# Patient Record
Sex: FEMALE | Race: WHITE | NOT HISPANIC OR LATINO | Employment: FULL TIME | URBAN - METROPOLITAN AREA
[De-identification: names, ages, dates, MRNs, and addresses within clinical notes are randomized per-mention and may not be internally consistent; named-entity substitution may affect disease eponyms.]

---

## 2017-06-29 ENCOUNTER — APPOINTMENT (OUTPATIENT)
Dept: GENERAL RADIOLOGY | Facility: HOSPITAL | Age: 37
End: 2017-06-29
Attending: INTERNAL MEDICINE

## 2017-06-29 ENCOUNTER — HOSPITAL ENCOUNTER (INPATIENT)
Facility: HOSPITAL | Age: 37
LOS: 14 days | Discharge: NURSING, REHAB OR LONG-TERM CARE | End: 2017-07-13
Attending: INTERNAL MEDICINE | Admitting: INTERNAL MEDICINE

## 2017-06-29 DIAGNOSIS — R53.1 GENERALIZED WEAKNESS: Primary | ICD-10-CM

## 2017-06-29 LAB
ALBUMIN SERPL-MCNC: 2.9 G/DL (ref 3.5–5.2)
ALBUMIN/GLOB SERPL: 0.6 G/DL
ALP SERPL-CCNC: 77 U/L (ref 39–117)
ALT SERPL W P-5'-P-CCNC: <5 U/L (ref 1–33)
ANION GAP SERPL CALCULATED.3IONS-SCNC: 25 MMOL/L
ARTERIAL PATENCY WRIST A: POSITIVE
AST SERPL-CCNC: 7 U/L (ref 1–32)
ATMOSPHERIC PRESS: 746.8 MMHG
BACTERIA UR QL AUTO: ABNORMAL /HPF
BASE EXCESS BLDA CALC-SCNC: -25.9 MMOL/L (ref 0–2)
BDY SITE: ABNORMAL
BILIRUB SERPL-MCNC: 0.2 MG/DL (ref 0.1–1.2)
BILIRUB UR QL STRIP: NEGATIVE
BUN BLD-MCNC: 21 MG/DL (ref 6–20)
BUN/CREAT SERPL: 10.8 (ref 7–25)
CALCIUM SPEC-SCNC: 9.4 MG/DL (ref 8.6–10.5)
CHLORIDE SERPL-SCNC: 102 MMOL/L (ref 98–107)
CLARITY UR: ABNORMAL
CO2 SERPL-SCNC: 5 MMOL/L (ref 22–29)
COLOR UR: YELLOW
CREAT BLD-MCNC: 1.94 MG/DL (ref 0.57–1)
D-LACTATE SERPL-SCNC: 2.1 MMOL/L (ref 0.5–2)
DEPRECATED RDW RBC AUTO: 48 FL (ref 37–54)
ERYTHROCYTE [DISTWIDTH] IN BLOOD BY AUTOMATED COUNT: 14.6 % (ref 11.7–13)
GAS FLOW AIRWAY: 1 LPM
GFR SERPL CREATININE-BSD FRML MDRD: 29 ML/MIN/1.73
GLOBULIN UR ELPH-MCNC: 4.8 GM/DL
GLUCOSE BLD-MCNC: 300 MG/DL (ref 65–99)
GLUCOSE BLDC GLUCOMTR-MCNC: 226 MG/DL (ref 70–130)
GLUCOSE BLDC GLUCOMTR-MCNC: 243 MG/DL (ref 70–130)
GLUCOSE BLDC GLUCOMTR-MCNC: 262 MG/DL (ref 70–130)
GLUCOSE BLDC GLUCOMTR-MCNC: 330 MG/DL (ref 70–130)
GLUCOSE UR STRIP-MCNC: ABNORMAL MG/DL
GRAN CASTS URNS QL MICRO: ABNORMAL /LPF
HBA1C MFR BLD: 12.82 % (ref 4.8–5.6)
HCO3 BLDA-SCNC: 4 MMOL/L (ref 22–28)
HCT VFR BLD AUTO: 33.6 % (ref 35.6–45.5)
HGB BLD-MCNC: 12.2 G/DL (ref 11.9–15.5)
HGB UR QL STRIP.AUTO: ABNORMAL
HYALINE CASTS UR QL AUTO: ABNORMAL /LPF
KETONES UR QL STRIP: ABNORMAL
LEUKOCYTE ESTERASE UR QL STRIP.AUTO: NEGATIVE
MCH RBC QN AUTO: 32.8 PG (ref 26.9–32)
MCHC RBC AUTO-ENTMCNC: 36.3 G/DL (ref 32.4–36.3)
MCV RBC AUTO: 90.3 FL (ref 80.5–98.2)
MODALITY: ABNORMAL
NITRITE UR QL STRIP: NEGATIVE
PCO2 BLDA: 16.8 MM HG (ref 35–45)
PH BLDA: 6.99 PH UNITS (ref 7.35–7.45)
PH UR STRIP.AUTO: 6 [PH] (ref 5–8)
PLATELET # BLD AUTO: 383 10*3/MM3 (ref 140–500)
PMV BLD AUTO: 10.3 FL (ref 6–12)
PO2 BLDA: 88.8 MM HG (ref 80–100)
POTASSIUM BLD-SCNC: <1.5 MMOL/L (ref 3.5–5.2)
PROCALCITONIN SERPL-MCNC: 2.18 NG/ML (ref 0.1–0.25)
PROT SERPL-MCNC: 7.7 G/DL (ref 6–8.5)
PROT UR QL STRIP: ABNORMAL
RBC # BLD AUTO: 3.72 10*6/MM3 (ref 3.9–5.2)
RBC # UR: ABNORMAL /HPF
REF LAB TEST METHOD: ABNORMAL
SAO2 % BLDCOA: 90.5 % (ref 92–99)
SODIUM BLD-SCNC: 132 MMOL/L (ref 136–145)
SP GR UR STRIP: 1.02 (ref 1–1.03)
SQUAMOUS #/AREA URNS HPF: ABNORMAL /HPF
TOTAL RATE: 28 BREATHS/MINUTE
TSH SERPL DL<=0.05 MIU/L-ACNC: 4.91 MIU/ML (ref 0.27–4.2)
UROBILINOGEN UR QL STRIP: ABNORMAL
WBC NRBC COR # BLD: 16.5 10*3/MM3 (ref 4.5–10.7)
WBC UR QL AUTO: ABNORMAL /HPF

## 2017-06-29 PROCEDURE — 84145 PROCALCITONIN (PCT): CPT | Performed by: INTERNAL MEDICINE

## 2017-06-29 PROCEDURE — 25010000002 VANCOMYCIN 10 G RECONSTITUTED SOLUTION: Performed by: INTERNAL MEDICINE

## 2017-06-29 PROCEDURE — 36600 WITHDRAWAL OF ARTERIAL BLOOD: CPT

## 2017-06-29 PROCEDURE — 93005 ELECTROCARDIOGRAM TRACING: CPT | Performed by: INTERNAL MEDICINE

## 2017-06-29 PROCEDURE — 84100 ASSAY OF PHOSPHORUS: CPT | Performed by: INTERNAL MEDICINE

## 2017-06-29 PROCEDURE — 83735 ASSAY OF MAGNESIUM: CPT | Performed by: INTERNAL MEDICINE

## 2017-06-29 PROCEDURE — 80053 COMPREHEN METABOLIC PANEL: CPT | Performed by: INTERNAL MEDICINE

## 2017-06-29 PROCEDURE — 80048 BASIC METABOLIC PNL TOTAL CA: CPT | Performed by: INTERNAL MEDICINE

## 2017-06-29 PROCEDURE — 81001 URINALYSIS AUTO W/SCOPE: CPT | Performed by: INTERNAL MEDICINE

## 2017-06-29 PROCEDURE — 63710000001 INSULIN REGULAR HUMAN PER 5 UNITS: Performed by: INTERNAL MEDICINE

## 2017-06-29 PROCEDURE — 83605 ASSAY OF LACTIC ACID: CPT | Performed by: INTERNAL MEDICINE

## 2017-06-29 PROCEDURE — 82962 GLUCOSE BLOOD TEST: CPT

## 2017-06-29 PROCEDURE — 82803 BLOOD GASES ANY COMBINATION: CPT

## 2017-06-29 PROCEDURE — 87086 URINE CULTURE/COLONY COUNT: CPT | Performed by: INTERNAL MEDICINE

## 2017-06-29 PROCEDURE — 25010000002 PIPERACILLIN SOD-TAZOBACTAM PER 1 G: Performed by: INTERNAL MEDICINE

## 2017-06-29 PROCEDURE — 94799 UNLISTED PULMONARY SVC/PX: CPT

## 2017-06-29 PROCEDURE — 85027 COMPLETE CBC AUTOMATED: CPT | Performed by: INTERNAL MEDICINE

## 2017-06-29 PROCEDURE — 25010000002 MAGNESIUM SULFATE IN D5W 1G/100ML (PREMIX) 1-5 GM/100ML-% SOLUTION: Performed by: INTERNAL MEDICINE

## 2017-06-29 PROCEDURE — 25010000002 HEPARIN (PORCINE) PER 1000 UNITS: Performed by: INTERNAL MEDICINE

## 2017-06-29 PROCEDURE — 25810000003 POTASSIUM CHLORIDE PER 2 MEQ: Performed by: INTERNAL MEDICINE

## 2017-06-29 PROCEDURE — 25010000003 POTASSIUM CHLORIDE 10 MEQ/100ML SOLUTION: Performed by: INTERNAL MEDICINE

## 2017-06-29 PROCEDURE — 83036 HEMOGLOBIN GLYCOSYLATED A1C: CPT | Performed by: INTERNAL MEDICINE

## 2017-06-29 PROCEDURE — 82330 ASSAY OF CALCIUM: CPT | Performed by: INTERNAL MEDICINE

## 2017-06-29 PROCEDURE — 71010 HC CHEST PA OR AP: CPT

## 2017-06-29 PROCEDURE — 84443 ASSAY THYROID STIM HORMONE: CPT | Performed by: INTERNAL MEDICINE

## 2017-06-29 RX ORDER — POTASSIUM CHLORIDE 1.5 G/1.77G
40 POWDER, FOR SOLUTION ORAL AS NEEDED
Status: DISCONTINUED | OUTPATIENT
Start: 2017-06-29 | End: 2017-06-29 | Stop reason: CLARIF

## 2017-06-29 RX ORDER — POTASSIUM CHLORIDE 750 MG/1
20 CAPSULE, EXTENDED RELEASE ORAL AS NEEDED
Status: DISCONTINUED | OUTPATIENT
Start: 2017-06-29 | End: 2017-06-30 | Stop reason: ALTCHOICE

## 2017-06-29 RX ORDER — POTASSIUM CHLORIDE 7.46 G/1000ML
10 INJECTION, SOLUTION INTRAVENOUS
Status: DISCONTINUED | OUTPATIENT
Start: 2017-06-29 | End: 2017-06-30 | Stop reason: ALTCHOICE

## 2017-06-29 RX ORDER — SODIUM CHLORIDE AND POTASSIUM CHLORIDE 150; 450 MG/100ML; MG/100ML
250 INJECTION, SOLUTION INTRAVENOUS CONTINUOUS PRN
Status: DISCONTINUED | OUTPATIENT
Start: 2017-06-29 | End: 2017-07-02

## 2017-06-29 RX ORDER — POTASSIUM CHLORIDE 7.45 MG/ML
10 INJECTION INTRAVENOUS
Status: DISCONTINUED | OUTPATIENT
Start: 2017-06-29 | End: 2017-06-30 | Stop reason: ALTCHOICE

## 2017-06-29 RX ORDER — POTASSIUM CHLORIDE 1.5 G/1.77G
20 POWDER, FOR SOLUTION ORAL AS NEEDED
Status: DISCONTINUED | OUTPATIENT
Start: 2017-06-29 | End: 2017-06-30 | Stop reason: ALTCHOICE

## 2017-06-29 RX ORDER — POTASSIUM CHLORIDE 750 MG/1
40 CAPSULE, EXTENDED RELEASE ORAL AS NEEDED
Status: DISCONTINUED | OUTPATIENT
Start: 2017-06-29 | End: 2017-06-30 | Stop reason: ALTCHOICE

## 2017-06-29 RX ORDER — MAGNESIUM SULFATE HEPTAHYDRATE 40 MG/ML
2 INJECTION, SOLUTION INTRAVENOUS AS NEEDED
Status: DISCONTINUED | OUTPATIENT
Start: 2017-06-29 | End: 2017-07-06

## 2017-06-29 RX ORDER — DEXTROSE AND SODIUM CHLORIDE 5; .45 G/100ML; G/100ML
150 INJECTION, SOLUTION INTRAVENOUS CONTINUOUS PRN
Status: DISCONTINUED | OUTPATIENT
Start: 2017-06-29 | End: 2017-07-02

## 2017-06-29 RX ORDER — SODIUM CHLORIDE 450 MG/100ML
250 INJECTION, SOLUTION INTRAVENOUS CONTINUOUS
Status: DISCONTINUED | OUTPATIENT
Start: 2017-06-29 | End: 2017-07-02

## 2017-06-29 RX ORDER — MAGNESIUM SULFATE 1 G/100ML
1 INJECTION INTRAVENOUS ONCE
Status: COMPLETED | OUTPATIENT
Start: 2017-06-29 | End: 2017-06-29

## 2017-06-29 RX ORDER — POTASSIUM CHLORIDE 750 MG/1
10 CAPSULE, EXTENDED RELEASE ORAL AS NEEDED
Status: DISCONTINUED | OUTPATIENT
Start: 2017-06-29 | End: 2017-06-30 | Stop reason: ALTCHOICE

## 2017-06-29 RX ORDER — MAGNESIUM SULFATE HEPTAHYDRATE 40 MG/ML
4 INJECTION, SOLUTION INTRAVENOUS AS NEEDED
Status: DISCONTINUED | OUTPATIENT
Start: 2017-06-29 | End: 2017-07-06

## 2017-06-29 RX ORDER — DEXTROSE MONOHYDRATE 25 G/50ML
12.5 INJECTION, SOLUTION INTRAVENOUS
Status: DISCONTINUED | OUTPATIENT
Start: 2017-06-29 | End: 2017-07-13 | Stop reason: HOSPADM

## 2017-06-29 RX ORDER — POTASSIUM CHLORIDE 1.5 G/1.77G
10 POWDER, FOR SOLUTION ORAL AS NEEDED
Status: DISCONTINUED | OUTPATIENT
Start: 2017-06-29 | End: 2017-06-30 | Stop reason: ALTCHOICE

## 2017-06-29 RX ORDER — HEPARIN SODIUM 5000 [USP'U]/ML
5000 INJECTION, SOLUTION INTRAVENOUS; SUBCUTANEOUS EVERY 8 HOURS SCHEDULED
Status: DISCONTINUED | OUTPATIENT
Start: 2017-06-29 | End: 2017-07-01

## 2017-06-29 RX ORDER — DEXTROSE, SODIUM CHLORIDE, AND POTASSIUM CHLORIDE 5; .45; .15 G/100ML; G/100ML; G/100ML
150 INJECTION INTRAVENOUS CONTINUOUS PRN
Status: DISCONTINUED | OUTPATIENT
Start: 2017-06-29 | End: 2017-07-02

## 2017-06-29 RX ADMIN — POTASSIUM CHLORIDE 10 MEQ: 7.46 INJECTION, SOLUTION INTRAVENOUS at 20:43

## 2017-06-29 RX ADMIN — MAGNESIUM SULFATE HEPTAHYDRATE 1 G: 1 INJECTION, SOLUTION INTRAVENOUS at 20:43

## 2017-06-29 RX ADMIN — VANCOMYCIN HYDROCHLORIDE 1500 MG: 10 INJECTION, POWDER, LYOPHILIZED, FOR SOLUTION INTRAVENOUS at 21:54

## 2017-06-29 RX ADMIN — TAZOBACTAM SODIUM AND PIPERACILLIN SODIUM 3.38 G: 375; 3 INJECTION, SOLUTION INTRAVENOUS at 21:08

## 2017-06-29 RX ADMIN — HEPARIN SODIUM 5000 UNITS: 5000 INJECTION, SOLUTION INTRAVENOUS; SUBCUTANEOUS at 22:13

## 2017-06-29 RX ADMIN — HUMAN INSULIN 7.1 UNITS: 100 INJECTION, SOLUTION SUBCUTANEOUS at 22:14

## 2017-06-29 RX ADMIN — POTASSIUM CHLORIDE 10 MEQ: 7.46 INJECTION, SOLUTION INTRAVENOUS at 22:08

## 2017-06-29 RX ADMIN — SODIUM CHLORIDE 0.13 UNITS/KG/HR: 9 INJECTION, SOLUTION INTRAVENOUS at 22:17

## 2017-06-29 RX ADMIN — POTASSIUM CHLORIDE AND SODIUM CHLORIDE 250 ML/HR: 450; 150 INJECTION, SOLUTION INTRAVENOUS at 22:13

## 2017-06-29 RX ADMIN — POTASSIUM CHLORIDE 10 MEQ: 7.46 INJECTION, SOLUTION INTRAVENOUS at 22:07

## 2017-06-29 RX ADMIN — HUMAN INSULIN 7.1 UNITS: 100 INJECTION, SOLUTION SUBCUTANEOUS at 21:08

## 2017-06-30 ENCOUNTER — APPOINTMENT (OUTPATIENT)
Dept: GENERAL RADIOLOGY | Facility: HOSPITAL | Age: 37
End: 2017-06-30
Attending: INTERNAL MEDICINE

## 2017-06-30 PROBLEM — E11.10 DKA (DIABETIC KETOACIDOSES): Status: ACTIVE | Noted: 2017-06-30

## 2017-06-30 LAB
ALBUMIN SERPL-MCNC: 2.6 G/DL (ref 3.5–5.2)
ANION GAP SERPL CALCULATED.3IONS-SCNC: 13.9 MMOL/L
ANION GAP SERPL CALCULATED.3IONS-SCNC: 15.9 MMOL/L
ANION GAP SERPL CALCULATED.3IONS-SCNC: 16.1 MMOL/L
ANION GAP SERPL CALCULATED.3IONS-SCNC: 17 MMOL/L
ANION GAP SERPL CALCULATED.3IONS-SCNC: 18.8 MMOL/L
ANION GAP SERPL CALCULATED.3IONS-SCNC: 21.2 MMOL/L
ARTERIAL PATENCY WRIST A: ABNORMAL
ATMOSPHERIC PRESS: 744.2 MMHG
ATMOSPHERIC PRESS: 747.9 MMHG
B PERT DNA SPEC QL NAA+PROBE: NOT DETECTED
BASE EXCESS BLDA CALC-SCNC: -9.4 MMOL/L (ref 0–2)
BASE EXCESS BLDV CALC-SCNC: -22.5 MMOL/L
BDY SITE: ABNORMAL
BDY SITE: ABNORMAL
BUN BLD-MCNC: 13 MG/DL (ref 6–20)
BUN BLD-MCNC: 21 MG/DL (ref 6–20)
BUN BLD-MCNC: 24 MG/DL (ref 6–20)
BUN BLD-MCNC: 26 MG/DL (ref 6–20)
BUN BLD-MCNC: 27 MG/DL (ref 6–20)
BUN BLD-MCNC: 28 MG/DL (ref 6–20)
BUN/CREAT SERPL: 10.3 (ref 7–25)
BUN/CREAT SERPL: 10.5 (ref 7–25)
BUN/CREAT SERPL: 11.1 (ref 7–25)
BUN/CREAT SERPL: 11.6 (ref 7–25)
BUN/CREAT SERPL: 12.1 (ref 7–25)
BUN/CREAT SERPL: 9.2 (ref 7–25)
C PNEUM DNA NPH QL NAA+NON-PROBE: NOT DETECTED
CA-I BLD-MCNC: 4.5 MG/DL (ref 4.6–5.4)
CA-I BLD-MCNC: 5.9 MG/DL (ref 4.6–5.4)
CA-I BLD-MCNC: 6 MG/DL (ref 4.6–5.4)
CA-I BLD-MCNC: 6.2 MG/DL (ref 4.6–5.4)
CA-I BLD-MCNC: 6.4 MG/DL (ref 4.6–5.4)
CA-I BLD-MCNC: 6.5 MG/DL (ref 4.6–5.4)
CA-I SERPL ISE-MCNC: 1.13 MMOL/L (ref 1.1–1.35)
CA-I SERPL ISE-MCNC: 1.48 MMOL/L (ref 1.1–1.35)
CA-I SERPL ISE-MCNC: 1.5 MMOL/L (ref 1.1–1.35)
CA-I SERPL ISE-MCNC: 1.55 MMOL/L (ref 1.1–1.35)
CA-I SERPL ISE-MCNC: 1.6 MMOL/L (ref 1.1–1.35)
CA-I SERPL ISE-MCNC: 1.62 MMOL/L (ref 1.1–1.35)
CALCIUM SPEC-SCNC: 7.1 MG/DL (ref 8.6–10.5)
CALCIUM SPEC-SCNC: 7.8 MG/DL (ref 8.6–10.5)
CALCIUM SPEC-SCNC: 8.4 MG/DL (ref 8.6–10.5)
CALCIUM SPEC-SCNC: 9 MG/DL (ref 8.6–10.5)
CALCIUM SPEC-SCNC: 9.2 MG/DL (ref 8.6–10.5)
CALCIUM SPEC-SCNC: 9.3 MG/DL (ref 8.6–10.5)
CHLORIDE SERPL-SCNC: 107 MMOL/L (ref 98–107)
CHLORIDE SERPL-SCNC: 109 MMOL/L (ref 98–107)
CHLORIDE SERPL-SCNC: 111 MMOL/L (ref 98–107)
CHLORIDE SERPL-SCNC: 112 MMOL/L (ref 98–107)
CHLORIDE SERPL-SCNC: 113 MMOL/L (ref 98–107)
CHLORIDE SERPL-SCNC: 97 MMOL/L (ref 98–107)
CO2 SERPL-SCNC: 15.2 MMOL/L (ref 22–29)
CO2 SERPL-SCNC: 4 MMOL/L (ref 22–29)
CO2 SERPL-SCNC: 4.8 MMOL/L (ref 22–29)
CO2 SERPL-SCNC: 5.9 MMOL/L (ref 22–29)
CO2 SERPL-SCNC: 6.1 MMOL/L (ref 22–29)
CO2 SERPL-SCNC: 6.1 MMOL/L (ref 22–29)
CORTIS SERPL-MCNC: 72 MCG/DL
CREAT BLD-MCNC: 1.42 MG/DL (ref 0.57–1)
CREAT BLD-MCNC: 1.81 MG/DL (ref 0.57–1)
CREAT BLD-MCNC: 1.98 MG/DL (ref 0.57–1)
CREAT BLD-MCNC: 2.34 MG/DL (ref 0.57–1)
CREAT BLD-MCNC: 2.56 MG/DL (ref 0.57–1)
CREAT BLD-MCNC: 2.73 MG/DL (ref 0.57–1)
D-LACTATE SERPL-SCNC: 1.7 MMOL/L (ref 0.5–2)
D-LACTATE SERPL-SCNC: 2.9 MMOL/L (ref 0.5–2)
D-LACTATE SERPL-SCNC: 3 MMOL/L (ref 0.5–2)
FLUAV H1 2009 PAND RNA NPH QL NAA+PROBE: NOT DETECTED
FLUAV H1 HA GENE NPH QL NAA+PROBE: NOT DETECTED
FLUAV H3 RNA NPH QL NAA+PROBE: NOT DETECTED
FLUAV SUBTYP SPEC NAA+PROBE: NOT DETECTED
FLUBV RNA ISLT QL NAA+PROBE: NOT DETECTED
GAS FLOW AIRWAY: 1 LPM
GFR SERPL CREATININE-BSD FRML MDRD: 19 ML/MIN/1.73
GFR SERPL CREATININE-BSD FRML MDRD: 21 ML/MIN/1.73
GFR SERPL CREATININE-BSD FRML MDRD: 23 ML/MIN/1.73
GFR SERPL CREATININE-BSD FRML MDRD: 28 ML/MIN/1.73
GFR SERPL CREATININE-BSD FRML MDRD: 31 ML/MIN/1.73
GFR SERPL CREATININE-BSD FRML MDRD: 41 ML/MIN/1.73
GLUCOSE BLD-MCNC: 146 MG/DL (ref 65–99)
GLUCOSE BLD-MCNC: 147 MG/DL (ref 65–99)
GLUCOSE BLD-MCNC: 181 MG/DL (ref 65–99)
GLUCOSE BLD-MCNC: 185 MG/DL (ref 65–99)
GLUCOSE BLD-MCNC: 221 MG/DL (ref 65–99)
GLUCOSE BLD-MCNC: 254 MG/DL (ref 65–99)
GLUCOSE BLDC GLUCOMTR-MCNC: 115 MG/DL (ref 70–130)
GLUCOSE BLDC GLUCOMTR-MCNC: 119 MG/DL (ref 70–130)
GLUCOSE BLDC GLUCOMTR-MCNC: 124 MG/DL (ref 70–130)
GLUCOSE BLDC GLUCOMTR-MCNC: 132 MG/DL (ref 70–130)
GLUCOSE BLDC GLUCOMTR-MCNC: 138 MG/DL (ref 70–130)
GLUCOSE BLDC GLUCOMTR-MCNC: 138 MG/DL (ref 70–130)
GLUCOSE BLDC GLUCOMTR-MCNC: 144 MG/DL (ref 70–130)
GLUCOSE BLDC GLUCOMTR-MCNC: 148 MG/DL (ref 70–130)
GLUCOSE BLDC GLUCOMTR-MCNC: 154 MG/DL (ref 70–130)
GLUCOSE BLDC GLUCOMTR-MCNC: 155 MG/DL (ref 70–130)
GLUCOSE BLDC GLUCOMTR-MCNC: 156 MG/DL (ref 70–130)
GLUCOSE BLDC GLUCOMTR-MCNC: 158 MG/DL (ref 70–130)
GLUCOSE BLDC GLUCOMTR-MCNC: 159 MG/DL (ref 70–130)
GLUCOSE BLDC GLUCOMTR-MCNC: 165 MG/DL (ref 70–130)
GLUCOSE BLDC GLUCOMTR-MCNC: 167 MG/DL (ref 70–130)
GLUCOSE BLDC GLUCOMTR-MCNC: 173 MG/DL (ref 70–130)
GLUCOSE BLDC GLUCOMTR-MCNC: 178 MG/DL (ref 70–130)
GLUCOSE BLDC GLUCOMTR-MCNC: 195 MG/DL (ref 70–130)
GLUCOSE BLDC GLUCOMTR-MCNC: 197 MG/DL (ref 70–130)
GLUCOSE BLDC GLUCOMTR-MCNC: 199 MG/DL (ref 70–130)
GLUCOSE BLDC GLUCOMTR-MCNC: 209 MG/DL (ref 70–130)
GLUCOSE BLDC GLUCOMTR-MCNC: 255 MG/DL (ref 70–130)
GLUCOSE BLDC GLUCOMTR-MCNC: 280 MG/DL (ref 70–130)
GLUCOSE BLDC GLUCOMTR-MCNC: 71 MG/DL (ref 70–130)
GLUCOSE BLDC GLUCOMTR-MCNC: 75 MG/DL (ref 70–130)
GLUCOSE BLDC GLUCOMTR-MCNC: 81 MG/DL (ref 70–130)
GLUCOSE BLDC GLUCOMTR-MCNC: 85 MG/DL (ref 70–130)
GLUCOSE BLDC GLUCOMTR-MCNC: 96 MG/DL (ref 70–130)
GLUCOSE BLDC GLUCOMTR-MCNC: 97 MG/DL (ref 70–130)
HADV DNA SPEC NAA+PROBE: NOT DETECTED
HBV SURFACE AB SER RIA-ACNC: NORMAL
HBV SURFACE AG SERPL QL IA: NORMAL
HCO3 BLDA-SCNC: 15 MMOL/L (ref 22–28)
HCO3 BLDV-SCNC: 6.7 MMOL/L (ref 22–28)
HCOV 229E RNA SPEC QL NAA+PROBE: NOT DETECTED
HCOV HKU1 RNA SPEC QL NAA+PROBE: NOT DETECTED
HCOV NL63 RNA SPEC QL NAA+PROBE: NOT DETECTED
HCOV OC43 RNA SPEC QL NAA+PROBE: NOT DETECTED
HMPV RNA NPH QL NAA+NON-PROBE: NOT DETECTED
HOROWITZ INDEX BLD+IHG-RTO: 100 %
HPIV1 RNA SPEC QL NAA+PROBE: NOT DETECTED
HPIV2 RNA SPEC QL NAA+PROBE: NOT DETECTED
HPIV3 RNA NPH QL NAA+PROBE: NOT DETECTED
HPIV4 P GENE NPH QL NAA+PROBE: NOT DETECTED
M PNEUMO IGG SER IA-ACNC: NOT DETECTED
MAGNESIUM SERPL-MCNC: 1.8 MG/DL (ref 1.6–2.6)
MAGNESIUM SERPL-MCNC: 2 MG/DL (ref 1.6–2.6)
MAGNESIUM SERPL-MCNC: 2.3 MG/DL (ref 1.6–2.6)
MAGNESIUM SERPL-MCNC: 2.4 MG/DL (ref 1.6–2.6)
MAGNESIUM SERPL-MCNC: 2.4 MG/DL (ref 1.6–2.6)
MODALITY: ABNORMAL
MODALITY: ABNORMAL
O2 A-A PPRESDIFF RESPIRATORY: 0.3 MMHG
OSMOLALITY SERPL: 292 MOSM/KG (ref 275–300)
PCO2 BLDA: 27.3 MM HG (ref 35–45)
PCO2 BLDV: 24.7 MM HG (ref 41–51)
PEEP RESPIRATORY: 10 CM[H2O]
PH BLDA: 7.35 PH UNITS (ref 7.35–7.45)
PH BLDV: 7.04 [PH] (ref 7.31–7.41)
PHOSPHATE SERPL-MCNC: 0.4 MG/DL (ref 2.5–4.5)
PHOSPHATE SERPL-MCNC: 0.6 MG/DL (ref 2.5–4.5)
PHOSPHATE SERPL-MCNC: 1.4 MG/DL (ref 2.5–4.5)
PHOSPHATE SERPL-MCNC: 1.8 MG/DL (ref 2.5–4.5)
PO2 BLDA: 196.6 MM HG (ref 80–100)
PO2 BLDV: 48.8 MM HG (ref 35–45)
POTASSIUM BLD-SCNC: 2 MMOL/L (ref 3.5–5.2)
POTASSIUM BLD-SCNC: 3.3 MMOL/L (ref 3.5–5.2)
POTASSIUM BLD-SCNC: 3.4 MMOL/L (ref 3.5–5.2)
POTASSIUM BLD-SCNC: 3.7 MMOL/L (ref 3.5–5.2)
POTASSIUM BLD-SCNC: 3.9 MMOL/L (ref 3.5–5.2)
POTASSIUM BLD-SCNC: 4.2 MMOL/L (ref 3.5–5.2)
RHINOVIRUS RNA SPEC NAA+PROBE: NOT DETECTED
RSV RNA NPH QL NAA+NON-PROBE: NOT DETECTED
SAO2 % BLDCOA: 66.5 % (ref 92–99)
SAO2 % BLDCOA: 99.7 % (ref 92–99)
SET MECH RESP RATE: 24
SODIUM BLD-SCNC: 129 MMOL/L (ref 136–145)
SODIUM BLD-SCNC: 131 MMOL/L (ref 136–145)
SODIUM BLD-SCNC: 132 MMOL/L (ref 136–145)
SODIUM BLD-SCNC: 132 MMOL/L (ref 136–145)
SODIUM BLD-SCNC: 135 MMOL/L (ref 136–145)
SODIUM BLD-SCNC: 135 MMOL/L (ref 136–145)
TOTAL RATE: 24 BREATHS/MINUTE
TOTAL RATE: 44 BREATHS/MINUTE
VANCOMYCIN SERPL-MCNC: 9.5 MCG/ML (ref 5–40)
VENTILATOR MODE: ABNORMAL
VT ON VENT VENT: 476 ML

## 2017-06-30 PROCEDURE — 25010000002 FENTANYL CITRATE (PF) 100 MCG/2ML SOLUTION: Performed by: INTERNAL MEDICINE

## 2017-06-30 PROCEDURE — G0480 DRUG TEST DEF 1-7 CLASSES: HCPCS | Performed by: INTERNAL MEDICINE

## 2017-06-30 PROCEDURE — 80299 QUANTITATIVE ASSAY DRUG: CPT | Performed by: INTERNAL MEDICINE

## 2017-06-30 PROCEDURE — 80184 ASSAY OF PHENOBARBITAL: CPT | Performed by: INTERNAL MEDICINE

## 2017-06-30 PROCEDURE — 84100 ASSAY OF PHOSPHORUS: CPT | Performed by: INTERNAL MEDICINE

## 2017-06-30 PROCEDURE — 94799 UNLISTED PULMONARY SVC/PX: CPT

## 2017-06-30 PROCEDURE — 82330 ASSAY OF CALCIUM: CPT | Performed by: INTERNAL MEDICINE

## 2017-06-30 PROCEDURE — 02HV33Z INSERTION OF INFUSION DEVICE INTO SUPERIOR VENA CAVA, PERCUTANEOUS APPROACH: ICD-10-PCS | Performed by: INTERNAL MEDICINE

## 2017-06-30 PROCEDURE — 83605 ASSAY OF LACTIC ACID: CPT | Performed by: INTERNAL MEDICINE

## 2017-06-30 PROCEDURE — 87340 HEPATITIS B SURFACE AG IA: CPT | Performed by: INTERNAL MEDICINE

## 2017-06-30 PROCEDURE — 0BH17EZ INSERTION OF ENDOTRACHEAL AIRWAY INTO TRACHEA, VIA NATURAL OR ARTIFICIAL OPENING: ICD-10-PCS | Performed by: INTERNAL MEDICINE

## 2017-06-30 PROCEDURE — 87040 BLOOD CULTURE FOR BACTERIA: CPT | Performed by: INTERNAL MEDICINE

## 2017-06-30 PROCEDURE — 25010000002 PROPOFOL 10 MG/ML EMULSION

## 2017-06-30 PROCEDURE — 25010000002 HEPARIN (PORCINE) PER 1000 UNITS: Performed by: INTERNAL MEDICINE

## 2017-06-30 PROCEDURE — 25010000002 CALCIUM GLUCONATE PER 10 ML: Performed by: INTERNAL MEDICINE

## 2017-06-30 PROCEDURE — 87486 CHLMYD PNEUM DNA AMP PROBE: CPT | Performed by: INTERNAL MEDICINE

## 2017-06-30 PROCEDURE — 99255 IP/OBS CONSLTJ NEW/EST HI 80: CPT | Performed by: INTERNAL MEDICINE

## 2017-06-30 PROCEDURE — 71010 HC CHEST PA OR AP: CPT

## 2017-06-30 PROCEDURE — 80048 BASIC METABOLIC PNL TOTAL CA: CPT | Performed by: INTERNAL MEDICINE

## 2017-06-30 PROCEDURE — 86704 HEP B CORE ANTIBODY TOTAL: CPT | Performed by: INTERNAL MEDICINE

## 2017-06-30 PROCEDURE — 25010000002 PIPERACILLIN SOD-TAZOBACTAM PER 1 G: Performed by: INTERNAL MEDICINE

## 2017-06-30 PROCEDURE — 25010000002 HYDROCORTISONE SODIUM SUCCINATE 100 MG RECONSTITUTED SOLUTION: Performed by: INTERNAL MEDICINE

## 2017-06-30 PROCEDURE — 25010000002 PROPOFOL 1000 MG/ML EMULSION: Performed by: INTERNAL MEDICINE

## 2017-06-30 PROCEDURE — 25010000002 THIAMINE PER 100 MG: Performed by: INTERNAL MEDICINE

## 2017-06-30 PROCEDURE — 82533 TOTAL CORTISOL: CPT | Performed by: INTERNAL MEDICINE

## 2017-06-30 PROCEDURE — 25010000002 LORAZEPAM PER 2 MG

## 2017-06-30 PROCEDURE — 87798 DETECT AGENT NOS DNA AMP: CPT | Performed by: INTERNAL MEDICINE

## 2017-06-30 PROCEDURE — 83735 ASSAY OF MAGNESIUM: CPT | Performed by: INTERNAL MEDICINE

## 2017-06-30 PROCEDURE — 25010000003 POTASSIUM CHLORIDE 10 MEQ/100ML SOLUTION: Performed by: INTERNAL MEDICINE

## 2017-06-30 PROCEDURE — 87581 M.PNEUMON DNA AMP PROBE: CPT | Performed by: INTERNAL MEDICINE

## 2017-06-30 PROCEDURE — 93010 ELECTROCARDIOGRAM REPORT: CPT | Performed by: INTERNAL MEDICINE

## 2017-06-30 PROCEDURE — 93005 ELECTROCARDIOGRAM TRACING: CPT | Performed by: INTERNAL MEDICINE

## 2017-06-30 PROCEDURE — 82010 KETONE BODYS QUAN: CPT | Performed by: INTERNAL MEDICINE

## 2017-06-30 PROCEDURE — 83930 ASSAY OF BLOOD OSMOLALITY: CPT | Performed by: INTERNAL MEDICINE

## 2017-06-30 PROCEDURE — 25010000002 PROPOFOL 10 MG/ML EMULSION: Performed by: INTERNAL MEDICINE

## 2017-06-30 PROCEDURE — 5A1955Z RESPIRATORY VENTILATION, GREATER THAN 96 CONSECUTIVE HOURS: ICD-10-PCS | Performed by: INTERNAL MEDICINE

## 2017-06-30 PROCEDURE — 80202 ASSAY OF VANCOMYCIN: CPT | Performed by: INTERNAL MEDICINE

## 2017-06-30 PROCEDURE — 82962 GLUCOSE BLOOD TEST: CPT

## 2017-06-30 PROCEDURE — 87070 CULTURE OTHR SPECIMN AEROBIC: CPT | Performed by: INTERNAL MEDICINE

## 2017-06-30 PROCEDURE — 03HC33Z INSERTION OF INFUSION DEVICE INTO LEFT RADIAL ARTERY, PERCUTANEOUS APPROACH: ICD-10-PCS | Performed by: INTERNAL MEDICINE

## 2017-06-30 PROCEDURE — 84600 ASSAY OF VOLATILES: CPT | Performed by: INTERNAL MEDICINE

## 2017-06-30 PROCEDURE — 87633 RESP VIRUS 12-25 TARGETS: CPT | Performed by: INTERNAL MEDICINE

## 2017-06-30 PROCEDURE — 25010000002 VANCOMYCIN: Performed by: INTERNAL MEDICINE

## 2017-06-30 PROCEDURE — 25810000003 POTASSIUM CHLORIDE PER 2 MEQ: Performed by: INTERNAL MEDICINE

## 2017-06-30 PROCEDURE — 80069 RENAL FUNCTION PANEL: CPT | Performed by: INTERNAL MEDICINE

## 2017-06-30 PROCEDURE — 86706 HEP B SURFACE ANTIBODY: CPT | Performed by: INTERNAL MEDICINE

## 2017-06-30 PROCEDURE — 87205 SMEAR GRAM STAIN: CPT | Performed by: INTERNAL MEDICINE

## 2017-06-30 PROCEDURE — 82803 BLOOD GASES ANY COMBINATION: CPT

## 2017-06-30 PROCEDURE — 5A1D60Z PERFORMANCE OF URINARY FILTRATION, MULTIPLE: ICD-10-PCS | Performed by: INTERNAL MEDICINE

## 2017-06-30 PROCEDURE — 36600 WITHDRAWAL OF ARTERIAL BLOOD: CPT

## 2017-06-30 RX ORDER — FAMOTIDINE 10 MG/ML
20 INJECTION, SOLUTION INTRAVENOUS DAILY
Status: DISCONTINUED | OUTPATIENT
Start: 2017-06-30 | End: 2017-07-03 | Stop reason: CLARIF

## 2017-06-30 RX ORDER — HYDROCODONE BITARTRATE AND ACETAMINOPHEN 5; 325 MG/1; MG/1
1 TABLET ORAL EVERY 4 HOURS PRN
Status: DISPENSED | OUTPATIENT
Start: 2017-06-30 | End: 2017-07-10

## 2017-06-30 RX ORDER — ASCORBIC ACID 500 MG/ML
1500 INJECTION, SOLUTION INTRAMUSCULAR; INTRAVENOUS; SUBCUTANEOUS EVERY 6 HOURS
Status: DISCONTINUED | OUTPATIENT
Start: 2017-06-30 | End: 2017-06-30 | Stop reason: SDUPTHER

## 2017-06-30 RX ORDER — FENTANYL CITRATE 50 UG/ML
50 INJECTION, SOLUTION INTRAMUSCULAR; INTRAVENOUS
Status: DISCONTINUED | OUTPATIENT
Start: 2017-06-30 | End: 2017-07-04

## 2017-06-30 RX ORDER — PROPOFOL 10 MG/ML
20 VIAL (ML) INTRAVENOUS ONCE
Status: DISCONTINUED | OUTPATIENT
Start: 2017-06-30 | End: 2017-06-30

## 2017-06-30 RX ORDER — POTASSIUM CHLORIDE 7.45 MG/ML
10 INJECTION INTRAVENOUS
Status: COMPLETED | OUTPATIENT
Start: 2017-06-30 | End: 2017-07-01

## 2017-06-30 RX ORDER — DEXTROSE MONOHYDRATE 25 G/50ML
INJECTION, SOLUTION INTRAVENOUS
Status: COMPLETED
Start: 2017-06-30 | End: 2017-06-30

## 2017-06-30 RX ORDER — ALBUMIN (HUMAN) 12.5 G/50ML
12.5 SOLUTION INTRAVENOUS AS NEEDED
Status: ACTIVE | OUTPATIENT
Start: 2017-06-30 | End: 2017-07-01

## 2017-06-30 RX ORDER — PROPOFOL 10 MG/ML
VIAL (ML) INTRAVENOUS
Status: COMPLETED
Start: 2017-06-30 | End: 2017-06-30

## 2017-06-30 RX ORDER — FENTANYL CITRATE 50 UG/ML
100 INJECTION, SOLUTION INTRAMUSCULAR; INTRAVENOUS ONCE
Status: COMPLETED | OUTPATIENT
Start: 2017-07-01 | End: 2017-06-30

## 2017-06-30 RX ORDER — LORAZEPAM 2 MG/ML
INJECTION INTRAMUSCULAR
Status: COMPLETED
Start: 2017-06-30 | End: 2017-06-30

## 2017-06-30 RX ORDER — CALCIUM GLUCONATE 94 MG/ML
1 INJECTION, SOLUTION INTRAVENOUS ONCE
Status: DISCONTINUED | OUTPATIENT
Start: 2017-06-30 | End: 2017-06-30 | Stop reason: SDUPTHER

## 2017-06-30 RX ORDER — DEXTROSE MONOHYDRATE 25 G/50ML
INJECTION, SOLUTION INTRAVENOUS
Status: DISPENSED
Start: 2017-06-30 | End: 2017-07-01

## 2017-06-30 RX ORDER — HEPARIN SODIUM 1000 [USP'U]/ML
3400 INJECTION, SOLUTION INTRAVENOUS; SUBCUTANEOUS ONCE
Status: COMPLETED | OUTPATIENT
Start: 2017-06-30 | End: 2017-06-30

## 2017-06-30 RX ORDER — LORAZEPAM 2 MG/ML
1 INJECTION INTRAMUSCULAR
Status: DISCONTINUED | OUTPATIENT
Start: 2017-06-30 | End: 2017-06-30

## 2017-06-30 RX ADMIN — HYDROCORTISONE SODIUM SUCCINATE 50 MG: 100 INJECTION, POWDER, FOR SOLUTION INTRAMUSCULAR; INTRAVENOUS at 20:45

## 2017-06-30 RX ADMIN — PROPOFOL 25 MCG/KG/MIN: 10 INJECTION, EMULSION INTRAVENOUS at 21:31

## 2017-06-30 RX ADMIN — HEPARIN SODIUM 5000 UNITS: 5000 INJECTION, SOLUTION INTRAVENOUS; SUBCUTANEOUS at 22:34

## 2017-06-30 RX ADMIN — HEPARIN SODIUM 3400 UNITS: 1000 INJECTION, SOLUTION INTRAVENOUS; SUBCUTANEOUS at 20:27

## 2017-06-30 RX ADMIN — ASCORBIC ACID: 500 INJECTION, SOLUTION INTRAMUSCULAR; INTRAVENOUS; SUBCUTANEOUS at 20:20

## 2017-06-30 RX ADMIN — POTASSIUM CHLORIDE 10 MEQ: 7.46 INJECTION, SOLUTION INTRAVENOUS at 02:50

## 2017-06-30 RX ADMIN — POTASSIUM PHOSPHATE, MONOBASIC AND POTASSIUM PHOSPHATE, DIBASIC 10 MMOL: 224; 236 INJECTION, SOLUTION INTRAVENOUS at 14:23

## 2017-06-30 RX ADMIN — FENTANYL CITRATE 100 MCG: 50 INJECTION, SOLUTION INTRAMUSCULAR; INTRAVENOUS at 23:33

## 2017-06-30 RX ADMIN — HEPARIN SODIUM 5000 UNITS: 5000 INJECTION, SOLUTION INTRAVENOUS; SUBCUTANEOUS at 05:44

## 2017-06-30 RX ADMIN — POTASSIUM CHLORIDE 10 MEQ: 7.46 INJECTION, SOLUTION INTRAVENOUS at 01:43

## 2017-06-30 RX ADMIN — FENTANYL CITRATE 50 MCG: 50 INJECTION INTRAMUSCULAR; INTRAVENOUS at 21:30

## 2017-06-30 RX ADMIN — PROPOFOL 5 MG: 10 INJECTION, EMULSION INTRAVENOUS at 16:30

## 2017-06-30 RX ADMIN — POTASSIUM CHLORIDE 10 MEQ: 7.46 INJECTION, SOLUTION INTRAVENOUS at 01:44

## 2017-06-30 RX ADMIN — POTASSIUM CHLORIDE 10 MEQ: 7.46 INJECTION, SOLUTION INTRAVENOUS at 22:34

## 2017-06-30 RX ADMIN — POTASSIUM CHLORIDE, DEXTROSE MONOHYDRATE AND SODIUM CHLORIDE 150 ML/HR: 150; 5; 450 INJECTION, SOLUTION INTRAVENOUS at 20:21

## 2017-06-30 RX ADMIN — POTASSIUM CHLORIDE 10 MEQ: 7.46 INJECTION, SOLUTION INTRAVENOUS at 00:39

## 2017-06-30 RX ADMIN — POTASSIUM CHLORIDE 10 MEQ: 7.46 INJECTION, SOLUTION INTRAVENOUS at 00:38

## 2017-06-30 RX ADMIN — POTASSIUM CHLORIDE 10 MEQ: 7.46 INJECTION, SOLUTION INTRAVENOUS at 23:55

## 2017-06-30 RX ADMIN — PROPOFOL 35 MCG/KG/MIN: 10 INJECTION, EMULSION INTRAVENOUS at 22:43

## 2017-06-30 RX ADMIN — TAZOBACTAM SODIUM AND PIPERACILLIN SODIUM 3.38 G: 375; 3 INJECTION, SOLUTION INTRAVENOUS at 13:10

## 2017-06-30 RX ADMIN — TAZOBACTAM SODIUM AND PIPERACILLIN SODIUM 3.38 G: 375; 3 INJECTION, SOLUTION INTRAVENOUS at 21:52

## 2017-06-30 RX ADMIN — THIAMINE HYDROCHLORIDE 200 MG: 100 INJECTION, SOLUTION INTRAMUSCULAR; INTRAVENOUS at 19:00

## 2017-06-30 RX ADMIN — HEPARIN SODIUM 5000 UNITS: 5000 INJECTION, SOLUTION INTRAVENOUS; SUBCUTANEOUS at 16:19

## 2017-06-30 RX ADMIN — POTASSIUM CHLORIDE 10 MEQ: 7.46 INJECTION, SOLUTION INTRAVENOUS at 02:49

## 2017-06-30 RX ADMIN — LORAZEPAM 1 MG: 2 INJECTION INTRAMUSCULAR at 03:04

## 2017-06-30 RX ADMIN — LORAZEPAM 1 MG: 2 INJECTION INTRAMUSCULAR; INTRAVENOUS at 03:04

## 2017-06-30 RX ADMIN — CALCIUM GLUCONATE 1 G: 94 INJECTION, SOLUTION INTRAVENOUS at 23:47

## 2017-06-30 RX ADMIN — POTASSIUM CHLORIDE AND SODIUM CHLORIDE 250 ML/HR: 450; 150 INJECTION, SOLUTION INTRAVENOUS at 01:59

## 2017-06-30 RX ADMIN — DEXTROSE MONOHYDRATE 12.5 G: 25 INJECTION, SOLUTION INTRAVENOUS at 15:45

## 2017-06-30 RX ADMIN — DEXTROSE MONOHYDRATE 12.5 G: 25 INJECTION, SOLUTION INTRAVENOUS at 18:39

## 2017-06-30 RX ADMIN — TAZOBACTAM SODIUM AND PIPERACILLIN SODIUM 3.38 G: 375; 3 INJECTION, SOLUTION INTRAVENOUS at 05:01

## 2017-06-30 RX ADMIN — DEXTROSE MONOHYDRATE 12.5 G: 25 INJECTION, SOLUTION INTRAVENOUS at 19:17

## 2017-06-30 RX ADMIN — VANCOMYCIN HYDROCHLORIDE 1500 MG: 1 INJECTION, POWDER, LYOPHILIZED, FOR SOLUTION INTRAVENOUS at 21:13

## 2017-06-30 RX ADMIN — POTASSIUM CHLORIDE AND SODIUM CHLORIDE 250 ML/HR: 450; 150 INJECTION, SOLUTION INTRAVENOUS at 18:34

## 2017-06-30 RX ADMIN — SODIUM PHOSPHATE, MONOBASIC, MONOHYDRATE AND SODIUM PHOSPHATE, DIBASIC, ANHYDROUS 20 MMOL: 276; 142 INJECTION, SOLUTION INTRAVENOUS at 23:48

## 2017-06-30 RX ADMIN — AZITHROMYCIN DIHYDRATE 500 MG: 500 INJECTION, POWDER, LYOPHILIZED, FOR SOLUTION INTRAVENOUS at 10:06

## 2017-06-30 RX ADMIN — POTASSIUM CHLORIDE, DEXTROSE MONOHYDRATE AND SODIUM CHLORIDE 150 ML/HR: 150; 5; 450 INJECTION, SOLUTION INTRAVENOUS at 05:01

## 2017-07-01 ENCOUNTER — APPOINTMENT (OUTPATIENT)
Dept: CARDIOLOGY | Facility: HOSPITAL | Age: 37
End: 2017-07-01
Attending: INTERNAL MEDICINE

## 2017-07-01 ENCOUNTER — APPOINTMENT (OUTPATIENT)
Dept: GENERAL RADIOLOGY | Facility: HOSPITAL | Age: 37
End: 2017-07-01

## 2017-07-01 PROBLEM — A41.9 SEPSIS: Status: ACTIVE | Noted: 2017-07-01

## 2017-07-01 PROBLEM — J18.9 PNEUMONIA: Status: ACTIVE | Noted: 2017-07-01

## 2017-07-01 PROBLEM — Z86.2 HISTORY OF ITP: Status: ACTIVE | Noted: 2017-07-01

## 2017-07-01 PROBLEM — J96.00 ACUTE RESPIRATORY FAILURE: Status: ACTIVE | Noted: 2017-07-01

## 2017-07-01 PROBLEM — M32.9 HISTORY OF SYSTEMIC LUPUS ERYTHEMATOSUS (SLE) (HCC): Status: ACTIVE | Noted: 2017-07-01

## 2017-07-01 PROBLEM — Z90.81 HISTORY OF SPLENECTOMY: Status: ACTIVE | Noted: 2017-07-01

## 2017-07-01 PROBLEM — E03.9 PRIMARY HYPOTHYROIDISM: Status: ACTIVE | Noted: 2017-07-01

## 2017-07-01 LAB
ALBUMIN SERPL-MCNC: 1.6 G/DL (ref 3.5–5.2)
ALBUMIN SERPL-MCNC: 1.7 G/DL (ref 3.5–5.2)
ALBUMIN SERPL-MCNC: 1.9 G/DL (ref 3.5–5.2)
ANION GAP SERPL CALCULATED.3IONS-SCNC: 11.5 MMOL/L
ANION GAP SERPL CALCULATED.3IONS-SCNC: 11.9 MMOL/L
ANION GAP SERPL CALCULATED.3IONS-SCNC: 17.3 MMOL/L
ANION GAP SERPL CALCULATED.3IONS-SCNC: 18 MMOL/L
APTT PPP: 107.8 SECONDS (ref 22.7–35.4)
APTT PPP: 37.4 SECONDS (ref 22.7–35.4)
APTT PPP: 37.6 SECONDS (ref 22.7–35.4)
ARTERIAL PATENCY WRIST A: ABNORMAL
ATMOSPHERIC PRESS: 744.5 MMHG
ATMOSPHERIC PRESS: 745.9 MMHG
ATMOSPHERIC PRESS: 746.5 MMHG
ATMOSPHERIC PRESS: 747.5 MMHG
BACTERIA SPEC AEROBE CULT: NO GROWTH
BASE EXCESS BLDA CALC-SCNC: -0.1 MMOL/L (ref 0–2)
BASE EXCESS BLDA CALC-SCNC: -10.3 MMOL/L (ref 0–2)
BASE EXCESS BLDA CALC-SCNC: -13.4 MMOL/L (ref 0–2)
BASE EXCESS BLDA CALC-SCNC: -5.2 MMOL/L (ref 0–2)
BASOPHILS # BLD AUTO: 0.01 10*3/MM3 (ref 0–0.2)
BASOPHILS NFR BLD AUTO: 0.1 % (ref 0–1.5)
BDY SITE: ABNORMAL
BUN BLD-MCNC: 12 MG/DL (ref 6–20)
BUN BLD-MCNC: 16 MG/DL (ref 6–20)
BUN BLD-MCNC: 17 MG/DL (ref 6–20)
BUN BLD-MCNC: 6 MG/DL (ref 6–20)
BUN/CREAT SERPL: 6.7 (ref 7–25)
BUN/CREAT SERPL: 7.1 (ref 7–25)
BUN/CREAT SERPL: 7.9 (ref 7–25)
BUN/CREAT SERPL: 8.4 (ref 7–25)
BURR CELLS BLD QL SMEAR: NORMAL
C3 FRG RBC-MCNC: NORMAL
CA-I BLD-MCNC: 4 MG/DL (ref 4.6–5.4)
CA-I BLD-MCNC: 4.1 MG/DL (ref 4.6–5.4)
CA-I BLD-MCNC: 4.4 MG/DL (ref 4.6–5.4)
CA-I SERPL ISE-MCNC: 1 MMOL/L (ref 1.1–1.35)
CA-I SERPL ISE-MCNC: 1.02 MMOL/L (ref 1.1–1.35)
CA-I SERPL ISE-MCNC: 1.1 MMOL/L (ref 1.1–1.35)
CALCIUM SPEC-SCNC: 6.6 MG/DL (ref 8.6–10.5)
CALCIUM SPEC-SCNC: 6.8 MG/DL (ref 8.6–10.5)
CALCIUM SPEC-SCNC: 7 MG/DL (ref 8.6–10.5)
CALCIUM SPEC-SCNC: 7.4 MG/DL (ref 8.6–10.5)
CHLORIDE SERPL-SCNC: 101 MMOL/L (ref 98–107)
CHLORIDE SERPL-SCNC: 96 MMOL/L (ref 98–107)
CO2 SERPL-SCNC: 14 MMOL/L (ref 22–29)
CO2 SERPL-SCNC: 16.7 MMOL/L (ref 22–29)
CO2 SERPL-SCNC: 22.1 MMOL/L (ref 22–29)
CO2 SERPL-SCNC: 26.5 MMOL/L (ref 22–29)
CREAT BLD-MCNC: 0.85 MG/DL (ref 0.57–1)
CREAT BLD-MCNC: 1.79 MG/DL (ref 0.57–1)
CREAT BLD-MCNC: 1.9 MG/DL (ref 0.57–1)
CREAT BLD-MCNC: 2.15 MG/DL (ref 0.57–1)
D-LACTATE SERPL-SCNC: 0.2 MMOL/L (ref 0.5–2)
D-LACTATE SERPL-SCNC: 0.3 MMOL/L (ref 0.5–2)
D-LACTATE SERPL-SCNC: 1.6 MMOL/L (ref 0.5–2)
D-LACTATE SERPL-SCNC: 2.4 MMOL/L (ref 0.5–2)
D-LACTATE SERPL-SCNC: 2.9 MMOL/L (ref 0.5–2)
D-LACTATE SERPL-SCNC: 4.8 MMOL/L (ref 0.5–2)
DEPRECATED RDW RBC AUTO: 45.2 FL (ref 37–54)
EOSINOPHIL # BLD AUTO: 0 10*3/MM3 (ref 0–0.7)
EOSINOPHIL NFR BLD AUTO: 0 % (ref 0.3–6.2)
ERYTHROCYTE [DISTWIDTH] IN BLOOD BY AUTOMATED COUNT: 14.6 % (ref 11.7–13)
GFR SERPL CREATININE-BSD FRML MDRD: 26 ML/MIN/1.73
GFR SERPL CREATININE-BSD FRML MDRD: 29 ML/MIN/1.73
GFR SERPL CREATININE-BSD FRML MDRD: 32 ML/MIN/1.73
GFR SERPL CREATININE-BSD FRML MDRD: 74 ML/MIN/1.73
GLUCOSE BLD-MCNC: 144 MG/DL (ref 65–99)
GLUCOSE BLD-MCNC: 150 MG/DL (ref 65–99)
GLUCOSE BLD-MCNC: 198 MG/DL (ref 65–99)
GLUCOSE BLD-MCNC: 290 MG/DL (ref 65–99)
GLUCOSE BLDC GLUCOMTR-MCNC: 118 MG/DL (ref 70–130)
GLUCOSE BLDC GLUCOMTR-MCNC: 129 MG/DL (ref 70–130)
GLUCOSE BLDC GLUCOMTR-MCNC: 131 MG/DL (ref 70–130)
GLUCOSE BLDC GLUCOMTR-MCNC: 147 MG/DL (ref 70–130)
GLUCOSE BLDC GLUCOMTR-MCNC: 157 MG/DL (ref 70–130)
GLUCOSE BLDC GLUCOMTR-MCNC: 162 MG/DL (ref 70–130)
GLUCOSE BLDC GLUCOMTR-MCNC: 166 MG/DL (ref 70–130)
GLUCOSE BLDC GLUCOMTR-MCNC: 166 MG/DL (ref 70–130)
GLUCOSE BLDC GLUCOMTR-MCNC: 179 MG/DL (ref 70–130)
GLUCOSE BLDC GLUCOMTR-MCNC: 202 MG/DL (ref 70–130)
GLUCOSE BLDC GLUCOMTR-MCNC: 211 MG/DL (ref 70–130)
GLUCOSE BLDC GLUCOMTR-MCNC: 246 MG/DL (ref 70–130)
GLUCOSE BLDC GLUCOMTR-MCNC: 282 MG/DL (ref 70–130)
GLUCOSE BLDC GLUCOMTR-MCNC: 293 MG/DL (ref 70–130)
GLUCOSE BLDC GLUCOMTR-MCNC: 304 MG/DL (ref 70–130)
GLUCOSE BLDC GLUCOMTR-MCNC: 328 MG/DL (ref 70–130)
GLUCOSE BLDC GLUCOMTR-MCNC: 62 MG/DL (ref 70–130)
GLUCOSE BLDC GLUCOMTR-MCNC: 87 MG/DL (ref 70–130)
GLUCOSE BLDC GLUCOMTR-MCNC: 91 MG/DL (ref 70–130)
GLUCOSE BLDC GLUCOMTR-MCNC: 93 MG/DL (ref 70–130)
HCG INTACT+B SERPL-ACNC: <0.5 MIU/ML
HCO3 BLDA-SCNC: 13.1 MMOL/L (ref 22–28)
HCO3 BLDA-SCNC: 14.2 MMOL/L (ref 22–28)
HCO3 BLDA-SCNC: 22.9 MMOL/L (ref 22–28)
HCO3 BLDA-SCNC: 26.1 MMOL/L (ref 22–28)
HCT VFR BLD AUTO: 29 % (ref 35.6–45.5)
HCT VFR BLD AUTO: 29.2 % (ref 35.6–45.5)
HGB BLD-MCNC: 11.1 G/DL (ref 11.9–15.5)
HGB BLD-MCNC: 9.7 G/DL (ref 11.9–15.5)
HOROWITZ INDEX BLD+IHG-RTO: 100 %
HYPOCHROMIA BLD QL: NORMAL
IMM GRANULOCYTES # BLD: 0.09 10*3/MM3 (ref 0–0.03)
IMM GRANULOCYTES NFR BLD: 0.8 % (ref 0–0.5)
INR PPP: 1.63 (ref 0.9–1.1)
LYMPHOCYTES # BLD AUTO: 0.83 10*3/MM3 (ref 0.9–4.8)
LYMPHOCYTES NFR BLD AUTO: 7.6 % (ref 19.6–45.3)
MAGNESIUM SERPL-MCNC: 1.8 MG/DL (ref 1.6–2.6)
MAGNESIUM SERPL-MCNC: 1.8 MG/DL (ref 1.6–2.6)
MAGNESIUM SERPL-MCNC: 1.9 MG/DL (ref 1.6–2.6)
MODALITY: ABNORMAL
MONOCYTES # BLD AUTO: 0.53 10*3/MM3 (ref 0.2–1.2)
MONOCYTES NFR BLD AUTO: 4.9 % (ref 5–12)
NEUTROPHILS # BLD AUTO: 9.44 10*3/MM3 (ref 1.9–8.1)
NEUTROPHILS NFR BLD AUTO: 86.6 % (ref 42.7–76)
O2 A-A PPRESDIFF RESPIRATORY: 0.1 MMHG
O2 A-A PPRESDIFF RESPIRATORY: 0.1 MMHG
O2 A-A PPRESDIFF RESPIRATORY: 0.3 MMHG
O2 A-A PPRESDIFF RESPIRATORY: 0.3 MMHG
OVALOCYTES BLD QL SMEAR: NORMAL
PCO2 BLDA: 26.4 MM HG (ref 35–45)
PCO2 BLDA: 31.5 MM HG (ref 35–45)
PCO2 BLDA: 48.9 MM HG (ref 35–45)
PCO2 BLDA: 57.7 MM HG (ref 35–45)
PEEP RESPIRATORY: 10 CM[H2O]
PEEP RESPIRATORY: 12 CM[H2O]
PEEP RESPIRATORY: 14 CM[H2O]
PEEP RESPIRATORY: 18 CM[H2O]
PH BLDA: 7.21 PH UNITS (ref 7.35–7.45)
PH BLDA: 7.22 PH UNITS (ref 7.35–7.45)
PH BLDA: 7.33 PH UNITS (ref 7.35–7.45)
PH BLDA: 7.34 PH UNITS (ref 7.35–7.45)
PHOSPHATE SERPL-MCNC: 0.8 MG/DL (ref 2.5–4.5)
PHOSPHATE SERPL-MCNC: 1.1 MG/DL (ref 2.5–4.5)
PHOSPHATE SERPL-MCNC: 2.5 MG/DL (ref 2.5–4.5)
PLAT MORPH BLD: NORMAL
PLATELET # BLD AUTO: 210 10*3/MM3 (ref 140–500)
PMV BLD AUTO: 10.1 FL (ref 6–12)
PO2 BLDA: 191.5 MM HG (ref 80–100)
PO2 BLDA: 229.2 MM HG (ref 80–100)
PO2 BLDA: 58.7 MM HG (ref 80–100)
PO2 BLDA: 62.6 MM HG (ref 80–100)
POTASSIUM BLD-SCNC: 3.3 MMOL/L (ref 3.5–5.2)
POTASSIUM BLD-SCNC: 3.4 MMOL/L (ref 3.5–5.2)
POTASSIUM BLD-SCNC: 3.8 MMOL/L (ref 3.5–5.2)
POTASSIUM BLD-SCNC: 4 MMOL/L (ref 3.5–5.2)
PROCALCITONIN SERPL-MCNC: 4.89 NG/ML (ref 0.1–0.25)
PROTHROMBIN TIME: 18.8 SECONDS (ref 11.7–14.2)
RBC # BLD AUTO: 3.23 10*6/MM3 (ref 3.9–5.2)
SAO2 % BLDCOA: 83.2 % (ref 92–99)
SAO2 % BLDCOA: 87.3 % (ref 92–99)
SAO2 % BLDCOA: 99.6 % (ref 92–99)
SAO2 % BLDCOA: 99.8 % (ref 92–99)
SET MECH RESP RATE: 24
SODIUM BLD-SCNC: 133 MMOL/L (ref 136–145)
SODIUM BLD-SCNC: 134 MMOL/L (ref 136–145)
SODIUM BLD-SCNC: 135 MMOL/L (ref 136–145)
SODIUM BLD-SCNC: 135 MMOL/L (ref 136–145)
T4 FREE SERPL-MCNC: 0.84 NG/DL (ref 0.93–1.7)
TOTAL RATE: 24 BREATHS/MINUTE
TOTAL RATE: 24 BREATHS/MINUTE
TOTAL RATE: 33 BREATHS/MINUTE
TOTAL RATE: 36 BREATHS/MINUTE
VANCOMYCIN SERPL-MCNC: 34.5 MCG/ML (ref 5–40)
VENTILATOR MODE: ABNORMAL
VT ON VENT VENT: 375 ML
VT ON VENT VENT: 397 ML
VT ON VENT VENT: 462 ML
VT ON VENT VENT: 560 ML
WBC MORPH BLD: NORMAL
WBC NRBC COR # BLD: 10.9 10*3/MM3 (ref 4.5–10.7)

## 2017-07-01 PROCEDURE — 82330 ASSAY OF CALCIUM: CPT | Performed by: INTERNAL MEDICINE

## 2017-07-01 PROCEDURE — 94003 VENT MGMT INPAT SUBQ DAY: CPT

## 2017-07-01 PROCEDURE — 36600 WITHDRAWAL OF ARTERIAL BLOOD: CPT

## 2017-07-01 PROCEDURE — 84702 CHORIONIC GONADOTROPIN TEST: CPT | Performed by: INTERNAL MEDICINE

## 2017-07-01 PROCEDURE — 83605 ASSAY OF LACTIC ACID: CPT | Performed by: INTERNAL MEDICINE

## 2017-07-01 PROCEDURE — 83735 ASSAY OF MAGNESIUM: CPT | Performed by: INTERNAL MEDICINE

## 2017-07-01 PROCEDURE — 84439 ASSAY OF FREE THYROXINE: CPT | Performed by: INTERNAL MEDICINE

## 2017-07-01 PROCEDURE — 25010000002 FENTANYL CITRATE (PF) 100 MCG/2ML SOLUTION: Performed by: INTERNAL MEDICINE

## 2017-07-01 PROCEDURE — 94799 UNLISTED PULMONARY SVC/PX: CPT

## 2017-07-01 PROCEDURE — 80048 BASIC METABOLIC PNL TOTAL CA: CPT | Performed by: INTERNAL MEDICINE

## 2017-07-01 PROCEDURE — 85730 THROMBOPLASTIN TIME PARTIAL: CPT | Performed by: INTERNAL MEDICINE

## 2017-07-01 PROCEDURE — 25010000002 PIPERACILLIN SOD-TAZOBACTAM PER 1 G: Performed by: INTERNAL MEDICINE

## 2017-07-01 PROCEDURE — 85007 BL SMEAR W/DIFF WBC COUNT: CPT | Performed by: INTERNAL MEDICINE

## 2017-07-01 PROCEDURE — 93010 ELECTROCARDIOGRAM REPORT: CPT | Performed by: INTERNAL MEDICINE

## 2017-07-01 PROCEDURE — 99255 IP/OBS CONSLTJ NEW/EST HI 80: CPT | Performed by: INTERNAL MEDICINE

## 2017-07-01 PROCEDURE — 25010000002 HYDROCORTISONE SODIUM SUCCINATE 100 MG RECONSTITUTED SOLUTION: Performed by: INTERNAL MEDICINE

## 2017-07-01 PROCEDURE — 25010000002 HEPARIN (PORCINE) PER 1000 UNITS: Performed by: INTERNAL MEDICINE

## 2017-07-01 PROCEDURE — 25010000002 PROPOFOL 1000 MG/ML EMULSION: Performed by: INTERNAL MEDICINE

## 2017-07-01 PROCEDURE — 63710000001 INSULIN REGULAR HUMAN PER 5 UNITS: Performed by: INTERNAL MEDICINE

## 2017-07-01 PROCEDURE — 99233 SBSQ HOSP IP/OBS HIGH 50: CPT | Performed by: INTERNAL MEDICINE

## 2017-07-01 PROCEDURE — 25010000002 THIAMINE PER 100 MG: Performed by: INTERNAL MEDICINE

## 2017-07-01 PROCEDURE — 82962 GLUCOSE BLOOD TEST: CPT

## 2017-07-01 PROCEDURE — 25010000002 PHENYLEPHRINE PER 1 ML

## 2017-07-01 PROCEDURE — 82803 BLOOD GASES ANY COMBINATION: CPT

## 2017-07-01 PROCEDURE — 25010000002 FUROSEMIDE PER 20 MG: Performed by: INTERNAL MEDICINE

## 2017-07-01 PROCEDURE — 85610 PROTHROMBIN TIME: CPT | Performed by: INTERNAL MEDICINE

## 2017-07-01 PROCEDURE — 80069 RENAL FUNCTION PANEL: CPT | Performed by: INTERNAL MEDICINE

## 2017-07-01 PROCEDURE — 80202 ASSAY OF VANCOMYCIN: CPT | Performed by: INTERNAL MEDICINE

## 2017-07-01 PROCEDURE — 84145 PROCALCITONIN (PCT): CPT | Performed by: INTERNAL MEDICINE

## 2017-07-01 PROCEDURE — 93005 ELECTROCARDIOGRAM TRACING: CPT | Performed by: INTERNAL MEDICINE

## 2017-07-01 PROCEDURE — 85018 HEMOGLOBIN: CPT | Performed by: INTERNAL MEDICINE

## 2017-07-01 PROCEDURE — 85014 HEMATOCRIT: CPT | Performed by: INTERNAL MEDICINE

## 2017-07-01 PROCEDURE — 25010000002 DOPAMINE PER 40 MG

## 2017-07-01 PROCEDURE — 85025 COMPLETE CBC W/AUTO DIFF WBC: CPT | Performed by: INTERNAL MEDICINE

## 2017-07-01 RX ORDER — DEXTROSE MONOHYDRATE 100 MG/ML
20 INJECTION, SOLUTION INTRAVENOUS CONTINUOUS
Status: DISCONTINUED | OUTPATIENT
Start: 2017-07-01 | End: 2017-07-03

## 2017-07-01 RX ORDER — DOPAMINE HYDROCHLORIDE 160 MG/100ML
2-20 INJECTION, SOLUTION INTRAVENOUS
Status: DISCONTINUED | OUTPATIENT
Start: 2017-07-01 | End: 2017-07-01

## 2017-07-01 RX ORDER — HEPARIN SODIUM 5000 [USP'U]/ML
80 INJECTION, SOLUTION INTRAVENOUS; SUBCUTANEOUS ONCE
Status: DISCONTINUED | OUTPATIENT
Start: 2017-07-01 | End: 2017-07-01

## 2017-07-01 RX ORDER — DOPAMINE HYDROCHLORIDE 160 MG/100ML
INJECTION, SOLUTION INTRAVENOUS
Status: COMPLETED
Start: 2017-07-01 | End: 2017-07-01

## 2017-07-01 RX ORDER — HEPARIN SODIUM 5000 [USP'U]/ML
40-80 INJECTION, SOLUTION INTRAVENOUS; SUBCUTANEOUS EVERY 6 HOURS PRN
Status: DISCONTINUED | OUTPATIENT
Start: 2017-07-01 | End: 2017-07-01

## 2017-07-01 RX ORDER — MAGNESIUM SULFATE 1 G/100ML
2 INJECTION INTRAVENOUS AS NEEDED
Status: ACTIVE | OUTPATIENT
Start: 2017-07-01 | End: 2017-07-03

## 2017-07-01 RX ORDER — MINERAL OIL AND WHITE PETROLATUM 150; 830 MG/G; MG/G
OINTMENT OPHTHALMIC AS NEEDED
Status: DISCONTINUED | OUTPATIENT
Start: 2017-07-01 | End: 2017-07-13 | Stop reason: HOSPADM

## 2017-07-01 RX ORDER — BUMETANIDE 0.25 MG/ML
2 INJECTION INTRAMUSCULAR; INTRAVENOUS ONCE
Status: COMPLETED | OUTPATIENT
Start: 2017-07-01 | End: 2017-07-01

## 2017-07-01 RX ORDER — BUMETANIDE 0.25 MG/ML
3 INJECTION INTRAMUSCULAR; INTRAVENOUS ONCE
Status: COMPLETED | OUTPATIENT
Start: 2017-07-01 | End: 2017-07-01

## 2017-07-01 RX ORDER — HEPARIN SODIUM 5000 [USP'U]/ML
5000 INJECTION, SOLUTION INTRAVENOUS; SUBCUTANEOUS EVERY 8 HOURS SCHEDULED
Status: DISCONTINUED | OUTPATIENT
Start: 2017-07-01 | End: 2017-07-05

## 2017-07-01 RX ORDER — POTASSIUM CHLORIDE 7.45 MG/ML
10 INJECTION INTRAVENOUS
Status: DISPENSED | OUTPATIENT
Start: 2017-07-01 | End: 2017-07-01

## 2017-07-01 RX ORDER — CISATRACURIUM BESYLATE 2 MG/ML
100 INJECTION, SOLUTION INTRAVENOUS ONCE
Status: COMPLETED | OUTPATIENT
Start: 2017-07-01 | End: 2017-07-01

## 2017-07-01 RX ORDER — MINERAL OIL AND WHITE PETROLATUM 150; 830 MG/G; MG/G
OINTMENT OPHTHALMIC EVERY 4 HOURS
Status: DISCONTINUED | OUTPATIENT
Start: 2017-07-01 | End: 2017-07-04

## 2017-07-01 RX ORDER — LEVOTHYROXINE SODIUM ANHYDROUS 100 UG/5ML
25 INJECTION, POWDER, LYOPHILIZED, FOR SOLUTION INTRAVENOUS
Status: DISCONTINUED | OUTPATIENT
Start: 2017-07-01 | End: 2017-07-04 | Stop reason: ALTCHOICE

## 2017-07-01 RX ORDER — DEXTROSE MONOHYDRATE 25 G/50ML
INJECTION, SOLUTION INTRAVENOUS
Status: DISPENSED
Start: 2017-07-01 | End: 2017-07-01

## 2017-07-01 RX ORDER — HEPARIN SODIUM 1000 [USP'U]/ML
2000 INJECTION, SOLUTION INTRAVENOUS; SUBCUTANEOUS ONCE
Status: COMPLETED | OUTPATIENT
Start: 2017-07-01 | End: 2017-07-01

## 2017-07-01 RX ORDER — HEPARIN SODIUM 1000 [USP'U]/ML
INJECTION, SOLUTION INTRAVENOUS; SUBCUTANEOUS AS NEEDED
Status: DISCONTINUED | OUTPATIENT
Start: 2017-07-01 | End: 2017-07-06

## 2017-07-01 RX ORDER — FUROSEMIDE 10 MG/ML
40 INJECTION INTRAMUSCULAR; INTRAVENOUS ONCE
Status: COMPLETED | OUTPATIENT
Start: 2017-07-01 | End: 2017-07-01

## 2017-07-01 RX ORDER — POTASSIUM CHLORIDE 29.8 MG/ML
20 INJECTION INTRAVENOUS AS NEEDED
Status: DISCONTINUED | OUTPATIENT
Start: 2017-07-01 | End: 2017-07-02

## 2017-07-01 RX ADMIN — LEVOTHYROXINE SODIUM ANHYDROUS 25 MCG: 100 INJECTION, POWDER, LYOPHILIZED, FOR SOLUTION INTRAVENOUS at 21:26

## 2017-07-01 RX ADMIN — SODIUM CHLORIDE 3 MCG/KG/MIN: 9 INJECTION, SOLUTION INTRAVENOUS at 06:33

## 2017-07-01 RX ADMIN — DOPAMINE HYDROCHLORIDE 2 MCG/KG/MIN: 160 INJECTION, SOLUTION INTRAVENOUS at 05:51

## 2017-07-01 RX ADMIN — DEXTROSE MONOHYDRATE 12.5 G: 25 INJECTION, SOLUTION INTRAVENOUS at 15:26

## 2017-07-01 RX ADMIN — BUMETANIDE 1 MG/HR: 0.25 INJECTION, SOLUTION INTRAMUSCULAR; INTRAVENOUS at 11:02

## 2017-07-01 RX ADMIN — FAMOTIDINE 20 MG: 10 INJECTION, SOLUTION INTRAVENOUS at 15:34

## 2017-07-01 RX ADMIN — HYDROCORTISONE SODIUM SUCCINATE 50 MG: 100 INJECTION, POWDER, FOR SOLUTION INTRAMUSCULAR; INTRAVENOUS at 15:28

## 2017-07-01 RX ADMIN — THIAMINE HYDROCHLORIDE 200 MG: 100 INJECTION, SOLUTION INTRAMUSCULAR; INTRAVENOUS at 18:13

## 2017-07-01 RX ADMIN — BUMETANIDE 2 MG: 0.25 INJECTION, SOLUTION INTRAMUSCULAR; INTRAVENOUS at 09:30

## 2017-07-01 RX ADMIN — ASCORBIC ACID: 500 INJECTION, SOLUTION INTRAMUSCULAR; INTRAVENOUS; SUBCUTANEOUS at 13:51

## 2017-07-01 RX ADMIN — Medication 0.04 MCG/KG/MIN: at 10:15

## 2017-07-01 RX ADMIN — CISATRACURIUM BESYLATE 7040 MCG: 2 INJECTION INTRAVENOUS at 06:35

## 2017-07-01 RX ADMIN — MINERAL OIL AND WHITE PETROLATUM: 150; 830 OINTMENT OPHTHALMIC at 20:09

## 2017-07-01 RX ADMIN — THIAMINE HYDROCHLORIDE 200 MG: 100 INJECTION, SOLUTION INTRAMUSCULAR; INTRAVENOUS at 07:08

## 2017-07-01 RX ADMIN — HEPARIN SODIUM 5000 UNITS: 5000 INJECTION, SOLUTION INTRAVENOUS; SUBCUTANEOUS at 21:27

## 2017-07-01 RX ADMIN — HEPARIN SODIUM 500 UNITS/HR: 10000 INJECTION, SOLUTION INTRAVENOUS at 07:14

## 2017-07-01 RX ADMIN — MINERAL OIL AND WHITE PETROLATUM: 150; 830 OINTMENT OPHTHALMIC at 15:34

## 2017-07-01 RX ADMIN — MINERAL OIL AND WHITE PETROLATUM: 150; 830 OINTMENT OPHTHALMIC at 15:37

## 2017-07-01 RX ADMIN — DEXTROSE MONOHYDRATE 12.5 G: 25 INJECTION, SOLUTION INTRAVENOUS at 10:44

## 2017-07-01 RX ADMIN — REMIFENTANIL HYDROCHLORIDE 0.5 MCG/KG/HR: 1 INJECTION, POWDER, LYOPHILIZED, FOR SOLUTION INTRAVENOUS at 00:16

## 2017-07-01 RX ADMIN — AZITHROMYCIN DIHYDRATE 500 MG: 500 INJECTION, POWDER, LYOPHILIZED, FOR SOLUTION INTRAVENOUS at 11:25

## 2017-07-01 RX ADMIN — TAZOBACTAM SODIUM AND PIPERACILLIN SODIUM 3.38 G: 375; 3 INJECTION, SOLUTION INTRAVENOUS at 21:27

## 2017-07-01 RX ADMIN — TAZOBACTAM SODIUM AND PIPERACILLIN SODIUM 3.38 G: 375; 3 INJECTION, SOLUTION INTRAVENOUS at 05:57

## 2017-07-01 RX ADMIN — HYDROCORTISONE SODIUM SUCCINATE 50 MG: 100 INJECTION, POWDER, FOR SOLUTION INTRAMUSCULAR; INTRAVENOUS at 20:17

## 2017-07-01 RX ADMIN — SODIUM CHLORIDE 2.5 MCG/KG/MIN: 9 INJECTION, SOLUTION INTRAVENOUS at 20:28

## 2017-07-01 RX ADMIN — HYDROCORTISONE SODIUM SUCCINATE 50 MG: 100 INJECTION, POWDER, FOR SOLUTION INTRAMUSCULAR; INTRAVENOUS at 05:57

## 2017-07-01 RX ADMIN — BUMETANIDE 3 MG: 0.25 INJECTION, SOLUTION INTRAMUSCULAR; INTRAVENOUS at 07:35

## 2017-07-01 RX ADMIN — PROPOFOL 20 MCG/KG/MIN: 10 INJECTION, EMULSION INTRAVENOUS at 06:09

## 2017-07-01 RX ADMIN — Medication 50 MEQ: at 04:01

## 2017-07-01 RX ADMIN — PHENYLEPHRINE HYDROCHLORIDE: 10 INJECTION INTRAVENOUS at 02:51

## 2017-07-01 RX ADMIN — SODIUM CHLORIDE 9.5 UNITS/HR: 9 INJECTION, SOLUTION INTRAVENOUS at 06:32

## 2017-07-01 RX ADMIN — TAZOBACTAM SODIUM AND PIPERACILLIN SODIUM 3.38 G: 375; 3 INJECTION, SOLUTION INTRAVENOUS at 13:42

## 2017-07-01 RX ADMIN — PHENYLEPHRINE HYDROCHLORIDE 2.5 MCG/KG/MIN: 10 INJECTION INTRAVENOUS at 02:50

## 2017-07-01 RX ADMIN — PHENYLEPHRINE HYDROCHLORIDE 2 MCG/KG/MIN: 10 INJECTION INTRAVENOUS at 00:34

## 2017-07-01 RX ADMIN — PROPOFOL 20 MCG/KG/MIN: 10 INJECTION, EMULSION INTRAVENOUS at 14:43

## 2017-07-01 RX ADMIN — ASCORBIC ACID: 500 INJECTION, SOLUTION INTRAMUSCULAR; INTRAVENOUS; SUBCUTANEOUS at 18:13

## 2017-07-01 RX ADMIN — HEPARIN SODIUM 5000 UNITS: 5000 INJECTION, SOLUTION INTRAVENOUS; SUBCUTANEOUS at 06:09

## 2017-07-01 RX ADMIN — FENTANYL CITRATE 50 MCG: 50 INJECTION INTRAMUSCULAR; INTRAVENOUS at 00:09

## 2017-07-01 RX ADMIN — HEPARIN SODIUM 2000 UNITS: 1000 INJECTION, SOLUTION INTRAVENOUS; SUBCUTANEOUS at 06:10

## 2017-07-01 RX ADMIN — DEXTROSE MONOHYDRATE 20 ML/HR: 100 INJECTION, SOLUTION INTRAVENOUS at 10:41

## 2017-07-01 RX ADMIN — FUROSEMIDE 40 MG: 10 INJECTION, SOLUTION INTRAMUSCULAR; INTRAVENOUS at 06:15

## 2017-07-01 RX ADMIN — ASCORBIC ACID: 500 INJECTION, SOLUTION INTRAMUSCULAR; INTRAVENOUS; SUBCUTANEOUS at 07:46

## 2017-07-01 RX ADMIN — ASCORBIC ACID: 500 INJECTION, SOLUTION INTRAMUSCULAR; INTRAVENOUS; SUBCUTANEOUS at 00:49

## 2017-07-01 RX ADMIN — POTASSIUM CHLORIDE, DEXTROSE MONOHYDRATE AND SODIUM CHLORIDE 150 ML/HR: 150; 5; 450 INJECTION, SOLUTION INTRAVENOUS at 04:02

## 2017-07-01 NOTE — PROGRESS NOTES
PROGRESS NOTE   LOS: 2 days   Patient Care Team:  Gregorio Bai MD as PCP - General (Family Medicine)    Chief Complaint: Worsening respiratory failure and hypoxemia    Interval History: Patient has been having challenging night with increased oxygen requirement that ended up with being intubated on the ventilator with evidence of significant progression of the pulmonary infiltrate despite initial antibiotic administration.  Her initial ABG was showing adequate ventilation and oxygenation in her acidosis did improve with the initial hemodialysis session however she had worsening respiratory distress and despite having adequate ventilation and she was borderline in her oxygen and her acidosis has recurred so CRRT was ordered and patient clotted the filter and lost significant amount of blood with that and dropped her blood pressure.  She was having increasing her PEEP requirement and she is on 100% FiO2 and she still having the frothy secretion and copies seal secretion.  Patient was started on propofol and later on on remifentanil in order to help control her severe tachypnea  Different ventilator setting has been tried and currently she is on a pressure control with a PEEP of 12    REVIEW OF SYSTEMS:   Unable to obtain    Ventilator/Non-Invasive Ventilation Settings     Start     Ordered    07/01/17 0107  Ventilator - AC/PC; (24); 100; 12; 24  Continuous     Question Answer Comment   Vent Mode AC/PC    Breath rate  24   FiO2 100    PEEP 12    Inspiratory Pressure 24        07/01/17 0107    06/30/17 2121  Ventilator - AC/VC; 8  Continuous,   Status:  Canceled     Question Answer Comment   Vent Mode AC/VC    PEEP 8        06/30/17 2120            Vital Signs  Temp:  [97.3 °F (36.3 °C)-99.1 °F (37.3 °C)] 99.1 °F (37.3 °C)  Heart Rate:  [] 87  Resp:  [20-47] 37  BP: ()/() 90/58  FiO2 (%):  [80 %-100 %] 100 %  SpO2:  [78 %-100 %] 85 %  on    O2 Device: mechanical ventilator    Intake/Output Summary  "(Last 24 hours) at 07/01/17 0556  Last data filed at 06/30/17 2152   Gross per 24 hour   Intake              150 ml   Output              125 ml   Net               25 ml     Flowsheet Rows         First Filed Value    Admission Height  67\" (170.2 cm) Documented at 06/29/2017 2028    Admission Weight  155 lb 10.3 oz (70.6 kg) Documented at 06/29/2017 2028        Last 3 weights    06/29/17 2028 06/29/17 2103 06/30/17  0850   Weight: 155 lb 10.3 oz (70.6 kg) 155 lb (70.3 kg) 155 lb (70.3 kg)       Physical Exam:  GEN:  Sedated, orally intubated, on the ventilator, in obvious distress   EYES:   Sclera clear. No icterus.   ENT:   External ears/nose normal, no oral lesions, no thrush, mucous membranes moist  NECK:  Supple, midline trachea, no JVD  LUNGS: Coarse crackles and rhonchi bilaterally rapid respiratory rate on the ventilator, wheezes.   CV:  Regular rhythm and rate. Normal S1/S2. No murmurs, gallops, or rubs noted.  ABD:  Soft, non-tender and non-distended. Normal bowel sounds. No guarding  EXT:  Cyanotic areas are from her Raynaud's disease. No redness. No edema.   Skin: dry, intact, no bleeding    Results Review:        Results from last 7 days  Lab Units 07/01/17  0236 06/30/17  2049 06/30/17  1605 06/30/17  1258 06/30/17  0853 06/30/17  0401 06/29/17  2320 06/29/17 2018   SODIUM mmol/L 133* 131* 132* 135* 129* 132* 135* 132*   POTASSIUM mmol/L 3.4* 3.4* 3.7 3.9 3.3* 4.2 2.0* <1.5*   CHLORIDE mmol/L 101 97* 112* 113* 107 111* 109* 102   CO2 mmol/L 14.0* 15.2* 6.1* 5.9* 6.1* 4.0* 4.8* 5.0*   BUN mg/dL 16 13 27* 28* 26* 24* 21* 21*   CREATININE mg/dL 1.90* 1.42* 2.56* 2.73* 2.34* 1.98* 1.81* 1.94*   CALCIUM mg/dL 7.4* 7.1* 7.8* 9.3 9.2 9.0 8.4* 9.4   AST (SGOT) U/L  --   --   --   --   --   --   --  7   ALT (SGPT) U/L  --   --   --   --   --   --   --  <5   ANION GAP mmol/L 18.0 18.8 13.9 16.1 15.9 17.0 21.2 25.0   ALBUMIN g/dL  --   --   --  2.60*  --   --   --  2.90*           Results from last 7 days  Lab " Units 06/29/17  2018   WBC 10*3/mm3 16.50*   HEMOGLOBIN g/dL 12.2   HEMATOCRIT % 33.6*   PLATELETS 10*3/mm3 383           Results from last 7 days  Lab Units 06/30/17  2049 06/30/17  1605 06/30/17  0853 06/30/17  0401 06/29/17  2320   MAGNESIUM mg/dL 1.8 2.0 2.3 2.4 2.4           Results from last 7 days  Lab Units 07/01/17  0203 06/30/17  2107 06/30/17  1741 06/29/17 2020   PH, ARTERIAL pH units 7.225* 7.348* 7.339* 6.987*   PO2 ART mm Hg 62.6* 196.6* 191.5* 88.8   PCO2, ARTERIAL mm Hg 31.5* 27.3* 26.4* 16.8*   HCO3 ART mmol/L 13.1* 15.0* 14.2* 4.0*       Results from last 7 days  Lab Units 06/29/17  2018   HEMOGLOBIN A1C % 12.82*     Glucose   Date/Time Value Ref Range Status   07/01/2017 0442 246 (H) 70 - 130 mg/dL Final   07/01/2017 0341 293 (H) 70 - 130 mg/dL Final   07/01/2017 0234 282 (H) 70 - 130 mg/dL Final   07/01/2017 0131 304 (H) 70 - 130 mg/dL Final   07/01/2017 0027 328 (H) 70 - 130 mg/dL Final   06/30/2017 2335 280 (H) 70 - 130 mg/dL Final   06/30/2017 2249 255 (H) 70 - 130 mg/dL Final   06/30/2017 2126 155 (H) 70 - 130 mg/dL Final       Results from last 7 days  Lab Units 07/01/17  0406 06/30/17  2355 06/30/17  2049 06/30/17  1815 06/30/17  0200 06/29/17  2200 06/29/17 2018   PROCALCITONIN ng/mL  --   --   --   --   --   --  2.18*   LACTATE mmol/L 4.8* 2.9* 3.0* 1.7 2.9* 2.1*  --            Results from last 7 days  Lab Units 06/30/17  1050 06/30/17  0925   BLOODCX  No growth at less than 24 hours No growth at less than 24 hours       Results from last 7 days  Lab Units 06/29/17  2159   NITRITE UA  Negative   WBC UA /HPF 13-20*   BACTERIA UA /HPF Trace*   SQUAM EPITHEL UA /HPF None Seen       Results from last 7 days  Lab Units 06/30/17  1706   ADENOVIRUS DETECTION BY PCR  Not Detected   CORONAVIRUS 229E  Not Detected   CORONAVIRUS HKU1  Not Detected   CORONAVIRUS NL63  Not Detected   CORONAVIRUS OC43  Not Detected   HUMAN METAPNEUMOVIRUS  Not Detected   HUMAN RHINOVIRUS/ENTEROVIRUS  Not Detected    INFLUENZA B PCR  Not Detected   PARAINFLUENZA 1  Not Detected   PARAINFLUENZA VIRUS 2  Not Detected   PARAINFLUENZA VIRUS 3  Not Detected   PARAINFLUENZA VIRUS 4  Not Detected   BORDETELLA PERTUSSIS PCR  Not Detected   INFLUENZA 2009 H1N1 BY PCR  Not Detected   CHLAMYDOPHILA PNEUMONIAE PCR  Not Detected   MYCOPLAMA PNEUMO PCR  Not Detected   INFLUENZA A PCR  Not Detected   INFLUENZA A H3  Not Detected   INFLUENZA A H1  Not Detected   RSV, PCR  Not Detected           Imaging Results (all)     Procedure Component Value Units Date/Time    XR Chest 1 View [[id number]] Collected:  06/30/17 1723     Updated:  06/30/17 1728    Narrative:       ONE VIEW PORTABLE CHEST AT 5:07 PM     HISTORY: Shortness of breath. ET tube placement     There has been recent insertion of an ET tube which ends in good  position 3.4 cm above the sai. There is extensive bilateral  pneumonia, right larger than left and the left-sided pneumonia shows  further worsening since yesterday's exam.     This report was finalized on 6/30/2017 5:25 PM by Dr. Ba Marley MD.       XR Chest 1 View [[id number]] Collected:  06/29/17 2108     Updated:  06/30/17 2109    Narrative:       X-RAY CHEST 1 VIEW.     HISTORY: Dyspnea cardiac origin.     COMPARISON: No prior studies for comparison.     FINDINGS:  Cardiomediastinal silhouette is within normal limits.         Large opacity in the right lung concerning for infiltrate, there is a  small right effusion.     Mild opacity in the left lung base, infiltrate is suspected.              Impression:       Large right, small left infiltrate. Suspect small right effusion.  Follow-up to resolution is recommended.         This report was finalized on 6/30/2017 9:06 PM by Dr. Fabi Collado MD.             I reviewed the patient's new clinical results.  I personally viewed and interpreted the patient's imaging results:Rapid progression of the bilateral pulmonary infiltrate with severe involvement of the entire right  lung with opacification and evidence of right-sided pleural effusion        Medication Review:     artificial tears  Both Eyes Q4H   artificial tears  Both Eyes Q4H   IVPB builder  Intravenous Q6H   azithromycin 500 mg Intravenous Q24H   cisatracurium 100 mcg/kg Intravenous Once   dextrose      famotidine 20 mg Intravenous Daily   furosemide 40 mg Intravenous Once   heparin (porcine) 5,000 Units Subcutaneous Q8H   hydrocortisone sodium succinate 50 mg Intravenous Q8H   piperacillin-tazobactam 3.375 g Intravenous Q8H   thiamine (VITAMIN B1) IVPB 200 mg Intravenous Q12H   Vancomycin Pharmacy Intermittent Dosing  Does not apply Daily         cisatracurium (NIMBEX) infusion 3 mcg/kg/min    dextrose 5 % and sodium chloride 0.45 % 150 mL/hr Last Rate: 150 mL/hr (06/30/17 1815)   dextrose 5 % and sodium chloride 0.45 % with KCl 20 mEq/L 150 mL/hr Last Rate: 150 mL/hr (07/01/17 0402)   DOPamine 2-20 mcg/kg/min Last Rate: 2 mcg/kg/min (07/01/17 0551)   insulin regular infusion 1 unit/mL 0.1 Units/kg/hr Last Rate: 8.5 Units/hr (07/01/17 0343)   Pharmacy to dose vancomycin     phenylephrine (DREA-SYNEPHRINE) 50 mg/250 (0.2 mg/mL) infusion 0.5-3 mcg/kg/min Last Rate: 2.5 mcg/kg/min (07/01/17 0250)   propofol 5-50 mcg/kg/min Last Rate: 25 mcg/kg/min (07/01/17 0355)   remifentanil (ULTIVA) infusion 0.5-15 mcg/kg/hr (Ideal) Last Rate: 1 mcg/kg/hr (07/01/17 0315)   sodium bicarbonate drip less than 75 mEq/bag 100 mL/hr Last Rate: 100 mL/hr (07/01/17 0348)   sodium chloride 250 mL/hr Last Rate: Stopped (06/29/17 2208)   sodium chloride 0.45 % with KCl 20 mEq 250 mL/hr Last Rate: 250 mL/hr (06/30/17 1834)       ASSESSMENT:   ARDS  Immunocompromised host with splenectomy  DKA  Sepsis with septic shock  Acute kidney injury secondary to above  History of ITP status post splenectomy  Toxic metabolic encephalopathy  History of lupus in remission  Lactic acidosis with gradual increase in the lactic acid level despite the maximum supportive  measures  Severe hypokalemia on presentation corrected    PLAN:  Antibiotic with Zosyn and vancomycin and Zithromax per infectious disease recommendation while waiting on the culture results  Given the rapid progression in the immunocompromise situation patient was started on the metabolic resuscitation of sepsis with vitamin C, thiamine, and IV steroids  Patient had significant increase in her oxygen requirement in such a short time and given the progression despite the initial supportive measures we will go ahead and proceed with the roto-prone bed to allow adequate oxygenation  Add dopamine given the fact that she has hypotension heart rate in the 80s and she get significant amount of fluid with possible cardiomyopathy from sepsis and we will check an echocardiogram to better assess  Ventilator setting adjusted and would require further adjustment once she get the roto-program which is estimated to be set up by 9 AM today  Discussed with the staff and with the family patient will be further evaluated and followed by the daytime shift physician  Continue with the D5/half-normal saline and with the insulin drip  Patient would require heparin drip while on CRRT to avoid any further clotting in blood loss as a result of that      Disposition: Depending on hospital course    Madison Harris MD  07/01/17  5:56 AM      Time: Critical care 52 min    EMR Dragon/Transcription disclaimer:   Much of this encounter note is an electronic transcription/translation of spoken language to printed text. The electronic translation of spoken language may permit erroneous, or at times, nonsensical words or phrases to be inadvertently transcribed; Although I have reviewed the note for such errors, some may still exist.

## 2017-07-01 NOTE — NURSING NOTE
1345 crrt started after 1.5hr delay. Changed to rotating bed, and had issues trying to get lines to position we could run.

## 2017-07-01 NOTE — PROGRESS NOTES
LOS: 2 days     Chief Complaint:  Follow-up sepsis    Interval History:  Intubated yesterday. Very sick. 100% FiO2. Plans for prone positioning. Now on CRRT. TOlerating antibiotics w/o rash or diarrhea.    ROS: can't be obtained due to intubated status    Vital Signs  Temp:  [97.3 °F (36.3 °C)-99.1 °F (37.3 °C)] 98.8 °F (37.1 °C)  Heart Rate:  [] 105  Resp:  [24-47] 24  BP: ()/() 110/61  FiO2 (%):  [80 %-100 %] 100 %    Physical Exam:  General: intubated, sedated  Head: Normocephalic, atraumatic  Eyes: no scleral icterus,  ENT: ETT in place, MM dry, OP clear, no thrush. Good dentition.   Cardiovascular: tachycardic, RR, no murmurs, rubs, or gallops; no LE edema  Respiratory: Lungs with B rales; on 100% FiO2 on vent  GI: Abdomen is soft, non-tender, non-distended  :  Lockwood catheter present  Musculoskeletal: no joint abnormalities, normal musculature  Skin: No rashes, lesions, or embolic phenomenon  Psychiatric: sedated  Vasc: +cyanosis; RIJ TLC w/o erythema    Antibiotics:  •  azithromycin (ZITHROMAX) 500 mg 0.9% NaCl (Add-vantage) 250 mL, 500 mg, Intravenous, Q24H, Steve Emery MD, 500 mg at 06/30/17 1006  •  Pharmacy to dose vancomycin, , Does not apply, Continuous PRN, Alexis Poe MD  •  piperacillin-tazobactam (ZOSYN) 3.375 g in iso-osmotic dextrose 50 ml (premix), 3.375 g, Intravenous, Q8H, Alexis Poe MD, 3.375 g at 07/01/17 0557  •  Vancomycin Pharmacy Intermittent Dosing, , Does not apply, Daily, Alexis Poe MD, Stopped at 06/30/17 1330    LABS:  CBC, CMP, VTr, Procal, Micro reviewed today  Lab Results   Component Value Date    WBC 10.90 (H) 07/01/2017    HGB 9.7 (L) 07/01/2017    HCT 29.0 (L) 07/01/2017    MCV 90.3 06/29/2017     07/01/2017     Lab Results   Component Value Date    GLUCOSE 198 (H) 07/01/2017    BUN 17 07/01/2017    CREATININE 2.15 (H) 07/01/2017    EGFRIFNONA 26 (L) 07/01/2017    BCR 7.9 07/01/2017    CO2 16.7 (L) 07/01/2017    CALCIUM 7.0 (L)  07/01/2017    ALBUMIN 1.70 (L) 07/01/2017    LABIL2 0.6 06/29/2017    AST 7 06/29/2017    ALT <5 06/29/2017     Lab Results   Component Value Date    STEFFANIE 34.50 07/01/2017       Procal 2--->4.9    Microbiology:  6/30 RVP negative  6/30 BCx: NGTD  6/30 SpCx: pending    Radiology (personally reviewed):   CXR w/ worse B infiltrates    Assessment/Plan   1. ARDS and Sepsis secondary to Right lower lobe pneumonia  -much worse today; plans for prone positioning; CXR worse  -continue vancomycin with goal trough 15-20; dosing for CRRT  -continue Zosyn 3.375 g q8h  -continue azithromycin 500 mg IV q24h  -f/u sputum culture  -f/u blood cultures and call [facility] for BCx there  -prone positioning today     2. Diabetic ketoacidosis  -per ICU team  -trying to correct acidosis w/ CRRT     3. History of splenectomy  -will address vaccine status after critical stage     4. Systemic lupus erythematosus  -appears quiescent based on history     5. Acute kidney injury now on CRRT  -Crt elevated; will trend     Thank you for this consult. ID will follow. I discussed the patients findings and my recommendations with Dr Remy

## 2017-07-01 NOTE — PROGRESS NOTES
"Pharmacy to Dose Vancomycin IV    Day 3  Consult for Dr. Poe  (Dr Emery following for sepsis)  Treating: PNA  Goal trough: 15-20 mcg/mL    Current Vancomycin dose: Intermittent    IV Anti-Infectives     Ordered     Dose/Rate Route Frequency Start Stop    06/30/17 1410  vancomycin 1500 mg/500 mL 0.9% NS IVPB ([facility])     Ordering Provider:  Madison Harris MD    20 mg/kg × 70.6 kg  over 150 Minutes Intravenous Once 06/30/17 1445 06/30/17 2343    06/30/17 0828  azithromycin (ZITHROMAX) 500 mg 0.9% NaCl (Add-vantage) 250 mL     Ordering Provider:  Steve Emery MD    500 mg  over 60 Minutes Intravenous Every 24 Hours 06/30/17 0900 07/05/17 0859    06/29/17 2101  piperacillin-tazobactam (ZOSYN) 3.375 g in iso-osmotic dextrose 50 ml (premix)     Ordering Provider:  Alexis Poe MD    3.375 g  over 4 Hours Intravenous Every 8 Hours 06/29/17 2145      06/29/17 2103  vancomycin 1500 mg/500 mL 0.9% NS IVPB ([facility])     Ordering Provider:  Alexis Poe MD    20 mg/kg × 70.6 kg Intravenous Once 06/29/17 2145 06/29/17 2154      Relevant clinical data and objective history reviewed:  37 y.o. female 67.01\" (170.2 cm) 155 lb (70.3 kg)  Body mass index is 24.27 kg/(m^2).    Results from last 7 days  Lab Units 07/01/17  0608 07/01/17  0236 06/30/17  2049   CREATININE mg/dL 2.15* 1.90* 1.42*     Estimated Creatinine Clearance: 34.2 mL/min (by C-G formula based on Cr of 2.15).    Lab Results   Component Value Date    WBC 10.90 (H) 07/01/2017    WBC 16.50 (H) 06/29/2017     Temp:  [97.3 °F (36.3 °C)-99.1 °F (37.3 °C)] 98.8 °F (37.1 °C)  Heart Rate:  [] 99  Resp:  [24-47] 24  BP: ()/() 110/61  FiO2 (%):  [80 %-100 %] 100 %    Intake/Output Summary (Last 24 hours) at 07/01/17 1159  Last data filed at 06/30/17 1699   Gross per 24 hour   Intake              150 ml   Output                0 ml   Net              150 ml     IV Meds:  bumetanide (BUMEX) infusion 1 mg/hr Last Rate: 1 mg/hr (07/01/17 1102)   dextrose " 20 mL/hr Last Rate: 20 mL/hr (07/01/17 1041)   dextrose 5 % and sodium chloride 0.45 % 150 mL/hr Last Rate: 150 mL/hr (06/30/17 1815)   dextrose 5 % and sodium chloride 0.45 % with KCl 20 mEq/L 150 mL/hr Last Rate: 150 mL/hr (07/01/17 0402)   norepinephrine 0.02-0.3 mcg/kg/min Last Rate: 0.06 mcg/kg/min (07/01/17 1120)   propofol 5-50 mcg/kg/min Last Rate: 20 mcg/kg/min (07/01/17 0609)   remifentanil (ULTIVA) infusion 0.5-15 mcg/kg/hr (Ideal) Last Rate: 1 mcg/kg/hr (07/01/17 0315)     Ventilator/Non-Invasive Ventilation Settings     Start     Ordered    07/01/17 0804  Ventilator - AC/PC; (24); 100; 88%; Other (see comments); 18; 22; (I time 1 sec)  Continuous     Question Answer Comment   Vent Mode AC/PC    Breath rate  24   FiO2 100    FiO2 titrate for Sp02% =/> 88%    PEEP Other (see comments)    PEEP: 18    Inspiratory Pressure 22    I:E Ratio  I time 1 sec       07/01/17 0805      Baseline cultures/labs/radiology:  6/29 Urine cx: NGF  6/29 HgA1C: 12.82  6/29 TSH: 4.910 (H)  6/29 PCT: 2.18  6/29 Lactic acid: 2.1  6/29 UA: trace bacteria/13-20 WBC  6/29 CXR: large right, small left infiltrate  6/30 Lactic acid: 2.9-->1.7-->3.0-->2.9  6/30 CXR: extensive bilateral pneumonia, right larger than left and the left-sided pneumonia shows further worsening since yesterday's exam.  6/30 Blood cx 2/2: NG x 24 hrs  6/30 RVP: None detected  6/30 Sputum cx (cough): NGTD  7/01 Lactic acid: 4.8 --> 2.4  7/01 CXR: Again noted is extensive consolidation throughout both lungs and moderately large right pleural  effusion showing no change from yesterday  7/01 PCT: 4.89    Assessment/Plan:   PMH: ITP w previous splenectomy, Lupus and Hypothyroidism.    Pt transferred from [facility] ED. [place] airport confused & wondering around. IO=228, pH=6.89 at . No previous hx DM. Admitted d/t DKA & PNA    Vancomycin random level above desired range. Will continue to hold & recheck level in AM for further dosing.    Lab Results    Component Value Date    VANCO RANDOM 34.50 mcg/mL 07/01/2017@06:08 am     Vancomycin Dosing History:  6/30 11:59 Vanc random: 9.50 mcg/mL  6/30 21:13 Vancomycin 1500 mg IV x1  7/01 06:08 Vanc random: 34.50 mcg/mL    Thank you for your consult,    Maribel Cardona [facility]  07/01/17 11:59 AM

## 2017-07-01 NOTE — NURSING NOTE
[facility]AL[facility] called and Rotoprone bed ordered per Dr Harris orders. Order confirmation number [id number]. Awaiting call from rep with ETA from company.

## 2017-07-01 NOTE — PROGRESS NOTES
LOS: 2 days   Patient Care Team:  Gregorio Bai MD as PCP - General (Family Medicine)    Chief Complaint:  Pneumonia and respiratory failure    Subjective     Interval History:     37-year-old white female intubated paralyzed and sedated on the ventilator she is on a Nimbex drip with remifentanil and propofol she is also on dopamine at 2 mics per kilogram per minute apparently because she developed some bradycardia overnight.  She is currently receiving CRRT and she is on a ventilator with pressure control of 24 and I time of 0.75 seconds an inspiratory pressure of 24 PEEP of 14 cm and SPO2 on 100% FiO2 of 83% and tidal volumes are running about 325 cc.  Her mother reports her height of about 5 feet 6 inches and no known allergies.  She has a history of lupus and she's had a prior splenectomy she also has Raynaud phenomenon she has no history of diabetes she does not use drugs or tobacco she has occasional social alcohol but is not a regular consumer.  Patient has a right femoral dialysis catheter in place.  A rather prolonged bed is supposed to be delivered shortly to allow programming of the patient.    Review of Systems:   Unable to obtain       Objective     Vital Signs  Temp:  [97.3 °F (36.3 °C)-99.1 °F (37.3 °C)] 99.1 °F (37.3 °C)  Heart Rate:  [] 105  Resp:  [24-47] 24  BP: ()/() 110/61  FiO2 (%):  [80 %-100 %] 100 %  Vent Mode: PC/AC  Ventilator/Non-Invasive Ventilation Settings     Start     Ordered    07/01/17 0804  Ventilator - AC/PC; (24); 100; 88%; Other (see comments); 18; 22; (I time 1 sec)  Continuous     Question Answer Comment   Vent Mode AC/PC    Breath rate  24   FiO2 100    FiO2 titrate for Sp02% =/> 88%    PEEP Other (see comments)    PEEP: 18    Inspiratory Pressure 22    I:E Ratio  I time 1 sec       07/01/17 0805    07/01/17 0107  Ventilator - AC/PC; (24); 100; 12; 24  Continuous,   Status:  Canceled     Question Answer Comment   Vent Mode AC/PC    Breath rate  24    FiO2 100    PEEP 12    Inspiratory Pressure 24        07/01/17 0107    06/30/17 2121  Ventilator - AC/VC; 8  Continuous,   Status:  Canceled     Question Answer Comment   Vent Mode AC/VC    PEEP 8        06/30/17 2120                       Body mass index is 24.27 kg/(m^2).  I/O last 3 completed shifts:  In: 4438 [I.V.:4288; IV Piggyback:150]  Out: 800 [Urine:800]           Physical Exam:  General Appearance: Well-developed white female she isn't fed orally intubated and a tracheal tube looks like it's about 23 cm at the lips.  When she is sedated with propofol and remifentanil she has 0 which is on a train of 4 on Nimbex drip.  Eyes: Conjunctiva are clear and anicteric pupils are about 2-2-1/2 mm they are sluggishly reactive to light.  ENT: Oral mucous membranes are dry  Neck: Trachea midline I don't appreciate jugular venous distention  Lungs: Coarse breath sounds bilaterally they are symmetric and chest expansion appears symmetric  Cardiac: Regular rate and rhythm no murmur  Abdomen: Soft nontender I do not palpate organomegaly or masses I don't hear much in the way of bowel sounds either  : Lockwood catheter dependent drainage and a small amount of yellow urine in the bag  Musc/Skel: She is paralyzed chemically she does not have any twitches on a train of 4 and with the amperage turned up to the maximum on the nerve stimulator.  Skin: She does have some mottling of distal fingers and feet bilaterally I don't appreciate any rashes no jaundice no petechiae  Neuro: Paralyzed and sedated and unresponsive  Extremities/P Vascular: She does have palpable strong dorsalis pedis and radial pulses bilaterally and she really doesn't have significant edema  MSE: Unable to assess       Labs:  WBC No results found for: WBCS   HGB Hemoglobin   Date Value Ref Range Status   07/01/2017 9.7 (L) 11.9 - 15.5 g/dL Final   06/29/2017 12.2 11.9 - 15.5 g/dL Final      HCT Hematocrit   Date Value Ref Range Status   07/01/2017 29.0 (L)  35.6 - 45.5 % Final   06/29/2017 33.6 (L) 35.6 - 45.5 % Final      Platlets No results found for: LABPLAT     PT/INR:    Protime   Date Value Ref Range Status   07/01/2017 18.8 (H) 11.7 - 14.2 Seconds Final   /  INR   Date Value Ref Range Status   07/01/2017 1.63 (H) 0.90 - 1.10 Final       Sodium Sodium   Date Value Ref Range Status   07/01/2017 135 (L) 136 - 145 mmol/L Final   07/01/2017 133 (L) 136 - 145 mmol/L Final   06/30/2017 131 (L) 136 - 145 mmol/L Final   06/30/2017 132 (L) 136 - 145 mmol/L Final   06/30/2017 135 (L) 136 - 145 mmol/L Final   06/30/2017 129 (L) 136 - 145 mmol/L Final   06/30/2017 132 (L) 136 - 145 mmol/L Final   06/29/2017 135 (L) 136 - 145 mmol/L Final   06/29/2017 132 (L) 136 - 145 mmol/L Final      Potassium Potassium   Date Value Ref Range Status   07/01/2017 3.3 (L) 3.5 - 5.2 mmol/L Final   07/01/2017 3.4 (L) 3.5 - 5.2 mmol/L Final   06/30/2017 3.4 (L) 3.5 - 5.2 mmol/L Final   06/30/2017 3.7 3.5 - 5.2 mmol/L Final   06/30/2017 3.9 3.5 - 5.2 mmol/L Final   06/30/2017 3.3 (L) 3.5 - 5.2 mmol/L Final   06/30/2017 4.2 3.5 - 5.2 mmol/L Final   06/29/2017 2.0 (C) 3.5 - 5.2 mmol/L Final   06/29/2017 <1.5 (C) 3.5 - 5.2 mmol/L Final      Chloride Chloride   Date Value Ref Range Status   07/01/2017 101 98 - 107 mmol/L Final   07/01/2017 101 98 - 107 mmol/L Final   06/30/2017 97 (L) 98 - 107 mmol/L Final   06/30/2017 112 (H) 98 - 107 mmol/L Final   06/30/2017 113 (H) 98 - 107 mmol/L Final   06/30/2017 107 98 - 107 mmol/L Final   06/30/2017 111 (H) 98 - 107 mmol/L Final   06/29/2017 109 (H) 98 - 107 mmol/L Final   06/29/2017 102 98 - 107 mmol/L Final      Bicarbonate No results found for: PLASMABICARB   BUN BUN   Date Value Ref Range Status   07/01/2017 17 6 - 20 mg/dL Final   07/01/2017 16 6 - 20 mg/dL Final   06/30/2017 13 6 - 20 mg/dL Final   06/30/2017 27 (H) 6 - 20 mg/dL Final   06/30/2017 28 (H) 6 - 20 mg/dL Final   06/30/2017 26 (H) 6 - 20 mg/dL Final   06/30/2017 24 (H) 6 - 20 mg/dL Final    06/29/2017 21 (H) 6 - 20 mg/dL Final   06/29/2017 21 (H) 6 - 20 mg/dL Final      Creatinine Creatinine   Date Value Ref Range Status   07/01/2017 2.15 (H) 0.57 - 1.00 mg/dL Final   07/01/2017 1.90 (H) 0.57 - 1.00 mg/dL Final   06/30/2017 1.42 (H) 0.57 - 1.00 mg/dL Final   06/30/2017 2.56 (H) 0.57 - 1.00 mg/dL Final   06/30/2017 2.73 (H) 0.57 - 1.00 mg/dL Final   06/30/2017 2.34 (H) 0.57 - 1.00 mg/dL Final   06/30/2017 1.98 (H) 0.57 - 1.00 mg/dL Final   06/29/2017 1.81 (H) 0.57 - 1.00 mg/dL Final   06/29/2017 1.94 (H) 0.57 - 1.00 mg/dL Final      Calcium Calcium   Date Value Ref Range Status   07/01/2017 7.0 (L) 8.6 - 10.5 mg/dL Final   07/01/2017 7.4 (L) 8.6 - 10.5 mg/dL Final   06/30/2017 7.1 (L) 8.6 - 10.5 mg/dL Final   06/30/2017 7.8 (L) 8.6 - 10.5 mg/dL Final   06/30/2017 9.3 8.6 - 10.5 mg/dL Final   06/30/2017 9.2 8.6 - 10.5 mg/dL Final   06/30/2017 9.0 8.6 - 10.5 mg/dL Final   06/29/2017 8.4 (L) 8.6 - 10.5 mg/dL Final   06/29/2017 9.4 8.6 - 10.5 mg/dL Final      Magnesium Magnesium   Date Value Ref Range Status   07/01/2017 1.9 1.6 - 2.6 mg/dL Final   06/30/2017 1.8 1.6 - 2.6 mg/dL Final   06/30/2017 2.0 1.6 - 2.6 mg/dL Final   06/30/2017 2.3 1.6 - 2.6 mg/dL Final   06/30/2017 2.4 1.6 - 2.6 mg/dL Final   06/29/2017 2.4 1.6 - 2.6 mg/dL Final            pH pH, Arterial   Date Value Ref Range Status   07/01/2017 7.225 (C) 7.350 - 7.450 pH units Final     Comment:     Critical:Notify THIERNO PARK RN (01-Jul-17 02:05:47)Read back ok   06/30/2017 7.348 (L) 7.350 - 7.450 pH units Final   06/30/2017 7.339 (L) 7.350 - 7.450 pH units Final   06/29/2017 6.987 (C) 7.350 - 7.450 pH units Final     Comment:     Critical:Notify THIERNO REYES RN (29-Jun-17 20:23:53)Read back ok      pO2 pO2, Arterial   Date Value Ref Range Status   07/01/2017 62.6 (L) 80.0 - 100.0 mm Hg Final   06/30/2017 196.6 (H) 80.0 - 100.0 mm Hg Final   06/30/2017 191.5 (H) 80.0 - 100.0 mm Hg Final   06/29/2017 88.8 80.0 - 100.0 mm Hg Final       pCO2 pCO2, Arterial   Date Value Ref Range Status   07/01/2017 31.5 (L) 35.0 - 45.0 mm Hg Final   06/30/2017 27.3 (L) 35.0 - 45.0 mm Hg Final   06/30/2017 26.4 (L) 35.0 - 45.0 mm Hg Final   06/29/2017 16.8 (C) 35.0 - 45.0 mm Hg Final     Comment:     Critical:Notify RN JUDI REYES RN (29-Jun-17 20:23:53)Read back ok      HCO3 HCO3, Arterial   Date Value Ref Range Status   07/01/2017 13.1 (L) 22.0 - 28.0 mmol/L Final   06/30/2017 15.0 (L) 22.0 - 28.0 mmol/L Final   06/30/2017 14.2 (L) 22.0 - 28.0 mmol/L Final   06/29/2017 4.0 (L) 22.0 - 28.0 mmol/L Final          artificial tears  Both Eyes Q4H   artificial tears  Both Eyes Q4H   IVPB builder  Intravenous Q6H   azithromycin 500 mg Intravenous Q24H   famotidine 20 mg Intravenous Daily   heparin (porcine) 5,000 Units Subcutaneous Q8H   hydrocortisone sodium succinate 50 mg Intravenous Q8H   piperacillin-tazobactam 3.375 g Intravenous Q8H   potassium chloride 10 mEq Intravenous Q1H   thiamine (VITAMIN B1) IVPB 200 mg Intravenous Q12H   Vancomycin Pharmacy Intermittent Dosing  Does not apply Daily       cisatracurium (NIMBEX) infusion 3 mcg/kg/min Last Rate: 3 mcg/kg/min (07/01/17 0633)   dextrose 5 % and sodium chloride 0.45 % 150 mL/hr Last Rate: 150 mL/hr (06/30/17 1815)   dextrose 5 % and sodium chloride 0.45 % with KCl 20 mEq/L 150 mL/hr Last Rate: 150 mL/hr (07/01/17 0402)   heparin (porcine) 500 Units/hr Last Rate: 500 Units/hr (07/01/17 0714)   insulin regular infusion 1 unit/mL 0.1 Units/kg/hr Last Rate: 9.5 Units/hr (07/01/17 0632)   Pharmacy to dose vancomycin     propofol 5-50 mcg/kg/min Last Rate: 20 mcg/kg/min (07/01/17 0609)   remifentanil (ULTIVA) infusion 0.5-15 mcg/kg/hr (Ideal) Last Rate: 1 mcg/kg/hr (07/01/17 0315)   sodium bicarbonate drip less than 75 mEq/bag 100 mL/hr Last Rate: Stopped (07/01/17 0630)   sodium chloride 250 mL/hr Last Rate: Stopped (06/29/17 2208)   sodium chloride 0.45 % with KCl 20 mEq 250 mL/hr Last Rate: 250 mL/hr (06/30/17  1834)       Diagnostics:  Imaging Results (last 24 hours)     Procedure Component Value Units Date/Time    XR Chest 1 View [[id number]] Collected:  06/30/17 1723     Updated:  06/30/17 1728    Narrative:       ONE VIEW PORTABLE CHEST AT 5:07 PM     HISTORY: Shortness of breath. ET tube placement     There has been recent insertion of an ET tube which ends in good  position 3.4 cm above the sai. There is extensive bilateral  pneumonia, right larger than left and the left-sided pneumonia shows  further worsening since yesterday's exam.     This report was finalized on 6/30/2017 5:25 PM by Dr. Ba Marley MD.       XR Chest 1 View [[id number]] Collected:  06/29/17 2108     Updated:  06/30/17 2109    Narrative:       X-RAY CHEST 1 VIEW.     HISTORY: Dyspnea cardiac origin.     COMPARISON: No prior studies for comparison.     FINDINGS:  Cardiomediastinal silhouette is within normal limits.         Large opacity in the right lung concerning for infiltrate, there is a  small right effusion.     Mild opacity in the left lung base, infiltrate is suspected.              Impression:       Large right, small left infiltrate. Suspect small right effusion.  Follow-up to resolution is recommended.         This report was finalized on 6/30/2017 9:06 PM by Dr. Fabi Collado MD.             I did review her chest x-rays from yesterday bilateral progressive pulmonary infiltrates right greater than left she now has all lobe involvement.  Endotracheal tube in good position      Assessment/Plan     Patient Active Problem List   Diagnosis Code   • DKA (diabetic ketoacidoses) E13.10       Impression #1 pneumonia community-acquired severe she is on broad-spectrum antibiotics including azithromycin and vancomycin and Zosyn.  Cultures thus far negative and infectious disease is following  #2 acute hypoxic respiratory failure this meets the definition for ARDS.  I have increased her peak and expiratory pressure to 18 I have her saturation  artery up to 88% which is my goal.  A plan is to prone the patient shortly.  If we need to we could potentially increase her inspiratory pressure a little further I believe  #3 severe sepsis with septic shockher blood pressure has improved.  She is off Ilu-Synephrine drip and we just turned off the dopamine which was on more for some bradycardia she is now tachycardic  #4 new onset diabetes with diabetic ketoacidosis and dialysis is helping clear the excess ketones her acidosis has improved she is on an insulin drip she is obviously providing she is survived skin in need endocrinology help I'm going ahead and get them consulted  #5 acute renal failure probably ATN she is on SLED per nephrology  #6 SLE apparently this is been fairly inactive recently she is getting some steroids stress dose that should help should she have some active disease.  #7 renal syndrome will have to watch I have discussed with the family regarding location of arterial line.  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    Plan for disposition:    Van Remy MD  07/01/17  8:10 AM    Time: I have spent 43 minutes thus far today in care of the patient excluding any planned procedures

## 2017-07-01 NOTE — POST-PROCEDURE NOTE
Left radial arterial line  Indication: Patient in ARDS going to be prone in need of access for both a blood pressure evaluation and management as she is also in shock and for access for monitoring arterial blood gases.  Consent: Discussed with the patient's parents informed consent obtained.  We did discuss the risk that she does have Raynaud onset and options  Procedure patient left radial artery identified with ultrasound ChloraPrep times to maximal barrier precautions with ultrasound guidance the left radial was cannulated on the first attempt and using modified Seldinger technique a narrow radial artery catheter was placed 20-gauge and secured with 2 2-0 silk sutures Biopatch dressing applied there is excellent blood flow and good waveform no complications

## 2017-07-01 NOTE — CONSULTS
"  ENDOCRINE    Subjective   Sarita Bai is a 37 y.o. female.     Date of consultation:  July 1, 2017    Consultation from: Dr. Remy    Reason for consultation: Endocrine evaluation    Present illness:  There is no history available from the patient.  Her chart was reviewed    Patient is a 37-year-old female who was admitted to the hospital on June 29, 2017 because of confusion she was found to be markedly hyperglycemic and was noted to have pneumonia.  She was started on IV fluids and insulin drip and empiric antibiotics.  Her respiratory status deteriorated and she required mechanical ventilation.  She is on Nimbex, Bumex, heparin, Levothroid, propofol, Ultiva and insulin drips.  She is off the dopamine drip.  She is undergoing SLED    Patient has no previous history of diabetes mellitus.  She has a history of hypothyroidism but has not been taking any medication.  TSH on admission was elevated at 4.91.    Past medical history:  Previous splenectomy for ITP.    History of systemic lupus erythematosus-not on medication    Allergies: No known drug allergies    Family history: Unobtainable    Review of systems: Unobtainable due to patient's sedation    Objective   /61  Pulse 97  Temp 98.6 °F (37 °C) (Oral)   Resp 24  Ht 67.01\" (170.2 cm)  Wt 155 lb (70.3 kg)  SpO2 100%  BMI 24.27 kg/m2  Physical Exam    Sedated on mechanical ventilation.  Cornea clear.  Pupils equal and reactive to light    pink conjunctiva.  Sclerae is nonicteric  No xanthelasma  No facial asymmetry  Orotracheal tube in place  Neck veins are not engorged  Thyroid is not enlarged.  No cervical lymphadenopathy.  Equal chest expansion.  Few rhonchi.  No wheezes  Regular heart rate and rhythm.  No murmurs.  No rubs  Abdomen soft, hypoactive bowel sounds  Extremities warm.  No cyanosis.  No pedal edema.        Lab Results (last 24 hours)     Procedure Component Value Units Date/Time    POC Glucose Fingerstick [[id number]]  (Normal) " Collected:  06/30/17 2046    Specimen:  Blood Updated:  06/30/17 2106     Glucose 115 mg/dL     Narrative:       Meter: [id number] : [id number] Edgar Leonard    Blood Gas, Arterial [[id number]]  (Abnormal) Collected:  06/30/17 2107    Specimen:  Arterial Blood Updated:  06/30/17 2112     Site Arterial: right brachial     Cristian's Test N/A     pH, Arterial 7.348 (L) pH units      pCO2, Arterial 27.3 (L) mm Hg      pO2, Arterial 196.6 (H) mm Hg      HCO3, Arterial 15.0 (L) mmol/L      Base Excess, Arterial -9.4 (L) mmol/L      O2 Saturation Calculated 99.7 (H) %      A-a Gradiant 0.3 mmHg      Barometric Pressure for Blood Gas 744.2 mmHg      Modality Adult Vent     FIO2 100 %      Ventilator Mode PC     Set Tidal Volume 476     Set Mech Resp Rate 24     Rate 44 Breaths/minute      PEEP 10    Narrative:       SPO2 98% IP 28 Meter: [id number] : [id number] Bertram Dickey    Lactic Acid, Plasma [[id number]]  (Abnormal) Collected:  06/30/17 2049    Specimen:  Blood Updated:  06/30/17 2119     Lactate 3.0 (C) mmol/L     POC Glucose Fingerstick [[id number]]  (Abnormal) Collected:  06/30/17 2111    Specimen:  Blood Updated:  06/30/17 2125     Glucose 148 (H) mg/dL     Narrative:       Meter: [id number] : [id number] Lovely Castillo    Magnesium [[id number]]  (Normal) Collected:  06/30/17 2049    Specimen:  Blood Updated:  06/30/17 2125     Magnesium 1.8 mg/dL     Cortisol [[id number]]  (Normal) Collected:  06/30/17 1607    Specimen:  Blood Updated:  06/30/17 2126     Cortisol 72.00 mcg/dL     Narrative:       Cortisol Reference Ranges:    Cortisol 6AM - 10AM Range: 6.02-18.40 mcg/dl  Cortisol 4PM - 8PM Range: 2.68-10.50 mcg/dl  Critical AM/PM:    Less than 2 mcg/dl    Phosphorus [[id number]]  (Abnormal) Collected:  06/30/17 2049    Specimen:  Blood Updated:  06/30/17 2127     Phosphorus 0.6 (L) mg/dL     Basic Metabolic Panel [[id number]]  (Abnormal) Collected:  06/30/17 2049    Specimen:  Blood Updated:  06/30/17  2127     Glucose 254 (H) mg/dL      BUN 13 mg/dL      Creatinine 1.42 (H) mg/dL      Sodium 131 (L) mmol/L      Potassium 3.4 (L) mmol/L      Chloride 97 (L) mmol/L      CO2 15.2 (L) mmol/L      Calcium 7.1 (L) mg/dL      eGFR Non African Amer 41 (L) mL/min/1.73      BUN/Creatinine Ratio 9.2     Anion Gap 18.8 mmol/L     Narrative:       GFR Normal >60  Chronic Kidney Disease <60  Kidney Failure <15    POC Glucose Fingerstick [[id number]]  (Abnormal) Collected:  06/30/17 2126    Specimen:  Blood Updated:  06/30/17 2134     Glucose 155 (H) mg/dL     Narrative:       Meter: [id number] : [id number] Lovely Castillo    Calcium, Ionized [[id number]]  (Abnormal) Collected:  06/30/17 2049    Specimen:  Blood Updated:  06/30/17 2138     Ionized Calcium 1.13 mmol/L      Ionized Calcium 4.5 (L) mg/dL     POC Glucose Fingerstick [[id number]]  (Abnormal) Collected:  06/30/17 2249    Specimen:  Blood Updated:  06/30/17 2302     Glucose 255 (H) mg/dL     Narrative:       Meter: [id number] : [id number] Lovely Castillo    POC Glucose Fingerstick [[id number]]  (Abnormal) Collected:  06/30/17 2335    Specimen:  Blood Updated:  06/30/17 2345     Glucose 280 (H) mg/dL     Narrative:       Meter: [id number] : [id number] Lovely Castillo    POC Glucose Fingerstick [[id number]]  (Abnormal) Collected:  07/01/17 0027    Specimen:  Blood Updated:  07/01/17 0028     Glucose 328 (H) mg/dL     Narrative:       Meter: [id number] : [id number] Lovely Castillo    Lactic Acid, Plasma [[id number]]  (Abnormal) Collected:  06/30/17 2355    Specimen:  Blood Updated:  07/01/17 0100     Lactate 2.9 (C) mmol/L     POC Glucose Fingerstick [[id number]]  (Abnormal) Collected:  07/01/17 0131    Specimen:  Blood Updated:  07/01/17 0141     Glucose 304 (H) mg/dL     Narrative:       Meter: [id number] : [id number] Lovely Castillo    Blood Gas, Arterial [[id number]]  (Abnormal) Collected:  07/01/17 0203    Specimen:  Arterial Blood Updated:  07/01/17 0206     Site  Arterial: right brachial     Cristian's Test N/A     pH, Arterial 7.225 (C) pH units       Critical:Notify RN GISELLE PARK RN (01-Jul-17 02:05:47)Read back ok        pCO2, Arterial 31.5 (L) mm Hg      pO2, Arterial 62.6 (L) mm Hg      HCO3, Arterial 13.1 (L) mmol/L      Base Excess, Arterial -13.4 (L) mmol/L      O2 Saturation Calculated 87.3 (L) %      A-a Gradiant 0.1 mmHg      Barometric Pressure for Blood Gas 745.9 mmHg      Modality Adult Vent     FIO2 100 %      Ventilator Mode PC     Set Tidal Volume 462     Set Mech Resp Rate 24     Rate 33 Breaths/minute      PEEP 12    Narrative:       SPO2 94% IP 24 Meter: [id number] : [id number] Bertram Dickey    POC Glucose Fingerstick [[id number]]  (Abnormal) Collected:  07/01/17 0234    Specimen:  Blood Updated:  07/01/17 0245     Glucose 282 (H) mg/dL     Narrative:       Meter: [id number] : [id number] Lovely Castillo    Basic Metabolic Panel [[id number]]  (Abnormal) Collected:  07/01/17 0236    Specimen:  Blood Updated:  07/01/17 0314     Glucose 290 (H) mg/dL      BUN 16 mg/dL      Creatinine 1.90 (H) mg/dL      Sodium 133 (L) mmol/L      Potassium 3.4 (L) mmol/L      Chloride 101 mmol/L      CO2 14.0 (L) mmol/L      Calcium 7.4 (L) mg/dL      eGFR Non African Amer 29 (L) mL/min/1.73      BUN/Creatinine Ratio 8.4     Anion Gap 18.0 mmol/L     Narrative:       GFR Normal >60  Chronic Kidney Disease <60  Kidney Failure <15    POC Glucose Fingerstick [[id number]]  (Abnormal) Collected:  07/01/17 0341    Specimen:  Blood Updated:  07/01/17 0352     Glucose 293 (H) mg/dL     Narrative:       Meter: [id number] : [id number] Lovely Castillo    Lactic Acid, Plasma [[id number]]  (Abnormal) Collected:  07/01/17 0406    Specimen:  Blood Updated:  07/01/17 0452     Lactate 4.8 (C) mmol/L     POC Glucose Fingerstick [[id number]]  (Abnormal) Collected:  07/01/17 0442    Specimen:  Blood Updated:  07/01/17 0455     Glucose 246 (H) mg/dL     Narrative:       Meter: [id number]  : [id number] Lovely Anna    Urine Culture [[id number]]  (Normal) Collected:  06/29/17 2159    Specimen:  Urine from Urine, Clean Catch Updated:  07/01/17 0609     Urine Culture No growth    POC Glucose Fingerstick [[id number]]  (Abnormal) Collected:  07/01/17 0604    Specimen:  Blood Updated:  07/01/17 0616     Glucose 202 (H) mg/dL     Narrative:       Meter: [id number] : [id number] WASHINGTON DIA    POC Glucose Fingerstick [[id number]]  (Abnormal) Collected:  07/01/17 0645    Specimen:  Blood Updated:  07/01/17 0646     Glucose 179 (H) mg/dL     Narrative:       Meter: [id number] : [id number] Lovely Castillo    Protime-INR [[id number]]  (Abnormal) Collected:  07/01/17 0607    Specimen:  Blood Updated:  07/01/17 0649     Protime 18.8 (H) Seconds      INR 1.63 (H)    aPTT [[id number]]  (Abnormal) Collected:  07/01/17 0607    Specimen:  Blood Updated:  07/01/17 0649     PTT 37.6 (H) seconds     Magnesium [[id number]]  (Normal) Collected:  07/01/17 0608    Specimen:  Blood Updated:  07/01/17 0659     Magnesium 1.9 mg/dL     Vancomycin, Random [[id number]]  (Normal) Collected:  07/01/17 0608    Specimen:  Blood Updated:  07/01/17 0700     Vancomycin Random 34.50 mcg/mL     Renal Function Panel [[id number]]  (Abnormal) Collected:  07/01/17 0608    Specimen:  Blood Updated:  07/01/17 0703     Glucose 198 (H) mg/dL      BUN 17 mg/dL      Creatinine 2.15 (H) mg/dL      Sodium 135 (L) mmol/L      Potassium 3.3 (L) mmol/L      Chloride 101 mmol/L      CO2 16.7 (L) mmol/L      Calcium 7.0 (L) mg/dL      Albumin 1.70 (L) g/dL      Phosphorus 1.1 (L) mg/dL      Anion Gap 17.3 mmol/L      BUN/Creatinine Ratio 7.9     eGFR Non African Amer 26 (L) mL/min/1.73     Procalcitonin [[id number]]  (Abnormal) Collected:  07/01/17 0608    Specimen:  Blood Updated:  07/01/17 0708     Procalcitonin 4.89 (C) ng/mL     Narrative:       As a Marker for Sepsis (Non-Neonates):   1. <0.5 ng/mL represents a low risk of severe sepsis and/or  "septic shock.  1. >2 ng/mL represents a high risk of severe sepsis and/or septic shock.    As a Marker for Lower Respiratory Tract Infections that require antibiotic therapy:  PCT on Admission     Antibiotic Therapy             6-12 Hrs later  > 0.5                Strongly Recommended            >0.25 - <0.5         Recommended  0.1 - 0.25           Discouraged                   Remeasure/reassess PCT  <0.1                 Strongly Discouraged          Remeasure/reassess PCT      As 28 day mortality risk marker: \"Change in Procalcitonin Result\" (> 80 % or <=80 %) if Day 0 (or Day 1) and Day 4 values are available. Refer to http://www.[employer]-calculator.com/   Change in PCT <=80 %   A decrease of PCT levels below or equal to 80 % defines a positive change in PCT test result representing a higher risk for 28-day all-cause mortality of patients diagnosed with severe sepsis or septic shock.  Change in PCT > 80 %   A decrease of PCT levels of more than 80 % defines a negative change in PCT result representing a lower risk for 28-day all-cause mortality of patients diagnosed with severe sepsis or septic shock.                Calcium, Ionized [[id number]]  (Abnormal) Collected:  07/01/17 0607    Specimen:  Blood Updated:  07/01/17 0736     Ionized Calcium 1.10 mmol/L      Ionized Calcium 4.4 (L) mg/dL     POC Glucose Fingerstick [[id number]]  (Abnormal) Collected:  07/01/17 0726    Specimen:  Blood Updated:  07/01/17 0737     Glucose 166 (H) mg/dL     Narrative:       Meter: [id number] : [id number] Lovely Castillo    CBC Auto Differential [[id number]]  (Abnormal) Collected:  07/01/17 0615    Specimen:  Blood Updated:  07/01/17 0746     WBC 10.90 (H) 10*3/mm3      RBC 3.23 (L) 10*6/mm3      Hemoglobin 9.7 (L) g/dL      Hematocrit 29.0 (L) %      RDW 14.6 (H) %      RDW-SD 45.2 fl      MPV 10.1 fL      Platelets 210 10*3/mm3      Neutrophil % 86.6 (H) %      Lymphocyte % 7.6 (L) %      Monocyte % 4.9 (L) %      Eosinophil % " 0.0 (L) %      Basophil % 0.1 %      Immature Grans % 0.8 (H) %      Neutrophils, Absolute 9.44 (H) 10*3/mm3      Lymphocytes, Absolute 0.83 (L) 10*3/mm3      Monocytes, Absolute 0.53 10*3/mm3      Eosinophils, Absolute 0.00 10*3/mm3      Basophils, Absolute 0.01 10*3/mm3      Immature Grans, Absolute 0.09 (H) 10*3/mm3     CBC & Differential [[id number]] Collected:  07/01/17 0615    Specimen:  Blood Updated:  07/01/17 0749    Narrative:       The following orders were created for panel order CBC & Differential.  Procedure                               Abnormality         Status                     ---------                               -----------         ------                     Scan Slide[[id number]]                                       Final result               CBC Auto Differential[[id number]]        Abnormal            Final result                 Please view results for these tests on the individual orders.    Scan Slide [[id number]] Collected:  07/01/17 0615    Specimen:  Blood Updated:  07/01/17 0749     Crenated RBC's Slight/1+     Hypochromia Mod/2+     Ovalocytes Mod/2+     RBC Fragments Slight/1+     WBC Morphology Normal     Platelet Morphology Normal    Lactic Acid, Plasma [[id number]]  (Abnormal) Collected:  07/01/17 0816    Specimen:  Blood Updated:  07/01/17 0851     Lactate 2.4 (C) mmol/L     Blood Culture [[id number]]  (Normal) Collected:  06/30/17 0925    Specimen:  Blood from Hand, Left Updated:  07/01/17 1001     Blood Culture No growth at 24 hours    Blood Gas, Arterial [[id number]]  (Abnormal) Collected:  06/30/17 1741    Specimen:  Arterial Blood Updated:  07/01/17 1003     Site Arterial: right brachial     Cristian's Test N/A     pH, Arterial 7.339 (L) pH units      pCO2, Arterial 26.4 (L) mm Hg      pO2, Arterial 191.5 (H) mm Hg      HCO3, Arterial 14.2 (L) mmol/L      Base Excess, Arterial -10.3 (L) mmol/L      O2 Saturation Calculated 99.6 (H) %      A-a Gradiant 0.3 mmHg      Barometric  Pressure for Blood Gas 744.5 mmHg      Modality Adult Vent      Corrected result. Previous result was aerosol mask on 6/30/2017 at 1743 EDT        FIO2 100 %      Ventilator Mode PC     Set Tidal Volume 560     [facility] 24     Rate 36 Breaths/minute      PEEP 10    Narrative:       sat 98 Meter: [id number] : [id number] De La Cruz Shannan    POC Glucose Fingerstick [[id number]]  (Normal) Collected:  07/01/17 0937    Specimen:  Blood Updated:  07/01/17 1012     Glucose 87 mg/dL     Narrative:       Meter: [id number] : [id number] Edgar Leonard    hCG, Quantitative, Pregnancy [[id number]] Collected:  07/01/17 0608    Specimen:  Blood Updated:  07/01/17 1023     HCG Quantitative <0.50 mIU/mL     Narrative:       HCG Ranges by Gestational Age    3 Weeks         5.4 -      72 mIU/mL  4 Weeks        10.2 -     708 mIU/mL  5 Weeks       217   -   8,245 mIU/mL  6 Weeks       152   -  32,177 mIU/mL  7 Weeks     4,059   - 153,767 mIU/mL  8 Weeks    31,366   - 149,094 mIU/mL  9 Weeks    59,109   - 135,901 mIU/mL  10 Weeks   44,186   - 170,409 mIU/mL  12 Weeks   27,107   - 201,615 mIU/mL  14 Weeks   24,302   -  93,646 mIU/mL  15 Weeks   12,540   -  69,747 mIU/mL  16 Weeks    8,904   -  55,332 mIU/mL  17 Weeks    8,240   -  51,793 mIU/mL  18 Weeks    9,649   -  55,271 mIU/mL    Respiratory Culture [[id number]] Collected:  06/30/17 1706    Specimen:  Sputum from Cough Updated:  07/01/17 1024     Respiratory Culture No growth at less than 24 hours     Gram Stain Result Rare (1+) WBCs seen      Rare (1+) Epithelial cells per low power field      No organisms seen    POC Glucose Fingerstick [[id number]]  (Abnormal) Collected:  07/01/17 1036    Specimen:  Blood Updated:  07/01/17 1046     Glucose 62 (L) mg/dL     Narrative:       Meter: [id number] : [id number] Edgar Leonard    Blood Culture [[id number]]  (Normal) Collected:  06/30/17 1050    Specimen:  Blood from Arm, Left Updated:  07/01/17 1101     Blood Culture No  growth at 24 hours    POC Glucose Fingerstick [[id number]]  (Normal) Collected:  07/01/17 1130    Specimen:  Blood Updated:  07/01/17 1150     Glucose 118 mg/dL     Narrative:       Meter: [id number] : [id number] Hernández Aisha    Blood Gas, Arterial [[id number]]  (Abnormal) Collected:  07/01/17 1148    Specimen:  Arterial Blood Updated:  07/01/17 1151     Site Arterial Line     Cristian's Test N/A     pH, Arterial 7.207 (C) pH units       Critical:Notify Dr CARVER (01-Jul-17 11:50:51)Read back ok        pCO2, Arterial 57.7 (C) mm Hg       Critical:Notify Dr CARVER (01-Jul-17 11:50:51)Read back ok        pO2, Arterial 58.7 (L) mm Hg      HCO3, Arterial 22.9 mmol/L      Base Excess, Arterial -5.2 (L) mmol/L      O2 Saturation Calculated 83.2 (L) %      A-a Gradiant 0.1 mmHg      Barometric Pressure for Blood Gas 747.5 mmHg      Modality Adult Vent     FIO2 100 %      Ventilator Mode PC     Set Tidal Volume 375     Set Mech Resp Rate 24     Rate 24 Breaths/minute      PEEP 14    Narrative:       IP 22, sat 94 Meter: [id number] : [id number] Johnny Esparza    POC Glucose Fingerstick [[id number]]  (Abnormal) Collected:  07/01/17 1217    Specimen:  Blood Updated:  07/01/17 1229     Glucose 131 (H) mg/dL     Narrative:       Meter: [id number] : [id number] Edgar Leonard    Lactic Acid, Plasma [[id number]]  (Normal) Collected:  07/01/17 1202    Specimen:  Blood Updated:  07/01/17 1237     Lactate 1.6 mmol/L     aPTT [[id number]]  (Abnormal) Collected:  07/01/17 1202    Specimen:  Blood Updated:  07/01/17 1238     PTT 37.4 (H) seconds     Magnesium [[id number]]  (Normal) Collected:  07/01/17 1202    Specimen:  Blood Updated:  07/01/17 1243     Magnesium 1.8 mg/dL     Renal Function Panel [[id number]]  (Abnormal) Collected:  07/01/17 1202    Specimen:  Blood Updated:  07/01/17 1245     Glucose 144 (H) mg/dL      BUN 12 mg/dL      Creatinine 1.79 (H) mg/dL      Sodium 135 (L) mmol/L      Potassium 4.0 mmol/L      Chloride  101 mmol/L      CO2 22.1 mmol/L      Calcium 6.6 (L) mg/dL      Albumin 1.60 (L) g/dL      Phosphorus 2.5 mg/dL      Anion Gap 11.9 mmol/L      BUN/Creatinine Ratio 6.7 (L)     eGFR Non  Amer 32 (L) mL/min/1.73     Calcium, Ionized [[id number]]  (Abnormal) Collected:  07/01/17 1202    Specimen:  Blood Updated:  07/01/17 1255     Ionized Calcium 1.00 (L) mmol/L      Ionized Calcium 4.0 (L) mg/dL     POC Glucose Fingerstick [[id number]]  (Abnormal) Collected:  07/01/17 1404    Specimen:  Blood Updated:  07/01/17 1414     Glucose 211 (H) mg/dL     Narrative:       Meter: [id number] : [id number] Hernández Aisha    POC Glucose Fingerstick [[id number]]  (Normal) Collected:  07/01/17 1523    Specimen:  Blood Updated:  07/01/17 1542     Glucose 91 mg/dL     Narrative:       Meter: [id number] : [id number] [employer] Aisha    Hemoglobin & Hematocrit, Blood [[id number]]  (Abnormal) Collected:  07/01/17 1540    Specimen:  Blood Updated:  07/01/17 1612     Hemoglobin 11.1 (L) g/dL      Hematocrit 29.2 (L) %     Lactic Acid, Plasma [[id number]]  (Abnormal) Collected:  07/01/17 1540    Specimen:  Blood Updated:  07/01/17 1613     Lactate 0.2 (L) mmol/L     POC Glucose Fingerstick [[id number]]  (Normal) Collected:  07/01/17 1623    Specimen:  Blood Updated:  07/01/17 1642     Glucose 93 mg/dL     Narrative:       Meter: [id number] : [id number] [employer] Aisha    POC Glucose Fingerstick [[id number]]  (Abnormal) Collected:  07/01/17 1744    Specimen:  Blood Updated:  07/01/17 1746     Glucose 147 (H) mg/dL     Narrative:       Meter: [id number] : [id number] Hernández Aisha    Blood Gas, Arterial [[id number]]  (Abnormal) Collected:  07/01/17 1804    Specimen:  Arterial Blood Updated:  07/01/17 1807     Site Arterial Line     Cristian's Test N/A     pH, Arterial 7.335 (L) pH units      pCO2, Arterial 48.9 (H) mm Hg      pO2, Arterial 229.2 (H) mm Hg      HCO3, Arterial 26.1 mmol/L      Base Excess, Arterial -0.1 (L) mmol/L       O2 Saturation Calculated 99.8 (H) %      A-a Gradiant 0.3 mmHg      Barometric Pressure for Blood Gas 746.5 mmHg      Modality Adult Vent     FIO2 100 %      Ventilator Mode PC     Set Tidal Volume 397     Set Mech Resp Rate 24     Rate 24 Breaths/minute      PEEP 18    Narrative:       99% SAT, IP 22, prone Meter: [id number] : [id number] Johnny Esparza    Lactic Acid, Plasma [[id number]]  (Abnormal) Collected:  07/01/17 1740    Specimen:  Blood Updated:  07/01/17 1819     Lactate 0.3 (L) mmol/L     Magnesium [[id number]]  (Normal) Collected:  07/01/17 1740    Specimen:  Blood Updated:  07/01/17 1836     Magnesium 1.8 mg/dL     Renal Function Panel [[id number]]  (Abnormal) Collected:  07/01/17 1740    Specimen:  Blood Updated:  07/01/17 1836     Glucose 150 (H) mg/dL      BUN 6 mg/dL      Creatinine 0.85 mg/dL      Sodium 134 (L) mmol/L      Potassium 3.8 mmol/L      Chloride 96 (L) mmol/L      CO2 26.5 mmol/L      Calcium 6.8 (L) mg/dL      Albumin 1.90 (L) g/dL      Phosphorus 0.8 (L) mg/dL      Anion Gap 11.5 mmol/L      BUN/Creatinine Ratio 7.1     eGFR Non African Amer 74 mL/min/1.73     Calcium, Ionized [[id number]]  (Abnormal) Collected:  07/01/17 1740    Specimen:  Blood Updated:  07/01/17 1846     Ionized Calcium 1.02 (L) mmol/L      Ionized Calcium 4.1 (L) mg/dL             Active Problems:    DKA (diabetic ketoacidoses)    Pneumonia    Sepsis    Acute respiratory failure    History of systemic lupus erythematosus (SLE)    History of splenectomy    History of ITP    Primary hypothyroidism    Continue insulin drip until more hemodynamically stable.  Check C-peptide and REGINA antibody in the morning    Patient has history of hypothyroidism.  Her TSH is mildly elevated but this may be depressed by exogenous corticosteroids and severe illness.  Check free T4.  Will start levothyroxine 25 µg IV daily.    Continue empiric antibiotics per Dr. Emery.      Thank you for the consultation

## 2017-07-01 NOTE — PROGRESS NOTES
"   LOS: 2 days    Patient Care Team:  Gregorio Bai MD as PCP - General (Family Medicine)    Chief Complaint:  No chief complaint on file.      Subjective     Interval History: worsening condition.  Pt was intubated around 4:30 pm yesterday for worsening oxygenation, underwent emergent hemodialysis for correction of acidemia.  She stabilized somewhat and then dropped her oxygen level and ph dropped to 7.2 early this am.  Initiated on CRRT this am, clotted off within 30 minutes and then has been restarted.  Going on levophed for pressor support.    Patient Complaints: unobtainable  Patient Denies:  unobtainable  History taken from: patient RN    Review of Systems:    Review of systems could not be obtained due to  patient unresponsive.    Objective     Vital Signs  Temp:  [97.3 °F (36.3 °C)-99.1 °F (37.3 °C)] 98.8 °F (37.1 °C)  Heart Rate:  [] 105  Resp:  [24-47] 24  BP: ()/() 110/61  FiO2 (%):  [80 %-100 %] 100 %    Flowsheet Rows         First Filed Value    Admission Height  67\" (170.2 cm) Documented at 06/29/2017 2028    Admission Weight  155 lb 10.3 oz (70.6 kg) Documented at 06/29/2017 2028             I/O last 3 completed shifts:  In: 4438 [I.V.:4288; IV Piggyback:150]  Out: 800 [Urine:800]    Intake/Output Summary (Last 24 hours) at 07/01/17 0846  Last data filed at 06/30/17 2152   Gross per 24 hour   Intake              150 ml   Output                0 ml   Net              150 ml       Physical Exam:  Profoundly ill-appearing; paralyzed and sedated  Dry MM; no scleral icterus  No JVD; no carotid bruits  Coarse rhonchi bilat; rapid breathing; not labored  RR, tachy, no rub  Soft, NT, ND, BS hypoactive  No edema; cool extremities; some mottling of LE's  No clubbing  Cannot assess asterixis  Moves all extremities       Results Review:      Results from last 7 days  Lab Units 07/01/17  0608 07/01/17  0236 06/30/17 2049 06/29/17 2018   SODIUM mmol/L 135* 133* 131*  < > 132*   POTASSIUM " mmol/L 3.3* 3.4* 3.4*  < > <1.5*   CHLORIDE mmol/L 101 101 97*  < > 102   CO2 mmol/L 16.7* 14.0* 15.2*  < > 5.0*   BUN mg/dL 17 16 13  < > 21*   CREATININE mg/dL 2.15* 1.90* 1.42*  < > 1.94*   CALCIUM mg/dL 7.0* 7.4* 7.1*  < > 9.4   BILIRUBIN mg/dL  --   --   --   --  0.2   ALK PHOS U/L  --   --   --   --  77   ALT (SGPT) U/L  --   --   --   --  <5   AST (SGOT) U/L  --   --   --   --  7   GLUCOSE mg/dL 198* 290* 254*  < > 300*   < > = values in this interval not displayed.    Estimated Creatinine Clearance: 34.2 mL/min (by C-G formula based on Cr of 2.15).      Results from last 7 days  Lab Units 07/01/17  0608 06/30/17  2049 06/30/17  1605   MAGNESIUM mg/dL 1.9 1.8 2.0   PHOSPHORUS mg/dL 1.1* 0.6* 1.8*               Results from last 7 days  Lab Units 07/01/17  0615 06/29/17 2018   WBC 10*3/mm3 10.90* 16.50*   HEMOGLOBIN g/dL 9.7* 12.2   PLATELETS 10*3/mm3 210 383         Results from last 7 days  Lab Units 07/01/17  0607   INR  1.63*         Imaging Results (last 24 hours)     Procedure Component Value Units Date/Time    XR Chest 1 View [[id number]] Collected:  06/30/17 1723     Updated:  06/30/17 1728    Narrative:       ONE VIEW PORTABLE CHEST AT 5:07 PM     HISTORY: Shortness of breath. ET tube placement     There has been recent insertion of an ET tube which ends in good  position 3.4 cm above the sai. There is extensive bilateral  pneumonia, right larger than left and the left-sided pneumonia shows  further worsening since yesterday's exam.     This report was finalized on 6/30/2017 5:25 PM by Dr. Ba Marley MD.       XR Chest 1 View [[id number]] Collected:  06/29/17 2108     Updated:  06/30/17 2109    Narrative:       X-RAY CHEST 1 VIEW.     HISTORY: Dyspnea cardiac origin.     COMPARISON: No prior studies for comparison.     FINDINGS:  Cardiomediastinal silhouette is within normal limits.         Large opacity in the right lung concerning for infiltrate, there is a  small right effusion.     Mild  opacity in the left lung base, infiltrate is suspected.              Impression:       Large right, small left infiltrate. Suspect small right effusion.  Follow-up to resolution is recommended.         This report was finalized on 6/30/2017 9:06 PM by Dr. Fabi Collado MD.             artificial tears  Both Eyes Q4H   artificial tears  Both Eyes Q4H   IVPB builder  Intravenous Q6H   azithromycin 500 mg Intravenous Q24H   bumetanide 2 mg Intravenous Once   bumetanide 2 mg Intravenous Once   famotidine 20 mg Intravenous Daily   heparin (porcine) 5,000 Units Subcutaneous Q8H   hydrocortisone sodium succinate 50 mg Intravenous Q8H   piperacillin-tazobactam 3.375 g Intravenous Q8H   potassium chloride 10 mEq Intravenous Q1H   thiamine (VITAMIN B1) IVPB 200 mg Intravenous Q12H   Vancomycin Pharmacy Intermittent Dosing  Does not apply Daily       bumetanide (BUMEX) infusion 1 mg/hr    cisatracurium (NIMBEX) infusion 3 mcg/kg/min Last Rate: 3 mcg/kg/min (07/01/17 0633)   dextrose 5 % and sodium chloride 0.45 % 150 mL/hr Last Rate: 150 mL/hr (06/30/17 1815)   dextrose 5 % and sodium chloride 0.45 % with KCl 20 mEq/L 150 mL/hr Last Rate: 150 mL/hr (07/01/17 0402)   heparin (porcine) 500 Units/hr Last Rate: 500 Units/hr (07/01/17 0714)   insulin regular infusion 1 unit/mL 0.1 Units/kg/hr Last Rate: 9.5 Units/hr (07/01/17 0632)   Pharmacy to dose vancomycin     propofol 5-50 mcg/kg/min Last Rate: 20 mcg/kg/min (07/01/17 0609)   remifentanil (ULTIVA) infusion 0.5-15 mcg/kg/hr (Ideal) Last Rate: 1 mcg/kg/hr (07/01/17 0315)   sodium bicarbonate drip less than 75 mEq/bag 100 mL/hr Last Rate: Stopped (07/01/17 0630)   sodium chloride 250 mL/hr Last Rate: Stopped (06/29/17 2208)   sodium chloride 0.45 % with KCl 20 mEq 250 mL/hr Last Rate: 250 mL/hr (06/30/17 1834)       Medication Review:   Current Facility-Administered Medications   Medication Dose Route Frequency Provider Last Rate Last Dose   • albumin human 25 % IV SOLN 12.5 g   12.5 g Intravenous PRN Justo Gold MD       • artificial tears (LUBRIFRESH P.M.) ophthalmic ointment   Both Eyes Q4H Madison Harris MD       • artificial tears (LUBRIFRESH P.M.) ophthalmic ointment   Both Eyes PRN Madison Harris MD       • artificial tears ophthalmic ointment   Both Eyes Q4H Madison Harris MD       • ascorbic acid 1,500 mg in dextrose (D5W) 5 % 100 mL IVPB   Intravenous Q6H Madison Harris  mL/hr at 07/01/17 0746     • azithromycin (ZITHROMAX) 500 mg 0.9% NaCl (Add-vantage) 250 mL  500 mg Intravenous Q24H Steve Emery MD   500 mg at 06/30/17 1006   • bumetanide (BUMEX) 10 mg in sodium chloride 0.9 % 100 mL (0.1 mg/mL) infusion  1 mg/hr Intravenous Continuous Susan Urena MD       • bumetanide (BUMEX) injection 2 mg  2 mg Intravenous Once Susan Urena MD       • bumetanide (BUMEX) injection 2 mg  2 mg Intravenous Once Susan Urena MD       • calcium gluconate 1 g in sodium chloride 0.9 % 50 mL IVPB  1 g Intravenous PRN Susan Urena MD        Or   • calcium gluconate 2 g in sodium chloride 0.9 % 50 mL IVPB  2 g Intravenous PRN Susan Urena MD       • cisatracurium besylate (NIMBEX) 200 mg in sodium chloride 0.9 % 100 mL (2 mg/mL) infusion  3 mcg/kg/min Intravenous Titrated Madison Harris MD 6.33 mL/hr at 07/01/17 0633 3 mcg/kg/min at 07/01/17 0633   • dextrose (D50W) solution 12.5 g  12.5 g Intravenous Q15 Min PRN Alexis Poe MD   12.5 g at 06/30/17 1917   • dextrose 5 % and sodium chloride 0.45 % infusion  150 mL/hr Intravenous Continuous PRN Alexis Poe  mL/hr at 06/30/17 1815 150 mL/hr at 06/30/17 1815   • dextrose 5 % and sodium chloride 0.45 % with KCl 20 mEq/L infusion  150 mL/hr Intravenous Continuous PRN Alexis Poe  mL/hr at 07/01/17 0402 150 mL/hr at 07/01/17 0402   • famotidine (PEPCID) injection 20 mg  20 mg Intravenous Daily Madison Harris MD       • fentaNYL citrate (PF) (SUBLIMAZE) injection  50 mcg  50 mcg Intravenous Q1H PRN Madison Harris MD   50 mcg at 07/01/17 0009   • heparin (porcine) 5000 UNIT/ML injection 5,000 Units  5,000 Units Subcutaneous Q8H Alexis Poe MD   5,000 Units at 07/01/17 0609   • heparin (porcine) injection 1,000-2,000 Units  1,000-2,000 Units Intravenous PRN Susan Urena MD       • heparin 83343 units/250 mL (100 units/mL) in 0.45 % NaCl infusion  500 Units/hr Intravenous Continuous Susan Urena MD 5 mL/hr at 07/01/17 0714 500 Units/hr at 07/01/17 0714   • HYDROcodone-acetaminophen (NORCO) 5-325 MG per tablet 1 tablet  1 tablet Oral Q4H PRN Alexis Poe MD       • hydrocortisone sodium succinate (Solu-CORTEF) injection 50 mg  50 mg Intravenous Q8H Madison Harris MD   50 mg at 07/01/17 0557   • insulin regular (humuLIN R,novoLIN R) 100 Units in sodium chloride 0.9 % 100 mL (1 Units/mL) infusion  0.1 Units/kg/hr Intravenous Titrated Alexis Poe MD 9.5 mL/hr at 07/01/17 0632 9.5 Units/hr at 07/01/17 0632   • Magnesium Sulfate 2 gram Bolus, followed by 8 gram infusion (total Mg dose 10 grams)- Mg less than or equal to 1mg/dL  2 g Intravenous PRN Alexis Poe MD        Or   • Magnesium Sulfate 6 gram Infusion (2 gm x 3) -Mg 1.1 -1.5 mg/dL  2 g Intravenous PRN Alexis Poe MD        Or   • magnesium sulfate 4 gram infusion- Mg 1.6-1.9 mg/dL  4 g Intravenous PRN Alexis Poe MD       • magnesium sulfate in D5W 1g/100mL (PREMIX)  2 g Intravenous PRN Susan Urena MD       • Pharmacy to dose vancomycin   Does not apply Continuous PRN Alexis Poe MD       • piperacillin-tazobactam (ZOSYN) 3.375 g in iso-osmotic dextrose 50 ml (premix)  3.375 g Intravenous Q8H Alexis Poe MD   3.375 g at 07/01/17 0557   • potassium chloride 10 mEq in 100 mL IVPB  10 mEq Intravenous Q1H Susan Urena MD       • potassium chloride 20 mEq in 50 mL IVPB  20 mEq Intravenous PRN Susan Urena MD       • propofol (DIPRIVAN) infusion 10 mg/mL 100 mL  5-50 mcg/kg/min  Intravenous Titrated Madison Harris MD 8.44 mL/hr at 07/01/17 0609 20 mcg/kg/min at 07/01/17 0609   • remifentanil (ULTIVA) 50 mcg/mL in sodium chloride 0.9 % 100 mL  0.5-15 mcg/kg/hr (Ideal) Intravenous Titrated Madison Harris MD 1.23 mL/hr at 07/01/17 0315 1 mcg/kg/hr at 07/01/17 0315   • sodium bicarbonate 8.4 % 75 mEq in sodium chloride 0.45 % 1,000 mL infusion (less than 75 mEq)  100 mL/hr Intravenous Continuous Susan Urena MD   Stopped at 07/01/17 0630   • sodium chloride 0.45 % infusion  250 mL/hr Intravenous Continuous Alexis Poe MD   Stopped at 06/29/17 2208   • sodium chloride 0.45 % with KCl 20 mEq/L infusion  250 mL/hr Intravenous Continuous PRN Alexis Poe  mL/hr at 06/30/17 1834 250 mL/hr at 06/30/17 1834   • sodium phosphates 10 mmol in sodium chloride 0.9 % 100 mL IVPB  10 mmol Intravenous PRN Susan Urena MD       • thiamine (B-1) 200 mg in sodium chloride 0.9 % 100 mL IVPB  200 mg Intravenous Q12H Madison Harris  mL/hr at 07/01/17 0708 200 mg at 07/01/17 0708   • Vancomycin Pharmacy Intermittent Dosing   Does not apply Daily Alexis Poe MD   Stopped at 06/30/17 1330       Assessment/Plan       Active Problems:    DKA (diabetic ketoacidoses)    1. HODAN, oliguric, prerenal +/- evolution to ATN from vol depletion/DKA and sepsis syndrome. SCr still rising despite vol resuscitation; very low phos and stabilizing serum K; severe acidemia.  Pt had hemodialysis yesterday.    Early this am she developed worsening oxygenation and acidosis and CRRT was initiated.  She clotted off within 30minutes and CRRT restarted with heparin gtt and goal of removing volume as tolerated.  Course is complicated by severe sepsis, and now hypotension requiring pressor.   2. Severe right-sided pna   3. Obtundation  4. Hypoxia  5. Newly dx'd DM1 presenting as DKA, presently on insulin drip with controlled BS6. H/o TTP with prior splenectomy: plt count fine. Vaccine status not known  6.  Hypothyroidism, untreated    Plan:    Continue CRRT with the goal of trying to remove volume excess and correct severe acidemia.  Will add bumex gtt this am to see if she will respond.  Have discontinued IVF (part of dka protocol) because of oligoanuria.  Agree with pressor support.  Check labs serially.  Discussed with RN and Dr. Remy      Discussed above with patient's family in waiting room.    Susan Urena MD  07/01/17  8:46 AM

## 2017-07-01 NOTE — CONSULTS
Adult Nutrition  Assessment/PES    Patient Name:  Sarita Bai  YOB: 1980  MRN: [id number]  Admit Date:  6/29/2017    Assessment Date:  7/1/2017  Consult for RD to manage nutrition support.  Nepro at 40 ml/hr will meet needs at this time.      Reason for Assessment       07/01/17 1023    Reason for Assessment    Reason For Assessment/Visit physician consult;TF/PN    Pulmonary/Critical Care Ventilator   proning ordered    Renal CRRT              Nutrition/Diet History       07/01/17 1024    Nutrition/Diet History    Other intubated, MD wants Dietitian to mangage nutrition support              Labs/Tests/Procedures/Meds       07/01/17 1024    Labs/Tests/Procedures/Meds    Diagnostic Test/Procedure Review reviewed    Labs/Tests Review Reviewed    Medication Review Reviewed, pertinent    Significant Vitals reviewed            Physical Findings       07/01/17 1024    Physical Appearance    Overall Physical Appearance on ventilator support    Skin --   intact              Nutrition Prescription Ordered       07/01/17 1030    Nutrition Prescription PO    Current PO Diet NPO    Propofol Considerations    Propofol (mL/hr) 8.44 mL/hr    Propofol (Kcal/day) 222 Kcal/day                Problem/Interventions:          Problem 2       07/01/17 1025    Nutrition Diagnoses Problem 2    Problem 2 Needs Alternate Route    Etiology (related to) Medical Diagnosis    Pulmonary/Critical Care Ventilator                  Intervention Goal       07/01/17 1028    Intervention Goal    General Maintain nutrition;Nutrition support treatment    TF/PN Inititiate TF/PN;Tolerate TF at goal    Weight No significant weight loss            Nutrition Intervention       07/01/17 1029    Nutrition Intervention    RD/Tech Action Follow Tx progress;Care plan reviewd;Recommend/ordered    Recommended/Ordered EN            Nutrition Prescription       07/01/17 1029    Nutrition Prescription EN    Enteral Prescription Enteral  begin/change;Enteral to supply    Product Nepro    TF Delivery Method Continuous    Continuous TF Goal Rate (mL/hr) 40 mL/hr    Water flush (mL)  30 mL    Water Flush Frequency Every 4 hours    EN to Supply    Kcal/Day 1728 Kcal/Day    Kcal/day with Propofol  1950 Kcal/day with Propofol    Protein (gm/day) 77 gm/day    Meet Estimated Kcal Need (%) 110 %    Meet Estimated Protein Need (%) 111 %    TF Free H2O (mL) 697 mL    Total Free H2O (mL/day) 877 mL/day            Education/Evaluation       07/01/17 1030    Education    Education Education not appropriate at this time    Monitor/Evaluation    Monitor Per protocol            Electronically signed by:  Alexa Stroud RD  07/01/17 10:32 AM

## 2017-07-01 NOTE — POST-PROCEDURE NOTE
Procedure right IJ central venous catheter indication patient on multiple medications in need of IV access.  Consent: Discussed with the patient's parents informed consent obtained  Procedure: The right IJ site was chosen patient severely hypoxic on high PEEP so subclavian sites not optimal.  Patient placed in Trendelenburg position ChloraPrep times to maximal per precautions the right IJ was cannulated on the first attempt without complications using modified Seldinger technique a narrow quad lumen catheter was placed to 16 cm insertion like secured with 2 2-0 silk sutures Biopatch dressing applied all ports aspirated and flushed easily chest x-rays been ordered to confirm placement

## 2017-07-02 ENCOUNTER — APPOINTMENT (OUTPATIENT)
Dept: GENERAL RADIOLOGY | Facility: HOSPITAL | Age: 37
End: 2017-07-02

## 2017-07-02 ENCOUNTER — APPOINTMENT (OUTPATIENT)
Dept: CARDIOLOGY | Facility: HOSPITAL | Age: 37
End: 2017-07-02
Attending: INTERNAL MEDICINE

## 2017-07-02 LAB
ALBUMIN SERPL-MCNC: 1.9 G/DL (ref 3.5–5.2)
ALBUMIN SERPL-MCNC: 2 G/DL (ref 3.5–5.2)
ALBUMIN SERPL-MCNC: 2.1 G/DL (ref 3.5–5.2)
ALBUMIN SERPL-MCNC: 2.1 G/DL (ref 3.5–5.2)
AMYLASE SERPL-CCNC: 483 U/L (ref 28–100)
ANION GAP SERPL CALCULATED.3IONS-SCNC: 15.7 MMOL/L
ANION GAP SERPL CALCULATED.3IONS-SCNC: 17.2 MMOL/L
ANION GAP SERPL CALCULATED.3IONS-SCNC: 18.5 MMOL/L
ANION GAP SERPL CALCULATED.3IONS-SCNC: 19.1 MMOL/L
APTT PPP: 101.1 SECONDS (ref 22.7–35.4)
ARTERIAL PATENCY WRIST A: ABNORMAL
ASCENDING AORTA: 2.8 CM
ATMOSPHERIC PRESS: 748.7 MMHG
ATMOSPHERIC PRESS: 748.8 MMHG
ATMOSPHERIC PRESS: 748.9 MMHG
ATMOSPHERIC PRESS: 750.9 MMHG
BACTERIA SPEC RESP CULT: NO GROWTH
BASE EXCESS BLDA CALC-SCNC: -1.5 MMOL/L (ref 0–2)
BASE EXCESS BLDA CALC-SCNC: 0.1 MMOL/L (ref 0–2)
BASE EXCESS BLDA CALC-SCNC: 0.5 MMOL/L (ref 0–2)
BASE EXCESS BLDA CALC-SCNC: 2.5 MMOL/L (ref 0–2)
BDY SITE: ABNORMAL
BH CV ECHO MEAS - AO MAX PG: 11 MMHG
BH CV ECHO MEAS - AO MEAN PG (FULL): 4 MMHG
BH CV ECHO MEAS - AO MEAN PG: 6 MMHG
BH CV ECHO MEAS - AO ROOT AREA (BSA CORRECTED): 1.4
BH CV ECHO MEAS - AO ROOT AREA: 5.3 CM^2
BH CV ECHO MEAS - AO ROOT DIAM: 2.6 CM
BH CV ECHO MEAS - AO V2 MAX: 164 CM/SEC
BH CV ECHO MEAS - AO V2 MEAN: 112 CM/SEC
BH CV ECHO MEAS - AO V2 VTI: 25.6 CM
BH CV ECHO MEAS - ASC AORTA: 2.8 CM
BH CV ECHO MEAS - AVA(I,A): 2.3 CM^2
BH CV ECHO MEAS - AVA(I,D): 2.3 CM^2
BH CV ECHO MEAS - BSA(HAYCOCK): 1.8 M^2
BH CV ECHO MEAS - BSA: 1.8 M^2
BH CV ECHO MEAS - BZI_BMI: 24.3 KILOGRAMS/M^2
BH CV ECHO MEAS - BZI_METRIC_HEIGHT: 170.2 CM
BH CV ECHO MEAS - BZI_METRIC_WEIGHT: 70.3 KG
BH CV ECHO MEAS - CONTRAST EF (2CH): 66.7 ML/M^2
BH CV ECHO MEAS - CONTRAST EF 4CH: 60 ML/M^2
BH CV ECHO MEAS - EDV(CUBED): 50.7 ML
BH CV ECHO MEAS - EDV(MOD-SP2): 45 ML
BH CV ECHO MEAS - EDV(MOD-SP4): 50 ML
BH CV ECHO MEAS - EDV(TEICH): 58.1 ML
BH CV ECHO MEAS - EF(CUBED): 65.3 %
BH CV ECHO MEAS - EF(MOD-SP2): 66.7 %
BH CV ECHO MEAS - EF(MOD-SP4): 60 %
BH CV ECHO MEAS - EF(TEICH): 57.7 %
BH CV ECHO MEAS - ESV(CUBED): 17.6 ML
BH CV ECHO MEAS - ESV(MOD-SP2): 15 ML
BH CV ECHO MEAS - ESV(MOD-SP4): 20 ML
BH CV ECHO MEAS - ESV(TEICH): 24.6 ML
BH CV ECHO MEAS - FS: 29.7 %
BH CV ECHO MEAS - IVS/LVPW: 1.3
BH CV ECHO MEAS - IVSD: 1 CM
BH CV ECHO MEAS - LA DIMENSION(2D): 11 CM
BH CV ECHO MEAS - LA DIMENSION: 4 CM
BH CV ECHO MEAS - LAT PEAK E' VEL: 7 CM/SEC
BH CV ECHO MEAS - LV DIASTOLIC VOL/BSA (35-75): 27.6 ML/M^2
BH CV ECHO MEAS - LV MASS(C)D: 96.9 GRAMS
BH CV ECHO MEAS - LV MASS(C)DI: 53.4 GRAMS/M^2
BH CV ECHO MEAS - LV MEAN PG: 2 MMHG
BH CV ECHO MEAS - LV SYSTOLIC VOL/BSA (12-30): 11 ML/M^2
BH CV ECHO MEAS - LV V1 MEAN: 69.8 CM/SEC
BH CV ECHO MEAS - LV V1 VTI: 18.4 CM
BH CV ECHO MEAS - LVIDD: 3.7 CM
BH CV ECHO MEAS - LVIDS: 2.6 CM
BH CV ECHO MEAS - LVLD AP2: 4.9 CM
BH CV ECHO MEAS - LVLD AP4: 5.8 CM
BH CV ECHO MEAS - LVLS AP2: 4.2 CM
BH CV ECHO MEAS - LVLS AP4: 5.1 CM
BH CV ECHO MEAS - LVOT AREA (M): 3.1 CM^2
BH CV ECHO MEAS - LVOT AREA: 3.1 CM^2
BH CV ECHO MEAS - LVOT DIAM: 2 CM
BH CV ECHO MEAS - LVPWD: 0.8 CM
BH CV ECHO MEAS - MED PEAK E' VEL: 8 CM/SEC
BH CV ECHO MEAS - MV A DUR: 0.09 SEC
BH CV ECHO MEAS - MV A MAX VEL: 75 CM/SEC
BH CV ECHO MEAS - MV DEC SLOPE: 771 CM/SEC^2
BH CV ECHO MEAS - MV DEC TIME: 0.14 SEC
BH CV ECHO MEAS - MV E MAX VEL: 92.3 CM/SEC
BH CV ECHO MEAS - MV E/A: 1.2
BH CV ECHO MEAS - MV MEAN PG: 2 MMHG
BH CV ECHO MEAS - MV P1/2T MAX VEL: 117 CM/SEC
BH CV ECHO MEAS - MV P1/2T: 44.4 MSEC
BH CV ECHO MEAS - MV V2 MEAN: 72.2 CM/SEC
BH CV ECHO MEAS - MV V2 VTI: 19.1 CM
BH CV ECHO MEAS - MVA P1/2T LCG: 1.9 CM^2
BH CV ECHO MEAS - MVA(P1/2T): 4.9 CM^2
BH CV ECHO MEAS - MVA(VTI): 3 CM^2
BH CV ECHO MEAS - PA ACC SLOPE: 3432 CM/SEC^2
BH CV ECHO MEAS - PA ACC TIME: 0.04 SEC
BH CV ECHO MEAS - PA MAX PG (FULL): 4 MMHG
BH CV ECHO MEAS - PA MAX PG: 7.2 MMHG
BH CV ECHO MEAS - PA PR(ACCEL): 61.5 MMHG
BH CV ECHO MEAS - PA V2 MAX: 134 CM/SEC
BH CV ECHO MEAS - PULM A REVS DUR: 0.09 SEC
BH CV ECHO MEAS - PULM A REVS VEL: 36.5 CM/SEC
BH CV ECHO MEAS - PULM DIAS VEL: 89.3 CM/SEC
BH CV ECHO MEAS - PULM S/D: 0.78
BH CV ECHO MEAS - PULM SYS VEL: 69.6 CM/SEC
BH CV ECHO MEAS - PVA(V,A): 2.5 CM^2
BH CV ECHO MEAS - PVA(V,D): 2.5 CM^2
BH CV ECHO MEAS - QP/QS: 0.85
BH CV ECHO MEAS - RV MAX PG: 3.1 MMHG
BH CV ECHO MEAS - RV MEAN PG: 1 MMHG
BH CV ECHO MEAS - RV V1 MAX: 88.7 CM/SEC
BH CV ECHO MEAS - RV V1 MEAN: 45.9 CM/SEC
BH CV ECHO MEAS - RV V1 VTI: 12.9 CM
BH CV ECHO MEAS - RVOT AREA: 3.8 CM^2
BH CV ECHO MEAS - RVOT DIAM: 2.2 CM
BH CV ECHO MEAS - SI(AO): 74.9 ML/M^2
BH CV ECHO MEAS - SI(CUBED): 18.2 ML/M^2
BH CV ECHO MEAS - SI(LVOT): 31.9 ML/M^2
BH CV ECHO MEAS - SI(MOD-SP2): 16.5 ML/M^2
BH CV ECHO MEAS - SI(MOD-SP4): 16.5 ML/M^2
BH CV ECHO MEAS - SI(TEICH): 18.5 ML/M^2
BH CV ECHO MEAS - SV(AO): 135.9 ML
BH CV ECHO MEAS - SV(CUBED): 33.1 ML
BH CV ECHO MEAS - SV(LVOT): 57.8 ML
BH CV ECHO MEAS - SV(MOD-SP2): 30 ML
BH CV ECHO MEAS - SV(MOD-SP4): 30 ML
BH CV ECHO MEAS - SV(RVOT): 49 ML
BH CV ECHO MEAS - SV(TEICH): 33.5 ML
BH CV ECHO MEAS - TAPSE (>1.6): 1.8 CM2
BH CV ECHO MEAS - TR MAX VEL: 337 CM/SEC
BH CV VAS BP RIGHT ARM: NORMAL MMHG
BH CV XLRA - RV BASE: 3 CM
BH CV XLRA - TDI S': 11 CM/SEC
BUN BLD-MCNC: 3 MG/DL (ref 6–20)
BUN BLD-MCNC: 5 MG/DL (ref 6–20)
BUN BLD-MCNC: 6 MG/DL (ref 6–20)
BUN BLD-MCNC: 7 MG/DL (ref 6–20)
BUN/CREAT SERPL: 5 (ref 7–25)
BUN/CREAT SERPL: 5.6 (ref 7–25)
BUN/CREAT SERPL: 6.4 (ref 7–25)
BUN/CREAT SERPL: 7.5 (ref 7–25)
BURR CELLS BLD QL SMEAR: ABNORMAL
CA-I BLD-MCNC: 3.9 MG/DL (ref 4.6–5.4)
CA-I BLD-MCNC: 4 MG/DL (ref 4.6–5.4)
CA-I SERPL ISE-MCNC: 0.98 MMOL/L (ref 1.1–1.35)
CA-I SERPL ISE-MCNC: 0.99 MMOL/L (ref 1.1–1.35)
CA-I SERPL ISE-MCNC: 1 MMOL/L (ref 1.1–1.35)
CA-I SERPL ISE-MCNC: 1 MMOL/L (ref 1.1–1.35)
CALCIUM SPEC-SCNC: 6.5 MG/DL (ref 8.6–10.5)
CALCIUM SPEC-SCNC: 6.9 MG/DL (ref 8.6–10.5)
CALCIUM SPEC-SCNC: 6.9 MG/DL (ref 8.6–10.5)
CALCIUM SPEC-SCNC: 7 MG/DL (ref 8.6–10.5)
CHLORIDE SERPL-SCNC: 92 MMOL/L (ref 98–107)
CHLORIDE SERPL-SCNC: 93 MMOL/L (ref 98–107)
CO2 SERPL-SCNC: 22.5 MMOL/L (ref 22–29)
CO2 SERPL-SCNC: 22.9 MMOL/L (ref 22–29)
CO2 SERPL-SCNC: 24.8 MMOL/L (ref 22–29)
CO2 SERPL-SCNC: 25.3 MMOL/L (ref 22–29)
CREAT BLD-MCNC: 0.47 MG/DL (ref 0.57–1)
CREAT BLD-MCNC: 0.93 MG/DL (ref 0.57–1)
CREAT BLD-MCNC: 1 MG/DL (ref 0.57–1)
CREAT BLD-MCNC: 1.08 MG/DL (ref 0.57–1)
D-LACTATE SERPL-SCNC: 1.9 MMOL/L (ref 0.5–2)
DEPRECATED RDW RBC AUTO: 47.2 FL (ref 37–54)
E/E' RATIO: 13
ERYTHROCYTE [DISTWIDTH] IN BLOOD BY AUTOMATED COUNT: 15.1 % (ref 11.7–13)
GFR SERPL CREATININE-BSD FRML MDRD: 148 ML/MIN/1.73
GFR SERPL CREATININE-BSD FRML MDRD: 56 ML/MIN/1.73
GFR SERPL CREATININE-BSD FRML MDRD: 62 ML/MIN/1.73
GFR SERPL CREATININE-BSD FRML MDRD: 67 ML/MIN/1.73
GLUCOSE BLD-MCNC: 132 MG/DL (ref 65–99)
GLUCOSE BLD-MCNC: 148 MG/DL (ref 65–99)
GLUCOSE BLD-MCNC: 155 MG/DL (ref 65–99)
GLUCOSE BLD-MCNC: 184 MG/DL (ref 65–99)
GLUCOSE BLDC GLUCOMTR-MCNC: 132 MG/DL (ref 70–130)
GLUCOSE BLDC GLUCOMTR-MCNC: 159 MG/DL (ref 70–130)
GLUCOSE BLDC GLUCOMTR-MCNC: 163 MG/DL (ref 70–130)
GLUCOSE BLDC GLUCOMTR-MCNC: 165 MG/DL (ref 70–130)
GLUCOSE BLDC GLUCOMTR-MCNC: 167 MG/DL (ref 70–130)
GLUCOSE BLDC GLUCOMTR-MCNC: 168 MG/DL (ref 70–130)
GLUCOSE BLDC GLUCOMTR-MCNC: 172 MG/DL (ref 70–130)
GLUCOSE BLDC GLUCOMTR-MCNC: 176 MG/DL (ref 70–130)
GLUCOSE BLDC GLUCOMTR-MCNC: 176 MG/DL (ref 70–130)
GLUCOSE BLDC GLUCOMTR-MCNC: 181 MG/DL (ref 70–130)
GLUCOSE BLDC GLUCOMTR-MCNC: 185 MG/DL (ref 70–130)
GLUCOSE BLDC GLUCOMTR-MCNC: 188 MG/DL (ref 70–130)
GLUCOSE BLDC GLUCOMTR-MCNC: 198 MG/DL (ref 70–130)
GLUCOSE BLDC GLUCOMTR-MCNC: 204 MG/DL (ref 70–130)
GLUCOSE BLDC GLUCOMTR-MCNC: 207 MG/DL (ref 70–130)
GLUCOSE BLDC GLUCOMTR-MCNC: 208 MG/DL (ref 70–130)
GRAM STN SPEC: NORMAL
HBV CORE AB SER DONR QL IA: NEGATIVE
HCO3 BLDA-SCNC: 24.8 MMOL/L (ref 22–28)
HCO3 BLDA-SCNC: 24.9 MMOL/L (ref 22–28)
HCO3 BLDA-SCNC: 27.1 MMOL/L (ref 22–28)
HCO3 BLDA-SCNC: 27.2 MMOL/L (ref 22–28)
HCT VFR BLD AUTO: 25 % (ref 35.6–45.5)
HGB BLD-MCNC: 8.3 G/DL (ref 11.9–15.5)
HOROWITZ INDEX BLD+IHG-RTO: 50 %
HOROWITZ INDEX BLD+IHG-RTO: 50 %
HOROWITZ INDEX BLD+IHG-RTO: 60 %
HOROWITZ INDEX BLD+IHG-RTO: 65 %
LEFT ATRIUM VOLUME INDEX: 13 ML/M2
LEFT ATRIUM VOLUME: 24 CM3
LIPASE SERPL-CCNC: 20 U/L (ref 13–60)
LYMPHOCYTES # BLD MANUAL: 0.18 10*3/MM3 (ref 0.9–4.8)
LYMPHOCYTES NFR BLD MANUAL: 1 % (ref 19.6–45.3)
LYMPHOCYTES NFR BLD MANUAL: 4 % (ref 5–12)
MAGNESIUM SERPL-MCNC: 1.8 MG/DL (ref 1.6–2.6)
MAGNESIUM SERPL-MCNC: 2 MG/DL (ref 1.6–2.6)
MAGNESIUM SERPL-MCNC: 2 MG/DL (ref 1.6–2.6)
MAGNESIUM SERPL-MCNC: 2.8 MG/DL (ref 1.6–2.6)
MODALITY: ABNORMAL
MONOCYTES # BLD AUTO: 0.71 10*3/MM3 (ref 0.2–1.2)
NEUTROPHILS # BLD AUTO: 16.79 10*3/MM3 (ref 1.9–8.1)
NEUTROPHILS NFR BLD MANUAL: 95 % (ref 42.7–76)
NRBC SPEC MANUAL: 8 /100 WBC (ref 0–0)
O2 A-A PPRESDIFF RESPIRATORY: 0.1 MMHG
O2 A-A PPRESDIFF RESPIRATORY: 0.2 MMHG
O2 A-A PPRESDIFF RESPIRATORY: 0.2 MMHG
O2 A-A PPRESDIFF RESPIRATORY: 0.3 MMHG
PCO2 BLDA: 37.6 MM HG (ref 35–45)
PCO2 BLDA: 41.4 MM HG (ref 35–45)
PCO2 BLDA: 50.9 MM HG (ref 35–45)
PCO2 BLDA: 57.2 MM HG (ref 35–45)
PEEP RESPIRATORY: 15 CM[H2O]
PEEP RESPIRATORY: 15 CM[H2O]
PEEP RESPIRATORY: 9 CM[H2O]
PEEP RESPIRATORY: 9 CM[H2O]
PH BLDA: 7.29 PH UNITS (ref 7.35–7.45)
PH BLDA: 7.3 PH UNITS (ref 7.35–7.45)
PH BLDA: 7.42 PH UNITS (ref 7.35–7.45)
PH BLDA: 7.43 PH UNITS (ref 7.35–7.45)
PHOSPHATE SERPL-MCNC: 0.9 MG/DL (ref 2.5–4.5)
PHOSPHATE SERPL-MCNC: 1.2 MG/DL (ref 2.5–4.5)
PHOSPHATE SERPL-MCNC: 1.4 MG/DL (ref 2.5–4.5)
PHOSPHATE SERPL-MCNC: 1.8 MG/DL (ref 2.5–4.5)
PLAT MORPH BLD: NORMAL
PLATELET # BLD AUTO: 197 10*3/MM3 (ref 140–500)
PMV BLD AUTO: 10.7 FL (ref 6–12)
PO2 BLDA: 112.5 MM HG (ref 80–100)
PO2 BLDA: 58.6 MM HG (ref 80–100)
PO2 BLDA: 67.7 MM HG (ref 80–100)
PO2 BLDA: 72 MM HG (ref 80–100)
POTASSIUM BLD-SCNC: 3.6 MMOL/L (ref 3.5–5.2)
POTASSIUM BLD-SCNC: 3.8 MMOL/L (ref 3.5–5.2)
POTASSIUM BLD-SCNC: 3.9 MMOL/L (ref 3.5–5.2)
POTASSIUM BLD-SCNC: 3.9 MMOL/L (ref 3.5–5.2)
PROCALCITONIN SERPL-MCNC: 4.41 NG/ML (ref 0.1–0.25)
RBC # BLD AUTO: 3.03 10*6/MM3 (ref 3.9–5.2)
SAO2 % BLDCOA: 90.5 % (ref 92–99)
SAO2 % BLDCOA: 90.8 % (ref 92–99)
SAO2 % BLDCOA: 91.7 % (ref 92–99)
SAO2 % BLDCOA: 98.5 % (ref 92–99)
SCAN SLIDE: NORMAL
SCHISTOCYTES BLD QL SMEAR: ABNORMAL
SET MECH RESP RATE: 24
SET MECH RESP RATE: 242
SODIUM BLD-SCNC: 133 MMOL/L (ref 136–145)
SODIUM BLD-SCNC: 134 MMOL/L (ref 136–145)
TOTAL RATE: 24 BREATHS/MINUTE
TOTAL RATE: 26 BREATHS/MINUTE
VANCOMYCIN SERPL-MCNC: 5.7 MCG/ML (ref 5–40)
VENTILATOR MODE: ABNORMAL
VT ON VENT VENT: 344 ML
VT ON VENT VENT: 350 ML
VT ON VENT VENT: 443 ML
VT ON VENT VENT: 491 ML
WBC MORPH BLD: NORMAL
WBC NRBC COR # BLD: 17.67 10*3/MM3 (ref 4.5–10.7)

## 2017-07-02 PROCEDURE — 94003 VENT MGMT INPAT SUBQ DAY: CPT

## 2017-07-02 PROCEDURE — 93306 TTE W/DOPPLER COMPLETE: CPT | Performed by: INTERNAL MEDICINE

## 2017-07-02 PROCEDURE — 82962 GLUCOSE BLOOD TEST: CPT

## 2017-07-02 PROCEDURE — 82330 ASSAY OF CALCIUM: CPT | Performed by: INTERNAL MEDICINE

## 2017-07-02 PROCEDURE — 25010000002 ALBUMIN HUMAN 5% PER 50 ML

## 2017-07-02 PROCEDURE — 25010000002 PROPOFOL 1000 MG/ML EMULSION: Performed by: INTERNAL MEDICINE

## 2017-07-02 PROCEDURE — 25010000002 HYDROCORTISONE SODIUM SUCCINATE 100 MG RECONSTITUTED SOLUTION: Performed by: INTERNAL MEDICINE

## 2017-07-02 PROCEDURE — 25010000002 ALBUMIN HUMAN 25% PER 50 ML: Performed by: INTERNAL MEDICINE

## 2017-07-02 PROCEDURE — 85730 THROMBOPLASTIN TIME PARTIAL: CPT | Performed by: INTERNAL MEDICINE

## 2017-07-02 PROCEDURE — 84145 PROCALCITONIN (PCT): CPT | Performed by: INTERNAL MEDICINE

## 2017-07-02 PROCEDURE — 94799 UNLISTED PULMONARY SVC/PX: CPT

## 2017-07-02 PROCEDURE — 25010000003 POTASSIUM CHLORIDE PER 2 MEQ: Performed by: INTERNAL MEDICINE

## 2017-07-02 PROCEDURE — P9046 ALBUMIN (HUMAN), 25%, 20 ML: HCPCS | Performed by: INTERNAL MEDICINE

## 2017-07-02 PROCEDURE — 25010000002 HEPARIN (PORCINE) PER 1000 UNITS: Performed by: INTERNAL MEDICINE

## 2017-07-02 PROCEDURE — 83605 ASSAY OF LACTIC ACID: CPT | Performed by: INTERNAL MEDICINE

## 2017-07-02 PROCEDURE — 25010000002 THIAMINE PER 100 MG: Performed by: INTERNAL MEDICINE

## 2017-07-02 PROCEDURE — 80069 RENAL FUNCTION PANEL: CPT | Performed by: INTERNAL MEDICINE

## 2017-07-02 PROCEDURE — 71010 HC CHEST PA OR AP: CPT

## 2017-07-02 PROCEDURE — 99233 SBSQ HOSP IP/OBS HIGH 50: CPT | Performed by: INTERNAL MEDICINE

## 2017-07-02 PROCEDURE — 25010000002 PIPERACILLIN SOD-TAZOBACTAM PER 1 G: Performed by: INTERNAL MEDICINE

## 2017-07-02 PROCEDURE — 84681 ASSAY OF C-PEPTIDE: CPT | Performed by: INTERNAL MEDICINE

## 2017-07-02 PROCEDURE — 80202 ASSAY OF VANCOMYCIN: CPT | Performed by: INTERNAL MEDICINE

## 2017-07-02 PROCEDURE — 82150 ASSAY OF AMYLASE: CPT | Performed by: INTERNAL MEDICINE

## 2017-07-02 PROCEDURE — 86341 ISLET CELL ANTIBODY: CPT | Performed by: INTERNAL MEDICINE

## 2017-07-02 PROCEDURE — 93306 TTE W/DOPPLER COMPLETE: CPT

## 2017-07-02 PROCEDURE — P9041 ALBUMIN (HUMAN),5%, 50ML: HCPCS

## 2017-07-02 PROCEDURE — 83690 ASSAY OF LIPASE: CPT | Performed by: INTERNAL MEDICINE

## 2017-07-02 PROCEDURE — 99232 SBSQ HOSP IP/OBS MODERATE 35: CPT | Performed by: INTERNAL MEDICINE

## 2017-07-02 PROCEDURE — 85025 COMPLETE CBC W/AUTO DIFF WBC: CPT | Performed by: INTERNAL MEDICINE

## 2017-07-02 PROCEDURE — 83735 ASSAY OF MAGNESIUM: CPT | Performed by: INTERNAL MEDICINE

## 2017-07-02 PROCEDURE — 85007 BL SMEAR W/DIFF WBC COUNT: CPT | Performed by: INTERNAL MEDICINE

## 2017-07-02 PROCEDURE — 82803 BLOOD GASES ANY COMBINATION: CPT

## 2017-07-02 RX ORDER — ALBUMIN, HUMAN INJ 5% 5 %
SOLUTION INTRAVENOUS
Status: COMPLETED
Start: 2017-07-02 | End: 2017-07-02

## 2017-07-02 RX ORDER — ALBUMIN (HUMAN) 12.5 G/50ML
12.5 SOLUTION INTRAVENOUS EVERY 6 HOURS
Status: DISCONTINUED | OUTPATIENT
Start: 2017-07-02 | End: 2017-07-04

## 2017-07-02 RX ADMIN — SODIUM PHOSPHATE, MONOBASIC, MONOHYDRATE AND SODIUM PHOSPHATE, DIBASIC, ANHYDROUS 10 MMOL: 276; 142 INJECTION, SOLUTION INTRAVENOUS at 09:02

## 2017-07-02 RX ADMIN — MINERAL OIL AND WHITE PETROLATUM: 150; 830 OINTMENT OPHTHALMIC at 10:16

## 2017-07-02 RX ADMIN — ASCORBIC ACID: 500 INJECTION, SOLUTION INTRAMUSCULAR; INTRAVENOUS; SUBCUTANEOUS at 18:42

## 2017-07-02 RX ADMIN — HYDROCORTISONE SODIUM SUCCINATE 50 MG: 100 INJECTION, POWDER, FOR SOLUTION INTRAMUSCULAR; INTRAVENOUS at 21:12

## 2017-07-02 RX ADMIN — HEPARIN SODIUM 5000 UNITS: 5000 INJECTION, SOLUTION INTRAVENOUS; SUBCUTANEOUS at 22:10

## 2017-07-02 RX ADMIN — MINERAL OIL AND WHITE PETROLATUM: 150; 830 OINTMENT OPHTHALMIC at 06:21

## 2017-07-02 RX ADMIN — ASCORBIC ACID: 500 INJECTION, SOLUTION INTRAMUSCULAR; INTRAVENOUS; SUBCUTANEOUS at 06:38

## 2017-07-02 RX ADMIN — HYDROCORTISONE SODIUM SUCCINATE 50 MG: 100 INJECTION, POWDER, FOR SOLUTION INTRAMUSCULAR; INTRAVENOUS at 15:24

## 2017-07-02 RX ADMIN — PROPOFOL 10 MCG/KG/MIN: 10 INJECTION, EMULSION INTRAVENOUS at 05:51

## 2017-07-02 RX ADMIN — ASCORBIC ACID: 500 INJECTION, SOLUTION INTRAMUSCULAR; INTRAVENOUS; SUBCUTANEOUS at 16:05

## 2017-07-02 RX ADMIN — THIAMINE HYDROCHLORIDE 200 MG: 100 INJECTION, SOLUTION INTRAMUSCULAR; INTRAVENOUS at 06:00

## 2017-07-02 RX ADMIN — HYDROCORTISONE SODIUM SUCCINATE 50 MG: 100 INJECTION, POWDER, FOR SOLUTION INTRAMUSCULAR; INTRAVENOUS at 04:02

## 2017-07-02 RX ADMIN — MINERAL OIL AND WHITE PETROLATUM: 150; 830 OINTMENT OPHTHALMIC at 22:10

## 2017-07-02 RX ADMIN — SODIUM PHOSPHATE, MONOBASIC, MONOHYDRATE AND SODIUM PHOSPHATE, DIBASIC, ANHYDROUS 10 MMOL: 276; 142 INJECTION, SOLUTION INTRAVENOUS at 21:09

## 2017-07-02 RX ADMIN — LEVOTHYROXINE SODIUM ANHYDROUS 25 MCG: 100 INJECTION, POWDER, LYOPHILIZED, FOR SOLUTION INTRAVENOUS at 10:58

## 2017-07-02 RX ADMIN — SODIUM PHOSPHATE, MONOBASIC, MONOHYDRATE AND SODIUM PHOSPHATE, DIBASIC, ANHYDROUS 10 MMOL: 276; 142 INJECTION, SOLUTION INTRAVENOUS at 01:51

## 2017-07-02 RX ADMIN — ALBUMIN HUMAN 12.5 G: 0.05 INJECTION, SOLUTION INTRAVENOUS at 15:39

## 2017-07-02 RX ADMIN — PROPOFOL 25 MCG/KG/MIN: 10 INJECTION, EMULSION INTRAVENOUS at 22:23

## 2017-07-02 RX ADMIN — ASCORBIC ACID: 500 INJECTION, SOLUTION INTRAMUSCULAR; INTRAVENOUS; SUBCUTANEOUS at 00:59

## 2017-07-02 RX ADMIN — TAZOBACTAM SODIUM AND PIPERACILLIN SODIUM 3.38 G: 375; 3 INJECTION, SOLUTION INTRAVENOUS at 21:00

## 2017-07-02 RX ADMIN — MINERAL OIL AND WHITE PETROLATUM: 150; 830 OINTMENT OPHTHALMIC at 18:25

## 2017-07-02 RX ADMIN — FAMOTIDINE 20 MG: 10 INJECTION, SOLUTION INTRAVENOUS at 08:19

## 2017-07-02 RX ADMIN — DEXTROSE MONOHYDRATE 10 ML/HR: 100 INJECTION, SOLUTION INTRAVENOUS at 21:33

## 2017-07-02 RX ADMIN — TAZOBACTAM SODIUM AND PIPERACILLIN SODIUM 3.38 G: 375; 3 INJECTION, SOLUTION INTRAVENOUS at 04:56

## 2017-07-02 RX ADMIN — TAZOBACTAM SODIUM AND PIPERACILLIN SODIUM 3.38 G: 375; 3 INJECTION, SOLUTION INTRAVENOUS at 15:02

## 2017-07-02 RX ADMIN — THIAMINE HYDROCHLORIDE 200 MG: 100 INJECTION, SOLUTION INTRAMUSCULAR; INTRAVENOUS at 18:42

## 2017-07-02 RX ADMIN — POTASSIUM CHLORIDE 20 MEQ: 400 INJECTION, SOLUTION INTRAVENOUS at 06:29

## 2017-07-02 RX ADMIN — HEPARIN SODIUM 5000 UNITS: 5000 INJECTION, SOLUTION INTRAVENOUS; SUBCUTANEOUS at 15:39

## 2017-07-02 RX ADMIN — AZITHROMYCIN DIHYDRATE 500 MG: 500 INJECTION, POWDER, LYOPHILIZED, FOR SOLUTION INTRAVENOUS at 08:18

## 2017-07-02 RX ADMIN — MAGNESIUM SULFATE HEPTAHYDRATE 4 G: 40 INJECTION, SOLUTION INTRAVENOUS at 01:00

## 2017-07-02 RX ADMIN — ALBUMIN HUMAN 12.5 G: 0.25 SOLUTION INTRAVENOUS at 18:42

## 2017-07-02 RX ADMIN — VANCOMYCIN HYDROCHLORIDE 750 MG: 750 INJECTION, POWDER, LYOPHILIZED, FOR SOLUTION INTRAVENOUS at 12:00

## 2017-07-02 RX ADMIN — ALBUMIN HUMAN 12.5 G: 0.25 SOLUTION INTRAVENOUS at 17:34

## 2017-07-02 RX ADMIN — HEPARIN SODIUM 5000 UNITS: 5000 INJECTION, SOLUTION INTRAVENOUS; SUBCUTANEOUS at 05:05

## 2017-07-02 RX ADMIN — PROPOFOL 20 MCG/KG/MIN: 10 INJECTION, EMULSION INTRAVENOUS at 13:06

## 2017-07-02 RX ADMIN — POTASSIUM CHLORIDE 20 MEQ: 400 INJECTION, SOLUTION INTRAVENOUS at 00:16

## 2017-07-02 RX ADMIN — HEPARIN SODIUM 1000 UNITS/HR: 10000 INJECTION, SOLUTION INTRAVENOUS at 09:16

## 2017-07-02 NOTE — PROGRESS NOTES
"   LOS: 3 days    Patient Care Team:  Gregorio Bai MD as PCP - General (Family Medicine)    Chief Complaint:  No chief complaint on file.      Subjective     Interval History: Tolerated CRRT overnight    Patient Complaints: unobtainable  Patient Denies:  unobtainable  History taken from: RN Dr. Remy    Review of Systems:    Review of systems could not be obtained due to  patient unresponsive.    Objective     Vital Signs  Temp:  [98.8 °F (37.1 °C)-99.5 °F (37.5 °C)] 99.5 °F (37.5 °C)  Heart Rate:  [] 112  Resp:  [24] 24  BP: ()/() 123/81  Arterial Line BP: ()/() 186/93  FiO2 (%):  [50 %-100 %] 50 %    Flowsheet Rows         First Filed Value    Admission Height  67\" (170.2 cm) Documented at 06/29/2017 2028    Admission Weight  155 lb 10.3 oz (70.6 kg) Documented at 06/29/2017 2028             I/O last 3 completed shifts:  In: 3113.7 [I.V.:2528; IV Piggyback:585.7]  Out: 5302 [Other:5302]    Intake/Output Summary (Last 24 hours) at 07/02/17 1208  Last data filed at 07/02/17 0700   Gross per 24 hour   Intake          2613.77 ml   Output             4650 ml   Net         -2036.23 ml       Physical Exam:on rotating bed   Paralyzed and sedated on the vent  Eyes taped shut  No jvd or lad seen  Lungs:  Auscultated posteriorly very course  Cardiac: s1s2 tachy  abd unable to be examined due to her position     Results Review:      Results from last 7 days  Lab Units 07/02/17  0513 07/01/17  2305 07/01/17  1740  06/29/17 2018   SODIUM mmol/L 134* 134* 134*  < > 132*   POTASSIUM mmol/L 3.8 3.6 3.8  < > <1.5*   CHLORIDE mmol/L 92* 93* 96*  < > 102   CO2 mmol/L 22.9 25.3 26.5  < > 5.0*   BUN mg/dL 5* 6 6  < > 21*   CREATININE mg/dL 1.00 1.08* 0.85  < > 1.94*   CALCIUM mg/dL 7.0* 6.9* 6.8*  < > 9.4   BILIRUBIN mg/dL  --   --   --   --  0.2   ALK PHOS U/L  --   --   --   --  77   ALT (SGPT) U/L  --   --   --   --  <5   AST (SGOT) U/L  --   --   --   --  7   GLUCOSE mg/dL 155* 148* 150*  < > 300* "   < > = values in this interval not displayed.    Estimated Creatinine Clearance: 73.4 mL/min (by C-G formula based on Cr of 1).      Results from last 7 days  Lab Units 07/02/17  0513 07/01/17  2305 07/01/17  1740   MAGNESIUM mg/dL 2.8* 1.8 1.8   PHOSPHORUS mg/dL 1.4* 0.9* 0.8*               Results from last 7 days  Lab Units 07/02/17  0513 07/01/17  1540 07/01/17  0615 06/29/17 2018   WBC 10*3/mm3 17.67*  --  10.90* 16.50*   HEMOGLOBIN g/dL 8.3* 11.1* 9.7* 12.2   PLATELETS 10*3/mm3 197  --  210 383         Results from last 7 days  Lab Units 07/01/17  0607   INR  1.63*         Imaging Results (last 24 hours)     Procedure Component Value Units Date/Time    XR Chest 1 View [[id number]] Collected:  07/02/17 0539     Updated:  07/02/17 0539    Narrative:       X-RAY CHEST 1 VIEW.     HISTORY: Respiratory failure.     COMPARISON: 07/01/2017.     FINDINGS:  Cardiomediastinal silhouette is within normal limits. Line and tubes are  stable.     Diffuse bilateral lung opacities and bilateral pleural effusions.              Impression:       Persistent bilateral infiltrates and effusions, follow-up to resolution  is recommended.                 artificial tears  Both Eyes Q4H   artificial tears  Both Eyes Q4H   IVPB builder  Intravenous Q6H   azithromycin 500 mg Intravenous Q24H   famotidine 20 mg Intravenous Daily   heparin (porcine) 5,000 Units Subcutaneous Q8H   hydrocortisone sodium succinate 50 mg Intravenous Q8H   levothyroxine sodium 25 mcg Intravenous Daily   piperacillin-tazobactam 3.375 g Intravenous Q8H   thiamine (VITAMIN B1) IVPB 200 mg Intravenous Q12H   vancomycin 750 mg Intravenous Q12H       bumetanide (BUMEX) infusion 1 mg/hr Last Rate: Stopped (07/01/17 1600)   cisatracurium (NIMBEX) infusion 3 mcg/kg/min Last Rate: 2 mcg/kg/min (07/02/17 0728)   dextrose 20 mL/hr Last Rate: 20 mL/hr (07/01/17 1041)   dextrose 5 % and sodium chloride 0.45 % 150 mL/hr Last Rate: 150 mL/hr (06/30/17 4026)   dextrose 5 %  and sodium chloride 0.45 % with KCl 20 mEq/L 150 mL/hr Last Rate: 150 mL/hr (07/01/17 0402)   heparin (porcine) 1,000 Units/hr Last Rate: 1,000 Units/hr (07/02/17 0916)   insulin regular infusion 1 unit/mL 0.1 Units/kg/hr Last Rate: 0.014 Units/kg/hr (07/01/17 2000)   norepinephrine 0.02-0.3 mcg/kg/min Last Rate: Stopped (07/02/17 0728)   Pharmacy to dose vancomycin     propofol 5-50 mcg/kg/min Last Rate: 20 mcg/kg/min (07/02/17 0728)   remifentanil (ULTIVA) infusion 0.5-15 mcg/kg/hr (Ideal) Last Rate: 1 mcg/kg/hr (07/01/17 0315)   sodium bicarbonate drip less than 75 mEq/bag 100 mL/hr Last Rate: Stopped (07/01/17 0630)   sodium chloride 250 mL/hr Last Rate: Stopped (06/29/17 2208)   sodium chloride 0.45 % with KCl 20 mEq 250 mL/hr Last Rate: 250 mL/hr (06/30/17 1834)       Medication Review:   Current Facility-Administered Medications   Medication Dose Route Frequency Provider Last Rate Last Dose   • artificial tears (LUBRIFRESH P.M.) ophthalmic ointment   Both Eyes Q4H Madison Harris MD       • artificial tears (LUBRIFRESH P.M.) ophthalmic ointment   Both Eyes PRN Madison Harris MD       • artificial tears ophthalmic ointment   Both Eyes Q4H Madison Harris MD       • ascorbic acid 1,500 mg in dextrose (D5W) 5 % 100 mL IVPB   Intravenous Q6H Madison Harris  mL/hr at 07/02/17 0638     • azithromycin (ZITHROMAX) 500 mg 0.9% NaCl (Add-vantage) 250 mL  500 mg Intravenous Q24H Steve Emery MD   500 mg at 07/02/17 0818   • bumetanide (BUMEX) 10 mg in sodium chloride 0.9 % 100 mL (0.1 mg/mL) infusion  1 mg/hr Intravenous Continuous Susan Urena MD   Stopped at 07/01/17 1600   • calcium gluconate 1 g in sodium chloride 0.9 % 50 mL IVPB  1 g Intravenous PRN Susan Urena MD        Or   • calcium gluconate 2 g in sodium chloride 0.9 % 50 mL IVPB  2 g Intravenous PRN Susan Urena MD       • cisatracurium besylate (NIMBEX) 200 mg in sodium chloride 0.9 % 100 mL (2 mg/mL)  infusion  3 mcg/kg/min Intravenous Titrated Madison Harris MD 4.22 mL/hr at 07/02/17 0728 2 mcg/kg/min at 07/02/17 0728   • dextrose (D50W) solution 12.5 g  12.5 g Intravenous Q15 Min PRN Alexis Poe MD   12.5 g at 07/01/17 1526   • dextrose 10 % infusion  20 mL/hr Intravenous Continuous Van Remy MD 20 mL/hr at 07/01/17 1041 20 mL/hr at 07/01/17 1041   • dextrose 5 % and sodium chloride 0.45 % infusion  150 mL/hr Intravenous Continuous PRN Alexis Poe  mL/hr at 06/30/17 1815 150 mL/hr at 06/30/17 1815   • dextrose 5 % and sodium chloride 0.45 % with KCl 20 mEq/L infusion  150 mL/hr Intravenous Continuous PRN Alexis Poe  mL/hr at 07/01/17 0402 150 mL/hr at 07/01/17 0402   • famotidine (PEPCID) injection 20 mg  20 mg Intravenous Daily Madison Harris MD   20 mg at 07/02/17 0819   • fentaNYL citrate (PF) (SUBLIMAZE) injection 50 mcg  50 mcg Intravenous Q1H PRN Madison Harris MD   50 mcg at 07/01/17 0009   • heparin (porcine) 5000 UNIT/ML injection 5,000 Units  5,000 Units Subcutaneous Q8H Van Remy MD   5,000 Units at 07/02/17 0505   • heparin (porcine) injection 1,000-2,000 Units  1,000-2,000 Units Intravenous PRN Susan Urena MD       • heparin 54933 units/250 mL (100 units/mL) in 0.45 % NaCl infusion  1,000 Units/hr Intravenous Continuous Susan Urena MD 10 mL/hr at 07/02/17 0916 1,000 Units/hr at 07/02/17 0916   • HYDROcodone-acetaminophen (NORCO) 5-325 MG per tablet 1 tablet  1 tablet Oral Q4H PRN Alexis Poe MD       • hydrocortisone sodium succinate (Solu-CORTEF) injection 50 mg  50 mg Intravenous Q8H Madison Harris MD   50 mg at 07/02/17 0402   • insulin regular (humuLIN R,novoLIN R) 100 Units in sodium chloride 0.9 % 100 mL (1 Units/mL) infusion  0.1 Units/kg/hr Intravenous Titrated Alexis Poe MD 1 mL/hr at 07/01/17 2000 0.014 Units/kg/hr at 07/01/17 2000   • levothyroxine sodium injection 25 mcg  25 mcg Intravenous Daily Thomas Campbell MD   25 mcg at  07/02/17 1058   • Magnesium Sulfate 2 gram Bolus, followed by 8 gram infusion (total Mg dose 10 grams)- Mg less than or equal to 1mg/dL  2 g Intravenous PRN Alexis Poe MD        Or   • Magnesium Sulfate 6 gram Infusion (2 gm x 3) -Mg 1.1 -1.5 mg/dL  2 g Intravenous PRN Alexis Poe MD        Or   • magnesium sulfate 4 gram infusion- Mg 1.6-1.9 mg/dL  4 g Intravenous PRN Alexis Poe MD 25 mL/hr at 07/02/17 0100 4 g at 07/02/17 0100   • magnesium sulfate in D5W 1g/100mL (PREMIX)  2 g Intravenous PRN Susan Urena MD       • norepinephrine (LEVOPHED) 8 mg/250 mL (32 mcg/mL) in sodium chloride 0.9% infusion (premix)  0.02-0.3 mcg/kg/min Intravenous Titrated Van Remy MD   Stopped at 07/02/17 0728   • Pharmacy to dose vancomycin   Does not apply Continuous PRN Alexis Poe MD       • piperacillin-tazobactam (ZOSYN) 3.375 g in iso-osmotic dextrose 50 ml (premix)  3.375 g Intravenous Q8H Alexis Poe MD   3.375 g at 07/02/17 0456   • potassium chloride 20 mEq in 50 mL IVPB  20 mEq Intravenous PRN Susan Urena MD 50 mL/hr at 07/02/17 0629 20 mEq at 07/02/17 0629   • propofol (DIPRIVAN) infusion 10 mg/mL 100 mL  5-50 mcg/kg/min Intravenous Titrated Madison Harris MD 8.44 mL/hr at 07/02/17 0728 20 mcg/kg/min at 07/02/17 0728   • remifentanil (ULTIVA) 50 mcg/mL in sodium chloride 0.9 % 100 mL  0.5-15 mcg/kg/hr (Ideal) Intravenous Titrated Madison Harris MD 1.23 mL/hr at 07/01/17 0315 1 mcg/kg/hr at 07/01/17 0315   • sodium bicarbonate 8.4 % 75 mEq in sodium chloride 0.45 % 1,000 mL infusion (less than 75 mEq)  100 mL/hr Intravenous Continuous Susan Urena MD   Stopped at 07/01/17 0630   • sodium chloride 0.45 % infusion  250 mL/hr Intravenous Continuous Alexis Poe MD   Stopped at 06/29/17 2208   • sodium chloride 0.45 % with KCl 20 mEq/L infusion  250 mL/hr Intravenous Continuous PRN Alexis Poe  mL/hr at 06/30/17 1834 250 mL/hr at 06/30/17 1834   • sodium phosphates 10 mmol in  sodium chloride 0.9 % 100 mL IVPB  10 mmol Intravenous PRN Susan Urena MD 25 mL/hr at 07/02/17 0902 10 mmol at 07/02/17 0902   • thiamine (B-1) 200 mg in sodium chloride 0.9 % 100 mL IVPB  200 mg Intravenous Q12H Madison Harris  mL/hr at 07/02/17 0600 200 mg at 07/02/17 0600   • vancomycin 750 mg/250 mL 0.9% NS add-vantage  750 mg Intravenous Q12H Alexis Poe MD           Assessment/Plan       Active Problems:    DKA (diabetic ketoacidoses)    Pneumonia    Sepsis    Acute respiratory failure    History of systemic lupus erythematosus (SLE)    History of splenectomy    History of ITP    Primary hypothyroidism    1. HODAN, oligoanuric, prerenal +/- evolution to ATN from vol depletion/DKA and sepsis syndrome.   Early this am she developed worsening oxygenation and acidosis and CRRT was initiated  She has been on CRRT overnight and roughly 2.5liters have been removed  2. Severe right-sided pna/ARDS  3. Obtundation  4. Hypoxia- oxygenation improving with fluid removal  5. Newly dx'd DM1 presenting as DKA, presently on insulin drip with controlled BS.  A1c 12.  6. H/o TTP with prior splenectomy: plt count fine. Vaccine status not known  6. Hypothyroidism, untreated  7.  Anemia:  May need prbcs     Plan:   Continue SLED with the goal of trying to remove volume excess and correct severe acidemia. Discussed with RN and Dr. Remy and updated patient's mother.  Prognosis guarded                Susan Urena MD  07/02/17  12:08 PM

## 2017-07-02 NOTE — NURSING NOTE
[facility] called to check on blood cultures drawn on admission prior to transfer to [facility], spoke with [place] in medical records, she states that there are no record of cultures collected on that admission. Dr Harris informed of this information.

## 2017-07-02 NOTE — PROGRESS NOTES
"  ENDOCRINE    Subjective   AND PLANS  Sarita Bai is a 37 y.o. female.     Remains on NPO.  Maintaining oxygenation on lower FiO2 and PEEP.  Levophed drip has been discontinued.    On Nimbex and propofor and Utiva drips.  Patient has been on NPO since June 29.  If patient cannot be taken off rotating bed, she may need to start on TPN.  Will let Dr. Remy decide.    Blood sugar 132-185.  Continue on insulin drip.    Tolerating IV levothyroxine.  Continue with same for now.    Tolerating SLED.  Had -2 L yesterday.  Dr. rUena managing patient.      Objective   /81 (BP Location: Right arm, Patient Position: Lying)  Pulse 112  Temp 99.5 °F (37.5 °C) (Oral)   Resp 24  Ht 67.01\" (170.2 cm)  Wt 155 lb (70.3 kg)  SpO2 95%  BMI 24.27 kg/m2  Physical Exam     sedated on ventilator.  Orotracheal tube in place    no rales or wheezes.  Few rhonchi.  Regular heart rate and rhythm.  Abdomen soft.  Few bowel sounds.   Extremities warm.  No cyanosis.   Lab Results (last 24 hours)     Procedure Component Value Units Date/Time    POC Glucose Fingerstick [[id number]]  (Normal) Collected:  07/01/17 1523    Specimen:  Blood Updated:  07/01/17 1542     Glucose 91 mg/dL     Narrative:       Meter: [id number] : [id number] Edgar Aisha    Hemoglobin & Hematocrit, Blood [[id number]]  (Abnormal) Collected:  07/01/17 1540    Specimen:  Blood Updated:  07/01/17 1612     Hemoglobin 11.1 (L) g/dL      Hematocrit 29.2 (L) %     Lactic Acid, Plasma [[id number]]  (Abnormal) Collected:  07/01/17 1540    Specimen:  Blood Updated:  07/01/17 1613     Lactate 0.2 (L) mmol/L     POC Glucose Fingerstick [[id number]]  (Normal) Collected:  07/01/17 1623    Specimen:  Blood Updated:  07/01/17 1642     Glucose 93 mg/dL     Narrative:       Meter: [id number] : [id number] Hernández Aisha    POC Glucose Fingerstick [[id number]]  (Abnormal) Collected:  07/01/17 1744    Specimen:  Blood Updated:  07/01/17 1746     Glucose 147 (H) mg/dL     " Narrative:       Meter: [id number] : [id number] Edgar Leonard    Blood Gas, Arterial [[id number]]  (Abnormal) Collected:  07/01/17 1804    Specimen:  Arterial Blood Updated:  07/01/17 1807     Site Arterial Line     Cristian's Test N/A     pH, Arterial 7.335 (L) pH units      pCO2, Arterial 48.9 (H) mm Hg      pO2, Arterial 229.2 (H) mm Hg      HCO3, Arterial 26.1 mmol/L      Base Excess, Arterial -0.1 (L) mmol/L      O2 Saturation Calculated 99.8 (H) %      A-a Gradiant 0.3 mmHg      Barometric Pressure for Blood Gas 746.5 mmHg      Modality Adult Vent     FIO2 100 %      Ventilator Mode PC     Set Tidal Volume 397     Set Mech Resp Rate 24     Rate 24 Breaths/minute      PEEP 18    Narrative:       99% SAT, IP 22, prone Meter: [id number] : [id number] Johnny Esparza    Lactic Acid, Plasma [[id number]]  (Abnormal) Collected:  07/01/17 1740    Specimen:  Blood Updated:  07/01/17 1819     Lactate 0.3 (L) mmol/L     Magnesium [[id number]]  (Normal) Collected:  07/01/17 1740    Specimen:  Blood Updated:  07/01/17 1836     Magnesium 1.8 mg/dL     Renal Function Panel [[id number]]  (Abnormal) Collected:  07/01/17 1740    Specimen:  Blood Updated:  07/01/17 1836     Glucose 150 (H) mg/dL      BUN 6 mg/dL      Creatinine 0.85 mg/dL      Sodium 134 (L) mmol/L      Potassium 3.8 mmol/L      Chloride 96 (L) mmol/L      CO2 26.5 mmol/L      Calcium 6.8 (L) mg/dL      Albumin 1.90 (L) g/dL      Phosphorus 0.8 (L) mg/dL      Anion Gap 11.5 mmol/L      BUN/Creatinine Ratio 7.1     eGFR Non African Amer 74 mL/min/1.73     Calcium, Ionized [[id number]]  (Abnormal) Collected:  07/01/17 1740    Specimen:  Blood Updated:  07/01/17 1846     Ionized Calcium 1.02 (L) mmol/L      Ionized Calcium 4.1 (L) mg/dL     POC Glucose Fingerstick [[id number]]  (Abnormal) Collected:  07/01/17 2001    Specimen:  Blood Updated:  07/01/17 2010     Glucose 157 (H) mg/dL     Narrative:       Meter: [id number] : [id number] Edgar Leonard    T4, Free  [[id number]]  (Abnormal) Collected:  07/01/17 1740    Specimen:  Blood Updated:  07/01/17 2039     Free T4 0.84 (L) ng/dL     POC Glucose Fingerstick [[id number]]  (Normal) Collected:  07/01/17 2102    Specimen:  Blood Updated:  07/01/17 2112     Glucose 129 mg/dL     Narrative:       Meter: [id number] : [id number] Caro Paul    aPTT [[id number]]  (Abnormal) Collected:  07/01/17 1852    Specimen:  Blood Updated:  07/01/17 2132     .8 (H) seconds     POC Glucose Fingerstick [[id number]]  (Abnormal) Collected:  07/01/17 2156    Specimen:  Blood Updated:  07/01/17 2205     Glucose 162 (H) mg/dL     Narrative:       Meter: [id number] : [id number] Caro Paul    Lactic Acid, Plasma [[id number]]  (Normal) Collected:  07/01/17 2023    Specimen:  Blood Updated:  07/01/17 2208     Lactate 1.6 mmol/L     POC Glucose Fingerstick [[id number]]  (Abnormal) Collected:  07/01/17 2255    Specimen:  Blood Updated:  07/01/17 2304     Glucose 166 (H) mg/dL     Narrative:       Meter: [id number] : [id number] Edison Paul    Lactic Acid, Plasma [[id number]]  (Normal) Collected:  07/01/17 2305    Specimen:  Blood Updated:  07/01/17 2337     Lactate 1.6 mmol/L     Magnesium [[id number]]  (Normal) Collected:  07/01/17 2305    Specimen:  Blood Updated:  07/02/17 0002     Magnesium 1.8 mg/dL     Renal Function Panel [[id number]]  (Abnormal) Collected:  07/01/17 2305    Specimen:  Blood Updated:  07/02/17 0002     Glucose 148 (H) mg/dL      BUN 6 mg/dL      Creatinine 1.08 (H) mg/dL      Sodium 134 (L) mmol/L      Potassium 3.6 mmol/L      Chloride 93 (L) mmol/L      CO2 25.3 mmol/L      Calcium 6.9 (L) mg/dL      Albumin 2.10 (L) g/dL      Phosphorus 0.9 (L) mg/dL      Anion Gap 15.7 mmol/L      BUN/Creatinine Ratio 5.6 (L)     eGFR Non  Amer 56 (L) mL/min/1.73     POC Glucose Fingerstick [[id number]]  (Abnormal) Collected:  07/01/17 2356    Specimen:  Blood Updated:  07/02/17 0005     Glucose 165 (H) mg/dL      Narrative:       Meter: [id number] : [id number] Caro Paul    Blood Gas, Arterial [[id number]]  (Abnormal) Collected:  07/02/17 0003    Specimen:  Arterial Blood Updated:  07/02/17 0006     Site Arterial Line     Cristian's Test N/A     pH, Arterial 7.425 pH units      pCO2, Arterial 41.4 mm Hg      pO2, Arterial 58.6 (L) mm Hg      HCO3, Arterial 27.1 mmol/L      Base Excess, Arterial 2.5 (H) mmol/L      O2 Saturation Calculated 90.5 (L) %      A-a Gradiant 0.1 mmHg      Barometric Pressure for Blood Gas 748.8 mmHg      Modality Adult Vent     FIO2 65 %      Ventilator Mode PC     Set Tidal Volume 491     Set Mech Resp Rate 24     Rate 24 Breaths/minute      PEEP 15    Narrative:       SPO2 97% IP 22 Meter: [id number] : [id number] Bertram Keli    Calcium, Ionized [[id number]]  (Abnormal) Collected:  07/01/17 2305    Specimen:  Blood Updated:  07/02/17 0026     Ionized Calcium 1.00 (L) mmol/L      Ionized Calcium 4.0 (L) mg/dL     POC Glucose Fingerstick [[id number]]  (Abnormal) Collected:  07/02/17 0056    Specimen:  Blood Updated:  07/02/17 0105     Glucose 188 (H) mg/dL     Narrative:       Meter: [id number] : [id number] Hamel Paul    POC Glucose Fingerstick [[id number]]  (Abnormal) Collected:  07/02/17 0157    Specimen:  Blood Updated:  07/02/17 0207     Glucose 176 (H) mg/dL     Narrative:       Meter: [id number] : [id number] Hamel Paul    POC Glucose Fingerstick [[id number]]  (Abnormal) Collected:  07/02/17 0255    Specimen:  Blood Updated:  07/02/17 0305     Glucose 172 (H) mg/dL     Narrative:       Meter: [id number] : [id number] Hamel Paul    POC Glucose Fingerstick [[id number]]  (Abnormal) Collected:  07/02/17 0350    Specimen:  Blood Updated:  07/02/17 0359     Glucose 167 (H) mg/dL     Narrative:       Meter: [id number] : [id number] Hamel Paul    POC Glucose Fingerstick [[id number]]  (Abnormal) Collected:  07/02/17 0453    Specimen:  Blood Updated:  07/02/17 0506      Glucose 159 (H) mg/dL     Narrative:       Meter: [id number] : [id number] Caro Miller    Glutamic Acid Decarboxylase [[id number]] Collected:  07/02/17 0513    Specimen:  Blood Updated:  07/02/17 0521    C-Peptide [[id number]] Collected:  07/02/17 0513    Specimen:  Blood Updated:  07/02/17 0521    aPTT [[id number]]  (Abnormal) Collected:  07/02/17 0513    Specimen:  Blood Updated:  07/02/17 0544     .1 (H) seconds     Lipase [[id number]]  (Normal) Collected:  07/02/17 0513    Specimen:  Blood Updated:  07/02/17 0551     Lipase 20 U/L     Amylase [[id number]]  (Abnormal) Collected:  07/02/17 0513    Specimen:  Blood Updated:  07/02/17 0551     Amylase 483 (H) U/L     Renal Function Panel [[id number]]  (Abnormal) Collected:  07/02/17 0513    Specimen:  Blood Updated:  07/02/17 0554     Glucose 155 (H) mg/dL      BUN 5 (L) mg/dL      Creatinine 1.00 mg/dL      Sodium 134 (L) mmol/L      Potassium 3.8 mmol/L      Chloride 92 (L) mmol/L      CO2 22.9 mmol/L      Calcium 7.0 (L) mg/dL      Albumin 2.10 (L) g/dL      Phosphorus 1.4 (L) mg/dL      Anion Gap 19.1 mmol/L      BUN/Creatinine Ratio 5.0 (L)     eGFR Non African Amer 62 mL/min/1.73     Magnesium [[id number]]  (Abnormal) Collected:  07/02/17 0513    Specimen:  Blood Updated:  07/02/17 0554     Magnesium 2.8 (H) mg/dL     Blood Gas, Arterial [[id number]]  (Abnormal) Collected:  07/02/17 0555    Specimen:  Arterial Blood Updated:  07/02/17 0558     Site Arterial Line     Cristian's Test N/A     pH, Arterial 7.427 pH units      pCO2, Arterial 37.6 mm Hg      pO2, Arterial 112.5 (H) mm Hg      HCO3, Arterial 24.8 mmol/L      Base Excess, Arterial 0.5 mmol/L      O2 Saturation Calculated 98.5 %      A-a Gradiant 0.3 mmHg      Barometric Pressure for Blood Gas 748.9 mmHg      Modality Adult Vent     FIO2 60 %      Ventilator Mode PC     Set Tidal Volume 443     Set Mech Resp Rate 24     Rate 24 Breaths/minute      PEEP 15    Narrative:       SPO2 97% IP 22 Meter:  [id number] : [id number] Bertram Dickey    POC Glucose Fingerstick [[id number]]  (Abnormal) Collected:  07/02/17 0554    Specimen:  Blood Updated:  07/02/17 0604     Glucose 185 (H) mg/dL     Narrative:       Meter: [id number] : [id number] Caro Miller    Lactic Acid, Plasma [[id number]]  (Normal) Collected:  07/02/17 0513    Specimen:  Blood Updated:  07/02/17 0606     Lactate 1.9 mmol/L     CBC & Differential [[id number]] Collected:  07/02/17 0513    Specimen:  Blood Updated:  07/02/17 0631    Narrative:       The following orders were created for panel order CBC & Differential.  Procedure                               Abnormality         Status                     ---------                               -----------         ------                     Manual Differential[[id number]]          Abnormal            Final result               Scan Slide[[id number]]                                       Final result               CBC Auto Differential[[id number]]        Abnormal            Final result                 Please view results for these tests on the individual orders.    CBC Auto Differential [[id number]]  (Abnormal) Collected:  07/02/17 0513    Specimen:  Blood Updated:  07/02/17 0631     WBC 17.67 (H) 10*3/mm3       WBC corrected for presence of NRBC's        RBC 3.03 (L) 10*6/mm3      Hemoglobin 8.3 (L) g/dL      Hematocrit 25.0 (L) %      RDW 15.1 (H) %      RDW-SD 47.2 fl      MPV 10.7 fL      Platelets 197 10*3/mm3     Scan Slide [[id number]] Collected:  07/02/17 0513    Specimen:  Blood Updated:  07/02/17 0631     Scan Slide --      See Manual Differential Results       Manual Differential [[id number]]  (Abnormal) Collected:  07/02/17 0513    Specimen:  Blood Updated:  07/02/17 0631     Neutrophil % 95.0 (H) %      Lymphocyte % 1.0 (L) %      Monocyte % 4.0 (L) %      Neutrophils Absolute 16.79 (H) 10*3/mm3      Lymphocytes Absolute 0.18 (L) 10*3/mm3      Monocytes Absolute 0.71 10*3/mm3      nRBC  "8.0 (H) /100 WBC      Yogi Cells Mod/2+     Schistocytes Slight/1+     WBC Morphology Normal     Platelet Morphology Normal    Calcium, Ionized [[id number]]  (Abnormal) Collected:  07/02/17 0513    Specimen:  Blood Updated:  07/02/17 0638     Ionized Calcium 0.98 (L) mmol/L      Ionized Calcium 3.9 (L) mg/dL     POC Glucose Fingerstick [[id number]]  (Abnormal) Collected:  07/02/17 0653    Specimen:  Blood Updated:  07/02/17 0702     Glucose 163 (H) mg/dL     Narrative:       Meter: [id number] : [id number] Caro Miller    Procalcitonin [[id number]]  (Abnormal) Collected:  07/02/17 0513    Specimen:  Blood Updated:  07/02/17 0703     Procalcitonin 4.41 (C) ng/mL     Narrative:       As a Marker for Sepsis (Non-Neonates):   1. <0.5 ng/mL represents a low risk of severe sepsis and/or septic shock.  1. >2 ng/mL represents a high risk of severe sepsis and/or septic shock.    As a Marker for Lower Respiratory Tract Infections that require antibiotic therapy:  PCT on Admission     Antibiotic Therapy             6-12 Hrs later  > 0.5                Strongly Recommended            >0.25 - <0.5         Recommended  0.1 - 0.25           Discouraged                   Remeasure/reassess PCT  <0.1                 Strongly Discouraged          Remeasure/reassess PCT      As 28 day mortality risk marker: \"Change in Procalcitonin Result\" (> 80 % or <=80 %) if Day 0 (or Day 1) and Day 4 values are available. Refer to http://www.[employer]-pct-calculator.com/   Change in PCT <=80 %   A decrease of PCT levels below or equal to 80 % defines a positive change in PCT test result representing a higher risk for 28-day all-cause mortality of patients diagnosed with severe sepsis or septic shock.  Change in PCT > 80 %   A decrease of PCT levels of more than 80 % defines a negative change in PCT result representing a lower risk for 28-day all-cause mortality of patients diagnosed with severe sepsis or septic shock.                Vancomycin, " Random [[id number]]  (Normal) Collected:  07/02/17 0513    Specimen:  Blood Updated:  07/02/17 0741     Vancomycin Random 5.70 mcg/mL     Hepatitis B Core Antibody, Total [[id number]] Collected:  06/30/17 1607    Specimen:  Blood Updated:  07/02/17 0809     Hep B Core Total Ab Negative    Narrative:       Performed at:  [address] [place] [address]  [id number]  : Vince Minaya PhD, Phone:  [phone]    POC Glucose Fingerstick [[id number]]  (Abnormal) Collected:  07/02/17 0813    Specimen:  Blood Updated:  07/02/17 0832     Glucose 181 (H) mg/dL     Narrative:       Meter: [id number] : [id number] Hernández Aisha    POC Glucose Fingerstick [[id number]]  (Abnormal) Collected:  07/02/17 0912    Specimen:  Blood Updated:  07/02/17 0932     Glucose 168 (H) mg/dL     Narrative:       Meter: [id number] : [id number] Edgar Aisha    Respiratory Culture [[id number]] Collected:  06/30/17 1706    Specimen:  Sputum from Cough Updated:  07/02/17 0933     Respiratory Culture No growth     Gram Stain Result Rare (1+) WBCs seen      Rare (1+) Epithelial cells per low power field      No organisms seen    Blood Culture [[id number]]  (Normal) Collected:  06/30/17 0925    Specimen:  Blood from Hand, Left Updated:  07/02/17 1001     Blood Culture No growth at 2 days    Blood Culture [[id number]]  (Normal) Collected:  06/30/17 1050    Specimen:  Blood from Arm, Left Updated:  07/02/17 1101     Blood Culture No growth at 2 days    Blood Gas, Arterial [[id number]]  (Abnormal) Collected:  07/02/17 1226    Specimen:  Arterial Blood Updated:  07/02/17 1233     Site Arterial Line     Cristian's Test N/A     pH, Arterial 7.286 (C) pH units       Critical:Notify Dr CARVER (02-Jul-17 12:28:31)Read back ok        pCO2, Arterial 57.2 (C) mm Hg       Critical:Notify Dr CARVER (02-Jul-17 12:28:31)Read back ok        pO2, Arterial 72.0 (L) mm Hg      HCO3, Arterial 27.2 mmol/L      Base Excess, Arterial 0.1 mmol/L       O2 Saturation Calculated 91.7 (L) %      A-a Gradiant 0.2 mmHg      Barometric Pressure for Blood Gas 750.9 mmHg      Modality Adult Vent     FIO2 50 %      Ventilator Mode PC     Set Tidal Volume 344     Set Mech Resp Rate 242     Rate 24 Breaths/minute      PEEP 9    Narrative:       94% SAT, IP 18, supine Meter: [id number] : [id number] Johnny Esparza    POC Glucose Fingerstick [[id number]]  (Abnormal) Collected:  07/02/17 1225    Specimen:  Blood Updated:  07/02/17 1242     Glucose 132 (H) mg/dL     Narrative:       Meter: [id number] : [id number] Edgar Leonard    Calcium, Ionized [[id number]]  (Abnormal) Collected:  07/02/17 1235    Specimen:  Blood Updated:  07/02/17 1253     Ionized Calcium 1.00 (L) mmol/L      Ionized Calcium 4.0 (L) mg/dL     Renal Function Panel [[id number]]  (Abnormal) Collected:  07/02/17 1234    Specimen:  Blood Updated:  07/02/17 1318     Glucose 132 (H) mg/dL      BUN 3 (L) mg/dL      Creatinine 0.47 (L) mg/dL      Sodium 134 (L) mmol/L      Potassium 3.9 mmol/L      Chloride 92 (L) mmol/L      CO2 24.8 mmol/L      Calcium 6.9 (L) mg/dL      Albumin 1.90 (L) g/dL      Phosphorus 1.2 (L) mg/dL      Anion Gap 17.2 mmol/L      BUN/Creatinine Ratio 6.4 (L)     eGFR Non African Amer 148 mL/min/1.73     Magnesium [[id number]]  (Normal) Collected:  07/02/17 1234    Specimen:  Blood Updated:  07/02/17 1318     Magnesium 2.0 mg/dL             Active Problems:    DKA (diabetic ketoacidoses)    Pneumonia    Sepsis    Acute respiratory failure    History of systemic lupus erythematosus (SLE)    History of splenectomy    History of ITP    Primary hypothyroidism     continue insulin drip.     continue IV levothyroxine.     continue empiric antibiotics.

## 2017-07-02 NOTE — PLAN OF CARE
Problem: Patient Care Overview (Adult)  Goal: Plan of Care Review  Outcome: Ongoing (interventions implemented as appropriate)    07/02/17 0414   Coping/Psychosocial Response Interventions   Plan Of Care Reviewed With patient;family;mother;father   Patient Care Overview   Progress no change   Outcome Evaluation   Outcome Summary/Follow up Plan tolerating CRRT, anuric, one bowel movement on shift, tolerating rotoprone 4 hours, supine 1 hour per Dr Harris, blood sugar stable, spoke with family about support, encouraged questions       Goal: Adult Individualization and Mutuality  Outcome: Ongoing (interventions implemented as appropriate)  Goal: Discharge Needs Assessment  Outcome: Ongoing (interventions implemented as appropriate)    07/02/17 0414   Discharge Needs Assessment   Concerns To Be Addressed denies needs/concerns at this time         Problem: Fall Risk (Adult)  Goal: Identify Related Risk Factors and Signs and Symptoms  Outcome: Ongoing (interventions implemented as appropriate)    07/02/17 0414   Fall Risk   Fall Risk: Related Risk Factors environment unfamiliar;homeostatic imbalance   Fall Risk: Signs and Symptoms presence of risk factors       Goal: Absence of Falls  Outcome: Ongoing (interventions implemented as appropriate)    07/02/17 0414   Fall Risk (Adult)   Absence of Falls making progress toward outcome         Problem: Diabetes, Type 1 (Adult)  Goal: Signs and Symptoms of Listed Potential Problems Will be Absent or Manageable (Diabetes, Type 1)  Outcome: Ongoing (interventions implemented as appropriate)    07/02/17 0414   Diabetes, Type 1   Problems Assessed (Type 1 Diabetes) all         Problem: Sepsis (Adult)  Goal: Signs and Symptoms of Listed Potential Problems Will be Absent or Manageable (Sepsis)  Outcome: Ongoing (interventions implemented as appropriate)    07/02/17 0414   Sepsis   Problems Assessed (Sepsis) all         Problem: Pneumonia (Adult)  Goal: Signs and Symptoms of Listed  Potential Problems Will be Absent or Manageable (Pneumonia)  Outcome: Ongoing (interventions implemented as appropriate)    07/02/17 0414   Pneumonia   Problems Assessed (Pneumonia) all         Problem: Hemodialysis (Adult)  Goal: Signs and Symptoms of Listed Potential Problems Will be Absent or Manageable (Hemodialysis)  Outcome: Ongoing (interventions implemented as appropriate)    07/02/17 0414   Hemodialysis   Problems Assessed (Hemodialysis) all

## 2017-07-02 NOTE — PROGRESS NOTES
LOS: 3 days   Patient Care Team:  Gregorio Bai MD as PCP - General (Family Medicine)    Chief Complaint:  Pneumonia and respiratory failure    Subjective     Interval History:     Patient remains intubated and sedated on the ventilator    Review of Systems:   Unable to obtain       Objective     Vital Signs  Temp:  [98.6 °F (37 °C)-99.5 °F (37.5 °C)] 99.5 °F (37.5 °C)  Heart Rate:  [] 109  Resp:  [24] 24  BP: ()/() 123/81  Arterial Line BP: ()/() 186/93  FiO2 (%):  [60 %-100 %] 60 %  Vent Mode: PC/AC  Ventilator/Non-Invasive Ventilation Settings     Start     Ordered    07/01/17 0804  Ventilator - AC/PC; (24); 100; 88%; Other (see comments); 18; 22; (I time 1 sec)  Continuous     Question Answer Comment   Vent Mode AC/PC    Breath rate  24   FiO2 100    FiO2 titrate for Sp02% =/> 88%    PEEP Other (see comments)    PEEP: 18    Inspiratory Pressure 22    I:E Ratio  I time 1 sec       07/01/17 0805    07/01/17 0107  Ventilator - AC/PC; (24); 100; 12; 24  Continuous,   Status:  Canceled     Question Answer Comment   Vent Mode AC/PC    Breath rate  24   FiO2 100    PEEP 12    Inspiratory Pressure 24        07/01/17 0107    06/30/17 2121  Ventilator - AC/VC; 8  Continuous,   Status:  Canceled     Question Answer Comment   Vent Mode AC/VC    PEEP 8        06/30/17 2120              Arterial Line BP: 186/93  Arterial Line MAP (mmHg): 122 mmHg     Body mass index is 24.27 kg/(m^2).  I/O last 3 completed shifts:  In: 3113.7 [I.V.:2528; IV Piggyback:585.7]  Out: 5302 [Other:5302]           Physical Exam:  General Appearance: Well-developed white female she is orally intubated and a tracheal tube looks like it's about 23 cm at the lips.  When she is sedated with propofol at 10 mics and remifentanil at 1.3 she has 3 twitches on a train of 4 on Nimbex drip.  Her BIS is 40  Eyes: She is currently on the road a prolonged her eyes are taped shut she does have a little bit of scleredema  evident.  ENT: Oral mucous membranes are dry  Neck: Trachea midline I don't appreciate jugular venous distention  Lungs: Coarse breath sounds bilaterally they are symmetric and chest expansion appears symmetric she is on a ventilator she is on a pressure control of 24 with a nighttime of 1 second her positive end expiratory pressure is down to 15 cm and her FiO2 is down to 60% with an inspiratory pressure of 22 her tidal volumes have crept up they are now 450-465 cc  Cardiac: Regular rate and rhythm no murmur  Abdomen: Soft nontender I do not palpate organomegaly or masses I don't hear much in the way of bowel sounds either  : Lockwood catheter dependent drainage per RN she is been anuric all night  Musc/Skel: She is paralyzed chemically   Skin: Actually she has less mottling of her distal extremities today than yesterday  Neuro: Paralyzed and sedated and unresponsive  Extremities/P Vascular: She is developing a little bit of edema.  I really can't get to her feet currently that checked pulses although nurses assure me she has good pulses her a line has a good waveform  MSE: Unable to assess       Labs:  WBC No results found for: WBCS   HGB Hemoglobin   Date Value Ref Range Status   07/02/2017 8.3 (L) 11.9 - 15.5 g/dL Final   07/01/2017 11.1 (L) 11.9 - 15.5 g/dL Final   07/01/2017 9.7 (L) 11.9 - 15.5 g/dL Final   06/29/2017 12.2 11.9 - 15.5 g/dL Final      HCT Hematocrit   Date Value Ref Range Status   07/02/2017 25.0 (L) 35.6 - 45.5 % Final   07/01/2017 29.2 (L) 35.6 - 45.5 % Final   07/01/2017 29.0 (L) 35.6 - 45.5 % Final   06/29/2017 33.6 (L) 35.6 - 45.5 % Final      Platlets No results found for: LABPLAT     PT/INR:    Protime   Date Value Ref Range Status   07/01/2017 18.8 (H) 11.7 - 14.2 Seconds Final   /  INR   Date Value Ref Range Status   07/01/2017 1.63 (H) 0.90 - 1.10 Final       Sodium Sodium   Date Value Ref Range Status   07/02/2017 134 (L) 136 - 145 mmol/L Final   07/01/2017 134 (L) 136 - 145 mmol/L  Final   07/01/2017 134 (L) 136 - 145 mmol/L Final   07/01/2017 135 (L) 136 - 145 mmol/L Final   07/01/2017 135 (L) 136 - 145 mmol/L Final   07/01/2017 133 (L) 136 - 145 mmol/L Final   06/30/2017 131 (L) 136 - 145 mmol/L Final   06/30/2017 132 (L) 136 - 145 mmol/L Final   06/30/2017 135 (L) 136 - 145 mmol/L Final   06/30/2017 129 (L) 136 - 145 mmol/L Final   06/30/2017 132 (L) 136 - 145 mmol/L Final   06/29/2017 135 (L) 136 - 145 mmol/L Final   06/29/2017 132 (L) 136 - 145 mmol/L Final      Potassium Potassium   Date Value Ref Range Status   07/02/2017 3.8 3.5 - 5.2 mmol/L Final   07/01/2017 3.6 3.5 - 5.2 mmol/L Final   07/01/2017 3.8 3.5 - 5.2 mmol/L Final   07/01/2017 4.0 3.5 - 5.2 mmol/L Final   07/01/2017 3.3 (L) 3.5 - 5.2 mmol/L Final   07/01/2017 3.4 (L) 3.5 - 5.2 mmol/L Final   06/30/2017 3.4 (L) 3.5 - 5.2 mmol/L Final   06/30/2017 3.7 3.5 - 5.2 mmol/L Final   06/30/2017 3.9 3.5 - 5.2 mmol/L Final   06/30/2017 3.3 (L) 3.5 - 5.2 mmol/L Final   06/30/2017 4.2 3.5 - 5.2 mmol/L Final   06/29/2017 2.0 (C) 3.5 - 5.2 mmol/L Final   06/29/2017 <1.5 (C) 3.5 - 5.2 mmol/L Final      Chloride Chloride   Date Value Ref Range Status   07/02/2017 92 (L) 98 - 107 mmol/L Final   07/01/2017 93 (L) 98 - 107 mmol/L Final   07/01/2017 96 (L) 98 - 107 mmol/L Final   07/01/2017 101 98 - 107 mmol/L Final   07/01/2017 101 98 - 107 mmol/L Final   07/01/2017 101 98 - 107 mmol/L Final   06/30/2017 97 (L) 98 - 107 mmol/L Final   06/30/2017 112 (H) 98 - 107 mmol/L Final   06/30/2017 113 (H) 98 - 107 mmol/L Final   06/30/2017 107 98 - 107 mmol/L Final   06/30/2017 111 (H) 98 - 107 mmol/L Final   06/29/2017 109 (H) 98 - 107 mmol/L Final   06/29/2017 102 98 - 107 mmol/L Final      Bicarbonate No results found for: PLASMABICARB   BUN BUN   Date Value Ref Range Status   07/02/2017 5 (L) 6 - 20 mg/dL Final   07/01/2017 6 6 - 20 mg/dL Final   07/01/2017 6 6 - 20 mg/dL Final   07/01/2017 12 6 - 20 mg/dL Final   07/01/2017 17 6 - 20 mg/dL Final    07/01/2017 16 6 - 20 mg/dL Final   06/30/2017 13 6 - 20 mg/dL Final   06/30/2017 27 (H) 6 - 20 mg/dL Final   06/30/2017 28 (H) 6 - 20 mg/dL Final   06/30/2017 26 (H) 6 - 20 mg/dL Final   06/30/2017 24 (H) 6 - 20 mg/dL Final   06/29/2017 21 (H) 6 - 20 mg/dL Final   06/29/2017 21 (H) 6 - 20 mg/dL Final      Creatinine Creatinine   Date Value Ref Range Status   07/02/2017 1.00 0.57 - 1.00 mg/dL Final   07/01/2017 1.08 (H) 0.57 - 1.00 mg/dL Final   07/01/2017 0.85 0.57 - 1.00 mg/dL Final   07/01/2017 1.79 (H) 0.57 - 1.00 mg/dL Final   07/01/2017 2.15 (H) 0.57 - 1.00 mg/dL Final   07/01/2017 1.90 (H) 0.57 - 1.00 mg/dL Final   06/30/2017 1.42 (H) 0.57 - 1.00 mg/dL Final   06/30/2017 2.56 (H) 0.57 - 1.00 mg/dL Final   06/30/2017 2.73 (H) 0.57 - 1.00 mg/dL Final   06/30/2017 2.34 (H) 0.57 - 1.00 mg/dL Final   06/30/2017 1.98 (H) 0.57 - 1.00 mg/dL Final   06/29/2017 1.81 (H) 0.57 - 1.00 mg/dL Final   06/29/2017 1.94 (H) 0.57 - 1.00 mg/dL Final      Calcium Calcium   Date Value Ref Range Status   07/02/2017 7.0 (L) 8.6 - 10.5 mg/dL Final   07/01/2017 6.9 (L) 8.6 - 10.5 mg/dL Final   07/01/2017 6.8 (L) 8.6 - 10.5 mg/dL Final   07/01/2017 6.6 (L) 8.6 - 10.5 mg/dL Final   07/01/2017 7.0 (L) 8.6 - 10.5 mg/dL Final   07/01/2017 7.4 (L) 8.6 - 10.5 mg/dL Final   06/30/2017 7.1 (L) 8.6 - 10.5 mg/dL Final   06/30/2017 7.8 (L) 8.6 - 10.5 mg/dL Final   06/30/2017 9.3 8.6 - 10.5 mg/dL Final   06/30/2017 9.2 8.6 - 10.5 mg/dL Final   06/30/2017 9.0 8.6 - 10.5 mg/dL Final   06/29/2017 8.4 (L) 8.6 - 10.5 mg/dL Final   06/29/2017 9.4 8.6 - 10.5 mg/dL Final      Magnesium Magnesium   Date Value Ref Range Status   07/02/2017 2.8 (H) 1.6 - 2.6 mg/dL Final   07/01/2017 1.8 1.6 - 2.6 mg/dL Final   07/01/2017 1.8 1.6 - 2.6 mg/dL Final   07/01/2017 1.8 1.6 - 2.6 mg/dL Final   07/01/2017 1.9 1.6 - 2.6 mg/dL Final   06/30/2017 1.8 1.6 - 2.6 mg/dL Final   06/30/2017 2.0 1.6 - 2.6 mg/dL Final   06/30/2017 2.3 1.6 - 2.6 mg/dL Final   06/30/2017 2.4 1.6  - 2.6 mg/dL Final   06/29/2017 2.4 1.6 - 2.6 mg/dL Final            pH pH, Arterial   Date Value Ref Range Status   07/02/2017 7.427 7.350 - 7.450 pH units Final   07/02/2017 7.425 7.350 - 7.450 pH units Final   07/01/2017 7.335 (L) 7.350 - 7.450 pH units Final   07/01/2017 7.207 (C) 7.350 - 7.450 pH units Final     Comment:     Critical:Notify Dr CARVER (01-Jul-17 11:50:51)Read back ok   07/01/2017 7.225 (C) 7.350 - 7.450 pH units Final     Comment:     Critical:Notify THIERNO PARK RN (01-Jul-17 02:05:47)Read back ok   06/30/2017 7.348 (L) 7.350 - 7.450 pH units Final   06/30/2017 7.339 (L) 7.350 - 7.450 pH units Final   06/29/2017 6.987 (C) 7.350 - 7.450 pH units Final     Comment:     Critical:Notify THIERNO REYES RN (29-Jun-17 20:23:53)Read back ok      pO2 pO2, Arterial   Date Value Ref Range Status   07/02/2017 112.5 (H) 80.0 - 100.0 mm Hg Final   07/02/2017 58.6 (L) 80.0 - 100.0 mm Hg Final   07/01/2017 229.2 (H) 80.0 - 100.0 mm Hg Final   07/01/2017 58.7 (L) 80.0 - 100.0 mm Hg Final   07/01/2017 62.6 (L) 80.0 - 100.0 mm Hg Final   06/30/2017 196.6 (H) 80.0 - 100.0 mm Hg Final   06/30/2017 191.5 (H) 80.0 - 100.0 mm Hg Final   06/29/2017 88.8 80.0 - 100.0 mm Hg Final      pCO2 pCO2, Arterial   Date Value Ref Range Status   07/02/2017 37.6 35.0 - 45.0 mm Hg Final   07/02/2017 41.4 35.0 - 45.0 mm Hg Final   07/01/2017 48.9 (H) 35.0 - 45.0 mm Hg Final   07/01/2017 57.7 (C) 35.0 - 45.0 mm Hg Final     Comment:     Critical:Notify Dr CARVER (01-Jul-17 11:50:51)Read back ok   07/01/2017 31.5 (L) 35.0 - 45.0 mm Hg Final   06/30/2017 27.3 (L) 35.0 - 45.0 mm Hg Final   06/30/2017 26.4 (L) 35.0 - 45.0 mm Hg Final   06/29/2017 16.8 (C) 35.0 - 45.0 mm Hg Final     Comment:     Critical:Notify THIERNO REYES RN (29-Jun-17 20:23:53)Read back ok      HCO3 HCO3, Arterial   Date Value Ref Range Status   07/02/2017 24.8 22.0 - 28.0 mmol/L Final   07/02/2017 27.1 22.0 - 28.0 mmol/L Final   07/01/2017 26.1 22.0 - 28.0  mmol/L Final   07/01/2017 22.9 22.0 - 28.0 mmol/L Final   07/01/2017 13.1 (L) 22.0 - 28.0 mmol/L Final   06/30/2017 15.0 (L) 22.0 - 28.0 mmol/L Final   06/30/2017 14.2 (L) 22.0 - 28.0 mmol/L Final   06/29/2017 4.0 (L) 22.0 - 28.0 mmol/L Final          artificial tears  Both Eyes Q4H   artificial tears  Both Eyes Q4H   IVPB builder  Intravenous Q6H   azithromycin 500 mg Intravenous Q24H   famotidine 20 mg Intravenous Daily   heparin (porcine) 5,000 Units Subcutaneous Q8H   hydrocortisone sodium succinate 50 mg Intravenous Q8H   levothyroxine sodium 25 mcg Intravenous Daily   piperacillin-tazobactam 3.375 g Intravenous Q8H   thiamine (VITAMIN B1) IVPB 200 mg Intravenous Q12H   Vancomycin Pharmacy Intermittent Dosing  Does not apply Daily       bumetanide (BUMEX) infusion 1 mg/hr Last Rate: Stopped (07/01/17 1600)   cisatracurium (NIMBEX) infusion 3 mcg/kg/min Last Rate: 2 mcg/kg/min (07/02/17 0728)   dextrose 20 mL/hr Last Rate: 20 mL/hr (07/01/17 1041)   dextrose 5 % and sodium chloride 0.45 % 150 mL/hr Last Rate: 150 mL/hr (06/30/17 1815)   dextrose 5 % and sodium chloride 0.45 % with KCl 20 mEq/L 150 mL/hr Last Rate: 150 mL/hr (07/01/17 0402)   heparin (porcine) 1,000 Units/hr Last Rate: 1,000 Units/hr (07/01/17 1342)   insulin regular infusion 1 unit/mL 0.1 Units/kg/hr Last Rate: 0.014 Units/kg/hr (07/01/17 2000)   norepinephrine 0.02-0.3 mcg/kg/min Last Rate: Stopped (07/02/17 0728)   Pharmacy to dose vancomycin     propofol 5-50 mcg/kg/min Last Rate: 20 mcg/kg/min (07/02/17 0728)   remifentanil (ULTIVA) infusion 0.5-15 mcg/kg/hr (Ideal) Last Rate: 1 mcg/kg/hr (07/01/17 0315)   sodium bicarbonate drip less than 75 mEq/bag 100 mL/hr Last Rate: Stopped (07/01/17 0630)   sodium chloride 250 mL/hr Last Rate: Stopped (06/29/17 2208)   sodium chloride 0.45 % with KCl 20 mEq 250 mL/hr Last Rate: 250 mL/hr (06/30/17 1834)       Diagnostics:  Imaging Results (last 24 hours)     Procedure Component Value Units Date/Time     XR Chest Post CVA Port [[id number]] Collected:  07/01/17 1012     Updated:  07/01/17 1016    Narrative:       ONE VIEW PORTABLE CHEST AT 10:00 AM     HISTORY: Recent central line placement     There has been recent insertion of a right jugular catheter which ends  in the SVC. There is no pneumothorax. Again noted is extensive  consolidation throughout both lungs and moderately large right pleural  effusion showing no change from yesterday. An ET tube remains in good  position 3 cm above the sai.     This report was finalized on 7/1/2017 10:13 AM by Dr. Ba Marley MD.       XR Chest 1 View [[id number]] Collected:  07/02/17 0539     Updated:  07/02/17 0539    Narrative:       X-RAY CHEST 1 VIEW.     HISTORY: Respiratory failure.     COMPARISON: 07/01/2017.     FINDINGS:  Cardiomediastinal silhouette is within normal limits. Line and tubes are  stable.     Diffuse bilateral lung opacities and bilateral pleural effusions.              Impression:       Persistent bilateral infiltrates and effusions, follow-up to resolution  is recommended.                 Endotracheal tube and central venous catheter.  To be in good position I think there has been improvement in the right lung infiltrate that looks to be a small right pleural effusion not sure I can say there is a whole lot of improvement in the left lung      Assessment/Plan     Patient Active Problem List   Diagnosis Code   • DKA (diabetic ketoacidoses) E13.10   • Pneumonia J18.9   • Sepsis A41.9   • Acute respiratory failure J96.00   • History of systemic lupus erythematosus (SLE) Z87.39   • History of splenectomy Z90.81   • History of ITP Z86.2   • Primary hypothyroidism E03.9       Impression #1 pneumonia community-acquired severe she is on broad-spectrum antibiotics including azithromycin and vancomycin and Zosyn.  Cultures thus far negative and infectious disease is following  #2 acute hypoxic respiratory failure this meets the definition for ARDS. She has  responded very well to both higher peaks and proning.  Now we are in the process of weaning support.  I would like to get her off the road a prone and off paralytics as soon as possible but she still has pretty dramatic swings in her oxygenation and ventilation and blood pressure all for the better when she is prone.  I will decreased inspiratory pressure some 1 to keep her tidal volumes in that 5-7 mL per kilo ideal body weight range  #3 severe sepsis with septic shockher blood pressure has improved.  Her mother blood pressure is very good when she is prone they have to use low-dose Levophed when she is supine  #4 new onset diabetes with diabetic ketoacidosis and dialysis is helping clear the excess ketones her acidosis has improved she is on an insulin drip and glucose drip.  Once we get some tube feeds going at suspect we can get the glucose drip off.  Endocrinology is following and helping workup the etiology their help is much appreciated  #5 acute renal failure probably ATN she is on SLED per nephrology.  She has been tolerating dialysis very well and I certainly think it is helped greatly with fluid management along with the clearing her acidosis.  Unfortunately she looks like she is anicteric hopefully she will continue to tolerate the dialysis.  I think if we can keep her fluid status under control at this point it looks like her respiratory status may be starting to turn the corner  #6 SLE apparently this is been fairly inactive recently she is getting some steroids stress dose that should help should she have some active disease.  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  #11 anemia looks like this is primarily acute blood loss along with this is probably phlebotomy and ICU type anemia we don't see any evidence of  active bleeding we will continue to follow H&H she is on gut prophylaxis  #12 hypokalemia corrected  #13 fluids/electrolytes/nutrition we will need to start some tube feeds in the near future for gut protection I think the next time she is supine with can probably try and get a feeding tube in and start feeds now initially for gut protection but also help us in managing her diabetes some    Plan for disposition:    Van Remy MD  07/02/17  7:37 AM    Time: I have spent 45 minutes thus far today in care of the patient excluding any planned procedures

## 2017-07-02 NOTE — PROGRESS NOTES
"Pharmacy to Dose Vancomycin IV    Day 4  Consult for Dr. Poe  (Dr Emery following for sepsis)  Treating: PNA  Goal trough: 15-20 mcg/mL    Current Vancomycin dose: Intermittent    IV Anti-Infectives     Ordered     Dose/Rate Route Frequency Start Stop    06/30/17 1410  vancomycin 1500 mg/500 mL 0.9% NS IVPB ([facility])     Ordering Provider:  Madison Harris MD    20 mg/kg × 70.6 kg  over 150 Minutes Intravenous Once 06/30/17 1445 06/30/17 2343    06/30/17 0828  azithromycin (ZITHROMAX) 500 mg 0.9% NaCl (Add-vantage) 250 mL     Ordering Provider:  Steve Emery MD    500 mg  over 60 Minutes Intravenous Every 24 Hours 06/30/17 0900 07/05/17 0859    06/29/17 2101  piperacillin-tazobactam (ZOSYN) 3.375 g in iso-osmotic dextrose 50 ml (premix)     Ordering Provider:  Alexis Poe MD    3.375 g  over 4 Hours Intravenous Every 8 Hours 06/29/17 2145      06/29/17 2103  vancomycin 1500 mg/500 mL 0.9% NS IVPB ([facility])     Ordering Provider:  Alexis Poe MD    20 mg/kg × 70.6 kg Intravenous Once 06/29/17 2145 06/29/17 2154      Relevant clinical data and objective history reviewed:  37 y.o. female 67.01\" (170.2 cm) 155 lb (70.3 kg)  Body mass index is 24.27 kg/(m^2).    Results from last 7 days  Lab Units 07/02/17  0513 07/01/17  2305 07/01/17  1740   CREATININE mg/dL 1.00 1.08* 0.85     Estimated Creatinine Clearance: 73.4 mL/min (by C-G formula based on Cr of 1).    Lab Results   Component Value Date    WBC 17.67 (H) 07/02/2017    WBC 10.90 (H) 07/01/2017    WBC 16.50 (H) 06/29/2017     Temp:  [98.6 °F (37 °C)-99.5 °F (37.5 °C)] 99.5 °F (37.5 °C)  Heart Rate:  [] 112  Resp:  [24] 24  BP: ()/() 123/81  Arterial Line BP: ()/() 186/93  FiO2 (%):  [60 %-100 %] 60 %    Intake/Output Summary (Last 24 hours) at 07/02/17 1101  Last data filed at 07/02/17 0700   Gross per 24 hour   Intake          2613.77 ml   Output             4650 ml   Net         -2036.23 ml     Baseline " cultures/labs/radiology:  6/29 Urine cx: NGF  6/29 HgA1C: 12.82  6/29 TSH: 4.910 (H)  6/29 PCT: 2.18  6/29 Lactic acid: 2.1  6/29 UA: trace bacteria/13-20 WBC  6/29 CXR: large right, small left infiltrate  6/30 Lactic acid: 2.9-->1.7-->3.0-->2.9  6/30 CXR: extensive bilateral pneumonia, right larger than left and the left-sided pneumonia shows further worsening since yesterday's exam.  6/30 Blood cx 2/2: NG x 2 days  6/30 RVP: None detected  6/30 Sputum cx (cough): NGF  7/01 Lactic acid: 4.8 --> 2.4  7/01 CXR: Again noted is extensive consolidation throughout both lungs and moderately large right pleural  effusion showing no change from yesterday  7/01 PCT: 4.89  7/02 CXR: Persistent bilateral infiltrates and effusions  7/02 PCT: 4.41    Assessment/Plan:   PMH: ITP w previous splenectomy, Lupus and Hypothyroidism.     Pt transferred from [facility] ED. She [place] airport confused & wondering around. MG=571, pH=6.89 at . No previous hx DM. Admitted d/t DKA & PNA    Will start scheduled Vancomycin 750 mg IV q12hrs (21 mg/Kg/day) & follow w levels    Lab Results   Component Value Date    VANCO RANDOM 5.70 mcg/mL 07/02/2017     PK parameters:  Joao: 0.071 hr-1,  T1/2: 9.8 hr, Vd: 49.2 L  (0.7 L/kg)    Vancomycin Dosing History:  6/29 21:54 Vancomycin 1500mg IV x1  6/30 11:59 Vanc random: 9.50 mcg/mL  6/30 21:13 Vancomycin 1500 mg IV x1  7/01 06:08 Vanc random: 34.50 mcg/mL  7/02 05:13 Vanc random: 5.70 mcg/mL    Thank you for your consult,    Maribel Cardona [facility]  07/02/17 11:01 AM

## 2017-07-02 NOTE — PROGRESS NOTES
LOS: 3 days     Chief Complaint:  Follow-up sepsis    Interval History:  O2 improved w/ prone positioning. No fevers. WBC elevated. Tolerating antibiotics w/o rash or diarrhea. Can't obtain full history due to intubated status    ROS: can't be obtained due to intubated status    Vital Signs  Temp:  [98.6 °F (37 °C)-99.5 °F (37.5 °C)] 99.5 °F (37.5 °C)  Heart Rate:  [] 112  Resp:  [24] 24  BP: ()/() 123/81  Arterial Line BP: ()/() 186/93  FiO2 (%):  [60 %-100 %] 60 %    Physical Exam:  Full Exam unable to be done bc in prone/rotating bed  General: intubated, sedated, in prone rotating bed  :  Lockwood catheter present  Musculoskeletal: no joint abnormalities, normal musculature  Skin: No rashes, lesions, or embolic phenomenon on visible skin  Psychiatric: sedated  Vasc:  RIJ TLC present    Antibiotics:  •  azithromycin (ZITHROMAX) 500 mg 0.9% NaCl (Add-vantage) 250 mL, 500 mg, Intravenous, Q24H, Steve Emery MD, 500 mg at 06/30/17 1006  •  Pharmacy to dose vancomycin, , Does not apply, Continuous PRN, Alexis Poe MD  •  piperacillin-tazobactam (ZOSYN) 3.375 g in iso-osmotic dextrose 50 ml (premix), 3.375 g, Intravenous, Q8H, Alexis Poe MD, 3.375 g at 07/01/17 0557  •  Vancomycin Pharmacy Intermittent Dosing, , Does not apply, Daily, Alexis Poe MD, Stopped at 06/30/17 1330    LABS:  CBC, Crt, VTr, Procal, Micro reviewed today  Lab Results   Component Value Date    WBC 17.67 (H) 07/02/2017    HGB 8.3 (L) 07/02/2017    HCT 25.0 (L) 07/02/2017    MCV 90.3 06/29/2017     07/02/2017     Lab Results   Component Value Date    GLUCOSE 155 (H) 07/02/2017    BUN 5 (L) 07/02/2017    CREATININE 1.00 07/02/2017    EGFRIFNONA 62 07/02/2017    BCR 5.0 (L) 07/02/2017    CO2 22.9 07/02/2017    CALCIUM 7.0 (L) 07/02/2017    ALBUMIN 2.10 (L) 07/02/2017    LABIL2 0.6 06/29/2017    AST 7 06/29/2017    ALT <5 06/29/2017     Lab Results   Component Value Date    [facility] 5.70  07/02/2017       Procal 2--->4.9--->4.4    Microbiology:  6/30 RVP negative  6/30 BCx: NGTD  6/30 SpCx: negative    Radiology (personally reviewed):   CXR w/ persistent bilateral infiltrates    Assessment/Plan   1. ARDS and Sepsis secondary to Right lower lobe pneumonia  -improved w/ paralysis and prone positioning  -continue vancomycin with goal trough 15-20  -continue Zosyn 3.375 g q8h  -continue azithromycin 500 mg IV q24h x 5 days  -f/u sputum culture  -f/u blood cultures and call [facility] for BCx there  -prone positioning today     2. Diabetic ketoacidosis  -per ICU team  -trying to correct acidosis w/ CRRT     3. History of splenectomy  -will address vaccine status after critical stage     4. Systemic lupus erythematosus  -appears quiescent based on history     5. Acute kidney injury requiring CRRT  -Crt stable     Thank you for this consult. ID will follow. I discussed the patients findings and my recommendations with RN.

## 2017-07-03 ENCOUNTER — APPOINTMENT (OUTPATIENT)
Dept: GENERAL RADIOLOGY | Facility: HOSPITAL | Age: 37
End: 2017-07-03

## 2017-07-03 ENCOUNTER — APPOINTMENT (OUTPATIENT)
Dept: GENERAL RADIOLOGY | Facility: HOSPITAL | Age: 37
End: 2017-07-03
Attending: INTERNAL MEDICINE

## 2017-07-03 LAB
ACETONE SERPL-MCNC: NEGATIVE % (ref 0–0.01)
ALBUMIN SERPL-MCNC: 2.5 G/DL (ref 3.5–5.2)
ALBUMIN SERPL-MCNC: 2.7 G/DL (ref 3.5–5.2)
ALBUMIN SERPL-MCNC: 2.8 G/DL (ref 3.5–5.2)
ANION GAP SERPL CALCULATED.3IONS-SCNC: 16.3 MMOL/L
ANION GAP SERPL CALCULATED.3IONS-SCNC: 17.5 MMOL/L
ANION GAP SERPL CALCULATED.3IONS-SCNC: 18.5 MMOL/L
APTT PPP: 80.2 SECONDS (ref 22.7–35.4)
ARTERIAL PATENCY WRIST A: ABNORMAL
ATMOSPHERIC PRESS: 748.1 MMHG
ATMOSPHERIC PRESS: 748.3 MMHG
ATMOSPHERIC PRESS: 750.4 MMHG
BASE EXCESS BLDA CALC-SCNC: 1.8 MMOL/L (ref 0–2)
BASE EXCESS BLDA CALC-SCNC: 2.3 MMOL/L (ref 0–2)
BASE EXCESS BLDA CALC-SCNC: 7.2 MMOL/L (ref 0–2)
BDY SITE: ABNORMAL
BUN BLD-MCNC: 6 MG/DL (ref 6–20)
BUN BLD-MCNC: 7 MG/DL (ref 6–20)
BUN BLD-MCNC: 8 MG/DL (ref 6–20)
BUN/CREAT SERPL: 7.3 (ref 7–25)
BUN/CREAT SERPL: 8.2 (ref 7–25)
BUN/CREAT SERPL: 9.7 (ref 7–25)
CA-I BLD-MCNC: 4 MG/DL (ref 4.6–5.4)
CA-I BLD-MCNC: 4.1 MG/DL (ref 4.6–5.4)
CA-I SERPL ISE-MCNC: 0.99 MMOL/L (ref 1.1–1.35)
CA-I SERPL ISE-MCNC: 0.99 MMOL/L (ref 1.1–1.35)
CA-I SERPL ISE-MCNC: 1.01 MMOL/L (ref 1.1–1.35)
CA-I SERPL ISE-MCNC: 1.02 MMOL/L (ref 1.1–1.35)
CALCIUM SPEC-SCNC: 6.9 MG/DL (ref 8.6–10.5)
CALCIUM SPEC-SCNC: 6.9 MG/DL (ref 8.6–10.5)
CALCIUM SPEC-SCNC: 7.2 MG/DL (ref 8.6–10.5)
CHLORIDE SERPL-SCNC: 91 MMOL/L (ref 98–107)
CHLORIDE SERPL-SCNC: 92 MMOL/L (ref 98–107)
CHLORIDE SERPL-SCNC: 94 MMOL/L (ref 98–107)
CO2 SERPL-SCNC: 23.5 MMOL/L (ref 22–29)
CO2 SERPL-SCNC: 24.5 MMOL/L (ref 22–29)
CO2 SERPL-SCNC: 25.7 MMOL/L (ref 22–29)
CREAT BLD-MCNC: 0.62 MG/DL (ref 0.57–1)
CREAT BLD-MCNC: 0.85 MG/DL (ref 0.57–1)
CREAT BLD-MCNC: 1.1 MG/DL (ref 0.57–1)
ETHANOL SPEC-SCNC: NEGATIVE % (ref 0–0.01)
GFR SERPL CREATININE-BSD FRML MDRD: 107 ML/MIN/1.73
GFR SERPL CREATININE-BSD FRML MDRD: 55 ML/MIN/1.73
GFR SERPL CREATININE-BSD FRML MDRD: 74 ML/MIN/1.73
GLUCOSE BLD-MCNC: 132 MG/DL (ref 65–99)
GLUCOSE BLD-MCNC: 190 MG/DL (ref 65–99)
GLUCOSE BLD-MCNC: 204 MG/DL (ref 65–99)
GLUCOSE BLDC GLUCOMTR-MCNC: 139 MG/DL (ref 70–130)
GLUCOSE BLDC GLUCOMTR-MCNC: 143 MG/DL (ref 70–130)
GLUCOSE BLDC GLUCOMTR-MCNC: 148 MG/DL (ref 70–130)
GLUCOSE BLDC GLUCOMTR-MCNC: 163 MG/DL (ref 70–130)
GLUCOSE BLDC GLUCOMTR-MCNC: 163 MG/DL (ref 70–130)
GLUCOSE BLDC GLUCOMTR-MCNC: 166 MG/DL (ref 70–130)
GLUCOSE BLDC GLUCOMTR-MCNC: 166 MG/DL (ref 70–130)
GLUCOSE BLDC GLUCOMTR-MCNC: 167 MG/DL (ref 70–130)
GLUCOSE BLDC GLUCOMTR-MCNC: 175 MG/DL (ref 70–130)
GLUCOSE BLDC GLUCOMTR-MCNC: 177 MG/DL (ref 70–130)
GLUCOSE BLDC GLUCOMTR-MCNC: 178 MG/DL (ref 70–130)
GLUCOSE BLDC GLUCOMTR-MCNC: 189 MG/DL (ref 70–130)
GLUCOSE BLDC GLUCOMTR-MCNC: 191 MG/DL (ref 70–130)
GLUCOSE BLDC GLUCOMTR-MCNC: 194 MG/DL (ref 70–130)
GLUCOSE BLDC GLUCOMTR-MCNC: 197 MG/DL (ref 70–130)
GLUCOSE BLDC GLUCOMTR-MCNC: 201 MG/DL (ref 70–130)
GLUCOSE BLDC GLUCOMTR-MCNC: 207 MG/DL (ref 70–130)
GLUCOSE BLDC GLUCOMTR-MCNC: 209 MG/DL (ref 70–130)
GLUCOSE BLDC GLUCOMTR-MCNC: 211 MG/DL (ref 70–130)
GLUCOSE BLDC GLUCOMTR-MCNC: 220 MG/DL (ref 70–130)
GLUCOSE BLDC GLUCOMTR-MCNC: 227 MG/DL (ref 70–130)
GLUCOSE BLDC GLUCOMTR-MCNC: 231 MG/DL (ref 70–130)
HCO3 BLDA-SCNC: 26.8 MMOL/L (ref 22–28)
HCO3 BLDA-SCNC: 28 MMOL/L (ref 22–28)
HCO3 BLDA-SCNC: 31.2 MMOL/L (ref 22–28)
HOROWITZ INDEX BLD+IHG-RTO: 40 %
HOROWITZ INDEX BLD+IHG-RTO: 40 %
HOROWITZ INDEX BLD+IHG-RTO: 50 %
ISOPROPANOL SERPL-MCNC: NEGATIVE % (ref 0–0.01)
MAGNESIUM SERPL-MCNC: 2 MG/DL (ref 1.6–2.6)
MAGNESIUM SERPL-MCNC: 2.1 MG/DL (ref 1.6–2.6)
MAGNESIUM SERPL-MCNC: 2.1 MG/DL (ref 1.6–2.6)
METHANOL SERPL-MCNC: NEGATIVE % (ref 0–0.01)
MODALITY: ABNORMAL
O2 A-A PPRESDIFF RESPIRATORY: 0.3 MMHG
O2 A-A PPRESDIFF RESPIRATORY: 0.4 MMHG
O2 A-A PPRESDIFF RESPIRATORY: 0.6 MMHG
PCO2 BLDA: 40.1 MM HG (ref 35–45)
PCO2 BLDA: 40.4 MM HG (ref 35–45)
PCO2 BLDA: 52.6 MM HG (ref 35–45)
PEEP RESPIRATORY: 8 CM[H2O]
PEEP RESPIRATORY: 9 CM[H2O]
PEEP RESPIRATORY: 9 CM[H2O]
PH BLDA: 7.33 PH UNITS (ref 7.35–7.45)
PH BLDA: 7.43 PH UNITS (ref 7.35–7.45)
PH BLDA: 7.5 PH UNITS (ref 7.35–7.45)
PHOSPHATE SERPL-MCNC: 0.7 MG/DL (ref 2.5–4.5)
PHOSPHATE SERPL-MCNC: 1.9 MG/DL (ref 2.5–4.5)
PHOSPHATE SERPL-MCNC: 2.1 MG/DL (ref 2.5–4.5)
PO2 BLDA: 157.7 MM HG (ref 80–100)
PO2 BLDA: 79.2 MM HG (ref 80–100)
PO2 BLDA: 98.6 MM HG (ref 80–100)
POTASSIUM BLD-SCNC: 3.6 MMOL/L (ref 3.5–5.2)
POTASSIUM BLD-SCNC: 3.7 MMOL/L (ref 3.5–5.2)
POTASSIUM BLD-SCNC: 3.9 MMOL/L (ref 3.5–5.2)
SAO2 % BLDCOA: 94.5 % (ref 92–99)
SAO2 % BLDCOA: 97.9 % (ref 92–99)
SAO2 % BLDCOA: 99.5 % (ref 92–99)
SET MECH RESP RATE: 20
SET MECH RESP RATE: 24
SET MECH RESP RATE: 24
SODIUM BLD-SCNC: 133 MMOL/L (ref 136–145)
SODIUM BLD-SCNC: 134 MMOL/L (ref 136–145)
SODIUM BLD-SCNC: 136 MMOL/L (ref 136–145)
TOTAL RATE: 20 BREATHS/MINUTE
TOTAL RATE: 25 BREATHS/MINUTE
TOTAL RATE: 30 BREATHS/MINUTE
VANCOMYCIN TROUGH SERPL-MCNC: 12 MCG/ML (ref 5–20)
VENTILATOR MODE: ABNORMAL
VT ON VENT VENT: 322 ML
VT ON VENT VENT: 393 ML
VT ON VENT VENT: 482 ML

## 2017-07-03 PROCEDURE — 94799 UNLISTED PULMONARY SVC/PX: CPT

## 2017-07-03 PROCEDURE — 82803 BLOOD GASES ANY COMBINATION: CPT

## 2017-07-03 PROCEDURE — 25010000002 ALBUMIN HUMAN 25% PER 50 ML: Performed by: INTERNAL MEDICINE

## 2017-07-03 PROCEDURE — 83735 ASSAY OF MAGNESIUM: CPT | Performed by: INTERNAL MEDICINE

## 2017-07-03 PROCEDURE — 80069 RENAL FUNCTION PANEL: CPT | Performed by: INTERNAL MEDICINE

## 2017-07-03 PROCEDURE — 25010000002 PIPERACILLIN SOD-TAZOBACTAM PER 1 G: Performed by: INTERNAL MEDICINE

## 2017-07-03 PROCEDURE — 25010000002 THIAMINE PER 100 MG: Performed by: INTERNAL MEDICINE

## 2017-07-03 PROCEDURE — 25010000002 HYDROCORTISONE SODIUM SUCCINATE 100 MG RECONSTITUTED SOLUTION: Performed by: INTERNAL MEDICINE

## 2017-07-03 PROCEDURE — 82330 ASSAY OF CALCIUM: CPT | Performed by: INTERNAL MEDICINE

## 2017-07-03 PROCEDURE — 63710000001 INSULIN REGULAR HUMAN PER 5 UNITS: Performed by: INTERNAL MEDICINE

## 2017-07-03 PROCEDURE — 25010000002 FENTANYL CITRATE (PF) 100 MCG/2ML SOLUTION: Performed by: INTERNAL MEDICINE

## 2017-07-03 PROCEDURE — 25010000002 HEPARIN (PORCINE) PER 1000 UNITS: Performed by: INTERNAL MEDICINE

## 2017-07-03 PROCEDURE — 94003 VENT MGMT INPAT SUBQ DAY: CPT

## 2017-07-03 PROCEDURE — P9046 ALBUMIN (HUMAN), 25%, 20 ML: HCPCS | Performed by: INTERNAL MEDICINE

## 2017-07-03 PROCEDURE — 85730 THROMBOPLASTIN TIME PARTIAL: CPT | Performed by: INTERNAL MEDICINE

## 2017-07-03 PROCEDURE — 25010000002 PROPOFOL 1000 MG/ML EMULSION: Performed by: INTERNAL MEDICINE

## 2017-07-03 PROCEDURE — 71010 HC CHEST PA OR AP: CPT

## 2017-07-03 PROCEDURE — 99232 SBSQ HOSP IP/OBS MODERATE 35: CPT | Performed by: INTERNAL MEDICINE

## 2017-07-03 PROCEDURE — 99233 SBSQ HOSP IP/OBS HIGH 50: CPT | Performed by: INTERNAL MEDICINE

## 2017-07-03 PROCEDURE — 82962 GLUCOSE BLOOD TEST: CPT

## 2017-07-03 PROCEDURE — 80202 ASSAY OF VANCOMYCIN: CPT | Performed by: INTERNAL MEDICINE

## 2017-07-03 RX ORDER — CASTOR OIL AND BALSAM, PERU 788; 87 MG/G; MG/G
OINTMENT TOPICAL EVERY 12 HOURS SCHEDULED
Status: DISCONTINUED | OUTPATIENT
Start: 2017-07-03 | End: 2017-07-13 | Stop reason: HOSPADM

## 2017-07-03 RX ORDER — HYDROCODONE BITARTRATE AND ACETAMINOPHEN 10; 325 MG/1; MG/1
1 TABLET ORAL EVERY 6 HOURS
Status: DISCONTINUED | OUTPATIENT
Start: 2017-07-03 | End: 2017-07-06

## 2017-07-03 RX ORDER — LANSOPRAZOLE
30 KIT EVERY MORNING
Status: DISCONTINUED | OUTPATIENT
Start: 2017-07-04 | End: 2017-07-11

## 2017-07-03 RX ADMIN — HYDROCODONE BITARTRATE AND ACETAMINOPHEN 1 TABLET: 10; 325 TABLET ORAL at 15:32

## 2017-07-03 RX ADMIN — CASTOR OIL AND BALSAM, PERU: 788; 87 OINTMENT TOPICAL at 13:14

## 2017-07-03 RX ADMIN — FENTANYL CITRATE 50 MCG: 50 INJECTION INTRAMUSCULAR; INTRAVENOUS at 10:20

## 2017-07-03 RX ADMIN — HEPARIN SODIUM 1000 UNITS/HR: 10000 INJECTION, SOLUTION INTRAVENOUS at 04:22

## 2017-07-03 RX ADMIN — MINERAL OIL AND WHITE PETROLATUM: 150; 830 OINTMENT OPHTHALMIC at 21:36

## 2017-07-03 RX ADMIN — MINERAL OIL AND WHITE PETROLATUM: 150; 830 OINTMENT OPHTHALMIC at 10:24

## 2017-07-03 RX ADMIN — SODIUM PHOSPHATE, MONOBASIC, MONOHYDRATE AND SODIUM PHOSPHATE, DIBASIC, ANHYDROUS 10 MMOL: 276; 142 INJECTION, SOLUTION INTRAVENOUS at 14:25

## 2017-07-03 RX ADMIN — HYDROCORTISONE SODIUM SUCCINATE 25 MG: 100 INJECTION, POWDER, FOR SOLUTION INTRAMUSCULAR; INTRAVENOUS at 21:50

## 2017-07-03 RX ADMIN — ASCORBIC ACID: 500 INJECTION, SOLUTION INTRAMUSCULAR; INTRAVENOUS; SUBCUTANEOUS at 13:14

## 2017-07-03 RX ADMIN — THIAMINE HYDROCHLORIDE 200 MG: 100 INJECTION, SOLUTION INTRAMUSCULAR; INTRAVENOUS at 06:02

## 2017-07-03 RX ADMIN — ASCORBIC ACID: 500 INJECTION, SOLUTION INTRAMUSCULAR; INTRAVENOUS; SUBCUTANEOUS at 00:52

## 2017-07-03 RX ADMIN — VANCOMYCIN HYDROCHLORIDE 750 MG: 750 INJECTION, POWDER, LYOPHILIZED, FOR SOLUTION INTRAVENOUS at 11:23

## 2017-07-03 RX ADMIN — FENTANYL CITRATE 50 MCG: 50 INJECTION INTRAMUSCULAR; INTRAVENOUS at 12:40

## 2017-07-03 RX ADMIN — ASCORBIC ACID: 500 INJECTION, SOLUTION INTRAMUSCULAR; INTRAVENOUS; SUBCUTANEOUS at 06:01

## 2017-07-03 RX ADMIN — MINERAL OIL AND WHITE PETROLATUM: 150; 830 OINTMENT OPHTHALMIC at 02:02

## 2017-07-03 RX ADMIN — PROPOFOL 30 MCG/KG/MIN: 10 INJECTION, EMULSION INTRAVENOUS at 05:27

## 2017-07-03 RX ADMIN — AZITHROMYCIN DIHYDRATE 500 MG: 500 INJECTION, POWDER, LYOPHILIZED, FOR SOLUTION INTRAVENOUS at 08:50

## 2017-07-03 RX ADMIN — ALBUMIN HUMAN 12.5 G: 0.25 SOLUTION INTRAVENOUS at 18:05

## 2017-07-03 RX ADMIN — THIAMINE HYDROCHLORIDE 200 MG: 100 INJECTION, SOLUTION INTRAMUSCULAR; INTRAVENOUS at 18:12

## 2017-07-03 RX ADMIN — VANCOMYCIN HYDROCHLORIDE 750 MG: 750 INJECTION, POWDER, LYOPHILIZED, FOR SOLUTION INTRAVENOUS at 00:02

## 2017-07-03 RX ADMIN — HEPARIN SODIUM 2000 UNITS: 1000 INJECTION, SOLUTION INTRAVENOUS; SUBCUTANEOUS at 13:07

## 2017-07-03 RX ADMIN — TAZOBACTAM SODIUM AND PIPERACILLIN SODIUM 3.38 G: 375; 3 INJECTION, SOLUTION INTRAVENOUS at 05:04

## 2017-07-03 RX ADMIN — ALBUMIN HUMAN 12.5 G: 0.25 SOLUTION INTRAVENOUS at 00:52

## 2017-07-03 RX ADMIN — ALBUMIN HUMAN 12.5 G: 0.25 SOLUTION INTRAVENOUS at 13:08

## 2017-07-03 RX ADMIN — HEPARIN SODIUM 5000 UNITS: 5000 INJECTION, SOLUTION INTRAVENOUS; SUBCUTANEOUS at 05:20

## 2017-07-03 RX ADMIN — HYDROCODONE BITARTRATE AND ACETAMINOPHEN 1 TABLET: 10; 325 TABLET ORAL at 09:29

## 2017-07-03 RX ADMIN — TAZOBACTAM SODIUM AND PIPERACILLIN SODIUM 3.38 G: 375; 3 INJECTION, SOLUTION INTRAVENOUS at 21:50

## 2017-07-03 RX ADMIN — PROPOFOL 25 MCG/KG/MIN: 10 INJECTION, EMULSION INTRAVENOUS at 22:42

## 2017-07-03 RX ADMIN — MINERAL OIL AND WHITE PETROLATUM: 150; 830 OINTMENT OPHTHALMIC at 05:51

## 2017-07-03 RX ADMIN — FAMOTIDINE 20 MG: 10 INJECTION, SOLUTION INTRAVENOUS at 08:50

## 2017-07-03 RX ADMIN — HEPARIN SODIUM 5000 UNITS: 5000 INJECTION, SOLUTION INTRAVENOUS; SUBCUTANEOUS at 22:30

## 2017-07-03 RX ADMIN — HEPARIN SODIUM 5000 UNITS: 5000 INJECTION, SOLUTION INTRAVENOUS; SUBCUTANEOUS at 13:16

## 2017-07-03 RX ADMIN — MINERAL OIL AND WHITE PETROLATUM: 150; 830 OINTMENT OPHTHALMIC at 14:14

## 2017-07-03 RX ADMIN — SODIUM CHLORIDE 0.01 UNITS/KG/HR: 9 INJECTION, SOLUTION INTRAVENOUS at 01:42

## 2017-07-03 RX ADMIN — PROPOFOL 25 MCG/KG/MIN: 10 INJECTION, EMULSION INTRAVENOUS at 16:27

## 2017-07-03 RX ADMIN — HYDROCORTISONE SODIUM SUCCINATE 50 MG: 100 INJECTION, POWDER, FOR SOLUTION INTRAMUSCULAR; INTRAVENOUS at 04:00

## 2017-07-03 RX ADMIN — Medication 0.06 MCG/KG/MIN: at 01:54

## 2017-07-03 RX ADMIN — LEVOTHYROXINE SODIUM ANHYDROUS 25 MCG: 100 INJECTION, POWDER, LYOPHILIZED, FOR SOLUTION INTRAVENOUS at 10:25

## 2017-07-03 RX ADMIN — REMIFENTANIL HYDROCHLORIDE 5 MCG/KG/HR: 1 INJECTION, POWDER, LYOPHILIZED, FOR SOLUTION INTRAVENOUS at 04:10

## 2017-07-03 RX ADMIN — CASTOR OIL AND BALSAM, PERU: 788; 87 OINTMENT TOPICAL at 21:37

## 2017-07-03 RX ADMIN — TAZOBACTAM SODIUM AND PIPERACILLIN SODIUM 3.38 G: 375; 3 INJECTION, SOLUTION INTRAVENOUS at 13:16

## 2017-07-03 RX ADMIN — HYDROCODONE BITARTRATE AND ACETAMINOPHEN 1 TABLET: 10; 325 TABLET ORAL at 21:50

## 2017-07-03 RX ADMIN — MINERAL OIL AND WHITE PETROLATUM: 150; 830 OINTMENT OPHTHALMIC at 17:58

## 2017-07-03 RX ADMIN — SODIUM PHOSPHATE, MONOBASIC, MONOHYDRATE AND SODIUM PHOSPHATE, DIBASIC, ANHYDROUS 10 MMOL: 276; 142 INJECTION, SOLUTION INTRAVENOUS at 02:04

## 2017-07-03 RX ADMIN — ASCORBIC ACID: 500 INJECTION, SOLUTION INTRAMUSCULAR; INTRAVENOUS; SUBCUTANEOUS at 18:12

## 2017-07-03 RX ADMIN — HYDROCORTISONE SODIUM SUCCINATE 25 MG: 100 INJECTION, POWDER, FOR SOLUTION INTRAMUSCULAR; INTRAVENOUS at 13:15

## 2017-07-03 RX ADMIN — ALBUMIN HUMAN 12.5 G: 0.25 SOLUTION INTRAVENOUS at 06:02

## 2017-07-03 RX ADMIN — HEPARIN SODIUM 1000 UNITS: 1000 INJECTION, SOLUTION INTRAVENOUS; SUBCUTANEOUS at 18:16

## 2017-07-03 NOTE — PLAN OF CARE
Problem: Sepsis (Adult)  Intervention: Provide Early Enteral Nutrition Therapy  Tolerating TF at goal. BM 7/1.

## 2017-07-03 NOTE — PLAN OF CARE
Problem: Patient Care Overview (Adult)  Goal: Plan of Care Review  Outcome: Ongoing (interventions implemented as appropriate)    07/03/17 0636   Coping/Psychosocial Response Interventions   Plan Of Care Reviewed With patient;family   Patient Care Overview   Progress improving   Outcome Evaluation   Outcome Summary/Follow up Plan blood gases improving, requiring less FIO2 and PEEP, tolerating rotoprone and supine positions, tolerating tube feeding, tolerating CRRT       Goal: Adult Individualization and Mutuality  Outcome: Ongoing (interventions implemented as appropriate)  Goal: Discharge Needs Assessment  Outcome: Ongoing (interventions implemented as appropriate)    07/03/17 0636   Discharge Needs Assessment   Concerns To Be Addressed denies needs/concerns at this time         Problem: Fall Risk (Adult)  Goal: Identify Related Risk Factors and Signs and Symptoms  Outcome: Ongoing (interventions implemented as appropriate)    07/03/17 0636   Fall Risk   Fall Risk: Signs and Symptoms presence of risk factors       Goal: Absence of Falls  Outcome: Ongoing (interventions implemented as appropriate)    07/03/17 0636   Fall Risk (Adult)   Absence of Falls making progress toward outcome         Problem: Diabetes, Type 1 (Adult)  Goal: Signs and Symptoms of Listed Potential Problems Will be Absent or Manageable (Diabetes, Type 1)  Outcome: Ongoing (interventions implemented as appropriate)    07/03/17 0636   Diabetes, Type 1   Problems Assessed (Type 1 Diabetes) all         Problem: Sepsis (Adult)  Goal: Signs and Symptoms of Listed Potential Problems Will be Absent or Manageable (Sepsis)  Outcome: Ongoing (interventions implemented as appropriate)    07/03/17 0636   Sepsis   Problems Assessed (Sepsis) all         Problem: Pneumonia (Adult)  Goal: Signs and Symptoms of Listed Potential Problems Will be Absent or Manageable (Pneumonia)  Outcome: Ongoing (interventions implemented as appropriate)    07/03/17 0636    Pneumonia   Problems Assessed (Pneumonia) all         Problem: Hemodialysis (Adult)  Goal: Signs and Symptoms of Listed Potential Problems Will be Absent or Manageable (Hemodialysis)  Outcome: Ongoing (interventions implemented as appropriate)    07/03/17 0636   Hemodialysis   Problems Assessed (Hemodialysis) all

## 2017-07-03 NOTE — PROGRESS NOTES
LOS: 4 days     Chief Complaint:  Follow-up sepsis    Interval History:  O2 improved w/ prone positioning. FiO2 down to 40%. Plans to stop prone and paralytics today. No fevers. Tolerating antibiotics w/o rash or diarrhea. Can't obtain full history due to intubated status    ROS: can't be obtained due to intubated status    Vital Signs  Temp:  [99.6 °F (37.6 °C)-99.9 °F (37.7 °C)] 99.9 °F (37.7 °C)  Heart Rate:  [] 79  Resp:  [24-29] 24  BP: ()/() 94/55  Arterial Line BP: ()/() 124/83  FiO2 (%):  [40 %-60 %] 40 %    Physical Exam:  Full Exam unable to be done bc in prone/rotating bed  General: intubated, sedated, in prone rotating bed  :  Lockwood catheter present  Musculoskeletal: no joint abnormalities, normal musculature  Skin: No rashes, lesions, or embolic phenomenon on visible skin  Psychiatric: sedated  Vasc:  RIJ TLC present    Antibiotics:  •  azithromycin (ZITHROMAX) 500 mg 0.9% NaCl (Add-vantage) 250 mL, 500 mg, Intravenous, Q24H, Steve Emery MD, 500 mg at 06/30/17 1006  •  piperacillin-tazobactam (ZOSYN) 3.375 g in iso-osmotic dextrose 50 ml (premix), 3.375 g, Intravenous, Q8H, Alexis Poe MD, 3.375 g at 07/01/17 0557  •  Vancomycin 750 mg IV q12h    LABS:  BMP, VTr, Procal, Micro reviewed today  Lab Results   Component Value Date    WBC 17.67 (H) 07/02/2017    HGB 8.3 (L) 07/02/2017    HCT 25.0 (L) 07/02/2017    MCV 90.3 06/29/2017     07/02/2017     Lab Results   Component Value Date    GLUCOSE 204 (H) 07/03/2017    BUN 8 07/03/2017    CREATININE 1.10 (H) 07/03/2017    EGFRIFNONA 55 (L) 07/03/2017    BCR 7.3 07/03/2017    CO2 23.5 07/03/2017    CALCIUM 6.9 (L) 07/03/2017    ALBUMIN 2.70 (L) 07/03/2017    LABIL2 0.6 06/29/2017    AST 7 06/29/2017    ALT <5 06/29/2017     Lab Results   Component Value Date    [facility] 5.70 07/02/2017       Procal 2--->4.9--->4.4    Microbiology:  6/30 RVP negative  6/30 BCx: NGTD  6/30 SpCx: negative    Radiology  (personally reviewed images):   CXR w/ slight improvement of aeration    Assessment/Plan   1. ARDS and Sepsis secondary to Right lower lobe pneumonia  -improved w/ paralysis and prone positioning  -continue vancomycin with goal trough 15-20  -continue Zosyn 3.375 g q8h  -continue azithromycin 500 mg IV q24h x 5 days  -all cultures negative     2. Diabetic ketoacidosis  -resolved     3. History of splenectomy  -will address vaccine status after critical stage     4. Systemic lupus erythematosus  -appears quiescent based on history     5. Acute kidney injury   -Crt increased a little today but overall much better     Thank you for this consult. ID will follow. I discussed the patients findings and my recommendations with RN and patient's uncle.

## 2017-07-03 NOTE — NURSING NOTE
07/03/17 1124   Pressure Ulcer 07/01/17 Left other (see comments) Stage II   Date first assessed: 07/01/17   Present On Admission (Pressure Ulcer): no  Side: Left  Location: (c) other (see comments)  Stage: Stage II   Dressing Appearance intact   Pressure Ulcer Appearance moist;clean;yellow   Periwound Area intact;pink;dry   Length (Pressure Ulcer) (cm) 1   Width (Pressure Ulcer) (cm) 1.5   Periwound Care topical treatment applied  (VENELEX ORDERED)   Dressing foam;non-adherent   Pressure Ulcer 07/01/17 Right other (see comments) suspected deep tissue injury   Date first assessed: 07/01/17   Side: Right  Location: (c) other (see comments)  Stage: suspected deep tissue injury   Dressing Appearance intact   Pressure Ulcer Appearance reddened;purple   Periwound Area intact;pink;dry   Length (Pressure Ulcer) (cm) 1.5   Width (Pressure Ulcer) (cm) 4.5   Periwound Care topical treatment applied;other (see comments)  (VENELEX ORDERED)   Dressing foam;non-adherent   Pt with device related pressure injury to bilateral anterior shoulders r/t roto prone frame.  Pt has now been transferred back to a regular bed.  Mepilex dressings over these areas to provide extra padding.  Pt remains critically ill on HD, intubated, pressors, and multiple infusions.  Will order Venelex to these areas.  Continue to follow.

## 2017-07-03 NOTE — PLAN OF CARE
Problem: Patient Care Overview (Adult)  Goal: Plan of Care Review  Outcome: Ongoing (interventions implemented as appropriate)    07/03/17 1720   Coping/Psychosocial Response Interventions   Plan Of Care Reviewed With father;mother;sibling   Patient Care Overview   Progress improving   Outcome Evaluation   Outcome Summary/Follow up Plan Pt removed off rotoprone at 0930, tolerated well. tube feedings were increased to goal, pt did have multiple large watery stools through shift and FMS was placed. pt tolerated CRRT for shift, Nephrology wants to stop CRRT tonight at 1830 and proceed with HD in AM, pt has had an increase in pressures and levophed being titrated at this time. pt remains anuric, remains on insulin drip. vitals are stable at this time. will continue to monitor pt.          Problem: Fall Risk (Adult)  Goal: Identify Related Risk Factors and Signs and Symptoms  Outcome: Ongoing (interventions implemented as appropriate)  Goal: Absence of Falls  Outcome: Ongoing (interventions implemented as appropriate)    Problem: Diabetes, Type 1 (Adult)  Goal: Signs and Symptoms of Listed Potential Problems Will be Absent or Manageable (Diabetes, Type 1)  Outcome: Ongoing (interventions implemented as appropriate)    Problem: Sepsis (Adult)  Goal: Signs and Symptoms of Listed Potential Problems Will be Absent or Manageable (Sepsis)  Outcome: Ongoing (interventions implemented as appropriate)    Problem: Pneumonia (Adult)  Goal: Signs and Symptoms of Listed Potential Problems Will be Absent or Manageable (Pneumonia)  Outcome: Ongoing (interventions implemented as appropriate)    Problem: Hemodialysis (Adult)  Goal: Signs and Symptoms of Listed Potential Problems Will be Absent or Manageable (Hemodialysis)  Outcome: Ongoing (interventions implemented as appropriate)

## 2017-07-03 NOTE — PROGRESS NOTES
"  ENDOCRINE    Subjective   Sarita Bai is a 37 y.o. female.     Oxygenating well with less FiO2 and PEEP.  Patient is off Ultiva drip.  Levophed and Nimbex drips being tapered off.  Had -1.5 L fluid balance yesterday.  Patient will go off CRR T and switch to intermittent hemodialysis.  Started on Nepro and is tolerating at 40 mL per hour.  Blood sugar 139-201.  Continue on insulin drip until Levophed drip is off and then switch to subcutaneous insulin.  Tolerating IV levothyroxine 25 µg per day.  Continue with same for now.    Objective   BP 92/49 (BP Location: Right arm, Patient Position: Lying)  Pulse 76  Temp 97.9 °F (36.6 °C) (Oral)   Resp 22  Ht 67.01\" (170.2 cm)  Wt 155 lb (70.3 kg)  SpO2 98%  BMI 24.27 kg/m2  Physical Exam    Sedated.  Not in distress.  No rales or wheezes.  Regular heart rate and rhythm.  Abdomen soft, nontender.  No cyanosis.  No clubbing.      Lab Results (last 24 hours)     Procedure Component Value Units Date/Time    Magnesium [[id number]]  (Normal) Collected:  07/02/17 1820    Specimen:  Blood Updated:  07/02/17 1913     Magnesium 2.0 mg/dL     Renal Function Panel [[id number]]  (Abnormal) Collected:  07/02/17 1820    Specimen:  Blood Updated:  07/02/17 1917     Glucose 184 (H) mg/dL      BUN 7 mg/dL      Creatinine 0.93 mg/dL      Sodium 133 (L) mmol/L      Potassium 3.9 mmol/L      Chloride 92 (L) mmol/L      CO2 22.5 mmol/L      Calcium 6.5 (L) mg/dL      Albumin 2.00 (L) g/dL      Phosphorus 1.8 (L) mg/dL      Anion Gap 18.5 mmol/L      BUN/Creatinine Ratio 7.5     eGFR Non African Amer 67 mL/min/1.73     Calcium, Ionized [[id number]]  (Abnormal) Collected:  07/02/17 1820    Specimen:  Blood Updated:  07/02/17 1931     Ionized Calcium 0.99 (L) mmol/L      Ionized Calcium 4.0 (L) mg/dL     POC Glucose Fingerstick [[id number]]  (Abnormal) Collected:  07/02/17 2106    Specimen:  Blood Updated:  07/02/17 2115     Glucose 198 (H) mg/dL     Narrative:       Meter: [id number] : [id number] Caro Miller    POC Glucose Fingerstick [[id number]]  (Abnormal) Collected:  07/02/17 2158    Specimen:  Blood Updated:  07/02/17 2207     Glucose 204 (H) mg/dL     Narrative:       Meter: [id number] : [id number] Caro Miller    POC Glucose Fingerstick [[id number]]  (Abnormal) Collected:  07/02/17 2250    Specimen:  Blood Updated:  07/02/17 2300     Glucose 208 (H) mg/dL     Narrative:       Meter: [id number] : [id number] Caro Miller    POC Glucose Fingerstick [[id number]]  (Abnormal) Collected:  07/02/17 2357    Specimen:  Blood Updated:  07/03/17 0008     Glucose 194 (H) mg/dL     Narrative:       Meter: [id number] : [id number] Caro Miller    Blood Gas, Arterial [[id number]]  (Abnormal) Collected:  07/03/17 0005    Specimen:  Arterial Blood Updated:  07/03/17 0008     Site Arterial Line     Cristian's Test N/A     pH, Arterial 7.334 (L) pH units      pCO2, Arterial 52.6 (H) mm Hg      pO2, Arterial 79.2 (L) mm Hg      HCO3, Arterial 28.0 mmol/L      Base Excess, Arterial 1.8 mmol/L      O2 Saturation Calculated 94.5 %      A-a Gradiant 0.3 mmHg      Barometric Pressure for Blood Gas 750.4 mmHg      Modality Adult Vent     FIO2 50 %      Ventilator Mode PC     Set Tidal Volume 322     Set Mech Resp Rate 24     Rate 30 Breaths/minute      PEEP 9    Narrative:       SPO2 96% IP 18 Meter: [id number] : [id number] Bertram Dickey    Renal Function Panel [[id number]]  (Abnormal) Collected:  07/03/17 0004    Specimen:  Blood Updated:  07/03/17 0047     Glucose 190 (H) mg/dL      BUN 7 mg/dL      Creatinine 0.85 mg/dL      Sodium 133 (L) mmol/L      Potassium 3.7 mmol/L      Chloride 91 (L) mmol/L      CO2 24.5 mmol/L      Calcium 6.9 (L) mg/dL      Albumin 2.50 (L) g/dL      Phosphorus 2.1 (L) mg/dL      Anion Gap 17.5 mmol/L      BUN/Creatinine Ratio 8.2     eGFR Non African Amer 74 mL/min/1.73     Magnesium [[id number]]  (Normal) Collected:  07/03/17 0004    Specimen:  Blood Updated:   07/03/17 0047     Magnesium 2.1 mg/dL     Calcium, Ionized [[id number]]  (Abnormal) Collected:  07/03/17 0004    Specimen:  Blood Updated:  07/03/17 0054     Ionized Calcium 0.99 (L) mmol/L      Ionized Calcium 4.0 (L) mg/dL     POC Glucose Fingerstick [[id number]]  (Abnormal) Collected:  07/03/17 0051    Specimen:  Blood Updated:  07/03/17 0101     Glucose 166 (H) mg/dL     Narrative:       Meter: [id number] : [id number] [place] Paul    POC Glucose Fingerstick [[id number]]  (Abnormal) Collected:  07/03/17 0157    Specimen:  Blood Updated:  07/03/17 0206     Glucose 166 (H) mg/dL     Narrative:       Meter: [id number] : [id number] [place] Paul    POC Glucose Fingerstick [[id number]]  (Abnormal) Collected:  07/03/17 0248    Specimen:  Blood Updated:  07/03/17 0257     Glucose 211 (H) mg/dL     Narrative:       Meter: [id number] : [id number] Caro Paul    POC Glucose Fingerstick [[id number]]  (Abnormal) Collected:  07/03/17 0355    Specimen:  Blood Updated:  07/03/17 0406     Glucose 207 (H) mg/dL     Narrative:       Meter: [id number] : [id number] Caro Paul    POC Glucose Fingerstick [[id number]]  (Abnormal) Collected:  07/03/17 0513    Specimen:  Blood Updated:  07/03/17 0522     Glucose 231 (H) mg/dL     Narrative:       Meter: [id number] : [id number] Caro Paul    Blood Gas, Arterial [[id number]]  (Abnormal) Collected:  07/03/17 0550    Specimen:  Arterial Blood Updated:  07/03/17 0552     Site Arterial Line     Cristian's Test N/A     pH, Arterial 7.433 pH units      pCO2, Arterial 40.1 mm Hg      pO2, Arterial 98.6 mm Hg      HCO3, Arterial 26.8 mmol/L      Base Excess, Arterial 2.3 (H) mmol/L      O2 Saturation Calculated 97.9 %      A-a Gradiant 0.4 mmHg      Barometric Pressure for Blood Gas 748.1 mmHg      Modality Adult Vent     FIO2 40 %      Ventilator Mode PC     Set Tidal Volume 393     Set Mech Resp Rate 24     Rate 25 Breaths/minute      PEEP 9    Narrative:       SPO2 100% IP 18  Meter: [id number] : [id number] Bertram Dickey    POC Glucose Fingerstick [[id number]]  (Abnormal) Collected:  07/03/17 0550    Specimen:  Blood Updated:  07/03/17 0600     Glucose 227 (H) mg/dL     Narrative:       Meter: [id number] : [id number] Caro Miller    aPTT [[id number]]  (Abnormal) Collected:  07/03/17 0517    Specimen:  Blood Updated:  07/03/17 0626     PTT 80.2 (H) seconds     Calcium, Ionized [[id number]]  (Abnormal) Collected:  07/03/17 0517    Specimen:  Blood Updated:  07/03/17 0630     Ionized Calcium 0.99 (L) mmol/L      Ionized Calcium 4.0 (L) mg/dL     Magnesium [[id number]]  (Normal) Collected:  07/03/17 0516    Specimen:  Blood Updated:  07/03/17 0631     Magnesium 2.1 mg/dL     Renal Function Panel [[id number]]  (Abnormal) Collected:  07/03/17 0516    Specimen:  Blood Updated:  07/03/17 0631     Glucose 204 (H) mg/dL      BUN 8 mg/dL      Creatinine 1.10 (H) mg/dL      Sodium 134 (L) mmol/L      Potassium 3.9 mmol/L      Chloride 92 (L) mmol/L      CO2 23.5 mmol/L      Calcium 6.9 (L) mg/dL      Albumin 2.70 (L) g/dL      Phosphorus 1.9 (L) mg/dL      Anion Gap 18.5 mmol/L      BUN/Creatinine Ratio 7.3     eGFR Non African Amer 55 (L) mL/min/1.73     POC Glucose Fingerstick [[id number]]  (Abnormal) Collected:  07/03/17 0648    Specimen:  Blood Updated:  07/03/17 0658     Glucose 189 (H) mg/dL     Narrative:       Meter: [id number] : [id number] [place] Paul    POC Glucose Fingerstick [[id number]]  (Abnormal) Collected:  07/03/17 0744    Specimen:  Blood Updated:  07/03/17 0746     Glucose 167 (H) mg/dL     Narrative:       Meter: [id number] : [id number] Charlie Akins    POC Glucose Fingerstick [[id number]]  (Abnormal) Collected:  07/03/17 0847    Specimen:  Blood Updated:  07/03/17 0858     Glucose 177 (H) mg/dL     Narrative:       Meter: [id number] : [id number] Charlie Akins    Blood Culture [[id number]]  (Normal) Collected:  06/30/17 0925    Specimen:  Blood from Hand,  Left Updated:  07/03/17 1001     Blood Culture No growth at 3 days    POC Glucose Fingerstick [[id number]]  (Abnormal) Collected:  07/03/17 1004    Specimen:  Blood Updated:  07/03/17 1025     Glucose 148 (H) mg/dL     Narrative:       Meter: [id number] : [id number] Storm Betancourt    Blood Culture [[id number]]  (Normal) Collected:  06/30/17 1050    Specimen:  Blood from Arm, Left Updated:  07/03/17 1101     Blood Culture No growth at 3 days    POC Glucose Fingerstick [[id number]]  (Abnormal) Collected:  07/03/17 1116    Specimen:  Blood Updated:  07/03/17 1121     Glucose 143 (H) mg/dL     Narrative:       Meter: [id number] : [id number] Storm Asia    POC Glucose Fingerstick [[id number]]  (Abnormal) Collected:  07/03/17 1213    Specimen:  Blood Updated:  07/03/17 1234     Glucose 139 (H) mg/dL     Narrative:       Meter: [id number] : [id number] Storm Willishanie    Magnesium [[id number]]  (Normal) Collected:  07/03/17 1213    Specimen:  Blood Updated:  07/03/17 1309     Magnesium 2.0 mg/dL     Renal Function Panel [[id number]]  (Abnormal) Collected:  07/03/17 1213    Specimen:  Blood Updated:  07/03/17 1320     Glucose 132 (H) mg/dL      BUN 6 mg/dL      Creatinine 0.62 mg/dL      Sodium 136 mmol/L      Potassium 3.6 mmol/L      Chloride 94 (L) mmol/L      CO2 25.7 mmol/L      Calcium 7.2 (L) mg/dL      Albumin 2.80 (L) g/dL      Phosphorus 0.7 (L) mg/dL      Anion Gap 16.3 mmol/L      BUN/Creatinine Ratio 9.7     eGFR Non African Amer 107 mL/min/1.73     Calcium, Ionized [[id number]]  (Abnormal) Collected:  07/03/17 1213    Specimen:  Blood Updated:  07/03/17 1333     Ionized Calcium 1.01 (L) mmol/L      Ionized Calcium 4.0 (L) mg/dL     POC Glucose Fingerstick [[id number]]  (Abnormal) Collected:  07/03/17 1341    Specimen:  Blood Updated:  07/03/17 1359     Glucose 197 (H) mg/dL     Narrative:       Meter: [id number] : [id number] Storm Betancourt    Volatile Compounds [[id number]] Collected:  06/30/17  1605    Specimen:  Blood Updated:  07/03/17 1510     Acetone Negative %                                       Detection Limit = 0.010        Ethanol % Negative %                                       Detection Limit = 0.010  This test was developed and its performance characteristics  determined by [facility]. It has not been cleared or approved  by the Food and Drug Administration.        Isopropanol % Negative %                                       Detection Limit = 0.010        Methanol % Negative %                                       Detection Limit = 0.010       Narrative:       Performed at:   - [address]  [id number]  : Jordy Doll MD, Phone:  [phone]    POC Glucose Fingerstick [[id number]]  (Abnormal) Collected:  07/03/17 1511    Specimen:  Blood Updated:  07/03/17 1531     Glucose 201 (H) mg/dL     Narrative:       Meter: [id number] : [id number] Storm Betancourt    POC Glucose Fingerstick [[id number]]  (Abnormal) Collected:  07/03/17 1644    Specimen:  Blood Updated:  07/03/17 1704     Glucose 178 (H) mg/dL     Narrative:       Meter: [id number] : [id number] Storm Betancourt    POC Glucose Fingerstick [[id number]]  (Abnormal) Collected:  07/03/17 1736    Specimen:  Blood Updated:  07/03/17 1757     Glucose 163 (H) mg/dL     Narrative:       Meter: [id number] : [id number] Storm Betancourt            Active Problems:    DKA (diabetic ketoacidoses)    Pneumonia    Sepsis    Acute respiratory failure    History of systemic lupus erythematosus (SLE)    History of splenectomy    History of ITP    Primary hypothyroidism    Insulin therapy as discussed above.  Thyroid hormone replacement as discussed above.  Will defer IV antibiotic therapy to ID.  Continue hemodialysis from her nephrologist.  Continue ventilatory support per intensivist.  Discussed with Dr. Remy and Dr. Bai

## 2017-07-03 NOTE — PLAN OF CARE
Problem: Patient Care Overview (Adult)  Goal: Plan of Care Review  Outcome: Ongoing (interventions implemented as appropriate)    07/02/17 2023   Coping/Psychosocial Response Interventions   Plan Of Care Reviewed With family   Patient Care Overview   Progress progress toward functional goals as expected   Outcome Evaluation   Outcome Summary/Follow up Plan Patient's respiratory status has made improvements with rotoprone bed. Patient tolerating well. Patient tolerating CRRT. Remains anuric. Blood sugars stable this shift. Coretrack placed and tube feedings will begin during night shift. MDs plan to keep patient prone for at least another day. Vent weaning began this shift. O2 demand and peep both decreased.          Problem: Diabetes, Type 1 (Adult)  Goal: Signs and Symptoms of Listed Potential Problems Will be Absent or Manageable (Diabetes, Type 1)  Outcome: Ongoing (interventions implemented as appropriate)    Problem: Hemodialysis (Adult)  Goal: Signs and Symptoms of Listed Potential Problems Will be Absent or Manageable (Hemodialysis)  Outcome: Ongoing (interventions implemented as appropriate)

## 2017-07-03 NOTE — PROGRESS NOTES
LOS: 4 days   Patient Care Team:  Gregorio Bai MD as PCP - General (Family Medicine)    Chief Complaint:  Pneumonia and respiratory failure    Subjective     Interval History:     Patient remains intubated and sedated on the ventilator    Review of Systems:   Unable to obtain       Objective     Vital Signs  Temp:  [99.6 °F (37.6 °C)-99.9 °F (37.7 °C)] 99.9 °F (37.7 °C)  Heart Rate:  [] 79  Resp:  [24-29] 24  BP: ()/() 94/55  Arterial Line BP: ()/() 124/83  FiO2 (%):  [40 %-60 %] 40 %  Vent Mode: PC/AC  Ventilator/Non-Invasive Ventilation Settings     Start     Ordered    07/01/17 0804  Ventilator - AC/PC; (24); 100; 88%; Other (see comments); 18; 22; (I time 1 sec)  Continuous     Question Answer Comment   Vent Mode AC/PC    Breath rate  24   FiO2 100    FiO2 titrate for Sp02% =/> 88%    PEEP Other (see comments)    PEEP: 18    Inspiratory Pressure 22    I:E Ratio  I time 1 sec       07/01/17 0805    07/01/17 0107  Ventilator - AC/PC; (24); 100; 12; 24  Continuous,   Status:  Canceled     Question Answer Comment   Vent Mode AC/PC    Breath rate  24   FiO2 100    PEEP 12    Inspiratory Pressure 24        07/01/17 0107    06/30/17 2121  Ventilator - AC/VC; 8  Continuous,   Status:  Canceled     Question Answer Comment   Vent Mode AC/VC    PEEP 8        06/30/17 2120              Arterial Line BP: 124/83  Arterial Line MAP (mmHg): 102 mmHg     Body mass index is 24.27 kg/(m^2).  I/O last 3 completed shifts:  In: 6064.4 [I.V.:4124.8; Other:50; NG/GT:220; IV Piggyback:1669.6]  Out: 9284 [Other:9284]           Physical Exam:  General Appearance: Well-developed white female she is orally intubated and a tracheal tube looks like it's about 23 cm at the lips.  When she is sedated with propofol at 25 mics and remifentanil at 1.3 she has 0 twitches on a train of 4 on Nimbex drip.  Her BIS is 41  Eyes: She is currently on the road a prolonged her eyes are taped shut she does have a  little bit of scleredema evident.  ENT: Oral mucous membranes are dry  Neck: Trachea midline I don't appreciate jugular venous distention  Lungs: Coarse breath sounds bilaterally they are symmetric and chest expansion appears symmetric she is on a ventilator she is on a pressure control of 24 with a nighttime of 1 second her positive end expiratory pressure is down to 8 cm and her FiO2 is down to 40% with an inspiratory pressure of 18 her tidal volumes  are now 370 cc  Cardiac: Regular rate and rhythm no murmur.  She has been on and off of Levophed currently she is on 0.12 mics per kilogram per hour but they are titrating that down her mean arterial pressures up in the 80s currently  Abdomen: Soft nontender I do not palpate organomegaly or masses I don't hear much in the way of bowel sounds either or she is tolerating tube feeds well  : Lockwood catheter dependent drainage very minimal urine output.  She does remain on CRRT  Musc/Skel: She is paralyzed chemically   Skin: No mottling today  Neuro: Paralyzed and sedated and unresponsive  Extremities/P Vascular: She is developing a little bit of edema.  I really can't get to her feet currently that checked pulses although nurses assure me she has good pulses her a line has a good waveform  MSE: Unable to assess       Labs:  WBC No results found for: WBCS   HGB Hemoglobin   Date Value Ref Range Status   07/02/2017 8.3 (L) 11.9 - 15.5 g/dL Final   07/01/2017 11.1 (L) 11.9 - 15.5 g/dL Final   07/01/2017 9.7 (L) 11.9 - 15.5 g/dL Final      HCT Hematocrit   Date Value Ref Range Status   07/02/2017 25.0 (L) 35.6 - 45.5 % Final   07/01/2017 29.2 (L) 35.6 - 45.5 % Final   07/01/2017 29.0 (L) 35.6 - 45.5 % Final      Platlets No results found for: LABPLAT     PT/INR:    Protime   Date Value Ref Range Status   07/01/2017 18.8 (H) 11.7 - 14.2 Seconds Final   /  INR   Date Value Ref Range Status   07/01/2017 1.63 (H) 0.90 - 1.10 Final       Sodium Sodium   Date Value Ref Range  Status   07/03/2017 134 (L) 136 - 145 mmol/L Final   07/03/2017 133 (L) 136 - 145 mmol/L Final   07/02/2017 133 (L) 136 - 145 mmol/L Final   07/02/2017 134 (L) 136 - 145 mmol/L Final   07/02/2017 134 (L) 136 - 145 mmol/L Final   07/01/2017 134 (L) 136 - 145 mmol/L Final   07/01/2017 134 (L) 136 - 145 mmol/L Final   07/01/2017 135 (L) 136 - 145 mmol/L Final   07/01/2017 135 (L) 136 - 145 mmol/L Final   07/01/2017 133 (L) 136 - 145 mmol/L Final   06/30/2017 131 (L) 136 - 145 mmol/L Final   06/30/2017 132 (L) 136 - 145 mmol/L Final   06/30/2017 135 (L) 136 - 145 mmol/L Final   06/30/2017 129 (L) 136 - 145 mmol/L Final      Potassium Potassium   Date Value Ref Range Status   07/03/2017 3.9 3.5 - 5.2 mmol/L Final   07/03/2017 3.7 3.5 - 5.2 mmol/L Final   07/02/2017 3.9 3.5 - 5.2 mmol/L Final   07/02/2017 3.9 3.5 - 5.2 mmol/L Final   07/02/2017 3.8 3.5 - 5.2 mmol/L Final   07/01/2017 3.6 3.5 - 5.2 mmol/L Final   07/01/2017 3.8 3.5 - 5.2 mmol/L Final   07/01/2017 4.0 3.5 - 5.2 mmol/L Final   07/01/2017 3.3 (L) 3.5 - 5.2 mmol/L Final   07/01/2017 3.4 (L) 3.5 - 5.2 mmol/L Final   06/30/2017 3.4 (L) 3.5 - 5.2 mmol/L Final   06/30/2017 3.7 3.5 - 5.2 mmol/L Final   06/30/2017 3.9 3.5 - 5.2 mmol/L Final   06/30/2017 3.3 (L) 3.5 - 5.2 mmol/L Final      Chloride Chloride   Date Value Ref Range Status   07/03/2017 92 (L) 98 - 107 mmol/L Final   07/03/2017 91 (L) 98 - 107 mmol/L Final   07/02/2017 92 (L) 98 - 107 mmol/L Final   07/02/2017 92 (L) 98 - 107 mmol/L Final   07/02/2017 92 (L) 98 - 107 mmol/L Final   07/01/2017 93 (L) 98 - 107 mmol/L Final   07/01/2017 96 (L) 98 - 107 mmol/L Final   07/01/2017 101 98 - 107 mmol/L Final   07/01/2017 101 98 - 107 mmol/L Final   07/01/2017 101 98 - 107 mmol/L Final   06/30/2017 97 (L) 98 - 107 mmol/L Final   06/30/2017 112 (H) 98 - 107 mmol/L Final   06/30/2017 113 (H) 98 - 107 mmol/L Final   06/30/2017 107 98 - 107 mmol/L Final      Bicarbonate No results found for: PLASMABICARB   BUN BUN    Date Value Ref Range Status   07/03/2017 8 6 - 20 mg/dL Final   07/03/2017 7 6 - 20 mg/dL Final   07/02/2017 7 6 - 20 mg/dL Final   07/02/2017 3 (L) 6 - 20 mg/dL Final   07/02/2017 5 (L) 6 - 20 mg/dL Final   07/01/2017 6 6 - 20 mg/dL Final   07/01/2017 6 6 - 20 mg/dL Final   07/01/2017 12 6 - 20 mg/dL Final   07/01/2017 17 6 - 20 mg/dL Final   07/01/2017 16 6 - 20 mg/dL Final   06/30/2017 13 6 - 20 mg/dL Final   06/30/2017 27 (H) 6 - 20 mg/dL Final   06/30/2017 28 (H) 6 - 20 mg/dL Final   06/30/2017 26 (H) 6 - 20 mg/dL Final      Creatinine Creatinine   Date Value Ref Range Status   07/03/2017 1.10 (H) 0.57 - 1.00 mg/dL Final   07/03/2017 0.85 0.57 - 1.00 mg/dL Final   07/02/2017 0.93 0.57 - 1.00 mg/dL Final   07/02/2017 0.47 (L) 0.57 - 1.00 mg/dL Final   07/02/2017 1.00 0.57 - 1.00 mg/dL Final   07/01/2017 1.08 (H) 0.57 - 1.00 mg/dL Final   07/01/2017 0.85 0.57 - 1.00 mg/dL Final   07/01/2017 1.79 (H) 0.57 - 1.00 mg/dL Final   07/01/2017 2.15 (H) 0.57 - 1.00 mg/dL Final   07/01/2017 1.90 (H) 0.57 - 1.00 mg/dL Final   06/30/2017 1.42 (H) 0.57 - 1.00 mg/dL Final   06/30/2017 2.56 (H) 0.57 - 1.00 mg/dL Final   06/30/2017 2.73 (H) 0.57 - 1.00 mg/dL Final   06/30/2017 2.34 (H) 0.57 - 1.00 mg/dL Final      Calcium Calcium   Date Value Ref Range Status   07/03/2017 6.9 (L) 8.6 - 10.5 mg/dL Final   07/03/2017 6.9 (L) 8.6 - 10.5 mg/dL Final   07/02/2017 6.5 (L) 8.6 - 10.5 mg/dL Final   07/02/2017 6.9 (L) 8.6 - 10.5 mg/dL Final   07/02/2017 7.0 (L) 8.6 - 10.5 mg/dL Final   07/01/2017 6.9 (L) 8.6 - 10.5 mg/dL Final   07/01/2017 6.8 (L) 8.6 - 10.5 mg/dL Final   07/01/2017 6.6 (L) 8.6 - 10.5 mg/dL Final   07/01/2017 7.0 (L) 8.6 - 10.5 mg/dL Final   07/01/2017 7.4 (L) 8.6 - 10.5 mg/dL Final   06/30/2017 7.1 (L) 8.6 - 10.5 mg/dL Final   06/30/2017 7.8 (L) 8.6 - 10.5 mg/dL Final   06/30/2017 9.3 8.6 - 10.5 mg/dL Final   06/30/2017 9.2 8.6 - 10.5 mg/dL Final      Magnesium Magnesium   Date Value Ref Range Status   07/03/2017 2.1  1.6 - 2.6 mg/dL Final   07/03/2017 2.1 1.6 - 2.6 mg/dL Final   07/02/2017 2.0 1.6 - 2.6 mg/dL Final   07/02/2017 2.0 1.6 - 2.6 mg/dL Final   07/02/2017 2.8 (H) 1.6 - 2.6 mg/dL Final   07/01/2017 1.8 1.6 - 2.6 mg/dL Final   07/01/2017 1.8 1.6 - 2.6 mg/dL Final   07/01/2017 1.8 1.6 - 2.6 mg/dL Final   07/01/2017 1.9 1.6 - 2.6 mg/dL Final   06/30/2017 1.8 1.6 - 2.6 mg/dL Final   06/30/2017 2.0 1.6 - 2.6 mg/dL Final   06/30/2017 2.3 1.6 - 2.6 mg/dL Final            pH pH, Arterial   Date Value Ref Range Status   07/03/2017 7.433 7.350 - 7.450 pH units Final   07/03/2017 7.334 (L) 7.350 - 7.450 pH units Final   07/02/2017 7.298 (C) 7.350 - 7.450 pH units Final     Comment:     Critical:Notify Dr KINGSTON (02-Jul-17 17:35:40)Read back ok   07/02/2017 7.286 (C) 7.350 - 7.450 pH units Final     Comment:     Critical:Notify Dr CARVER (02-Jul-17 12:28:31)Read back ok   07/02/2017 7.427 7.350 - 7.450 pH units Final   07/02/2017 7.425 7.350 - 7.450 pH units Final   07/01/2017 7.335 (L) 7.350 - 7.450 pH units Final   07/01/2017 7.207 (C) 7.350 - 7.450 pH units Final     Comment:     Critical:Notify Dr CARVER (01-Jul-17 11:50:51)Read back ok   07/01/2017 7.225 (C) 7.350 - 7.450 pH units Final     Comment:     Critical:Notify THIERNO PARK RN (01-Jul-17 02:05:47)Read back ok   06/30/2017 7.348 (L) 7.350 - 7.450 pH units Final   06/30/2017 7.339 (L) 7.350 - 7.450 pH units Final      pO2 pO2, Arterial   Date Value Ref Range Status   07/03/2017 98.6 80.0 - 100.0 mm Hg Final   07/03/2017 79.2 (L) 80.0 - 100.0 mm Hg Final   07/02/2017 67.7 (L) 80.0 - 100.0 mm Hg Final   07/02/2017 72.0 (L) 80.0 - 100.0 mm Hg Final   07/02/2017 112.5 (H) 80.0 - 100.0 mm Hg Final   07/02/2017 58.6 (L) 80.0 - 100.0 mm Hg Final   07/01/2017 229.2 (H) 80.0 - 100.0 mm Hg Final   07/01/2017 58.7 (L) 80.0 - 100.0 mm Hg Final   07/01/2017 62.6 (L) 80.0 - 100.0 mm Hg Final   06/30/2017 196.6 (H) 80.0 - 100.0 mm Hg Final   06/30/2017 191.5 (H) 80.0 - 100.0 mm Hg  Final      pCO2 pCO2, Arterial   Date Value Ref Range Status   07/03/2017 40.1 35.0 - 45.0 mm Hg Final   07/03/2017 52.6 (H) 35.0 - 45.0 mm Hg Final   07/02/2017 50.9 (H) 35.0 - 45.0 mm Hg Final   07/02/2017 57.2 (C) 35.0 - 45.0 mm Hg Final     Comment:     Critical:Notify Dr CARVER (02-Jul-17 12:28:31)Read back ok   07/02/2017 37.6 35.0 - 45.0 mm Hg Final   07/02/2017 41.4 35.0 - 45.0 mm Hg Final   07/01/2017 48.9 (H) 35.0 - 45.0 mm Hg Final   07/01/2017 57.7 (C) 35.0 - 45.0 mm Hg Final     Comment:     Critical:Notify Dr CARVER (01-Jul-17 11:50:51)Read back ok   07/01/2017 31.5 (L) 35.0 - 45.0 mm Hg Final   06/30/2017 27.3 (L) 35.0 - 45.0 mm Hg Final   06/30/2017 26.4 (L) 35.0 - 45.0 mm Hg Final      HCO3 HCO3, Arterial   Date Value Ref Range Status   07/03/2017 26.8 22.0 - 28.0 mmol/L Final   07/03/2017 28.0 22.0 - 28.0 mmol/L Final   07/02/2017 24.9 22.0 - 28.0 mmol/L Final   07/02/2017 27.2 22.0 - 28.0 mmol/L Final   07/02/2017 24.8 22.0 - 28.0 mmol/L Final   07/02/2017 27.1 22.0 - 28.0 mmol/L Final   07/01/2017 26.1 22.0 - 28.0 mmol/L Final   07/01/2017 22.9 22.0 - 28.0 mmol/L Final   07/01/2017 13.1 (L) 22.0 - 28.0 mmol/L Final   06/30/2017 15.0 (L) 22.0 - 28.0 mmol/L Final   06/30/2017 14.2 (L) 22.0 - 28.0 mmol/L Final          albumin human 12.5 g Intravenous Q6H   artificial tears  Both Eyes Q4H   IVPB builder  Intravenous Q6H   azithromycin 500 mg Intravenous Q24H   famotidine 20 mg Intravenous Daily   heparin (porcine) 5,000 Units Subcutaneous Q8H   hydrocortisone sodium succinate 50 mg Intravenous Q8H   levothyroxine sodium 25 mcg Intravenous Daily   piperacillin-tazobactam 3.375 g Intravenous Q8H   thiamine (VITAMIN B1) IVPB 200 mg Intravenous Q12H   vancomycin 750 mg Intravenous Q12H       cisatracurium (NIMBEX) infusion 3 mcg/kg/min Last Rate: 1 mcg/kg/min (07/03/17 0741)   dextrose 20 mL/hr Last Rate: Stopped (07/03/17 0250)   heparin (porcine) 1,000 Units/hr Last Rate: 1,000 Units/hr (07/03/17  0422)   insulin regular infusion 1 unit/mL 0.1 Units/kg/hr Last Rate: 3 Units/hr (07/03/17 0745)   norepinephrine 0.02-0.3 mcg/kg/min Last Rate: 0.12 mcg/kg/min (07/03/17 0614)   Pharmacy to dose vancomycin     propofol 5-50 mcg/kg/min Last Rate: 20 mcg/kg/min (07/03/17 0741)   remifentanil (ULTIVA) infusion 0.5-15 mcg/kg/hr (Ideal) Last Rate: 5 mcg/kg/hr (07/03/17 0410)       Diagnostics:  Imaging Results (last 24 hours)     Procedure Component Value Units Date/Time    XR Chest 1 View [[id number]] Collected:  07/03/17 0615     Updated:  07/03/17 0615    Narrative:       PORTABLE CHEST X-RAY     CLINICAL HISTORY: Respiratory failure - Rotoprone protocol.     COMPARISON: 07/02/2017.     FINDINGS: Portable AP view of the chest was obtained with overlying  monitor leads in place. The lung apices were unable to be included.  Diffuse opacities are again noted bilaterally, left greater than right.  Findings may be related to multifocal pneumonia or asymmetric pulmonary  edema. They have worsened slightly in the interim. Right greater than  left effusions are stable. Normal heart size. Feeding tube has been  placed and is coiled well beneath the left hemidiaphragm likely in the  mid stomach region. ET tube and central line remain in place.       Impression:       Slight worsening in multifocal, left greater than right opacities which  may be related to diffuse pneumonia or asymmetric edema.                      Endotracheal tube and central venous catheter.  Radiology reports slight worsening on the x-ray but I think these changes just reflect the difference and PEEP I don't think there's been a huge change from yesterday      Assessment/Plan     Patient Active Problem List   Diagnosis Code   • DKA (diabetic ketoacidoses) E13.10   • Pneumonia J18.9   • Sepsis A41.9   • Acute respiratory failure J96.00   • History of systemic lupus erythematosus (SLE) Z87.39   • History of splenectomy Z90.81   • History of ITP Z86.2   •  Primary hypothyroidism E03.9       Impression #1 pneumonia community-acquired severe she is on broad-spectrum antibiotics including azithromycin and vancomycin and Zosyn.  Cultures thus far negative and infectious disease is following  #2 acute hypoxic respiratory failure this meets the definition for ARDS. She has responded very well I'm going to try her off the rota prone bed and off paralytics allow her tidal volumes to drift a little higher and see how she does  #3 severe sepsis with septic shock her blood pressure has improved.  Her  blood pressure remains very labile but I'm hopeful we get her off paralytics and high-dose sedatives that it will be better  #4 new onset diabetes with diabetic ketoacidosis and dialysis is helping clear the excess ketones her acidosis has improved she is on an insulin drip and glucose drip.  Once we get some tube feeds going at suspect we can get the glucose drip off.  Endocrinology is following and helping workup the etiology their help is much appreciated  #5 acute renal failure probably ATN she is on SLED per nephrology.  She has been tolerating dialysis very well and I certainly think it is helped greatly with fluid management along with the clearing her acidosis.  Unfortunately she looks like she is anuric hopefully she will continue to tolerate the dialysis.  I think if we can keep her fluid status under control at this point it looks like her respiratory status may be starting to turn the corner  #6 SLE apparently this is been fairly inactive recently she is getting some steroids stress dose that should help should she have some active disease.  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  #11 anemia looks like this is primarily acute blood loss along with this is probably phlebotomy  and ICU type anemia we don't see any evidence of active bleeding we will continue to follow H&H she is on gut prophylaxis  #12 hypokalemia corrected  #13 fluids/electrolytes/nutrition we will need to start some tube feeds in the near future for gut protection I think the next time she is supine with can probably try and get a feeding tube in and start feeds now initially for gut protection but also help us in managing her diabetes some    Plan for disposition:    Van Remy MD  07/03/17  8:35 AM    Time: I have spent 50 minutes thus far today in care of the patient excluding any planned procedures

## 2017-07-03 NOTE — PROGRESS NOTES
"   LOS: 4 days    Patient Care Team:  Gregorio Bai MD as PCP - General (Family Medicine)    Chief Complaint:  No chief complaint on file.      Subjective     Interval History: off rotating bed    Patient Complaints: unobtainable  Patient Denies:  unobtainable  History taken from: patient    Review of Systems:    Review of systems could not be obtained due to  patient unresponsive.    Objective     Vital Signs  Temp:  [97.8 °F (36.6 °C)-99.9 °F (37.7 °C)] 97.8 °F (36.6 °C)  Heart Rate:  [] 73  Resp:  [24-29] 24  BP: ()/() 94/55  Arterial Line BP: ()/() 105/63  FiO2 (%):  [40 %-50 %] 40 %    Flowsheet Rows         First Filed Value    Admission Height  67\" (170.2 cm) Documented at 06/29/2017 2028    Admission Weight  155 lb 10.3 oz (70.6 kg) Documented at 06/29/2017 2028          I/O this shift:  In: 30 [Other:30]  Out: 15 [Urine:15]  I/O last 3 completed shifts:  In: 6064.4 [I.V.:4124.8; Other:50; NG/GT:220; IV Piggyback:1669.6]  Out: 9284 [Other:9284]    Intake/Output Summary (Last 24 hours) at 07/03/17 1413  Last data filed at 07/03/17 0800   Gross per 24 hour   Intake          3740.01 ml   Output             4683 ml   Net          -942.99 ml       Physical Exam:sedated on the vent   No jvd seen   Coarse breath sounds  s1s2 nsr  abd soft nontender bs+  Ext no c/c/e  Results Review:      Results from last 7 days  Lab Units 07/03/17  1213 07/03/17  0516 07/03/17  0004  06/29/17 2018   SODIUM mmol/L 136 134* 133*  < > 132*   POTASSIUM mmol/L 3.6 3.9 3.7  < > <1.5*   CHLORIDE mmol/L 94* 92* 91*  < > 102   CO2 mmol/L 25.7 23.5 24.5  < > 5.0*   BUN mg/dL 6 8 7  < > 21*   CREATININE mg/dL 0.62 1.10* 0.85  < > 1.94*   CALCIUM mg/dL 7.2* 6.9* 6.9*  < > 9.4   BILIRUBIN mg/dL  --   --   --   --  0.2   ALK PHOS U/L  --   --   --   --  77   ALT (SGPT) U/L  --   --   --   --  <5   AST (SGOT) U/L  --   --   --   --  7   GLUCOSE mg/dL 132* 204* 190*  < > 300*   < > = values in this interval not " displayed.    Estimated Creatinine Clearance: 118.5 mL/min (by C-G formula based on Cr of 0.62).      Results from last 7 days  Lab Units 07/03/17  1213 07/03/17  0516 07/03/17  0004   MAGNESIUM mg/dL 2.0 2.1 2.1   PHOSPHORUS mg/dL 0.7* 1.9* 2.1*               Results from last 7 days  Lab Units 07/02/17  0513 07/01/17  1540 07/01/17  0615 06/29/17 2018   WBC 10*3/mm3 17.67*  --  10.90* 16.50*   HEMOGLOBIN g/dL 8.3* 11.1* 9.7* 12.2   PLATELETS 10*3/mm3 197  --  210 383         Results from last 7 days  Lab Units 07/01/17  0607   INR  1.63*         Imaging Results (last 24 hours)     Procedure Component Value Units Date/Time    XR Chest 1 View [[id number]] Collected:  07/03/17 0615     Updated:  07/03/17 0615    Narrative:       PORTABLE CHEST X-RAY     CLINICAL HISTORY: Respiratory failure - Rotoprone protocol.     COMPARISON: 07/02/2017.     FINDINGS: Portable AP view of the chest was obtained with overlying  monitor leads in place. The lung apices were unable to be included.  Diffuse opacities are again noted bilaterally, left greater than right.  Findings may be related to multifocal pneumonia or asymmetric pulmonary  edema. They have worsened slightly in the interim. Right greater than  left effusions are stable. Normal heart size. Feeding tube has been  placed and is coiled well beneath the left hemidiaphragm likely in the  mid stomach region. ET tube and central line remain in place.       Impression:       Slight worsening in multifocal, left greater than right opacities which  may be related to diffuse pneumonia or asymmetric edema.                      albumin human 12.5 g Intravenous Q6H   artificial tears  Both Eyes Q4H   IVPB builder  Intravenous Q6H   azithromycin 500 mg Intravenous Q24H   castor oil-balsam peru  Topical Q12H   heparin (porcine) 5,000 Units Subcutaneous Q8H   HYDROcodone-acetaminophen 1 tablet Nasogastric Q6H   hydrocortisone sodium succinate 25 mg Intravenous Q8H   [START ON 7/4/2017]  lansoprazole 30 mg Per G Tube QAM   levothyroxine sodium 25 mcg Intravenous Daily   piperacillin-tazobactam 3.375 g Intravenous Q8H   thiamine (VITAMIN B1) IVPB 200 mg Intravenous Q12H   vancomycin 750 mg Intravenous Q12H       heparin (porcine) 1,000 Units/hr Last Rate: 1,000 Units/hr (07/03/17 0422)   insulin regular infusion 1 unit/mL 0.1 Units/kg/hr Last Rate: 3 Units/hr (07/03/17 0745)   norepinephrine 0.02-0.3 mcg/kg/min Last Rate: 0.02 mcg/kg/min (07/03/17 1232)   Pharmacy to dose vancomycin     propofol 5-50 mcg/kg/min Last Rate: 20 mcg/kg/min (07/03/17 1022)   remifentanil (ULTIVA) infusion 0.5-15 mcg/kg/hr (Ideal) Last Rate: 3.5 mcg/kg/hr (07/03/17 1231)       Medication Review:   Current Facility-Administered Medications   Medication Dose Route Frequency Provider Last Rate Last Dose   • albumin human 25 % IV SOLN 12.5 g  12.5 g Intravenous Q6H Susan Urena MD   12.5 g at 07/03/17 1308   • artificial tears (LUBRIFRESH P.M.) ophthalmic ointment   Both Eyes Q4H Madison Harris MD       • artificial tears (LUBRIFRESH P.M.) ophthalmic ointment   Both Eyes PRN Madison Harris MD       • ascorbic acid 1,500 mg in dextrose (D5W) 5 % 100 mL IVPB   Intravenous Q6H Madison Harris  mL/hr at 07/03/17 1314     • azithromycin (ZITHROMAX) 500 mg 0.9% NaCl (Add-vantage) 250 mL  500 mg Intravenous Q24H Steve Emery MD   500 mg at 07/03/17 0850   • calcium gluconate 1 g in sodium chloride 0.9 % 50 mL IVPB  1 g Intravenous PRN Susan Urena MD        Or   • calcium gluconate 2 g in sodium chloride 0.9 % 50 mL IVPB  2 g Intravenous PRN Susan Urena MD       • castor oil-balsam peru (VENELEX) ointment   Topical Q12H Madison Harris MD       • dextrose (D50W) solution 12.5 g  12.5 g Intravenous Q15 Min PRN Alexis Poe MD   12.5 g at 07/01/17 1526   • fentaNYL citrate (PF) (SUBLIMAZE) injection 50 mcg  50 mcg Intravenous Q1H PRN Madison Harris MD   50 mcg at 07/03/17 1240   •  heparin (porcine) 5000 UNIT/ML injection 5,000 Units  5,000 Units Subcutaneous Q8H Van Remy MD   5,000 Units at 07/03/17 1316   • heparin (porcine) injection 1,000-2,000 Units  1,000-2,000 Units Intravenous PRN Susan Urena MD   2,000 Units at 07/03/17 1307   • heparin 69081 units/250 mL (100 units/mL) in 0.45 % NaCl infusion  1,000 Units/hr Intravenous Continuous Susan Urena MD 10 mL/hr at 07/03/17 0422 1,000 Units/hr at 07/03/17 0422   • HYDROcodone-acetaminophen (NORCO)  MG per tablet 1 tablet  1 tablet Nasogastric Q6H Van Remy MD   1 tablet at 07/03/17 0929   • HYDROcodone-acetaminophen (NORCO) 5-325 MG per tablet 1 tablet  1 tablet Oral Q4H PRN Alexis Poe MD       • hydrocortisone sodium succinate (Solu-CORTEF) injection 25 mg  25 mg Intravenous Q8H Van Remy MD   25 mg at 07/03/17 1315   • insulin regular (humuLIN R,novoLIN R) 100 Units in sodium chloride 0.9 % 100 mL (1 Units/mL) infusion  0.1 Units/kg/hr Intravenous Titrated Alexis Poe MD 3 mL/hr at 07/03/17 0745 3 Units/hr at 07/03/17 0745   • [START ON 7/4/2017] lansoprazole (FIRST) oral suspension 30 mg  30 mg Per G Tube LIAT Harris MD       • levothyroxine sodium injection 25 mcg  25 mcg Intravenous Daily Thomas Campbell MD   25 mcg at 07/03/17 1025   • Magnesium Sulfate 2 gram Bolus, followed by 8 gram infusion (total Mg dose 10 grams)- Mg less than or equal to 1mg/dL  2 g Intravenous PRN Alexis Poe MD        Or   • Magnesium Sulfate 6 gram Infusion (2 gm x 3) -Mg 1.1 -1.5 mg/dL  2 g Intravenous PRN Alexis Poe MD        Or   • magnesium sulfate 4 gram infusion- Mg 1.6-1.9 mg/dL  4 g Intravenous PRN Alexis Poe MD 25 mL/hr at 07/02/17 0100 4 g at 07/02/17 0100   • norepinephrine (LEVOPHED) 8 mg/250 mL (32 mcg/mL) in sodium chloride 0.9% infusion (premix)  0.02-0.3 mcg/kg/min Intravenous Titrated Van Remy MD 2.64 mL/hr at 07/03/17 1232 0.02 mcg/kg/min at 07/03/17 1232   •  Pharmacy to dose vancomycin   Does not apply Continuous PRN Alexis Poe MD       • piperacillin-tazobactam (ZOSYN) 3.375 g in iso-osmotic dextrose 50 ml (premix)  3.375 g Intravenous Q8H Alexis Poe MD   3.375 g at 07/03/17 1316   • propofol (DIPRIVAN) infusion 10 mg/mL 100 mL  5-50 mcg/kg/min Intravenous Titrated Madison Harris MD 8.44 mL/hr at 07/03/17 1022 20 mcg/kg/min at 07/03/17 1022   • remifentanil (ULTIVA) 50 mcg/mL in sodium chloride 0.9 % 100 mL  0.5-15 mcg/kg/hr (Ideal) Intravenous Titrated Madison Harris MD 4.31 mL/hr at 07/03/17 1231 3.5 mcg/kg/hr at 07/03/17 1231   • sodium phosphates 10 mmol in sodium chloride 0.9 % 100 mL IVPB  10 mmol Intravenous PRN Susan Urena MD 25 mL/hr at 07/03/17 0204 10 mmol at 07/03/17 0204   • thiamine (B-1) 200 mg in sodium chloride 0.9 % 100 mL IVPB  200 mg Intravenous Q12H Madison Harris  mL/hr at 07/03/17 0602 200 mg at 07/03/17 0602   • vancomycin 750 mg/250 mL 0.9% NS add-vantage  750 mg Intravenous Q12H Alexis Poe MD   750 mg at 07/03/17 1123       Assessment/Plan       Active Problems:    DKA (diabetic ketoacidoses)    Pneumonia    Sepsis    Acute respiratory failure    History of systemic lupus erythematosus (SLE)    History of splenectomy    History of ITP    Primary hypothyroidism    1. HODAN, oligoanuric, prerenal +/- evolution to ATN from vol depletion/DKA and sepsis syndrome.    CRRT overnight and roughly 4 liters have been removed  2. Severe right-sided pna/ARDS  3. Obtundation  4. Hypoxia- oxygenation improving with fluid removal  5. Newly dx'd DM1 presenting as DKA, presently on insulin drip with controlled BS. A1c 12.  6. H/o TTP with prior splenectomy: plt count fine. Vaccine status not known  6. Hypothyroidism, untreated  7. Anemia: monitor for need for transfusion    Plan:   Continue SLED with the goal of trying to remove volume excess until 7:00pm.  Then will take patient off and try regular hemodialysis tomorrow. Discussed with RN  and Dr. Remy and updated patient's mother.  Prognosis guarded          Susan Urena MD  07/03/17  2:13 PM

## 2017-07-04 ENCOUNTER — APPOINTMENT (OUTPATIENT)
Dept: GENERAL RADIOLOGY | Facility: HOSPITAL | Age: 37
End: 2017-07-04

## 2017-07-04 LAB
ABO GROUP BLD: NORMAL
ACETONE SERPL-MCNC: NEGATIVE % (ref 0–0.01)
ALBUMIN SERPL-MCNC: 2.9 G/DL (ref 3.5–5.2)
ANION GAP SERPL CALCULATED.3IONS-SCNC: 15.8 MMOL/L
APTT PPP: 36.2 SECONDS (ref 22.7–35.4)
ARTERIAL PATENCY WRIST A: POSITIVE
ATMOSPHERIC PRESS: 753 MMHG
BASE EXCESS BLDA CALC-SCNC: 5.5 MMOL/L (ref 0–2)
BDY SITE: ABNORMAL
BLD GP AB SCN SERPL QL: NEGATIVE
BUN BLD-MCNC: 19 MG/DL (ref 6–20)
BUN/CREAT SERPL: 11.2 (ref 7–25)
BUTALBITAL SERPL-MCNC: NORMAL UG/ML (ref 1–10)
CALCIUM SPEC-SCNC: 7.5 MG/DL (ref 8.6–10.5)
CHLORDIAZEP SERPL-MCNC: NORMAL UG/ML (ref 0.1–0.9)
CHLORIDE SERPL-SCNC: 96 MMOL/L (ref 98–107)
CO2 SERPL-SCNC: 25.2 MMOL/L (ref 22–29)
CREAT BLD-MCNC: 1.69 MG/DL (ref 0.57–1)
DEPRECATED RDW RBC AUTO: 48.1 FL (ref 37–54)
DIAZEPAM SERPL-MCNC: NORMAL UG/ML (ref 0.1–0.9)
ERYTHROCYTE [DISTWIDTH] IN BLOOD BY AUTOMATED COUNT: 14.9 % (ref 11.7–13)
ETHANOL SPEC-SCNC: NEGATIVE % (ref 0–0.01)
GFR SERPL CREATININE-BSD FRML MDRD: 34 ML/MIN/1.73
GLUCOSE BLD-MCNC: 186 MG/DL (ref 65–99)
GLUCOSE BLDC GLUCOMTR-MCNC: 119 MG/DL (ref 70–130)
GLUCOSE BLDC GLUCOMTR-MCNC: 122 MG/DL (ref 70–130)
GLUCOSE BLDC GLUCOMTR-MCNC: 125 MG/DL (ref 70–130)
GLUCOSE BLDC GLUCOMTR-MCNC: 158 MG/DL (ref 70–130)
GLUCOSE BLDC GLUCOMTR-MCNC: 170 MG/DL (ref 70–130)
GLUCOSE BLDC GLUCOMTR-MCNC: 178 MG/DL (ref 70–130)
GLUCOSE BLDC GLUCOMTR-MCNC: 188 MG/DL (ref 70–130)
GLUCOSE BLDC GLUCOMTR-MCNC: 213 MG/DL (ref 70–130)
GLUCOSE BLDC GLUCOMTR-MCNC: 220 MG/DL (ref 70–130)
GLUCOSE BLDC GLUCOMTR-MCNC: 241 MG/DL (ref 70–130)
GLUCOSE BLDC GLUCOMTR-MCNC: 248 MG/DL (ref 70–130)
GLUCOSE BLDC GLUCOMTR-MCNC: 59 MG/DL (ref 70–130)
GLUCOSE BLDC GLUCOMTR-MCNC: 75 MG/DL (ref 70–130)
GLUCOSE BLDC GLUCOMTR-MCNC: 95 MG/DL (ref 70–130)
HCO3 BLDA-SCNC: 29 MMOL/L (ref 22–28)
HCT VFR BLD AUTO: 18.3 % (ref 35.6–45.5)
HCT VFR BLD AUTO: 27.8 % (ref 35.6–45.5)
HGB BLD-MCNC: 10.1 G/DL (ref 11.9–15.5)
HGB BLD-MCNC: 6.5 G/DL (ref 11.9–15.5)
HOROWITZ INDEX BLD+IHG-RTO: 30 %
ISOPROPANOL SERPL-MCNC: NEGATIVE % (ref 0–0.01)
Lab: NORMAL
MAGNESIUM SERPL-MCNC: 2.2 MG/DL (ref 1.6–2.6)
MCH RBC QN AUTO: 31.3 PG (ref 26.9–32)
MCHC RBC AUTO-ENTMCNC: 35.5 G/DL (ref 32.4–36.3)
MCV RBC AUTO: 88 FL (ref 80.5–98.2)
METHANOL SERPL-MCNC: NEGATIVE % (ref 0–0.01)
MODALITY: ABNORMAL
NORCHLORDIAZEP SERPL-MCNC: NORMAL UG/ML (ref 0.1–0.6)
NORDIAZEPAM SERPL-MCNC: NORMAL UG/ML (ref 0.1–1.4)
O2 A-A PPRESDIFF RESPIRATORY: 0.7 MMHG
PCO2 BLDA: 36.5 MM HG (ref 35–45)
PEEP RESPIRATORY: 8 CM[H2O]
PENTOBARB SERPL-MCNC: NORMAL UG/ML (ref 1–5)
PH BLDA: 7.51 PH UNITS (ref 7.35–7.45)
PHENOBARB SERPL-MCNC: NORMAL UG/ML (ref 15–40)
PHOSPHATE SERPL-MCNC: 0.6 MG/DL (ref 2.5–4.5)
PHOSPHATE SERPL-MCNC: 1.8 MG/DL (ref 2.5–4.5)
PLATELET # BLD AUTO: 122 10*3/MM3 (ref 140–500)
PMV BLD AUTO: 11.7 FL (ref 6–12)
PO2 BLDA: 119.5 MM HG (ref 80–100)
POTASSIUM BLD-SCNC: 3.2 MMOL/L (ref 3.5–5.2)
RBC # BLD AUTO: 2.08 10*6/MM3 (ref 3.9–5.2)
RH BLD: POSITIVE
SALICYLATES SERPL-MCNC: NORMAL UG/ML (ref 30–250)
SAO2 % BLDCOA: 99 % (ref 92–99)
SET MECH RESP RATE: 20
SODIUM BLD-SCNC: 137 MMOL/L (ref 136–145)
TOTAL RATE: 20 BREATHS/MINUTE
VENTILATOR MODE: ABNORMAL
VT ON VENT VENT: 501 ML
WBC NRBC COR # BLD: 17.38 10*3/MM3 (ref 4.5–10.7)

## 2017-07-04 PROCEDURE — 85018 HEMOGLOBIN: CPT | Performed by: INTERNAL MEDICINE

## 2017-07-04 PROCEDURE — 86900 BLOOD TYPING SEROLOGIC ABO: CPT

## 2017-07-04 PROCEDURE — 25010000002 HEPARIN (PORCINE) PER 1000 UNITS: Performed by: INTERNAL MEDICINE

## 2017-07-04 PROCEDURE — 25010000002 FENTANYL CITRATE (PF) 100 MCG/2ML SOLUTION: Performed by: INTERNAL MEDICINE

## 2017-07-04 PROCEDURE — 25010000002 PIPERACILLIN SOD-TAZOBACTAM PER 1 G: Performed by: INTERNAL MEDICINE

## 2017-07-04 PROCEDURE — 85014 HEMATOCRIT: CPT | Performed by: INTERNAL MEDICINE

## 2017-07-04 PROCEDURE — 36600 WITHDRAWAL OF ARTERIAL BLOOD: CPT

## 2017-07-04 PROCEDURE — 82962 GLUCOSE BLOOD TEST: CPT

## 2017-07-04 PROCEDURE — 25010000002 ALBUMIN HUMAN 25% PER 50 ML: Performed by: INTERNAL MEDICINE

## 2017-07-04 PROCEDURE — 25010000002 INSULIN REGULAR HUMAN PER 5 UNITS: Performed by: INTERNAL MEDICINE

## 2017-07-04 PROCEDURE — 94002 VENT MGMT INPAT INIT DAY: CPT

## 2017-07-04 PROCEDURE — 80069 RENAL FUNCTION PANEL: CPT | Performed by: INTERNAL MEDICINE

## 2017-07-04 PROCEDURE — 86900 BLOOD TYPING SEROLOGIC ABO: CPT | Performed by: INTERNAL MEDICINE

## 2017-07-04 PROCEDURE — 84100 ASSAY OF PHOSPHORUS: CPT | Performed by: INTERNAL MEDICINE

## 2017-07-04 PROCEDURE — 94799 UNLISTED PULMONARY SVC/PX: CPT

## 2017-07-04 PROCEDURE — 36430 TRANSFUSION BLD/BLD COMPNT: CPT

## 2017-07-04 PROCEDURE — 25010000002 HYDROCORTISONE SODIUM SUCCINATE 100 MG RECONSTITUTED SOLUTION: Performed by: INTERNAL MEDICINE

## 2017-07-04 PROCEDURE — 99232 SBSQ HOSP IP/OBS MODERATE 35: CPT | Performed by: INTERNAL MEDICINE

## 2017-07-04 PROCEDURE — 86901 BLOOD TYPING SEROLOGIC RH(D): CPT

## 2017-07-04 PROCEDURE — 83735 ASSAY OF MAGNESIUM: CPT | Performed by: INTERNAL MEDICINE

## 2017-07-04 PROCEDURE — 63710000001 INSULIN DETEMER PER 5 UNITS: Performed by: INTERNAL MEDICINE

## 2017-07-04 PROCEDURE — 85027 COMPLETE CBC AUTOMATED: CPT | Performed by: INTERNAL MEDICINE

## 2017-07-04 PROCEDURE — P9016 RBC LEUKOCYTES REDUCED: HCPCS

## 2017-07-04 PROCEDURE — 99233 SBSQ HOSP IP/OBS HIGH 50: CPT | Performed by: INTERNAL MEDICINE

## 2017-07-04 PROCEDURE — 71010 HC CHEST PA OR AP: CPT

## 2017-07-04 PROCEDURE — 86850 RBC ANTIBODY SCREEN: CPT | Performed by: INTERNAL MEDICINE

## 2017-07-04 PROCEDURE — 82803 BLOOD GASES ANY COMBINATION: CPT

## 2017-07-04 PROCEDURE — 25010000002 VANCOMYCIN: Performed by: INTERNAL MEDICINE

## 2017-07-04 PROCEDURE — 85730 THROMBOPLASTIN TIME PARTIAL: CPT | Performed by: INTERNAL MEDICINE

## 2017-07-04 PROCEDURE — 63710000001 INSULIN ISOPHANE HUMAN PER 5 UNITS: Performed by: INTERNAL MEDICINE

## 2017-07-04 PROCEDURE — 86901 BLOOD TYPING SEROLOGIC RH(D): CPT | Performed by: INTERNAL MEDICINE

## 2017-07-04 PROCEDURE — P9046 ALBUMIN (HUMAN), 25%, 20 ML: HCPCS | Performed by: INTERNAL MEDICINE

## 2017-07-04 PROCEDURE — 86923 COMPATIBILITY TEST ELECTRIC: CPT

## 2017-07-04 PROCEDURE — 63710000001 INSULIN ASPART PER 5 UNITS: Performed by: INTERNAL MEDICINE

## 2017-07-04 RX ORDER — HEPARIN SODIUM 1000 [USP'U]/ML
3400 INJECTION, SOLUTION INTRAVENOUS; SUBCUTANEOUS AS NEEDED
Status: DISCONTINUED | OUTPATIENT
Start: 2017-07-04 | End: 2017-07-06

## 2017-07-04 RX ORDER — DEXMEDETOMIDINE HYDROCHLORIDE 4 UG/ML
.2-1.5 INJECTION, SOLUTION INTRAVENOUS
Status: DISCONTINUED | OUTPATIENT
Start: 2017-07-04 | End: 2017-07-07

## 2017-07-04 RX ORDER — FENTANYL CITRATE 50 UG/ML
25 INJECTION, SOLUTION INTRAMUSCULAR; INTRAVENOUS
Status: DISCONTINUED | OUTPATIENT
Start: 2017-07-04 | End: 2017-07-06

## 2017-07-04 RX ORDER — LEVOTHYROXINE SODIUM 0.05 MG/1
50 TABLET ORAL
Status: DISCONTINUED | OUTPATIENT
Start: 2017-07-05 | End: 2017-07-13 | Stop reason: HOSPADM

## 2017-07-04 RX ORDER — FENTANYL CITRATE 50 UG/ML
75 INJECTION, SOLUTION INTRAMUSCULAR; INTRAVENOUS
Status: DISCONTINUED | OUTPATIENT
Start: 2017-07-04 | End: 2017-07-05

## 2017-07-04 RX ORDER — FENTANYL CITRATE 50 UG/ML
50 INJECTION, SOLUTION INTRAMUSCULAR; INTRAVENOUS
Status: DISCONTINUED | OUTPATIENT
Start: 2017-07-04 | End: 2017-07-05

## 2017-07-04 RX ORDER — DEXTROSE MONOHYDRATE 25 G/50ML
25-50 INJECTION, SOLUTION INTRAVENOUS
Status: DISCONTINUED | OUTPATIENT
Start: 2017-07-04 | End: 2017-07-13 | Stop reason: HOSPADM

## 2017-07-04 RX ADMIN — INSULIN ASPART 3 UNITS: 100 INJECTION, SOLUTION INTRAVENOUS; SUBCUTANEOUS at 18:23

## 2017-07-04 RX ADMIN — TAZOBACTAM SODIUM AND PIPERACILLIN SODIUM 3.38 G: 375; 3 INJECTION, SOLUTION INTRAVENOUS at 19:03

## 2017-07-04 RX ADMIN — HUMAN INSULIN 8 UNITS: 100 INJECTION, SUSPENSION SUBCUTANEOUS at 10:01

## 2017-07-04 RX ADMIN — DEXMEDETOMIDINE HYDROCHLORIDE 1.2 MCG/KG/HR: 4 INJECTION, SOLUTION INTRAVENOUS at 21:16

## 2017-07-04 RX ADMIN — VANCOMYCIN HYDROCHLORIDE 500 MG: 500 INJECTION, POWDER, LYOPHILIZED, FOR SOLUTION INTRAVENOUS at 14:55

## 2017-07-04 RX ADMIN — CASTOR OIL AND BALSAM, PERU: 788; 87 OINTMENT TOPICAL at 20:24

## 2017-07-04 RX ADMIN — HEPARIN SODIUM 5000 UNITS: 5000 INJECTION, SOLUTION INTRAVENOUS; SUBCUTANEOUS at 07:13

## 2017-07-04 RX ADMIN — HYDROCODONE BITARTRATE AND ACETAMINOPHEN 1 TABLET: 10; 325 TABLET ORAL at 20:31

## 2017-07-04 RX ADMIN — FENTANYL CITRATE 50 MCG: 50 INJECTION INTRAMUSCULAR; INTRAVENOUS at 14:55

## 2017-07-04 RX ADMIN — HEPARIN SODIUM 3400 UNITS: 1000 INJECTION, SOLUTION INTRAVENOUS; SUBCUTANEOUS at 12:43

## 2017-07-04 RX ADMIN — HEPARIN SODIUM 5000 UNITS: 5000 INJECTION, SOLUTION INTRAVENOUS; SUBCUTANEOUS at 21:06

## 2017-07-04 RX ADMIN — TAZOBACTAM SODIUM AND PIPERACILLIN SODIUM 3.38 G: 375; 3 INJECTION, SOLUTION INTRAVENOUS at 07:12

## 2017-07-04 RX ADMIN — SODIUM CHLORIDE 5 UNITS/HR: 9 INJECTION, SOLUTION INTRAVENOUS at 08:34

## 2017-07-04 RX ADMIN — INSULIN ASPART 3 UNITS: 100 INJECTION, SOLUTION INTRAVENOUS; SUBCUTANEOUS at 20:25

## 2017-07-04 RX ADMIN — Medication 0.05 MCG/KG/MIN: at 02:00

## 2017-07-04 RX ADMIN — HYDROCODONE BITARTRATE AND ACETAMINOPHEN 1 TABLET: 10; 325 TABLET ORAL at 15:39

## 2017-07-04 RX ADMIN — HYDROCODONE BITARTRATE AND ACETAMINOPHEN 1 TABLET: 10; 325 TABLET ORAL at 09:51

## 2017-07-04 RX ADMIN — SODIUM PHOSPHATE, MONOBASIC, MONOHYDRATE AND SODIUM PHOSPHATE, DIBASIC, ANHYDROUS 10 MMOL: 276; 142 INJECTION, SOLUTION INTRAVENOUS at 12:56

## 2017-07-04 RX ADMIN — HEPARIN SODIUM 5000 UNITS: 5000 INJECTION, SOLUTION INTRAVENOUS; SUBCUTANEOUS at 14:48

## 2017-07-04 RX ADMIN — LANSOPRAZOLE 30 MG: KIT at 10:13

## 2017-07-04 RX ADMIN — MINERAL OIL AND WHITE PETROLATUM: 150; 830 OINTMENT OPHTHALMIC at 02:30

## 2017-07-04 RX ADMIN — CASTOR OIL AND BALSAM, PERU: 788; 87 OINTMENT TOPICAL at 10:12

## 2017-07-04 RX ADMIN — DEXMEDETOMIDINE HYDROCHLORIDE 0.8 MCG/KG/HR: 4 INJECTION, SOLUTION INTRAVENOUS at 14:48

## 2017-07-04 RX ADMIN — HYDROCODONE BITARTRATE AND ACETAMINOPHEN 1 TABLET: 10; 325 TABLET ORAL at 04:31

## 2017-07-04 RX ADMIN — MINERAL OIL AND WHITE PETROLATUM: 150; 830 OINTMENT OPHTHALMIC at 10:12

## 2017-07-04 RX ADMIN — LEVOTHYROXINE SODIUM ANHYDROUS 25 MCG: 100 INJECTION, POWDER, LYOPHILIZED, FOR SOLUTION INTRAVENOUS at 11:40

## 2017-07-04 RX ADMIN — INSULIN DETEMIR 10 UNITS: 100 INJECTION, SOLUTION SUBCUTANEOUS at 20:24

## 2017-07-04 RX ADMIN — HYDROCORTISONE SODIUM SUCCINATE 25 MG: 100 INJECTION, POWDER, FOR SOLUTION INTRAMUSCULAR; INTRAVENOUS at 18:02

## 2017-07-04 RX ADMIN — ASCORBIC ACID: 500 INJECTION, SOLUTION INTRAMUSCULAR; INTRAVENOUS; SUBCUTANEOUS at 01:50

## 2017-07-04 RX ADMIN — ALBUMIN HUMAN 12.5 G: 0.25 SOLUTION INTRAVENOUS at 01:50

## 2017-07-04 RX ADMIN — MINERAL OIL AND WHITE PETROLATUM: 150; 830 OINTMENT OPHTHALMIC at 06:30

## 2017-07-04 RX ADMIN — AZITHROMYCIN DIHYDRATE 500 MG: 500 INJECTION, POWDER, LYOPHILIZED, FOR SOLUTION INTRAVENOUS at 09:51

## 2017-07-04 RX ADMIN — DEXMEDETOMIDINE HYDROCHLORIDE 0.5 MCG/KG/HR: 4 INJECTION, SOLUTION INTRAVENOUS at 10:04

## 2017-07-04 RX ADMIN — VANCOMYCIN HYDROCHLORIDE 750 MG: 750 INJECTION, POWDER, LYOPHILIZED, FOR SOLUTION INTRAVENOUS at 00:17

## 2017-07-04 RX ADMIN — FENTANYL CITRATE 50 MCG: 50 INJECTION INTRAMUSCULAR; INTRAVENOUS at 09:51

## 2017-07-04 NOTE — PROGRESS NOTES
"   LOS: 5 days    Patient Care Team:  Gregorio Bai MD as PCP - General (Family Medicine)    Chief Complaint:  No chief complaint on file.      Subjective     Interval History: Follow up HODAN, anuric.  On dialysis currently.  qb 300 right femoral catheter.     Patient Complaints: unobtainable  Patient Denies:  unobtainable  History taken from: patient    Review of Systems:   Unable to obtain due to sedation, vent.    Objective     Vital Signs  Temp:  [97.5 °F (36.4 °C)-99 °F (37.2 °C)] 97.5 °F (36.4 °C)  Heart Rate:  [] 59  Resp:  [20-26] 26  BP: ()/(39-90) 90/61  Arterial Line BP: ()/() 124/120  FiO2 (%):  [30 %-40 %] 30 %    Flowsheet Rows         First Filed Value    Admission Height  67\" (170.2 cm) Documented at 06/29/2017 2028    Admission Weight  155 lb 10.3 oz (70.6 kg) Documented at 06/29/2017 2028             I/O last 3 completed shifts:  In: 2547.9 [I.V.:1199.6; Other:110; NG/GT:220; IV Piggyback:1018.3]  Out: 3430 [Urine:15; Other:3415]    Intake/Output Summary (Last 24 hours) at 07/04/17 1020  Last data filed at 07/04/17 1012   Gross per 24 hour   Intake               30 ml   Output                0 ml   Net               30 ml       Physical Exam:sedated on the vent  On dialysis.  Qb 300.  Right femoral catheter ( 6/30). Oral ETT and OG tube.  Heart RRR no s3 or rub.  Lungs coarse BS, upper airway rhonchi.  Abd +bs soft, nontender.  Body wall edema. Ext 1+ edema upper and lower ext .      Results from last 7 days  Lab Units 07/04/17  0259 07/03/17  1213 07/03/17  0516  06/29/17 2018   SODIUM mmol/L 137 136 134*  < > 132*   POTASSIUM mmol/L 3.2* 3.6 3.9  < > <1.5*   CHLORIDE mmol/L 96* 94* 92*  < > 102   CO2 mmol/L 25.2 25.7 23.5  < > 5.0*   BUN mg/dL 19 6 8  < > 21*   CREATININE mg/dL 1.69* 0.62 1.10*  < > 1.94*   CALCIUM mg/dL 7.5* 7.2* 6.9*  < > 9.4   BILIRUBIN mg/dL  --   --   --   --  0.2   ALK PHOS U/L  --   --   --   --  77   ALT (SGPT) U/L  --   --   --   --  <5   AST " (SGOT) U/L  --   --   --   --  7   GLUCOSE mg/dL 186* 132* 204*  < > 300*   < > = values in this interval not displayed.    Estimated Creatinine Clearance: 47 mL/min (by C-G formula based on Cr of 1.69).      Results from last 7 days  Lab Units 07/04/17  0259 07/03/17  1213 07/03/17  0516   MAGNESIUM mg/dL 2.2 2.0 2.1   PHOSPHORUS mg/dL 0.6* 0.7* 1.9*               Results from last 7 days  Lab Units 07/04/17  0259 07/02/17  0513 07/01/17  1540 07/01/17  0615 06/29/17 2018   WBC 10*3/mm3 17.38* 17.67*  --  10.90* 16.50*   HEMOGLOBIN g/dL 6.5* 8.3* 11.1* 9.7* 12.2   PLATELETS 10*3/mm3 122* 197  --  210 383         Results from last 7 days  Lab Units 07/01/17  0607   INR  1.63*         Imaging Results (last 24 hours)     Procedure Component Value Units Date/Time    XR Chest 1 View [[id number]] Collected:  07/04/17 0722     Updated:  07/04/17 0722    Narrative:       PORTABLE CHEST X-RAY     CLINICAL HISTORY: ARDS.     COMPARISON: 07/03/2017.     FINDINGS: Portable AP view of the chest was obtained with overlying  monitor leads in place. Life support lines are unchanged. No  pneumothorax demonstrated. There has been partial clearing of multifocal  opacities bilaterally, particularly in the left perihilar region which  is most likely related to improving edema. A component of superimposed  pneumonia cannot be excluded. Right effusion slightly improved also.  Normal heart size.       Impression:       Significant improvement in pulmonary opacities likely  related to edema.                artificial tears  Both Eyes Q4H   azithromycin 500 mg Intravenous Q24H   castor oil-balsam peru  Topical Q12H   heparin (porcine) 5,000 Units Subcutaneous Q8H   HYDROcodone-acetaminophen 1 tablet Nasogastric Q6H   hydrocortisone sodium succinate 25 mg Intravenous Q12H   insulin NPH 8 Units Subcutaneous Q8H   lansoprazole 30 mg Per G Tube QAM   levothyroxine sodium 25 mcg Intravenous Daily   piperacillin-tazobactam 3.375 g Intravenous  Q12H   Vancomycin Pharmacy Intermittent Dosing  Does not apply Daily       dexmedetomidine 0.2-1.5 mcg/kg/hr Last Rate: 1 mcg/kg/hr (07/04/17 1010)   insulin regular infusion 1 unit/mL 1-20 Units/hr Last Rate: 5 Units/hr (07/04/17 0834)   norepinephrine 0.02-0.3 mcg/kg/min Last Rate: 0.03 mcg/kg/min (07/04/17 1012)   Pharmacy to dose vancomycin     remifentanil (ULTIVA) infusion 0.5-15 mcg/kg/hr (Ideal) Last Rate: 0.6 mcg/kg/hr (07/04/17 0833)       Medication Review:   Current Facility-Administered Medications   Medication Dose Route Frequency Provider Last Rate Last Dose   • artificial tears (LUBRIFRESH P.M.) ophthalmic ointment   Both Eyes Q4H Madison Harris MD       • artificial tears (LUBRIFRESH P.M.) ophthalmic ointment   Both Eyes PRN Madison Harris MD       • azithromycin (ZITHROMAX) 500 mg 0.9% NaCl (Add-vantage) 250 mL  500 mg Intravenous Q24H Steve Emery MD   500 mg at 07/04/17 0951   • calcium gluconate 1 g in sodium chloride 0.9 % 50 mL IVPB  1 g Intravenous PRN Susan Urena MD        Or   • calcium gluconate 2 g in sodium chloride 0.9 % 50 mL IVPB  2 g Intravenous PRN Susan Urena MD       • castor oil-balsam peru (VENELEX) ointment   Topical Q12H Madison Harris MD       • dexmedetomidine (PRECEDEX) 400 mcg/100 mL (4 mcg/mL) infusion  0.2-1.5 mcg/kg/hr Intravenous Titrated Van Remy MD 18.53 mL/hr at 07/04/17 1010 1 mcg/kg/hr at 07/04/17 1010   • dextrose (D50W) solution 12.5 g  12.5 g Intravenous Q15 Min PRN Alexis Poe MD   12.5 g at 07/01/17 1526   • dextrose (D50W) solution 25-50 mL  25-50 mL Intravenous Q30 Min PRN Van Remy MD       • fentaNYL citrate (PF) (SUBLIMAZE) injection 25 mcg  25 mcg Intravenous Q30 Min PRN Van Remy MD       • fentaNYL citrate (PF) (SUBLIMAZE) injection 50 mcg  50 mcg Intravenous Q30 Min PRN Van Remy MD   50 mcg at 07/04/17 0951   • fentaNYL citrate (PF) (SUBLIMAZE) injection 75 mcg  75 mcg  Intravenous Q30 Min PRN Van Remy MD       • heparin (porcine) 5000 UNIT/ML injection 5,000 Units  5,000 Units Subcutaneous Q8H Van Remy MD   5,000 Units at 07/04/17 0713   • heparin (porcine) injection 1,000-2,000 Units  1,000-2,000 Units Intravenous PRN Susan Urena MD   1,000 Units at 07/03/17 1816   • heparin (porcine) injection 3,400 Units  3,400 Units Intracatheter PRN Susan Urena MD       • HYDROcodone-acetaminophen (NORCO)  MG per tablet 1 tablet  1 tablet Nasogastric Q6H Van Remy MD   1 tablet at 07/04/17 0951   • HYDROcodone-acetaminophen (NORCO) 5-325 MG per tablet 1 tablet  1 tablet Oral Q4H PRN Alexis Poe MD       • hydrocortisone sodium succinate (Solu-CORTEF) injection 25 mg  25 mg Intravenous Q12H Van Remy MD       • insulin NPH (humuLIN N,novoLIN N) injection 8 Units  8 Units Subcutaneous Q8H Van Remy MD   8 Units at 07/04/17 1001   • insulin regular (HumuLIN R,NovoLIN R) 100 Units in sodium chloride 0.9 % 100 mL (1 Units/mL) infusion  1-20 Units/hr Intravenous Titrated Van Remy MD 5 mL/hr at 07/04/17 0834 5 Units/hr at 07/04/17 0834   • lansoprazole (FIRST) oral suspension 30 mg  30 mg Per G Tube LIAT Harris MD   30 mg at 07/04/17 1013   • levothyroxine sodium injection 25 mcg  25 mcg Intravenous Daily Thomas Campbell MD   25 mcg at 07/03/17 1025   • Magnesium Sulfate 2 gram Bolus, followed by 8 gram infusion (total Mg dose 10 grams)- Mg less than or equal to 1mg/dL  2 g Intravenous PRN Alexis Poe MD        Or   • Magnesium Sulfate 6 gram Infusion (2 gm x 3) -Mg 1.1 -1.5 mg/dL  2 g Intravenous PRN Alexis Poe MD        Or   • magnesium sulfate 4 gram infusion- Mg 1.6-1.9 mg/dL  4 g Intravenous PRN Alexis Poe MD 25 mL/hr at 07/02/17 0100 4 g at 07/02/17 0100   • norepinephrine (LEVOPHED) 8 mg/250 mL (32 mcg/mL) in sodium chloride 0.9% infusion (premix)  0.02-0.3 mcg/kg/min Intravenous Titrated  Van Remy MD 3.95 mL/hr at 07/04/17 1012 0.03 mcg/kg/min at 07/04/17 1012   • Pharmacy to dose vancomycin   Does not apply Continuous PRN Alexis Poe MD       • piperacillin-tazobactam (ZOSYN) 3.375 g in iso-osmotic dextrose 50 ml (premix)  3.375 g Intravenous Q12H Steve Emery MD       • remifentanil (ULTIVA) 50 mcg/mL in sodium chloride 0.9 % 100 mL  0.5-15 mcg/kg/hr (Ideal) Intravenous Titrated Madison Harris MD 0.74 mL/hr at 07/04/17 0833 0.6 mcg/kg/hr at 07/04/17 0833   • Vancomycin Pharmacy Intermittent Dosing   Does not apply Daily Alexis Poe MD           Assessment/Plan       Active Problems:    DKA (diabetic ketoacidoses)    Pneumonia    Sepsis    Acute respiratory failure    History of systemic lupus erythematosus (SLE)    History of splenectomy    History of ITP    Primary hypothyroidism    1. HODAN, initially  prerenal , now  ATN. Anuric. Dialysis today.  BP tolerating so far.  Removing volume if BP allows. 4 k bath.   2. Severe right-sided pna/ARDS. On vent. SP paralysis and prone position.  Now supine with lower 02 requirement.   3. Hypotension requiring pressor support.   4. Encephalopathy  5. New DM1 presenting as DKA, presently on insulin drip . A1c 12. Endocrine managing.   6. H/o TTP with prior splenectomy: platelets falling.   6. Hypothyroidism, now on IV replacement.   7. Anemia:2 units prbc today. If hg does not respond, consider retroperitoneal bleed since femoral catheter in place.   8. SLE  9. Hypophosphatemia.  Replace. Recheck after replacement.   Plan:   1. Hemodialysis today. Plan dialysis again tomorrow to control volume to facilitate pulmonary weaning.        Debra Mohamud MD  07/04/17  10:20 AM

## 2017-07-04 NOTE — PROGRESS NOTES
"  ENDOCRINE    Subjective    AND PLANS  Sarita Bai is a 37 y.o. female.     Tolerating tube feeding well.  Solu-Cortef has been decreased to 25 mg every 12 hours.  Patient was given 1 dose of NPH 8 units at 10 AM today.  Blood sugar ranges from .  Will switch to Levemir 10 units at night and NovoLog sliding scale every 4 hours.    Will switch patient to levothyroxine 50 µg through feeding tube starting tomorrow.    Afebrile.  On IV Zithromax, vancomycin, and Zosyn per ID.    Discuss patient's status with patient's father      Objective   BP 96/62  Pulse 67  Temp 97.5 °F (36.4 °C) (Oral)   Resp 24  Ht 67.01\" (170.2 cm)  Wt 163 lb 5.8 oz (74.1 kg)  SpO2 100%  BMI 25.58 kg/m2  Physical Exam    Sedated on ventilator but arousable  Neck veins are not visible at 15°  Thyroid is not enlarged.  No cervical lymphadenopathy.  Course breath sounds.  No rales or wheezes  Regular heart rate and rhythm.  Abdomen soft, nontender.  Active bowel sounds  Toes cool but not cyanotic        Lab Results (last 24 hours)     Procedure Component Value Units Date/Time    POC Glucose Fingerstick [[id number]]  (Abnormal) Collected:  07/03/17 1644    Specimen:  Blood Updated:  07/03/17 1704     Glucose 178 (H) mg/dL     Narrative:       Meter: [id number] : [id number] Storm Asia    POC Glucose Fingerstick [[id number]]  (Abnormal) Collected:  07/03/17 1736    Specimen:  Blood Updated:  07/03/17 1757     Glucose 163 (H) mg/dL     Narrative:       Meter: [id number] : [id number] Storm Asia    POC Glucose Fingerstick [[id number]]  (Abnormal) Collected:  07/03/17 1823    Specimen:  Blood Updated:  07/03/17 1841     Glucose 163 (H) mg/dL     Narrative:       Meter: [id number] : [id number] Storm Betancourt    Blood Gas, Arterial [[id number]]  (Abnormal) Collected:  07/03/17 2006    Specimen:  Arterial Blood Updated:  07/03/17 2010     Site Arterial Line     Cristian's Test N/A     pH, Arterial 7.495 (H) pH units      " pCO2, Arterial 40.4 mm Hg      pO2, Arterial 157.7 (H) mm Hg      HCO3, Arterial 31.2 (H) mmol/L      Base Excess, Arterial 7.2 (H) mmol/L      O2 Saturation Calculated 99.5 (H) %      A-a Gradiant 0.6 mmHg      Barometric Pressure for Blood Gas 748.3 mmHg      Modality Adult Vent     FIO2 40 %      Ventilator Mode PC     Set Tidal Volume 482     Set Mech Resp Rate 20     Rate 20 Breaths/minute      PEEP 8    Narrative:       SATS 99% IP 18 Meter: [id number] : [id number] Roni Garcia    POC Glucose Fingerstick [[id number]]  (Abnormal) Collected:  07/03/17 2002    Specimen:  Blood Updated:  07/03/17 2014     Glucose 220 (H) mg/dL     Narrative:       Meter: [id number] : [id number] Edwardo Huggins    Calcium, Ionized [[id number]]  (Abnormal) Collected:  07/03/17 2006    Specimen:  Blood Updated:  07/03/17 2053     Ionized Calcium 1.02 (L) mmol/L      Ionized Calcium 4.1 (L) mg/dL     POC Glucose Fingerstick [[id number]]  (Abnormal) Collected:  07/03/17 2119    Specimen:  Blood Updated:  07/03/17 2121     Glucose 209 (H) mg/dL     Narrative:       Meter: [id number] : [id number] Edwardo Huggins    POC Glucose Fingerstick [[id number]]  (Abnormal) Collected:  07/03/17 2201    Specimen:  Blood Updated:  07/03/17 2203     Glucose 191 (H) mg/dL     Narrative:       Meter: [id number] : [id number] Edwardo Huggins    POC Glucose Fingerstick [[id number]]  (Abnormal) Collected:  07/03/17 2320    Specimen:  Blood Updated:  07/03/17 2321     Glucose 175 (H) mg/dL     Narrative:       Meter: [id number] : [id number] Edwardo Huggins    Vancomycin, Trough “Vancomycin dose due at MIDNIGHT. Please make sure Vancomycin IV is not infusing before drawing the vancomycin level.” [[id number]]  (Normal) Collected:  07/03/17 2235    Specimen:  Blood Updated:  07/03/17 2327     Vancomycin Trough 12.00 mcg/mL     POC Glucose Fingerstick [[id number]]  (Abnormal) Collected:  07/04/17 0011    Specimen:  Blood Updated:  07/04/17  0023     Glucose 158 (H) mg/dL     Narrative:       Meter: [id number] : [id number] Edwardo Huggins    POC Glucose Fingerstick [[id number]]  (Abnormal) Collected:  07/04/17 0109    Specimen:  Blood Updated:  07/04/17 0121     Glucose 170 (H) mg/dL     Narrative:       Meter: [id number] : [id number] Edwardo Huggins    aPTT [[id number]]  (Abnormal) Collected:  07/04/17 0259    Specimen:  Blood Updated:  07/04/17 0354     PTT 36.2 (H) seconds     Magnesium [[id number]]  (Normal) Collected:  07/04/17 0259    Specimen:  Blood Updated:  07/04/17 0359     Magnesium 2.2 mg/dL     Renal Function Panel [[id number]]  (Abnormal) Collected:  07/04/17 0259    Specimen:  Blood Updated:  07/04/17 0402     Glucose 186 (H) mg/dL      BUN 19 mg/dL      Creatinine 1.69 (H) mg/dL      Sodium 137 mmol/L      Potassium 3.2 (L) mmol/L      Chloride 96 (L) mmol/L      CO2 25.2 mmol/L      Calcium 7.5 (L) mg/dL      Albumin 2.90 (L) g/dL      Phosphorus 0.6 (L) mg/dL      Anion Gap 15.8 mmol/L      BUN/Creatinine Ratio 11.2     eGFR Non African Amer 34 (L) mL/min/1.73     POC Glucose Fingerstick [[id number]]  (Abnormal) Collected:  07/04/17 0435    Specimen:  Blood Updated:  07/04/17 0445     Glucose 220 (H) mg/dL     Narrative:       Meter: [id number] : [id number] Edwardo Huggins    CBC (No Diff) [[id number]]  (Abnormal) Collected:  07/04/17 0259    Specimen:  Blood Updated:  07/04/17 0458     WBC 17.38 (H) 10*3/mm3       Corrected for presence of NRBC's        RBC 2.08 (L) 10*6/mm3      Hemoglobin 6.5 (C) g/dL      Hematocrit 18.3 (C) %      MCV 88.0 fL      MCH 31.3 pg      MCHC 35.5 g/dL      RDW 14.9 (H) %      RDW-SD 48.1 fl      MPV 11.7 fL      Platelets 122 (L) 10*3/mm3     POC Glucose Fingerstick [[id number]]  (Abnormal) Collected:  07/04/17 0523    Specimen:  Blood Updated:  07/04/17 0535     Glucose 213 (H) mg/dL     Narrative:       Meter: [id number] : [id number] Edwardo Huggins    Toxicology Screen, Serum [[id number]]  Collected:  06/30/17 1605    Specimen:  Blood Updated:  07/04/17 0613     Acetone Negative %                                       Detection Limit = 0.010        Ethanol % Negative %                                       Detection Limit = 0.010        Isopropanol % Negative %                                       Detection Limit = 0.010        Methanol % Negative %                                       Detection Limit = 0.010        Pentobarbital None Detected ug/mL                                       Detection Limit = 1        Phenobarbital None Detected ug/mL                                       Detection Limit = 1        Butalbital None Detected ug/mL                                       Detection Limit = 1        Chlordiazepoxide None Detected ug/mL      Norchlordiazepoxide None Detected ug/mL      Nordiazepam None Detected ug/mL      Diazepam None Detected ug/mL                              Therapeutic:                          Chlordiazepoxide        < 1.0                          Norchlordiazepoxide     < 0.7                          Demoxepam               < 0.6                          Nordiazepam             < 1.5                          Diazepam                < 1.0                         Toxic:                          (Total, Parent+Met)     > 5.0        Salicylate None Detected ug/mL                                       Detection Limit = 5        Disclaimer: Comment      This test was developed and its performance characteristics  determined by [facility]. It has not been cleared or approved  by the Food and Drug Administration.       Narrative:       Performed at:  [address]  [id number]  : Jordy Doll MD, Phone:  [phone]    POC Glucose Fingerstick [[id number]]  (Abnormal) Collected:  07/04/17 0619    Specimen:  Blood Updated:  07/04/17 0620     Glucose 188 (H) mg/dL     Narrative:       Meter: [id number] : [id number] Edwardo  Bhavna    Blood Gas, Arterial [[id number]]  (Abnormal) Collected:  07/04/17 0644    Specimen:  Arterial Blood Updated:  07/04/17 0652     Site Arterial: right radial     Cristian's Test Positive     pH, Arterial 7.509 (H) pH units      pCO2, Arterial 36.5 mm Hg      pO2, Arterial 119.5 (H) mm Hg      HCO3, Arterial 29.0 (H) mmol/L      Base Excess, Arterial 5.5 (H) mmol/L      O2 Saturation Calculated 99.0 %      A-a Gradiant 0.7 mmHg      Barometric Pressure for Blood Gas 753.0 mmHg      Modality Adult Vent     FIO2 30 %      Ventilator Mode PC     Set Tidal Volume 501     Set Mech Resp Rate 20     Rate 20 Breaths/minute      PEEP 8    Narrative:       sat 100 pi 18 ti 1 Meter: [id number] : [id number] Ashley German    POC Glucose Fingerstick [[id number]]  (Abnormal) Collected:  07/04/17 0730    Specimen:  Blood Updated:  07/04/17 0742     Glucose 178 (H) mg/dL     Narrative:       Meter: [id number] : [id number] Edwardo Huggins    POC Glucose Fingerstick [[id number]]  (Normal) Collected:  07/04/17 0934    Specimen:  Blood Updated:  07/04/17 0950     Glucose 119 mg/dL     Narrative:       Meter: [id number] : [id number] Storm Betancourt    Blood Culture [[id number]]  (Normal) Collected:  06/30/17 0925    Specimen:  Blood from Hand, Left Updated:  07/04/17 1001     Blood Culture No growth at 4 days    Blood Culture [[id number]]  (Normal) Collected:  06/30/17 1050    Specimen:  Blood from Arm, Left Updated:  07/04/17 1101     Blood Culture No growth at 4 days    POC Glucose Fingerstick [[id number]]  (Normal) Collected:  07/04/17 1110    Specimen:  Blood Updated:  07/04/17 1132     Glucose 122 mg/dL     Narrative:       Meter: [id number] : [id number] Storm Betancourt    POC Glucose Fingerstick [[id number]]  (Normal) Collected:  07/04/17 1300    Specimen:  Blood Updated:  07/04/17 1320     Glucose 95 mg/dL     Narrative:       Meter: [id number] : [id number] Storm Betancourt    POC Glucose Fingerstick  [[id number]]  (Abnormal) Collected:  07/04/17 1421    Specimen:  Blood Updated:  07/04/17 1441     Glucose 59 (L) mg/dL     Narrative:       Meter: [id number] : [id number] Storm Betancourt    POC Glucose Fingerstick [[id number]]  (Normal) Collected:  07/04/17 1452    Specimen:  Blood Updated:  07/04/17 1512     Glucose 75 mg/dL     Narrative:       Meter: [id number] : [id number] Storm Betancourt    POC Glucose Fingerstick [[id number]]  (Normal) Collected:  07/04/17 1523    Specimen:  Blood Updated:  07/04/17 1543     Glucose 125 mg/dL     Narrative:       Meter: [id number] : [id number] Storm Betancourt            Active Problems:    DKA (diabetic ketoacidoses)    Pneumonia    Sepsis    Acute respiratory failure    History of systemic lupus erythematosus (SLE)    History of splenectomy    History of ITP    Primary hypothyroidism    Switch to Levemir 10 units at bedtime.  Start NovoLog sliding scale every 4 hours.  Discontinue insulin drip  Will defer decision on Virgie to Dr. Remy.  Switch to oral levothyroxine in the morning  Continue continue antibiotics per ID.  Dialysis therapy per nephrologist

## 2017-07-04 NOTE — PROGRESS NOTES
"Pharmacokinetic Consult - Vancomycin Dosing (Follow-up Note)  Patient: Sarita Bai  : 1980  MRN: [id number]  Admit date: 2017  8:02 PM    Day 6  Consult for Dr Poe  Treating: PNA  Goal trough: 15-20 mcg/ml    Relevant clinical data and objective history reviewed:  37 y.o. female 67.01\" (170.2 cm) 163 lb 5.8 oz (74.1 kg)   Admitted  with BG >500, no prior hx of diabetes. , lifelong smoker,  Assessed with severe CAP , severe sepsis/ septic shock ,new diagnosis of diabetes, HODAN, on SLED through last night, plans to begin HD this AM.    Past Medical History:   Diagnosis Date   • Lupus    • Thyroid activity decreased      Creatinine   Date Value Ref Range Status   2017 1.69 (H) 0.57 - 1.00 mg/dL Final   2017 0.62 0.57 - 1.00 mg/dL Final   2017 1.10 (H) 0.57 - 1.00 mg/dL Final     BUN   Date Value Ref Range Status   2017 19 6 - 20 mg/dL Final     Estimated Creatinine Clearance: 47 mL/min (by C-G formula based on Cr of 1.69).    Lab Results   Component Value Date    WBC 17.38 (H) 2017    WBC 17.67 (H) 2017    WBC 10.90 (H) 2017     Temp:  [97.7 °F (36.5 °C)-99 °F (37.2 °C)] 98.9 °F (37.2 °C)  Heart Rate:  [] 70  Resp:  [20-25] 20  BP: ()/(39-90) 112/76  Arterial Line BP: ()/() 109/103  FiO2 (%):  [30 %-40 %] 30 %    Baseline cultures/labs/radiology:   UC= neg    BC x2- ngtd   Sput-ng   resp panel= not detected  7/3 CXR= diffuse pna, slight improvement of aeration    IV Anti-Infectives     Ordered     Dose/Rate Route Frequency Start Stop    17 1114  vancomycin 750 mg/250 mL 0.9% NS add-vantage     Ordering Provider:  Alexis Poe MD    750 mg  over 60 Minutes Intravenous Every 12 Hours 17 1200      17 1410  vancomycin 1500 mg/500 mL 0.9% NS IVPB ([facility])     Ordering Provider:  Madison Harris MD    20 mg/kg × 70.6 kg  over 150 Minutes Intravenous Once 17 1445 17 2343    17 0828  azithromycin " (ZITHROMAX) 500 mg 0.9% NaCl (Add-vantage) 250 mL     Van Remy MD let the order  on 17 0647.   Ordering Provider:  Steve Emery MD    500 mg  over 60 Minutes Intravenous Every 24 Hours 17 0900 17 0859    17  piperacillin-tazobactam (ZOSYN) 3.375 g in iso-osmotic dextrose 50 ml (premix)     Ordering Provider:  Alexis Poe MD    3.375 g  over 4 Hours Intravenous Every 8 Hours 17  vancomycin 1500 mg/500 mL 0.9% NS IVPB ([facility])     Ordering Provider:  Alexis Poe MD    20 mg/kg × 70.6 kg Intravenous Once 17  Pharmacy to dose vancomycin     Ordering Provider:  Alexis Poe MD     Does not apply Continuous PRN 17           Vancomycin dosing history:    21:54 Vancomycin 1500mg IV x1   11:59 Vanc random: 9.50 mcg/mL;  21:13 Vancomycin 1500 mg IV x1   06:08 Vanc random: 34.50 mcg/mL   05:13 Vanc random: 5.70 mcg/mL; started 750mg IV q12h @ 1200  7/3 2235: trough = 12 mcg/ml    Assessment/Plan  1. Pt now off SLED with plans to transition to conventional HD today. Trough last night was slightly low, last 750mg dose given at 0017 this am. Will transition back to intermittent dosing with dialysis today. Discontinued scheduled dosing and will have RN check level prior to starting HD today to evaluate.   2. Will follow up when level returns.   Pharmacy will continue to follow daily while on vancomycin and adjust as needed.     Thank you,  Rebeca Barker, PharmD, BCPS  17 7:54 AM    17 1311 addendum:  Random level not drawn prior to HD this AM. Since small dose given last night after CRRT stopped and prior level only ~12 mcg/ml, will go ahead and give a 500mg IV x1 dose today. Recheck random level with AM labs tomorrow. Noted plans for additional HD tomorrow.  Thanks,  Rebeca Barker, PharmD, BCPS

## 2017-07-04 NOTE — PROGRESS NOTES
LOS: 5 days   Patient Care Team:  Gregorio Bai MD as PCP - General (Family Medicine)    Chief Complaint:  Pneumonia and respiratory failure    Subjective     Interval History:     Patient remains intubated and sedated on the ventilator    Review of Systems:   Unable to obtain       Objective     Vital Signs  Temp:  [97.7 °F (36.5 °C)-99 °F (37.2 °C)] 98.9 °F (37.2 °C)  Heart Rate:  [] 69  Resp:  [20-25] 20  BP: ()/(39-90) 108/73  Arterial Line BP: ()/() 120/77  FiO2 (%):  [30 %-40 %] 30 %  Vent Mode: PC/AC  Ventilator/Non-Invasive Ventilation Settings     Start     Ordered    07/01/17 0804  Ventilator - AC/PC; (24); 100; 88%; Other (see comments); 18; 22; (I time 1 sec)  Continuous     Question Answer Comment   Vent Mode AC/PC    Breath rate  24   FiO2 100    FiO2 titrate for Sp02% =/> 88%    PEEP Other (see comments)    PEEP: 18    Inspiratory Pressure 22    I:E Ratio  I time 1 sec       07/01/17 0805    07/01/17 0107  Ventilator - AC/PC; (24); 100; 12; 24  Continuous,   Status:  Canceled     Question Answer Comment   Vent Mode AC/PC    Breath rate  24   FiO2 100    PEEP 12    Inspiratory Pressure 24        07/01/17 0107    06/30/17 2121  Ventilator - AC/VC; 8  Continuous,   Status:  Canceled     Question Answer Comment   Vent Mode AC/VC    PEEP 8        06/30/17 2120              Arterial Line BP: 120/77  Arterial Line MAP (mmHg): 91 mmHg     Body mass index is 25.58 kg/(m^2).  I/O last 3 completed shifts:  In: 4590 [I.V.:3148.1; Other:80; NG/GT:220; IV Piggyback:1141.9]  Out: 6148 [Urine:15; Other:6133]  I/O this shift:  In: 30 [Other:30]  Out: -         Physical Exam:  General Appearance: Well-developed white female she is orally intubated and a tracheal tube looks like it's about 23 cm at the lips.   she is sedated with propofol at 25 mics and remifentanil at 1.0 .  Legs were discontinued yesterday.  The patient is grimacing and has some spontaneous movement is yet to follow  commands  Eyes: Conjunctiva are clear and anicteric pupils are about 3 mm they are equal round and reactive to light.  ENT: Oral mucous membranes are dry, she has her ET tube and feeding tube in place  Neck: Trachea midline I don't appreciate jugular venous distention right IJ central venous catheter site looks okay  Lungs: Coarse breath sounds bilaterally they are symmetric and chest expansion appears symmetric she is on a ventilator she is on a pressure control of 20 with a respiratory rate of 20 inspiratory pressure of 18 PEEP of 8 cm FiO2 30% her SPO2 is 99% and her tidal volumes are about 450- 480 cc  Cardiac: Regular rate and rhythm no murmur.  She has been on and off of Levophed currently she is on 0.06 mics per kilogram per min. but they are titrating that down her mean arterial pressures up in the 80s currently.  Unfortunately they have been having some trouble with either a line not functioning accurately  Abdomen: Soft nontender I do not palpate organomegaly or masses I don't hear much in the way of bowel sounds either or she is tolerating tube feeds well and she is having some loose stools and a bowel management system was placed last night  : Lockwood catheter dependent drainage very minimal urine output.  He is off CRRT  Musc/Skel: Left radial A-line site looks okay.  Waveform is dampened it comes and goes nurses working with it to try and get a better I think she may benefit from an arm board and have asked nurses to place that.  The right femoral dialysis catheter site also looks good  Skin: No mottling today  Neuro: A she will withdraw all 4 extremities to noxious stimuli she grimaces some but she is not following commands or responding to verbal stimuli  Extremities/P Vascular: No clubbing cyanosis or edema she has palpable dorsalis pedis pulses and a right radial and she has the left radial A-line  MSE: Unable to assess       Labs:  WBC No results found for: WBCS   HGB Hemoglobin   Date Value Ref  Range Status   07/04/2017 6.5 (C) 11.9 - 15.5 g/dL Final   07/02/2017 8.3 (L) 11.9 - 15.5 g/dL Final   07/01/2017 11.1 (L) 11.9 - 15.5 g/dL Final      HCT Hematocrit   Date Value Ref Range Status   07/04/2017 18.3 (C) 35.6 - 45.5 % Final   07/02/2017 25.0 (L) 35.6 - 45.5 % Final   07/01/2017 29.2 (L) 35.6 - 45.5 % Final      Platlets No results found for: LABPLAT     PT/INR:    No results found for: PROTIME/  No results found for: INR    Sodium Sodium   Date Value Ref Range Status   07/04/2017 137 136 - 145 mmol/L Final   07/03/2017 136 136 - 145 mmol/L Final   07/03/2017 134 (L) 136 - 145 mmol/L Final   07/03/2017 133 (L) 136 - 145 mmol/L Final   07/02/2017 133 (L) 136 - 145 mmol/L Final   07/02/2017 134 (L) 136 - 145 mmol/L Final   07/02/2017 134 (L) 136 - 145 mmol/L Final   07/01/2017 134 (L) 136 - 145 mmol/L Final   07/01/2017 134 (L) 136 - 145 mmol/L Final   07/01/2017 135 (L) 136 - 145 mmol/L Final      Potassium Potassium   Date Value Ref Range Status   07/04/2017 3.2 (L) 3.5 - 5.2 mmol/L Final   07/03/2017 3.6 3.5 - 5.2 mmol/L Final   07/03/2017 3.9 3.5 - 5.2 mmol/L Final   07/03/2017 3.7 3.5 - 5.2 mmol/L Final   07/02/2017 3.9 3.5 - 5.2 mmol/L Final   07/02/2017 3.9 3.5 - 5.2 mmol/L Final   07/02/2017 3.8 3.5 - 5.2 mmol/L Final   07/01/2017 3.6 3.5 - 5.2 mmol/L Final   07/01/2017 3.8 3.5 - 5.2 mmol/L Final   07/01/2017 4.0 3.5 - 5.2 mmol/L Final      Chloride Chloride   Date Value Ref Range Status   07/04/2017 96 (L) 98 - 107 mmol/L Final   07/03/2017 94 (L) 98 - 107 mmol/L Final   07/03/2017 92 (L) 98 - 107 mmol/L Final   07/03/2017 91 (L) 98 - 107 mmol/L Final   07/02/2017 92 (L) 98 - 107 mmol/L Final   07/02/2017 92 (L) 98 - 107 mmol/L Final   07/02/2017 92 (L) 98 - 107 mmol/L Final   07/01/2017 93 (L) 98 - 107 mmol/L Final   07/01/2017 96 (L) 98 - 107 mmol/L Final   07/01/2017 101 98 - 107 mmol/L Final      Bicarbonate No results found for: PLASMABICARB   BUN BUN   Date Value Ref Range Status    07/04/2017 19 6 - 20 mg/dL Final   07/03/2017 6 6 - 20 mg/dL Final   07/03/2017 8 6 - 20 mg/dL Final   07/03/2017 7 6 - 20 mg/dL Final   07/02/2017 7 6 - 20 mg/dL Final   07/02/2017 3 (L) 6 - 20 mg/dL Final   07/02/2017 5 (L) 6 - 20 mg/dL Final   07/01/2017 6 6 - 20 mg/dL Final   07/01/2017 6 6 - 20 mg/dL Final   07/01/2017 12 6 - 20 mg/dL Final      Creatinine Creatinine   Date Value Ref Range Status   07/04/2017 1.69 (H) 0.57 - 1.00 mg/dL Final   07/03/2017 0.62 0.57 - 1.00 mg/dL Final   07/03/2017 1.10 (H) 0.57 - 1.00 mg/dL Final   07/03/2017 0.85 0.57 - 1.00 mg/dL Final   07/02/2017 0.93 0.57 - 1.00 mg/dL Final   07/02/2017 0.47 (L) 0.57 - 1.00 mg/dL Final   07/02/2017 1.00 0.57 - 1.00 mg/dL Final   07/01/2017 1.08 (H) 0.57 - 1.00 mg/dL Final   07/01/2017 0.85 0.57 - 1.00 mg/dL Final   07/01/2017 1.79 (H) 0.57 - 1.00 mg/dL Final      Calcium Calcium   Date Value Ref Range Status   07/04/2017 7.5 (L) 8.6 - 10.5 mg/dL Final   07/03/2017 7.2 (L) 8.6 - 10.5 mg/dL Final   07/03/2017 6.9 (L) 8.6 - 10.5 mg/dL Final   07/03/2017 6.9 (L) 8.6 - 10.5 mg/dL Final   07/02/2017 6.5 (L) 8.6 - 10.5 mg/dL Final   07/02/2017 6.9 (L) 8.6 - 10.5 mg/dL Final   07/02/2017 7.0 (L) 8.6 - 10.5 mg/dL Final   07/01/2017 6.9 (L) 8.6 - 10.5 mg/dL Final   07/01/2017 6.8 (L) 8.6 - 10.5 mg/dL Final   07/01/2017 6.6 (L) 8.6 - 10.5 mg/dL Final      Magnesium Magnesium   Date Value Ref Range Status   07/04/2017 2.2 1.6 - 2.6 mg/dL Final   07/03/2017 2.0 1.6 - 2.6 mg/dL Final   07/03/2017 2.1 1.6 - 2.6 mg/dL Final   07/03/2017 2.1 1.6 - 2.6 mg/dL Final   07/02/2017 2.0 1.6 - 2.6 mg/dL Final   07/02/2017 2.0 1.6 - 2.6 mg/dL Final   07/02/2017 2.8 (H) 1.6 - 2.6 mg/dL Final   07/01/2017 1.8 1.6 - 2.6 mg/dL Final   07/01/2017 1.8 1.6 - 2.6 mg/dL Final   07/01/2017 1.8 1.6 - 2.6 mg/dL Final            pH pH, Arterial   Date Value Ref Range Status   07/03/2017 7.495 (H) 7.350 - 7.450 pH units Final   07/03/2017 7.433 7.350 - 7.450 pH units Final    07/03/2017 7.334 (L) 7.350 - 7.450 pH units Final   07/02/2017 7.298 (C) 7.350 - 7.450 pH units Final     Comment:     Critical:Notify Dr KINGSTON (02-Jul-17 17:35:40)Read back ok   07/02/2017 7.286 (C) 7.350 - 7.450 pH units Final     Comment:     Critical:Notify Dr CARVER (02-Jul-17 12:28:31)Read back ok   07/02/2017 7.427 7.350 - 7.450 pH units Final   07/02/2017 7.425 7.350 - 7.450 pH units Final   07/01/2017 7.335 (L) 7.350 - 7.450 pH units Final   07/01/2017 7.207 (C) 7.350 - 7.450 pH units Final     Comment:     Critical:Notify Dr CARVER (01-Jul-17 11:50:51)Read back ok      pO2 pO2, Arterial   Date Value Ref Range Status   07/03/2017 157.7 (H) 80.0 - 100.0 mm Hg Final   07/03/2017 98.6 80.0 - 100.0 mm Hg Final   07/03/2017 79.2 (L) 80.0 - 100.0 mm Hg Final   07/02/2017 67.7 (L) 80.0 - 100.0 mm Hg Final   07/02/2017 72.0 (L) 80.0 - 100.0 mm Hg Final   07/02/2017 112.5 (H) 80.0 - 100.0 mm Hg Final   07/02/2017 58.6 (L) 80.0 - 100.0 mm Hg Final   07/01/2017 229.2 (H) 80.0 - 100.0 mm Hg Final   07/01/2017 58.7 (L) 80.0 - 100.0 mm Hg Final      pCO2 pCO2, Arterial   Date Value Ref Range Status   07/03/2017 40.4 35.0 - 45.0 mm Hg Final   07/03/2017 40.1 35.0 - 45.0 mm Hg Final   07/03/2017 52.6 (H) 35.0 - 45.0 mm Hg Final   07/02/2017 50.9 (H) 35.0 - 45.0 mm Hg Final   07/02/2017 57.2 (C) 35.0 - 45.0 mm Hg Final     Comment:     Critical:Notify Dr CARVER (02-Jul-17 12:28:31)Read back ok   07/02/2017 37.6 35.0 - 45.0 mm Hg Final   07/02/2017 41.4 35.0 - 45.0 mm Hg Final   07/01/2017 48.9 (H) 35.0 - 45.0 mm Hg Final   07/01/2017 57.7 (C) 35.0 - 45.0 mm Hg Final     Comment:     Critical:Notify Dr CARVER (01-Jul-17 11:50:51)Read back ok      HCO3 HCO3, Arterial   Date Value Ref Range Status   07/03/2017 31.2 (H) 22.0 - 28.0 mmol/L Final   07/03/2017 26.8 22.0 - 28.0 mmol/L Final   07/03/2017 28.0 22.0 - 28.0 mmol/L Final   07/02/2017 24.9 22.0 - 28.0 mmol/L Final   07/02/2017 27.2 22.0 - 28.0 mmol/L Final   07/02/2017  24.8 22.0 - 28.0 mmol/L Final   07/02/2017 27.1 22.0 - 28.0 mmol/L Final   07/01/2017 26.1 22.0 - 28.0 mmol/L Final   07/01/2017 22.9 22.0 - 28.0 mmol/L Final          albumin human 12.5 g Intravenous Q6H   artificial tears  Both Eyes Q4H   azithromycin 500 mg Intravenous Q24H   castor oil-balsam peru  Topical Q12H   heparin (porcine) 5,000 Units Subcutaneous Q8H   HYDROcodone-acetaminophen 1 tablet Nasogastric Q6H   hydrocortisone sodium succinate 25 mg Intravenous Q12H   lansoprazole 30 mg Per G Tube QAM   levothyroxine sodium 25 mcg Intravenous Daily   piperacillin-tazobactam 3.375 g Intravenous Q8H   vancomycin 750 mg Intravenous Q12H       dexmedetomidine 0.2-1.5 mcg/kg/hr    heparin (porcine) 1,000 Units/hr Last Rate: 1,000 Units/hr (07/03/17 0422)   insulin regular infusion 1 unit/mL 1-20 Units/hr    norepinephrine 0.02-0.3 mcg/kg/min Last Rate: 0.05 mcg/kg/min (07/04/17 0200)   Pharmacy to dose vancomycin     propofol 5-50 mcg/kg/min Last Rate: 25 mcg/kg/min (07/03/17 2242)   remifentanil (ULTIVA) infusion 0.5-15 mcg/kg/hr (Ideal) Last Rate: 2 mcg/kg/hr (07/03/17 1901)       Diagnostics:  Imaging Results (last 24 hours)     Procedure Component Value Units Date/Time    XR Chest 1 View [[id number]] Updated:  07/04/17 0442          Chest x-ray reviewed endotracheal tube and central venous catheter remain in good position there has been significant improvement in the infiltrates particularly on the left today there is probably some small pleural effusions remaining.      Assessment/Plan     Patient Active Problem List   Diagnosis Code   • DKA (diabetic ketoacidoses) E13.10   • Pneumonia J18.9   • Sepsis A41.9   • Acute respiratory failure J96.00   • History of systemic lupus erythematosus (SLE) Z87.39   • History of splenectomy Z90.81   • History of ITP Z86.2   • Primary hypothyroidism E03.9       Impression #1 pneumonia community-acquired severe she is on broad-spectrum antibiotics including azithromycin and  vancomycin and Zosyn.  Cultures thus far negative and infectious disease is following.His completed 5 days of azithromycin  #2 acute hypoxic respiratory failure secondary to ARDS with marked improvement we will continue weaning.  I think mainly weaning now revolves around her neuro function.  #3 severe sepsis with septic shock her blood pressure has improved.  Her  blood pressure remains labile we will try and wean off Levophed  #4 new onset diabetes with diabetic ketoacidosis Ketosis cleared I'm going to switch her to around to the insulin standard ICU protocol and add some scheduled insulin if okay with endocrinology  #5 acute renal failure probably ATN , she remains anuric nephrology following dialysis per nephrology  #6 SLE apparently this is been fairly inactive recently she is getting some steroids stress dose that should help should she have some active disease.  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  She is got to the packed red cells ordered we'll give her a unit.  If she gets dialysis today maybe should get a second unit on dialysis  #12 hypokalemia too bad with her renal failure will just watch  #13 fluids/electrolytes/nutrition tolerating tube feeds    Plan for disposition:    Van Remy MD  07/04/17  6:48 AM    Time: I have spent 55 minutes thus far today in care of the patient

## 2017-07-04 NOTE — PROGRESS NOTES
LOS: 5 days     Chief Complaint:  Follow-up sepsis    Interval History:  O2 improved w/ prone positioning. FiO2 down to 40%. Plans to stop prone and paralytics today. No fevers. Tolerating antibiotics w/o rash or diarrhea. Can't obtain full history due to intubated status    ROS: can't be obtained due to intubated status    Vital Signs  Temp:  [97.5 °F (36.4 °C)-99 °F (37.2 °C)] 97.5 °F (36.4 °C)  Heart Rate:  [] 59  Resp:  [20-26] 26  BP: ()/(39-90) 90/61  Arterial Line BP: ()/() 124/120  FiO2 (%):  [30 %-40 %] 30 %    Physical Exam:  Full Exam unable to be done bc in prone/rotating bed  General: intubated, sedated, in prone rotating bed  :  Lockwood catheter present  Musculoskeletal: no joint abnormalities, normal musculature  Skin: No rashes, lesions, or embolic phenomenon on visible skin  Psychiatric: sedated  Vasc:  RIJ TLC present    Antibiotics:  •  piperacillin-tazobactam (ZOSYN) 3.375 g in iso-osmotic dextrose 50 ml (premix), 3.375 g, Intravenous, Q8H, Alexis Poe MD, 3.375 g at 07/01/17 0557  •  Vancomycin 750 mg IV q12h    LABS:  CBC, BMP, VTr,  Micro reviewed today  Lab Results   Component Value Date    WBC 17.38 (H) 07/04/2017    HGB 6.5 (C) 07/04/2017    HCT 18.3 (C) 07/04/2017    MCV 88.0 07/04/2017     (L) 07/04/2017     Lab Results   Component Value Date    GLUCOSE 186 (H) 07/04/2017    BUN 19 07/04/2017    CREATININE 1.69 (H) 07/04/2017    EGFRIFNONA 34 (L) 07/04/2017    BCR 11.2 07/04/2017    CO2 25.2 07/04/2017    CALCIUM 7.5 (L) 07/04/2017    ALBUMIN 2.90 (L) 07/04/2017    LABIL2 0.6 06/29/2017    AST 7 06/29/2017    ALT <5 06/29/2017     Lab Results   Component Value Date    [facility]    STEFFANIE 5.70 07/02/2017       Procal 2--->4.9--->4.4    Microbiology:  6/30 RVP negative  6/30 BCx: NGTD  6/30 SpCx: negative    Radiology (personally reviewed images):   CXR w/ improved opacities    Assessment/Plan   1. ARDS and Sepsis secondary to Right  lower lobe pneumonia  -improved w/ paralysis and prone positioning; now supine  -continue vancomycin with goal trough 15-20  -continue Zosyn 3.375 g but change to q12h for intermittent HD  -finishing azithromycin 500 mg IV q24h x 5 days  -all cultures negative     2. Diabetic ketoacidosis     3. History of splenectomy  -will address vaccine status after critical stage     4. Systemic lupus erythematosus  -appears quiescent based on history     5. Acute kidney injury   -back on intermittent HD     Thank you for this consult. ID will follow. I discussed the patients findings and my recommendations with RN.

## 2017-07-04 NOTE — PLAN OF CARE
Problem: Patient Care Overview (Adult)  Goal: Plan of Care Review  Outcome: Ongoing (interventions implemented as appropriate)    07/04/17 0530   Coping/Psychosocial Response Interventions   Plan Of Care Reviewed With father;mother   Patient Care Overview   Progress progress towards functional goals is fair   Outcome Evaluation   Outcome Summary/Follow up Plan pt tolerating vent andtubefeeds well, pt is anuric , levophed increased to maintain pressures,         Problem: Fall Risk (Adult)  Goal: Identify Related Risk Factors and Signs and Symptoms  Outcome: Ongoing (interventions implemented as appropriate)  Goal: Absence of Falls  Outcome: Ongoing (interventions implemented as appropriate)    Problem: Diabetes, Type 1 (Adult)  Goal: Signs and Symptoms of Listed Potential Problems Will be Absent or Manageable (Diabetes, Type 1)  Outcome: Ongoing (interventions implemented as appropriate)    Problem: Sepsis (Adult)  Goal: Signs and Symptoms of Listed Potential Problems Will be Absent or Manageable (Sepsis)  Outcome: Ongoing (interventions implemented as appropriate)    Problem: Pneumonia (Adult)  Goal: Signs and Symptoms of Listed Potential Problems Will be Absent or Manageable (Pneumonia)  Outcome: Ongoing (interventions implemented as appropriate)    Problem: Hemodialysis (Adult)  Goal: Signs and Symptoms of Listed Potential Problems Will be Absent or Manageable (Hemodialysis)  Outcome: Ongoing (interventions implemented as appropriate)

## 2017-07-05 ENCOUNTER — ANESTHESIA (OUTPATIENT)
Dept: PERIOP | Facility: HOSPITAL | Age: 37
End: 2017-07-05

## 2017-07-05 ENCOUNTER — APPOINTMENT (OUTPATIENT)
Dept: CARDIOLOGY | Facility: HOSPITAL | Age: 37
End: 2017-07-05
Attending: INTERNAL MEDICINE

## 2017-07-05 ENCOUNTER — ANESTHESIA EVENT (OUTPATIENT)
Dept: PERIOP | Facility: HOSPITAL | Age: 37
End: 2017-07-05

## 2017-07-05 PROBLEM — N17.0 ARF (ACUTE RENAL FAILURE) WITH TUBULAR NECROSIS (HCC): Status: ACTIVE | Noted: 2017-07-05

## 2017-07-05 LAB
ABO + RH BLD: NORMAL
ABO + RH BLD: NORMAL
ALBUMIN SERPL-MCNC: 2.9 G/DL (ref 3.5–5.2)
ALBUMIN/GLOB SERPL: 0.8 G/DL
ALP SERPL-CCNC: 125 U/L (ref 39–117)
ALT SERPL W P-5'-P-CCNC: 455 U/L (ref 1–33)
ANION GAP SERPL CALCULATED.3IONS-SCNC: 16.9 MMOL/L
APTT PPP: 27.7 SECONDS (ref 22.7–35.4)
APTT PPP: 29.4 SECONDS (ref 22.7–35.4)
ARTERIAL PATENCY WRIST A: POSITIVE
AST SERPL-CCNC: 176 U/L (ref 1–32)
ATMOSPHERIC PRESS: 752.2 MMHG
BACTERIA SPEC AEROBE CULT: NORMAL
BACTERIA SPEC AEROBE CULT: NORMAL
BASE EXCESS BLDA CALC-SCNC: -0.3 MMOL/L (ref 0–2)
BDY SITE: ABNORMAL
BH BB BLOOD EXPIRATION DATE: NORMAL
BH BB BLOOD EXPIRATION DATE: NORMAL
BH BB BLOOD TYPE BARCODE: 5100
BH BB BLOOD TYPE BARCODE: 5100
BH BB DISPENSE STATUS: NORMAL
BH BB DISPENSE STATUS: NORMAL
BH BB PRODUCT CODE: NORMAL
BH BB PRODUCT CODE: NORMAL
BH BB UNIT NUMBER: NORMAL
BH BB UNIT NUMBER: NORMAL
BH CV LOWER VASCULAR LEFT COMMON FEMORAL AUGMENT: NORMAL
BH CV LOWER VASCULAR LEFT COMMON FEMORAL COMPETENT: NORMAL
BH CV LOWER VASCULAR LEFT COMMON FEMORAL COMPRESS: NORMAL
BH CV LOWER VASCULAR LEFT COMMON FEMORAL PHASIC: NORMAL
BH CV LOWER VASCULAR LEFT COMMON FEMORAL SPONT: NORMAL
BH CV LOWER VASCULAR LEFT DISTAL FEMORAL COMPRESS: NORMAL
BH CV LOWER VASCULAR LEFT GASTRONEMIUS COMPRESS: NORMAL
BH CV LOWER VASCULAR LEFT GREATER SAPH AK COMPRESS: NORMAL
BH CV LOWER VASCULAR LEFT GREATER SAPH BK COMPRESS: NORMAL
BH CV LOWER VASCULAR LEFT MID FEMORAL AUGMENT: NORMAL
BH CV LOWER VASCULAR LEFT MID FEMORAL COMPETENT: NORMAL
BH CV LOWER VASCULAR LEFT MID FEMORAL COMPRESS: NORMAL
BH CV LOWER VASCULAR LEFT MID FEMORAL PHASIC: NORMAL
BH CV LOWER VASCULAR LEFT MID FEMORAL SPONT: NORMAL
BH CV LOWER VASCULAR LEFT PERONEAL COMPRESS: NORMAL
BH CV LOWER VASCULAR LEFT POPLITEAL AUGMENT: NORMAL
BH CV LOWER VASCULAR LEFT POPLITEAL COMPETENT: NORMAL
BH CV LOWER VASCULAR LEFT POPLITEAL COMPRESS: NORMAL
BH CV LOWER VASCULAR LEFT POPLITEAL PHASIC: NORMAL
BH CV LOWER VASCULAR LEFT POPLITEAL SPONT: NORMAL
BH CV LOWER VASCULAR LEFT POSTERIOR TIBIAL COMPRESS: NORMAL
BH CV LOWER VASCULAR LEFT PROXIMAL FEMORAL COMPRESS: NORMAL
BH CV LOWER VASCULAR LEFT SAPHENOFEMORAL JUNCTION COMPRESS: NORMAL
BH CV LOWER VASCULAR LEFT SAPHENOFEMORAL JUNCTION PHASIC: NORMAL
BH CV LOWER VASCULAR LEFT SAPHENOFEMORAL JUNCTION SPONT: NORMAL
BH CV LOWER VASCULAR RIGHT COMMON FEMORAL AUGMENT: NORMAL
BH CV LOWER VASCULAR RIGHT COMMON FEMORAL COMPETENT: NORMAL
BH CV LOWER VASCULAR RIGHT COMMON FEMORAL COMPRESS: NORMAL
BH CV LOWER VASCULAR RIGHT COMMON FEMORAL PHASIC: NORMAL
BH CV LOWER VASCULAR RIGHT COMMON FEMORAL SPONT: NORMAL
BH CV LOWER VASCULAR RIGHT DISTAL FEMORAL COMPRESS: NORMAL
BH CV LOWER VASCULAR RIGHT GASTRONEMIUS COMPRESS: NORMAL
BH CV LOWER VASCULAR RIGHT GREATER SAPH AK COMPRESS: NORMAL
BH CV LOWER VASCULAR RIGHT GREATER SAPH BK COMPRESS: NORMAL
BH CV LOWER VASCULAR RIGHT MID FEMORAL AUGMENT: NORMAL
BH CV LOWER VASCULAR RIGHT MID FEMORAL COMPETENT: NORMAL
BH CV LOWER VASCULAR RIGHT MID FEMORAL COMPRESS: NORMAL
BH CV LOWER VASCULAR RIGHT MID FEMORAL PHASIC: NORMAL
BH CV LOWER VASCULAR RIGHT MID FEMORAL SPONT: NORMAL
BH CV LOWER VASCULAR RIGHT PERONEAL COMPRESS: NORMAL
BH CV LOWER VASCULAR RIGHT POPLITEAL AUGMENT: NORMAL
BH CV LOWER VASCULAR RIGHT POPLITEAL COMPETENT: NORMAL
BH CV LOWER VASCULAR RIGHT POPLITEAL COMPRESS: NORMAL
BH CV LOWER VASCULAR RIGHT POPLITEAL PHASIC: NORMAL
BH CV LOWER VASCULAR RIGHT POPLITEAL SPONT: NORMAL
BH CV LOWER VASCULAR RIGHT POSTERIOR TIBIAL COMPRESS: NORMAL
BH CV LOWER VASCULAR RIGHT PROXIMAL FEMORAL COMPRESS: NORMAL
BH CV LOWER VASCULAR RIGHT SAPHENOFEMORAL JUNCTION COMPRESS: NORMAL
BH CV LOWER VASCULAR RIGHT SAPHENOFEMORAL JUNCTION PHASIC: NORMAL
BH CV LOWER VASCULAR RIGHT SAPHENOFEMORAL JUNCTION SPONT: NORMAL
BH CV UPPER VENOUS LEFT AXILLARY AUGMENT: NORMAL
BH CV UPPER VENOUS LEFT AXILLARY COMPETENT: NORMAL
BH CV UPPER VENOUS LEFT AXILLARY COMPRESS: NORMAL
BH CV UPPER VENOUS LEFT AXILLARY PHASIC: NORMAL
BH CV UPPER VENOUS LEFT AXILLARY SPONT: NORMAL
BH CV UPPER VENOUS LEFT BASILIC FOREARM COMPRESS: NORMAL
BH CV UPPER VENOUS LEFT BASILIC UPPER COMPRESS: NORMAL
BH CV UPPER VENOUS LEFT BRACHIAL COMPRESS: NORMAL
BH CV UPPER VENOUS LEFT CEPHALIC FOREARM COMPRESS: NORMAL
BH CV UPPER VENOUS LEFT CEPHALIC UPPER COLOR: 1
BH CV UPPER VENOUS LEFT CEPHALIC UPPER COMPRESS: NORMAL
BH CV UPPER VENOUS LEFT CEPHALIC UPPER THROMBUS: NORMAL
BH CV UPPER VENOUS LEFT INTERNAL JUGULAR AUGMENT: NORMAL
BH CV UPPER VENOUS LEFT INTERNAL JUGULAR COMPETENT: NORMAL
BH CV UPPER VENOUS LEFT INTERNAL JUGULAR COMPRESS: NORMAL
BH CV UPPER VENOUS LEFT INTERNAL JUGULAR PHASIC: NORMAL
BH CV UPPER VENOUS LEFT INTERNAL JUGULAR SPONT: NORMAL
BH CV UPPER VENOUS LEFT RADIAL COMPRESS: NORMAL
BH CV UPPER VENOUS LEFT SUBCLAVIAN AUGMENT: NORMAL
BH CV UPPER VENOUS LEFT SUBCLAVIAN COMPETENT: NORMAL
BH CV UPPER VENOUS LEFT SUBCLAVIAN COMPRESS: NORMAL
BH CV UPPER VENOUS LEFT SUBCLAVIAN PHASIC: NORMAL
BH CV UPPER VENOUS LEFT SUBCLAVIAN SPONT: NORMAL
BH CV UPPER VENOUS LEFT ULNAR COMPRESS: NORMAL
BH CV UPPER VENOUS RIGHT AXILLARY AUGMENT: NORMAL
BH CV UPPER VENOUS RIGHT AXILLARY COMPETENT: NORMAL
BH CV UPPER VENOUS RIGHT AXILLARY COMPRESS: NORMAL
BH CV UPPER VENOUS RIGHT AXILLARY PHASIC: NORMAL
BH CV UPPER VENOUS RIGHT AXILLARY SPONT: NORMAL
BH CV UPPER VENOUS RIGHT BASILIC FOREARM COMPRESS: NORMAL
BH CV UPPER VENOUS RIGHT BASILIC UPPER COMPRESS: NORMAL
BH CV UPPER VENOUS RIGHT BRACHIAL COMPRESS: NORMAL
BH CV UPPER VENOUS RIGHT CEPHALIC FOREARM COMPRESS: NORMAL
BH CV UPPER VENOUS RIGHT CEPHALIC UPPER COLOR: 1
BH CV UPPER VENOUS RIGHT CEPHALIC UPPER COMPRESS: NORMAL
BH CV UPPER VENOUS RIGHT CEPHALIC UPPER THROMBUS: NORMAL
BH CV UPPER VENOUS RIGHT RADIAL COMPRESS: NORMAL
BH CV UPPER VENOUS RIGHT SUBCLAVIAN AUGMENT: NORMAL
BH CV UPPER VENOUS RIGHT SUBCLAVIAN COMPETENT: NORMAL
BH CV UPPER VENOUS RIGHT SUBCLAVIAN COMPRESS: NORMAL
BH CV UPPER VENOUS RIGHT SUBCLAVIAN PHASIC: NORMAL
BH CV UPPER VENOUS RIGHT SUBCLAVIAN SPONT: NORMAL
BH CV UPPER VENOUS RIGHT ULNAR COMPRESS: NORMAL
BILIRUB CONJ SERPL-MCNC: 0.2 MG/DL (ref 0–0.3)
BILIRUB INDIRECT SERPL-MCNC: 0.2 MG/DL
BILIRUB SERPL-MCNC: 0.4 MG/DL (ref 0.1–1.2)
BILIRUB SERPL-MCNC: 0.4 MG/DL (ref 0.1–1.2)
BLD GP AB SCN SERPL QL ELUTION: NEGATIVE
BUN BLD-MCNC: 41 MG/DL (ref 6–20)
BUN/CREAT SERPL: 13.4 (ref 7–25)
C PEPTIDE SERPL-MCNC: 1.8 NG/ML (ref 1.1–4.4)
CALCIUM SPEC-SCNC: 7.9 MG/DL (ref 8.6–10.5)
CHLORIDE SERPL-SCNC: 98 MMOL/L (ref 98–107)
CO2 SERPL-SCNC: 23.1 MMOL/L (ref 22–29)
CREAT BLD-MCNC: 3.07 MG/DL (ref 0.57–1)
D DIMER PPP FEU-MCNC: 11.59 MCGFEU/ML (ref 0–0.49)
DAT C3: NEGATIVE
DAT IGG-SP REAG RBC-IMP: POSITIVE
DAT POLY-SP REAG RBC QL: POSITIVE
DEPRECATED RDW RBC AUTO: 47.2 FL (ref 37–54)
ERYTHROCYTE [DISTWIDTH] IN BLOOD BY AUTOMATED COUNT: 15.2 % (ref 11.7–13)
ERYTHROCYTE [SEDIMENTATION RATE] IN BLOOD: 48 MM/HR (ref 0–20)
FERRITIN SERPL-MCNC: 950.1 NG/ML (ref 13–150)
FIBRINOGEN PPP-MCNC: 387 MG/DL (ref 219–464)
FOLATE SERPL-MCNC: 13 NG/ML (ref 4.78–24.2)
GAD65 AB SER-ACNC: >250 U/ML (ref 0–5)
GFR SERPL CREATININE-BSD FRML MDRD: 17 ML/MIN/1.73
GLOBULIN UR ELPH-MCNC: 3.5 GM/DL
GLUCOSE BLD-MCNC: 253 MG/DL (ref 65–99)
GLUCOSE BLDC GLUCOMTR-MCNC: 124 MG/DL (ref 70–130)
GLUCOSE BLDC GLUCOMTR-MCNC: 136 MG/DL (ref 70–130)
GLUCOSE BLDC GLUCOMTR-MCNC: 211 MG/DL (ref 70–130)
GLUCOSE BLDC GLUCOMTR-MCNC: 227 MG/DL (ref 70–130)
GLUCOSE BLDC GLUCOMTR-MCNC: 268 MG/DL (ref 70–130)
GLUCOSE BLDC GLUCOMTR-MCNC: 312 MG/DL (ref 70–130)
GLUCOSE BLDC GLUCOMTR-MCNC: 362 MG/DL (ref 70–130)
GLUCOSE BLDC GLUCOMTR-MCNC: 91 MG/DL (ref 70–130)
HCO3 BLDA-SCNC: 23.7 MMOL/L (ref 22–28)
HCT VFR BLD AUTO: 28.4 % (ref 35.6–45.5)
HGB BLD-MCNC: 10.3 G/DL (ref 11.9–15.5)
HOROWITZ INDEX BLD+IHG-RTO: 30 %
INR PPP: 1.17 (ref 0.9–1.1)
IRON 24H UR-MRATE: 31 MCG/DL (ref 37–145)
IRON SATN MFR SERPL: 16 % (ref 20–50)
LDH SERPL-CCNC: 490 U/L (ref 135–214)
MCH RBC QN AUTO: 31.1 PG (ref 26.9–32)
MCHC RBC AUTO-ENTMCNC: 36.3 G/DL (ref 32.4–36.3)
MCV RBC AUTO: 85.8 FL (ref 80.5–98.2)
MODALITY: ABNORMAL
O2 A-A PPRESDIFF RESPIRATORY: 0.7 MMHG
PCO2 BLDA: 35.1 MM HG (ref 35–45)
PEEP RESPIRATORY: 5 CM[H2O]
PH BLDA: 7.44 PH UNITS (ref 7.35–7.45)
PHOSPHATE SERPL-MCNC: 2.1 MG/DL (ref 2.5–4.5)
PLATELET # BLD AUTO: 103 10*3/MM3 (ref 140–500)
PLATELET # BLD AUTO: 54 10*3/MM3 (ref 140–500)
PLATELETS.RETICULATED NFR BLD AUTO: 9.4 % (ref 0.9–6.5)
PMV BLD AUTO: 12 FL (ref 6–12)
PO2 BLDA: 122.4 MM HG (ref 80–100)
POTASSIUM BLD-SCNC: 3.3 MMOL/L (ref 3.5–5.2)
PROT SERPL-MCNC: 6.4 G/DL (ref 6–8.5)
PROTHROMBIN TIME: 14.5 SECONDS (ref 11.7–14.2)
RBC # BLD AUTO: 3.31 10*6/MM3 (ref 3.9–5.2)
SAO2 % BLDCOA: 98.9 % (ref 92–99)
SET MECH RESP RATE: 14
SODIUM BLD-SCNC: 138 MMOL/L (ref 136–145)
TIBC SERPL-MCNC: 198 MCG/DL (ref 298–536)
TOTAL RATE: 17 BREATHS/MINUTE
TRANSFERRIN SERPL-MCNC: 133 MG/DL (ref 200–360)
UNIT  ABO: NORMAL
UNIT  ABO: NORMAL
UNIT  RH: NORMAL
UNIT  RH: NORMAL
VANCOMYCIN SERPL-MCNC: 23.2 MCG/ML (ref 5–40)
VENTILATOR MODE: ABNORMAL
VIT B12 BLD-MCNC: 909 PG/ML (ref 211–946)
WBC NRBC COR # BLD: 18.1 10*3/MM3 (ref 4.5–10.7)

## 2017-07-05 PROCEDURE — 82248 BILIRUBIN DIRECT: CPT | Performed by: INTERNAL MEDICINE

## 2017-07-05 PROCEDURE — 86235 NUCLEAR ANTIGEN ANTIBODY: CPT | Performed by: INTERNAL MEDICINE

## 2017-07-05 PROCEDURE — 93970 EXTREMITY STUDY: CPT

## 2017-07-05 PROCEDURE — 85652 RBC SED RATE AUTOMATED: CPT | Performed by: INTERNAL MEDICINE

## 2017-07-05 PROCEDURE — 85730 THROMBOPLASTIN TIME PARTIAL: CPT | Performed by: INTERNAL MEDICINE

## 2017-07-05 PROCEDURE — 99255 IP/OBS CONSLTJ NEW/EST HI 80: CPT | Performed by: INTERNAL MEDICINE

## 2017-07-05 PROCEDURE — 82962 GLUCOSE BLOOD TEST: CPT

## 2017-07-05 PROCEDURE — 85610 PROTHROMBIN TIME: CPT | Performed by: INTERNAL MEDICINE

## 2017-07-05 PROCEDURE — 63710000001 INSULIN ASPART PER 5 UNITS: Performed by: INTERNAL MEDICINE

## 2017-07-05 PROCEDURE — 86225 DNA ANTIBODY NATIVE: CPT | Performed by: INTERNAL MEDICINE

## 2017-07-05 PROCEDURE — 25010000002 HEPARIN (PORCINE) PER 1000 UNITS

## 2017-07-05 PROCEDURE — 85055 RETICULATED PLATELET ASSAY: CPT | Performed by: INTERNAL MEDICINE

## 2017-07-05 PROCEDURE — 85027 COMPLETE CBC AUTOMATED: CPT | Performed by: INTERNAL MEDICINE

## 2017-07-05 PROCEDURE — 25010000002 HYDROCORTISONE SODIUM SUCCINATE 100 MG RECONSTITUTED SOLUTION: Performed by: INTERNAL MEDICINE

## 2017-07-05 PROCEDURE — 84466 ASSAY OF TRANSFERRIN: CPT | Performed by: INTERNAL MEDICINE

## 2017-07-05 PROCEDURE — 86850 RBC ANTIBODY SCREEN: CPT | Performed by: INTERNAL MEDICINE

## 2017-07-05 PROCEDURE — 99231 SBSQ HOSP IP/OBS SF/LOW 25: CPT | Performed by: INTERNAL MEDICINE

## 2017-07-05 PROCEDURE — 80053 COMPREHEN METABOLIC PANEL: CPT | Performed by: INTERNAL MEDICINE

## 2017-07-05 PROCEDURE — 94003 VENT MGMT INPAT SUBQ DAY: CPT

## 2017-07-05 PROCEDURE — 83540 ASSAY OF IRON: CPT | Performed by: INTERNAL MEDICINE

## 2017-07-05 PROCEDURE — 63710000001 INSULIN DETEMER PER 5 UNITS: Performed by: INTERNAL MEDICINE

## 2017-07-05 PROCEDURE — 94799 UNLISTED PULMONARY SVC/PX: CPT

## 2017-07-05 PROCEDURE — 82803 BLOOD GASES ANY COMBINATION: CPT

## 2017-07-05 PROCEDURE — 86860 RBC ANTIBODY ELUTION: CPT | Performed by: INTERNAL MEDICINE

## 2017-07-05 PROCEDURE — 94760 N-INVAS EAR/PLS OXIMETRY 1: CPT

## 2017-07-05 PROCEDURE — 25010000002 HEPARIN (PORCINE) PER 1000 UNITS: Performed by: INTERNAL MEDICINE

## 2017-07-05 PROCEDURE — 84100 ASSAY OF PHOSPHORUS: CPT | Performed by: INTERNAL MEDICINE

## 2017-07-05 PROCEDURE — 86880 COOMBS TEST DIRECT: CPT | Performed by: INTERNAL MEDICINE

## 2017-07-05 PROCEDURE — 86022 PLATELET ANTIBODIES: CPT | Performed by: INTERNAL MEDICINE

## 2017-07-05 PROCEDURE — 82728 ASSAY OF FERRITIN: CPT | Performed by: INTERNAL MEDICINE

## 2017-07-05 PROCEDURE — 85379 FIBRIN DEGRADATION QUANT: CPT | Performed by: INTERNAL MEDICINE

## 2017-07-05 PROCEDURE — 25010000002 METHYLPREDNISOLONE PER 125 MG: Performed by: INTERNAL MEDICINE

## 2017-07-05 PROCEDURE — 83615 LACTATE (LD) (LDH) ENZYME: CPT | Performed by: INTERNAL MEDICINE

## 2017-07-05 PROCEDURE — 25010000002 VANCOMYCIN: Performed by: INTERNAL MEDICINE

## 2017-07-05 PROCEDURE — 36600 WITHDRAWAL OF ARTERIAL BLOOD: CPT

## 2017-07-05 PROCEDURE — 82746 ASSAY OF FOLIC ACID SERUM: CPT | Performed by: INTERNAL MEDICINE

## 2017-07-05 PROCEDURE — 82247 BILIRUBIN TOTAL: CPT | Performed by: INTERNAL MEDICINE

## 2017-07-05 PROCEDURE — 99233 SBSQ HOSP IP/OBS HIGH 50: CPT | Performed by: INTERNAL MEDICINE

## 2017-07-05 PROCEDURE — 25010000002 PIPERACILLIN SOD-TAZOBACTAM PER 1 G: Performed by: INTERNAL MEDICINE

## 2017-07-05 PROCEDURE — 80202 ASSAY OF VANCOMYCIN: CPT | Performed by: INTERNAL MEDICINE

## 2017-07-05 PROCEDURE — 86200 CCP ANTIBODY: CPT | Performed by: INTERNAL MEDICINE

## 2017-07-05 PROCEDURE — 82607 VITAMIN B-12: CPT | Performed by: INTERNAL MEDICINE

## 2017-07-05 PROCEDURE — 85384 FIBRINOGEN ACTIVITY: CPT | Performed by: INTERNAL MEDICINE

## 2017-07-05 RX ORDER — METHYLPREDNISOLONE SODIUM SUCCINATE 125 MG/2ML
60 INJECTION, POWDER, LYOPHILIZED, FOR SOLUTION INTRAMUSCULAR; INTRAVENOUS EVERY 8 HOURS
Status: DISCONTINUED | OUTPATIENT
Start: 2017-07-05 | End: 2017-07-07

## 2017-07-05 RX ORDER — ALBUMIN (HUMAN) 12.5 G/50ML
12.5 SOLUTION INTRAVENOUS 3 TIMES DAILY PRN
Status: DISCONTINUED | OUTPATIENT
Start: 2017-07-05 | End: 2017-07-06

## 2017-07-05 RX ORDER — HEPARIN SODIUM 1000 [USP'U]/ML
INJECTION, SOLUTION INTRAVENOUS; SUBCUTANEOUS
Status: COMPLETED
Start: 2017-07-05 | End: 2017-07-05

## 2017-07-05 RX ADMIN — DEXMEDETOMIDINE HYDROCHLORIDE 1.2 MCG/KG/HR: 4 INJECTION, SOLUTION INTRAVENOUS at 01:38

## 2017-07-05 RX ADMIN — HEPARIN SODIUM 3400 UNITS: 1000 INJECTION, SOLUTION INTRAVENOUS; SUBCUTANEOUS at 11:12

## 2017-07-05 RX ADMIN — HYDROCODONE BITARTRATE AND ACETAMINOPHEN 1 TABLET: 10; 325 TABLET ORAL at 16:22

## 2017-07-05 RX ADMIN — CASTOR OIL AND BALSAM, PERU: 788; 87 OINTMENT TOPICAL at 20:57

## 2017-07-05 RX ADMIN — INSULIN ASPART 2 UNITS: 100 INJECTION, SOLUTION INTRAVENOUS; SUBCUTANEOUS at 09:29

## 2017-07-05 RX ADMIN — DEXMEDETOMIDINE HYDROCHLORIDE 0.7 MCG/KG/HR: 4 INJECTION, SOLUTION INTRAVENOUS at 19:47

## 2017-07-05 RX ADMIN — HYDROCODONE BITARTRATE AND ACETAMINOPHEN 1 TABLET: 10; 325 TABLET ORAL at 20:57

## 2017-07-05 RX ADMIN — CASTOR OIL AND BALSAM, PERU: 788; 87 OINTMENT TOPICAL at 09:30

## 2017-07-05 RX ADMIN — INSULIN ASPART 3 UNITS: 100 INJECTION, SOLUTION INTRAVENOUS; SUBCUTANEOUS at 00:43

## 2017-07-05 RX ADMIN — LANSOPRAZOLE 30 MG: KIT at 06:44

## 2017-07-05 RX ADMIN — TAZOBACTAM SODIUM AND PIPERACILLIN SODIUM 3.38 G: 375; 3 INJECTION, SOLUTION INTRAVENOUS at 19:47

## 2017-07-05 RX ADMIN — HEPARIN SODIUM 5000 UNITS: 5000 INJECTION, SOLUTION INTRAVENOUS; SUBCUTANEOUS at 05:15

## 2017-07-05 RX ADMIN — LEVOTHYROXINE SODIUM 50 MCG: 50 TABLET ORAL at 05:15

## 2017-07-05 RX ADMIN — INSULIN ASPART 2 UNITS: 100 INJECTION, SOLUTION INTRAVENOUS; SUBCUTANEOUS at 04:23

## 2017-07-05 RX ADMIN — VANCOMYCIN HYDROCHLORIDE 500 MG: 500 INJECTION, POWDER, LYOPHILIZED, FOR SOLUTION INTRAVENOUS at 16:23

## 2017-07-05 RX ADMIN — HYDROCODONE BITARTRATE AND ACETAMINOPHEN 1 TABLET: 10; 325 TABLET ORAL at 02:41

## 2017-07-05 RX ADMIN — TAZOBACTAM SODIUM AND PIPERACILLIN SODIUM 3.38 G: 375; 3 INJECTION, SOLUTION INTRAVENOUS at 06:15

## 2017-07-05 RX ADMIN — METHYLPREDNISOLONE SODIUM SUCCINATE 60 MG: 125 INJECTION, POWDER, FOR SOLUTION INTRAMUSCULAR; INTRAVENOUS at 14:05

## 2017-07-05 RX ADMIN — INSULIN DETEMIR 10 UNITS: 100 INJECTION, SOLUTION SUBCUTANEOUS at 20:57

## 2017-07-05 RX ADMIN — HYDROCORTISONE SODIUM SUCCINATE 25 MG: 100 INJECTION, POWDER, FOR SOLUTION INTRAMUSCULAR; INTRAVENOUS at 04:16

## 2017-07-05 RX ADMIN — DEXMEDETOMIDINE HYDROCHLORIDE 1 MCG/KG/HR: 4 INJECTION, SOLUTION INTRAVENOUS at 06:38

## 2017-07-05 RX ADMIN — INSULIN DETEMIR 10 UNITS: 100 INJECTION, SOLUTION SUBCUTANEOUS at 10:07

## 2017-07-05 RX ADMIN — DEXMEDETOMIDINE HYDROCHLORIDE 0.9 MCG/KG/HR: 4 INJECTION, SOLUTION INTRAVENOUS at 12:50

## 2017-07-05 RX ADMIN — METHYLPREDNISOLONE SODIUM SUCCINATE 60 MG: 125 INJECTION, POWDER, FOR SOLUTION INTRAMUSCULAR; INTRAVENOUS at 21:59

## 2017-07-05 RX ADMIN — HYDROCODONE BITARTRATE AND ACETAMINOPHEN 1 TABLET: 10; 325 TABLET ORAL at 09:29

## 2017-07-05 RX ADMIN — INSULIN ASPART 4 UNITS: 100 INJECTION, SOLUTION INTRAVENOUS; SUBCUTANEOUS at 20:56

## 2017-07-05 NOTE — PROGRESS NOTES
"  ENDOCRINE    Subjective   AND PLANS  Sarita Bai is a 37 y.o. female.     Tolerating tube feeding well.  Blood sugar 211-268.  Will give Levemir 10 units this morning.  Continue Levemir 10 units every evening plus NovoLog every 4 hours as needed.    Started on levothyroxine 50 µg through feeding tube today.      Afebrile.  On Zosyn and vancomycin    Hematology consultation pending.  Objective   /77 (BP Location: Right arm, Patient Position: Lying)  Pulse 52  Temp 98.2 °F (36.8 °C) (Oral)   Resp 16  Ht 67.01\" (170.2 cm)  Wt 162 lb 14.7 oz (73.9 kg)  SpO2 99%  BMI 25.51 kg/m2  Physical Exam    Sedated on ventilator  Few rhonchi.  No rales or wheezes  Regular heart rate and rhythm.  Abdomen soft, nontender, positive bowel sounds.  Toes cool but not cyanotic    Lab Results (last 24 hours)     Procedure Component Value Units Date/Time    POC Glucose Fingerstick [[id number]]  (Normal) Collected:  07/04/17 1300    Specimen:  Blood Updated:  07/04/17 1320     Glucose 95 mg/dL     Narrative:       Meter: [id number] : [id number] Storm Asia    POC Glucose Fingerstick [[id number]]  (Abnormal) Collected:  07/04/17 1421    Specimen:  Blood Updated:  07/04/17 1441     Glucose 59 (L) mg/dL     Narrative:       Meter: [id number] : [id number] [employer]    POC Glucose Fingerstick [[id number]]  (Normal) Collected:  07/04/17 1452    Specimen:  Blood Updated:  07/04/17 1512     Glucose 75 mg/dL     Narrative:       Meter: [id number] : [id number] Storm [employer]    POC Glucose Fingerstick [[id number]]  (Normal) Collected:  07/04/17 1523    Specimen:  Blood Updated:  07/04/17 1543     Glucose 125 mg/dL     Narrative:       Meter: [id number] : [id number] Storm Betancourt    Hemoglobin & Hematocrit, Blood [[id number]]  (Abnormal) Collected:  07/04/17 1712    Specimen:  Blood Updated:  07/04/17 1751     Hemoglobin 10.1 (L) g/dL      Hematocrit 27.8 (L) %     Phosphorus [[id number]]  (Abnormal) " Collected:  07/04/17 1712    Specimen:  Blood Updated:  07/04/17 1811     Phosphorus 1.8 (L) mg/dL     POC Glucose Fingerstick [[id number]]  (Abnormal) Collected:  07/04/17 1806    Specimen:  Blood Updated:  07/04/17 1826     Glucose 241 (H) mg/dL     Narrative:       Meter: [id number] : [id number] Storm Betancourt    POC Glucose Fingerstick [[id number]]  (Abnormal) Collected:  07/04/17 1944    Specimen:  Blood Updated:  07/04/17 1945     Glucose 248 (H) mg/dL     Narrative:       Meter: [id number] : [id number] Steven Sexton    POC Glucose Fingerstick [[id number]]  (Abnormal) Collected:  07/05/17 0031    Specimen:  Blood Updated:  07/05/17 0032     Glucose 268 (H) mg/dL     Narrative:       Meter: [id number] : [id number] Steven Sexton    Blood Gas, Arterial [[id number]]  (Abnormal) Collected:  07/05/17 0339    Specimen:  Arterial Blood Updated:  07/05/17 0341     Site Arterial: right radial     Cristian's Test Positive     pH, Arterial 7.438 pH units      pCO2, Arterial 35.1 mm Hg      pO2, Arterial 122.4 (H) mm Hg      HCO3, Arterial 23.7 mmol/L      Base Excess, Arterial -0.3 (L) mmol/L      O2 Saturation Calculated 98.9 %      A-a Gradiant 0.7 mmHg      Barometric Pressure for Blood Gas 752.2 mmHg      Modality Adult Vent     FIO2 30 %      Ventilator Mode PC     Set [facility] Resp Rate 14     Rate 17 Breaths/minute      PEEP 5    Narrative:       PCV IP 18  SATS 100% Meter: [id number] : [id number] Aimee He    POC Glucose Fingerstick [[id number]]  (Abnormal) Collected:  07/05/17 0423    Specimen:  Blood Updated:  07/05/17 0425     Glucose 211 (H) mg/dL     Narrative:       Meter: [id number] : [id number] Steven Sexton    aPTT [[id number]]  (Normal) Collected:  07/05/17 0415    Specimen:  Blood Updated:  07/05/17 0545     PTT 29.4 seconds     Phosphorus [[id number]]  (Abnormal) Collected:  07/05/17 0415    Specimen:  Blood Updated:  07/05/17 0556     Phosphorus 2.1 (L) mg/dL     Vancomycin, Random  [[id number]]  (Normal) Collected:  07/05/17 0415    Specimen:  Blood Updated:  07/05/17 0558     Vancomycin Random 23.20 mcg/mL     Comprehensive Metabolic Panel [[id number]]  (Abnormal) Collected:  07/05/17 0415    Specimen:  Blood Updated:  07/05/17 0558     Glucose 253 (H) mg/dL      BUN 41 (H) mg/dL      Creatinine 3.07 (H) mg/dL      Sodium 138 mmol/L      Potassium 3.3 (L) mmol/L      Chloride 98 mmol/L      CO2 23.1 mmol/L      Calcium 7.9 (L) mg/dL      Total Protein 6.4 g/dL      Albumin 2.90 (L) g/dL      ALT (SGPT) 455 (H) U/L      AST (SGOT) 176 (H) U/L      Alkaline Phosphatase 125 (H) U/L      Total Bilirubin 0.4 mg/dL      eGFR Non African Amer 17 (L) mL/min/1.73      Globulin 3.5 gm/dL      A/G Ratio 0.8 g/dL      BUN/Creatinine Ratio 13.4     Anion Gap 16.9 mmol/L     CBC (No Diff) [[id number]]  (Abnormal) Collected:  07/05/17 0415    Specimen:  Blood Updated:  07/05/17 0645     WBC 18.10 (H) 10*3/mm3      RBC 3.31 (L) 10*6/mm3      Hemoglobin 10.3 (L) g/dL      Hematocrit 28.4 (L) %      MCV 85.8 fL      MCH 31.1 pg      MCHC 36.3 g/dL      RDW 15.2 (H) %      RDW-SD 47.2 fl      MPV 12.0 fL      Platelets 103 (L) 10*3/mm3     POC Glucose Fingerstick [[id number]]  (Abnormal) Collected:  07/05/17 0728    Specimen:  Blood Updated:  07/05/17 0729     Glucose 227 (H) mg/dL     Narrative:       Meter: [id number] : [id number] CARA ROWE    Blood Culture [[id number]]  (Normal) Collected:  06/30/17 0925    Specimen:  Blood from Hand, Left Updated:  07/05/17 1001     Blood Culture No growth at 5 days    Iron Profile [[id number]]  (Abnormal) Collected:  07/05/17 0415    Specimen:  Blood Updated:  07/05/17 1014     Iron 31 (L) mcg/dL      Iron Saturation 16 (L) %      Transferrin 133 (L) mg/dL      TIBC 198 mcg/dL     Lactate Dehydrogenase [[id number]]  (Abnormal) Collected:  07/05/17 0415    Specimen:  Blood Updated:  07/05/17 1014      (H) U/L     Bilirubin, Total & Direct [[id number]] Collected:   07/05/17 0415    Specimen:  Blood Updated:  07/05/17 1014     Total Bilirubin 0.4 mg/dL      Bilirubin, Direct 0.2 mg/dL      Bilirubin, Indirect 0.2 mg/dL     Heparin-induced Platelet Antibody [[id number]] Collected:  07/05/17 0957    Specimen:  Blood Updated:  07/05/17 1019    Ferritin [[id number]]  (Abnormal) Collected:  07/05/17 0415    Specimen:  Blood Updated:  07/05/17 1019     Ferritin 950.10 (H) ng/mL     Vitamin B12 [[id number]]  (Normal) Collected:  07/05/17 0415    Specimen:  Blood Updated:  07/05/17 1028     Vitamin B-12 909 pg/mL     Folate [[id number]]  (Normal) Collected:  07/05/17 0415    Specimen:  Blood Updated:  07/05/17 1028     Folate 13.00 ng/mL     Immature Platelet Fraction [[id number]]  (Abnormal) Collected:  07/05/17 0957    Specimen:  Blood Updated:  07/05/17 1037     IPF 9.40 (H) %      Platelets 54 (L) 10*3/mm3     Fibrinogen [[id number]]  (Normal) Collected:  07/05/17 0957    Specimen:  Blood Updated:  07/05/17 1044     Fibrinogen 387 mg/dL     Protime-INR [[id number]]  (Abnormal) Collected:  07/05/17 0957    Specimen:  Blood Updated:  07/05/17 1044     Protime 14.5 (H) Seconds      INR 1.17 (H)    aPTT [[id number]]  (Normal) Collected:  07/05/17 0957    Specimen:  Blood Updated:  07/05/17 1044     PTT 27.7 seconds     D-dimer, Quantitative [[id number]]  (Abnormal) Collected:  07/05/17 0957    Specimen:  Blood Updated:  07/05/17 1044     D-Dimer, Quantitative 11.59 (H) MCGFEU/mL     Narrative:       The Stago D-Dimer test used in conjunction with a clinical pretest probability (PTP) assessment model, has been approved by the FDA to rule out the presence of venous thromboembolism (VTE) in outpatients suspected of deep venous thrombosis (DVT) or pulmonary embolism (PE).     Sedimentation Rate [[id number]]  (Abnormal) Collected:  07/05/17 0957    Specimen:  Blood Updated:  07/05/17 1100     Sed Rate 48 (H) mm/hr     Blood Culture [[id number]]  (Normal) Collected:  06/30/17 1050    Specimen:  Blood  from Arm, Left Updated:  07/05/17 1101     Blood Culture No growth at 5 days            Active Problems:    DKA (diabetic ketoacidoses)    Pneumonia    Sepsis    Acute respiratory failure    History of systemic lupus erythematosus (SLE)    History of splenectomy    History of ITP    Primary hypothyroidism    ARF (acute renal failure) with tubular necrosis    Insulin therapy as discussed above.  Thyroid hormone replacement as discussed above.  Hematology consultation pending  Continue dialysis per nephrologist.

## 2017-07-05 NOTE — PROGRESS NOTES
Discharge Planning Assessment  [facility]     Patient Name: Sarita Bai  MRN: [id number]  Today's Date: 7/5/2017    Admit Date: 6/29/2017          Discharge Needs Assessment       07/05/17 1033    Living Environment    Lives With alone    Living Arrangements house    Home Accessibility no concerns    Stair Railings at Home none    Transportation Available car;family or friend will provide    Living Environment    Provides Primary Care For no one    Quality Of Family Relationships supportive    Able to Return to Prior Living Arrangements yes    Living Arrangement Comments Pt lives alone in single level house.     Discharge Needs Assessment    Concerns To Be Addressed basic needs concerns    Anticipated Changes Related to Illness none    Equipment Currently Used at Home none    Equipment Needed After Discharge none    Discharge Disposition rehab facility            Discharge Plan       07/05/17 1034    Case Management/Social Work Plan    Plan Plan Acute Rehab of SNF.  Pt will need pre cert.  THIERNO Bryan    Additional Comments FACE SHEET VERIFIED.  Pt intubated on vent.  Spoke to pt's father  (Ryan Bai [phone]) and mother (Maria G [phone]) at bedside.  Pt lives alone in a single level house.  Pt independent with ADL's prior to hospitalization.  Pt's parents do no know pt's pharmacy.  Pt does not use any DME's.  Pt is not current with HH.  Pt has not been in SNF.   CCP explained role of assisting in transferring pt to rehab or SNF if needed.  Pt's family voice understanding and CCP will follow for discharge needs.  THIERNO Bryan        Discharge Placement     No information found                Demographic Summary       07/05/17 1028    Referral Information    Admission Type inpatient    Arrived From other (see comments)   [facility]    Contact Information    Permission Granted to Share Information With family/designee    Comments Parents:  Father  ( Ryan Bai [phone]) or Mother (Maria G Bai  [phone])    Primary Care Physician Information    Name Dr. Gregorio Bai    Phone [phone]            Functional Status       07/05/17 1032    Functional Status Current    Ambulation 4-->completely dependent    Transferring 4-->completely dependent    Toileting 4-->completely dependent    Bathing 4-->completely dependent    Dressing 4-->completely dependent    Eating 4-->completely dependent    Communication 3-->unable to understand or speak (not related to language barrier)            Psychosocial     None            Abuse/Neglect     None            Legal     None            Substance Abuse     None            Patient Forms     None          Amanda Thakur, RN

## 2017-07-05 NOTE — PROGRESS NOTES
LOS: 6 days   Patient Care Team:  Gregorio Bai MD as PCP - General (Family Medicine)    Chief Complaint:  Pneumonia and respiratory failure    Subjective     Interval History:     Patient remains intubated and sedated on the ventilator    Review of Systems:   Unable to obtain       Objective     Vital Signs  Temp:  [97.5 °F (36.4 °C)-98.3 °F (36.8 °C)] 97.8 °F (36.6 °C)  Heart Rate:  [48-80] 53  Resp:  [15-29] 16  BP: ()/(52-92) 105/75  Arterial Line BP: (109-241)/(103-241) 241/241  FiO2 (%):  [30 %] 30 %  Vent Mode: PC/AC  Ventilator/Non-Invasive Ventilation Settings     Start     Ordered    07/01/17 0804  Ventilator - AC/PC; (24); 100; 88%; Other (see comments); 18; 22; (I time 1 sec)  Continuous     Question Answer Comment   Vent Mode AC/PC    Breath rate  24   FiO2 100    FiO2 titrate for Sp02% =/> 88%    PEEP Other (see comments)    PEEP: 18    Inspiratory Pressure 22    I:E Ratio  I time 1 sec       07/01/17 0805    07/01/17 0107  Ventilator - AC/PC; (24); 100; 12; 24  Continuous,   Status:  Canceled     Question Answer Comment   Vent Mode AC/PC    Breath rate  24   FiO2 100    PEEP 12    Inspiratory Pressure 24        07/01/17 0107    06/30/17 2121  Ventilator - AC/VC; 8  Continuous,   Status:  Canceled     Question Answer Comment   Vent Mode AC/VC    PEEP 8        06/30/17 2120              Arterial Line BP: 241/241  Arterial Line MAP (mmHg): 241 mmHg     Body mass index is 25.51 kg/(m^2).  I/O last 3 completed shifts:  In: 3834 [I.V.:1388; Blood:302; Other:160; NG/GT:1653; IV Piggyback:331]  Out: 4350 [Other:3000; Stool:1350]           Physical Exam:  General Appearance: Well-developed white female she is orally intubated and a tracheal tube looks like it's about 23 cm at the lips.   With Precedex at 1.0 mics per kilogram per hour and some when necessary narcotics.  Apparently she got little while last night flailing around but not following commands she is now in wrist restraints  Eyes:  Conjunctiva are clear and anicteric pupils are about 3 mm they are equal round and reactive to light.  ENT: Oral mucous membranes are dry, she has her ET tube and feeding tube in place  Neck: Trachea midline I don't appreciate jugular venous distention right IJ central venous catheter site looks okay  Lungs: Coarse breath sounds bilaterally they are symmetric and chest expansion appears symmetric she is on a ventilator she is on a pressure control of 14 with a respiratory rate of 14 inspiratory pressure of 18 PEEP of 5 cm FiO2 30% her SPO2 is 99% and her tidal volumes are about 450- 490cc  Cardiac: Regular rate and rhythm no murmur.   Abdomen: Soft nontender I do not palpate organomegaly or masses , she does have a few bowel sounds today.  she is tolerating tube feeds well and she is having some loose stools and a bowel management system was placed last night  : Lockwood catheter dependent drainage very minimal urine output.    Musc/Skel: Left radial A-line is been removed .  The right femoral dialysis catheter site also looks good  Skin: No mottling today, she has a little bruise on her right supraclavicular area presumably from the roto-prone bed  Neuro: A she will withdraw all 4 extremities to noxious stimuli she grimaces some but she is not following commands or mentally she seems to open her eyes to verbal stimuli  Extremities/P Vascular: No clubbing cyanosis or edema she has palpable dorsalis pedis pulses and radial pulses  MSE: Unable to assess       Labs:  WBC No results found for: WBCS   HGB Hemoglobin   Date Value Ref Range Status   07/05/2017 10.3 (L) 11.9 - 15.5 g/dL Final   07/04/2017 10.1 (L) 11.9 - 15.5 g/dL Final   07/04/2017 6.5 (C) 11.9 - 15.5 g/dL Final      HCT Hematocrit   Date Value Ref Range Status   07/05/2017 28.4 (L) 35.6 - 45.5 % Final   07/04/2017 27.8 (L) 35.6 - 45.5 % Final   07/04/2017 18.3 (C) 35.6 - 45.5 % Final      Platlets No results found for: LABPLAT     PT/INR:    No results  found for: PROTIME/  No results found for: INR    Sodium Sodium   Date Value Ref Range Status   07/05/2017 138 136 - 145 mmol/L Final   07/04/2017 137 136 - 145 mmol/L Final   07/03/2017 136 136 - 145 mmol/L Final   07/03/2017 134 (L) 136 - 145 mmol/L Final   07/03/2017 133 (L) 136 - 145 mmol/L Final   07/02/2017 133 (L) 136 - 145 mmol/L Final   07/02/2017 134 (L) 136 - 145 mmol/L Final      Potassium Potassium   Date Value Ref Range Status   07/05/2017 3.3 (L) 3.5 - 5.2 mmol/L Final   07/04/2017 3.2 (L) 3.5 - 5.2 mmol/L Final   07/03/2017 3.6 3.5 - 5.2 mmol/L Final   07/03/2017 3.9 3.5 - 5.2 mmol/L Final   07/03/2017 3.7 3.5 - 5.2 mmol/L Final   07/02/2017 3.9 3.5 - 5.2 mmol/L Final   07/02/2017 3.9 3.5 - 5.2 mmol/L Final      Chloride Chloride   Date Value Ref Range Status   07/05/2017 98 98 - 107 mmol/L Final   07/04/2017 96 (L) 98 - 107 mmol/L Final   07/03/2017 94 (L) 98 - 107 mmol/L Final   07/03/2017 92 (L) 98 - 107 mmol/L Final   07/03/2017 91 (L) 98 - 107 mmol/L Final   07/02/2017 92 (L) 98 - 107 mmol/L Final   07/02/2017 92 (L) 98 - 107 mmol/L Final      Bicarbonate No results found for: PLASMABICARB   BUN BUN   Date Value Ref Range Status   07/05/2017 41 (H) 6 - 20 mg/dL Final   07/04/2017 19 6 - 20 mg/dL Final   07/03/2017 6 6 - 20 mg/dL Final   07/03/2017 8 6 - 20 mg/dL Final   07/03/2017 7 6 - 20 mg/dL Final   07/02/2017 7 6 - 20 mg/dL Final   07/02/2017 3 (L) 6 - 20 mg/dL Final      Creatinine Creatinine   Date Value Ref Range Status   07/05/2017 3.07 (H) 0.57 - 1.00 mg/dL Final   07/04/2017 1.69 (H) 0.57 - 1.00 mg/dL Final   07/03/2017 0.62 0.57 - 1.00 mg/dL Final   07/03/2017 1.10 (H) 0.57 - 1.00 mg/dL Final   07/03/2017 0.85 0.57 - 1.00 mg/dL Final   07/02/2017 0.93 0.57 - 1.00 mg/dL Final   07/02/2017 0.47 (L) 0.57 - 1.00 mg/dL Final      Calcium Calcium   Date Value Ref Range Status   07/05/2017 7.9 (L) 8.6 - 10.5 mg/dL Final   07/04/2017 7.5 (L) 8.6 - 10.5 mg/dL Final   07/03/2017 7.2 (L) 8.6 -  10.5 mg/dL Final   07/03/2017 6.9 (L) 8.6 - 10.5 mg/dL Final   07/03/2017 6.9 (L) 8.6 - 10.5 mg/dL Final   07/02/2017 6.5 (L) 8.6 - 10.5 mg/dL Final   07/02/2017 6.9 (L) 8.6 - 10.5 mg/dL Final      Magnesium Magnesium   Date Value Ref Range Status   07/04/2017 2.2 1.6 - 2.6 mg/dL Final   07/03/2017 2.0 1.6 - 2.6 mg/dL Final   07/03/2017 2.1 1.6 - 2.6 mg/dL Final   07/03/2017 2.1 1.6 - 2.6 mg/dL Final   07/02/2017 2.0 1.6 - 2.6 mg/dL Final   07/02/2017 2.0 1.6 - 2.6 mg/dL Final            pH pH, Arterial   Date Value Ref Range Status   07/05/2017 7.438 7.350 - 7.450 pH units Final   07/04/2017 7.509 (H) 7.350 - 7.450 pH units Final   07/03/2017 7.495 (H) 7.350 - 7.450 pH units Final   07/03/2017 7.433 7.350 - 7.450 pH units Final   07/03/2017 7.334 (L) 7.350 - 7.450 pH units Final   07/02/2017 7.298 (C) 7.350 - 7.450 pH units Final     Comment:     Critical:Notify Dr KINGSTON (02-Jul-17 17:35:40)Read back ok   07/02/2017 7.286 (C) 7.350 - 7.450 pH units Final     Comment:     Critical:Notify Dr CARVER (02-Jul-17 12:28:31)Read back ok      pO2 pO2, Arterial   Date Value Ref Range Status   07/05/2017 122.4 (H) 80.0 - 100.0 mm Hg Final   07/04/2017 119.5 (H) 80.0 - 100.0 mm Hg Final   07/03/2017 157.7 (H) 80.0 - 100.0 mm Hg Final   07/03/2017 98.6 80.0 - 100.0 mm Hg Final   07/03/2017 79.2 (L) 80.0 - 100.0 mm Hg Final   07/02/2017 67.7 (L) 80.0 - 100.0 mm Hg Final   07/02/2017 72.0 (L) 80.0 - 100.0 mm Hg Final      pCO2 pCO2, Arterial   Date Value Ref Range Status   07/05/2017 35.1 35.0 - 45.0 mm Hg Final   07/04/2017 36.5 35.0 - 45.0 mm Hg Final   07/03/2017 40.4 35.0 - 45.0 mm Hg Final   07/03/2017 40.1 35.0 - 45.0 mm Hg Final   07/03/2017 52.6 (H) 35.0 - 45.0 mm Hg Final   07/02/2017 50.9 (H) 35.0 - 45.0 mm Hg Final   07/02/2017 57.2 (C) 35.0 - 45.0 mm Hg Final     Comment:     Critical:Notify Dr CARVER (02-Jul-17 12:28:31)Read back ok      HCO3 HCO3, Arterial   Date Value Ref Range Status   07/05/2017 23.7 22.0 - 28.0  mmol/L Final   07/04/2017 29.0 (H) 22.0 - 28.0 mmol/L Final   07/03/2017 31.2 (H) 22.0 - 28.0 mmol/L Final   07/03/2017 26.8 22.0 - 28.0 mmol/L Final   07/03/2017 28.0 22.0 - 28.0 mmol/L Final   07/02/2017 24.9 22.0 - 28.0 mmol/L Final   07/02/2017 27.2 22.0 - 28.0 mmol/L Final          castor oil-balsam peru  Topical Q12H   heparin (porcine) 5,000 Units Subcutaneous Q8H   HYDROcodone-acetaminophen 1 tablet Nasogastric Q6H   insulin aspart 0-12 Units Subcutaneous Q4H   insulin detemir 10 Units Subcutaneous Nightly   lansoprazole 30 mg Per G Tube QAM   levothyroxine 50 mcg Oral Q AM   piperacillin-tazobactam 3.375 g Intravenous Q12H   Vancomycin Pharmacy Intermittent Dosing  Does not apply Daily       dexmedetomidine 0.2-1.5 mcg/kg/hr Last Rate: 1 mcg/kg/hr (07/05/17 0638)   Pharmacy to dose vancomycin         Diagnostics:  Imaging Results (last 24 hours)     Procedure Component Value Units Date/Time    XR Chest 1 View [[id number]] Collected:  07/02/17 0539     Updated:  07/05/17 0632    Narrative:       X-RAY CHEST 1 VIEW.     HISTORY: Respiratory failure.     COMPARISON: 07/01/2017.     FINDINGS:  Cardiomediastinal silhouette is within normal limits. Line and tubes are  stable.     Diffuse bilateral lung opacities and bilateral pleural effusions.              Impression:       Persistent bilateral infiltrates and effusions, follow-up to resolution  is recommended.         This report was finalized on 7/5/2017 6:29 AM by Dr. Fabi Collado MD.                   Assessment/Plan     Patient Active Problem List   Diagnosis Code   • DKA (diabetic ketoacidoses) E13.10   • Pneumonia J18.9   • Sepsis A41.9   • Acute respiratory failure J96.00   • History of systemic lupus erythematosus (SLE) Z87.39   • History of splenectomy Z90.81   • History of ITP Z86.2   • Primary hypothyroidism E03.9       Impression #1 pneumonia community-acquired severe she is on broad-spectrum antibiotics including  vancomycin and Zosyn.  Cultures  thus far negative and infectious disease is following.Tania completed 5 days of azithromycin.Probably just another day or 2 of antibiotics defer to ID on that decision  #2 acute hypoxic respiratory failure secondary to ARDS with marked improvement. we will continue weaning.  I think mainly weaning now revolves around her neuro function.  #3 severe sepsis with septic shock her blood pressure has improved.  She is off vasopressors  #4 new onset diabetes with diabetic ketoacidosis Ketosis cleared him to cardiology's help appreciated  #5 acute renal failure probably ATN , she remains anuric nephrology following dialysis per nephrology  #6 SLE apparently this is been fairly inactive recently   #7Raynauds syndrome so far looks like she's got pretty good blood flow to the distal extremities she has a little cyanosis in the toes of her left foot this morning although she's got good dorsalis pedis pulses bilaterally  #8 lactic acidosis resolved  #9 metabolic encephalopathy this will take time to clear  #10 history of ITP postsplenectomy  #11 anemia looks like this is primarily acute blood loss along with this is probably phlebotomy and ICU type anemia we don't see any evidence of active bleeding .  Hemoglobin is stable after transfusion yesterday  #12 hypokalemia not too bad with her renal failure will just watch  #13 fluids/electrolytes/nutrition tolerating tube feeds to continue.  #14 elevated liver function test this could be a little bit of ischemic hepatitis or could be related to drugs we will follow-up liver functions  #15 thrombocytopenia platelet count continues to fall I thought yesterday it may be related to the CRRT, and off the night prior but doesn't explain today's have to consider things such as heparin induced thrombocytopenia and clots I will check Dopplers today.  Hold subcutaneous heparin and Dopplers negative SCDs.  That she is getting heparin with dialysis which she is going to continue to need.  I'm  going to order heparin antibodies and I'm going ahead and ask hematology to see the patient    Plan for disposition:    Van Remy MD  07/05/17  7:23 AM    Time: I have spent 39 minutes thus far today in care of the patient

## 2017-07-05 NOTE — PROGRESS NOTES
"Pharmacy to dose Vancomycin    Day 7  Consult for Dr Poe  Treating: PNA    Current Vancomycin dose pulse dose for HD    Lab Results   Component Value Date    VANCORANDOM 23.20 07/05/2017       IV Anti-Infectives     Ordered     Dose/Rate Route Frequency Start Stop    07/04/17 0924  piperacillin-tazobactam (ZOSYN) 3.375 g in iso-osmotic dextrose 50 ml (premix)     Ordering Provider:  Steve Emery MD    3.375 g  over 4 Hours Intravenous Every 12 Hours 07/04/17 1912      07/04/17 1310  vancomycin 500 mg/100 mL 0.9% NS IVPB ([facility]p)     Ordering Provider:  Armin Vaughan MD    500 mg  over 50 Minutes Intravenous Once 07/04/17 1345 07/04/17 1545    07/04/17 0812  Vancomycin Pharmacy Intermittent Dosing     Ordering Provider:  Alexis Poe MD     Does not apply Daily 07/04/17 0900      06/30/17 1410  vancomycin 1500 mg/500 mL 0.9% NS IVPB ([facility])     Ordering Provider:  Madison Harris MD    20 mg/kg × 70.6 kg  over 150 Minutes Intravenous Once 06/30/17 1445 06/30/17 2343    06/30/17 0828  azithromycin (ZITHROMAX) 500 mg 0.9% NaCl (Add-vantage) 250 mL     Van Remy MD reviewed the order on 07/04/17 0647.   Ordering Provider:  Steve Emery MD    500 mg  over 60 Minutes Intravenous Every 24 Hours 06/30/17 0900 07/04/17 1051    06/29/17 2103  vancomycin 1500 mg/500 mL 0.9% NS IVPB ([facility])     Ordering Provider:  Alexis Poe MD    20 mg/kg × 70.6 kg Intravenous Once 06/29/17 2145 06/29/17 2154    06/29/17 2101  Pharmacy to dose vancomycin     Ordering Provider:  Alexis Poe MD     Does not apply Continuous PRN 06/29/17 2101             Relevant clinical data and objective history reviewed:  37 y.o. female 67.01\" (170.2 cm) 162 lb 14.7 oz (73.9 kg)    Lab Results   Component Value Date    CREATININE 3.07 (H) 07/05/2017    CREATININE 1.69 (H) 07/04/2017    CREATININE 0.62 07/03/2017     Estimated Creatinine Clearance: 23.9 mL/min (by C-G formula based on Cr of 3.07).    Lab Results "   Component Value Date    WBC 18.10 (H) 07/05/2017    WBC 17.38 (H) 07/04/2017    WBC 17.67 (H) 07/02/2017     Temp Readings from Last 3 Encounters:   07/05/17 97.8 °F (36.6 °C) (Oral)       Baseline cultures:  6/29 UC= neg   6/30 BC x2- ngtd  6/30 Sput-ng  6/20 resp panel= not detected     7/3 CXR= diffuse pna, slight improvement of aeration    PLAN: Patient to have HD again today.  Based on random level of 23.2 this am will give a dose of 500mg x one after HD today.  Will check a random level in am again and re-dose as appropriate.     Kathia Mcgee PharmD, BCPS  07/05/17 8:18 AM

## 2017-07-05 NOTE — CONSULTS
Subjective     REASON FOR CONSULTATION: thrombocytopenia, nucleated red cell in circulation, positive Lakisha test, hx of ITP and SLE, Raynaud's phenomenon, renal failure, pneumonia, s/ splenectomy. Provide an opinion on any further workup or treatment                             REQUESTING PHYSICIAN:  DR KENDALL CARVER MD    RECORDS OBTAINED:  Records of the patients history including those obtained from the referring provider were reviewed and summarized in detail.    HISTORY OF PRESENT ILLNESS:  The patient is a 37 y.o. year old female who is here for an opinion about the above issue.    History of Present Illness this 37 years old white female who I have the opportunity to the 2/9/2001 when she developed a continued thrombocytopenic purpura requiring therapy with the steroids at that time and subsequently required a splenectomy in 2002 to control her disease process.  The patient has been follow-up in the office on 2 2011 when she was seen the last time receiving a Pneumovax injection in 2000 and 2011.  In any event the patient recently was a obesity in [place] with her mother and she complained of significant amount of years.  She was feeling fatigue and diarrhea has lost a almost a 15 pounds of weight.  During the last several weeks before.  She has lost her appetite that she was complaining of epigastric pain.  She wasn't having any alterations in the skin, normal today shows a HEENT oral mucosa is, no alteration in her hair, but she wasn't having any cough or sputum production but she was having shortness of breath.  She was going to travel [place] and she was found to be in the airport and we will okay on the been lost and not been.  Able to tell what she wanted to do with her situation.  The police a picked An appointment at an ambulance and she was transferred to the emergency room about this and we will when she was found to have a diabetic kidney ketoacidosis and she was found to have acute kidney failure  requiring hemofiltration and dialysis.  We have been consulted because of the time of the admission the patient had a normal platelet count but is now plummeting down to 55,000 and also we have documented today the patient has positive Lakisha test along with nucleated red cells in circulation and abundantly.  Also she has had significant evidence of clinical examination likely will be described below Raynaud C indicated.  Digits in both hands.  Repeat perfectly fine and she has no other evidence of likely LIVEDO reticularis.  According to the mother grade medial this information she has not had any diarrhea abdominal pain and jaundice.  The recent joint pain.  Today she has not been sick anything else but complaining of profound thirst aT the recent platelet Pala.    Past Medical History:   Diagnosis Date   • Lupus    • Thyroid activity decreased         Past Surgical History:   Procedure Laterality Date   • INSERT CENTRAL LINE AT BEDSIDE  6/30/2017        • INTUBATION  6/30/2017        • SPLENECTOMY          No current facility-administered medications on file prior to encounter.      No current outpatient prescriptions on file prior to encounter.        ALLERGIES:  No Known Allergies     Social History     Social History   • Marital status: Single     Spouse name: N/A   • Number of children: N/A   • Years of education: N/A     Social History Main Topics   • Smoking status: Never Smoker   • Smokeless tobacco: Never Used   • Alcohol use No   • Drug use: None   • Sexual activity: No     Other Topics Concern   • None     Social History Narrative        History reviewed. No pertinent family history.   HEMATOLOGIC HISTORY: 2001, immune thrombocytopenic purpura. Positive ZIGGY and Raynaud' s phenomenon. She saw Dr. Benito but he was not impressed. She was treated initially with steroids for the ITP but eventually went on to have a splenectomy in May 2002 with no difficulty. Good platelet counts since.       Pneumovax May  2002. Pneumovax December 2007.   Review of Systems   General: no fever, chills,SIGNIFICANT fatigue, AND weight changes, AND lack of appetite.POLYDIPSIA FOR 3 WEEKS  Eyes: no epiphora, xerophthalmia,conjunctivitis, pain, glaucoma, blurred vision, blindness, secretion, photophobia, proptosis, diplopia.  Ears: no otorrhea, tinnitus, otorrhagia, deafness, pain, vertigo.  Nose: no rhinorrhea, epistaxis, alteration in perception of odors, sinuses pressure.  Mouth: no alteration in gums or teeth,  ulcers, no difficulty with mastication or deglut ion, no odynophagia.  Neck: no masses or pain, no thyroid alterations, no pain in muscles or arteries, no carotid odynia, no crepitation.  Respiratory: no cough, sputum production,SOME  dyspnea,NO trepopnea, pleuritic pain, hemoptysis.  Heart: no syncope, irregularity, palpitations, angina, orthopnea, paroxysmal nocturnal dyspnea.  Vascular Venous: no tenderness,edema, palpable cords, postphlebitic syndrome, skin changes or ulcerations.  Vascular Arterial: no distal ischemia, claudication, gangrene, neuropathic ischemic pain, skin ulcers, paleness or cyanosis.ACTIVE RAYNAUD'S RECENTLY IN HANDS  GI: no dysphagia, odynophagia, no regurgitation, SOME heartburn,AND  indigestion,no nausea,no vomiting,no hematemesis ,no melena,no jaundice,no distention, no obstipation,no enterorrhagia,no proctalgia,no anal  lesions, nochanges in bowel habits.  : no frequency, hesitancy, hematuria, discharge, pain.  Musculoskeletal: no muscle or tendon pain or inflammation, joint pain, edema, functional limitation, fasciculations, mass.  Neurologic: no headache, seizures, alterations on Craneal nerves, no motor or senssory deficit, normal coordination, no alteration in memory, orientation, calculation,writting, verbal or written language.  Skin: no rashes, pruritus or localized lesions.  Psychiatric: no anxiety, depression, agitation, delusions, proper insight.    Objective     Vitals:    07/05/17 0704  07/05/17 0830 07/05/17 1100 07/05/17 1106   BP:  106/76 104/77    BP Location:  Right arm Right arm    Patient Position:  Lying Lying    Pulse:  51 (!) 47 52   Resp: 16 16 14 16   Temp: 99.6 °F (37.6 °C) 98.1 °F (36.7 °C) 98.2 °F (36.8 °C)    TempSrc: Oral Oral Oral    SpO2:    99%   Weight:       Height:         No flowsheet data found.    Physical Exam    GENERAL:  Well-developed, well-nourished  Patient  ON THE vENT.   SKIN:  Warm, dry without rashes, purpura or petechiae.RAYNAUD'S IN DIGITS BOTH HANDS.  HEENT:  Pupils were equal and reactive to light and accomodation, conjunctivas non injected, no pterigion,  Mouth mucosa was moist, no exudates in oropharynx, normal gum line, normal roof of the mouth and pillars, normal papillations of the tongue.Ear canals were normal, as well tympanic membranes, normal hearing acuity.No pain in mastoid area or erythema.  NECK:  no thyromegaly or masses, no JVD or bruits, no cervical adenopathies.  LYMPHATICS:  No cervical, supraclavicular, axillary, epitrochlear or inguinal adenopathy.  CHEST:  Normal excursion of both renetta thoraces, normal voice fremitus, no subcutaneous emphysema, normal axillas, no rashes or acanthosis nigricans. Lungs clear to percussion and auscultation, normal breath sounds bilaterally, no wheezing, crackles or ronchi, no stridor, no rubs.  CARDIAC AND VASCULAR: PMI not displaced,no thrills, normal rate and regular rhythm, without murmurs, rubs or S3 or S4 right or left sided gallops. Normal femoral, popliteal, pedis, brachial and carotid pulses.  ABDOMEN:  Soft, nontender with no organomegaly or masses, no ascites, no collateral circulation,no distention,no [place] sign, no abdominal pain,,no umbilical hernias,   GENITAL: Not  Performed.  EXTREMITIES  AND SPINE:  No clubbing, cyanosis or edema, no deformities or pain .  NEUROLOGICAL:  Patient was SEDATED NON RESPONSIVE.        RECENT LABS:  Hematology WBC   Date Value Ref Range Status   07/05/2017 18.10  (H) 4.50 - 10.70 10*3/mm3 Final     RBC   Date Value Ref Range Status   07/05/2017 3.31 (L) 3.90 - 5.20 10*6/mm3 Final     Hemoglobin   Date Value Ref Range Status   07/05/2017 10.3 (L) 11.9 - 15.5 g/dL Final     Hematocrit   Date Value Ref Range Status   07/05/2017 28.4 (L) 35.6 - 45.5 % Final     Platelets   Date Value Ref Range Status   07/05/2017 54 (L) 140 - 500 10*3/mm3 Final      Lactate Dehydrogenase   Order: [id number]   Status:  Final result   Visible to patient:  No (Not Released)      Ref Range & Units 4:15 AM      - 214 U/L 490 (H)   Resulting [facility]      Specimen Collected: 07/05/17  4:15 AM Last Resulted: 07/05/17 10:14 AM              Order: [id number]   Status:  Final result   Visible to patient:  No (Not Released)      Ref Range & Units 4:15 AM     Folate 4.78 - 24.20 ng/mL 13.00   Resulting [facility]      Specimen Collected: 07/05/17  4:15 AM Last Resulted: 07/05/17 10:28 AM              Vitamin B12   Order: [id number]   Status:  Final result   Visible to patient:  No (Not Released)      Ref Range & Units 4:15 AM     Vitamin B-12 211 - 946 pg/mL 909   [facility]      Specimen Collected: 07/05/17  4:15 AM Last Resulted: 07/05/17 10:28 AM              MIKE, IgG   Order: [id number] - Reflex for Order [id number]   Status:  Final result   Visible to patient:  No (Not Released)      4:15 AM     MIKE IgG Positive   Resulting [facility]   Narrative   ELUTION TO FOLLOW      Specimen Collected: 07/05/17  4:15 AM Last Resulted: 07/05/17 10:11 AM                       [place] Antibody Screen   Order: [id number] - Reflex for Order [id number]   Status:  Final result   Visible to patient:  No (Not Released)      4:15 AM     [place] Negative   Resulting [facility]   Narrative   SPECIMEN DRAWN 7-5-17 @0415 (POST TRANSFUSION OF TWO UNITS OF RBCS)      Specimen Collected: 07/05/17  4:15 AM Last Resulted: 07/05/17 11:25 AM                  Assessment/Plan I have  reviewed the peripheral blood in this patient..  The red cell morphology shows 3+ anisocytosis 3+ poikilocytosis shows nucleated red cells in circulation abundantly shows mAcro-ovalocytes and shows an increase reticulocyte count around 7-10%.  White blood cell morphology shows leukocytosis with left shift and granular.  Cytoplasmic in the lymphocytes are looking normal.  Platelet count with no platelet clumps and and occasional giant platelets.  In the red cell morphology there was no evidence of fragmented red blood cells.    My impression is that the patient states thrombocytopenia always very unlikely RELATED Phenomenon and associated sepsis with acute respiratory distress syndrome.  She has a positive Lakisha test today she has abundant amount OF nucleated red blood cells in circulation.  LDH is mildly elevated bilirubin and indirect bilirubin is normal.  I strongly suspect that this patient has an element AUTOIMMUNE DISEASE again by systemic lupus erythematosus and this goes along with the activity of the Raynaud's  IN her hands.    My recommendation at this time will be to proceed with IV steroids like she was before and I'm going to proceed with her lupus said testing including a repeated ZIGGY and sedimentation rate as well as CITRULLINE  peptide antibodies.  Dr. CARVER mentioned that this patient was receiving a low-dose prophylactic heparin I doubt that she has HIT.  In view of thrombocytopenia and nevertheless the antibody has been sent out.  If indeed the patient has a now to be autoimmune phenomena even in the antiplatelet antibodies could be positive..    I discussed all this issues a week.  The patient's mother who is aware of.  The circumstances and I have placed a phone call to talk with intensive care team.  The patient is to have a surgical intervention today in regard placing off a new catheter for dialysis and hemofiltration she will require platelet transfusions.

## 2017-07-05 NOTE — ANESTHESIA PREPROCEDURE EVALUATION
Anesthesia Evaluation     Patient summary reviewed and Nursing notes reviewed   no history of anesthetic complications:  NPO Solid Status: > 8 hours  NPO Liquid Status: > 8 hours     Airway   Dental      Pulmonary    (+) pneumonia worsening , decreased breath sounds,     ROS comment: ARDS, respiratory failure on vent for approximately 5 days.  Cardiovascular   Exercise tolerance: poor (<4 METS)    ECG reviewed  Rhythm: regular  Rate: normal      PE comment: tachycardia    Neuro/Psych- negative ROS  GI/Hepatic/Renal/Endo    (+)  renal disease ARF and dialysis, diabetes mellitus poorly controlled using insulin, hypothyroidism,     ROS Comment: Admitted with DKA and pnumonia that rapidly progressed to MSOF requiring intubation, dialysis.    Musculoskeletal (-) negative ROS    Abdominal    Substance History - negative use     OB/GYN          Other   (+) blood dyscrasia       Other Comment: Hx/o ITP, current anemia and thrombocytopenia.      Phys Exam Other: Intubated in the unit.                                Anesthesia Plan    ASA 4     general     intravenous induction   Anesthetic plan and risks discussed with father, mother and sibling.    Plan discussed with CRNA and attending.

## 2017-07-05 NOTE — PLAN OF CARE
Problem: Patient Care Overview (Adult)  Goal: Plan of Care Review  Outcome: Ongoing (interventions implemented as appropriate)    07/05/17 0455   Coping/Psychosocial Response Interventions   Plan Of Care Reviewed With family;patient   Patient Care Overview   Progress improving   Outcome Evaluation   Outcome Summary/Follow up Plan tolerating tube feeding, fecal management system, and minimal sedation, off levophed, no neurological change noted, anuric       Goal: Adult Individualization and Mutuality  Outcome: Ongoing (interventions implemented as appropriate)  Goal: Discharge Needs Assessment  Outcome: Ongoing (interventions implemented as appropriate)    07/05/17 0455   Discharge Needs Assessment   Concerns To Be Addressed denies needs/concerns at this time         Problem: Fall Risk (Adult)  Goal: Absence of Falls  Outcome: Ongoing (interventions implemented as appropriate)    07/05/17 0455   Fall Risk (Adult)   Absence of Falls making progress toward outcome         Problem: Diabetes, Type 1 (Adult)  Goal: Signs and Symptoms of Listed Potential Problems Will be Absent or Manageable (Diabetes, Type 1)  Outcome: Ongoing (interventions implemented as appropriate)    07/05/17 0455   Diabetes, Type 1   Problems Assessed (Type 1 Diabetes) all         Problem: Sepsis (Adult)  Goal: Signs and Symptoms of Listed Potential Problems Will be Absent or Manageable (Sepsis)  Outcome: Ongoing (interventions implemented as appropriate)    07/05/17 0455   Sepsis   Problems Assessed (Sepsis) all         Problem: Pneumonia (Adult)  Goal: Signs and Symptoms of Listed Potential Problems Will be Absent or Manageable (Pneumonia)  Outcome: Ongoing (interventions implemented as appropriate)    07/05/17 0455   Pneumonia   Problems Assessed (Pneumonia) all         Problem: Hemodialysis (Adult)  Goal: Signs and Symptoms of Listed Potential Problems Will be Absent or Manageable (Hemodialysis)  Outcome: Ongoing (interventions implemented as  appropriate)

## 2017-07-05 NOTE — PROGRESS NOTES
"   LOS: 6 days    Patient Care Team:  Gregorio Bai MD as PCP - General (Family Medicine)    Chief Complaint:  No chief complaint on file.      Subjective     Interval History: pt is on dialysis now, femoral catheter not working well.    Patient Complaints: unobtainable  Patient Denies:  unobtainable  History taken from: RN    Review of Systems:    Review of systems could not be obtained due to  patient intubated.    Objective     Vital Signs  Temp:  [97.5 °F (36.4 °C)-98.3 °F (36.8 °C)] 97.8 °F (36.6 °C)  Heart Rate:  [48-80] 54  Resp:  [15-29] 16  BP: ()/(52-92) 104/77  Arterial Line BP: (105-241)/() 241/241  FiO2 (%):  [30 %] 30 %    Flowsheet Rows         First Filed Value    Admission Height  67\" (170.2 cm) Documented at 06/29/2017 2028    Admission Weight  155 lb 10.3 oz (70.6 kg) Documented at 06/29/2017 2028          I/O this shift:  In: 1121 [I.V.:452; Other:100; NG/GT:519; IV Piggyback:50]  Out: 450 [Stool:450]  I/O last 3 completed shifts:  In: 2743 [I.V.:936; Blood:302; Other:90; NG/GT:1134; IV Piggyback:281]  Out: 3915 [Urine:15; Other:3000; Stool:900]    Intake/Output Summary (Last 24 hours) at 07/05/17 0612  Last data filed at 07/05/17 0610   Gross per 24 hour   Intake             3804 ml   Output             4350 ml   Net             -546 ml       Physical Exam:   Right femoral catheter ( 6/30). Oral ETT and OG tube. Heart RRR no s3 or rub. Lungs coarse BS, upper airway rhonchi. Abd +bs soft, nontender. Body wall edema. Ext 1+ edema upper and lower ext .         Results Review:      Results from last 7 days  Lab Units 07/05/17  0415 07/04/17  0259 07/03/17  1213  06/29/17 2018   SODIUM mmol/L 138 137 136  < > 132*   POTASSIUM mmol/L 3.3* 3.2* 3.6  < > <1.5*   CHLORIDE mmol/L 98 96* 94*  < > 102   CO2 mmol/L 23.1 25.2 25.7  < > 5.0*   BUN mg/dL 41* 19 6  < > 21*   CREATININE mg/dL 3.07* 1.69* 0.62  < > 1.94*   CALCIUM mg/dL 7.9* 7.5* 7.2*  < > 9.4   BILIRUBIN mg/dL 0.4  --   --   --  0.2 "   ALK PHOS U/L 125*  --   --   --  77   ALT (SGPT) U/L 455*  --   --   --  <5   AST (SGOT) U/L 176*  --   --   --  7   GLUCOSE mg/dL 253* 186* 132*  < > 300*   < > = values in this interval not displayed.    Estimated Creatinine Clearance: 23.9 mL/min (by C-G formula based on Cr of 3.07).      Results from last 7 days  Lab Units 07/05/17  0415 07/04/17 1712 07/04/17  0259 07/03/17  1213 07/03/17  0516   MAGNESIUM mg/dL  --   --  2.2 2.0 2.1   PHOSPHORUS mg/dL 2.1* 1.8* 0.6* 0.7* 1.9*               Results from last 7 days  Lab Units 07/04/17  1712 07/04/17  0259 07/02/17  0513 07/01/17  1540 07/01/17  0615 06/29/17 2018   WBC 10*3/mm3  --  17.38* 17.67*  --  10.90* 16.50*   HEMOGLOBIN g/dL 10.1* 6.5* 8.3* 11.1* 9.7* 12.2   PLATELETS 10*3/mm3  --  122* 197  --  210 383         Results from last 7 days  Lab Units 07/01/17  0607   INR  1.63*         Imaging Results (last 24 hours)     Procedure Component Value Units Date/Time    XR Chest 1 View [[id number]] Collected:  07/04/17 0722     Updated:  07/04/17 0722    Narrative:       PORTABLE CHEST X-RAY     CLINICAL HISTORY: ARDS.     COMPARISON: 07/03/2017.     FINDINGS: Portable AP view of the chest was obtained with overlying  monitor leads in place. Life support lines are unchanged. No  pneumothorax demonstrated. There has been partial clearing of multifocal  opacities bilaterally, particularly in the left perihilar region which  is most likely related to improving edema. A component of superimposed  pneumonia cannot be excluded. Right effusion slightly improved also.  Normal heart size.       Impression:       Significant improvement in pulmonary opacities likely  related to edema.                castor oil-balsam peru  Topical Q12H   heparin (porcine) 5,000 Units Subcutaneous Q8H   HYDROcodone-acetaminophen 1 tablet Nasogastric Q6H   hydrocortisone sodium succinate 25 mg Intravenous Q12H   insulin aspart 0-12 Units Subcutaneous Q4H   insulin detemir 10 Units  Subcutaneous Nightly   lansoprazole 30 mg Per G Tube QAM   levothyroxine 50 mcg Oral Q AM   piperacillin-tazobactam 3.375 g Intravenous Q12H   Vancomycin Pharmacy Intermittent Dosing  Does not apply Daily       dexmedetomidine 0.2-1.5 mcg/kg/hr Last Rate: 1 mcg/kg/hr (07/05/17 0500)   norepinephrine 0.02-0.3 mcg/kg/min Last Rate: Stopped (07/04/17 1255)   Pharmacy to dose vancomycin         Medication Review:   Current Facility-Administered Medications   Medication Dose Route Frequency Provider Last Rate Last Dose   • artificial tears (LUBRIFRESH P.M.) ophthalmic ointment   Both Eyes PRN Madison Harris MD       • calcium gluconate 1 g in sodium chloride 0.9 % 50 mL IVPB  1 g Intravenous PRN Susan Urena MD        Or   • calcium gluconate 2 g in sodium chloride 0.9 % 50 mL IVPB  2 g Intravenous PRN Susan Urena MD       • castor oil-balsam peru (VENELEX) ointment   Topical Q12H Madison Harris MD       • dexmedetomidine (PRECEDEX) 400 mcg/100 mL (4 mcg/mL) infusion  0.2-1.5 mcg/kg/hr Intravenous Titrated Van Remy MD 18.53 mL/hr at 07/05/17 0500 1 mcg/kg/hr at 07/05/17 0500   • dextrose (D50W) solution 12.5 g  12.5 g Intravenous Q15 Min PRN Alexis Poe MD   12.5 g at 07/01/17 1526   • dextrose (D50W) solution 25-50 mL  25-50 mL Intravenous Q30 Min PRN Van Remy MD       • fentaNYL citrate (PF) (SUBLIMAZE) injection 25 mcg  25 mcg Intravenous Q30 Min PRN Van Remy MD       • fentaNYL citrate (PF) (SUBLIMAZE) injection 50 mcg  50 mcg Intravenous Q30 Min PRN Van Remy MD   50 mcg at 07/04/17 1455   • fentaNYL citrate (PF) (SUBLIMAZE) injection 75 mcg  75 mcg Intravenous Q30 Min PRN Van Remy MD       • heparin (porcine) 5000 UNIT/ML injection 5,000 Units  5,000 Units Subcutaneous Q8H Van Remy MD   5,000 Units at 07/05/17 0515   • heparin (porcine) injection 1,000-2,000 Units  1,000-2,000 Units Intravenous PRN Susan Urena MD    1,000 Units at 07/03/17 1816   • heparin (porcine) injection 3,400 Units  3,400 Units Intracatheter PRN Susan Urena MD   3,400 Units at 07/04/17 1243   • HYDROcodone-acetaminophen (NORCO)  MG per tablet 1 tablet  1 tablet Nasogastric Q6H Van Remy MD   1 tablet at 07/05/17 0241   • HYDROcodone-acetaminophen (NORCO) 5-325 MG per tablet 1 tablet  1 tablet Oral Q4H PRN Alexis Poe MD       • hydrocortisone sodium succinate (Solu-CORTEF) injection 25 mg  25 mg Intravenous Q12H Van Remy MD   25 mg at 07/05/17 0416   • insulin aspart (novoLOG) injection 0-12 Units  0-12 Units Subcutaneous Q4H Thomas Campbell MD   2 Units at 07/05/17 0423   • insulin detemir (LEVEMIR) injection 10 Units  10 Units Subcutaneous Nightly Thomas Campbell MD   10 Units at 07/04/17 2024   • lansoprazole (FIRST) oral suspension 30 mg  30 mg Per G Tube QAM Madison Harris MD   30 mg at 07/04/17 1013   • levothyroxine (SYNTHROID, LEVOTHROID) tablet 50 mcg  50 mcg Oral Q AM Thomas Campbell MD   50 mcg at 07/05/17 0515   • Magnesium Sulfate 2 gram Bolus, followed by 8 gram infusion (total Mg dose 10 grams)- Mg less than or equal to 1mg/dL  2 g Intravenous PRN Alexis Poe MD        Or   • Magnesium Sulfate 6 gram Infusion (2 gm x 3) -Mg 1.1 -1.5 mg/dL  2 g Intravenous PRN Alexis Poe MD        Or   • magnesium sulfate 4 gram infusion- Mg 1.6-1.9 mg/dL  4 g Intravenous PRN Alexis Poe MD 25 mL/hr at 07/02/17 0100 4 g at 07/02/17 0100   • norepinephrine (LEVOPHED) 8 mg/250 mL (32 mcg/mL) in sodium chloride 0.9% infusion (premix)  0.02-0.3 mcg/kg/min Intravenous Titrated Van Remy MD   Stopped at 07/04/17 1255   • Pharmacy to dose vancomycin   Does not apply Continuous PRN Alexis Poe MD       • piperacillin-tazobactam (ZOSYN) 3.375 g in iso-osmotic dextrose 50 ml (premix)  3.375 g Intravenous Q12H Steve Emery MD   3.375 g at 07/04/17 1903   • Vancomycin Pharmacy Intermittent Dosing    Does not apply Daily Alexis Poe MD           Assessment/Plan       Active Problems:    DKA (diabetic ketoacidoses)    Pneumonia    Sepsis    Acute respiratory failure    History of systemic lupus erythematosus (SLE)    History of splenectomy    History of ITP    Primary hypothyroidism    1. HODAN, initially  prerenal , now  ATN. Anuric. Dialysis today. BP tolerating so far. Removing volume if BP allows. 4 k bath. Catheter not working well.  Will ask vascular surgery for a tunnel catheter.  Dr. Emery says ok to place.  2. Severe right-sided pna/ARDS. On vent. SP paralysis and prone position. Now supine with lower 02 requirement. Off pressor.  3. Encephalopathy  4. New DM1 presenting as DKA, presently on insulin drip . A1c 12. Endocrine managing.   5. H/o TTP with prior splenectomy: platelets falling.   6. Hypothyroidism, now on IV replacement.   7. Anemia:2 units prbc today. If hg does not respond, consider retroperitoneal bleed since femoral catheter in place.   8. SLE    Plan dialysis again tomorrow to control volume to facilitate pulmonary weaning.             Susan Urena MD  07/05/17  6:12 AM

## 2017-07-05 NOTE — PROGRESS NOTES
Continued Stay Note  [facility]     Patient Name: Sarita Bai  MRN: [id number]  Today's Date: 7/5/2017    Admit Date: 6/29/2017          Discharge Plan       07/05/17 1034    Case Management/Social Work Plan    Plan Plan Acute Rehab of SNF.  Pt will need pre cert.  THIERNO Bryan    Additional Comments FACE SHEET VERIFIED.  Pt intubated on vent.  Spoke to pt's father  (Ryan Bai [phone]) and mother (Maria G [phone]) at bedside.  Pt lives alone in a single level house.  Pt independent with ADL's prior to hospitalization.  Pt's parents do no know pt's pharmacy.  Pt does not use any DME's.  Pt is not current with HH.  Pt has not been in SNF.   CCP explained role of assisting in transferring pt to rehab or SNF if needed.  Pt's family voice understanding and CCP will follow for discharge needs.  THIERNO Bryan              Discharge Codes     None            Amanda Thkaur, RN

## 2017-07-05 NOTE — PROGRESS NOTES
LOS: 6 days     Chief Complaint:  Follow-up sepsis    Interval History:  No fevers. No pressor needs.  FiO2 down to 30%.Tolerating antibiotics w/o rash or diarrhea. Can't obtain full history due to intubated status    ROS: can't be obtained due to intubated status    Vital Signs  Temp:  [97.5 °F (36.4 °C)-99.6 °F (37.6 °C)] 98.1 °F (36.7 °C)  Heart Rate:  [48-80] 51  Resp:  [15-29] 16  BP: ()/(52-92) 106/76  Arterial Line BP: (118-241)/(113-241) 241/241  FiO2 (%):  [30 %] 30 %    Physical Exam:  General: intubated, sedated  Head:  normcephalic  Eyes:  PERRL, no scleral icterus  ENT: MM dry, ETT in place, no thrush  :  Lockwood catheter present  Musculoskeletal: no joint abnormalities, normal musculature  Skin: No rashes, lesions, or embolic phenomenon on visible skin  Psychiatric: sedated  Vasc:  RIJ TLC present    Antibiotics:  - ZOsyn 3.375 g q12h  •  Vancomycin dosed for HD    LABS:  CBC, CMP, VTr,  Micro reviewed today  Lab Results   Component Value Date    WBC 18.10 (H) 07/05/2017    HGB 10.3 (L) 07/05/2017    HCT 28.4 (L) 07/05/2017    MCV 85.8 07/05/2017     (L) 07/05/2017     Lab Results   Component Value Date    GLUCOSE 253 (H) 07/05/2017    BUN 41 (H) 07/05/2017    CREATININE 3.07 (H) 07/05/2017    EGFRIFNONA 17 (L) 07/05/2017    BCR 13.4 07/05/2017    CO2 23.1 07/05/2017    CALCIUM 7.9 (L) 07/05/2017    ALBUMIN 2.90 (L) 07/05/2017    LABIL2 0.8 07/05/2017     (H) 07/05/2017     (H) 07/05/2017     Lab Results   Component Value Date    VANCOTROUGH 12.00 07/03/2017    VANCORANDOM 23.20 07/05/2017       Procal 2--->4.9--->4.4    Microbiology:  6/30 RVP negative  6/30 BCx: negative  6/30 SpCx: negative    Radiology (personally reviewed images):   No new imaging today    Assessment/Plan   1. ARDS and Sepsis secondary to Right lower lobe pneumonia  -slowly improving as this process tends to follow  -continue vancomycin with goal trough 15-20; reviewed PharmD dosing  -continue Zosyn  3.375 g  q12h for intermittent HD  -plan stop date 7/6/17 at 11:59 pm to complete 7-day course  -finished azithromycin 500 mg IV q24h x 5 days  -all cultures negative     2. Diabetic ketoacidosis     3. History of splenectomy  -will address vaccine status after critical stage     4. Systemic lupus erythematosus  -appears quiescent based on history     5. Acute kidney injury   -on intermittent HD    6. Hepatitis - new. Work-up per ICU team.     Thank you for this consult. ID will follow. I discussed the patients findings and my recommendations with RN.

## 2017-07-05 NOTE — PLAN OF CARE
Problem: Patient Care Overview (Adult)  Goal: Plan of Care Review  Outcome: Ongoing (interventions implemented as appropriate)    07/05/17 1943   Coping/Psychosocial Response Interventions   Plan Of Care Reviewed With family   Patient Care Overview   Progress improving   Outcome Evaluation   Outcome Summary/Follow up Plan Pt has been tolerating tube feeding, ventilator, fecal management system through-out shift. Pt's platelet count low, MD notified and scheduled to give platelet for tunneled cath surgery tomorrow AM.  Pt's neuro improved; pt is able to follow commands such as nod head, move toes, etc. VSS.          Problem: Fall Risk (Adult)  Goal: Identify Related Risk Factors and Signs and Symptoms  Outcome: Ongoing (interventions implemented as appropriate)    07/05/17 1943   Fall Risk   Fall Risk: Related Risk Factors polypharmacy;environment unfamiliar   Fall Risk: Signs and Symptoms presence of risk factors       Goal: Absence of Falls  Outcome: Ongoing (interventions implemented as appropriate)    07/05/17 1943   Fall Risk (Adult)   Absence of Falls making progress toward outcome         Problem: Diabetes, Type 1 (Adult)  Goal: Signs and Symptoms of Listed Potential Problems Will be Absent or Manageable (Diabetes, Type 1)  Outcome: Ongoing (interventions implemented as appropriate)    07/05/17 1943   Diabetes, Type 1   Problems Assessed (Type 1 Diabetes) all   Problems Present (Type 1 Diabetes) other (see comments)  (continue to monitor)         Problem: Sepsis (Adult)  Goal: Signs and Symptoms of Listed Potential Problems Will be Absent or Manageable (Sepsis)  Outcome: Ongoing (interventions implemented as appropriate)    07/05/17 1943   Sepsis   Problems Assessed (Sepsis) all   Problems Present (Sepsis) glycemic control, impaired;situational response         Problem: Pneumonia (Adult)  Goal: Signs and Symptoms of Listed Potential Problems Will be Absent or Manageable (Pneumonia)  Outcome: Ongoing  (interventions implemented as appropriate)    07/05/17 1943   Pneumonia   Problems Assessed (Pneumonia) all   Problems Present (Pneumonia) respiratory compromise

## 2017-07-05 NOTE — PROGRESS NOTES
BLEV doppler completed. Preliminary report is negative for DVT.  BUEV doppler completed. Preliminary report is positive for bilateral superficial thrombophlebitis results given to Bethany PA.

## 2017-07-05 NOTE — CONSULTS
Date of Admission:  6/29/2017   LOS: 6 days   Patient Care Team:  Gregorio Bai MD as PCP - General (Family Medicine)        Subjective     Inpatient Consult to Vascular Surgery  Consult performed by: STEPHEN LOUIS  Consult ordered by: ERYN SERNA  Reason for consult: Placement of tunneled hemodialysis catheter request.        HPI Comments: Patient is a 37-year-old female who was admitted to the hospital on June 29, 2017 because of confusion she was found to be markedly hyperglycemic and was noted to have pneumonia. She was started on IV fluids and insulin drip and empiric antibiotics. Her respiratory status deteriorated and she required mechanical ventilation. She is on Nimbex, Bumex, heparin, Levothroid, propofol, Ultiva and insulin drips. She is off the dopamine drip. She is undergoing SLED     Currently has right femoral Shiley placed on 6/30/2017 by the critical care service.  Unfortunately this has been sluggish in its performance on hemodialysis.      Review of Systems   Unable to perform ROS: Intubated       Past Medical History:   Diagnosis Date   • Lupus    • Thyroid activity decreased      Past Surgical History:   Procedure Laterality Date   • INSERT CENTRAL LINE AT BEDSIDE  6/30/2017        • INTUBATION  6/30/2017        • SPLENECTOMY       History reviewed. No pertinent family history.    Social History   Substance Use Topics   • Smoking status: Never Smoker   • Smokeless tobacco: Never Used   • Alcohol use No     No prescriptions prior to admission.       castor oil-balsam peru  Topical Q12H   HYDROcodone-acetaminophen 1 tablet Nasogastric Q6H   insulin aspart 0-12 Units Subcutaneous Q4H   insulin detemir 10 Units Subcutaneous Nightly   lansoprazole 30 mg Per G Tube QAM   levothyroxine 50 mcg Oral Q AM   piperacillin-tazobactam 3.375 g Intravenous Q12H   vancomycin 500 mg Intravenous Once   Vancomycin Pharmacy Intermittent Dosing  Does not apply Daily       dexmedetomidine 0.2-1.5  mcg/kg/hr Last Rate: 1 mcg/kg/hr (07/05/17 0638)   Pharmacy to dose vancomycin       •  albumin human  •  artificial tears  •  calcium gluconate IVPB **OR** calcium gluconate IVPB  •  dextrose  •  dextrose  •  fentaNYL citrate (PF)  •  heparin (porcine)  •  heparin (porcine)  •  HYDROcodone-acetaminophen  •  magnesium sulfate **OR** magnesium sulfate **OR** magnesium sulfate  •  Pharmacy to dose vancomycin  Review of patient's allergies indicates no known allergies.    Objective     Physical Exam:  Physical Exam   Constitutional: She appears well-developed and well-nourished.   HENT:   Head: Normocephalic and atraumatic.   Eyes: Pupils are equal, round, and reactive to light. No scleral icterus.   Neck: Neck supple. No tracheal deviation present.   Cardiovascular: Normal rate and regular rhythm.    Pulses:       Femoral pulses are 2+ on the right side, and 2+ on the left side.       Dorsalis pedis pulses are 2+ on the right side, and 2+ on the left side.   Pulmonary/Chest: Effort normal. No respiratory distress.   On the mechanical ventilator.   Abdominal: Soft. She exhibits no distension.   Musculoskeletal: She exhibits no edema or tenderness.   Neurological:   Sedated on the mechanical ventilator.   Skin: Skin is warm and dry.   Vitals reviewed.      Vital Signs and Labs:  Vital Signs   Patient Vitals for the past 24 hrs:   BP Temp Temp src Pulse Resp SpO2 Weight   07/05/17 0830 106/76 98.1 °F (36.7 °C) Oral 51 16 - -   07/05/17 0704 - 99.6 °F (37.6 °C) Oral - 16 - -   07/05/17 0703 105/75 - - 53 17 99 % -   07/05/17 0633 101/74 - - 57 - 100 % -   07/05/17 0603 104/77 - - 54 16 100 % -   07/05/17 0533 101/76 - - 52 16 100 % -   07/05/17 0503 103/74 - - 54 16 100 % -   07/05/17 0433 103/72 - - 51 - 100 % -   07/05/17 0416 - - - - - - 162 lb 14.7 oz (73.9 kg)   07/05/17 0403 101/74 97.8 °F (36.6 °C) Oral 53 17 100 % -   07/05/17 0333 103/76 - - 52 - 100 % -   07/05/17 0314 - - - (!) 48 - 100 % -   07/05/17 0300  103/75 - - 56 18 100 % -   07/05/17 0233 104/75 - - 52 - 99 % -   07/05/17 0203 105/78 - - 56 17 99 % -   07/05/17 0133 105/80 - - 52 - 100 % -   07/05/17 0103 104/79 - - 50 17 100 % -   07/05/17 0059 - 97.8 °F (36.6 °C) Oral - - - -   07/05/17 0033 111/86 - - 61 - 100 % -   07/05/17 0003 99/70 - - 54 18 100 % -   07/04/17 2333 98/72 - - 52 - 99 % -   07/04/17 2314 - - - 52 - 100 % -   07/04/17 2303 98/73 - - 52 17 100 % -   07/04/17 2233 100/74 - - 51 - 100 % -   07/04/17 2203 103/76 - - 51 15 100 % -   07/04/17 2133 103/75 - - 51 - 100 % -   07/04/17 2103 102/75 - - 53 15 99 % -   07/04/17 2033 103/87 - - 68 - 100 % -   07/04/17 2012 - 98.1 °F (36.7 °C) - - - - -   07/04/17 2003 107/78 - - 53 16 100 % -   07/04/17 1946 - - - 58 - 100 % -   07/04/17 1943 - - - 56 - 100 % -   07/04/17 1933 117/90 - - 71 24 99 % -   07/04/17 1905 119/83 - - 64 - 99 % -   07/04/17 1835 121/90 - - 64 - 99 % -   07/04/17 1805 111/81 - - 70 - 100 % -   07/04/17 1750 130/69 - - 71 - 99 % -   07/04/17 1720 98/64 - - 73 - 100 % -   07/04/17 1705 95/70 - - 59 - 100 % -   07/04/17 1650 94/68 - - 54 - 100 % -   07/04/17 1635 97/69 - - 57 - 100 % -   07/04/17 1630 94/69 - - 58 - 100 % -   07/04/17 1605 92/63 - - 59 - 100 % -   07/04/17 1550 108/79 - - 63 - 100 % -   07/04/17 1535 96/62 - - 67 - 100 % -   07/04/17 1527 (!) 89/64 97.5 °F (36.4 °C) Oral - 24 - -   07/04/17 1520 (!) 89/64 - - 65 - 100 % -   07/04/17 1505 (!) 82/57 - - 68 - 100 % -   07/04/17 1450 (!) 84/64 - - 64 - 100 % -   07/04/17 1442 - - - 62 16 100 % -   07/04/17 1435 (!) 86/52 - - 62 - 100 % -   07/04/17 1420 (!) 79/52 - - 69 - 100 % -   07/04/17 1400 101/67 - - 71 - 95 % -   07/04/17 1350 93/59 - - 72 - 99 % -   07/04/17 1335 (!) 83/62 - - 78 - 95 % -   07/04/17 1320 (!) 87/57 - - 62 - 100 % -   07/04/17 1305 96/68 - - 65 - 100 % -   07/04/17 1303 96/68 97.6 °F (36.4 °C) Oral 68 16 100 % -   07/04/17 1250 114/75 - - 70 - 100 % -   07/04/17 1235 117/92 - - 73 - 100 % -    07/04/17 1230 117/92 97.6 °F (36.4 °C) Oral 80 24 100 % -   07/04/17 1220 100/68 97.6 °F (36.4 °C) Oral 62 (!) 29 - -   07/04/17 1215 100/68 - - 59 - 100 % -   07/04/17 1200 99/75 - - 58 - 100 % -   07/04/17 1150 113/83 - - 63 - 100 % -   07/04/17 1135 111/84 - - 57 - 100 % -   07/04/17 1126 108/80 98 °F (36.7 °C) - 56 22 - -   07/04/17 1120 108/80 - - 57 - 100 % -   07/04/17 1105 107/84 - - 58 - 100 % -   07/04/17 1050 104/78 - - 60 - 100 % -   07/04/17 1047 - - - 58 17 100 % -   07/04/17 1040 104/78 97.6 °F (36.4 °C) Oral 58 22 - -   07/04/17 1035 95/71 - - 61 - 100 % -   07/04/17 1022 - - - - 20 - -     I/O:  I/O last 3 completed shifts:  In: 3834 [I.V.:1388; Blood:302; Other:160; NG/GT:1653; IV Piggyback:331]  Out: 4350 [Other:3000; Stool:1350]    CBC      Results from last 7 days  Lab Units 07/05/17  0415 07/04/17  1712 07/04/17  0259 07/02/17  0513 07/01/17  1540 07/01/17  0615 06/29/17 2018   WBC 10*3/mm3 18.10*  --  17.38* 17.67*  --  10.90* 16.50*   HEMOGLOBIN g/dL 10.3* 10.1* 6.5* 8.3* 11.1* 9.7* 12.2   PLATELETS 10*3/mm3 103*  --  122* 197  --  210 383     BMP     Results from last 7 days  Lab Units 07/05/17  0415 07/04/17  1712 07/04/17  0259 07/03/17  1213 07/03/17  0516 07/03/17  0004 07/02/17  1820 07/02/17  1234 07/02/17  0513   SODIUM mmol/L 138  --  137 136 134* 133* 133* 134* 134*   POTASSIUM mmol/L 3.3*  --  3.2* 3.6 3.9 3.7 3.9 3.9 3.8   CHLORIDE mmol/L 98  --  96* 94* 92* 91* 92* 92* 92*   CO2 mmol/L 23.1  --  25.2 25.7 23.5 24.5 22.5 24.8 22.9   BUN mg/dL 41*  --  19 6 8 7 7 3* 5*   CREATININE mg/dL 3.07*  --  1.69* 0.62 1.10* 0.85 0.93 0.47* 1.00   GLUCOSE mg/dL 253*  --  186* 132* 204* 190* 184* 132* 155*   MAGNESIUM mg/dL  --   --  2.2 2.0 2.1 2.1 2.0 2.0 2.8*   PHOSPHORUS mg/dL 2.1* 1.8* 0.6* 0.7* 1.9* 2.1* 1.8* 1.2* 1.4*     Cr Clearance Estimated Creatinine Clearance: 28.7 mL/min (by C-G formula based on Cr of 3.07).  Coag     Results from last 7 days  Lab Units 07/05/17  0412  07/04/17  0259 07/03/17  0517 07/02/17  0513 07/01/17  1852 07/01/17  1202 07/01/17  0607   INR   --   --   --   --   --   --  1.63*   APTT seconds 29.4 36.2* 80.2* 101.1* 107.8* 37.4* 37.6*     HbA1C   Lab Results   Component Value Date    HGBA1C 12.82 (H) 06/29/2017     Blood Glucose   Glucose   Date/Time Value Ref Range Status   07/05/2017 0728 227 (H) 70 - 130 mg/dL Final   07/05/2017 0423 211 (H) 70 - 130 mg/dL Final   07/05/2017 0031 268 (H) 70 - 130 mg/dL Final   07/04/2017 1944 248 (H) 70 - 130 mg/dL Final   07/04/2017 1806 241 (H) 70 - 130 mg/dL Final   07/04/2017 1523 125 70 - 130 mg/dL Final   07/04/2017 1452 75 70 - 130 mg/dL Final   07/04/2017 1421 59 (L) 70 - 130 mg/dL Final     Micro     Results from last 7 days  Lab Units 06/30/17  1706 06/30/17  1050 06/30/17  0925 06/29/17  2159   BLOODCX   --  No growth at 4 days No growth at 5 days  --    RESPCX  No growth  --   --   --    GRAM STAIN RESULT  Rare (1+) WBCs seen  Rare (1+) Epithelial cells per low power field  No organisms seen  --   --   --    URINECX   --   --   --  No growth       Active Hospital Problems (** Indicates Principal Problem)    Diagnosis Date Noted   • ARF (acute renal failure) with tubular necrosis [N17.0] 07/05/2017   • Pneumonia [J18.9] 07/01/2017   • Sepsis [A41.9] 07/01/2017   • Acute respiratory failure [J96.00] 07/01/2017   • History of systemic lupus erythematosus (SLE) [Z87.39] 07/01/2017   • History of splenectomy [Z90.81] 07/01/2017   • History of ITP [Z86.2] 07/01/2017   • Primary hypothyroidism [E03.9] 07/01/2017   • DKA (diabetic ketoacidoses) [E13.10] 06/30/2017      Resolved Hospital Problems    Diagnosis Date Noted Date Resolved   No resolved problems to display.     Problem Points:  3:  Patient has a new problem, with no additional work-up planned (max of 1)  Total problem points:3    Data Points:  1:  I have reviewed or order clinical lab test  1:  I have reviewed or order radiology test (except heart  catheterization or echo)  2:  I have personally and independently review of image, tracing, or specimen  Total data points:4 or more    Risk Points:  High:  Any illness that poses threat to life or body funciton    MDM requires 2/3 (Problem points, Data points and Risk)  MDM Prob point Data point Risk   SF 1 1 Minimal   Low 2 2 Low   Mod 3 3 Moderate   High 4 4 High     Code requires 3/3 (MDM, History and Exam)  Code MDM History Exam Time   16685 SF/Low Detailed Detailed 30   82246 Mod Comprehensive Comprehensive 50   05578 High Comprehensive Comprehensive 70     Detailed history:  4 elements HPI or status of 3 chronic problems; 2-9 system ROS  Comprehensive:  4 elements HPI or status of 3 chronic problems;  10 system ROS    Detailed Exam:  12 findings from any organ system  Comprehensive Exam:  2 findings from each of 9 systems.   35489    Assessment/Plan     Active Problems:    DKA (diabetic ketoacidoses)    Pneumonia    Sepsis    Acute respiratory failure    History of systemic lupus erythematosus (SLE)    History of splenectomy    History of ITP    Primary hypothyroidism    ARF (acute renal failure) with tubular necrosis      37 y.o. female with hemodialysis-dependent renal failure.  Has poorly performing right femoral non-tunneled hemodialysis catheter in place.  Risks benefits alternatives were discussed with mother and father of the patient they agreed to placement of tunneled, cuffed, hemodialysis catheter.  We will work with a walk to perform this today.    I discussed the patients findings and my recommendations with family and nursing staff.    Caleb Bliss MD  07/05/17  10:16 AM    Please call my office with any question: [phone]

## 2017-07-05 NOTE — PROGRESS NOTES
Patient with platelet count of 54.  This represents a significant change.  She is actively being seen by hematology now.  Plan is for high-dose steroids.    Discussed with pulmonology and critical care.  She does not need emergency dialysis today but her femoral Shiley is essentially nonfunctioning.    I could probably go ahead and do this with a platelet count of greater than 50 but she will be at slightly increased risk of a bleeding complication.  After discussing with the patient's family, hematology, and pulmonology I think it is a reasonable option to type and cross her for platelets with the plan on doing a tunneled catheter tomorrow.  Platelet count is less than 50 will plan transfusion of platelets immediately prior to the procedure.  If greater than 50 will likely do without transfusion.

## 2017-07-06 ENCOUNTER — APPOINTMENT (OUTPATIENT)
Dept: GENERAL RADIOLOGY | Facility: HOSPITAL | Age: 37
End: 2017-07-06

## 2017-07-06 ENCOUNTER — ANESTHESIA (OUTPATIENT)
Dept: PERIOP | Facility: HOSPITAL | Age: 37
End: 2017-07-06

## 2017-07-06 ENCOUNTER — ANESTHESIA EVENT (OUTPATIENT)
Dept: PERIOP | Facility: HOSPITAL | Age: 37
End: 2017-07-06

## 2017-07-06 ENCOUNTER — APPOINTMENT (OUTPATIENT)
Dept: GENERAL RADIOLOGY | Facility: HOSPITAL | Age: 37
End: 2017-07-06
Attending: SURGERY

## 2017-07-06 LAB
ACANTHOCYTES BLD QL SMEAR: ABNORMAL
ALBUMIN SERPL-MCNC: 2.9 G/DL (ref 3.5–5.2)
ANION GAP SERPL CALCULATED.3IONS-SCNC: 19.6 MMOL/L
ANISOCYTOSIS BLD QL: ABNORMAL
APTT PPP: 29.2 SECONDS (ref 22.7–35.4)
BILIRUB CONJ SERPL-MCNC: 0.2 MG/DL (ref 0–0.3)
BUN BLD-MCNC: 63 MG/DL (ref 6–20)
BUN/CREAT SERPL: 14.7 (ref 7–25)
CALCIUM SPEC-SCNC: 7.9 MG/DL (ref 8.6–10.5)
CENTROMERE B AB SER-ACNC: <0.2 AI (ref 0–0.9)
CHLORIDE SERPL-SCNC: 100 MMOL/L (ref 98–107)
CHROMATIN AB SERPL-ACNC: >8 AI (ref 0–0.9)
CO2 SERPL-SCNC: 22.4 MMOL/L (ref 22–29)
CREAT BLD-MCNC: 4.3 MG/DL (ref 0.57–1)
DEPRECATED RDW RBC AUTO: 49.1 FL (ref 37–54)
DSDNA AB SER-ACNC: <1 IU/ML (ref 0–9)
ENA JO1 AB SER-ACNC: <0.2 AI (ref 0–0.9)
ENA RNP AB SER-ACNC: >8 AI (ref 0–0.9)
ENA SCL70 AB SER-ACNC: 0.2 AI (ref 0–0.9)
ENA SM AB SER-ACNC: 0.7 AI (ref 0–0.9)
ENA SS-A AB SER-ACNC: 0.2 AI (ref 0–0.9)
ENA SS-B AB SER-ACNC: <0.2 AI (ref 0–0.9)
ERYTHROCYTE [DISTWIDTH] IN BLOOD BY AUTOMATED COUNT: 15.3 % (ref 11.7–13)
GFR SERPL CREATININE-BSD FRML MDRD: 11 ML/MIN/1.73
GLUCOSE BLD-MCNC: 139 MG/DL (ref 65–99)
GLUCOSE BLDC GLUCOMTR-MCNC: 156 MG/DL (ref 70–130)
GLUCOSE BLDC GLUCOMTR-MCNC: 169 MG/DL (ref 70–130)
GLUCOSE BLDC GLUCOMTR-MCNC: 176 MG/DL (ref 70–130)
GLUCOSE BLDC GLUCOMTR-MCNC: 221 MG/DL (ref 70–130)
GLUCOSE BLDC GLUCOMTR-MCNC: 392 MG/DL (ref 70–130)
GLUCOSE BLDC GLUCOMTR-MCNC: 98 MG/DL (ref 70–130)
HCT VFR BLD AUTO: 29.1 % (ref 35.6–45.5)
HEMOCCULT STL QL: POSITIVE
HGB BLD-MCNC: 10.3 G/DL (ref 11.9–15.5)
LDH SERPL-CCNC: 323 U/L (ref 135–214)
LYMPHOCYTES # BLD MANUAL: 1.36 10*3/MM3 (ref 0.9–4.8)
LYMPHOCYTES NFR BLD MANUAL: 6 % (ref 5–12)
LYMPHOCYTES NFR BLD MANUAL: 9 % (ref 19.6–45.3)
Lab: ABNORMAL
MAGNESIUM SERPL-MCNC: 2.8 MG/DL (ref 1.6–2.6)
MCH RBC QN AUTO: 31.6 PG (ref 26.9–32)
MCHC RBC AUTO-ENTMCNC: 35.4 G/DL (ref 32.4–36.3)
MCV RBC AUTO: 89.3 FL (ref 80.5–98.2)
METAMYELOCYTES NFR BLD MANUAL: 1 % (ref 0–0)
MONOCYTES # BLD AUTO: 0.91 10*3/MM3 (ref 0.2–1.2)
MYELOCYTES NFR BLD MANUAL: 5 % (ref 0–0)
NEUTROPHILS # BLD AUTO: 11.96 10*3/MM3 (ref 1.9–8.1)
NEUTROPHILS NFR BLD MANUAL: 79 % (ref 42.7–76)
NRBC SPEC MANUAL: 57 /100 WBC (ref 0–0)
PF4 HEPARIN CMPLX AB SER-ACNC: 0.19 OD (ref 0–0.4)
PHOSPHATE SERPL-MCNC: 3.7 MG/DL (ref 2.5–4.5)
PLAT MORPH BLD: NORMAL
PLATELET # BLD AUTO: 78 10*3/MM3 (ref 140–500)
PMV BLD AUTO: 11.5 FL (ref 6–12)
POLYCHROMASIA BLD QL SMEAR: ABNORMAL
POTASSIUM BLD-SCNC: 3.6 MMOL/L (ref 3.5–5.2)
RBC # BLD AUTO: 3.26 10*6/MM3 (ref 3.9–5.2)
RETICS/RBC NFR AUTO: 2.84 % (ref 0.5–1.5)
SCAN SLIDE: NORMAL
SODIUM BLD-SCNC: 142 MMOL/L (ref 136–145)
VANCOMYCIN SERPL-MCNC: 30.9 MCG/ML (ref 5–40)
WBC MORPH BLD: NORMAL
WBC NRBC COR # BLD: 15.14 10*3/MM3 (ref 4.5–10.7)

## 2017-07-06 PROCEDURE — 87493 C DIFF AMPLIFIED PROBE: CPT | Performed by: INTERNAL MEDICINE

## 2017-07-06 PROCEDURE — 99232 SBSQ HOSP IP/OBS MODERATE 35: CPT | Performed by: INTERNAL MEDICINE

## 2017-07-06 PROCEDURE — 83735 ASSAY OF MAGNESIUM: CPT | Performed by: INTERNAL MEDICINE

## 2017-07-06 PROCEDURE — 94003 VENT MGMT INPAT SUBQ DAY: CPT

## 2017-07-06 PROCEDURE — C1750 CATH, HEMODIALYSIS,LONG-TERM: HCPCS | Performed by: SURGERY

## 2017-07-06 PROCEDURE — 63710000001 INSULIN ASPART PER 5 UNITS: Performed by: INTERNAL MEDICINE

## 2017-07-06 PROCEDURE — 85007 BL SMEAR W/DIFF WBC COUNT: CPT | Performed by: INTERNAL MEDICINE

## 2017-07-06 PROCEDURE — 85025 COMPLETE CBC W/AUTO DIFF WBC: CPT | Performed by: INTERNAL MEDICINE

## 2017-07-06 PROCEDURE — 25010000002 PROPOFOL 10 MG/ML EMULSION: Performed by: ANESTHESIOLOGY

## 2017-07-06 PROCEDURE — 25010000003 CEFAZOLIN IN DEXTROSE 2-4 GM/100ML-% SOLUTION: Performed by: ANESTHESIOLOGY

## 2017-07-06 PROCEDURE — 77001 FLUOROGUIDE FOR VEIN DEVICE: CPT

## 2017-07-06 PROCEDURE — 25010000002 ALTEPLASE 2 MG RECONSTITUTED SOLUTION: Performed by: SURGERY

## 2017-07-06 PROCEDURE — 82962 GLUCOSE BLOOD TEST: CPT

## 2017-07-06 PROCEDURE — 94799 UNLISTED PULMONARY SVC/PX: CPT

## 2017-07-06 PROCEDURE — 85730 THROMBOPLASTIN TIME PARTIAL: CPT | Performed by: INTERNAL MEDICINE

## 2017-07-06 PROCEDURE — 25010000002 FENTANYL CITRATE (PF) 100 MCG/2ML SOLUTION: Performed by: INTERNAL MEDICINE

## 2017-07-06 PROCEDURE — 71010 HC CHEST PA OR AP: CPT

## 2017-07-06 PROCEDURE — 83615 LACTATE (LD) (LDH) ENZYME: CPT | Performed by: INTERNAL MEDICINE

## 2017-07-06 PROCEDURE — 25010000002 MIDAZOLAM PER 1 MG: Performed by: ANESTHESIOLOGY

## 2017-07-06 PROCEDURE — 94760 N-INVAS EAR/PLS OXIMETRY 1: CPT

## 2017-07-06 PROCEDURE — 02HV33Z INSERTION OF INFUSION DEVICE INTO SUPERIOR VENA CAVA, PERCUTANEOUS APPROACH: ICD-10-PCS | Performed by: SURGERY

## 2017-07-06 PROCEDURE — 63710000001 INSULIN DETEMER PER 5 UNITS: Performed by: INTERNAL MEDICINE

## 2017-07-06 PROCEDURE — 82248 BILIRUBIN DIRECT: CPT | Performed by: INTERNAL MEDICINE

## 2017-07-06 PROCEDURE — 85045 AUTOMATED RETICULOCYTE COUNT: CPT | Performed by: INTERNAL MEDICINE

## 2017-07-06 PROCEDURE — 80069 RENAL FUNCTION PANEL: CPT | Performed by: INTERNAL MEDICINE

## 2017-07-06 PROCEDURE — 25010000002 PROMETHAZINE PER 50 MG: Performed by: ANESTHESIOLOGY

## 2017-07-06 PROCEDURE — 25010000002 METHYLPREDNISOLONE PER 125 MG: Performed by: INTERNAL MEDICINE

## 2017-07-06 PROCEDURE — 25010000002 ONDANSETRON PER 1 MG: Performed by: ANESTHESIOLOGY

## 2017-07-06 PROCEDURE — 99231 SBSQ HOSP IP/OBS SF/LOW 25: CPT | Performed by: INTERNAL MEDICINE

## 2017-07-06 PROCEDURE — 82270 OCCULT BLOOD FECES: CPT | Performed by: INTERNAL MEDICINE

## 2017-07-06 PROCEDURE — 80202 ASSAY OF VANCOMYCIN: CPT | Performed by: INTERNAL MEDICINE

## 2017-07-06 PROCEDURE — 25010000002 PIPERACILLIN SOD-TAZOBACTAM PER 1 G: Performed by: INTERNAL MEDICINE

## 2017-07-06 RX ORDER — ONDANSETRON 2 MG/ML
INJECTION INTRAMUSCULAR; INTRAVENOUS AS NEEDED
Status: DISCONTINUED | OUTPATIENT
Start: 2017-07-06 | End: 2017-07-06 | Stop reason: SURG

## 2017-07-06 RX ORDER — HYDRALAZINE HYDROCHLORIDE 20 MG/ML
5 INJECTION INTRAMUSCULAR; INTRAVENOUS
Status: DISCONTINUED | OUTPATIENT
Start: 2017-07-06 | End: 2017-07-07

## 2017-07-06 RX ORDER — HYDROCODONE BITARTRATE AND ACETAMINOPHEN 7.5; 325 MG/1; MG/1
1 TABLET ORAL ONCE AS NEEDED
Status: DISCONTINUED | OUTPATIENT
Start: 2017-07-06 | End: 2017-07-06

## 2017-07-06 RX ORDER — FLUMAZENIL 0.1 MG/ML
0.2 INJECTION INTRAVENOUS AS NEEDED
Status: DISCONTINUED | OUTPATIENT
Start: 2017-07-06 | End: 2017-07-07

## 2017-07-06 RX ORDER — MAGNESIUM HYDROXIDE 1200 MG/15ML
LIQUID ORAL AS NEEDED
Status: DISCONTINUED | OUTPATIENT
Start: 2017-07-06 | End: 2017-07-06 | Stop reason: HOSPADM

## 2017-07-06 RX ORDER — PROMETHAZINE HYDROCHLORIDE 25 MG/1
25 TABLET ORAL ONCE AS NEEDED
Status: DISCONTINUED | OUTPATIENT
Start: 2017-07-06 | End: 2017-07-07

## 2017-07-06 RX ORDER — EPHEDRINE SULFATE 50 MG/ML
5 INJECTION, SOLUTION INTRAVENOUS ONCE AS NEEDED
Status: DISCONTINUED | OUTPATIENT
Start: 2017-07-06 | End: 2017-07-07

## 2017-07-06 RX ORDER — LABETALOL HYDROCHLORIDE 5 MG/ML
5 INJECTION, SOLUTION INTRAVENOUS
Status: DISCONTINUED | OUTPATIENT
Start: 2017-07-06 | End: 2017-07-07

## 2017-07-06 RX ORDER — MIDAZOLAM HYDROCHLORIDE 1 MG/ML
INJECTION INTRAMUSCULAR; INTRAVENOUS AS NEEDED
Status: DISCONTINUED | OUTPATIENT
Start: 2017-07-06 | End: 2017-07-06 | Stop reason: SURG

## 2017-07-06 RX ORDER — PROMETHAZINE HYDROCHLORIDE 25 MG/ML
INJECTION, SOLUTION INTRAMUSCULAR; INTRAVENOUS AS NEEDED
Status: DISCONTINUED | OUTPATIENT
Start: 2017-07-06 | End: 2017-07-06 | Stop reason: SURG

## 2017-07-06 RX ORDER — NALOXONE HCL 0.4 MG/ML
0.2 VIAL (ML) INJECTION AS NEEDED
Status: DISCONTINUED | OUTPATIENT
Start: 2017-07-06 | End: 2017-07-07

## 2017-07-06 RX ORDER — SODIUM CHLORIDE 9 MG/ML
INJECTION, SOLUTION INTRAVENOUS CONTINUOUS PRN
Status: DISCONTINUED | OUTPATIENT
Start: 2017-07-06 | End: 2017-07-06 | Stop reason: SURG

## 2017-07-06 RX ORDER — HYDROCODONE BITARTRATE AND ACETAMINOPHEN 5; 325 MG/1; MG/1
1 TABLET ORAL EVERY 6 HOURS
Status: DISCONTINUED | OUTPATIENT
Start: 2017-07-06 | End: 2017-07-07

## 2017-07-06 RX ORDER — ONDANSETRON 2 MG/ML
4 INJECTION INTRAMUSCULAR; INTRAVENOUS ONCE AS NEEDED
Status: DISCONTINUED | OUTPATIENT
Start: 2017-07-06 | End: 2017-07-08

## 2017-07-06 RX ORDER — EPHEDRINE SULFATE 50 MG/ML
INJECTION, SOLUTION INTRAVENOUS AS NEEDED
Status: DISCONTINUED | OUTPATIENT
Start: 2017-07-06 | End: 2017-07-06 | Stop reason: SURG

## 2017-07-06 RX ORDER — PROMETHAZINE HYDROCHLORIDE 25 MG/1
25 SUPPOSITORY RECTAL ONCE AS NEEDED
Status: DISCONTINUED | OUTPATIENT
Start: 2017-07-06 | End: 2017-07-07

## 2017-07-06 RX ORDER — PROMETHAZINE HYDROCHLORIDE 25 MG/ML
12.5 INJECTION, SOLUTION INTRAMUSCULAR; INTRAVENOUS ONCE AS NEEDED
Status: DISCONTINUED | OUTPATIENT
Start: 2017-07-06 | End: 2017-07-07

## 2017-07-06 RX ORDER — DIPHENHYDRAMINE HYDROCHLORIDE 50 MG/ML
12.5 INJECTION INTRAMUSCULAR; INTRAVENOUS
Status: DISCONTINUED | OUTPATIENT
Start: 2017-07-06 | End: 2017-07-07

## 2017-07-06 RX ORDER — CEFAZOLIN SODIUM 2 G/100ML
INJECTION, SOLUTION INTRAVENOUS AS NEEDED
Status: DISCONTINUED | OUTPATIENT
Start: 2017-07-06 | End: 2017-07-06 | Stop reason: SURG

## 2017-07-06 RX ORDER — HYDROCODONE BITARTRATE AND ACETAMINOPHEN 5; 325 MG/1; MG/1
1 TABLET ORAL EVERY 6 HOURS PRN
Status: DISCONTINUED | OUTPATIENT
Start: 2017-07-06 | End: 2017-07-07

## 2017-07-06 RX ORDER — PROPOFOL 10 MG/ML
VIAL (ML) INTRAVENOUS AS NEEDED
Status: DISCONTINUED | OUTPATIENT
Start: 2017-07-06 | End: 2017-07-06 | Stop reason: SURG

## 2017-07-06 RX ORDER — OXYCODONE AND ACETAMINOPHEN 7.5; 325 MG/1; MG/1
1 TABLET ORAL ONCE AS NEEDED
Status: DISCONTINUED | OUTPATIENT
Start: 2017-07-06 | End: 2017-07-06

## 2017-07-06 RX ORDER — ALBUMIN (HUMAN) 12.5 G/50ML
12.5 SOLUTION INTRAVENOUS AS NEEDED
Status: ACTIVE | OUTPATIENT
Start: 2017-07-06 | End: 2017-07-07

## 2017-07-06 RX ORDER — FENTANYL CITRATE 50 UG/ML
50 INJECTION, SOLUTION INTRAMUSCULAR; INTRAVENOUS
Status: DISCONTINUED | OUTPATIENT
Start: 2017-07-06 | End: 2017-07-06

## 2017-07-06 RX ORDER — HYDROMORPHONE HYDROCHLORIDE 1 MG/ML
0.5 INJECTION, SOLUTION INTRAMUSCULAR; INTRAVENOUS; SUBCUTANEOUS
Status: DISCONTINUED | OUTPATIENT
Start: 2017-07-06 | End: 2017-07-06

## 2017-07-06 RX ORDER — PROMETHAZINE HYDROCHLORIDE 25 MG/1
12.5 TABLET ORAL ONCE AS NEEDED
Status: DISCONTINUED | OUTPATIENT
Start: 2017-07-06 | End: 2017-07-07

## 2017-07-06 RX ADMIN — FENTANYL CITRATE 25 MCG: 50 INJECTION INTRAMUSCULAR; INTRAVENOUS at 12:27

## 2017-07-06 RX ADMIN — METHYLPREDNISOLONE SODIUM SUCCINATE 60 MG: 125 INJECTION, POWDER, FOR SOLUTION INTRAMUSCULAR; INTRAVENOUS at 21:27

## 2017-07-06 RX ADMIN — MIDAZOLAM HYDROCHLORIDE 2 MG: 1 INJECTION, SOLUTION INTRAMUSCULAR; INTRAVENOUS at 12:16

## 2017-07-06 RX ADMIN — METHYLPREDNISOLONE SODIUM SUCCINATE 60 MG: 125 INJECTION, POWDER, FOR SOLUTION INTRAMUSCULAR; INTRAVENOUS at 14:26

## 2017-07-06 RX ADMIN — FENTANYL CITRATE 25 MCG: 50 INJECTION INTRAMUSCULAR; INTRAVENOUS at 12:49

## 2017-07-06 RX ADMIN — PROPOFOL 100 MG: 10 INJECTION, EMULSION INTRAVENOUS at 12:16

## 2017-07-06 RX ADMIN — CEFAZOLIN SODIUM 2 G: 2 INJECTION, SOLUTION INTRAVENOUS at 12:34

## 2017-07-06 RX ADMIN — INSULIN DETEMIR 10 UNITS: 100 INJECTION, SOLUTION SUBCUTANEOUS at 20:37

## 2017-07-06 RX ADMIN — FENTANYL CITRATE 25 MCG: 50 INJECTION INTRAMUSCULAR; INTRAVENOUS at 12:53

## 2017-07-06 RX ADMIN — EPHEDRINE SULFATE 20 MG: 50 INJECTION INTRAMUSCULAR; INTRAVENOUS; SUBCUTANEOUS at 12:34

## 2017-07-06 RX ADMIN — METHYLPREDNISOLONE SODIUM SUCCINATE 60 MG: 125 INJECTION, POWDER, FOR SOLUTION INTRAMUSCULAR; INTRAVENOUS at 05:28

## 2017-07-06 RX ADMIN — FENTANYL CITRATE 25 MCG: 50 INJECTION INTRAMUSCULAR; INTRAVENOUS at 12:24

## 2017-07-06 RX ADMIN — EPHEDRINE SULFATE 10 MG: 50 INJECTION INTRAMUSCULAR; INTRAVENOUS; SUBCUTANEOUS at 12:28

## 2017-07-06 RX ADMIN — CASTOR OIL AND BALSAM, PERU: 788; 87 OINTMENT TOPICAL at 20:36

## 2017-07-06 RX ADMIN — HYDROCODONE BITARTRATE AND ACETAMINOPHEN 1 TABLET: 10; 325 TABLET ORAL at 09:01

## 2017-07-06 RX ADMIN — LANSOPRAZOLE 30 MG: KIT at 06:13

## 2017-07-06 RX ADMIN — INSULIN ASPART 6 UNITS: 100 INJECTION, SOLUTION INTRAVENOUS; SUBCUTANEOUS at 20:37

## 2017-07-06 RX ADMIN — HYDROCODONE BITARTRATE AND ACETAMINOPHEN 1 TABLET: 5; 325 TABLET ORAL at 19:54

## 2017-07-06 RX ADMIN — CASTOR OIL AND BALSAM, PERU: 788; 87 OINTMENT TOPICAL at 08:22

## 2017-07-06 RX ADMIN — TAZOBACTAM SODIUM AND PIPERACILLIN SODIUM 3.38 G: 375; 3 INJECTION, SOLUTION INTRAVENOUS at 06:20

## 2017-07-06 RX ADMIN — INSULIN ASPART 2 UNITS: 100 INJECTION, SOLUTION INTRAVENOUS; SUBCUTANEOUS at 04:12

## 2017-07-06 RX ADMIN — SODIUM CHLORIDE: 9 INJECTION, SOLUTION INTRAVENOUS at 12:15

## 2017-07-06 RX ADMIN — INSULIN ASPART 6 UNITS: 100 INJECTION, SOLUTION INTRAVENOUS; SUBCUTANEOUS at 00:15

## 2017-07-06 RX ADMIN — HYDROCODONE BITARTRATE AND ACETAMINOPHEN 1 TABLET: 5; 325 TABLET ORAL at 16:28

## 2017-07-06 RX ADMIN — ONDANSETRON 4 MG: 2 INJECTION INTRAMUSCULAR; INTRAVENOUS at 12:43

## 2017-07-06 RX ADMIN — HYDROCODONE BITARTRATE AND ACETAMINOPHEN 1 TABLET: 5; 325 TABLET ORAL at 21:27

## 2017-07-06 RX ADMIN — LEVOTHYROXINE SODIUM 50 MCG: 50 TABLET ORAL at 05:28

## 2017-07-06 RX ADMIN — HYDROCODONE BITARTRATE AND ACETAMINOPHEN 1 TABLET: 10; 325 TABLET ORAL at 03:44

## 2017-07-06 RX ADMIN — INSULIN DETEMIR 10 UNITS: 100 INJECTION, SOLUTION SUBCUTANEOUS at 21:33

## 2017-07-06 RX ADMIN — PROMETHAZINE HYDROCHLORIDE 6.25 MG: 25 INJECTION INTRAMUSCULAR; INTRAVENOUS at 12:43

## 2017-07-06 RX ADMIN — TAZOBACTAM SODIUM AND PIPERACILLIN SODIUM 3.38 G: 375; 3 INJECTION, SOLUTION INTRAVENOUS at 18:33

## 2017-07-06 NOTE — PROGRESS NOTES
"  ENDOCRINE    Subjective   AND PLANS  Sarita Bai is a 37 y.o. female.     Hematology consultation noted.  Switched from IV hydrocortisone to Solu-Medrol yesterday.  Platelet count increased to 78K.  Patient still anuric and will have tunnelled catheter today    REGINA antibody markedly elevated and is consistent with type 1 diabetes mellitus.  Tolerating tube feeding which is presently on hold for dialysis catheter placement.  Continue Levemir and NovoLog as needed.    She has liquid stools.  Will check for C. difficile toxin and celiac panel    Continue levothyroxine.    Discussed the patient's status with father and uncle  Objective   BP 91/62 (BP Location: Right arm, Patient Position: Lying)  Pulse (!) 45  Temp 98.8 °F (37.1 °C) (Oral)   Resp 17  Ht 67.01\" (170.2 cm)  Wt 158 lb 15.2 oz (72.1 kg)  SpO2 100%  BMI 24.89 kg/m2  Physical Exam    Asleep but arousable.  No rales or wheezes.  Regular heart rate and rhythm.  No gallop  Abdomen soft, nontender.  Active bowel sounds.  Feet cool with some duskiness.  Hands warm.  No clubbing.    Lab Results (last 24 hours)     Procedure Component Value Units Date/Time    Blood Culture [[id number]]  (Normal) Collected:  06/30/17 0925    Specimen:  Blood from Hand, Left Updated:  07/05/17 1001     Blood Culture No growth at 5 days    Iron Profile [[id number]]  (Abnormal) Collected:  07/05/17 0415    Specimen:  Blood Updated:  07/05/17 1014     Iron 31 (L) mcg/dL      Iron Saturation 16 (L) %      Transferrin 133 (L) mg/dL      TIBC 198 mcg/dL     Lactate Dehydrogenase [[id number]]  (Abnormal) Collected:  07/05/17 0415    Specimen:  Blood Updated:  07/05/17 1014      (H) U/L     Bilirubin, Total & Direct [[id number]] Collected:  07/05/17 0415    Specimen:  Blood Updated:  07/05/17 1014     Total Bilirubin 0.4 mg/dL      Bilirubin, Direct 0.2 mg/dL      Bilirubin, Indirect 0.2 mg/dL     Heparin-induced Platelet Antibody [[id number]] Collected:  07/05/17 0957    " Specimen:  Blood Updated:  07/05/17 1019    Ferritin [[id number]]  (Abnormal) Collected:  07/05/17 0415    Specimen:  Blood Updated:  07/05/17 1019     Ferritin 950.10 (H) ng/mL     Vitamin B12 [[id number]]  (Normal) Collected:  07/05/17 0415    Specimen:  Blood Updated:  07/05/17 1028     Vitamin B-12 909 pg/mL     Folate [[id number]]  (Normal) Collected:  07/05/17 0415    Specimen:  Blood Updated:  07/05/17 1028     Folate 13.00 ng/mL     Immature Platelet Fraction [[id number]]  (Abnormal) Collected:  07/05/17 0957    Specimen:  Blood Updated:  07/05/17 1037     IPF 9.40 (H) %      Platelets 54 (L) 10*3/mm3     Fibrinogen [[id number]]  (Normal) Collected:  07/05/17 0957    Specimen:  Blood Updated:  07/05/17 1044     Fibrinogen 387 mg/dL     Protime-INR [[id number]]  (Abnormal) Collected:  07/05/17 0957    Specimen:  Blood Updated:  07/05/17 1044     Protime 14.5 (H) Seconds      INR 1.17 (H)    aPTT [[id number]]  (Normal) Collected:  07/05/17 0957    Specimen:  Blood Updated:  07/05/17 1044     PTT 27.7 seconds     D-dimer, Quantitative [[id number]]  (Abnormal) Collected:  07/05/17 0957    Specimen:  Blood Updated:  07/05/17 1044     D-Dimer, Quantitative 11.59 (H) MCGFEU/mL     Narrative:       The Stago D-Dimer test used in conjunction with a clinical pretest probability (PTP) assessment model, has been approved by the FDA to rule out the presence of venous thromboembolism (VTE) in outpatients suspected of deep venous thrombosis (DVT) or pulmonary embolism (PE).     Sedimentation Rate [[id number]]  (Abnormal) Collected:  07/05/17 0957    Specimen:  Blood Updated:  07/05/17 1100     Sed Rate 48 (H) mm/hr     Blood Culture [[id number]]  (Normal) Collected:  06/30/17 1050    Specimen:  Blood from Arm, Left Updated:  07/05/17 1101     Blood Culture No growth at 5 days    POC Glucose Fingerstick [[id number]]  (Normal) Collected:  07/05/17 1202    Specimen:  Blood Updated:  07/05/17 1207     Glucose 124 mg/dL     Narrative:        Meter: [id number] : [id number] CARA ROWE    ZIGGY Comprehensive Panel [[id number]] Collected:  07/05/17 1354    Specimen:  Blood Updated:  07/05/17 1403    Beta-2 Glycoprotein Antibodies [[id number]] Collected:  07/05/17 1354    Specimen:  Blood Updated:  07/05/17 1403    Cardiolipin Antibody [[id number]] Collected:  07/05/17 1354    Specimen:  Blood Updated:  07/05/17 1403    Cyclic Citrul Peptide Antibody, IgG / IgA [[id number]] Collected:  07/05/17 1354    Specimen:  Blood Updated:  07/05/17 1403    POC Glucose Fingerstick [[id number]]  (Normal) Collected:  07/05/17 1414    Specimen:  Blood Updated:  07/05/17 1423     Glucose 91 mg/dL     Narrative:       Meter: [id number] : [id number] Markie HUSOTN    Glutamic Acid Decarboxylase [[id number]]  (Abnormal) Collected:  07/02/17 0513    Specimen:  Blood Updated:  07/05/17 1511     REGINA-65 >250.0 (H) U/mL     Narrative:       Performed at:  [address] [place] [address]  [id number]  : Jordy Doll MD, Phone:  [phone]    C-Peptide [[id number]] Collected:  07/02/17 0513    Specimen:  Blood Updated:  07/05/17 1511     C-Peptide 1.8 ng/mL       C-Peptide reference interval is for fasting patients.       Narrative:       Performed at:  [address] [place] [address]  [id number]  : Vince Minaya PhD, Phone:  [phone]    POC Glucose Fingerstick [[id number]]  (Abnormal) Collected:  07/05/17 1638    Specimen:  Blood Updated:  07/05/17 1641     Glucose 136 (H) mg/dL     Narrative:       RN Notified R and V Meter: [id number] : [id number] Broderick Wheeler    POC Glucose Fingerstick [[id number]]  (Abnormal) Collected:  07/05/17 2006    Specimen:  Blood Updated:  07/05/17 2043     Glucose 312 (H) mg/dL     Narrative:       Meter: [id number] : [id number] LEONORA ALVARES    POC Glucose Fingerstick [[id number]]  (Abnormal) Collected:  07/05/17 2320    Specimen:  Blood Updated:  07/05/17 2322      Glucose 362 (H) mg/dL     Narrative:       Meter: [id number] : [id number] [facility]    POC Glucose Fingerstick [[id number]]  (Abnormal) Collected:  07/06/17 0328    Specimen:  Blood Updated:  07/06/17 0348     Glucose 221 (H) mg/dL     Narrative:       Meter: [id number] : [id number] WASHINGTON DIA    Reticulocytes [[id number]]  (Abnormal) Collected:  07/06/17 0510    Specimen:  Blood Updated:  07/06/17 0526     Reticulocyte % 2.84 (H) %     aPTT [[id number]]  (Normal) Collected:  07/06/17 0510    Specimen:  Blood Updated:  07/06/17 0535     PTT 29.2 seconds     CBC & Differential [[id number]] Collected:  07/06/17 0510    Specimen:  Blood Updated:  07/06/17 0544    Narrative:       The following orders were created for panel order CBC & Differential.  Procedure                               Abnormality         Status                     ---------                               -----------         ------                     Manual Differential[[id number]]          Abnormal            Final result               Scan Slide[[id number]]                                       Final result               CBC Auto Differential[[id number]]        Abnormal            Final result                 Please view results for these tests on the individual orders.    CBC Auto Differential [[id number]]  (Abnormal) Collected:  07/06/17 0510    Specimen:  Blood Updated:  07/06/17 0544     WBC 15.14 (H) 10*3/mm3       WBC corrected for presence of NRBC's        RBC 3.26 (L) 10*6/mm3      Hemoglobin 10.3 (L) g/dL      Hematocrit 29.1 (L) %      MCV 89.3 fL      MCH 31.6 pg      MCHC 35.4 g/dL      RDW 15.3 (H) %      RDW-SD 49.1 fl      MPV 11.5 fL      Platelets 78 (L) 10*3/mm3     Scan Slide [[id number]] Collected:  07/06/17 0510    Specimen:  Blood Updated:  07/06/17 0544     Scan Slide --      See Manual Differential Results       Manual Differential [[id number]]  (Abnormal) Collected:  07/06/17 0510    Specimen:  Blood  Updated:  07/06/17 0544     Neutrophil % 79.0 (H) %      Lymphocyte % 9.0 (L) %      Monocyte % 6.0 %      Metamyelocyte % 1.0 (H) %      Myelocyte % 5.0 (H) %      Neutrophils Absolute 11.96 (H) 10*3/mm3      Lymphocytes Absolute 1.36 10*3/mm3      Monocytes Absolute 0.91 10*3/mm3      nRBC 57.0 (H) /100 WBC      Acanthocytes Slight/1+     Anisocytosis Mod/2+     Polychromasia Slight/1+     WBC Morphology Normal     Platelet Morphology Normal    Bilirubin, Direct [[id number]]  (Normal) Collected:  07/06/17 0510    Specimen:  Blood Updated:  07/06/17 0552     Bilirubin, Direct 0.2 mg/dL     Vancomycin, Random [[id number]]  (Normal) Collected:  07/06/17 0510    Specimen:  Blood Updated:  07/06/17 0552     Vancomycin Random 30.90 mcg/mL     Lactate Dehydrogenase [[id number]]  (Abnormal) Collected:  07/06/17 0510    Specimen:  Blood Updated:  07/06/17 0552      (H) U/L     Magnesium [[id number]]  (Abnormal) Collected:  07/06/17 0510    Specimen:  Blood Updated:  07/06/17 0552     Magnesium 2.8 (H) mg/dL     Renal Function Panel [[id number]]  (Abnormal) Collected:  07/06/17 0510    Specimen:  Blood Updated:  07/06/17 0552     Glucose 139 (H) mg/dL      BUN 63 (H) mg/dL      Creatinine 4.30 (H) mg/dL      Sodium 142 mmol/L      Potassium 3.6 mmol/L      Chloride 100 mmol/L      CO2 22.4 mmol/L      Calcium 7.9 (L) mg/dL      Albumin 2.90 (L) g/dL      Phosphorus 3.7 mg/dL      Anion Gap 19.6 mmol/L      BUN/Creatinine Ratio 14.7     eGFR Non African Amer 11 (L) mL/min/1.73     POC Glucose Fingerstick [[id number]]  (Normal) Collected:  07/06/17 0715    Specimen:  Blood Updated:  07/06/17 0717     Glucose 98 mg/dL     Narrative:       Meter: [id number] : [id number] Artemio Dill            Active Problems:    DKA (diabetic ketoacidoses)    Pneumonia    Sepsis    Acute respiratory failure    History of systemic lupus erythematosus (SLE)    History of splenectomy    History of ITP    Primary hypothyroidism    ARF  (acute renal failure) with tubular necrosis    Insulin therapy as discussed above.  Thyroid hormone replacement as discussed above.  Check celiac panel as discussed above.  Continue antibiotic therapy per ID.

## 2017-07-06 NOTE — ANESTHESIA POSTPROCEDURE EVALUATION
Patient: Sarita Bai    Procedure Summary     Date Anesthesia Start Anesthesia Stop Room / Location    07/06/17 1213 1323  [facility] / [facility]       Procedure Diagnosis Surgeon Provider    LEFT IJ TUNNELED  CATHETER  (N/A ) No diagnosis on file. MD Hernan Reza MD          Anesthesia Type: general  Last vitals  BP      Temp      Pulse     Resp      SpO2        Post Anesthesia Care and Evaluation    Patient location during evaluation: ICU  Patient participation: complete - patient cannot participate  Level of consciousness: obtunded/minimal responses  Pain management: adequate  Airway patency: patent  Anesthetic complications: No anesthetic complications  PONV Status: NA  Cardiovascular status: acceptable  Respiratory status: acceptable, ETT, intubated and ventilator  Hydration status: acceptable

## 2017-07-06 NOTE — PROGRESS NOTES
Pharmacy to dose Vancomycin    Day 8  Consult for Dr Poe  Treating: PNA    Current Vancomycin dose pulse dose for HD    Lab Results   Component Value Date    VANCORANDOM 30.90 2017       IV Anti-Infectives     Ordered     Dose/Rate Route Frequency Start Stop    17 0821  vancomycin 500 mg/100 mL 0.9% NS IVPB (mbp)     Ordering Provider:  Alexis Poe MD    500 mg  over 50 Minutes Intravenous Once 17 1600 17 1713    17 0924  piperacillin-tazobactam (ZOSYN) 3.375 g in iso-osmotic dextrose 50 ml (premix)     Steve Emery MD let the order  on 17 0858.   Ordering Provider:  Steve Emery MD    3.375 g  over 4 Hours Intravenous Every 12 Hours 17 1912 17 2359    17 1310  vancomycin 500 mg/100 mL 0.9% NS IVPB (mbp)     Ordering Provider:  Armin Vaughan MD    500 mg  over 50 Minutes Intravenous Once 17 1345 17 1545    17 0812  Vancomycin Pharmacy Intermittent Dosing     Steve Emery MD let the order  on 17 0858.   Ordering Provider:  Steve Emery MD     Does not apply Daily 17 0900 17 2359    17 1410  vancomycin 1500 mg/500 mL 0.9% NS IVPB ([facility])     Ordering Provider:  Madison Harris MD    20 mg/kg × 70.6 kg  over 150 Minutes Intravenous Once 17 1445 17 2343    17 0828  azithromycin (ZITHROMAX) 500 mg 0.9% NaCl (Add-vantage) 250 mL     Van Remy MD reviewed the order on 17 0647.   Ordering Provider:  Steve Emery MD    500 mg  over 60 Minutes Intravenous Every 24 Hours 17 0900 17 1051    17 2103  vancomycin 1500 mg/500 mL 0.9% NS IVPB ([facility])     Ordering Provider:  Alexis Poe MD    20 mg/kg × 70.6 kg Intravenous Once 17 2145 17  Pharmacy to dose vancomycin     Ordering Provider:  Alexis Poe MD     Does not apply Continuous PRN 17          "    Relevant clinical data and objective history reviewed:  37 y.o. female 67.01\" (170.2 cm) 158 lb 15.2 oz (72.1 kg)    Lab Results   Component Value Date    CREATININE 4.30 (H) 07/06/2017    CREATININE 3.07 (H) 07/05/2017    CREATININE 1.69 (H) 07/04/2017     Estimated Creatinine Clearance: 20 mL/min (by C-G formula based on Cr of 4.3).    Lab Results   Component Value Date    WBC 15.14 (H) 07/06/2017    WBC 18.10 (H) 07/05/2017    WBC 17.38 (H) 07/04/2017     Temp Readings from Last 3 Encounters:   07/06/17 98.8 °F (37.1 °C) (Oral)       Baseline cultures:  6/29 UC= neg   6/30 BC x2- ngtd  6/30 Sput-ng  6/20 resp panel= not detected   7/3 CXR= diffuse pna, slight improvement of aeration    PLAN: Patient going for tunneled catheter today, hope to extubate today after HD.  Based on random level this am no dose needed today, which is last day of therapy per ID.  No further levels or doses needed.    Kathia Mcgee PharmD, BCPS  07/06/17 11:08 AM            "

## 2017-07-06 NOTE — ANESTHESIA PREPROCEDURE EVALUATION
Anesthesia Evaluation     Patient summary reviewed and Nursing notes reviewed   NPO Solid Status: > 8 hours       Airway   Mallampati: II  TM distance: >3 FB  Neck ROM: full  no difficulty expected  Comment: intubated  Dental - normal exam     Pulmonary - negative pulmonary ROS   (+) rhonchi,   Cardiovascular - negative cardio ROS and normal exam        Neuro/Psych- negative ROS  GI/Hepatic/Renal/Endo - negative ROS     Musculoskeletal (-) negative ROS    Abdominal  - normal exam   Substance History - negative use     OB/GYN negative ob/gyn ROS         Other        ROS/Med Hx Other: Septic, ards, arf                                Anesthesia Plan    ASA 3     general     intravenous induction   Anesthetic plan and risks discussed with patient.    Plan discussed with CRNA.

## 2017-07-06 NOTE — PROGRESS NOTES
LOS: 7 days   Patient Care Team:  Gregorio Bai MD as PCP - General (Family Medicine)    Chief Complaint:  Pneumonia and respiratory failure    Subjective     Interval History:     Patient remains intubated she is sedated she is on some Precedex but this morning she is awake and alert she is following commands moving all 4 extremities she is coughing on command nodding her head yes and no appropriately    Review of Systems:   Unable to obtain       Objective     Vital Signs  Temp:  [98.1 °F (36.7 °C)-99.6 °F (37.6 °C)] 99.1 °F (37.3 °C)  Heart Rate:  [42-61] 45  Resp:  [12-18] 17  BP: ()/(56-80) 91/62  FiO2 (%):  [30 %] 30 %  Vent Mode: PC/AC  Ventilator/Non-Invasive Ventilation Settings     Start     Ordered    07/01/17 0804  Ventilator - AC/PC; (24); 100; 88%; Other (see comments); 18; 22; (I time 1 sec)  Continuous     Question Answer Comment   Vent Mode AC/PC    Breath rate  24   FiO2 100    FiO2 titrate for Sp02% =/> 88%    PEEP Other (see comments)    PEEP: 18    Inspiratory Pressure 22    I:E Ratio  I time 1 sec       07/01/17 0805    07/01/17 0107  Ventilator - AC/PC; (24); 100; 12; 24  Continuous,   Status:  Canceled     Question Answer Comment   Vent Mode AC/PC    Breath rate  24   FiO2 100    PEEP 12    Inspiratory Pressure 24        07/01/17 0107    06/30/17 2121  Ventilator - AC/VC; 8  Continuous,   Status:  Canceled     Question Answer Comment   Vent Mode AC/VC    PEEP 8        06/30/17 2120              Arterial Line BP: 241/241  Arterial Line MAP (mmHg): 241 mmHg     Body mass index is 24.89 kg/(m^2).  I/O last 3 completed shifts:  In: 2550 [I.V.:1078; Other:320; NG/GT:1102; IV Piggyback:50]  Out: 3025 [Other:1400; Stool:1625]           Physical Exam:  General Appearance: Well-developed white female she is orally intubated and a tracheal tube looks like it's about 23 cm at the lips.   With Precedex at 1.0 mics per kilogram per hour and some when necessary narcotics.  Awake alert  tracking voices and movement around the room nodding her head, coughing on command moving all extremities on command  Eyes: Conjunctiva are clear and anicteric pupils are about 3 mm they are equal round and reactive to light.  ENT: Oral mucous membranes are dry, she has her ET tube and feeding tube in place  Neck: Trachea midline I don't appreciate jugular venous distention right IJ central venous catheter site looks okay  Lungs: Coarse breath sounds bilaterally they are symmetric and chest expansion appears symmetric she is on a ventilator she is on a pressure control of 14 with a respiratory rate of 14 inspiratory pressure of 18 PEEP of 5 cm FiO2 30% her SPO2 is 99% and her tidal volumes are about 450+cc  Cardiac: Regular rate and rhythm no murmur.   Abdomen: Soft nontender I do not palpate organomegaly or masses , she does have a few bowel sounds today.  she is tolerating tube feeds   : Lockwood catheter dependent drainage very minimal urine output.    Musc/Skel: Looks pretty good today  Skin: No mottling today, she has a little bruise on her right supraclavicular area presumably from the roto-prone bed  Neuro: Hard to improvement again she is gripping and releasing to command with both hands wiggling and moving both feet and legs to command she gave very good cough effort to command she is nodding her head yes and no tracking with her eyes.  Extremities/P Vascular: No clubbing cyanosis or edema she has palpable dorsalis pedis pulses and radial pulses  MSE: Unable to assess       Labs:  WBC No results found for: WBCS   HGB Hemoglobin   Date Value Ref Range Status   07/06/2017 10.3 (L) 11.9 - 15.5 g/dL Final   07/05/2017 10.3 (L) 11.9 - 15.5 g/dL Final   07/04/2017 10.1 (L) 11.9 - 15.5 g/dL Final   07/04/2017 6.5 (C) 11.9 - 15.5 g/dL Final      HCT Hematocrit   Date Value Ref Range Status   07/06/2017 29.1 (L) 35.6 - 45.5 % Final   07/05/2017 28.4 (L) 35.6 - 45.5 % Final   07/04/2017 27.8 (L) 35.6 - 45.5 % Final    07/04/2017 18.3 (C) 35.6 - 45.5 % Final      Platlets No results found for: LABPLAT     PT/INR:    Protime   Date Value Ref Range Status   07/05/2017 14.5 (H) 11.7 - 14.2 Seconds Final   /  INR   Date Value Ref Range Status   07/05/2017 1.17 (H) 0.90 - 1.10 Final       Sodium Sodium   Date Value Ref Range Status   07/06/2017 142 136 - 145 mmol/L Final   07/05/2017 138 136 - 145 mmol/L Final   07/04/2017 137 136 - 145 mmol/L Final   07/03/2017 136 136 - 145 mmol/L Final      Potassium Potassium   Date Value Ref Range Status   07/06/2017 3.6 3.5 - 5.2 mmol/L Final   07/05/2017 3.3 (L) 3.5 - 5.2 mmol/L Final   07/04/2017 3.2 (L) 3.5 - 5.2 mmol/L Final   07/03/2017 3.6 3.5 - 5.2 mmol/L Final      Chloride Chloride   Date Value Ref Range Status   07/06/2017 100 98 - 107 mmol/L Final   07/05/2017 98 98 - 107 mmol/L Final   07/04/2017 96 (L) 98 - 107 mmol/L Final   07/03/2017 94 (L) 98 - 107 mmol/L Final      Bicarbonate No results found for: PLASMABICARB   BUN BUN   Date Value Ref Range Status   07/06/2017 63 (H) 6 - 20 mg/dL Final   07/05/2017 41 (H) 6 - 20 mg/dL Final   07/04/2017 19 6 - 20 mg/dL Final   07/03/2017 6 6 - 20 mg/dL Final      Creatinine Creatinine   Date Value Ref Range Status   07/06/2017 4.30 (H) 0.57 - 1.00 mg/dL Final   07/05/2017 3.07 (H) 0.57 - 1.00 mg/dL Final   07/04/2017 1.69 (H) 0.57 - 1.00 mg/dL Final   07/03/2017 0.62 0.57 - 1.00 mg/dL Final      Calcium Calcium   Date Value Ref Range Status   07/06/2017 7.9 (L) 8.6 - 10.5 mg/dL Final   07/05/2017 7.9 (L) 8.6 - 10.5 mg/dL Final   07/04/2017 7.5 (L) 8.6 - 10.5 mg/dL Final   07/03/2017 7.2 (L) 8.6 - 10.5 mg/dL Final      Magnesium Magnesium   Date Value Ref Range Status   07/06/2017 2.8 (H) 1.6 - 2.6 mg/dL Final   07/04/2017 2.2 1.6 - 2.6 mg/dL Final   07/03/2017 2.0 1.6 - 2.6 mg/dL Final            pH pH, Arterial   Date Value Ref Range Status   07/05/2017 7.438 7.350 - 7.450 pH units Final   07/04/2017 7.509 (H) 7.350 - 7.450 pH units Final    07/03/2017 7.495 (H) 7.350 - 7.450 pH units Final      pO2 pO2, Arterial   Date Value Ref Range Status   07/05/2017 122.4 (H) 80.0 - 100.0 mm Hg Final   07/04/2017 119.5 (H) 80.0 - 100.0 mm Hg Final   07/03/2017 157.7 (H) 80.0 - 100.0 mm Hg Final      pCO2 pCO2, Arterial   Date Value Ref Range Status   07/05/2017 35.1 35.0 - 45.0 mm Hg Final   07/04/2017 36.5 35.0 - 45.0 mm Hg Final   07/03/2017 40.4 35.0 - 45.0 mm Hg Final      HCO3 HCO3, Arterial   Date Value Ref Range Status   07/05/2017 23.7 22.0 - 28.0 mmol/L Final   07/04/2017 29.0 (H) 22.0 - 28.0 mmol/L Final   07/03/2017 31.2 (H) 22.0 - 28.0 mmol/L Final          castor oil-balsam peru  Topical Q12H   HYDROcodone-acetaminophen 1 tablet Nasogastric Q6H   insulin aspart 0-12 Units Subcutaneous Q4H   insulin detemir 10 Units Subcutaneous Nightly   lansoprazole 30 mg Per G Tube QAM   levothyroxine 50 mcg Oral Q AM   methylPREDNISolone sodium succinate 60 mg Intravenous Q8H   piperacillin-tazobactam 3.375 g Intravenous Q12H   Vancomycin Pharmacy Intermittent Dosing  Does not apply Daily       dexmedetomidine 0.2-1.5 mcg/kg/hr Last Rate: Stopped (07/06/17 0533)   Pharmacy to dose vancomycin         Diagnostics:  Imaging Results (last 24 hours)     ** No results found for the last 24 hours. **          Dopplers of lower extremities were negative yesterday and upper extremities only showed bilateral superficial vein thromboses      Assessment/Plan     Patient Active Problem List   Diagnosis Code   • DKA (diabetic ketoacidoses) E13.10   • Pneumonia J18.9   • Sepsis A41.9   • Acute respiratory failure J96.00   • History of systemic lupus erythematosus (SLE) Z87.39   • History of splenectomy Z90.81   • History of ITP Z86.2   • Primary hypothyroidism E03.9   • ARF (acute renal failure) with tubular necrosis N17.0       Impression #1 pneumonia community-acquired severe she is on broad-spectrum antibiotics including  vancomycin and Zosyn.  Cultures thus far negative and  infectious disease is following.Tania completed 5 days of azithromycin.And due to complete vancomycin and Zosyn today  #2 acute hypoxic respiratory failure secondary to ARDS with marked improvement. we will continue weaning.  With her awake now I think we can probably extubate her but I want to get her tunnel catheter in person get dialysis going.  #3 severe sepsis with septic shock her blood pressure has improved.  She is off vasopressors  #4 new onset diabetes with diabetic ketoacidosis Ketosis cleared , endocrinology help appreciated  #5 acute renal failure probably ATN , she remains anuric nephrology following dialysis per nephrology.  Her femoral dialysis catheter started not functioning well yesterday she is going to need a new catheter.  A tunnel catheter would be preferable.  Dr. Boykin was willing to place one yesterday but we felt with her falling platelet count it would be better to treat her and see if her platelets are improving or getting platelets for transfusion prior to procedure thankfully her platelets have improved she will get her tunnel catheter, today then maybe dialysis after.  #6 SLE apparently this is been fairly inactive recently   #7Raynauds syndrome   #8 lactic acidosis resolved  #9 metabolic encephalopathy much improved  #10 history of ITP postsplenectomy looks like probably has reflux flare she is on high-dose steroids now and her platelet count is actually better than yesterday  #11 anemia looks like this is primarily acute blood loss this is probably phlebotomy and ICU type anemia we don't see any evidence of active bleeding .  Hemoglobin is stable   #12 hypokalemia resolved  #13 fluids/electrolytes/nutrition tolerating tube feeds to continue.  #14 elevated liver function test this could be a little bit of ischemic hepatitis or could be related to drugs even related to ITP we will follow-up liver functions  #15 thrombocytopenia platelet count up after starting steroids likely ITP,  NADIA antibodies are pending unless Dr. Amato feels otherwise I think we should continue to avoid heparin products, at least pending study results    Plan for disposition:    Van Remy MD  07/06/17  7:10 AM    Time: I have spent 45 minutes thus far today in care of the patient

## 2017-07-06 NOTE — PLAN OF CARE
Problem: Patient Care Overview (Adult)  Goal: Plan of Care Review  Outcome: Ongoing (interventions implemented as appropriate)    07/06/17 1900   Coping/Psychosocial Response Interventions   Plan Of Care Reviewed With patient;father;mother   Patient Care Overview   Progress improving   Outcome Evaluation   Outcome Summary/Follow up Plan Pt tolerating tube feeding,ventilator, BMS, and wolfe catheter. Pt went to OR @ prox 1215 for tunneled catheter, pt responded well to surgery. Pt presents improved neuro status by following commands. Precedex on hold through-out shift. Pt's blood surgar has been between 90-170s; no insulin needed through shift. VSS.         Problem: Fall Risk (Adult)  Goal: Identify Related Risk Factors and Signs and Symptoms  Outcome: Ongoing (interventions implemented as appropriate)    07/06/17 1900   Fall Risk   Fall Risk: Related Risk Factors polypharmacy;environment unfamiliar   Fall Risk: Signs and Symptoms presence of risk factors       Goal: Absence of Falls  Outcome: Ongoing (interventions implemented as appropriate)    Problem: Diabetes, Type 1 (Adult)  Goal: Signs and Symptoms of Listed Potential Problems Will be Absent or Manageable (Diabetes, Type 1)  Outcome: Ongoing (interventions implemented as appropriate)    Problem: Sepsis (Adult)  Goal: Signs and Symptoms of Listed Potential Problems Will be Absent or Manageable (Sepsis)  Outcome: Ongoing (interventions implemented as appropriate)    07/06/17 1900   Sepsis   Problems Assessed (Sepsis) all   Problems Present (Sepsis) glycemic control, impaired;situational response         Problem: Pneumonia (Adult)  Goal: Signs and Symptoms of Listed Potential Problems Will be Absent or Manageable (Pneumonia)  Outcome: Ongoing (interventions implemented as appropriate)    07/06/17 1900   Pneumonia   Problems Assessed (Pneumonia) all   Problems Present (Pneumonia) respiratory compromise         Problem: SAFETY - NON-VIOLENT RESTRAINT  Goal: Remains  free of injury from restraints (Non-Violent Restraint)  Outcome: Outcome(s) achieved Date Met:  07/06/17  Goal: Free from restraint(s) (Non-Violent Restraint)  Outcome: Outcome(s) achieved Date Met:  07/06/17

## 2017-07-06 NOTE — PROGRESS NOTES
Subjective         History of Present Illness  My impression is that the patient states thrombocytopenia always very likely RELATED  associated sepsis with acute respiratory distress syndrome. She has a positive Lakisha test today she has abundant amount OF nucleated red blood cells in circulation. LDH is mildly elevated bilirubin and indirect bilirubin is normal. 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     My recommendation at this time will be to proceed with IV steroids like she was before and I'm going to proceed with her lupus  testing including a repeated ZIGGY and sedimentation rate as well as CITRULLINE  peptide antibodies. Dr. CARVER mentioned that this patient was receiving a low-dose prophylactic heparin I doubt that she has HIT. In view of thrombocytopenia and nevertheless the antibody has been sent out. If indeed the patient has a now to be autoimmune phenomena even in the antiplatelet antibodies could be positive..       Past Medical History, Past Surgical History, Social History, Family History have been reviewed and are without significant changes except as mentioned.    Review of Systems much better, awake responsive smiling , not in pain, no jaundiced.Still on the vent  A comprehensive 14 point review of systems was performed and was negative except as mentioned.    Medications:  The current medication list was reviewed in the EMR    ALLERGIES:    Allergies   Allergen Reactions   • Heparin      Waiting for heparin antigen to result       Objective      Vitals:    07/06/17 0648 07/06/17 0703 07/06/17 0800 07/06/17 0857   BP:  91/62     BP Location:  Right arm     Patient Position:  Lying     Pulse:  (!) 45 (!) 45 (!) 49   Resp: 16 17     Temp:  98.8 °F (37.1 °C)     TempSrc:  Oral     SpO2:  100%     Weight:       Height:         No flowsheet data  found.    Physical Exam  Well perfused hands, alert, smiling,moving 4 limbs, wants tube out.    RECENT LABS:  Hematology WBC   Date Value Ref Range Status   07/06/2017 15.14 (H) 4.50 - 10.70 10*3/mm3 Final     Comment:     WBC corrected for presence of NRBC's     RBC   Date Value Ref Range Status   07/06/2017 3.26 (L) 3.90 - 5.20 10*6/mm3 Final     Hemoglobin   Date Value Ref Range Status   07/06/2017 10.3 (L) 11.9 - 15.5 g/dL Final     Hematocrit   Date Value Ref Range Status   07/06/2017 29.1 (L) 35.6 - 45.5 % Final     Platelets   Date Value Ref Range Status   07/06/2017 78 (L) 140 - 500 10*3/mm3 Final          Assessment/Plan my assessment on this patient remains exactly like yesterday.  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  Her platelet count is 78,000 today she has no clinical bleeding.  White count is elevated because the steroids .  Her hemoglobin has dropped some no surprising that LDH remains not a little bit elevated at her bilirubin remains normal therefore in my opinion the degree of hemolysis on this patient is minimal.  The hit  Antibody testing is still pending and again I am not surprised that these would be positive doubt that could be positive in relation she with a tapering that she received for a few days I think with the positive nature related to the afternoon phenomenon that she is experiencing.  I called that the primary team will be able to extubate better very soon.  I discussed all this fax with the patient's mother the patient's father and family members.  The patient is back taking to reality..  When I  walked into the room been my patient long time ago she realizes that was there she recognizes me immediately.                       7/6/2017      CC:

## 2017-07-06 NOTE — PROGRESS NOTES
"   LOS: 7 days    Patient Care Team:  Gregorio Bai MD as PCP - General (Family Medicine)    Chief Complaint:  No chief complaint on file.      Subjective     Interval History: Follow up HODAN, anuric.  Awake on vent.  Able to nod to some questions.    Going for TDC this am.  Hematology consult noted.           Objective     Vital Signs  Temp:  [98.1 °F (36.7 °C)-99.6 °F (37.6 °C)] 98.8 °F (37.1 °C)  Heart Rate:  [42-61] 45  Resp:  [12-18] 17  BP: ()/(56-80) 91/62  FiO2 (%):  [30 %] 30 %    Flowsheet Rows         First Filed Value    Admission Height  67\" (170.2 cm) Documented at 06/29/2017 2028    Admission Weight  155 lb 10.3 oz (70.6 kg) Documented at 06/29/2017 2028             I/O last 3 completed shifts:  In: 2550 [I.V.:1078; Other:320; NG/GT:1102; IV Piggyback:50]  Out: 3025 [Other:1400; Stool:1625]    Intake/Output Summary (Last 24 hours) at 07/06/17 0826  Last data filed at 07/06/17 0500   Gross per 24 hour   Intake             1409 ml   Output             2575 ml   Net            -1166 ml       Physical Exam:on vent.   Right femoral catheter ( 6/30). Oral ETT and OG tube.  Heart RRR no s3 or rub.  Lungs coarse BS, upper airway rhonchi.  Abd +bs soft, nontender.  Body wall edema. Ext 1+ edema upper and lower ext .      Results from last 7 days  Lab Units 07/06/17  0510 07/05/17  0415 07/04/17  0259  06/29/17 2018   SODIUM mmol/L 142 138 137  < > 132*   POTASSIUM mmol/L 3.6 3.3* 3.2*  < > <1.5*   CHLORIDE mmol/L 100 98 96*  < > 102   CO2 mmol/L 22.4 23.1 25.2  < > 5.0*   BUN mg/dL 63* 41* 19  < > 21*   CREATININE mg/dL 4.30* 3.07* 1.69*  < > 1.94*   CALCIUM mg/dL 7.9* 7.9* 7.5*  < > 9.4   BILIRUBIN mg/dL  --  0.4  0.4  --   --  0.2   ALK PHOS U/L  --  125*  --   --  77   ALT (SGPT) U/L  --  455*  --   --  <5   AST (SGOT) U/L  --  176*  --   --  7   GLUCOSE mg/dL 139* 253* 186*  < > 300*   < > = values in this interval not displayed.    Estimated Creatinine Clearance: 20 mL/min (by C-G formula based on " Cr of 4.3).      Results from last 7 days  Lab Units 07/06/17  0510 07/05/17  0415 07/04/17  1712 07/04/17  0259 07/03/17  1213   MAGNESIUM mg/dL 2.8*  --   --  2.2 2.0   PHOSPHORUS mg/dL 3.7 2.1* 1.8* 0.6* 0.7*               Results from last 7 days  Lab Units 07/06/17  0510 07/05/17  0957 07/05/17  0415 07/04/17  1712 07/04/17  0259 07/02/17  0513  07/01/17  0615   WBC 10*3/mm3 15.14*  --  18.10*  --  17.38* 17.67*  --  10.90*   HEMOGLOBIN g/dL 10.3*  --  10.3* 10.1* 6.5* 8.3*  < > 9.7*   PLATELETS 10*3/mm3 78* 54* 103*  --  122* 197  --  210   < > = values in this interval not displayed.      Results from last 7 days  Lab Units 07/05/17  0957 07/01/17  0607   INR  1.17* 1.63*         Imaging Results (last 24 hours)     ** No results found for the last 24 hours. **          castor oil-balsam peru  Topical Q12H   HYDROcodone-acetaminophen 1 tablet Nasogastric Q6H   insulin aspart 0-12 Units Subcutaneous Q4H   insulin detemir 10 Units Subcutaneous Nightly   lansoprazole 30 mg Per G Tube QAM   levothyroxine 50 mcg Oral Q AM   methylPREDNISolone sodium succinate 60 mg Intravenous Q8H   piperacillin-tazobactam 3.375 g Intravenous Q12H   Vancomycin Pharmacy Intermittent Dosing  Does not apply Daily       dexmedetomidine 0.2-1.5 mcg/kg/hr Last Rate: Stopped (07/06/17 0533)   Pharmacy to dose vancomycin         Medication Review:   Current Facility-Administered Medications   Medication Dose Route Frequency Provider Last Rate Last Dose   • albumin human 25 % IV SOLN 12.5 g  12.5 g Intravenous PRN Debra Mohamud MD       • artificial tears (LUBRIFRESH P.M.) ophthalmic ointment   Both Eyes PRN Madison Harris MD       • calcium gluconate 1 g in sodium chloride 0.9 % 50 mL IVPB  1 g Intravenous PRN Susan Urena MD        Or   • calcium gluconate 2 g in sodium chloride 0.9 % 50 mL IVPB  2 g Intravenous PRN Susan Urena MD       • castor oil-balsam peru (VENELEX) ointment   Topical Q12H Madison Harris,  MD       • dexmedetomidine (PRECEDEX) 400 mcg/100 mL (4 mcg/mL) infusion  0.2-1.5 mcg/kg/hr Intravenous Titrated Van Remy MD   Stopped at 07/06/17 0533   • dextrose (D50W) solution 12.5 g  12.5 g Intravenous Q15 Min PRN Alexis Poe MD   12.5 g at 07/01/17 1526   • dextrose (D50W) solution 25-50 mL  25-50 mL Intravenous Q30 Min PRN Van Remy MD       • fentaNYL citrate (PF) (SUBLIMAZE) injection 25 mcg  25 mcg Intravenous Q30 Min PRN Van Remy MD       • HYDROcodone-acetaminophen (NORCO)  MG per tablet 1 tablet  1 tablet Nasogastric Q6H Van Remy MD   1 tablet at 07/06/17 0344   • HYDROcodone-acetaminophen (NORCO) 5-325 MG per tablet 1 tablet  1 tablet Oral Q4H PRN Alexis Peo MD       • insulin aspart (novoLOG) injection 0-12 Units  0-12 Units Subcutaneous Q4H Thomas Campbell MD   2 Units at 07/06/17 0412   • insulin detemir (LEVEMIR) injection 10 Units  10 Units Subcutaneous Nightly Thomas Campbell MD   10 Units at 07/05/17 2057   • lansoprazole (FIRST) oral suspension 30 mg  30 mg Per G Tube QAM Madison Harris MD   30 mg at 07/06/17 0613   • levothyroxine (SYNTHROID, LEVOTHROID) tablet 50 mcg  50 mcg Oral Q AM Thomas Campbell MD   50 mcg at 07/06/17 0528   • methylPREDNISolone sodium succinate (SOLU-Medrol) injection 60 mg  60 mg Intravenous Q8H Kory Amato MD   60 mg at 07/06/17 0528   • Pharmacy to dose vancomycin   Does not apply Continuous PRN Alexis Poe MD       • piperacillin-tazobactam (ZOSYN) 3.375 g in iso-osmotic dextrose 50 ml (premix)  3.375 g Intravenous Q12H Steve Emery MD   3.375 g at 07/06/17 0620   • Vancomycin Pharmacy Intermittent Dosing   Does not apply Daily Steve Emery MD           Assessment/Plan       Active Problems:    DKA (diabetic ketoacidoses)    Pneumonia    Sepsis    Acute respiratory failure    History of systemic lupus erythematosus (SLE)    History of splenectomy    History of ITP    Primary  hypothyroidism    ARF (acute renal failure) with tubular necrosis    1. HODAN, initially  prerenal , now  ATN. Anuric. Dialysis today after tunneled line placed.  2. Severe right-sided pna/ARDS. On vent. SP paralysis and prone position. Vanc and zosyn. Completed zithromax. Improving, weaning.   3. Hypotension.  Off pressor support for now.   4. Encephalopathy much improved   5. New DM1 presenting as DKA, now on scheduled insulin.  Endocrine managing.   6. H/o ITP with prior splenectomy: TCP. Likely autoimmune per hematology .  Steroids. HIT antibody pending.  Called lab.  They are to call me back with time frame for result.   It is a send-out lab.  6. Hypothyroidism on IV replacement.   7. Anemia.  Hg stable.    8. SLE .  Raynaud's.   9. Hypophosphatemia.  Now replete after replacement.  10. Elevated transaminases.     Plan:   1. Hemodialysis today after TDC placed.   2. DW father and uncle Dr. Gregorio Bai.    Debra Mohamud MD  07/06/17  8:26 AM

## 2017-07-06 NOTE — PROGRESS NOTES
Given the fact that NADIA was considered enough to send labs - I will treat as she has allergy and not use heparin during surgery and will NOT use a heparin bonded catheter unless we get a neg NADIA back before surgery.    Discussed with RN.

## 2017-07-06 NOTE — OP NOTE
Date of Admission:  6/29/2017 07/06/17  Caleb Bliss MD    Preoperative Diagnosis: Acute renal failure    Postoperative Diagnosis: same as above    Procedure Performed:     1)  Palindrome catheter insertion into the Left internal jugular vein (67008)  2)  Ultrasound guided vascular access (52594)  3)  Radiologic supervision of catheter tip placement (17025)    Surgeon: Caleb Bliss MD    Assistant:  None    Anesthesia: General    Estimated Blood Loss:  Minimal    Findings:     Both blue and red ports draw and flush without resistance    Successful  placement of a 28 cm Palindrome catheter in the Left internal jugular vein.    Specimen: none    Complications: none    Dispo: to PACU    Indication for procedure: 37 y.o. female with acute renal failure secondary to severe sepsis.  She currently has a poorly functioning right femoral vein Shiley catheter.  Her family was counseled on the risks benefits alternatives undergoing tunneled dialysis catheter in the operating room.  Degrees the procedure informed consent was obtained.     Description of procedure:     The patient was taken to the operating room. Left internal jugular vein was interrogated with an ultrasound which appeared to be adequate for Palindrome placement. Surgical sites were prepped and draped in the usual sterile fashion. A full surgical timeout was performed.  The skin overlying the vein was infiltrated with local. Under ultrasound guidance, the vein was accessed and wire was placed under fluoroscopic guidance into the cava.  11 bladed scalpel was used to make a half centimeter skin neck at the vascular insertion site.  Local anesthetic was used to anesthetize the tunneling tract of the palindrome catheter.  Half centimeters skin neck was made at the planned exit site of the Palindrome catheter.  Serial dilation was performed under fluoroscopic guidance to vascular access site with the supplied vascular dilators.  Peel-away sheath and  dilator were placed under fluoroscopic guidance over the guidewire.  Palindrome catheter was tunneled from the exit site to the vascular insertion site.  Wire and dilator were removed from the peel-away sheath.  Palindrome catheter was placed down into the peel-away sheath into the vena cava.  Palindrome catheter was withdrawn until adequate tip placement under fluoroscopic guidance.  Fluoroscopy of the apex of the catheter was performed to ensure no kinking.  Catheter was secured in place with nylon sutures.  Thre vascular access site was closed using Vicryl suture.  Sterile dressings were applied throughout.    Caleb Bliss MD  07/06/17    Active Hospital Problems (** Indicates Principal Problem)    Diagnosis Date Noted   • ARF (acute renal failure) with tubular necrosis [N17.0] 07/05/2017   • Pneumonia [J18.9] 07/01/2017   • Sepsis [A41.9] 07/01/2017   • Acute respiratory failure [J96.00] 07/01/2017   • History of systemic lupus erythematosus (SLE) [Z87.39] 07/01/2017   • History of splenectomy [Z90.81] 07/01/2017   • History of ITP [Z86.2] 07/01/2017   • Primary hypothyroidism [E03.9] 07/01/2017   • DKA (diabetic ketoacidoses) [E13.10] 06/30/2017      Resolved Hospital Problems    Diagnosis Date Noted Date Resolved   No resolved problems to display.       .

## 2017-07-06 NOTE — PLAN OF CARE
Problem: Patient Care Overview (Adult)  Goal: Plan of Care Review  Outcome: Ongoing (interventions implemented as appropriate)    07/06/17 0537   Coping/Psychosocial Response Interventions   Plan Of Care Reviewed With patient;family   Patient Care Overview   Progress no change   Outcome Evaluation   Outcome Summary/Follow up Plan tolarting tube feeding, ventilator, bowel management system, and wolfe catheter, tunneled catheter scheduled for placement today, hemodialysis to be completed after, able to follow commands, not requiring levophed at this time, precedex on hold, patient calm, nods no to pain, does not appear to be in pain (see CPOT documentation in flowsheets), tolerating boot for foot drop, tube feedings held for today's surgery, irrigating bowell management system every 2-4 hours, afebrile, blood sugars high despite treatment       Goal: Discharge Needs Assessment  Outcome: Ongoing (interventions implemented as appropriate)    07/06/17 0537   Discharge Needs Assessment   Concerns To Be Addressed denies needs/concerns at this time         Problem: Fall Risk (Adult)  Goal: Absence of Falls  Outcome: Ongoing (interventions implemented as appropriate)    07/06/17 0537   Fall Risk (Adult)   Absence of Falls making progress toward outcome         Problem: Diabetes, Type 1 (Adult)  Goal: Signs and Symptoms of Listed Potential Problems Will be Absent or Manageable (Diabetes, Type 1)  Outcome: Ongoing (interventions implemented as appropriate)    07/06/17 0537   Diabetes, Type 1   Problems Assessed (Type 1 Diabetes) all         Problem: Sepsis (Adult)  Goal: Signs and Symptoms of Listed Potential Problems Will be Absent or Manageable (Sepsis)  Outcome: Ongoing (interventions implemented as appropriate)    07/06/17 0537   Sepsis   Problems Assessed (Sepsis) all         Problem: Pneumonia (Adult)  Goal: Signs and Symptoms of Listed Potential Problems Will be Absent or Manageable (Pneumonia)  Outcome: Ongoing  (interventions implemented as appropriate)    07/06/17 0537   Pneumonia   Problems Assessed (Pneumonia) all         Problem: Hemodialysis (Adult)  Goal: Signs and Symptoms of Listed Potential Problems Will be Absent or Manageable (Hemodialysis)  Outcome: Ongoing (interventions implemented as appropriate)    07/06/17 0537   Hemodialysis   Problems Assessed (Hemodialysis) all

## 2017-07-06 NOTE — PROGRESS NOTES
LOS: 7 days     Chief Complaint:  Follow-up sepsis    History from RN and chart.     Interval History:  No fevers. No pressor needs.  On SBT this AM and more alert. Tolerating antibiotics w/o rash or diarrhea. Can't obtain full history due to intubated status    ROS: can't be obtained due to intubated status    Vital Signs  Temp:  [98.2 °F (36.8 °C)-99.6 °F (37.6 °C)] 98.8 °F (37.1 °C)  Heart Rate:  [42-61] 49  Resp:  [12-18] 17  BP: ()/(56-80) 91/62  FiO2 (%):  [30 %] 30 %    Physical Exam:  General: intubated, awake, alert today  Head:  normcephalic  Eyes:  PERRL, no scleral icterus  ENT: MM dry, ETT in place, no thrush  :  Lockwood catheter present  Musculoskeletal: no joint abnormalities, normal musculature  Skin: No rashes, lesions, or embolic phenomenon on visible skin  Psychiatric: sedated  Vasc:  RIJ TLC present, R femoral HD catehter    Antibiotics:  - ZOsyn 3.375 g q12h  •  Vancomycin dosed for HD    LABS:  CBC, BMP, VTr,  Micro reviewed today  Lab Results   Component Value Date    WBC 15.14 (H) 07/06/2017    HGB 10.3 (L) 07/06/2017    HCT 29.1 (L) 07/06/2017    MCV 89.3 07/06/2017    PLT 78 (L) 07/06/2017     Lab Results   Component Value Date    GLUCOSE 139 (H) 07/06/2017    BUN 63 (H) 07/06/2017    CREATININE 4.30 (H) 07/06/2017    EGFRIFNONA 11 (L) 07/06/2017    BCR 14.7 07/06/2017    CO2 22.4 07/06/2017    CALCIUM 7.9 (L) 07/06/2017    ALBUMIN 2.90 (L) 07/06/2017    LABIL2 0.8 07/05/2017     (H) 07/05/2017     (H) 07/05/2017     Lab Results   Component Value Date    VANCOTROUGH 12.00 07/03/2017    VANCORANDOM 30.90 07/06/2017       Procal 2--->4.9--->4.4    Microbiology:  6/30 RVP negative  6/30 BCx: negative  6/30 SpCx: negative    Radiology (personally reviewed images):   No new imaging today    Assessment/Plan   1. ARDS and Sepsis secondary to Right lower lobe pneumonia  -slowly improving course as this process tends to follow  -continue vancomycin with goal trough 15-20;  reviewed PharmD dosing  -continue Zosyn 3.375 g  q12h for intermittent HD  -plan stop date 7/6/17 at 11:59 pm to complete 7-day course  -finished azithromycin 500 mg IV q24h x 5 days  -all cultures negative     2. Diabetic ketoacidosis   - endocrine following    3. History of splenectomy  -will need PVX-23, Prevnar-13, meningitis and H flu vaccines as outpatient if not done already  -she has a PCP     4. Systemic lupus erythematosus  -appears quiescent based on history     5. Acute kidney injury   -on intermittent HD    6. Hepatitis - Work-up per ICU team.     Thank you for allowing me to be involved in the care of this patient. Infectious diseases will sign off at this time with antibiotics plan in place but please call me at [phone] if any further question.

## 2017-07-06 NOTE — ANESTHESIA PROCEDURE NOTES
Airway    Indications and Patient Condition  Indications for airway management: airway protection      Final Airway Details  Final airway type: endotracheal airway          Additional Comments  Already orally intubated on arrival

## 2017-07-06 NOTE — PLAN OF CARE
Problem: SAFETY - NON-VIOLENT RESTRAINT  Goal: Remains free of injury from restraints (Non-Violent Restraint)  Outcome: Ongoing (interventions implemented as appropriate)  No injury noted      Goal: Free from restraint(s) (Non-Violent Restraint)  Outcome: Ongoing (interventions implemented as appropriate)  Pulling lines

## 2017-07-07 LAB
ALBUMIN SERPL-MCNC: 3.2 G/DL (ref 3.5–5.2)
ALBUMIN/GLOB SERPL: 0.9 G/DL
ALP SERPL-CCNC: 103 U/L (ref 39–117)
ALT SERPL W P-5'-P-CCNC: 193 U/L (ref 1–33)
ANION GAP SERPL CALCULATED.3IONS-SCNC: 16 MMOL/L
APTT PPP: 24.7 SECONDS (ref 22.7–35.4)
ARTERIAL PATENCY WRIST A: POSITIVE
AST SERPL-CCNC: 38 U/L (ref 1–32)
ATMOSPHERIC PRESS: 751.6 MMHG
B2 GLYCOPROT1 IGA SER-ACNC: <9 GPI IGA UNITS (ref 0–25)
B2 GLYCOPROT1 IGG SER-ACNC: <9 GPI IGG UNITS (ref 0–20)
B2 GLYCOPROT1 IGM SER-ACNC: <9 GPI IGM UNITS (ref 0–32)
BASE EXCESS BLDA CALC-SCNC: 7.1 MMOL/L (ref 0–2)
BDY SITE: ABNORMAL
BILIRUB CONJ SERPL-MCNC: 0.2 MG/DL (ref 0–0.3)
BILIRUB SERPL-MCNC: 0.4 MG/DL (ref 0.1–1.2)
BUN BLD-MCNC: 30 MG/DL (ref 6–20)
BUN/CREAT SERPL: 11.7 (ref 7–25)
C DIFF TOX GENS STL QL NAA+PROBE: NEGATIVE
CALCIUM SPEC-SCNC: 8.2 MG/DL (ref 8.6–10.5)
CHLORIDE SERPL-SCNC: 97 MMOL/L (ref 98–107)
CO2 SERPL-SCNC: 27 MMOL/L (ref 22–29)
CREAT BLD-MCNC: 2.56 MG/DL (ref 0.57–1)
DEPRECATED RDW RBC AUTO: 48.2 FL (ref 37–54)
ERYTHROCYTE [DISTWIDTH] IN BLOOD BY AUTOMATED COUNT: 15.3 % (ref 11.7–13)
GFR SERPL CREATININE-BSD FRML MDRD: 21 ML/MIN/1.73
GLOBULIN UR ELPH-MCNC: 3.5 GM/DL
GLUCOSE BLD-MCNC: 241 MG/DL (ref 65–99)
GLUCOSE BLDC GLUCOMTR-MCNC: 170 MG/DL (ref 70–130)
GLUCOSE BLDC GLUCOMTR-MCNC: 221 MG/DL (ref 70–130)
GLUCOSE BLDC GLUCOMTR-MCNC: 221 MG/DL (ref 70–130)
GLUCOSE BLDC GLUCOMTR-MCNC: 262 MG/DL (ref 70–130)
GLUCOSE BLDC GLUCOMTR-MCNC: 288 MG/DL (ref 70–130)
GLUCOSE BLDC GLUCOMTR-MCNC: 297 MG/DL (ref 70–130)
HCO3 BLDA-SCNC: 30.4 MMOL/L (ref 22–28)
HCT VFR BLD AUTO: 29.7 % (ref 35.6–45.5)
HGB BLD-MCNC: 10.5 G/DL (ref 11.9–15.5)
HOROWITZ INDEX BLD+IHG-RTO: 30 %
LDH SERPL-CCNC: 343 U/L (ref 135–214)
MCH RBC QN AUTO: 31.3 PG (ref 26.9–32)
MCHC RBC AUTO-ENTMCNC: 35.4 G/DL (ref 32.4–36.3)
MCV RBC AUTO: 88.4 FL (ref 80.5–98.2)
MODALITY: ABNORMAL
O2 A-A PPRESDIFF RESPIRATORY: 0.7 MMHG
PCO2 BLDA: 37.3 MM HG (ref 35–45)
PEEP RESPIRATORY: 5 CM[H2O]
PH BLDA: 7.52 PH UNITS (ref 7.35–7.45)
PHOSPHATE SERPL-MCNC: 2.5 MG/DL (ref 2.5–4.5)
PLATELET # BLD AUTO: 115 10*3/MM3 (ref 140–500)
PMV BLD AUTO: 11.3 FL (ref 6–12)
PO2 BLDA: 117.2 MM HG (ref 80–100)
POTASSIUM BLD-SCNC: 4.1 MMOL/L (ref 3.5–5.2)
PROT SERPL-MCNC: 6.7 G/DL (ref 6–8.5)
PSV: 7 CMH2O
RBC # BLD AUTO: 3.36 10*6/MM3 (ref 3.9–5.2)
RETICS/RBC NFR AUTO: 3.95 % (ref 0.5–1.5)
SAO2 % BLDCOA: 99 % (ref 92–99)
SODIUM BLD-SCNC: 140 MMOL/L (ref 136–145)
TOTAL RATE: 16 BREATHS/MINUTE
VENTILATOR MODE: ABNORMAL
VT ON VENT VENT: 475 ML
WBC NRBC COR # BLD: 22.46 10*3/MM3 (ref 4.5–10.7)

## 2017-07-07 PROCEDURE — 85045 AUTOMATED RETICULOCYTE COUNT: CPT | Performed by: INTERNAL MEDICINE

## 2017-07-07 PROCEDURE — 63710000001 INSULIN ASPART PER 5 UNITS: Performed by: INTERNAL MEDICINE

## 2017-07-07 PROCEDURE — 36600 WITHDRAWAL OF ARTERIAL BLOOD: CPT

## 2017-07-07 PROCEDURE — 99232 SBSQ HOSP IP/OBS MODERATE 35: CPT | Performed by: INTERNAL MEDICINE

## 2017-07-07 PROCEDURE — 94799 UNLISTED PULMONARY SVC/PX: CPT

## 2017-07-07 PROCEDURE — 85027 COMPLETE CBC AUTOMATED: CPT | Performed by: INTERNAL MEDICINE

## 2017-07-07 PROCEDURE — 25010000002 ALTEPLASE 2 MG RECONSTITUTED SOLUTION: Performed by: INTERNAL MEDICINE

## 2017-07-07 PROCEDURE — 25010000002 METHYLPREDNISOLONE PER 125 MG: Performed by: INTERNAL MEDICINE

## 2017-07-07 PROCEDURE — 82803 BLOOD GASES ANY COMBINATION: CPT

## 2017-07-07 PROCEDURE — 80053 COMPREHEN METABOLIC PANEL: CPT | Performed by: INTERNAL MEDICINE

## 2017-07-07 PROCEDURE — 92610 EVALUATE SWALLOWING FUNCTION: CPT

## 2017-07-07 PROCEDURE — 83516 IMMUNOASSAY NONANTIBODY: CPT | Performed by: INTERNAL MEDICINE

## 2017-07-07 PROCEDURE — 83615 LACTATE (LD) (LDH) ENZYME: CPT | Performed by: INTERNAL MEDICINE

## 2017-07-07 PROCEDURE — 82248 BILIRUBIN DIRECT: CPT | Performed by: INTERNAL MEDICINE

## 2017-07-07 PROCEDURE — 84100 ASSAY OF PHOSPHORUS: CPT | Performed by: INTERNAL MEDICINE

## 2017-07-07 PROCEDURE — 82962 GLUCOSE BLOOD TEST: CPT

## 2017-07-07 PROCEDURE — 85730 THROMBOPLASTIN TIME PARTIAL: CPT | Performed by: INTERNAL MEDICINE

## 2017-07-07 RX ORDER — METHYLPREDNISOLONE SODIUM SUCCINATE 40 MG/ML
30 INJECTION, POWDER, LYOPHILIZED, FOR SOLUTION INTRAMUSCULAR; INTRAVENOUS EVERY 12 HOURS
Status: DISCONTINUED | OUTPATIENT
Start: 2017-07-07 | End: 2017-07-08

## 2017-07-07 RX ADMIN — LANSOPRAZOLE 30 MG: KIT at 06:44

## 2017-07-07 RX ADMIN — INSULIN ASPART 4 UNITS: 100 INJECTION, SOLUTION INTRAVENOUS; SUBCUTANEOUS at 19:05

## 2017-07-07 RX ADMIN — HYDROCODONE BITARTRATE AND ACETAMINOPHEN 1 TABLET: 5; 325 TABLET ORAL at 03:18

## 2017-07-07 RX ADMIN — ALTEPLASE 4 MG: 2.2 INJECTION, POWDER, LYOPHILIZED, FOR SOLUTION INTRAVENOUS at 02:09

## 2017-07-07 RX ADMIN — INSULIN ASPART 3 UNITS: 100 INJECTION, SOLUTION INTRAVENOUS; SUBCUTANEOUS at 09:08

## 2017-07-07 RX ADMIN — INSULIN ASPART 4 UNITS: 100 INJECTION, SOLUTION INTRAVENOUS; SUBCUTANEOUS at 20:30

## 2017-07-07 RX ADMIN — INSULIN ASPART 2 UNITS: 100 INJECTION, SOLUTION INTRAVENOUS; SUBCUTANEOUS at 04:00

## 2017-07-07 RX ADMIN — INSULIN ASPART 4 UNITS: 100 INJECTION, SOLUTION INTRAVENOUS; SUBCUTANEOUS at 16:58

## 2017-07-07 RX ADMIN — CASTOR OIL AND BALSAM, PERU: 788; 87 OINTMENT TOPICAL at 09:00

## 2017-07-07 RX ADMIN — CASTOR OIL AND BALSAM, PERU: 788; 87 OINTMENT TOPICAL at 20:26

## 2017-07-07 RX ADMIN — METHYLPREDNISOLONE SODIUM SUCCINATE 60 MG: 125 INJECTION, POWDER, FOR SOLUTION INTRAMUSCULAR; INTRAVENOUS at 06:14

## 2017-07-07 RX ADMIN — LEVOTHYROXINE SODIUM 50 MCG: 50 TABLET ORAL at 06:14

## 2017-07-07 RX ADMIN — INSULIN ASPART 1 UNITS: 100 INJECTION, SOLUTION INTRAVENOUS; SUBCUTANEOUS at 01:00

## 2017-07-07 RX ADMIN — INSULIN ASPART 2 UNITS: 100 INJECTION, SOLUTION INTRAVENOUS; SUBCUTANEOUS at 13:02

## 2017-07-07 RX ADMIN — METHYLPREDNISOLONE SODIUM SUCCINATE 30 MG: 125 INJECTION, POWDER, FOR SOLUTION INTRAMUSCULAR; INTRAVENOUS at 18:15

## 2017-07-07 NOTE — THERAPY EVALUATION
Acute Care - Speech Language Pathology   Swallow Initial Evaluation [facility]     Patient Name: Sarita Bai  : 1980  MRN: [id number]  Today's Date: 2017               Admit Date: 2017    SPEECH-LANGUAGE PATHOLOGY EVALUATION - SWALLOW  Subjective: The patient was seen on this date for a Clinical Swallow evaluation.  Patient was alert and cooperative.  Mostly aphonic voicing.  Some difficulty w/ vision, reports having double vision.  Not fully oriented past person.   [place].  Admitted w/ DKA (no prior hx of DM), pna & TME.  Developed respiratory failure due to ARDS & intubated 17-17.  PMH: Lupus  Objective: Textures given included ice chips, nectar thick liquid, honey thick liquid and puree consistency.  Assessment: Wet voicing noted after trials of ice chips.  Pt with a mistimed swallow with nectar thick liquids by cup.  No overt s/s of aspiration w/ puree or honey thick liquids.  SLP did not trial any further solid due to pt just being extubated this am & concern for fatigue.    SLP Findings:  Patient presents with moderate oropharyngeal dysphagia, without esophageal component.   Recommendations: Diet Textures: honey thick liquid, puree consistency food.  Medications should be taken whole with puree. May have ice between meals after oral care, under staff or family supervision and with the recommended strategies for safe swallowing.   Recommended Strategies: Upright for PO, small bites and sips and no straw. Oral care before breakfast, after all meals and PRN.  Other Recommended Evaluations: Re-evaluation at bedside  & Cognitive eval    Visit Dx:   No diagnosis found.  Patient Active Problem List   Diagnosis   • DKA (diabetic ketoacidoses)   • Pneumonia   • Sepsis   • Acute respiratory failure   • History of systemic lupus erythematosus (SLE)   • History of splenectomy   • History of ITP   • Primary hypothyroidism   • ARF (acute renal failure)  with tubular necrosis     Past Medical History:   Diagnosis Date   • Lupus    • Thyroid activity decreased      Past Surgical History:   Procedure Laterality Date   • INSERT CENTRAL LINE AT BEDSIDE  6/30/2017        • INSERTION HEMODIALYSIS CATHETER N/A 7/6/2017    Procedure: LEFT IJ TUNNELED  CATHETER ;  Surgeon: Caleb Bliss MD;  [facility] MA[facility];  Service:    • INTUBATION  6/30/2017        • SPLENECTOMY            SWALLOW EVALUATION (last 72 hours)      Swallow Evaluation       07/07/17 1507                Rehab Evaluation    Document Type evaluation  -NB        Symptoms Noted During/After Treatment none  -NB        General Information    Patient Profile Review yes  -NB        Current Diet Limitations NPO  -NB        Precautions/Limitations, Vision WFL  -NB        Precautions/Limitations, Hearing WFL  -NB        Prior Level of Function- Communication functional in all spheres  -NB        Prior Level of Function- Swallowing no diet consistency restrictions  -NB        Plans/Goals Discussed With patient and family;agreed upon  -NB        Barriers to Rehab medically complex  -NB        Clinical Impression    Patient's Goals For Discharge return to PO diet  -NB        Family Goals For Discharge patient able to return to PO diet  -NB        SLP Swallowing Diagnosis moderate dysphagia;oral dysfunction;pharyngeal dysfunction  -NB        Rehab Potential/Prognosis, Swallowing good, to achieve stated therapy goals  -NB        Criteria for Skilled Therapeutic Interventions Met skilled criteria for dysphagia intervention met  -NB        Therapy Frequency PRN  -NB        Predicted Duration Therapy Interv (days) until discharge  -NB        Expected Duration Therapy Session (min) 15-30 minutes  -NB        SLP Diet Recommendation II - pureed;honey-thick liquids  -NB        Recommended Diagnostics reassess via clinical swallow (non-instrumental exam)  -NB        Recommended Feeding/Eating Techniques no straws;small  sips/bites  -NB        SLP Rec. for Method of Medication Administration meds whole in pudding/applesauce  -NB        Monitor For Signs Of Aspiration cough;gurgly voice;throat clearing  -NB        Anticipated Discharge Disposition other (see comments)   unknown  -NB        Pain Assessment    Pain Assessment No/denies pain  -NB        Cognitive Assessment/Intervention    Current Cognitive/Communication Assessment impaired   some confusion, complaints of double vision too  -NB        Orientation Status oriented to;person  -NB        Follows Commands/Answers Questions 100% of the time;able to follow single-step instructions  -NB        Oral Motor Structure and Function    Oral Motor Anatomy and Physiology patient demonstrates anatomy that is WNL  -NB        Dentition Assessment present and adequate  -NB        Secretion Management WNL/WFL  -NB        Mucosal Quality moist, healthy  -NB        Velar Elevation WNL (within normal limits)  -NB        Volitional Swallow no difficulties initiating volitional swallow  -NB        Volitional Cough no difficulties initiating volitional cough  -NB        Oral Musculature General Assessment WNL (within normal limits)  -NB          User Key  (r) = Recorded By, (t) = Taken By, (c) = Cosigned By    Initials Name Effective Dates    GREG Posadas MS [facility]-SLP 04/13/15 -         EDUCATION  The patient has been educated in the following areas:   Dysphagia (Swallowing Impairment).    SLP Recommendation and Plan  SLP Swallowing Diagnosis: moderate dysphagia, oral dysfunction, pharyngeal dysfunction  SLP Diet Recommendation: II - pureed, honey-thick liquids  Recommended Feeding/Eating Techniques: no straws, small sips/bites  SLP Rec. for Method of Medication Administration: meds whole in pudding/applesauce  Monitor For Signs Of Aspiration: cough, gurgly voice, throat clearing  Recommended Diagnostics: reassess via clinical swallow (non-instrumental exam)  Criteria for Skilled Therapeutic  Interventions Met: skilled criteria for dysphagia intervention met  Anticipated Discharge Disposition: other (see comments) (unknown)  Rehab Potential/Prognosis, Swallowing: good, to achieve stated therapy goals  Therapy Frequency: PRN             Plan of Care Review  Plan Of Care Reviewed With: patient, family  Progress: improving  Outcome Summary/Follow up Plan: BSE: start PO diet of puree & HTL; SLP to re-eval tomorrow.           IP SLP Goals       07/07/17 1506          Safely Consume Diet    Safely Consume Diet- SLP, Date Established 07/07/17  -NB      Safely Consume Diet- SLP, Time to Achieve by discharge  -NB      Safely Consume Diet- SLP, Additional Goal puree & HTL  -NB      Safely Consume Diet- SLP, Date Goal Reviewed 07/07/17  -NB        User Key  (r) = Recorded By, (t) = Taken By, (c) = Cosigned By    Initials Name Provider Type    GREG Posadas MS CCC-SLP Speech and Language Pathologist             SLP Outcome Measures (last 72 hours)      SLP Outcome Measures       07/07/17 1507          SLP Outcome Measures    Outcome Measure Used? Adult NOMS  -NB      FCM Scores    FCM Chosen Swallowing  -NB      Swallowing FCM Score 3  -NB        User Key  (r) = Recorded By, (t) = Taken By, (c) = Cosigned By    Initials Name Effective Dates    GREG Posadas MS CCC-SLP 04/13/15 -            Time Calculation:         Time Calculation- SLP       07/07/17 1509          Time Calculation- SLP    SLP Start Time 1400  -NB      SLP Stop Time 1500  -NB      SLP Time Calculation (min) 60 min  -NB      SLP Received On 07/07/17  -NB        User Key  (r) = Recorded By, (t) = Taken By, (c) = Cosigned By    Initials Name Provider Type    GREG Posadas MS CCC-SLP Speech and Language Pathologist          Therapy Charges for Today     Code Description Service Date Service Provider Modifiers Qty    [id number] HC ST EVAL ORAL PHARYNG SWALLOW 4 7/7/2017 Abida Posadas MS CCC-SLP GN 1               Abida Posadas MS  CCC-SLP  7/7/2017

## 2017-07-07 NOTE — SIGNIFICANT NOTE
07/07/17 1312   Rehab Treatment   Discipline physical therapist   Rehab Evaluation   Evaluation Not Performed patient unavailable for evaluation  (spoke with THIERNO Sims who states patient has to be flat x 6 hours since R José Miguel pulled from groin. will check back tomorrow )   Recommendation   PT - Next Appointment 07/08/17

## 2017-07-07 NOTE — PLAN OF CARE
Problem: Patient Care Overview (Adult)  Goal: Plan of Care Review  Outcome: Ongoing (interventions implemented as appropriate)    07/07/17 1953   Coping/Psychosocial Response Interventions   Plan Of Care Reviewed With patient;family   Patient Care Overview   Progress improving   Outcome Evaluation   Outcome Summary/Follow up Plan Pt extubated and cortrak removed at 0800, speech eval @ 1300; pt is now on puree/diabetic diet. D/C femoral shiley and F/C. Glucose monitoring continued, along with supplemental insulin. Pt tolerating BMS with frequent flushes. Pt complaines of no pain. VSS.         Problem: Fall Risk (Adult)  Goal: Identify Related Risk Factors and Signs and Symptoms  Outcome: Ongoing (interventions implemented as appropriate)    07/07/17 1953   Fall Risk   Fall Risk: Related Risk Factors fatigue/slow reaction;bladder function altered;polypharmacy;environment unfamiliar   Fall Risk: Signs and Symptoms presence of risk factors       Goal: Absence of Falls  Outcome: Ongoing (interventions implemented as appropriate)    07/07/17 1953   Fall Risk (Adult)   Absence of Falls making progress toward outcome         Problem: Diabetes, Type 1 (Adult)  Goal: Signs and Symptoms of Listed Potential Problems Will be Absent or Manageable (Diabetes, Type 1)  Outcome: Ongoing (interventions implemented as appropriate)    07/07/17 1953   Diabetes, Type 1   Problems Assessed (Type 1 Diabetes) all   Problems Present (Type 1 Diabetes) impaired glycemic control;situational response         Problem: Sepsis (Adult)  Goal: Signs and Symptoms of Listed Potential Problems Will be Absent or Manageable (Sepsis)  Outcome: Ongoing (interventions implemented as appropriate)    07/07/17 1953   Sepsis   Problems Assessed (Sepsis) all   Problems Present (Sepsis) glycemic control, impaired;malnutrition (undernutrition);situational response         Problem: Pneumonia (Adult)  Goal: Signs and Symptoms of Listed Potential Problems Will be  Absent or Manageable (Pneumonia)  Outcome: Ongoing (interventions implemented as appropriate)    07/07/17 1953   Pneumonia   Problems Assessed (Pneumonia) all   Problems Present (Pneumonia) fluid/electrolyte imbalance

## 2017-07-07 NOTE — PROGRESS NOTES
Subjective         History of Present Illness  My impression is that the patient states thrombocytopenia always very likely RELATED  associated sepsis with acute respiratory distress syndrome. She has a positive Lakisha test today she has abundant amount OF nucleated red blood cells in circulation. LDH is mildly elevated bilirubin and indirect bilirubin is normal. 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     My recommendation at this time will be to proceed with IV steroids like she was before and I'm going to proceed with her lupus  testing including a repeated ZIGGY and sedimentation rate as well as CITRULLINE  peptide antibodies. Dr. CARVER mentioned that this patient was receiving a low-dose prophylactic heparin I doubt that she has HIT. In view of thrombocytopenia and nevertheless the antibody has been sent out. If indeed the patient has a now to be autoimmune phenomena even in the antiplatelet antibodies could be positive..       Past Medical History, Past Surgical History, Social History, Family History have been reviewed and are without significant changes except as mentioned.    Review of Systems much better, awake responsive smiling , not in pain, no jaundiced.Still on the vent  A comprehensive 14 point review of systems was performed and was negative except as mentioned.    Medications:  The current medication list was reviewed in the EMR    ALLERGIES:    Allergies   Allergen Reactions   • Heparin      Waiting for heparin antigen to result       Objective      Vitals:    07/07/17 0603 07/07/17 0633 07/07/17 0703 07/07/17 0720   BP: 125/84 124/81 124/81    BP Location: Right arm Right arm Right arm    Patient Position: Lying Lying Lying    Pulse: 64 61 62 64   Resp: 18 19 17   Temp:   98.7 °F (37.1 °C)    TempSrc:   Oral    SpO2: 100% 100% 100% 100%   Weight:        Height:         No flowsheet data found.    Physical Exam  Well perfused hands, alert, smiling,moving 4 limbs,  tube out.No cervical swelling, no pain in upper extremities veins, clear lung regular heaart, soft abdomen, fully awake alert smiling moving 4 limbs.    RECENT LABS:  Hematology WBC   Date Value Ref Range Status   07/07/2017 22.46 (H) 4.50 - 10.70 10*3/mm3 Final     RBC   Date Value Ref Range Status   07/07/2017 3.36 (L) 3.90 - 5.20 10*6/mm3 Final     Hemoglobin   Date Value Ref Range Status   07/07/2017 10.5 (L) 11.9 - 15.5 g/dL Final     Hematocrit   Date Value Ref Range Status   07/07/2017 29.7 (L) 35.6 - 45.5 % Final     Platelets   Date Value Ref Range Status   07/07/2017 115 (L) 140 - 500 10*3/mm3 Final      ZIGGY Comprehensive Panel   Order: [id number]   Status:  Final result   Visible to patient:  No (Not Released)      Ref Range & Units 2d ago     Anti-DNA (DS) Ab Qn 0 - 9 IU/mL <1   Comments:                                    Negative      <5                                      Equivocal  5 - 9                                      Positive      >9   RNP Antibodies 0.0 - 0.9 AI >8.0 (H)   Del Cid Antibodies 0.0 - 0.9 AI 0.7   Antiscleroderma-70 Antibodies 0.0 - 0.9 AI 0.2   JOE SSA (RO) Ab 0.0 - 0.9 AI 0.2   JOE SSB (LA) Ab 0.0 - 0.9 AI <0.2   Antichromatin Antibodies 0.0 - 0.9 AI >8.0 (H)   NORA-1 IgG 0.0 - 0.9 AI <0.2   Anti-Centromere B Antibodies 0.0 - 0.9 AI <0.2   See below:  Comment   Comments: Autoantibody                       Disease            Interpretation Summary   · Normal bilateral lower extremity venous duplex scan.         Interpretation Summary   · Acute right upper extremity superficial thrombophlebitis noted in the cephalic (upper arm).  · Acute left upper extremity superficial thrombophlebitis noted in the cephalic (upper arm).  · All other vessels appear normal.  · Right IJ not examined due to central line.     Heparin-induced Platelet Antibody   Order: [id number]   Status:   Final result   Visible to patient:  No (Not Released)      Ref Range & Units 2d ago     Heparin Induced Plt Ab 0.000 - 0.400 OD 0.190   Resulting Agency  [facility]   Narrative   Performed at:  01 - [facility] [address]  [id number]  : Jordy Doll MD, Phone:  [phone]      Specimen Collected: 07/05/17  9:57 AM Last Resulted: 07/06/                  Assessment/Plan my assessment on this patient remains exactly like yesterday.  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  Her platelet count is 78,000 today she has no clinical bleeding.  White count is elevated because the steroids .  Her hemoglobin has dropped some no surprising that LDH remains not a little bit elevated at her bilirubin remains normal therefore in my opinion the degree of hemolysis on this patient is minimal.  The hit  Antibody testing is still pending and again I am not surprised that these would be positive doubt that could be positive in relation she with a tapering that she received for a few days I think with the positive nature related to the afternoon phenomenon that she is experiencing.  I called that the primary team will be able to extubate better very soon.  I discussed all this fax with the patient's mother the patient's father and family members.  The patient is back taking to reality..  When I walked into the room been my patient long time ago she realizes that was there she recognizes me immediately.   I haVE SEEN ALL ABOVE TEST, NO EVIDENCE OF HEPARIN ALLERGY, PLATELETS COUNT BETTER, WBC HIGH DUE TO STEROIDS, LUPUS  SEROLOGY STATED.CYCLIC AB PENDING.  RECS: ADJUST SOLUMEDROL DOSE TO 60 MG A DAY; DRAMATICALLY BETTER; REST OF CARE BY PRIMARY TEAM, DISCUSSED WITH PT, NURSE AND PT'S FATHER.                    7/7/2017      CC:

## 2017-07-07 NOTE — PLAN OF CARE
Problem: Patient Care Overview (Adult)  Goal: Plan of Care Review    07/07/17 1506   Coping/Psychosocial Response Interventions   Plan Of Care Reviewed With patient;family   Patient Care Overview   Progress improving   Outcome Evaluation   Outcome Summary/Follow up Plan BSE: start PO diet of puree & HTL; SLP to re-eval tomorrow.          Problem: Inpatient SLP  Goal: Dysphagia- Patient will safely consume diet as per recommendation with no signs/symptoms of aspiration    07/07/17 1506   Safely Consume Diet   Safely Consume Diet- SLP, Date Established 07/07/17   Safely Consume Diet- SLP, Time to Achieve by discharge   Safely Consume Diet- SLP, Additional Goal puree & HTL   Safely Consume Diet- SLP, Date Goal Reviewed 07/07/17

## 2017-07-07 NOTE — PROGRESS NOTES
"   LOS: 8 days    Patient Care Team:  Gregorio Bai MD as PCP - General (Family Medicine)    Chief Complaint:  No chief complaint on file.      Subjective     Interval History: Extubated!    Patient Complaints: none  Patient Denies:  Cp soa  History taken from: patient    Review of Systems:    Review of systems could not be obtained due to  still groggy    Objective     Vital Signs  Temp:  [97.6 °F (36.4 °C)-98.7 °F (37.1 °C)] 98.7 °F (37.1 °C)  Heart Rate:  [53-95] 60  Resp:  [13-22] 15  BP: ()/() 129/76  FiO2 (%):  [30 %] 30 %    Flowsheet Rows         First Filed Value    Admission Height  67\" (170.2 cm) Documented at 06/29/2017 2028    Admission Weight  155 lb 10.3 oz (70.6 kg) Documented at 06/29/2017 2028             I/O last 3 completed shifts:  In: 1707 [I.V.:569; Other:260; NG/GT:828; IV Piggyback:50]  Out: 3840 [Urine:15; Other:2000; Stool:1825]    Intake/Output Summary (Last 24 hours) at 07/07/17 1328  Last data filed at 07/07/17 0510   Gross per 24 hour   Intake              934 ml   Output             3165 ml   Net            -2231 ml       Physical Exam:  Right femoral catheter ( 6/30). Oral ETT and OG tube. Heart RRR no s3 or rub. Lungs coarse BS, upper airway rhonchi. Abd +bs soft, nontender. Body wall edema. Ext 1+ edema upper and lower ext .          Results Review:      Results from last 7 days  Lab Units 07/07/17  0356 07/06/17  0510 07/05/17  0415   SODIUM mmol/L 140 142 138   POTASSIUM mmol/L 4.1 3.6 3.3*   CHLORIDE mmol/L 97* 100 98   CO2 mmol/L 27.0 22.4 23.1   BUN mg/dL 30* 63* 41*   CREATININE mg/dL 2.56* 4.30* 3.07*   CALCIUM mg/dL 8.2* 7.9* 7.9*   BILIRUBIN mg/dL 0.4  --  0.4  0.4   ALK PHOS U/L 103  --  125*   ALT (SGPT) U/L 193*  --  455*   AST (SGOT) U/L 38*  --  176*   GLUCOSE mg/dL 241* 139* 253*       Estimated Creatinine Clearance: 32.1 mL/min (by C-G formula based on Cr of 2.56).      Results from last 7 days  Lab Units 07/07/17  0356 07/06/17  0510 07/05/17  0415  " 07/04/17  0259 07/03/17  1213   MAGNESIUM mg/dL  --  2.8*  --   --  2.2 2.0   PHOSPHORUS mg/dL 2.5 3.7 2.1*  < > 0.6* 0.7*   < > = values in this interval not displayed.            Results from last 7 days  Lab Units 07/07/17  0356 07/06/17  0510 07/05/17  0957 07/05/17  0415 07/04/17  1712 07/04/17  0259 07/02/17  0513   WBC 10*3/mm3 22.46* 15.14*  --  18.10*  --  17.38* 17.67*   HEMOGLOBIN g/dL 10.5* 10.3*  --  10.3* 10.1* 6.5* 8.3*   PLATELETS 10*3/mm3 115* 78* 54* 103*  --  122* 197         Results from last 7 days  Lab Units 07/05/17  0957 07/01/17  0607   INR  1.17* 1.63*         Imaging Results (last 24 hours)     Procedure Component Value Units Date/Time    XR Chest 1 View [[id number]] Collected:  07/06/17 1451     Updated:  07/06/17 1458    Narrative:       XR CHEST 1 VW-     HISTORY: Female who is 37 years-old,  new catheter placement     TECHNIQUE: Frontal view of the chest     COMPARISON: 07/04/2017     FINDINGS: Endotracheal tube, IJ catheter appears stable. Left-sided  lumen central venous catheter extends to the cavoatrial junction  region/proximal right atrium. Heart, mediastinum and pulmonary  vasculature are unremarkable. No focal pulmonary consolidation, pleural  effusion, or obvious large pneumothorax, but the left apex is partly  obscured by overlying objects, and assessment is also limited by supine  positioning. No acute osseous process.       Impression:       New left-sided central venous catheter, no pneumothorax  identified, limited as described, consider follow-up upright view of the  chest with overlying objects moved away from left apical region.     This report was finalized on 7/6/2017 2:55 PM by Dr. Storm Biswas MD.        PICC W Fluoro Surgery Only [[id number]] Resulted:  07/07/17 0805     Updated:  07/07/17 0805          castor oil-balsam peru  Topical Q12H   insulin aspart 0-12 Units Subcutaneous Q4H   insulin detemir 20 Units Subcutaneous Nightly   lansoprazole 30 mg Per G  Tube QAM   levothyroxine 50 mcg Oral Q AM   methylPREDNISolone sodium succinate 30 mg Intravenous Q12H          Medication Review:   Current Facility-Administered Medications   Medication Dose Route Frequency Provider Last Rate Last Dose   • albumin human 25 % IV SOLN 12.5 g  12.5 g Intravenous PRN Debra Mohamud MD       • artificial tears (LUBRIFRESH P.M.) ophthalmic ointment   Both Eyes PRN Madison Harris MD       • calcium gluconate 1 g in sodium chloride 0.9 % 50 mL IVPB  1 g Intravenous PRN Susan Urena MD        Or   • calcium gluconate 2 g in sodium chloride 0.9 % 50 mL IVPB  2 g Intravenous PRN Susan Urena MD       • castor oil-balsam peru (VENELEX) ointment   Topical Q12H Madison Harris MD       • dextrose (D50W) solution 12.5 g  12.5 g Intravenous Q15 Min PRN Alexis Poe MD   12.5 g at 07/01/17 1526   • dextrose (D50W) solution 25-50 mL  25-50 mL Intravenous Q30 Min PRN Van Remy MD       • HYDROcodone-acetaminophen (NORCO) 5-325 MG per tablet 1 tablet  1 tablet Oral Q4H PRN Alexis Poe MD   1 tablet at 07/06/17 1954   • insulin aspart (novoLOG) injection 0-12 Units  0-12 Units Subcutaneous Q4H Thomas Campbell MD   2 Units at 07/07/17 1302   • insulin detemir (LEVEMIR) injection 20 Units  20 Units Subcutaneous Nightly Thomas Campbell MD       • lansoprazole (FIRST) oral suspension 30 mg  30 mg Per G Tube QAM Madison Harris MD   30 mg at 07/07/17 0644   • levothyroxine (SYNTHROID, LEVOTHROID) tablet 50 mcg  50 mcg Oral Q AM Thomas Campbell MD   50 mcg at 07/07/17 0614   • methylPREDNISolone sodium succinate (SOLU-Medrol) injection 30 mg  30 mg Intravenous Q12H Koyr Amato MD       • ondansetron (ZOFRAN) injection 4 mg  4 mg Intravenous Once PRN Hernan Foster MD           Assessment/Plan       Active Problems:    DKA (diabetic ketoacidoses)    Pneumonia    Sepsis    Acute respiratory failure    History of systemic lupus erythematosus (SLE)    History of  splenectomy    History of ITP    Primary hypothyroidism    ARF (acute renal failure) with tubular necrosis      1. HODAN, initially prerenal , now ATN. Dialysis yesterday.  Has tunnel catheter.  2. Severe right-sided pna/ARDS.  Formerly on rota bed. Vanc and zosyn. Completed zithromax, extubated today!  3. Encephalopathy much improved   4. New DM1 presenting as DKA, now on scheduled insulin.  Endocrine managing.   5. H/o ITP with prior splenectomy: TCP. Likely autoimmune per hematology . Steroids. HIT antibody pending. Called lab.   6. Hypothyroidism on IV replacement.   7. Anemia. Hg stable.    8. SLE . Raynaud's.   9. Elevated transaminases.      Plan:   1. Hemodialysis tomorrow.   2. Discontinue wolfe.           Susan Urena MD  07/07/17  1:28 PM

## 2017-07-07 NOTE — PROGRESS NOTES
LOS: 8 days   Patient Care Team:  Gregorio Bai MD as PCP - General (Family Medicine)    Chief Complaint:  Pneumonia and respiratory failure    Subjective     Interval History:     Patient remains intubated she has been off all sedation overnight, a little bit of pain medication and she denies any pain at this time she is awake on the ventilator following commands she didn't complete dialysis until early this morning and she is been on spontaneous breathing trial since    Review of Systems:   Unable to obtain       Objective     Vital Signs  Temp:  [97.6 °F (36.4 °C)-98 °F (36.7 °C)] 97.6 °F (36.4 °C)  Heart Rate:  [45-95] 62  Resp:  [13-22] 19  BP: ()/() 124/81  FiO2 (%):  [30 %] 30 %  Vent Mode: PS  Ventilator/Non-Invasive Ventilation Settings     Start     Ordered    07/01/17 0804  Ventilator - AC/PC; (24); 100; 88%; Other (see comments); 18; 22; (I time 1 sec)  Continuous     Question Answer Comment   Vent Mode AC/PC    Breath rate  24   FiO2 100    FiO2 titrate for Sp02% =/> 88%    PEEP Other (see comments)    PEEP: 18    Inspiratory Pressure 22    I:E Ratio  I time 1 sec       07/01/17 0805    07/01/17 0107  Ventilator - AC/PC; (24); 100; 12; 24  Continuous,   Status:  Canceled     Question Answer Comment   Vent Mode AC/PC    Breath rate  24   FiO2 100    PEEP 12    Inspiratory Pressure 24        07/01/17 0107    06/30/17 2121  Ventilator - AC/VC; 8  Continuous,   Status:  Canceled     Question Answer Comment   Vent Mode AC/VC    PEEP 8        06/30/17 2120              Arterial Line BP: 241/241  Arterial Line MAP (mmHg): 241 mmHg     Body mass index is 23.82 kg/(m^2).  I/O last 3 completed shifts:  In: 1707 [I.V.:569; Other:260; NG/GT:828; IV Piggyback:50]  Out: 3840 [Urine:15; Other:2000; Stool:1825]           Physical Exam:  General Appearance: Well-developed white female she is orally intubated and a tracheal tube looks like it's about 23 cm at the lips.   She is able to cough on  command she is nodding her head appropriately moving all extremities appropriately  Eyes: Conjunctiva are clear and anicteric pupils are about 3 mm they are equal round and reactive to light.  ENT: Oral mucous membranes are dry, she has her ET tube and feeding tube in place  Neck: Trachea midline I don't appreciate jugular venous distention right IJ central venous catheter site looks okay and the left tunneled IJ catheter site looks okay  Lungs: Coarse breath sounds bilaterally they are symmetric and chest expansion appears symmetric she is on a ventilator she is on a pressure support ventilation of 7 cm the PEEP to 5 her tidal volumes are right around 500 cc and her respiratory rate is about 18  Cardiac: Regular rate and rhythm no murmur.   Abdomen: Soft nontender I do not palpate organomegaly or masses , she does have a few bowel sounds today.  she is tolerating tube feeds   : Lockwood catheter dependent drainage very minimal urine output.    Musc/Skel: Looks pretty good today  Skin: No mottling today, she has a little bruise on her right supraclavicular area presumably from the roto-prone bed  Neuro: She seems to be communicating very well despite the tube with nods and gestures she is moving all extremities well  Extremities/P Vascular: No clubbing or edema she has palpable dorsalis pedis pulses and radial pulses and she does have a little bit of cyanosis in the distal toes on the right foot she has a little hard splint on the left that foot is much warmer and is pink and this is just her raynauds  MSE: Unable to assess       Labs:  WBC No results found for: WBCS   HGB Hemoglobin   Date Value Ref Range Status   07/07/2017 10.5 (L) 11.9 - 15.5 g/dL Final   07/06/2017 10.3 (L) 11.9 - 15.5 g/dL Final   07/05/2017 10.3 (L) 11.9 - 15.5 g/dL Final   07/04/2017 10.1 (L) 11.9 - 15.5 g/dL Final      HCT Hematocrit   Date Value Ref Range Status   07/07/2017 29.7 (L) 35.6 - 45.5 % Final   07/06/2017 29.1 (L) 35.6 - 45.5 %  Final   07/05/2017 28.4 (L) 35.6 - 45.5 % Final   07/04/2017 27.8 (L) 35.6 - 45.5 % Final      Platlets No results found for: LABPLAT     PT/INR:    Protime   Date Value Ref Range Status   07/05/2017 14.5 (H) 11.7 - 14.2 Seconds Final   /  INR   Date Value Ref Range Status   07/05/2017 1.17 (H) 0.90 - 1.10 Final       Sodium Sodium   Date Value Ref Range Status   07/07/2017 140 136 - 145 mmol/L Final   07/06/2017 142 136 - 145 mmol/L Final   07/05/2017 138 136 - 145 mmol/L Final      Potassium Potassium   Date Value Ref Range Status   07/07/2017 4.1 3.5 - 5.2 mmol/L Final   07/06/2017 3.6 3.5 - 5.2 mmol/L Final   07/05/2017 3.3 (L) 3.5 - 5.2 mmol/L Final      Chloride Chloride   Date Value Ref Range Status   07/07/2017 97 (L) 98 - 107 mmol/L Final   07/06/2017 100 98 - 107 mmol/L Final   07/05/2017 98 98 - 107 mmol/L Final      Bicarbonate No results found for: PLASMABICARB   BUN BUN   Date Value Ref Range Status   07/07/2017 30 (H) 6 - 20 mg/dL Final   07/06/2017 63 (H) 6 - 20 mg/dL Final   07/05/2017 41 (H) 6 - 20 mg/dL Final      Creatinine Creatinine   Date Value Ref Range Status   07/07/2017 2.56 (H) 0.57 - 1.00 mg/dL Final   07/06/2017 4.30 (H) 0.57 - 1.00 mg/dL Final   07/05/2017 3.07 (H) 0.57 - 1.00 mg/dL Final      Calcium Calcium   Date Value Ref Range Status   07/07/2017 8.2 (L) 8.6 - 10.5 mg/dL Final   07/06/2017 7.9 (L) 8.6 - 10.5 mg/dL Final   07/05/2017 7.9 (L) 8.6 - 10.5 mg/dL Final      Magnesium Magnesium   Date Value Ref Range Status   07/06/2017 2.8 (H) 1.6 - 2.6 mg/dL Final            pH pH, Arterial   Date Value Ref Range Status   07/05/2017 7.438 7.350 - 7.450 pH units Final      pO2 pO2, Arterial   Date Value Ref Range Status   07/05/2017 122.4 (H) 80.0 - 100.0 mm Hg Final      pCO2 pCO2, Arterial   Date Value Ref Range Status   07/05/2017 35.1 35.0 - 45.0 mm Hg Final      HCO3 HCO3, Arterial   Date Value Ref Range Status   07/05/2017 23.7 22.0 - 28.0 mmol/L Final          castor oil-balsam  peru  Topical Q12H   insulin aspart 0-12 Units Subcutaneous Q4H   insulin detemir 20 Units Subcutaneous Nightly   lansoprazole 30 mg Per G Tube QAM   levothyroxine 50 mcg Oral Q AM   methylPREDNISolone sodium succinate 60 mg Intravenous Q8H          Diagnostics:  Imaging Results (last 24 hours)     Procedure Component Value Units Date/Time    IR PICC W Fluoro Surgery Only [[id number]] Updated:  07/06/17 1309    XR Chest 1 View [[id number]] Collected:  07/06/17 1451     Updated:  07/06/17 1458    Narrative:       XR CHEST 1 VW-     HISTORY: Female who is 37 years-old,  new catheter placement     TECHNIQUE: Frontal view of the chest     COMPARISON: 07/04/2017     FINDINGS: Endotracheal tube, IJ catheter appears stable. Left-sided  lumen central venous catheter extends to the cavoatrial junction  region/proximal right atrium. Heart, mediastinum and pulmonary  vasculature are unremarkable. No focal pulmonary consolidation, pleural  effusion, or obvious large pneumothorax, but the left apex is partly  obscured by overlying objects, and assessment is also limited by supine  positioning. No acute osseous process.       Impression:       New left-sided central venous catheter, no pneumothorax  identified, limited as described, consider follow-up upright view of the  chest with overlying objects moved away from left apical region.     This report was finalized on 7/6/2017 2:55 PM by Dr. Stomr Biswas MD.             Dopplers of lower extremities were negative yesterday and upper extremities only showed bilateral superficial vein thromboses      Assessment/Plan     Patient Active Problem List   Diagnosis Code   • DKA (diabetic ketoacidoses) E13.10   • Pneumonia J18.9   • Sepsis A41.9   • Acute respiratory failure J96.00   • History of systemic lupus erythematosus (SLE) Z87.39   • History of splenectomy Z90.81   • History of ITP Z86.2   • Primary hypothyroidism E03.9   • ARF (acute renal failure) with tubular necrosis  N17.0       Impression #1 pneumonia community-acquired severe she is on broad-spectrum antibiotics including  vancomycin and Zosyn.  Cultures thus far negative and infectious disease is following.Has completed 5 days of azithromycin.And About 8 days of vancomycin and Zosyn.  #2 acute hypoxic respiratory failure secondary to ARDS with marked improvement.  To extubate shortly  #3 severe sepsis with septic shock her blood pressure has improved.  She is off vasopressors  #4 new onset diabetes with diabetic ketoacidosis, Ketosis cleared , endocrinology help appreciated  #5 acute renal failure probably ATN , she remains anuric nephrology following dialysis per nephrology.  Has new left IJ tunneled catheter  #6 SLE apparently this is been fairly inactive recently   #7Raynauds syndrome   #8 lactic acidosis resolved  #9 metabolic encephalopathy much improved  #10 history of ITP postsplenectomy looks like probably has reflare she is on high-dose steroids now and her platelet count is actually better than yesterday  #11 anemia looks like this is primarily acute blood loss this is probably phlebotomy and ICU type anemia we don't see any evidence of active bleeding .  Hemoglobin is stable although she does have some heme positive stool.  We'll continue to follow and continue PPI  #12 hypokalemia resolved  #13 fluids/electrolytes/nutrition tolerating tube feeds to continue.  Once extubated we will get a swallow study.  #14 elevated liver function test this could be a little bit of ischemic hepatitis or could be related to drugs even related to ITP signs are improving.  #15 thrombocytopenia platelet count up after starting steroids likely ITP, NADIA antibodies are negative.  Question do we restart some heparin for DVT prophylaxis with her platelet count improving I will defer to hematology    Plan for disposition:    Van Remy MD  07/07/17  7:11 AM    Time: I have spent over 48 minutes in time with the patient thus far  today

## 2017-07-07 NOTE — NURSING NOTE
CWOCN follow up on anterior shoulder wounds post rotoprone bed. Both wounds are improved from last assessment. Wound edges are pink and there is thinning yellow, brown eschar on the wounds. Recommend to continue venelex at this time. Will continue to follow up for assessment and recommendations.

## 2017-07-07 NOTE — PROGRESS NOTES
Date of Admission:  6/29/2017  Caleb Bliss MD       LOS: 8 days   Patient Care Team:  Gregorio Bai MD as PCP - General (Family Medicine)    Subjective     37 y.o. female resting on vent.  Per father her left sided TDC was used last night without issue.      Review of Systems   Unable to perform ROS: Intubated       Objective     Vital Signs  Vital Signs Patient Vitals for the past 24 hrs:   BP Temp Temp src Pulse Resp SpO2 Weight   07/07/17 0703 124/81 - - 62 19 100 % -   07/07/17 0633 124/81 - - 61 - 100 % -   07/07/17 0603 125/84 - - 64 18 100 % -   07/07/17 0533 123/85 - - 64 - 100 % -   07/07/17 0510 - - - - - - 152 lb 1.9 oz (69 kg)   07/07/17 0503 119/80 - - 59 17 100 % -   07/07/17 0433 126/78 - - 58 - 100 % -   07/07/17 0429 - - - 56 17 100 % -   07/07/17 0410 - - - 56 17 100 % -   07/07/17 0403 112/73 - - 53 15 100 % -   07/07/17 0333 110/73 - - 57 - 100 % -   07/07/17 0325 - - - 61 17 99 % -   07/07/17 0305 - 97.6 °F (36.4 °C) Oral 61 17 100 % -   07/07/17 0303 (!) 122/108 - - 63 18 100 % -   07/07/17 0233 128/81 - - 61 - 100 % -   07/07/17 0230 - - - 65 22 100 % -   07/07/17 0203 107/73 - - 57 16 100 % -   07/07/17 0133 106/72 - - 74 - 100 % -   07/07/17 0103 108/73 - - 71 17 100 % -   07/07/17 0033 101/72 - - 70 - 100 % -   07/07/17 0003 108/72 - - 75 22 100 % -   07/06/17 2333 97/70 - - 83 - 100 % -   07/06/17 2326 - 98 °F (36.7 °C) Oral 93 15 98 % -   07/06/17 2303 102/64 - - 76 17 100 % -   07/06/17 2233 106/62 - - 77 - 100 % -   07/06/17 2203 110/66 - - 66 13 100 % -   07/06/17 2133 107/52 - - 81 - 100 % -   07/06/17 2103 112/57 - - 73 14 100 % -   07/06/17 2033 121/65 - - 73 - 100 % -   07/06/17 2003 121/63 - - 79 13 98 % -   07/06/17 1933 123/65 97.7 °F (36.5 °C) Oral 75 - 99 % -   07/06/17 1915 - - - 95 18 100 % -   07/06/17 1902 121/64 - - 82 - 94 % -   07/06/17 1848 94/59 - - 77 - 98 % -   07/06/17 1834 112/56 - - 74 - 99 % -   07/06/17 1817 111/58 - - 72 - 99 % -   07/06/17 1804 121/66  - - 80 - 99 % -   07/06/17 1748 110/59 - - 61 - 100 % -   07/06/17 1733 116/59 - - 58 - 99 % -   07/06/17 1718 117/60 - - 63 - 99 % -   07/06/17 1702 119/66 - - 57 - 100 % -   07/06/17 1648 110/55 - - 62 - 99 % -   07/06/17 1632 126/70 - - 73 - 99 % -   07/06/17 1617 122/72 - - 62 - 100 % -   07/06/17 1603 129/73 - - 62 - 99 % -   07/06/17 1548 129/76 - - 69 - 99 % -   07/06/17 1533 129/69 - - 64 - 99 % -   07/06/17 1518 125/68 - - 62 - 99 % -   07/06/17 1517 - - - - 16 - -   07/06/17 1515 - 97.8 °F (36.6 °C) Oral - - - -   07/06/17 1503 126/68 - - 58 - 100 % -   07/06/17 1448 125/69 - - 55 - 99 % -   07/06/17 1432 126/66 - - 58 - 98 % -   07/06/17 1418 125/60 - - 59 - 97 % -   07/06/17 1413 119/58 - - 60 - 98 % -   07/06/17 1408 122/62 - - 61 - 98 % -   07/06/17 1403 123/64 - - 61 - 98 % -   07/06/17 1358 123/64 - - 58 - 99 % -   07/06/17 1353 124/68 - - 67 - 98 % -   07/06/17 1348 129/67 - - 68 - 97 % -   07/06/17 1343 125/67 - - 69 - 97 % -   07/06/17 1338 117/65 - - 64 13 97 % -   07/06/17 1206 - - - 51 16 98 % -   07/06/17 1133 107/57 - - 51 - 100 % -   07/06/17 1103 106/60 - - 51 - 100 % -   07/06/17 1033 104/59 - - (!) 47 - 100 % -   07/06/17 1002 107/59 - - (!) 48 - 99 % -   07/06/17 1000 - - - (!) 49 - - -   07/06/17 0933 111/67 - - 50 - 100 % -   07/06/17 0903 114/67 - - (!) 47 - 99 % -   07/06/17 0857 - - - (!) 49 - - -   07/06/17 0833 109/61 - - 50 - 98 % -   07/06/17 0803 95/59 - - (!) 45 - 100 % -   07/06/17 0800 - - - (!) 45 - - -     I/O:  I/O last 3 completed shifts:  In: 1707 [I.V.:569; Other:260; NG/GT:828; IV Piggyback:50]  Out: 3840 [Urine:15; Other:2000; Stool:1825]    Physical Exam:  Physical Exam   Constitutional: She appears well-developed and well-nourished.   HENT:   Head: Normocephalic and atraumatic.   Neck: Normal range of motion. No tracheal deviation present.   Abdominal: Soft. She exhibits no distension.   Neurological: She is alert.   left sided TDC without hematoma or  redness  Right sided shiley in femoral.    Results Review:     CBC    Results from last 7 days  Lab Units 07/07/17  0356 07/06/17  0510 07/05/17  0957 07/05/17  0415 07/04/17  1712 07/04/17  0259 07/02/17  0513 07/01/17  1540 07/01/17  0615   WBC 10*3/mm3 22.46* 15.14*  --  18.10*  --  17.38* 17.67*  --  10.90*   HEMOGLOBIN g/dL 10.5* 10.3*  --  10.3* 10.1* 6.5* 8.3* 11.1* 9.7*   PLATELETS 10*3/mm3 115* 78* 54* 103*  --  122* 197  --  210     BMP   Results from last 7 days  Lab Units 07/07/17  0356 07/06/17  0510 07/05/17  0415 07/04/17  1712 07/04/17  0259 07/03/17  1213 07/03/17  0516 07/03/17  0004 07/02/17  1820 07/02/17  1234   SODIUM mmol/L 140 142 138  --  137 136 134* 133* 133* 134*   POTASSIUM mmol/L 4.1 3.6 3.3*  --  3.2* 3.6 3.9 3.7 3.9 3.9   CHLORIDE mmol/L 97* 100 98  --  96* 94* 92* 91* 92* 92*   CO2 mmol/L 27.0 22.4 23.1  --  25.2 25.7 23.5 24.5 22.5 24.8   BUN mg/dL 30* 63* 41*  --  19 6 8 7 7 3*   CREATININE mg/dL 2.56* 4.30* 3.07*  --  1.69* 0.62 1.10* 0.85 0.93 0.47*   GLUCOSE mg/dL 241* 139* 253*  --  186* 132* 204* 190* 184* 132*   MAGNESIUM mg/dL  --  2.8*  --   --  2.2 2.0 2.1 2.1 2.0 2.0   PHOSPHORUS mg/dL 2.5 3.7 2.1* 1.8* 0.6* 0.7* 1.9* 2.1* 1.8* 1.2*     Cr Clearance Estimated Creatinine Clearance: 32.1 mL/min (by C-G formula based on Cr of 2.56).  Coag   Results from last 7 days  Lab Units 07/07/17  0356 07/06/17  0510 07/05/17  0957 07/05/17  0415 07/04/17  0259 07/03/17  0517 07/02/17  0513  07/01/17  0607   INR   --   --  1.17*  --   --   --   --   --  1.63*   APTT seconds 24.7 29.2 27.7 29.4 36.2* 80.2* 101.1*  < > 37.6*   < > = values in this interval not displayed.  HbA1C   Lab Results   Component Value Date    HGBA1C 12.82 (H) 06/29/2017     Blood Glucose   Glucose   Date/Time Value Ref Range Status   07/07/2017 0328 221 (H) 70 - 130 mg/dL Final   07/06/2017 2354 170 (H) 70 - 130 mg/dL Final   07/06/2017 1948 392 (H) 70 - 130 mg/dL Final   07/06/2017 1518 176 (H) 70 - 130 mg/dL  Final   07/06/2017 1408 169 (H) 70 - 130 mg/dL Final   07/06/2017 1105 156 (H) 70 - 130 mg/dL Final   07/06/2017 0715 98 70 - 130 mg/dL Final   07/06/2017 0328 221 (H) 70 - 130 mg/dL Final     Micro   Results from last 7 days  Lab Units 06/30/17  1706 06/30/17  1050 06/30/17  0925   BLOODCX   --  No growth at 5 days No growth at 5 days   RESPCX  No growth  --   --    GRAM STAIN RESULT  Rare (1+) WBCs seen  Rare (1+) Epithelial cells per low power field  No organisms seen  --   --        Assessment/Plan     Assessment & Plan    Active Problems:    DKA (diabetic ketoacidoses)    Pneumonia    Sepsis    Acute respiratory failure    History of systemic lupus erythematosus (SLE)    History of splenectomy    History of ITP    Primary hypothyroidism    ARF (acute renal failure) with tubular necrosis      37 y.o. female S/p TDC placed yesterday in the left IJ.  Per reports worked well for HD last night.  Will have Rn's pull right groin shiley.  Will consider arm vein mapping later in her hospital course to protect potential fistula options.  Will see PRN.  Thanks.      Caleb Bliss MD  07/07/17  7:16 AM    Please call my office with any question: [phone]    Active Hospital Problems (** Indicates Principal Problem)    Diagnosis Date Noted   • ARF (acute renal failure) with tubular necrosis [N17.0] 07/05/2017   • Pneumonia [J18.9] 07/01/2017   • Sepsis [A41.9] 07/01/2017   • Acute respiratory failure [J96.00] 07/01/2017   • History of systemic lupus erythematosus (SLE) [Z87.39] 07/01/2017   • History of splenectomy [Z90.81] 07/01/2017   • History of ITP [Z86.2] 07/01/2017   • Primary hypothyroidism [E03.9] 07/01/2017   • DKA (diabetic ketoacidoses) [E13.10] 06/30/2017      Resolved Hospital Problems    Diagnosis Date Noted Date Resolved   No resolved problems to display.

## 2017-07-07 NOTE — PROGRESS NOTES
Continued Stay Note  [facility]     Patient Name: Sarita Bai  MRN: [id number]  Today's Date: 7/7/2017    Admit Date: 6/29/2017          Discharge Plan       07/07/17 1545    Case Management/Social Work Plan    Plan CCP to follow for discharge needs.  [facility] following.  THIERNO Bryan    Additional Comments Pt extubated and off vent. Pt to work with PT.  CCP to follow for discharge needs.  Will need pre cert for [facility] or SNF if needed.  THIERNO Bryan              Discharge Codes     None            Amanda Thakur RN

## 2017-07-07 NOTE — PROGRESS NOTES
"  ENDOCRINE    Subjective   AND PLANS  Sarita Bai is a 37 y.o. female.     Extubated this morning without difficulty.  Tube feeding has been discontinued earlier.  Solu-Medrol will be decreased to 30 mg every 12 hours.  REGINA antibody markedly elevated.  Blood sugar 170-392.  Will keep on Levemir 20 units every evening and NovoLog every 4 hours as needed.    Patient has mild hypothyroidism.  Continue levothyroxine 50 µg per day.  Recheck thyroid function tests in 4-6 weeks.    Platelet count improving.  HIT antibody negative.  Stool occult blood positive.  Hematology follow-up noted.  No bleeding.  On Solu-Medrol.  Objective   /76  Pulse 60  Temp 98.7 °F (37.1 °C) (Oral)   Resp 15  Ht 67.01\" (170.2 cm)  Wt 152 lb 1.9 oz (69 kg)  SpO2 98%  BMI 23.82 kg/m2  Physical Exam    Awake, alert, not dyspneic, not in distress.  Thyroid is not enlarged.  Neck veins are not engorged.  Equal chest expansion.  No rales or wheezes.  Regular heart rate and rhythm.  Abdomen soft, nontender.  No cyanosis or clubbing.        Lab Results (last 24 hours)     Procedure Component Value Units Date/Time    Clostridium Difficile Toxin, PCR [[id number]] Collected:  07/06/17 1153    Specimen:  Stool from Per Rectum Updated:  07/06/17 1153    Occult Blood X 1, Stool [[id number]]  (Abnormal) Collected:  07/06/17 1120    Specimen:  Stool from Per Rectum Updated:  07/06/17 1221     Fecal Occult Blood Positive (A)    [facility] Comprehensive Panel [[id number]]  (Abnormal) Collected:  07/05/17 1354    Specimen:  Blood Updated:  07/06/17 1309     Anti-DNA (DS) Ab Qn <1 IU/mL                                          Negative      <5                                     Equivocal  5 - 9                                     Positive      >9        RNP Antibodies >8.0 (H) AI      Del Cid Antibodies 0.7 AI      Antiscleroderma-70 Antibodies 0.2 AI      JOE SSA (RO) Ab 0.2 AI      JOE SSB (LA) Ab <0.2 AI      Antichromatin Antibodies >8.0 (H) AI      NORA-1 " IgG <0.2 AI      Anti-Centromere B Antibodies <0.2 AI      See below: Comment      Autoantibody                       Disease Association  ------------------------------------------------------------                          Condition                  Frequency  ---------------------   ------------------------   ---------  Antinuclear Antibody,    SLE, mixed connective  Direct ([facility]-D)           tissue diseases  ---------------------   ------------------------   ---------  dsDNA                    SLE                        40 - 60%  ---------------------   ------------------------   ---------  Chromatin                Drug induced SLE                90%                           SLE                        48 - 97%  ---------------------   ------------------------   ---------  SSA (Ro)                 SLE                        25 - 35%                           Sjogren's Syndrome         40 - 70%                            Lupus                 100%  ---------------------   ------------------------   ---------  SSB (La)                 SLE                             10%                           Sjogren's Syndrome              30%  ---------------------   -----------------------    ---------  Sm (anti-Smith)          SLE                        15 - 30%  ---------------------   -----------------------    ---------  RNP                      Mixed Connective Tissue                           Disease                         95%  (U1 nRNP,                SLE                        30 - 50%  anti-ribonucleoprotein)  Polymyositis and/or                           Dermatomyositis                 20%  ---------------------   ------------------------   ---------  Scl-70 (antiDNA          Scleroderma (diffuse)      20 - 35%  topoisomerase)           Crest                           13%  ---------------------   ------------------------   ---------  La-1                     Polymyositis and/or                            Dermatomyositis            20 - 40%  ---------------------   ------------------------   ---------  Centromere B             Scleroderma - Crest                           variant                         80%       Narrative:       Performed at:  [address] [place] [address]  [id number]  : Vince Minaya PhD, Phone:  [phone]    POC Glucose Fingerstick [[id number]]  (Abnormal) Collected:  07/06/17 1408    Specimen:  Blood Updated:  07/06/17 1418     Glucose 169 (H) mg/dL     Narrative:       Meter: [id number] : [id number] Markie HUSTON    Heparin-induced Platelet Antibody [[id number]] Collected:  07/05/17 0957    Specimen:  Blood Updated:  07/06/17 1516     Heparin Induced Plt Ab 0.190 OD     Narrative:       Performed at:  [facility]  [id number]  : Jordy Doll MD, Phone:  [phone]    POC Glucose Fingerstick [[id number]]  (Abnormal) Collected:  07/06/17 1518    Specimen:  Blood Updated:  07/06/17 1520     Glucose 176 (H) mg/dL     Narrative:       Meter: [id number] : [id number] Natan Kulkarni    POC Glucose Fingerstick [[id number]]  (Abnormal) Collected:  07/06/17 1948    Specimen:  Blood Updated:  07/06/17 1949     Glucose 392 (H) mg/dL     Narrative:       Meter: [id number] : [id number] Mekhi Kulkarni    POC Glucose Fingerstick [[id number]]  (Abnormal) Collected:  07/06/17 2354    Specimen:  Blood Updated:  07/07/17 0001     Glucose 170 (H) mg/dL     Narrative:       Meter: [id number] : [id number] Mekhi Kulkarni    POC Glucose Fingerstick [[id number]]  (Abnormal) Collected:  07/07/17 0328    Specimen:  Blood Updated:  07/07/17 0329     Glucose 221 (H) mg/dL     Narrative:       Meter: [id number] : [id number] Mekhi Kulkarni    Celiac Panel Reflex To Titer [[id number]] Collected:  07/07/17 0356    Specimen:  Blood Updated:  07/07/17 0455    Reticulocytes [[id number]]  (Abnormal) Collected:  07/07/17 0356    Specimen:   Blood Updated:  07/07/17 0509     Reticulocyte % 3.95 (H) %     aPTT [[id number]]  (Normal) Collected:  07/07/17 0356    Specimen:  Blood Updated:  07/07/17 0516     PTT 24.7 seconds     Bilirubin, Direct [[id number]]  (Normal) Collected:  07/07/17 0356    Specimen:  Blood Updated:  07/07/17 0526     Bilirubin, Direct 0.2 mg/dL     Lactate Dehydrogenase [[id number]]  (Abnormal) Collected:  07/07/17 0356    Specimen:  Blood Updated:  07/07/17 0526      (H) U/L     Comprehensive Metabolic Panel [[id number]]  (Abnormal) Collected:  07/07/17 0356    Specimen:  Blood Updated:  07/07/17 0536     Glucose 241 (H) mg/dL      BUN 30 (H) mg/dL      Creatinine 2.56 (H) mg/dL      Sodium 140 mmol/L      Potassium 4.1 mmol/L      Chloride 97 (L) mmol/L      CO2 27.0 mmol/L      Calcium 8.2 (L) mg/dL      Total Protein 6.7 g/dL      Albumin 3.20 (L) g/dL      ALT (SGPT) 193 (H) U/L      AST (SGOT) 38 (H) U/L      Alkaline Phosphatase 103 U/L      Total Bilirubin 0.4 mg/dL      eGFR Non African Amer 21 (L) mL/min/1.73      Globulin 3.5 gm/dL      A/G Ratio 0.9 g/dL      BUN/Creatinine Ratio 11.7     Anion Gap 16.0 mmol/L     Phosphorus [[id number]]  (Normal) Collected:  07/07/17 0356    Specimen:  Blood Updated:  07/07/17 0615     Phosphorus 2.5 mg/dL     CBC (No Diff) [[id number]]  (Abnormal) Collected:  07/07/17 0356    Specimen:  Blood Updated:  07/07/17 0637     WBC 22.46 (H) 10*3/mm3      RBC 3.36 (L) 10*6/mm3      Hemoglobin 10.5 (L) g/dL      Hematocrit 29.7 (L) %      MCV 88.4 fL      MCH 31.3 pg      MCHC 35.4 g/dL      RDW 15.3 (H) %      RDW-SD 48.2 fl      MPV 11.3 fL      Platelets 115 (L) 10*3/mm3     Blood Gas, Arterial [[id number]]  (Abnormal) Collected:  07/07/17 0721    Specimen:  Arterial Blood Updated:  07/07/17 0724     Site Arterial: right radial     Cristian's Test Positive     pH, Arterial 7.519 (H) pH units      pCO2, Arterial 37.3 mm Hg      pO2, Arterial 117.2 (H) mm Hg      HCO3, Arterial 30.4 (H) mmol/L       Base Excess, Arterial 7.1 (H) mmol/L      O2 Saturation Calculated 99.0 %      A-a Gradiant 0.7 mmHg      Barometric Pressure for Blood Gas 751.6 mmHg      Modality Adult Vent     FIO2 30 %      Ventilator Mode PS     Set Tidal Volume 475     Rate 16 Breaths/minute      PEEP 5     PSV 7 cmH2O     Narrative:       sat 100 Meter: [id number] : [id number] Dustysandy Antonio    POC Glucose Fingerstick [[id number]]  (Abnormal) Collected:  07/07/17 0736    Specimen:  Blood Updated:  07/07/17 0738     Glucose 262 (H) mg/dL     Narrative:       Meter: [id number] : [id number] Artemio Dill    POC Glucose Fingerstick [[id number]]  (Abnormal) Collected:  07/07/17 1111    Specimen:  Blood Updated:  07/07/17 1114     Glucose 221 (H) mg/dL     Narrative:       Meter: [id number] : [id number] Artemio Dill            Active Problems:    DKA (diabetic ketoacidoses)    Pneumonia    Sepsis    Acute respiratory failure    History of systemic lupus erythematosus (SLE)    History of splenectomy    History of ITP    Primary hypothyroidism    ARF (acute renal failure) with tubular necrosis    Continue Levemir and NovoLog as discussed above.  Watch blood sugar closely as Solu-Medrol is tapered down.  Continue levothyroxine.  Continue antibiotics per infectious disease specialist.

## 2017-07-08 LAB
ALBUMIN SERPL-MCNC: 3 G/DL (ref 3.5–5.2)
ANION GAP SERPL CALCULATED.3IONS-SCNC: 19.3 MMOL/L
APTT PPP: 23.7 SECONDS (ref 22.7–35.4)
BILIRUB CONJ SERPL-MCNC: <0.2 MG/DL (ref 0–0.3)
BUN BLD-MCNC: 69 MG/DL (ref 6–20)
BUN/CREAT SERPL: 14.3 (ref 7–25)
CALCIUM SPEC-SCNC: 8.2 MG/DL (ref 8.6–10.5)
CARDIOLIPIN IGA SER IA-ACNC: <9 APL U/ML (ref 0–11)
CARDIOLIPIN IGG SER IA-ACNC: <9 GPL U/ML (ref 0–14)
CARDIOLIPIN IGM SER IA-ACNC: <9 MPL U/ML (ref 0–12)
CCP IGA+IGG SERPL IA-ACNC: 11 UNITS (ref 0–19)
CHLORIDE SERPL-SCNC: 94 MMOL/L (ref 98–107)
CO2 SERPL-SCNC: 24.7 MMOL/L (ref 22–29)
CREAT BLD-MCNC: 4.83 MG/DL (ref 0.57–1)
DEPRECATED RDW RBC AUTO: 49.4 FL (ref 37–54)
ERYTHROCYTE [DISTWIDTH] IN BLOOD BY AUTOMATED COUNT: 15.4 % (ref 11.7–13)
GFR SERPL CREATININE-BSD FRML MDRD: 10 ML/MIN/1.73
GLIADIN PEPTIDE IGA SER-ACNC: 11 UNITS (ref 0–19)
GLUCOSE BLD-MCNC: 260 MG/DL (ref 65–99)
GLUCOSE BLDC GLUCOMTR-MCNC: 110 MG/DL (ref 70–130)
GLUCOSE BLDC GLUCOMTR-MCNC: 156 MG/DL (ref 70–130)
GLUCOSE BLDC GLUCOMTR-MCNC: 183 MG/DL (ref 70–130)
GLUCOSE BLDC GLUCOMTR-MCNC: 209 MG/DL (ref 70–130)
GLUCOSE BLDC GLUCOMTR-MCNC: 234 MG/DL (ref 70–130)
GLUCOSE BLDC GLUCOMTR-MCNC: 247 MG/DL (ref 70–130)
HCT VFR BLD AUTO: 28.6 % (ref 35.6–45.5)
HGB BLD-MCNC: 9.9 G/DL (ref 11.9–15.5)
IGA SERPL-MCNC: 329 MG/DL (ref 87–352)
LDH SERPL-CCNC: 275 U/L (ref 135–214)
MCH RBC QN AUTO: 31.5 PG (ref 26.9–32)
MCHC RBC AUTO-ENTMCNC: 34.6 G/DL (ref 32.4–36.3)
MCV RBC AUTO: 91.1 FL (ref 80.5–98.2)
PHOSPHATE SERPL-MCNC: 5.6 MG/DL (ref 2.5–4.5)
PLATELET # BLD AUTO: 184 10*3/MM3 (ref 140–500)
PMV BLD AUTO: 11.5 FL (ref 6–12)
POTASSIUM BLD-SCNC: 4.3 MMOL/L (ref 3.5–5.2)
RBC # BLD AUTO: 3.14 10*6/MM3 (ref 3.9–5.2)
RETICS/RBC NFR AUTO: 4.58 % (ref 0.5–1.5)
SODIUM BLD-SCNC: 138 MMOL/L (ref 136–145)
TTG IGA SER-ACNC: <2 U/ML (ref 0–3)
WBC NRBC COR # BLD: 18.33 10*3/MM3 (ref 4.5–10.7)

## 2017-07-08 PROCEDURE — 83615 LACTATE (LD) (LDH) ENZYME: CPT | Performed by: INTERNAL MEDICINE

## 2017-07-08 PROCEDURE — 97110 THERAPEUTIC EXERCISES: CPT

## 2017-07-08 PROCEDURE — 25010000002 HEPARIN (PORCINE) PER 1000 UNITS: Performed by: INTERNAL MEDICINE

## 2017-07-08 PROCEDURE — 99232 SBSQ HOSP IP/OBS MODERATE 35: CPT | Performed by: INTERNAL MEDICINE

## 2017-07-08 PROCEDURE — 82248 BILIRUBIN DIRECT: CPT | Performed by: INTERNAL MEDICINE

## 2017-07-08 PROCEDURE — 25010000002 METHYLPREDNISOLONE PER 40 MG: Performed by: INTERNAL MEDICINE

## 2017-07-08 PROCEDURE — 92526 ORAL FUNCTION THERAPY: CPT | Performed by: SPEECH-LANGUAGE PATHOLOGIST

## 2017-07-08 PROCEDURE — 85045 AUTOMATED RETICULOCYTE COUNT: CPT | Performed by: INTERNAL MEDICINE

## 2017-07-08 PROCEDURE — 85027 COMPLETE CBC AUTOMATED: CPT | Performed by: INTERNAL MEDICINE

## 2017-07-08 PROCEDURE — 63710000001 INSULIN ASPART PER 5 UNITS: Performed by: INTERNAL MEDICINE

## 2017-07-08 PROCEDURE — 99233 SBSQ HOSP IP/OBS HIGH 50: CPT | Performed by: INTERNAL MEDICINE

## 2017-07-08 PROCEDURE — 82962 GLUCOSE BLOOD TEST: CPT

## 2017-07-08 PROCEDURE — 80069 RENAL FUNCTION PANEL: CPT | Performed by: INTERNAL MEDICINE

## 2017-07-08 PROCEDURE — 85730 THROMBOPLASTIN TIME PARTIAL: CPT | Performed by: INTERNAL MEDICINE

## 2017-07-08 PROCEDURE — 97162 PT EVAL MOD COMPLEX 30 MIN: CPT

## 2017-07-08 PROCEDURE — 25010000002 ENOXAPARIN PER 10 MG: Performed by: INTERNAL MEDICINE

## 2017-07-08 RX ORDER — METHYLPREDNISOLONE SODIUM SUCCINATE 40 MG/ML
20 INJECTION, POWDER, LYOPHILIZED, FOR SOLUTION INTRAMUSCULAR; INTRAVENOUS EVERY 12 HOURS
Status: DISCONTINUED | OUTPATIENT
Start: 2017-07-08 | End: 2017-07-11

## 2017-07-08 RX ORDER — HEPARIN SODIUM 1000 [USP'U]/ML
4200 INJECTION, SOLUTION INTRAVENOUS; SUBCUTANEOUS ONCE
Status: COMPLETED | OUTPATIENT
Start: 2017-07-08 | End: 2017-07-08

## 2017-07-08 RX ORDER — ALBUMIN (HUMAN) 12.5 G/50ML
12.5 SOLUTION INTRAVENOUS AS NEEDED
Status: ACTIVE | OUTPATIENT
Start: 2017-07-08 | End: 2017-07-08

## 2017-07-08 RX ADMIN — INSULIN ASPART 6 UNITS: 100 INJECTION, SOLUTION INTRAVENOUS; SUBCUTANEOUS at 17:38

## 2017-07-08 RX ADMIN — HYDROCODONE BITARTRATE AND ACETAMINOPHEN 1 TABLET: 5; 325 TABLET ORAL at 00:41

## 2017-07-08 RX ADMIN — LEVOTHYROXINE SODIUM 50 MCG: 50 TABLET ORAL at 06:19

## 2017-07-08 RX ADMIN — METHYLPREDNISOLONE SODIUM SUCCINATE 20 MG: 40 INJECTION, POWDER, FOR SOLUTION INTRAMUSCULAR; INTRAVENOUS at 17:34

## 2017-07-08 RX ADMIN — CASTOR OIL AND BALSAM, PERU: 788; 87 OINTMENT TOPICAL at 20:55

## 2017-07-08 RX ADMIN — HEPARIN SODIUM 4200 UNITS: 1000 INJECTION, SOLUTION INTRAVENOUS; SUBCUTANEOUS at 11:38

## 2017-07-08 RX ADMIN — INSULIN ASPART 4 UNITS: 100 INJECTION, SOLUTION INTRAVENOUS; SUBCUTANEOUS at 08:46

## 2017-07-08 RX ADMIN — ENOXAPARIN SODIUM 30 MG: 30 INJECTION SUBCUTANEOUS at 13:22

## 2017-07-08 RX ADMIN — INSULIN ASPART 2 UNITS: 100 INJECTION, SOLUTION INTRAVENOUS; SUBCUTANEOUS at 08:44

## 2017-07-08 RX ADMIN — CASTOR OIL AND BALSAM, PERU: 788; 87 OINTMENT TOPICAL at 08:46

## 2017-07-08 RX ADMIN — METHYLPREDNISOLONE SODIUM SUCCINATE 30 MG: 125 INJECTION, POWDER, FOR SOLUTION INTRAMUSCULAR; INTRAVENOUS at 06:19

## 2017-07-08 RX ADMIN — LANSOPRAZOLE 30 MG: KIT at 08:44

## 2017-07-08 RX ADMIN — INSULIN ASPART 2 UNITS: 100 INJECTION, SOLUTION INTRAVENOUS; SUBCUTANEOUS at 17:37

## 2017-07-08 RX ADMIN — INSULIN ASPART 2 UNITS: 100 INJECTION, SOLUTION INTRAVENOUS; SUBCUTANEOUS at 20:56

## 2017-07-08 NOTE — PROGRESS NOTES
Dr. OSITO Porter    [facility] INTENSIVE CARE    7/8/2017    Patient ID:  Name:  Sarita Bai  MRN:  [id number]  1980  37 y.o.  female            CC/Reason for visit: Follow-up for pneumonia and respiratory failure    HPI: The patient has been off of ventilator.  She is receiving hemodialysis now.  She is doing well.    Vitals:  Vitals:    07/08/17 0935 07/08/17 0950 07/08/17 1005 07/08/17 1035   BP: 115/80 117/84 119/85 115/65   BP Location:       Patient Position:       Pulse: 69 70 70 70   Resp:       Temp:       TempSrc:       SpO2: 94% 95% 94% 94%   Weight:       Height:         FiO2 (%): 30 %     Body mass index is 23.37 kg/(m^2).    Intake/Output Summary (Last 24 hours) at 07/08/17 1050  Last data filed at 07/08/17 0543   Gross per 24 hour   Intake              375 ml   Output              300 ml   Net               75 ml       Exam:  GEN:  No distress, Awake, calm, off the ventilator  LUNGS: Clear breath sounds bilat, nonlabored breathing  CV:  Normal S1S2, without murmur, no edema      Scheduled meds:    castor oil-balsam peru  Topical Q12H   heparin (porcine) 4,200 Units Intracatheter Once   insulin aspart 0-12 Units Subcutaneous 4x Daily AC & at Bedtime   insulin aspart 4 Units Subcutaneous TID With Meals   insulin detemir 20 Units Subcutaneous Nightly   lansoprazole 30 mg Per G Tube QAM   levothyroxine 50 mcg Oral Q AM   methylPREDNISolone sodium succinate 30 mg Intravenous Q12H     IV meds:                           Data Review:   I reviewed the patient's medications and new clinical results.  Lab Results   Component Value Date    CALCIUM 8.2 (L) 07/08/2017    PHOS 5.6 (H) 07/08/2017       Results from last 7 days  Lab Units 07/08/17  0424 07/07/17  0356 07/06/17  0510 07/05/17  0957 07/05/17  0415  07/02/17  0513   SODIUM mmol/L 138 140 142  --  138  < > 134*   POTASSIUM mmol/L 4.3 4.1 3.6  --  3.3*  < > 3.8   CHLORIDE mmol/L 94* 97* 100  --  98  < > 92*   CO2 mmol/L 24.7 27.0 22.4   --  23.1  < > 22.9   BUN mg/dL 69* 30* 63*  --  41*  < > 5*   CREATININE mg/dL 4.83* 2.56* 4.30*  --  3.07*  < > 1.00   CALCIUM mg/dL 8.2* 8.2* 7.9*  --  7.9*  < > 7.0*   BILIRUBIN mg/dL  --  0.4  --   --  0.4  0.4  --   --    ALK PHOS U/L  --  103  --   --  125*  --   --    ALT (SGPT) U/L  --  193*  --   --  455*  --   --    AST (SGOT) U/L  --  38*  --   --  176*  --   --    GLUCOSE mg/dL 260* 241* 139*  --  253*  < > 155*   WBC 10*3/mm3 18.33* 22.46* 15.14*  --  18.10*  < > 17.67*   HEMOGLOBIN g/dL 9.9* 10.5* 10.3*  --  10.3*  < > 8.3*   PLATELETS 10*3/mm3 184 115* 78* 54* 103*  < > 197   INR   --   --   --  1.17*  --   --   --    PROCALCITONIN ng/mL  --   --   --   --   --   --  4.41*   < > = values in this interval not displayed.                Results from last 7 days  Lab Units 07/07/17  0721 07/05/17  0339 07/04/17  0644 07/03/17  2006 07/03/17  0550 07/03/17  0005 07/02/17  1733   PH, ARTERIAL pH units 7.519* 7.438 7.509* 7.495* 7.433 7.334* 7.298*   PCO2, ARTERIAL mm Hg 37.3 35.1 36.5 40.4 40.1 52.6* 50.9*   PO2 ART mm Hg 117.2* 122.4* 119.5* 157.7* 98.6 79.2* 67.7*   MODALITY  Adult Vent Adult Vent Adult Vent Adult Vent Adult Vent Adult Vent Adult Vent   O2 SATURATION CALC % 99.0 98.9 99.0 99.5* 97.9 94.5 90.8*         ASSESSMENT:   Community acquired pneumonia    DKA (diabetic ketoacidoses)  ARDS    Sepsis    Acute respiratory failure    History of systemic lupus erythematosus (SLE)    History of splenectomy    History of ITP    Primary hypothyroidism    ARF (acute renal failure) with tubular necrosis  Diabetes type 2    PLAN:  Continue current care.  Doing well.  We will transfer this patient out of the ICU today.  Continue hemodialysis.  Appreciate input from hematology and nephrology.        Leeroy Porter MD  7/8/2017

## 2017-07-08 NOTE — PROGRESS NOTES
"   LOS: 9 days    Patient Care Team:  Gregorio Bai MD as PCP - General (Family Medicine)    Chief Complaint:  No chief complaint on file.      Subjective     Interval History: Seen on HD. UF ~ 2600 Cc. No issues. Family is on bedside. No CP or SOA.     Patient Complaints: none  Patient Denies:  Cp soa  History taken from: patient        Objective     Vital Signs  Temp:  [97.6 °F (36.4 °C)-98.4 °F (36.9 °C)] 97.6 °F (36.4 °C)  Heart Rate:  [46-75] 67  Resp:  [15-20] 17  BP: (111-139)/(70-97) 118/89    Flowsheet Rows         First Filed Value    Admission Height  67\" (170.2 cm) Documented at 06/29/2017 2028    Admission Weight  155 lb 10.3 oz (70.6 kg) Documented at 06/29/2017 2028             I/O last 3 completed shifts:  In: 1169 [P.O.:120; I.V.:320; Other:200; NG/GT:529]  Out: 2900 [Other:2000; Stool:900]    Intake/Output Summary (Last 24 hours) at 07/08/17 0928  Last data filed at 07/08/17 0543   Gross per 24 hour   Intake              375 ml   Output              300 ml   Net               75 ml       Physical Exam:  Alert NAD  Heart RRR no s3 or rub.   Lungs coarse BS, upper airway rhonchi.   Abd +bs soft, nontender. Body wall edema.   Ext 1+ edema upper and lower ext .          Results Review:      Results from last 7 days  Lab Units 07/08/17  0424 07/07/17  0356 07/06/17  0510 07/05/17  0415   SODIUM mmol/L 138 140 142 138   POTASSIUM mmol/L 4.3 4.1 3.6 3.3*   CHLORIDE mmol/L 94* 97* 100 98   CO2 mmol/L 24.7 27.0 22.4 23.1   BUN mg/dL 69* 30* 63* 41*   CREATININE mg/dL 4.83* 2.56* 4.30* 3.07*   CALCIUM mg/dL 8.2* 8.2* 7.9* 7.9*   BILIRUBIN mg/dL  --  0.4  --  0.4  0.4   ALK PHOS U/L  --  103  --  125*   ALT (SGPT) U/L  --  193*  --  455*   AST (SGOT) U/L  --  38*  --  176*   GLUCOSE mg/dL 260* 241* 139* 253*       Estimated Creatinine Clearance: 16.7 mL/min (by C-G formula based on Cr of 4.83).      Results from last 7 days  Lab Units 07/08/17  0424 07/07/17  0356 07/06/17  0510  07/04/17  0259 " 07/03/17  1213   MAGNESIUM mg/dL  --   --  2.8*  --  2.2 2.0   PHOSPHORUS mg/dL 5.6* 2.5 3.7  < > 0.6* 0.7*   < > = values in this interval not displayed.            Results from last 7 days  Lab Units 07/08/17  0424 07/07/17  0356 07/06/17  0510 07/05/17  0957 07/05/17  0415 07/04/17  1712 07/04/17  0259   WBC 10*3/mm3 18.33* 22.46* 15.14*  --  18.10*  --  17.38*   HEMOGLOBIN g/dL 9.9* 10.5* 10.3*  --  10.3* 10.1* 6.5*   PLATELETS 10*3/mm3 184 115* 78* 54* 103*  --  122*         Results from last 7 days  Lab Units 07/05/17  0957   INR  1.17*         Imaging Results (last 24 hours)     ** No results found for the last 24 hours. **          castor oil-balsam peru  Topical Q12H   heparin (porcine) 4,200 Units Intracatheter Once   insulin aspart 0-12 Units Subcutaneous 4x Daily AC & at Bedtime   insulin aspart 4 Units Subcutaneous TID With Meals   insulin detemir 20 Units Subcutaneous Nightly   lansoprazole 30 mg Per G Tube QAM   levothyroxine 50 mcg Oral Q AM   methylPREDNISolone sodium succinate 30 mg Intravenous Q12H          Medication Review:   Current Facility-Administered Medications   Medication Dose Route Frequency Provider Last Rate Last Dose   • albumin human 25 % IV SOLN 12.5 g  12.5 g Intravenous PRN Susan Urena MD       • artificial tears (LUBRIFRESH P.M.) ophthalmic ointment   Both Eyes PRN Madison Harris MD       • calcium gluconate 1 g in sodium chloride 0.9 % 50 mL IVPB  1 g Intravenous PRN Susan Urena MD        Or   • calcium gluconate 2 g in sodium chloride 0.9 % 50 mL IVPB  2 g Intravenous PRN Susan Urena MD       • castor oil-balsam peru (VENELEX) ointment   Topical Q12H Madison Harris MD       • dextrose (D50W) solution 12.5 g  12.5 g Intravenous Q15 Min PRN Alexis Poe MD   12.5 g at 07/01/17 1526   • dextrose (D50W) solution 25-50 mL  25-50 mL Intravenous Q30 Min PRN Van Remy MD       • heparin (porcine) injection 4,200 Units  4,200 Units  Intracatheter Once Angelica Saucedo MD       • HYDROcodone-acetaminophen (NORCO) 5-325 MG per tablet 1 tablet  1 tablet Oral Q4H PRN Alexis Poe MD   1 tablet at 07/08/17 0041   • insulin aspart (novoLOG) injection 0-12 Units  0-12 Units Subcutaneous 4x Daily AC & at Bedtime Thomas Campbell MD   2 Units at 07/08/17 0844   • insulin aspart (novoLOG) injection 4 Units  4 Units Subcutaneous TID With Meals Thomas Campbell MD   4 Units at 07/08/17 0846   • insulin detemir (LEVEMIR) injection 20 Units  20 Units Subcutaneous Nightly Thomas Campbell MD   20 Units at 07/07/17 2027   • lansoprazole (FIRST) oral suspension 30 mg  30 mg Per G Tube LIAT Harris MD   30 mg at 07/08/17 0844   • levothyroxine (SYNTHROID, LEVOTHROID) tablet 50 mcg  50 mcg Oral Q AM Thomas Campbell MD   50 mcg at 07/08/17 0619   • methylPREDNISolone sodium succinate (SOLU-Medrol) injection 30 mg  30 mg Intravenous Q12H Kory Amato MD   30 mg at 07/08/17 0619   • ondansetron (ZOFRAN) injection 4 mg  4 mg Intravenous Once PRN Hernan Foster MD           Assessment/Plan       Active Problems:    DKA (diabetic ketoacidoses)    Pneumonia    Sepsis    Acute respiratory failure    History of systemic lupus erythematosus (SLE)    History of splenectomy    History of ITP    Primary hypothyroidism    ARF (acute renal failure) with tubular necrosis      1. HODAN, initially prerenal , now ATN. Dialysis yesterday.  Has tunnel catheter.  2. Severe right-sided pna/ARDS.   Vanc and zosyn. Completed zithromax  3. Encephalopathy much improved   4. New DM1 presenting as DKA, now on scheduled insulin.  Endocrine managing.   5. H/o ITP with prior splenectomy: TCP. Likely autoimmune per hematology . HIT antibody negative  6. Hypothyroidism   7. Anemia. Hg stable.    8. SLE . Raynaud's.        Plan:     Seen on HD. D/W staff and family  Hold on permanent access.   Dialysis placement    Angelica Saucedo MD  07/08/17  9:28 AM

## 2017-07-08 NOTE — THERAPY RE-EVALUATION
Acute Care - Speech Language Pathology   Swallow Re-Evaluation [facility]     Patient Name: Sarita Bai  : 1980  MRN: [id number]  Today's Date: 2017  Onset of Illness/Injury or Date of Surgery Date: 17            Admit Date: 2017    SPEECH-LANGUAGE PATHOLOGY EVALUATION - SWALLOW  Subjective: The patient was seen on this date for a Clinical Swallow re-evaluation.  Patient was alert and cooperative.    Objective: Textures given included thin liquid, mechanical soft consistency and regular consistency.  Assessment: Difficulties were noted with none of the above consistencies, characterized by no overt s/s of pen/asp, functional mastication, timely swallow initiation and adequate laryngeal elevation upon neck palpation.  SLP Findings:  Patient presents with functional swallow, without esophageal component.   Recommendations: Diet Textures: thin liquid, regular consistency food.  Medications should be taken whole with thin liquids.   Recommended Strategies: Upright for PO and small bites and sips. Oral care before breakfast, after all meals and PRN.  Dysphagia therapy is recommended. Rationale: diet tolerance  Visit Dx:     ICD-10-CM ICD-9-CM   1. Generalized weakness R53.1 780.79     Patient Active Problem List   Diagnosis   • DKA (diabetic ketoacidoses)   • Pneumonia   • Sepsis   • Acute respiratory failure   • History of systemic lupus erythematosus (SLE)   • History of splenectomy   • History of ITP   • Primary hypothyroidism   • ARF (acute renal failure) with tubular necrosis     Past Medical History:   Diagnosis Date   • Lupus    • Thyroid activity decreased      Past Surgical History:   Procedure Laterality Date   • INSERT CENTRAL LINE AT BEDSIDE  2017        • INSERTION HEMODIALYSIS CATHETER N/A 2017    Procedure: LEFT IJ TUNNELED  CATHETER ;  Surgeon: Caleb Bliss MD;  Location: [facility] OR;  Service:    • INTUBATION  2017        • SPLENECTOMY            SWALLOW  EVALUATION (last 72 hours)      Swallow Evaluation       07/08/17 1400 07/07/17 0044             Rehab Evaluation    Document Type re-evaluation  -KA evaluation  -NB       Subjective Information no complaints  -KA        Patient Effort, Rehab Treatment excellent  -KA        Symptoms Noted During/After Treatment none  -KA none  -NB       General Information    Patient Profile Review yes  -KA yes  -NB       Current Diet Limitations puree;honey thick liquids  -KA NPO  -NB       Precautions/Limitations, Vision  WFL  -NB       Precautions/Limitations, Hearing  WFL  -NB       Prior Level of Function- Communication functional in all spheres  -KA functional in all spheres  -NB       Prior Level of Function- Swallowing no diet consistency restrictions  -KA no diet consistency restrictions  -NB       Plans/Goals Discussed With patient  -KA patient and family;agreed upon  -NB       Barriers to Rehab cognitive status  -KA medically complex  -NB       Clinical Impression    Patient's Goals For Discharge return to regular diet  -KA return to PO diet  -NB       Family Goals For Discharge  patient able to return to PO diet  -NB       SLP Swallowing Diagnosis other (see comments)   WNL  -KA moderate dysphagia;oral dysfunction;pharyngeal dysfunction  -NB       Rehab Potential/Prognosis, Swallowing good, to achieve stated therapy goals  -KA good, to achieve stated therapy goals  -NB       Criteria for Skilled Therapeutic Interventions Met skilled criteria for dysphagia intervention met  -KA skilled criteria for dysphagia intervention met  -NB       Therapy Frequency PRN  -KA PRN  -NB       Predicted Duration Therapy Interv (days) until discharge  -KA until discharge  -NB       Expected Duration Therapy Session (min) 15-30 minutes  -KA 15-30 minutes  -NB       SLP Diet Recommendation regular textures;thin liquids  -KA II - pureed;honey-thick liquids  -NB       Recommended Diagnostics  reassess via clinical swallow (non-instrumental  exam)  -NB       Recommended Feeding/Eating Techniques small sips/bites  -KA no straws;small sips/bites  -NB       SLP Rec. for Method of Medication Administration meds whole with thin liquid  -KA meds whole in pudding/applesauce  -NB       Monitor For Signs Of Aspiration pneumonia;right lower lobe infiltrates  -KA cough;gurgly voice;throat clearing  -NB       Anticipated Discharge Disposition other (see comments)   unknown   -KA other (see comments)   unknown  -NB       Pain Assessment    Pain Assessment  No/denies pain  -NB       Cognitive Assessment/Intervention    Current Cognitive/Communication Assessment  impaired   some confusion, complaints of double vision too  -NB       Orientation Status  oriented to;person  -NB       Follows Commands/Answers Questions  100% of the time;able to follow single-step instructions  -NB       Oral Motor Structure and Function    Oral Motor Anatomy and Physiology patient demonstrates anatomy  that is WNL  -KA patient demonstrates anatomy that is WNL  -NB       Dentition Assessment present and adequate  -KA present and adequate  -NB       Secretion Management WNL/WFL  -KA WNL/WFL  -NB       Mucosal Quality  moist, healthy  -NB       Velar Elevation  WNL (within normal limits)  -NB       Volitional Swallow no difficulties initiating volitional swallow  -KA no difficulties initiating volitional swallow  -NB       Volitional Cough weak volitional cough  -KA no difficulties initiating volitional cough  -NB       Oral Musculature General Assessment WNL (within normal limits)  -KA WNL (within normal limits)  -NB       Oral Motor Assessment Comment WNL, patient no longer aphonic however has weak, breathy vocal quality   -KA          User Key  (r) = Recorded By, (t) = Taken By, (c) = Cosigned By    Initials Name Effective Dates    NB Abida Posadas MS [facility]-SLP 04/13/15 -     CHARLES Blackwell MA,CCC-SLP 04/13/15 -         EDUCATION  The patient has been educated in the following areas:    Dysphagia (Swallowing Impairment).    SLP Recommendation and Plan  SLP Swallowing Diagnosis: other (see comments) (WNL)  SLP Diet Recommendation: regular textures, thin liquids  Recommended Feeding/Eating Techniques: small sips/bites  SLP Rec. for Method of Medication Administration: meds whole with thin liquid  Monitor For Signs Of Aspiration: pneumonia, right lower lobe infiltrates     Criteria for Skilled Therapeutic Interventions Met: skilled criteria for dysphagia intervention met  Anticipated Discharge Disposition: other (see comments) (unknown )  Rehab Potential/Prognosis, Swallowing: good, to achieve stated therapy goals  Therapy Frequency: PRN                        IP SLP Goals       07/08/17 1439 07/07/17 1506       Safely Consume Diet    Safely Consume Diet- SLP, Date Established  07/07/17  -NB     Safely Consume Diet- SLP, Time to Achieve  by discharge  -NB     Safely Consume Diet- SLP, Additional Goal  puree & HTL  -NB     Safely Consume Diet- SLP, Date Goal Reviewed 07/08/17  -KA 07/07/17  -NB     Safely Consume Diet- SLP, Outcome goal ongoing  -KA        User Key  (r) = Recorded By, (t) = Taken By, (c) = Cosigned By    Initials Name Provider Type    GREG Posadas MS CCC-SLP Speech and Language Pathologist    CHARLES Blackwell MA,CCC-SLP Speech and Language Pathologist             SLP Outcome Measures (last 72 hours)      SLP Outcome Measures       07/08/17 1400 07/07/17 1507       SLP Outcome Measures    Outcome Measure Used? Adult NOMS  -KA Adult NOMS  -NB     FCM Scores    FCM Chosen Swallowing  -KA Swallowing  -NB     Swallowing FCM Score 7  -KA 3  -NB       User Key  (r) = Recorded By, (t) = Taken By, (c) = Cosigned By    Initials Name Effective Dates    GRGE Posadas MS [facility]-SLP 04/13/15 -     CHARLES Blackwell MA,CCC-SLP 04/13/15 -            Time Calculation:         Time Calculation- SLP       07/08/17 1441          Time Calculation- SLP    SLP Start Time 1400  -KA      SLP Stop Time  1442  -KA      SLP Time Calculation (min) 42 min  -KA        User Key  (r) = Recorded By, (t) = Taken By, (c) = Cosigned By    Initials Name Provider Type    CHARLES Blackwell MA,CCC-SLP Speech and Language Pathologist          Therapy Charges for Today     Code Description Service Date Service Provider Modifiers Qty    [id number]  ST TREATMENT SWALLOW 3 7/8/2017 Rodrick Blackwell MA,CCC-SLP GN 1               Rodrick Blackwell MA,JOHN PAUL-SLP  7/8/2017

## 2017-07-08 NOTE — PROGRESS NOTES
Subjective         History of Present Illness  My impression is that the patient states thrombocytopenia always very likely RELATED  associated sepsis with acute respiratory distress syndrome. She has a positive Lakisha test today she has abundant amount OF nucleated red blood cells in circulation. LDH is mildly elevated bilirubin and indirect bilirubin is normal. 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     My recommendation at this time will be to proceed with IV steroids like she was before and I'm going to proceed with her lupus  testing including a repeated ZIGGY and sedimentation rate as well as CITRULLINE  peptide antibodies. Dr. CARVER mentioned that this patient was receiving a low-dose prophylactic heparin I doubt that she has HIT.  Hit antibody is negative.     Past Medical History, Past Surgical History, Social History, Family History have been reviewed and are without significant changes except as mentioned.    Review of Systems much better, awake responsive smiling , not in pain, no jaundiced.Still on the vent  A comprehensive 14 point review of systems was performed and was negative except as mentioned.    Medications:  The current medication list was reviewed in the EMR    ALLERGIES:    Allergies   Allergen Reactions   • Heparin      Waiting for heparin antigen to result       Objective      Vitals:    07/08/17 0600 07/08/17 0605 07/08/17 0705 07/08/17 0745   BP:  115/77 115/77    BP Location:   Right arm    Patient Position:   Lying    Pulse:  (!) 46 (!) 48    Resp:   17    Temp:   97.6 °F (36.4 °C) 97.6 °F (36.4 °C)   TempSrc:   Oral Oral   SpO2:  97% 100%    Weight: 149 lb 4 oz (67.7 kg)      Height:         No flowsheet data found.    Physical Exam  Well perfused hands, alert, smiling,moving 4 limbs,  tube out.No cervical swelling, no pain in upper extremities  veins, clear lung regular heaart, soft abdomen, fully awake alert smiling moving 4 limbs.    RECENT LABS:  Hematology WBC   Date Value Ref Range Status   07/08/2017 18.33 (H) 4.50 - 10.70 10*3/mm3 Final     RBC   Date Value Ref Range Status   07/08/2017 3.14 (L) 3.90 - 5.20 10*6/mm3 Final     Hemoglobin   Date Value Ref Range Status   07/08/2017 9.9 (L) 11.9 - 15.5 g/dL Final     Hematocrit   Date Value Ref Range Status   07/08/2017 28.6 (L) 35.6 - 45.5 % Final     Platelets   Date Value Ref Range Status   07/08/2017 184 140 - 500 10*3/mm3 Final      ZIGGY Comprehensive Panel   Order: [id number]   Status:  Final result   Visible to patient:  No (Not Released)      Ref Range & Units 2d ago     Anti-DNA (DS) Ab Qn 0 - 9 IU/mL <1   Comments:                                    Negative      <5                                      Equivocal  5 - 9                                      Positive      >9   RNP Antibodies 0.0 - 0.9 AI >8.0 (H)   Del Cid Antibodies 0.0 - 0.9 AI 0.7   Antiscleroderma-70 Antibodies 0.0 - 0.9 AI 0.2   JOE SSA (RO) Ab 0.0 - 0.9 AI 0.2   JOE SSB (LA) Ab 0.0 - 0.9 AI <0.2   Antichromatin Antibodies 0.0 - 0.9 AI >8.0 (H)   NORA-1 IgG 0.0 - 0.9 AI <0.2   Anti-Centromere B Antibodies 0.0 - 0.9 AI <0.2   See below:  Comment   Comments: Autoantibody                       Disease            Interpretation Summary   · Normal bilateral lower extremity venous duplex scan.         Interpretation Summary   · Acute right upper extremity superficial thrombophlebitis noted in the cephalic (upper arm).  · Acute left upper extremity superficial thrombophlebitis noted in the cephalic (upper arm).  · All other vessels appear normal.  · Right IJ not examined due to central line.     Heparin-induced Platelet Antibody   Order: [id number]   Status:  Final result   Visible to patient:  No (Not Released)      Ref Range & Units 2d ago     Heparin Induced Plt Ab 0.000 - 0.400 OD 0.190   Resulting Agency  [facility]   Narrative    Performed at:  [facility]  [address], [place]  [id number]  : Jordy Doll MD, Phone:  [phone]      Specimen Collected: 07/05/17  9:57 AM Last Resulted: 07/06/                  ASSESS/PLAN:  1.  Anemia and thrombocytopenia: Platelets are normalized now.  This is likely secondary to underlying sepsis.    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  Her platelet count is 78,000 today she has no clinical bleeding.  White count is elevated because the steroids .  Her hemoglobin has dropped some no surprising that LDH remains not a little bit elevated at her bilirubin remains normal therefore in my opinion the degree of hemolysis on this patient is minimal.        2.  Hit antibody negative, okay to use Lovenox prophylaxis on low-dose heparin prophylaxis.    3.  Systemic lupus erythematosus, patient on IV Solu-Medrol being tapered.    4.  Sepsis on Zosyn and Ancef.    5.  Heme-positive stools    6.  Diabetic ketoacidosis.  We will follow.    Kendal Martinez MD              7/8/2017      CC:

## 2017-07-08 NOTE — PLAN OF CARE
Problem: Inpatient SLP  Goal: Dysphagia- Patient will safely consume diet as per recommendation with no signs/symptoms of aspiration    07/08/17 1439   Safely Consume Diet   Safely Consume Diet- SLP, Date Goal Reviewed 07/08/17   Safely Consume Diet- SLP, Outcome goal ongoing

## 2017-07-08 NOTE — THERAPY EVALUATION
"Acute Care - Physical Therapy Initial Evaluation  [facility]     Patient Name: Sarita Bai  : 1980  MRN: [id number]  Today's Date: 2017   Onset of Illness/Injury or Date of Surgery Date: 17            Admit Date: 2017     Visit Dx:    ICD-10-CM ICD-9-CM   1. Generalized weakness R53.1 780.79     Patient Active Problem List   Diagnosis   • DKA (diabetic ketoacidoses)   • Pneumonia   • Sepsis   • Acute respiratory failure   • History of systemic lupus erythematosus (SLE)   • History of splenectomy   • History of ITP   • Primary hypothyroidism   • ARF (acute renal failure) with tubular necrosis     Past Medical History:   Diagnosis Date   • Lupus    • Thyroid activity decreased      Past Surgical History:   Procedure Laterality Date   • INSERT CENTRAL LINE AT BEDSIDE  2017        • INSERTION HEMODIALYSIS CATHETER N/A 2017    Procedure: LEFT IJ TUNNELED  CATHETER ;  Surgeon: Caleb Bliss MD;  Location: [facility] MAIN OR;  Service:    • INTUBATION  2017        • SPLENECTOMY            PT ASSESSMENT (last 72 hours)      PT Evaluation       17 1323 17 0400    Rehab Evaluation    Document Type evaluation  -EM     Subjective Information agree to therapy;complains of;weakness  -EM     General Information    Onset of Illness/Injury or Date of Surgery Date 17  -EM     General Observations young  female in supine, awake and alert  -EM     Pertinent History Of Current Problem DKA, pna (pt intubated -17)   -EM     Prior Level of Function independent:;community mobility  -EM     Equipment Currently Used at Home none  -EM     Plans/Goals Discussed With patient  -EM     Living Environment    Lives With alone   has family support  -EM     Living Arrangements house  -EM     Home Accessibility no concerns  -EM     Clinical Impression    Patient/Family Goals Statement \"I want to go home\"   -EM     Criteria for Skilled Therapeutic Interventions Met yes;treatment " indicated  -EM     Impairments Found (describe specific impairments) gait, locomotion, and balance;aerobic capacity/endurance  -EM     Rehab Potential good, to achieve stated therapy goals  -EM     Vital Signs    Pre Systolic BP Rehab 149  -EM     Pre Treatment Diastolic BP 95  -EM     Pretreatment Heart Rate (beats/min) 57  -EM     Posttreatment Heart Rate (beats/min) 56  -EM     Pre SpO2 (%) 97  -EM     O2 Delivery Pre Treatment room air  -EM     Pain Assessment    Pain Assessment No/denies pain  -EM     Cognitive Assessment/Intervention    Current Cognitive/Communication Assessment functional  -EM     Orientation Status oriented x 4  -EM     Follows Commands/Answers Questions 100% of the time  -EM     Personal Safety WNL/WFL  -EM     ROM (Range of Motion)    General ROM no range of motion deficits identified  -EM     MMT (Manual Muscle Testing)    General MMT Assessment no strength deficits identified   no focal deficits, generalized weakness noted x 4 ext.   -EM     Muscle Tone Assessment    Muscle Tone Assessment  LUE;RUE;LLE;RLE  -MC    LUE Muscle Tone Assessment  mildly increased tone  -MC    RUE Muscle Tone Assessment  mildly increased tone  -MC    LLE Muscle Tone Assessment  mildly increased tone  -MC    RLE Muscle Tone Assessment  mildly increased tone  -MC    Bed Mobility, Assessment/Treatment    Bed Mobility, Assistive Device head of bed elevated  -EM     Bed Mob, Supine to Sit, [place] moderate assist (50% patient effort);2 person assist required  -EM     Bed Mob, Sit to Supine, [place] moderate assist (50% patient effort);2 person assist required  -EM     Transfer Assessment/Treatment    Transfers, Sit-Stand [place] not tested  -EM     Transfer, Comment pt fatigued very quickly sitting on eob   -EM     Motor Skills/Interventions    Additional Documentation Balance Skills Training (Group)  -EM     Balance Skills Training    Sitting-Level of Assistance Contact guard  -EM      Sitting-Balance Support Feet supported;Right upper extremity supported;Left upper extremity supported  -EM     Sitting-Balance Activities Trunk control activities  -EM     Sitting # of Minutes 2  -EM     Therapy Exercises    Bilateral Lower Extremities 10 reps;AROM:;ankle pumps/circles;LAQ  -EM     Positioning and Restraints    Pre-Treatment Position in bed  -EM     Post Treatment Position bed  -EM     In Bed supine;call light within reach;notified nsg  -EM       07/08/17 0000 07/07/17 2000    Muscle Tone Assessment    Muscle Tone Assessment LUE;RUE;LLE;RLE  -MC LUE;RUE;LLE;RLE  -MC    LUE Muscle Tone Assessment mildly increased tone  -MC mildly increased tone  -MC    RUE Muscle Tone Assessment mildly increased tone  -MC mildly increased tone  -MC    LLE Muscle Tone Assessment mildly increased tone  -MC mildly increased tone  -MC    RLE Muscle Tone Assessment mildly increased tone  -MC mildly increased tone  -MC      07/07/17 1600 07/07/17 1507    Rehab Evaluation    Document Type  evaluation  -NB    Symptoms Noted During/After Treatment  none  -NB    Pain Assessment    Pain Assessment  No/denies pain  -NB    Cognitive Assessment/Intervention    Current Cognitive/Communication Assessment  impaired   some confusion, complaints of double vision too  -NB    Orientation Status  oriented to;person  -NB    Follows Commands/Answers Questions  100% of the time;able to follow single-step instructions  -NB    Muscle Tone Assessment    Muscle Tone Assessment LUE;RUE;LLE;RLE  -BT     LUE Muscle Tone Assessment mildly increased tone  -BT     RUE Muscle Tone Assessment mildly increased tone  -BT     LLE Muscle Tone Assessment mildly increased tone  -BT     RLE Muscle Tone Assessment mildly increased tone  -BT       07/07/17 1312 07/07/17 1200    Rehab Evaluation    Evaluation Not Performed patient unavailable for evaluation   spoke with THIERNO Sims who states patient has to be flat x 6 hours since LINDA Valdez pulled from groin. will  check back tomorrow   -EM     Muscle Tone Assessment    Muscle Tone Assessment  LUE;RUE;LLE;RLE  -BT    LUE Muscle Tone Assessment  mildly increased tone  -BT    RUE Muscle Tone Assessment  mildly increased tone  -BT    LLE Muscle Tone Assessment  mildly increased tone  -BT    RLE Muscle Tone Assessment  mildly increased tone  -BT      07/07/17 0800 07/06/17 1615    Muscle Tone Assessment    Muscle Tone Assessment LUE;RUE;LLE;RLE  -BT LUE;RUE;LLE;RLE  -BT    LUE Muscle Tone Assessment mildly increased tone  -BT mildly increased tone  -BT    RUE Muscle Tone Assessment mildly increased tone  -BT mildly increased tone  -BT    LLE Muscle Tone Assessment mildly increased tone  -BT mildly increased tone  -BT    RLE Muscle Tone Assessment mildly increased tone  -BT mildly increased tone  -BT      07/06/17 1200 07/06/17 0805    Muscle Tone Assessment    Muscle Tone Assessment LUE;RUE;LLE;RLE  -BT LUE;RUE;LLE;RLE  -BT    LUE Muscle Tone Assessment mildly increased tone  -BT mildly increased tone  -BT    RUE Muscle Tone Assessment mildly increased tone  -BT mildly increased tone  -BT    LLE Muscle Tone Assessment mildly increased tone  -BT mildly increased tone  -BT    RLE Muscle Tone Assessment mildly increased tone  -BT mildly increased tone  -BT      User Key  (r) = Recorded By, (t) = Taken By, (c) = Cosigned By    Initials Name Provider Type    GREG Posadas MS CCC-SLP Speech and Language Pathologist    RAFAEL Pierce, PT Physical Therapist    LYNDSAY Murillo RN Registered Nurse    YVES Villafana RN Registered Nurse          Physical Therapy Education     Title: PT OT SLP Therapies (Active)     Topic: Physical Therapy (Active)     Point: Mobility training (Active)    Learning Progress Summary    Learner Readiness Method Response Comment Documented by Status   Patient Acceptance E NR  EM 07/08/17 1330 Active                      User Key     Initials Effective Dates Name Provider Type Discipline    EM  12/01/15 -  Adrianne Pierce PT Physical Therapist PT                PT Recommendation and Plan  Anticipated Discharge Disposition: inpatient rehabilitation facility (pending progress)  Planned Therapy Interventions: bed mobility training, gait training, home exercise program, transfer training  PT Frequency: daily  Plan of Care Review  Outcome Summary/Follow up Plan: patient presents with generalized weakness from prolonged immobility, decreased activity tolerance, decreased balance which limit independence with functional mobility and patient would  benefit from skilled PT. Anticipate patient may need inpt rehab or SNU at d/c pending progress.           IP PT Goals       07/08/17 1330          Bed Mobility PT LTG    Bed Mobility PT LTG, Date Established 07/08/17  -EM      Bed Mobility PT LTG, Time to Achieve 1 wk  -EM      Bed Mobility PT LTG, Activity Type all bed mobility  -EM      Bed Mobility PT LTG, [place] Level minimum assist (75% patient effort)  -EM      Transfer Training PT LTG    Transfer Training PT LTG, Date Established 07/08/17  -EM      Transfer Training PT LTG, Time to Achieve 1 wk  -EM      Transfer Training PT LTG, Activity Type all transfers  -EM      Transfer Training PT LTG, [place] Level minimum assist (75% patient effort)  -EM      Gait Training PT LTG    Gait Training Goal PT LTG, Date Established 07/08/17  -EM      Gait Training Goal PT LTG, Time to Achieve 1 wk  -EM      Gait Training Goal PT LTG, [place] Level minimum assist (75% patient effort)  -EM      Gait Training Goal PT LTG, Assist Device walker, rolling  -EM      Gait Training Goal PT LTG, Distance to Achieve 50 feet   -EM        User Key  (r) = Recorded By, (t) = Taken By, (c) = Cosigned By    Initials Name Provider Type    EM Adrianne Pierce, PT Physical Therapist                Outcome Measures       07/08/17 1300          How much help from another person do you currently need...    Turning from your  back to your side while in flat bed without using bedrails? 2  -EM      Moving from lying on back to sitting on the side of a flat bed without bedrails? 2  -EM      Moving to and from a bed to a chair (including a wheelchair)? 1  -EM      Standing up from a chair using your arms (e.g., wheelchair, bedside chair)? 1  -EM      Climbing 3-5 steps with a railing? 1  -EM      To walk in hospital room? 1  -EM      AM-PAC 6 Clicks Score 8  -EM      Functional Assessment    Outcome Measure Options AM-PAC 6 Clicks Basic Mobility (PT)  -EM        User Key  (r) = Recorded By, (t) = Taken By, (c) = Cosigned By    Initials Name Provider Type    EM Adrianne Pierce PT Physical Therapist           Time Calculation:         PT Charges       07/08/17 1333          Time Calculation    Start Time 1258  -EM      Stop Time 1319  -EM      Time Calculation (min) 21 min  -EM      PT Received On 07/08/17  -EM      PT - Next Appointment 07/09/17  -EM      PT Goal Re-Cert Due Date 07/15/17  -EM        User Key  (r) = Recorded By, (t) = Taken By, (c) = Cosigned By    Initials Name Provider Type    EM Adrianne Pierce PT Physical Therapist          Therapy Charges for Today     Code Description Service Date Service Provider Modifiers Qty    84239900207 HC PT EVAL MOD COMPLEXITY 2 7/8/2017 Adrianne Pierce, PT GP 1    50710373607 HC PT THER PROC EA 15 MIN 7/8/2017 Adrianne Pierce, PT GP 1    50008453324 HC PT THER SUPP EA 15 MIN 7/8/2017 Adrianne Pierce, PT GP 1          PT G-Codes  Outcome Measure Options: AM-PAC 6 Clicks Basic Mobility (PT)      Adrianne Pierce PT  7/8/2017

## 2017-07-08 NOTE — PLAN OF CARE
Problem: Patient Care Overview (Adult)  Goal: Plan of Care Review    07/08/17 1330   Outcome Evaluation   Outcome Summary/Follow up Plan patient presents with generalized weakness from prolonged immobility, decreased activity tolerance, decreased balance which limit independence with functional mobility and patient would benefit from skilled PT. Anticipate patient may need inpt rehab or SNU at d/c pending progress.          Problem: Inpatient Physical Therapy  Goal: Bed Mobility Goal LTG- PT    07/08/17 1330   Bed Mobility PT LTG   Bed Mobility PT LTG, Date Established 07/08/17   Bed Mobility PT LTG, Time to Achieve 1 wk   Bed Mobility PT LTG, Activity Type all bed mobility   Bed Mobility PT LTG, [place] Level minimum assist (75% patient effort)       Goal: Transfer Training Goal 1 LTG- PT    07/08/17 1330   Transfer Training PT LTG   Transfer Training PT LTG, Date Established 07/08/17   Transfer Training PT LTG, Time to Achieve 1 wk   Transfer Training PT LTG, Activity Type all transfers   Transfer Training PT LTG, [place] Level minimum assist (75% patient effort)       Goal: Gait Training Goal LTG- PT    07/08/17 1330   Gait Training PT LTG   Gait Training Goal PT LTG, Date Established 07/08/17   Gait Training Goal PT LTG, Time to Achieve 1 wk   Gait Training Goal PT LTG, [place] Level minimum assist (75% patient effort)   Gait Training Goal PT LTG, Assist Device walker, rolling   Gait Training Goal PT LTG, Distance to Achieve 50 feet

## 2017-07-08 NOTE — PLAN OF CARE
Problem: Patient Care Overview (Adult)  Goal: Plan of Care Review  Outcome: Ongoing (interventions implemented as appropriate)  Goal: Adult Individualization and Mutuality  Outcome: Ongoing (interventions implemented as appropriate)  Goal: Discharge Needs Assessment  Outcome: Ongoing (interventions implemented as appropriate)    Problem: Fall Risk (Adult)  Goal: Identify Related Risk Factors and Signs and Symptoms  Outcome: Ongoing (interventions implemented as appropriate)  Goal: Absence of Falls  Outcome: Ongoing (interventions implemented as appropriate)    Problem: Diabetes, Type 1 (Adult)  Goal: Signs and Symptoms of Listed Potential Problems Will be Absent or Manageable (Diabetes, Type 1)  Outcome: Ongoing (interventions implemented as appropriate)    Problem: Sepsis (Adult)  Goal: Signs and Symptoms of Listed Potential Problems Will be Absent or Manageable (Sepsis)  Outcome: Ongoing (interventions implemented as appropriate)    Problem: Pneumonia (Adult)  Goal: Signs and Symptoms of Listed Potential Problems Will be Absent or Manageable (Pneumonia)  Outcome: Ongoing (interventions implemented as appropriate)

## 2017-07-08 NOTE — PROGRESS NOTES
37 y.o.   LOS: 9 days   Patient Care Team:  Gregorio Bai MD as PCP - General (Family Medicine)    Chief Complaint: Newly diagnosed type I diabetic    Subjective  patient has been diagnosed with type 1 diabetes in this admission.  Blood sugars around 200-250 mg/dL range.  Continues to be on IV Solu-Medrol 20 mg IV twice a day.  Currently getting hemodialysis when I went to see the patient this morning.    Interval History:    Review of Systems:   Review of Systems   Constitutional: Positive for appetite change and fatigue. Negative for fever.   Respiratory: Negative for shortness of breath.    Cardiovascular: Positive for leg swelling.   Gastrointestinal: Negative for constipation and nausea.   Neurological: Positive for weakness and numbness.     Objective     Vital Signs   Temp:  [97.6 °F (36.4 °C)-98.4 °F (36.9 °C)] 97.8 °F (36.6 °C)  Heart Rate:  [46-75] 56  Resp:  [16-20] 18  BP: (105-149)/(65-97) 149/95    Physical Exam   Gen exam - alert and oriented x 3, not in distress.   HEENT - No acanthosis nigricans. Thyroid palpable. IJ present  Resp - Clear to auscultation.   CVS - S1,S2 heard and no murmurs.   Abd - Non tender, BS heard.   Ext - No edema and intact pin prick and proprioception.     Results Review:     I reviewed the patient's new clinical results.      Glucose   Date/Time Value Ref Range Status   07/08/2017 0424 260 (H) 65 - 99 mg/dL Final   07/07/2017 0356 241 (H) 65 - 99 mg/dL Final   07/06/2017 0510 139 (H) 65 - 99 mg/dL Final     Lab Results (last 72 hours)     Procedure Component Value Units Date/Time    POC Glucose Fingerstick [[id number]]  (Normal) Collected:  07/05/17 1414    Specimen:  Blood Updated:  07/05/17 1423     Glucose 91 mg/dL     Narrative:       Meter: [id number] : [id number] Markie HUSTON    Glutamic Acid Decarboxylase [[id number]]  (Abnormal) Collected:  07/02/17 0513    Specimen:  Blood Updated:  07/05/17 1511     REGINA-65 >250.0 (H) U/mL     Narrative:       Performed at:   [facility] [id number] [place], [place]  [id number]  : Jordy Doll MD, Phone:  [phone]    C-Peptide [[id number]] Collected:  07/02/17 0513    Specimen:  Blood Updated:  07/05/17 1511     C-Peptide 1.8 ng/mL       C-Peptide reference interval is for fasting patients.       Narrative:       Performed at:  01 - [facility]  [id number]  : Vince Minaya PhD, Phone:  [phone]    POC Glucose Fingerstick [[id number]]  (Abnormal) Collected:  07/05/17 1638    Specimen:  Blood Updated:  07/05/17 1641     Glucose 136 (H) mg/dL     Narrative:       RN Notified R and V Meter: [id number] : [id number] Broderick Wheeler    POC Glucose Fingerstick [[id number]]  (Abnormal) Collected:  07/05/17 2006    Specimen:  Blood Updated:  07/05/17 2043     Glucose 312 (H) mg/dL     Narrative:       Meter: [id number] : [id number] [employer]    POC Glucose Fingerstick [[id number]]  (Abnormal) Collected:  07/05/17 2320    Specimen:  Blood Updated:  07/05/17 2322     Glucose 362 (H) mg/dL     Narrative:       Meter: [id number] : [id number] [employer]    POC Glucose Fingerstick [[id number]]  (Abnormal) Collected:  07/06/17 0328    Specimen:  Blood Updated:  07/06/17 0348     Glucose 221 (H) mg/dL     Narrative:       Meter: [id number] : [id number] [employer]    Reticulocytes [[id number]]  (Abnormal) Collected:  07/06/17 0510    Specimen:  Blood Updated:  07/06/17 0526     Reticulocyte % 2.84 (H) %     aPTT [[id number]]  (Normal) Collected:  07/06/17 0510    Specimen:  Blood Updated:  07/06/17 0535     PTT 29.2 seconds     CBC & Differential [[id number]] Collected:  07/06/17 0510    Specimen:  Blood Updated:  07/06/17 0544    Narrative:       The following orders were created for panel order CBC & Differential.  Procedure                               Abnormality         Status                     ---------                                -----------         ------                     Manual Differential[[id number]]          Abnormal            Final result               Scan Slide[[id number]]                                       Final result               CBC Auto Differential[[id number]]        Abnormal            Final result                 Please view results for these tests on the individual orders.    CBC Auto Differential [[id number]]  (Abnormal) Collected:  07/06/17 0510    Specimen:  Blood Updated:  07/06/17 0544     WBC 15.14 (H) 10*3/mm3       WBC corrected for presence of NRBC's        RBC 3.26 (L) 10*6/mm3      Hemoglobin 10.3 (L) g/dL      Hematocrit 29.1 (L) %      MCV 89.3 fL      MCH 31.6 pg      MCHC 35.4 g/dL      RDW 15.3 (H) %      RDW-SD 49.1 fl      MPV 11.5 fL      Platelets 78 (L) 10*3/mm3     Scan Slide [[id number]] Collected:  07/06/17 0510    Specimen:  Blood Updated:  07/06/17 0544     Scan Slide --      See Manual Differential Results       Manual Differential [[id number]]  (Abnormal) Collected:  07/06/17 0510    Specimen:  Blood Updated:  07/06/17 0544     Neutrophil % 79.0 (H) %      Lymphocyte % 9.0 (L) %      Monocyte % 6.0 %      Metamyelocyte % 1.0 (H) %      Myelocyte % 5.0 (H) %      Neutrophils Absolute 11.96 (H) 10*3/mm3      Lymphocytes Absolute 1.36 10*3/mm3      Monocytes Absolute 0.91 10*3/mm3      nRBC 57.0 (H) /100 WBC      Acanthocytes Slight/1+     Anisocytosis Mod/2+     Polychromasia Slight/1+     WBC Morphology Normal     Platelet Morphology Normal    Bilirubin, Direct [[id number]]  (Normal) Collected:  07/06/17 0510    Specimen:  Blood Updated:  07/06/17 0552     Bilirubin, Direct 0.2 mg/dL     Vancomycin, Random [[id number]]  (Normal) Collected:  07/06/17 0510    Specimen:  Blood Updated:  07/06/17 0552     Vancomycin Random 30.90 mcg/mL     Lactate Dehydrogenase [[id number]]  (Abnormal) Collected:  07/06/17 0510    Specimen:  Blood Updated:  07/06/17 0552      (H) U/L     Magnesium [[id number]]   (Abnormal) Collected:  07/06/17 0510    Specimen:  Blood Updated:  07/06/17 0552     Magnesium 2.8 (H) mg/dL     Renal Function Panel [[id number]]  (Abnormal) Collected:  07/06/17 0510    Specimen:  Blood Updated:  07/06/17 0552     Glucose 139 (H) mg/dL      BUN 63 (H) mg/dL      Creatinine 4.30 (H) mg/dL      Sodium 142 mmol/L      Potassium 3.6 mmol/L      Chloride 100 mmol/L      CO2 22.4 mmol/L      Calcium 7.9 (L) mg/dL      Albumin 2.90 (L) g/dL      Phosphorus 3.7 mg/dL      Anion Gap 19.6 mmol/L      BUN/Creatinine Ratio 14.7     eGFR Non African Amer 11 (L) mL/min/1.73     POC Glucose Fingerstick [[id number]]  (Normal) Collected:  07/06/17 0715    Specimen:  Blood Updated:  07/06/17 0717     Glucose 98 mg/dL     Narrative:       Meter: [id number] : [id number] Artemio Dill    POC Glucose Fingerstick [[id number]]  (Abnormal) Collected:  07/06/17 1105    Specimen:  Blood Updated:  07/06/17 1119     Glucose 156 (H) mg/dL     Narrative:       Meter: [id number] : [id number] Markie HUSTON    Occult Blood X 1, Stool [[id number]]  (Abnormal) Collected:  07/06/17 1120    Specimen:  Stool from Per Rectum Updated:  07/06/17 1221     Fecal Occult Blood Positive (A)    ZIGGY Comprehensive Panel [[id number]]  (Abnormal) Collected:  07/05/17 1354    Specimen:  Blood Updated:  07/06/17 1309     Anti-DNA (DS) Ab Qn <1 IU/mL                                          Negative      <5                                     Equivocal  5 - 9                                     Positive      >9        RNP Antibodies >8.0 (H) AI      Del Cid Antibodies 0.7 AI      Antiscleroderma-70 Antibodies 0.2 AI      JOE SSA (RO) Ab 0.2 AI      JOE SSB (LA) Ab <0.2 AI      Antichromatin Antibodies >8.0 (H) AI      NORA-1 IgG <0.2 AI      Anti-Centromere B Antibodies <0.2 AI      See below: Comment      Autoantibody                       Disease Association  ------------------------------------------------------------                           Condition                  Frequency  ---------------------   ------------------------   ---------  Antinuclear Antibody,    SLE, mixed connective  Direct (ZIGGY-D)           tissue diseases  ---------------------   ------------------------   ---------  dsDNA                    SLE                        40 - 60%  ---------------------   ------------------------   ---------  Chromatin                Drug induced SLE                90%                           SLE                        48 - 97%  ---------------------   ------------------------   ---------  SSA (Ro)                 SLE                        25 - 35%                           Sjogren's Syndrome         40 - 70%                            Lupus                 100%  ---------------------   ------------------------   ---------  SSB (La)                 SLE                             10%                           Sjogren's Syndrome              30%  ---------------------   -----------------------    ---------  Sm (anti-Smith)          SLE                        15 - 30%  ---------------------   -----------------------    ---------  RNP                      Mixed Connective Tissue                           Disease                         95%  (U1 nRNP,                SLE                        30 - 50%  anti-ribonucleoprotein)  Polymyositis and/or                           Dermatomyositis                 20%  ---------------------   ------------------------   ---------  Scl-70 (antiDNA          Scleroderma (diffuse)      20 - 35%  topoisomerase)           Crest                           13%  ---------------------   ------------------------   ---------  La-1                     Polymyositis and/or                           Dermatomyositis            20 - 40%  ---------------------   ------------------------   ---------  Centromere B             Scleroderma - Crest                           variant                         80%       Narrative:        Performed at:  [address] [place] [address]  [id number]  : Vince Minaya PhD, Phone:  [phone]    POC Glucose Fingerstick [[id number]]  (Abnormal) Collected:  07/06/17 1408    Specimen:  Blood Updated:  07/06/17 1418     Glucose 169 (H) mg/dL     Narrative:       Meter: [id number] : [id number] Markie HUSTON    Heparin-induced Platelet Antibody [[id number]] Collected:  07/05/17 0957    Specimen:  Blood Updated:  07/06/17 1516     Heparin Induced Plt Ab 0.190 OD     Narrative:       Performed at:  [address]  [id number]  : Jordy Doll MD, Phone:  [phone]    POC Glucose Fingerstick [[id number]]  (Abnormal) Collected:  07/06/17 1518    Specimen:  Blood Updated:  07/06/17 1520     Glucose 176 (H) mg/dL     Narrative:       Meter: [id number] : [id number] Natan Kulkarni    POC Glucose Fingerstick [[id number]]  (Abnormal) Collected:  07/06/17 1948    Specimen:  Blood Updated:  07/06/17 1949     Glucose 392 (H) mg/dL     Narrative:       Meter: [id number] : [id number] Mekhi Kulkarni    POC Glucose Fingerstick [[id number]]  (Abnormal) Collected:  07/06/17 2354    Specimen:  Blood Updated:  07/07/17 0001     Glucose 170 (H) mg/dL     Narrative:       Meter: [id number] : [id number] Mekhi Kulkarni    POC Glucose Fingerstick [[id number]]  (Abnormal) Collected:  07/07/17 0328    Specimen:  Blood Updated:  07/07/17 0329     Glucose 221 (H) mg/dL     Narrative:       Meter: [id number] : [id number] Mekhi Kulkarni    Celiac Panel Reflex To Titer [[id number]] Collected:  07/07/17 0356    Specimen:  Blood Updated:  07/07/17 0455    Reticulocytes [[id number]]  (Abnormal) Collected:  07/07/17 0356    Specimen:  Blood Updated:  07/07/17 0509     Reticulocyte % 3.95 (H) %     aPTT [[id number]]  (Normal) Collected:  07/07/17 0356    Specimen:  Blood Updated:  07/07/17 0516     PTT 24.7 seconds     Bilirubin, Direct [[id number]]  (Normal)  Collected:  07/07/17 0356    Specimen:  Blood Updated:  07/07/17 0526     Bilirubin, Direct 0.2 mg/dL     Lactate Dehydrogenase [[id number]]  (Abnormal) Collected:  07/07/17 0356    Specimen:  Blood Updated:  07/07/17 0526      (H) U/L     Comprehensive Metabolic Panel [[id number]]  (Abnormal) Collected:  07/07/17 0356    Specimen:  Blood Updated:  07/07/17 0536     Glucose 241 (H) mg/dL      BUN 30 (H) mg/dL      Creatinine 2.56 (H) mg/dL      Sodium 140 mmol/L      Potassium 4.1 mmol/L      Chloride 97 (L) mmol/L      CO2 27.0 mmol/L      Calcium 8.2 (L) mg/dL      Total Protein 6.7 g/dL      Albumin 3.20 (L) g/dL      ALT (SGPT) 193 (H) U/L      AST (SGOT) 38 (H) U/L      Alkaline Phosphatase 103 U/L      Total Bilirubin 0.4 mg/dL      eGFR Non African Amer 21 (L) mL/min/1.73      Globulin 3.5 gm/dL      A/G Ratio 0.9 g/dL      BUN/Creatinine Ratio 11.7     Anion Gap 16.0 mmol/L     Phosphorus [[id number]]  (Normal) Collected:  07/07/17 0356    Specimen:  Blood Updated:  07/07/17 0615     Phosphorus 2.5 mg/dL     CBC (No Diff) [[id number]]  (Abnormal) Collected:  07/07/17 0356    Specimen:  Blood Updated:  07/07/17 0637     WBC 22.46 (H) 10*3/mm3      RBC 3.36 (L) 10*6/mm3      Hemoglobin 10.5 (L) g/dL      Hematocrit 29.7 (L) %      MCV 88.4 fL      MCH 31.3 pg      MCHC 35.4 g/dL      RDW 15.3 (H) %      RDW-SD 48.2 fl      MPV 11.3 fL      Platelets 115 (L) 10*3/mm3     Blood Gas, Arterial [[id number]]  (Abnormal) Collected:  07/07/17 0721    Specimen:  Arterial Blood Updated:  07/07/17 0724     Site Arterial: right radial     Cristian's Test Positive     pH, Arterial 7.519 (H) pH units      pCO2, Arterial 37.3 mm Hg      pO2, Arterial 117.2 (H) mm Hg      HCO3, Arterial 30.4 (H) mmol/L      Base Excess, Arterial 7.1 (H) mmol/L      O2 Saturation Calculated 99.0 %      A-a Gradiant 0.7 mmHg      Barometric Pressure for Blood Gas 751.6 mmHg      Modality Adult Vent     FIO2 30 %      Ventilator Mode PS     Set  Tidal Volume 475     Rate 16 Breaths/minute      PEEP 5     PSV 7 cmH2O     Narrative:       sat 100 Meter: [id number] : [id number] Dusty Antonio    POC Glucose Fingerstick [[id number]]  (Abnormal) Collected:  07/07/17 0736    Specimen:  Blood Updated:  07/07/17 0738     Glucose 262 (H) mg/dL     Narrative:       Meter: [id number] : [id number] Artemio Dill    POC Glucose Fingerstick [[id number]]  (Abnormal) Collected:  07/07/17 1111    Specimen:  Blood Updated:  07/07/17 1114     Glucose 221 (H) mg/dL     Narrative:       Meter: [id number] : [id number] Artemio Dill    Clostridium Difficile Toxin, PCR [[id number]]  (Normal) Collected:  07/06/17 1153    Specimen:  Stool from Per Rectum Updated:  07/07/17 1302     C. Difficile Toxins by PCR Negative    Narrative:       .See original report from the [facility] scanned into patient chart.    Beta-2 Glycoprotein Antibodies [[id number]] Collected:  07/05/17 1354    Specimen:  Blood Updated:  07/07/17 1514     Beta-2 Glyco 1 IgG <9 GPI IgG units       The reference interval reflects a 3SD or 99th percentile interval,  which is thought to represent a potentially clinically significant  result in accordance with the International Consensus Statement on  the classification criteria for definitive antiphospholipid syndrome  (APS). J Thromb Haem 2006;4:295-306.        Beta-2 Glyco 1 IgA <9 GPI IgA units       The reference interval reflects a 3SD or 99th percentile interval,  which is thought to represent a potentially clinically significant  result in accordance with the International Consensus Statement on  the classification criteria for definitive antiphospholipid syndrome  (APS). J Thromb Haem 2006;4:295-306.        Beta-2 Glyco 1 IgM <9 GPI IgM units       The reference interval reflects a 3SD or 99th percentile interval,  which is thought to represent a potentially clinically significant  result in accordance with the  International Consensus Statement on  the classification criteria for definitive antiphospholipid syndrome  (APS). J Thromb Haem 2006;4:295-306.       Narrative:       Performed at:  [facility]  [id number]  : Jordy Doll MD, Phone:  [phone]    POC Glucose Fingerstick [[id number]]  (Abnormal) Collected:  07/07/17 1530    Specimen:  Blood Updated:  07/07/17 1533     Glucose 297 (H) mg/dL     Narrative:       Meter: [id number] : [id number] Artemio Dill    POC Glucose Fingerstick [[id number]]  (Abnormal) Collected:  07/07/17 2005    Specimen:  Blood Updated:  07/07/17 2048     Glucose 288 (H) mg/dL     Narrative:       Meter: [id number] : [id number] Avel Hebert    POC Glucose Fingerstick [[id number]]  (Abnormal) Collected:  07/08/17 0021    Specimen:  Blood Updated:  07/08/17 0033     Glucose 234 (H) mg/dL     Narrative:       Meter: [id number] : [id number] Avel Hebert    Reticulocytes [[id number]]  (Abnormal) Collected:  07/08/17 0424    Specimen:  Blood from Blood, Central Line Updated:  07/08/17 0450     Reticulocyte % 4.58 (H) %     aPTT [[id number]]  (Normal) Collected:  07/08/17 0424    Specimen:  Blood from Blood, Central Line Updated:  07/08/17 0456     PTT 23.7 seconds     POC Glucose Fingerstick [[id number]]  (Abnormal) Collected:  07/08/17 0449    Specimen:  Blood Updated:  07/08/17 0502     Glucose 247 (H) mg/dL     Narrative:       Meter: [id number] : [id number] Avel Hebert    Lactate Dehydrogenase [[id number]]  (Abnormal) Collected:  07/08/17 0424    Specimen:  Blood from Blood, Central Line Updated:  07/08/17 0505      (H) U/L     Renal Function Panel [[id number]]  (Abnormal) Collected:  07/08/17 0424    Specimen:  Blood from Blood, Central Line Updated:  07/08/17 0512     Glucose 260 (H) mg/dL      BUN 69 (H) mg/dL      Creatinine 4.83 (H) mg/dL      Sodium 138 mmol/L      Potassium 4.3 mmol/L      Chloride 94  (L) mmol/L      CO2 24.7 mmol/L      Calcium 8.2 (L) mg/dL      Albumin 3.00 (L) g/dL      Phosphorus 5.6 (H) mg/dL      Anion Gap 19.3 mmol/L      BUN/Creatinine Ratio 14.3     eGFR Non African Amer 10 (L) mL/min/1.73     Bilirubin, Direct [[id number]]  (Normal) Collected:  07/08/17 0424    Specimen:  Blood from Blood, Central Line Updated:  07/08/17 0516     Bilirubin, Direct <0.2 mg/dL     CBC (No Diff) [[id number]]  (Abnormal) Collected:  07/08/17 0424    Specimen:  Blood from Blood, Central Line Updated:  07/08/17 0551     WBC 18.33 (H) 10*3/mm3      RBC 3.14 (L) 10*6/mm3      Hemoglobin 9.9 (L) g/dL      Hematocrit 28.6 (L) %      MCV 91.1 fL      MCH 31.5 pg      MCHC 34.6 g/dL      RDW 15.4 (H) %      RDW-SD 49.4 fl      MPV 11.5 fL      Platelets 184 10*3/mm3     Cardiolipin Antibody [[id number]] Collected:  07/05/17 1354    Specimen:  Blood Updated:  07/08/17 0614     Anticardiolipin IgG <9 GPL U/mL                                 Negative:              <15                            Indeterminate:     15 - 20                            Low-Med Positive: >20 - 80                            High Positive:         >80        Anticardiolipin IgM <9 MPL U/mL                                 Negative:              <13                            Indeterminate:     13 - 20                            Low-Med Positive: >20 - 80                            High Positive:         >80        Anticardiolipin IgA <9 APL U/mL                                 Negative:              <12                            Indeterminate:     12 - 20                            Low-Med Positive: >20 - 80                            High Positive:         >80       Narrative:       Performed at:  [address] [place] [address]  [id number]  : Vince Minaya PhD, Phone:  [phone]    Cyclic Citrul Peptide Antibody, IgG / IgA [[id number]] Collected:  07/05/17 1354    Specimen:  Blood Updated:  07/08/17 0614      CCP Antibodies IgG/IgA 11 units                                 Negative               <20                            Weak positive      20 - 39                            Moderate positive  40 - 59                            Strong positive        >59       Narrative:       Performed at:  02 - [facility], [place], [place]  [id number]  : Jordy Doll MD, Phone:  [phone]    POC Glucose Fingerstick [[id number]]  (Abnormal) Collected:  07/08/17 0751    Specimen:  Blood Updated:  07/08/17 0754     Glucose 209 (H) mg/dL     Narrative:       Meter: [id number] : [id number] Natan Cayla    POC Glucose Fingerstick [[id number]]  (Normal) Collected:  07/08/17 1111    Specimen:  Blood Updated:  07/08/17 1113     Glucose 110 mg/dL     Narrative:       Meter: [id number] : [id number] Natan Kulkarni        Imaging Results (last 72 hours)     Procedure Component Value Units Date/Time    XR Chest 1 View [[id number]] Collected:  07/06/17 1451     Updated:  07/06/17 1458    Narrative:       XR CHEST 1 VW-     HISTORY: Female who is 37 years-old,  new catheter placement     TECHNIQUE: Frontal view of the chest     COMPARISON: 07/04/2017     FINDINGS: Endotracheal tube, IJ catheter appears stable. Left-sided  lumen central venous catheter extends to the cavoatrial junction  region/proximal right atrium. Heart, mediastinum and pulmonary  vasculature are unremarkable. No focal pulmonary consolidation, pleural  effusion, or obvious large pneumothorax, but the left apex is partly  obscured by overlying objects, and assessment is also limited by supine  positioning. No acute osseous process.       Impression:       New left-sided central venous catheter, no pneumothorax  identified, limited as described, consider follow-up upright view of the  chest with overlying objects moved away from left apical region.     This report was finalized on 7/6/2017 2:55 PM by Dr. Storm Biswas MD.       [facility]  [facility] Surgery Only [[id number]] Resulted:  07/07/17 0805     Updated:  07/07/17 0805          Medication Review: done      Current Facility-Administered Medications:   •  albumin human 25 % IV SOLN 12.5 g, 12.5 g, Intravenous, PRN, Susan Urena MD  •  artificial tears (LUBRIFRESH P.M.) ophthalmic ointment, , Both Eyes, PRN, Madison Harris MD  •  calcium gluconate 1 g in sodium chloride 0.9 % 50 mL IVPB, 1 g, Intravenous, PRN **OR** calcium gluconate 2 g in sodium chloride 0.9 % 50 mL IVPB, 2 g, Intravenous, PRN, Susan Urena MD  •  castor oil-balsam peru (VENELEX) ointment, , Topical, Q12H, Madison Harris MD  •  dextrose (D50W) solution 12.5 g, 12.5 g, Intravenous, Q15 Min PRN, Alexis Poe MD, 12.5 g at 07/01/17 1526  •  dextrose (D50W) solution 25-50 mL, 25-50 mL, Intravenous, Q30 Min PRN, Van Remy MD  •  enoxaparin (LOVENOX) syringe 30 mg, 30 mg, Subcutaneous, Daily, Leeroy Porter MD, 30 mg at 07/08/17 1322  •  HYDROcodone-acetaminophen (NORCO) 5-325 MG per tablet 1 tablet, 1 tablet, Oral, Q4H PRN, Alexis Poe MD, 1 tablet at 07/08/17 0041  •  insulin aspart (novoLOG) injection 0-12 Units, 0-12 Units, Subcutaneous, 4x Daily AC & at Bedtime, Thomas Campbell MD, 2 Units at 07/08/17 0844  •  insulin aspart (novoLOG) injection 6 Units, 6 Units, Subcutaneous, TID With Meals, Kd Olea MD  •  insulin detemir (LEVEMIR) injection 22 Units, 22 Units, Subcutaneous, Nightly, Kd Olea MD  •  lansoprazole (FIRST) oral suspension 30 mg, 30 mg, Per G Tube, QAM, Madison Harris MD, 30 mg at 07/08/17 0844  •  levothyroxine (SYNTHROID, LEVOTHROID) tablet 50 mcg, 50 mcg, Oral, Q AM, Thomas Campbell MD, 50 mcg at 07/08/17 0619  •  methylPREDNISolone sodium succinate (SOLU-Medrol) injection 20 mg, 20 mg, Intravenous, Q12H, Leeroy Porter MD    Assessment/Plan     Active Hospital Problems (** Indicates Principal Problem)    Diagnosis Date Noted   • ARF (acute renal failure) with  "tubular necrosis [N17.0] 07/05/2017   • Pneumonia [J18.9] 07/01/2017   • Sepsis [A41.9] 07/01/2017   • Acute respiratory failure [J96.00] 07/01/2017   • History of systemic lupus erythematosus (SLE) [Z87.39] 07/01/2017   • History of splenectomy [Z90.81] 07/01/2017   • History of ITP [Z86.2] 07/01/2017   • Primary hypothyroidism [E03.9] 07/01/2017   • DKA (diabetic ketoacidoses) [E13.10] 06/30/2017      Resolved Hospital Problems    Diagnosis Date Noted Date Resolved   No resolved problems to display.     Type 1 diabetes mellitus-diagnosed in this admission.  Increase levemir to 22 units at bedtime  Continue NovoLog 6 units with each meal  Continue NovoLog sliding scale 3 times a day before meals and at bedtime.  Will consult diabetic educator.    Hypothyroidism  Continue levothyroxine 50 µg oral daily.    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  Currently on IV steroids.  Insulin regimen needs to be adjusted if steroids tapered.     Right sided Pneumonia complicated with ARDS  Managed by pulmonary and critical care.    18 minutes out of 35 minutes face to face spent on floor managing care and counseling patient/family on  insulin regimen changes and about type 1 diabetes.    Kd Olea MD.  07/08/17  1:44 PM      EMR Dragon / transcription disclaimer:    \"Dictated utilizing Dragon dictation\".   "

## 2017-07-09 LAB
ALBUMIN SERPL-MCNC: 3 G/DL (ref 3.5–5.2)
ALBUMIN/GLOB SERPL: 0.8 G/DL
ALP SERPL-CCNC: 95 U/L (ref 39–117)
ALT SERPL W P-5'-P-CCNC: 70 U/L (ref 1–33)
ANION GAP SERPL CALCULATED.3IONS-SCNC: 16.9 MMOL/L
ANISOCYTOSIS BLD QL: ABNORMAL
APTT PPP: 24.6 SECONDS (ref 22.7–35.4)
AST SERPL-CCNC: 27 U/L (ref 1–32)
BILIRUB CONJ SERPL-MCNC: <0.2 MG/DL (ref 0–0.3)
BILIRUB SERPL-MCNC: 0.4 MG/DL (ref 0.1–1.2)
BUN BLD-MCNC: 53 MG/DL (ref 6–20)
BUN/CREAT SERPL: 13.2 (ref 7–25)
C3 FRG RBC-MCNC: ABNORMAL
CALCIUM SPEC-SCNC: 8.9 MG/DL (ref 8.6–10.5)
CHLORIDE SERPL-SCNC: 97 MMOL/L (ref 98–107)
CO2 SERPL-SCNC: 25.1 MMOL/L (ref 22–29)
CREAT BLD-MCNC: 4.01 MG/DL (ref 0.57–1)
DEPRECATED RDW RBC AUTO: 49.1 FL (ref 37–54)
ERYTHROCYTE [DISTWIDTH] IN BLOOD BY AUTOMATED COUNT: 16.1 % (ref 11.7–13)
GFR SERPL CREATININE-BSD FRML MDRD: 12 ML/MIN/1.73
GLOBULIN UR ELPH-MCNC: 3.6 GM/DL
GLUCOSE BLD-MCNC: 217 MG/DL (ref 65–99)
GLUCOSE BLDC GLUCOMTR-MCNC: 134 MG/DL (ref 70–130)
GLUCOSE BLDC GLUCOMTR-MCNC: 141 MG/DL (ref 70–130)
GLUCOSE BLDC GLUCOMTR-MCNC: 189 MG/DL (ref 70–130)
GLUCOSE BLDC GLUCOMTR-MCNC: 79 MG/DL (ref 70–130)
HCT VFR BLD AUTO: 31.2 % (ref 35.6–45.5)
HGB BLD-MCNC: 10.9 G/DL (ref 11.9–15.5)
LDH SERPL-CCNC: 262 U/L (ref 135–214)
LYMPHOCYTES # BLD MANUAL: 2.04 10*3/MM3 (ref 0.9–4.8)
LYMPHOCYTES NFR BLD MANUAL: 12 % (ref 19.6–45.3)
LYMPHOCYTES NFR BLD MANUAL: 3 % (ref 5–12)
MCH RBC QN AUTO: 31.8 PG (ref 26.9–32)
MCHC RBC AUTO-ENTMCNC: 34.9 G/DL (ref 32.4–36.3)
MCV RBC AUTO: 91 FL (ref 80.5–98.2)
METAMYELOCYTES NFR BLD MANUAL: 2 % (ref 0–0)
MONOCYTES # BLD AUTO: 0.51 10*3/MM3 (ref 0.2–1.2)
MYELOCYTES NFR BLD MANUAL: 1 % (ref 0–0)
NEUTROPHILS # BLD AUTO: 13.94 10*3/MM3 (ref 1.9–8.1)
NEUTROPHILS NFR BLD MANUAL: 82 % (ref 42.7–76)
NRBC SPEC MANUAL: 2 /100 WBC (ref 0–0)
OVALOCYTES BLD QL SMEAR: ABNORMAL
PLAT MORPH BLD: NORMAL
PLATELET # BLD AUTO: 209 10*3/MM3 (ref 140–500)
PMV BLD AUTO: 11.2 FL (ref 6–12)
POLYCHROMASIA BLD QL SMEAR: ABNORMAL
POTASSIUM BLD-SCNC: 4 MMOL/L (ref 3.5–5.2)
PROT SERPL-MCNC: 6.6 G/DL (ref 6–8.5)
RBC # BLD AUTO: 3.43 10*6/MM3 (ref 3.9–5.2)
RETICS/RBC NFR AUTO: 3.7 % (ref 0.5–1.5)
SODIUM BLD-SCNC: 139 MMOL/L (ref 136–145)
WBC MORPH BLD: NORMAL
WBC NRBC COR # BLD: 17 10*3/MM3 (ref 4.5–10.7)

## 2017-07-09 PROCEDURE — 85730 THROMBOPLASTIN TIME PARTIAL: CPT | Performed by: INTERNAL MEDICINE

## 2017-07-09 PROCEDURE — 63710000001 INSULIN ASPART PER 5 UNITS: Performed by: INTERNAL MEDICINE

## 2017-07-09 PROCEDURE — 25010000002 ENOXAPARIN PER 10 MG: Performed by: INTERNAL MEDICINE

## 2017-07-09 PROCEDURE — 25010000002 METHYLPREDNISOLONE PER 40 MG: Performed by: INTERNAL MEDICINE

## 2017-07-09 PROCEDURE — 85045 AUTOMATED RETICULOCYTE COUNT: CPT | Performed by: INTERNAL MEDICINE

## 2017-07-09 PROCEDURE — 85027 COMPLETE CBC AUTOMATED: CPT | Performed by: INTERNAL MEDICINE

## 2017-07-09 PROCEDURE — 82962 GLUCOSE BLOOD TEST: CPT

## 2017-07-09 PROCEDURE — 99232 SBSQ HOSP IP/OBS MODERATE 35: CPT | Performed by: INTERNAL MEDICINE

## 2017-07-09 PROCEDURE — 80053 COMPREHEN METABOLIC PANEL: CPT | Performed by: INTERNAL MEDICINE

## 2017-07-09 PROCEDURE — 82248 BILIRUBIN DIRECT: CPT | Performed by: INTERNAL MEDICINE

## 2017-07-09 PROCEDURE — 85007 BL SMEAR W/DIFF WBC COUNT: CPT | Performed by: INTERNAL MEDICINE

## 2017-07-09 PROCEDURE — 83615 LACTATE (LD) (LDH) ENZYME: CPT | Performed by: INTERNAL MEDICINE

## 2017-07-09 PROCEDURE — 97110 THERAPEUTIC EXERCISES: CPT

## 2017-07-09 PROCEDURE — 99233 SBSQ HOSP IP/OBS HIGH 50: CPT | Performed by: INTERNAL MEDICINE

## 2017-07-09 RX ADMIN — INSULIN ASPART 6 UNITS: 100 INJECTION, SOLUTION INTRAVENOUS; SUBCUTANEOUS at 18:46

## 2017-07-09 RX ADMIN — LANSOPRAZOLE 30 MG: KIT at 06:10

## 2017-07-09 RX ADMIN — METHYLPREDNISOLONE SODIUM SUCCINATE 20 MG: 40 INJECTION, POWDER, FOR SOLUTION INTRAMUSCULAR; INTRAVENOUS at 06:10

## 2017-07-09 RX ADMIN — ENOXAPARIN SODIUM 30 MG: 30 INJECTION SUBCUTANEOUS at 09:14

## 2017-07-09 RX ADMIN — CASTOR OIL AND BALSAM, PERU: 788; 87 OINTMENT TOPICAL at 09:15

## 2017-07-09 RX ADMIN — INSULIN ASPART 2 UNITS: 100 INJECTION, SOLUTION INTRAVENOUS; SUBCUTANEOUS at 12:33

## 2017-07-09 RX ADMIN — INSULIN ASPART 6 UNITS: 100 INJECTION, SOLUTION INTRAVENOUS; SUBCUTANEOUS at 12:33

## 2017-07-09 RX ADMIN — CASTOR OIL AND BALSAM, PERU: 788; 87 OINTMENT TOPICAL at 21:12

## 2017-07-09 RX ADMIN — INSULIN ASPART 6 UNITS: 100 INJECTION, SOLUTION INTRAVENOUS; SUBCUTANEOUS at 09:15

## 2017-07-09 RX ADMIN — LEVOTHYROXINE SODIUM 50 MCG: 50 TABLET ORAL at 06:10

## 2017-07-09 RX ADMIN — METHYLPREDNISOLONE SODIUM SUCCINATE 20 MG: 40 INJECTION, POWDER, FOR SOLUTION INTRAMUSCULAR; INTRAVENOUS at 18:47

## 2017-07-09 NOTE — PROGRESS NOTES
Dr. OSITO Porter    [address]    7/9/2017    Patient ID:  Name:  Sarita Bai  MRN:  [id number]  1980  37 y.o.  female            CC/Reason for visit: Follow-up for pneumonia and respiratory failure    HPI: She slowly improving.  She has no new complaints today but she has not ambulated yet.    Vitals:  Vitals:    07/08/17 2054 07/08/17 2336 07/09/17 0539 07/09/17 0913   BP: 125/72 128/75  132/78   BP Location: Right arm Right arm  Right arm   Patient Position: Lying Lying  Lying   Pulse: 84 68  67   Resp: 16 16  16   Temp: 98.6 °F (37 °C) 97.5 °F (36.4 °C)  98.2 °F (36.8 °C)   TempSrc: Oral Oral  Oral   SpO2: 95% 97%  96%   Weight:   156 lb 12 oz (71.1 kg)    Height:         FiO2 (%): 30 %     Body mass index is 24.54 kg/(m^2).  No intake or output data in the 24 hours ending 07/09/17 1259    Exam:  GEN:  No distress, Calm awake  LUNGS: Clear breath sounds bilat, nonlabored breathing  CV:  Normal S1S2, without murmur, no edema  ABD:  Non tender, no enlarged liver or masses  EXT:  No cyanosis or clubbing    Scheduled meds:    castor oil-balsam peru  Topical Q12H   enoxaparin 30 mg Subcutaneous Daily   insulin aspart 0-10 Units Subcutaneous 4x Daily AC & at Bedtime   insulin aspart 6 Units Subcutaneous TID With Meals   insulin detemir 22 Units Subcutaneous Nightly   lansoprazole 30 mg Per G Tube QAM   levothyroxine 50 mcg Oral Q AM   methylPREDNISolone sodium succinate 20 mg Intravenous Q12H     IV meds:                           Data Review:   I reviewed the patient's medications and new clinical results.  Lab Results   Component Value Date    CALCIUM 8.9 07/09/2017    PHOS 5.6 (H) 07/08/2017       Results from last 7 days  Lab Units 07/09/17  0356 07/08/17  0424 07/07/17  0356  07/05/17  0957 07/05/17  0415   SODIUM mmol/L 139 138 140  < >  --  138   POTASSIUM mmol/L 4.0 4.3 4.1  < >  --  3.3*   CHLORIDE mmol/L 97* 94* 97*  < >  --  98   CO2 mmol/L 25.1 24.7 27.0  < >  --  23.1   BUN  mg/dL 53* 69* 30*  < >  --  41*   CREATININE mg/dL 4.01* 4.83* 2.56*  < >  --  3.07*   CALCIUM mg/dL 8.9 8.2* 8.2*  < >  --  7.9*   BILIRUBIN mg/dL 0.4  --  0.4  --   --  0.4  0.4   ALK PHOS U/L 95  --  103  --   --  125*   ALT (SGPT) U/L 70*  --  193*  --   --  455*   AST (SGOT) U/L 27  --  38*  --   --  176*   GLUCOSE mg/dL 217* 260* 241*  < >  --  253*   WBC 10*3/mm3 17.00* 18.33* 22.46*  < >  --  18.10*   HEMOGLOBIN g/dL 10.9* 9.9* 10.5*  < >  --  10.3*   PLATELETS 10*3/mm3 209 184 115*  < > 54* 103*   INR   --   --   --   --  1.17*  --    < > = values in this interval not displayed.                ASSESSMENT:   Community acquired pneumonia  DKA (diabetic ketoacidoses)  ARDS  Sepsis  Acute respiratory failure  History of systemic lupus erythematosus (SLE)  History of splenectomy  History of ITP  Primary hypothyroidism  ARF (acute renal failure) with tubular necrosis  Diabetes type 2      PLAN:  Start mobilizing the patient out of bed.  Advance diet.  Hemodialysis as per nephrology.  Anticipate home soon.        Leeroy Porter MD  7/9/2017

## 2017-07-09 NOTE — PLAN OF CARE
Problem: Patient Care Overview (Adult)  Goal: Plan of Care Review  Outcome: Ongoing (interventions implemented as appropriate)    07/09/17 1525   Outcome Evaluation   Outcome Summary/Follow up Plan No c/o pain.Moved pt to chair. Resting quietly. Will monitor pt closely.       Goal: Adult Individualization and Mutuality  Outcome: Ongoing (interventions implemented as appropriate)  Goal: Discharge Needs Assessment  Outcome: Ongoing (interventions implemented as appropriate)    Problem: Fall Risk (Adult)  Goal: Absence of Falls  Outcome: Ongoing (interventions implemented as appropriate)    Problem: Diabetes, Type 1 (Adult)  Goal: Signs and Symptoms of Listed Potential Problems Will be Absent or Manageable (Diabetes, Type 1)  Outcome: Ongoing (interventions implemented as appropriate)    Problem: Sepsis (Adult)  Goal: Signs and Symptoms of Listed Potential Problems Will be Absent or Manageable (Sepsis)  Outcome: Ongoing (interventions implemented as appropriate)    Problem: Pneumonia (Adult)  Goal: Signs and Symptoms of Listed Potential Problems Will be Absent or Manageable (Pneumonia)  Outcome: Ongoing (interventions implemented as appropriate)

## 2017-07-09 NOTE — PROGRESS NOTES
"   LOS: 10 days    Patient Care Team:  Gregorio Bai MD as PCP - General (Family Medicine)    Chief Complaint:  No chief complaint on file.      Subjective     Interval History:     Seen and examined.  Out of ICU.  No shortness of air or chest pain.  Family is on bedside.        Objective     Vital Signs  Temp:  [97.5 °F (36.4 °C)-98.6 °F (37 °C)] 98.2 °F (36.8 °C)  Heart Rate:  [67-84] 67  Resp:  [16] 16  BP: (125-132)/(72-78) 132/78    Flowsheet Rows         First Filed Value    Admission Height  67\" (170.2 cm) Documented at 06/29/2017 2028    Admission Weight  155 lb 10.3 oz (70.6 kg) Documented at 06/29/2017 2028             I/O last 3 completed shifts:  In: 95 [I.V.:95]  Out: 2000 [Other:2000]  No intake or output data in the 24 hours ending 07/09/17 1428    Physical Exam:  Alert NAD  Heart RRR no s3 or rub.   Lungs coarse BS, upper airway rhonchi.   Abd +bs soft, nontender. Body wall edema.   Ext trace+ edema upper and lower ext .          Results Review:      Results from last 7 days  Lab Units 07/09/17  0356 07/08/17  0424 07/07/17  0356  07/05/17  0415   SODIUM mmol/L 139 138 140  < > 138   POTASSIUM mmol/L 4.0 4.3 4.1  < > 3.3*   CHLORIDE mmol/L 97* 94* 97*  < > 98   CO2 mmol/L 25.1 24.7 27.0  < > 23.1   BUN mg/dL 53* 69* 30*  < > 41*   CREATININE mg/dL 4.01* 4.83* 2.56*  < > 3.07*   CALCIUM mg/dL 8.9 8.2* 8.2*  < > 7.9*   BILIRUBIN mg/dL 0.4  --  0.4  --  0.4  0.4   ALK PHOS U/L 95  --  103  --  125*   ALT (SGPT) U/L 70*  --  193*  --  455*   AST (SGOT) U/L 27  --  38*  --  176*   GLUCOSE mg/dL 217* 260* 241*  < > 253*   < > = values in this interval not displayed.    Estimated Creatinine Clearance: 21.1 mL/min (by C-G formula based on Cr of 4.01).      Results from last 7 days  Lab Units 07/08/17  0424 07/07/17  0356 07/06/17  0510  07/04/17  0259 07/03/17  1213   MAGNESIUM mg/dL  --   --  2.8*  --  2.2 2.0   PHOSPHORUS mg/dL 5.6* 2.5 3.7  < > 0.6* 0.7*   < > = values in this interval not " displayed.            Results from last 7 days  Lab Units 07/09/17  0356 07/08/17  0424 07/07/17  0356 07/06/17  0510 07/05/17  0957 07/05/17  0415   WBC 10*3/mm3 17.00* 18.33* 22.46* 15.14*  --  18.10*   HEMOGLOBIN g/dL 10.9* 9.9* 10.5* 10.3*  --  10.3*   PLATELETS 10*3/mm3 209 184 115* 78* 54* 103*         Results from last 7 days  Lab Units 07/05/17  0957   INR  1.17*         Imaging Results (last 24 hours)     ** No results found for the last 24 hours. **          castor oil-balsam peru  Topical Q12H   enoxaparin 30 mg Subcutaneous Daily   insulin aspart 0-10 Units Subcutaneous 4x Daily AC & at Bedtime   insulin aspart 6 Units Subcutaneous TID With Meals   insulin detemir 24 Units Subcutaneous Nightly   lansoprazole 30 mg Per G Tube QAM   levothyroxine 50 mcg Oral Q AM   methylPREDNISolone sodium succinate 20 mg Intravenous Q12H          Medication Review:   Current Facility-Administered Medications   Medication Dose Route Frequency Provider Last Rate Last Dose   • artificial tears (LUBRIFRESH P.M.) ophthalmic ointment   Both Eyes PRN Madison Harris MD       • calcium gluconate 1 g in sodium chloride 0.9 % 50 mL IVPB  1 g Intravenous PRN Susan Urena MD        Or   • calcium gluconate 2 g in sodium chloride 0.9 % 50 mL IVPB  2 g Intravenous PRN Susan Urena MD       • castor oil-balsam peru (VENELEX) ointment   Topical Q12H Madison Harris MD       • dextrose (D50W) solution 12.5 g  12.5 g Intravenous Q15 Min PRN Alexis Poe MD   12.5 g at 07/01/17 1526   • dextrose (D50W) solution 25-50 mL  25-50 mL Intravenous Q30 Min PRN Van Remy MD       • enoxaparin (LOVENOX) syringe 30 mg  30 mg Subcutaneous Daily Leeroy Porter MD   30 mg at 07/09/17 0914   • HYDROcodone-acetaminophen (NORCO) 5-325 MG per tablet 1 tablet  1 tablet Oral Q4H PRN Alexis Poe MD   1 tablet at 07/08/17 0041   • insulin aspart (novoLOG) injection 0-10 Units  0-10 Units Subcutaneous 4x Daily AC & at Bedtime  Kd Olea MD   2 Units at 07/09/17 1233   • insulin aspart (novoLOG) injection 6 Units  6 Units Subcutaneous TID With Meals Kd Olea MD   6 Units at 07/09/17 1233   • insulin detemir (LEVEMIR) injection 24 Units  24 Units Subcutaneous Nightly Kd Olea MD       • lansoprazole (FIRST) oral suspension 30 mg  30 mg Per G Tube QAM Madison Harris MD   30 mg at 07/09/17 0610   • levothyroxine (SYNTHROID, LEVOTHROID) tablet 50 mcg  50 mcg Oral Q AM Thomas Campbell MD   50 mcg at 07/09/17 0610   • methylPREDNISolone sodium succinate (SOLU-Medrol) injection 20 mg  20 mg Intravenous Q12H Leeroy TERA Porter MD   20 mg at 07/09/17 0610       Assessment/Plan       Active Problems:    DKA (diabetic ketoacidoses)    Pneumonia    Sepsis    Acute respiratory failure    History of systemic lupus erythematosus (SLE)    History of splenectomy    History of ITP    Primary hypothyroidism    ARF (acute renal failure) with tubular necrosis      1. HODAN, initially prerenal , now ATN. Dialysis yesterday.  Has tunnel catheter.  2. Severe right-sided pna/ARDS.   Vanc and zosyn. Completed zithromax  3. Encephalopathy much improved   4. New DM1 presenting as DKA, now on scheduled insulin.  Endocrine managing.   5. H/o ITP with prior splenectomy: TCP. Likely autoimmune per hematology . HIT antibody negative  6. Hypothyroidism   7. Anemia. Hg stable.    8. SLE . Raynaud's.        Plan:     Plan for hemodialysis in the morning. D/W staff and family  Hold on permanent access.   Dialysis placement  Surveillance labs    Angelica Saucedo MD  07/09/17  2:28 PM

## 2017-07-09 NOTE — PROGRESS NOTES
37 y.o.   LOS: 10 days   Patient Care Team:  Gregorio Bai MD as PCP - General (Family Medicine)    Chief Complaint: Newly diagnosed type I diabetic    Subjective  patient is moved out of the ICU.  He is eating well.  Her blood sugars are in reasonable edyaq-456-934 mg/dL.  Still remains on IV Solu-Medrol 20 mg IV twice a day.    Interval History:    Review of Systems:   Review of Systems   Constitutional: Positive for fatigue. Negative for appetite change and fever.   HENT: Positive for voice change.    Respiratory: Negative for shortness of breath.    Cardiovascular: Negative for leg swelling.   Gastrointestinal: Negative for constipation and nausea.   Neurological: Positive for weakness and numbness.     Objective     Vital Signs   Temp:  [97.5 °F (36.4 °C)-98.6 °F (37 °C)] 98.2 °F (36.8 °C)  Heart Rate:  [53-84] 67  Resp:  [16] 16  BP: (125-138)/(72-90) 132/78    Physical Exam   Gen exam - alert and oriented x 3, not in distress, red cheeks   HEENT - No Acanthosis nigricans. Thyroid palpable.   Resp - Clear to auscultation.   CVS - S1,S2 heard and no murmurs.   Abd - Non tender, BS heard.   Ext - 1+ edema and intact pin prick and proprioception.       Results Review:     I reviewed the patient's new clinical results.      Glucose   Date/Time Value Ref Range Status   07/09/2017 0356 217 (H) 65 - 99 mg/dL Final   07/08/2017 0424 260 (H) 65 - 99 mg/dL Final   07/07/2017 0356 241 (H) 65 - 99 mg/dL Final     Lab Results (last 72 hours)     Procedure Component Value Units Date/Time    POC Glucose Fingerstick [[id number]]  (Normal) Collected:  07/05/17 1414    Specimen:  Blood Updated:  07/05/17 1423     Glucose 91 mg/dL     Narrative:       Meter: [id number] : [id number] Markie HUSTON    Glutamic Acid Decarboxylase [[id number]]  (Abnormal) Collected:  07/02/17 0513    Specimen:  Blood Updated:  07/05/17 1511     REGINA-65 >250.0 (H) U/mL     Narrative:       Performed at:  [address] [place] [address]  [place], [place]  [id number]  : Jordy Doll MD, Phone:  [phone]    C-Peptide [[id number]] Collected:  07/02/17 0513    Specimen:  Blood Updated:  07/05/17 1511     C-Peptide 1.8 ng/mL       C-Peptide reference interval is for fasting patients.       Narrative:       Performed at:  [address] [place] [address]  [id number]  : Vince Minaya PhD, Phone:  [phone]    POC Glucose Fingerstick [[id number]]  (Abnormal) Collected:  07/05/17 1638    Specimen:  Blood Updated:  07/05/17 1641     Glucose 136 (H) mg/dL     Narrative:       RN Notified R and V Meter: [id number] : [id number] Broderick Wheeler    POC Glucose Fingerstick [[id number]]  (Abnormal) Collected:  07/05/17 2006    Specimen:  Blood Updated:  07/05/17 2043     Glucose 312 (H) mg/dL     Narrative:       Meter: [id number] : [id number] [employer]    POC Glucose Fingerstick [[id number]]  (Abnormal) Collected:  07/05/17 2320    Specimen:  Blood Updated:  07/05/17 2322     Glucose 362 (H) mg/dL     Narrative:       Meter: [id number] : [id number] [employer]    POC Glucose Fingerstick [[id number]]  (Abnormal) Collected:  07/06/17 0328    Specimen:  Blood Updated:  07/06/17 0348     Glucose 221 (H) mg/dL     Narrative:       Meter: [id number] : [id number] [employer]    Reticulocytes [[id number]]  (Abnormal) Collected:  07/06/17 0510    Specimen:  Blood Updated:  07/06/17 0526     Reticulocyte % 2.84 (H) %     aPTT [[id number]]  (Normal) Collected:  07/06/17 0510    Specimen:  Blood Updated:  07/06/17 0535     PTT 29.2 seconds     CBC & Differential [[id number]] Collected:  07/06/17 0510    Specimen:  Blood Updated:  07/06/17 0544    Narrative:       The following orders were created for panel order CBC & Differential.  Procedure                               Abnormality         Status                     ---------                               -----------         ------                      Manual Differential[[id number]]          Abnormal            Final result               Scan Slide[[id number]]                                       Final result               CBC Auto Differential[[id number]]        Abnormal            Final result                 Please view results for these tests on the individual orders.    CBC Auto Differential [[id number]]  (Abnormal) Collected:  07/06/17 0510    Specimen:  Blood Updated:  07/06/17 0544     WBC 15.14 (H) 10*3/mm3       WBC corrected for presence of NRBC's        RBC 3.26 (L) 10*6/mm3      Hemoglobin 10.3 (L) g/dL      Hematocrit 29.1 (L) %      MCV 89.3 fL      MCH 31.6 pg      MCHC 35.4 g/dL      RDW 15.3 (H) %      RDW-SD 49.1 fl      MPV 11.5 fL      Platelets 78 (L) 10*3/mm3     Scan Slide [[id number]] Collected:  07/06/17 0510    Specimen:  Blood Updated:  07/06/17 0544     Scan Slide --      See Manual Differential Results       Manual Differential [[id number]]  (Abnormal) Collected:  07/06/17 0510    Specimen:  Blood Updated:  07/06/17 0544     Neutrophil % 79.0 (H) %      Lymphocyte % 9.0 (L) %      Monocyte % 6.0 %      Metamyelocyte % 1.0 (H) %      Myelocyte % 5.0 (H) %      Neutrophils Absolute 11.96 (H) 10*3/mm3      Lymphocytes Absolute 1.36 10*3/mm3      Monocytes Absolute 0.91 10*3/mm3      nRBC 57.0 (H) /100 WBC      Acanthocytes Slight/1+     Anisocytosis Mod/2+     Polychromasia Slight/1+     WBC Morphology Normal     Platelet Morphology Normal    Bilirubin, Direct [[id number]]  (Normal) Collected:  07/06/17 0510    Specimen:  Blood Updated:  07/06/17 0552     Bilirubin, Direct 0.2 mg/dL     Vancomycin, Random [[id number]]  (Normal) Collected:  07/06/17 0510    Specimen:  Blood Updated:  07/06/17 0552     Vancomycin Random 30.90 mcg/mL     Lactate Dehydrogenase [[id number]]  (Abnormal) Collected:  07/06/17 0510    Specimen:  Blood Updated:  07/06/17 0552      (H) U/L     Magnesium [[id number]]  (Abnormal) Collected:  07/06/17 0510     Specimen:  Blood Updated:  07/06/17 0552     Magnesium 2.8 (H) mg/dL     Renal Function Panel [[id number]]  (Abnormal) Collected:  07/06/17 0510    Specimen:  Blood Updated:  07/06/17 0552     Glucose 139 (H) mg/dL      BUN 63 (H) mg/dL      Creatinine 4.30 (H) mg/dL      Sodium 142 mmol/L      Potassium 3.6 mmol/L      Chloride 100 mmol/L      CO2 22.4 mmol/L      Calcium 7.9 (L) mg/dL      Albumin 2.90 (L) g/dL      Phosphorus 3.7 mg/dL      Anion Gap 19.6 mmol/L      BUN/Creatinine Ratio 14.7     eGFR Non African Amer 11 (L) mL/min/1.73     POC Glucose Fingerstick [[id number]]  (Normal) Collected:  07/06/17 0715    Specimen:  Blood Updated:  07/06/17 0717     Glucose 98 mg/dL     Narrative:       Meter: [id number] : [id number] Artemio Dill    POC Glucose Fingerstick [[id number]]  (Abnormal) Collected:  07/06/17 1105    Specimen:  Blood Updated:  07/06/17 1119     Glucose 156 (H) mg/dL     Narrative:       Meter: [id number] : [id number] Markie HUSTON    Occult Blood X 1, Stool [[id number]]  (Abnormal) Collected:  07/06/17 1120    Specimen:  Stool from Per Rectum Updated:  07/06/17 1221     Fecal Occult Blood Positive (A)    [facility] Comprehensive Panel [[id number]]  (Abnormal) Collected:  07/05/17 1354    Specimen:  Blood Updated:  07/06/17 1309     Anti-DNA (DS) Ab Qn <1 IU/mL                                          Negative      <5                                     Equivocal  5 - 9                                     Positive      >9        RNP Antibodies >8.0 (H) AI      Del Cid Antibodies 0.7 AI      Antiscleroderma-70 Antibodies 0.2 AI      JOE SSA (RO) Ab 0.2 AI      JOE SSB (LA) Ab <0.2 AI      Antichromatin Antibodies >8.0 (H) AI      NORA-1 IgG <0.2 AI      Anti-Centromere B Antibodies <0.2 AI      See below: Comment      Autoantibody                       Disease Association  ------------------------------------------------------------                          Condition                   Frequency  ---------------------   ------------------------   ---------  Antinuclear Antibody,    SLE, mixed connective  Direct ([facility]IGGY-D)           tissue diseases  ---------------------   ------------------------   ---------  dsDNA                    SLE                        40 - 60%  ---------------------   ------------------------   ---------  Chromatin                Drug induced SLE                90%                           SLE                        48 - 97%  ---------------------   ------------------------   ---------  SSA (Ro)                 SLE                        25 - 35%                           Sjogren's Syndrome         40 - 70%                            Lupus                 100%  ---------------------   ------------------------   ---------  SSB (La)                 SLE                             10%                           Sjogren's Syndrome              30%  ---------------------   -----------------------    ---------  Sm (anti-Smith)          SLE                        15 - 30%  ---------------------   -----------------------    ---------  RNP                      Mixed Connective Tissue                           Disease                         95%  (U1 nRNP,                SLE                        30 - 50%  anti-ribonucleoprotein)  Polymyositis and/or                           Dermatomyositis                 20%  ---------------------   ------------------------   ---------  Scl-70 (antiDNA          Scleroderma (diffuse)      20 - 35%  topoisomerase)           Crest                           13%  ---------------------   ------------------------   ---------  La-1                     Polymyositis and/or                           Dermatomyositis            20 - 40%  ---------------------   ------------------------   ---------  Centromere B             Scleroderma - Crest                           variant                         80%       Narrative:       Performed at:  01 -  [address]  [id number]  : Vince Minaya PhD, Phone:  [phone]    POC Glucose Fingerstick [[id number]]  (Abnormal) Collected:  07/06/17 1408    Specimen:  Blood Updated:  07/06/17 1418     Glucose 169 (H) mg/dL     Narrative:       Meter: [id number] : [id number] Markie HUSTON    Heparin-induced Platelet Antibody [[id number]] Collected:  07/05/17 0957    Specimen:  Blood Updated:  07/06/17 1516     Heparin Induced Plt Ab 0.190 OD     Narrative:       Performed at:  [address]  [id number]  : Jordy Doll MD, Phone:  [phone]    POC Glucose Fingerstick [[id number]]  (Abnormal) Collected:  07/06/17 1518    Specimen:  Blood Updated:  07/06/17 1520     Glucose 176 (H) mg/dL     Narrative:       Meter: [id number] : [id number] Natan Kulkarni    POC Glucose Fingerstick [[id number]]  (Abnormal) Collected:  07/06/17 1948    Specimen:  Blood Updated:  07/06/17 1949     Glucose 392 (H) mg/dL     Narrative:       Meter: [id number] : [id number] Mekhi Kulkarni    POC Glucose Fingerstick [[id number]]  (Abnormal) Collected:  07/06/17 2354    Specimen:  Blood Updated:  07/07/17 0001     Glucose 170 (H) mg/dL     Narrative:       Meter: [id number] : [id number] Mekhi Kulkarni    POC Glucose Fingerstick [[id number]]  (Abnormal) Collected:  07/07/17 0328    Specimen:  Blood Updated:  07/07/17 0329     Glucose 221 (H) mg/dL     Narrative:       Meter: [id number] : [id number] Mekhi Kulkarni    Celiac Panel Reflex To Titer [[id number]] Collected:  07/07/17 0356    Specimen:  Blood Updated:  07/07/17 0455    Reticulocytes [[id number]]  (Abnormal) Collected:  07/07/17 0356    Specimen:  Blood Updated:  07/07/17 0509     Reticulocyte % 3.95 (H) %     aPTT [[id number]]  (Normal) Collected:  07/07/17 0356    Specimen:  Blood Updated:  07/07/17 0516     PTT 24.7 seconds     Bilirubin, Direct [[id number]]  (Normal) Collected:  07/07/17  0356    Specimen:  Blood Updated:  07/07/17 0526     Bilirubin, Direct 0.2 mg/dL     Lactate Dehydrogenase [[id number]]  (Abnormal) Collected:  07/07/17 0356    Specimen:  Blood Updated:  07/07/17 0526      (H) U/L     Comprehensive Metabolic Panel [[id number]]  (Abnormal) Collected:  07/07/17 0356    Specimen:  Blood Updated:  07/07/17 0536     Glucose 241 (H) mg/dL      BUN 30 (H) mg/dL      Creatinine 2.56 (H) mg/dL      Sodium 140 mmol/L      Potassium 4.1 mmol/L      Chloride 97 (L) mmol/L      CO2 27.0 mmol/L      Calcium 8.2 (L) mg/dL      Total Protein 6.7 g/dL      Albumin 3.20 (L) g/dL      ALT (SGPT) 193 (H) U/L      AST (SGOT) 38 (H) U/L      Alkaline Phosphatase 103 U/L      Total Bilirubin 0.4 mg/dL      eGFR Non African Amer 21 (L) mL/min/1.73      Globulin 3.5 gm/dL      A/G Ratio 0.9 g/dL      BUN/Creatinine Ratio 11.7     Anion Gap 16.0 mmol/L     Phosphorus [[id number]]  (Normal) Collected:  07/07/17 0356    Specimen:  Blood Updated:  07/07/17 0615     Phosphorus 2.5 mg/dL     CBC (No Diff) [[id number]]  (Abnormal) Collected:  07/07/17 0356    Specimen:  Blood Updated:  07/07/17 0637     WBC 22.46 (H) 10*3/mm3      RBC 3.36 (L) 10*6/mm3      Hemoglobin 10.5 (L) g/dL      Hematocrit 29.7 (L) %      MCV 88.4 fL      MCH 31.3 pg      MCHC 35.4 g/dL      RDW 15.3 (H) %      RDW-SD 48.2 fl      MPV 11.3 fL      Platelets 115 (L) 10*3/mm3     Blood Gas, Arterial [[id number]]  (Abnormal) Collected:  07/07/17 0721    Specimen:  Arterial Blood Updated:  07/07/17 0724     Site Arterial: right radial     Cristian's Test Positive     pH, Arterial 7.519 (H) pH units      pCO2, Arterial 37.3 mm Hg      pO2, Arterial 117.2 (H) mm Hg      HCO3, Arterial 30.4 (H) mmol/L      Base Excess, Arterial 7.1 (H) mmol/L      O2 Saturation Calculated 99.0 %      A-a Gradiant 0.7 mmHg      Barometric Pressure for Blood Gas 751.6 mmHg      Modality Adult Vent     FIO2 30 %      Ventilator Mode PS     Set Tidal Volume 475      Rate 16 Breaths/minute      PEEP 5     PSV 7 cmH2O     Narrative:       sat 100 Meter: [id number] : [id number] Dusty Antonio    POC Glucose Fingerstick [[id number]]  (Abnormal) Collected:  07/07/17 0736    Specimen:  Blood Updated:  07/07/17 0738     Glucose 262 (H) mg/dL     Narrative:       Meter: [id number] : [id number] Artemio Dill    POC Glucose Fingerstick [[id number]]  (Abnormal) Collected:  07/07/17 1111    Specimen:  Blood Updated:  07/07/17 1114     Glucose 221 (H) mg/dL     Narrative:       Meter: [id number] : [id number] Artemio Dill    Clostridium Difficile Toxin, PCR [[id number]]  (Normal) Collected:  07/06/17 1153    Specimen:  Stool from Per Rectum Updated:  07/07/17 1302     C. Difficile Toxins by PCR Negative    Narrative:       .See original report from the [facility] scanned into patient chart.    Beta-2 Glycoprotein Antibodies [[id number]] Collected:  07/05/17 1354    Specimen:  Blood Updated:  07/07/17 1514     Beta-2 Glyco 1 IgG <9 GPI IgG units       The reference interval reflects a 3SD or 99th percentile interval,  which is thought to represent a potentially clinically significant  result in accordance with the International Consensus Statement on  the classification criteria for definitive antiphospholipid syndrome  (APS). J Thromb Haem 2006;4:295-306.        Beta-2 Glyco 1 IgA <9 GPI IgA units       The reference interval reflects a 3SD or 99th percentile interval,  which is thought to represent a potentially clinically significant  result in accordance with the International Consensus Statement on  the classification criteria for definitive antiphospholipid syndrome  (APS). J Thromb Haem 2006;4:295-306.        Beta-2 Glyco 1 IgM <9 GPI IgM units       The reference interval reflects a 3SD or 99th percentile interval,  which is thought to represent a potentially clinically significant  result in accordance with the International Consensus  Statement on  the classification criteria for definitive antiphospholipid syndrome  (APS). J Thromb Haem 2006;4:295-306.       Narrative:       Performed at:  [address] [place] [address]  [id number]  : Jordy Doll MD, Phone:  [phone]    POC Glucose Fingerstick [[id number]]  (Abnormal) Collected:  07/07/17 1530    Specimen:  Blood Updated:  07/07/17 1533     Glucose 297 (H) mg/dL     Narrative:       Meter: [id number] : [id number] Artemio Dill    POC Glucose Fingerstick [[id number]]  (Abnormal) Collected:  07/07/17 2005    Specimen:  Blood Updated:  07/07/17 2048     Glucose 288 (H) mg/dL     Narrative:       Meter: [id number] : [id number] Avel Hebert    POC Glucose Fingerstick [[id number]]  (Abnormal) Collected:  07/08/17 0021    Specimen:  Blood Updated:  07/08/17 0033     Glucose 234 (H) mg/dL     Narrative:       Meter: [id number] : [id number] Avel Hebert    Reticulocytes [[id number]]  (Abnormal) Collected:  07/08/17 0424    Specimen:  Blood from Blood, Central Line Updated:  07/08/17 0450     Reticulocyte % 4.58 (H) %     aPTT [[id number]]  (Normal) Collected:  07/08/17 0424    Specimen:  Blood from Blood, Central Line Updated:  07/08/17 0456     PTT 23.7 seconds     POC Glucose Fingerstick [[id number]]  (Abnormal) Collected:  07/08/17 0449    Specimen:  Blood Updated:  07/08/17 0502     Glucose 247 (H) mg/dL     Narrative:       Meter: [id number] : [id number] Avel Hebert    Lactate Dehydrogenase [[id number]]  (Abnormal) Collected:  07/08/17 0424    Specimen:  Blood from Blood, Central Line Updated:  07/08/17 0505      (H) U/L     Renal Function Panel [[id number]]  (Abnormal) Collected:  07/08/17 0424    Specimen:  Blood from Blood, Central Line Updated:  07/08/17 0512     Glucose 260 (H) mg/dL      BUN 69 (H) mg/dL      Creatinine 4.83 (H) mg/dL      Sodium 138 mmol/L      Potassium 4.3 mmol/L      Chloride 94 (L) mmol/L      CO2 24.7  mmol/L      Calcium 8.2 (L) mg/dL      Albumin 3.00 (L) g/dL      Phosphorus 5.6 (H) mg/dL      Anion Gap 19.3 mmol/L      BUN/Creatinine Ratio 14.3     eGFR Non African Amer 10 (L) mL/min/1.73     Bilirubin, Direct [[id number]]  (Normal) Collected:  07/08/17 0424    Specimen:  Blood from Blood, Central Line Updated:  07/08/17 0516     Bilirubin, Direct <0.2 mg/dL     CBC (No Diff) [[id number]]  (Abnormal) Collected:  07/08/17 0424    Specimen:  Blood from Blood, Central Line Updated:  07/08/17 0551     WBC 18.33 (H) 10*3/mm3      RBC 3.14 (L) 10*6/mm3      Hemoglobin 9.9 (L) g/dL      Hematocrit 28.6 (L) %      MCV 91.1 fL      MCH 31.5 pg      MCHC 34.6 g/dL      RDW 15.4 (H) %      RDW-SD 49.4 fl      MPV 11.5 fL      Platelets 184 10*3/mm3     Cardiolipin Antibody [[id number]] Collected:  07/05/17 1354    Specimen:  Blood Updated:  07/08/17 0614     Anticardiolipin IgG <9 GPL U/mL                                 Negative:              <15                            Indeterminate:     15 - 20                            Low-Med Positive: >20 - 80                            High Positive:         >80        Anticardiolipin IgM <9 MPL U/mL                                 Negative:              <13                            Indeterminate:     13 - 20                            Low-Med Positive: >20 - 80                            High Positive:         >80        Anticardiolipin IgA <9 APL U/mL                                 Negative:              <12                            Indeterminate:     12 - 20                            Low-Med Positive: >20 - 80                            High Positive:         >80       Narrative:       Performed at:  [address] [place] [address]  [id number]  : Vince Minaya PhD, Phone:  [phone]    Cyclic Citrul Peptide Antibody, IgG / IgA [[id number]] Collected:  07/05/17 1354    Specimen:  Blood Updated:  07/08/17 0614     CCP Antibodies IgG/IgA 11  units                                 Negative               <20                            Weak positive      20 - 39                            Moderate positive  40 - 59                            Strong positive        >59       Narrative:       Performed at:  02 - [facility] [address], [place], [place]  [id number]  : Jordy Doll MD, Phone:  [phone]    POC Glucose Fingerstick [[id number]]  (Abnormal) Collected:  07/08/17 0751    Specimen:  Blood Updated:  07/08/17 0754     Glucose 209 (H) mg/dL     Narrative:       Meter: [id number] : [id number] Natan Cayla    POC Glucose Fingerstick [[id number]]  (Normal) Collected:  07/08/17 1111    Specimen:  Blood Updated:  07/08/17 1113     Glucose 110 mg/dL     Narrative:       Meter: [id number] : [id number] Natan Kulkarni        Imaging Results (last 72 hours)     Procedure Component Value Units Date/Time    XR Chest 1 View [[id number]] Collected:  07/06/17 1451     Updated:  07/06/17 1458    Narrative:       XR CHEST 1 VW-     HISTORY: Female who is 37 years-old,  new catheter placement     TECHNIQUE: Frontal view of the chest     COMPARISON: 07/04/2017     FINDINGS: Endotracheal tube, IJ catheter appears stable. Left-sided  lumen central venous catheter extends to the cavoatrial junction  region/proximal right atrium. Heart, mediastinum and pulmonary  vasculature are unremarkable. No focal pulmonary consolidation, pleural  effusion, or obvious large pneumothorax, but the left apex is partly  obscured by overlying objects, and assessment is also limited by supine  positioning. No acute osseous process.       Impression:       New left-sided central venous catheter, no pneumothorax  identified, limited as described, consider follow-up upright view of the  chest with overlying objects moved away from left apical region.     This report was finalized on 7/6/2017 2:55 PM by Dr. Storm Biswas MD.       IR PICC W Fluoro Surgery Only  [[id number]] Resulted:  07/07/17 0805     Updated:  07/07/17 0805          Medication Review: done      Current Facility-Administered Medications:   •  artificial tears (LUBRIFRESH P.M.) ophthalmic ointment, , Both Eyes, PRN, Madison Harris MD  •  calcium gluconate 1 g in sodium chloride 0.9 % 50 mL IVPB, 1 g, Intravenous, PRN **OR** calcium gluconate 2 g in sodium chloride 0.9 % 50 mL IVPB, 2 g, Intravenous, PRN, Susan Urena MD  •  castor oil-balsam peru (VENELEX) ointment, , Topical, Q12H, Madison Harris MD  •  dextrose (D50W) solution 12.5 g, 12.5 g, Intravenous, Q15 Min PRN, Alexis Poe MD, 12.5 g at 07/01/17 1526  •  dextrose (D50W) solution 25-50 mL, 25-50 mL, Intravenous, Q30 Min PRN, Van Remy MD  •  enoxaparin (LOVENOX) syringe 30 mg, 30 mg, Subcutaneous, Daily, Leeroy Porter MD, 30 mg at 07/09/17 0914  •  HYDROcodone-acetaminophen (NORCO) 5-325 MG per tablet 1 tablet, 1 tablet, Oral, Q4H PRN, Alexis Poe MD, 1 tablet at 07/08/17 0041  •  insulin aspart (novoLOG) injection 0-10 Units, 0-10 Units, Subcutaneous, 4x Daily AC & at Bedtime, Kd Olea MD, 2 Units at 07/09/17 1233  •  insulin aspart (novoLOG) injection 6 Units, 6 Units, Subcutaneous, TID With Meals, Kd Olea MD, 6 Units at 07/09/17 1233  •  insulin detemir (LEVEMIR) injection 24 Units, 24 Units, Subcutaneous, Nightly, Kd Olea MD  •  lansoprazole (FIRST) oral suspension 30 mg, 30 mg, Per G Tube, QAM, Madison Harris MD, 30 mg at 07/09/17 0610  •  levothyroxine (SYNTHROID, LEVOTHROID) tablet 50 mcg, 50 mcg, Oral, Q AM, Thomas Campbell MD, 50 mcg at 07/09/17 0610  •  methylPREDNISolone sodium succinate (SOLU-Medrol) injection 20 mg, 20 mg, Intravenous, Q12H, Leeroy Porter MD, 20 mg at 07/09/17 0610    Assessment/Plan     Active Hospital Problems (** Indicates Principal Problem)    Diagnosis Date Noted   • ARF (acute renal failure) with tubular necrosis [N17.0] 07/05/2017   • Pneumonia [J18.9] 07/01/2017  "  • Sepsis [A41.9] 07/01/2017   • Acute respiratory failure [J96.00] 07/01/2017   • History of systemic lupus erythematosus (SLE) [Z87.39] 07/01/2017   • History of splenectomy [Z90.81] 07/01/2017   • History of ITP [Z86.2] 07/01/2017   • Primary hypothyroidism [E03.9] 07/01/2017   • DKA (diabetic ketoacidoses) [E13.10] 06/30/2017      Resolved Hospital Problems    Diagnosis Date Noted Date Resolved   No resolved problems to display.     Type 1 diabetes mellitus-diagnosed in this admission.  Patient still needs to be educated regarding type 1 diabetes.  Patient didn't want to see the diabetic educator today, Will consult tomorrow.  Increase levemir to 24 units at bedtime  Continue NovoLog 6 units with each meal  Continue NovoLog sliding scale 3 times a day before meals and at bedtime.  Will consult diabetic educator.    Hypothyroidism  Continue levothyroxine 50 µg oral daily.    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  Currently on IV steroids.  Insulin regimen needs to be adjusted if steroids tapered.     Right sided Pneumonia complicated with ARDS  Managed by pulmonary and critical care.    I am covering the pt over the week end and pt will be seen by   on Monday.     19 minutes out of 35 minutes face to face spent on floor managing care, discussing plan with nursing and counseling patient/family on treatment options, side effects of the medications.      Kd Olea MD.  07/09/17  1:44 PM      EMR Dragon / transcription disclaimer:    \"Dictated utilizing Dragon dictation\".   "

## 2017-07-09 NOTE — PROGRESS NOTES
Subjective         History of Present Illness  My impression is that the patient states thrombocytopenia always very likely RELATED  associated sepsis with acute respiratory distress syndrome. She has a positive Lakisha test today she has abundant amount OF nucleated red blood cells in circulation. LDH is mildly elevated bilirubin and indirect bilirubin is normal. 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     My recommendation at this time will be to proceed with IV steroids like she was before and I'm going to proceed with her lupus  testing including a repeated ZIGGY and sedimentation rate as well as CITRULLINE  peptide antibodies. Dr. CARVER mentioned that this patient was receiving a low-dose prophylactic heparin I doubt that she has HIT.  Hit antibody is negative.     Interval History: Patient's platelets have improved significantly and is normal at 204 now.  Hemoglobin still stays at 10.9.  Her hit antibody has been negative.  She does have history of systemic lupus erythematosus for which she followed with Dr. Sanon but hasn't kept her appointments for more than 2 years.    She was started on steroids and now she is tapering with Solu-Medrol 20 mg IV daily now.  Her lupus test has been positive.  She certainly needs to see rheumatologist as outpatient.  She feels much better today.    Past Medical History, Past Surgical History, Social History, Family History have been reviewed and are without significant changes except as mentioned.    Review of Systems much better, awake responsive smiling , not in pain, no jaundiced.Still on the vent  A comprehensive 14 point review of systems was performed and was negative except as mentioned.    Medications:  The current medication list was reviewed in the EMR    ALLERGIES:    Allergies   Allergen Reactions   • Heparin      Waiting for  heparin antigen to result       Objective      Vitals:    07/08/17 1353 07/08/17 2054 07/08/17 2336 07/09/17 0539   BP: 138/90 125/72 128/75    BP Location: Right arm Right arm Right arm    Patient Position: Lying Lying Lying    Pulse: 55 84 68    Resp: 16 16 16    Temp: 98 °F (36.7 °C) 98.6 °F (37 °C) 97.5 °F (36.4 °C)    TempSrc: Oral Oral Oral    SpO2: 97% 95% 97%    Weight: 157 lb 6.5 oz (71.4 kg)   156 lb 12 oz (71.1 kg)   Height:         No flowsheet data found.    Physical Exam  Well perfused hands, alert, smiling,moving 4 limbs,  tube out.No cervical swelling, no pain in upper extremities veins, clear lung regular heaart, soft abdomen, fully awake alert smiling moving 4 limbs.    RECENT LABS:  Hematology WBC   Date Value Ref Range Status   07/09/2017 17.00 (H) 4.50 - 10.70 10*3/mm3 Final     RBC   Date Value Ref Range Status   07/09/2017 3.43 (L) 3.90 - 5.20 10*6/mm3 Final     Hemoglobin   Date Value Ref Range Status   07/09/2017 10.9 (L) 11.9 - 15.5 g/dL Final     Hematocrit   Date Value Ref Range Status   07/09/2017 31.2 (L) 35.6 - 45.5 % Final     Platelets   Date Value Ref Range Status   07/09/2017 209 140 - 500 10*3/mm3 Final      ZIGGY Comprehensive Panel   Order: [id number]   Status:  Final result   Visible to patient:  No (Not Released)      Ref Range & Units 2d ago     Anti-DNA (DS) Ab Qn 0 - 9 IU/mL <1   Comments:                                    Negative      <5                                      Equivocal  5 - 9                                      Positive      >9   RNP Antibodies 0.0 - 0.9 AI >8.0 (H)   Del Cid Antibodies 0.0 - 0.9 AI 0.7   Antiscleroderma-70 Antibodies 0.0 - 0.9 AI 0.2   JOE SSA (RO) Ab 0.0 - 0.9 AI 0.2   JOE SSB (LA) Ab 0.0 - 0.9 AI <0.2   Antichromatin Antibodies 0.0 - 0.9 AI >8.0 (H)   NORA-1 IgG 0.0 - 0.9 AI <0.2   Anti-Centromere B Antibodies 0.0 - 0.9 AI <0.2   See below:  Comment   Comments: Autoantibody                       Disease            Interpretation Summary    · Normal bilateral lower extremity venous duplex scan.         Interpretation Summary   · Acute right upper extremity superficial thrombophlebitis noted in the cephalic (upper arm).  · Acute left upper extremity superficial thrombophlebitis noted in the cephalic (upper arm).  · All other vessels appear normal.  · Right IJ not examined due to central line.     Heparin-induced Platelet Antibody   Order: [id number]   Status:  Final result   Visible to patient:  No (Not Released)      Ref Range & Units 2d ago     Heparin Induced Plt Ab 0.000 - 0.400 OD 0.190   Resulting Agency  [facility]   Narrative   Performed at:  01 - [facility] [address]  [id number]  : Jordy Doll MD, Phone:  [phone]      Specimen Collected: 07/05/17  9:57 AM Last Resulted: 07/06/              Bun 53, creatinine 4.01, alt 70 , total bili nl, ldh 268,     ASSESS/PLAN:  1.  Anemia and thrombocytopenia: Platelets are normalized now.  This is likely secondary to underlying sepsis.Hit antibody is negative.      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  Her platelet count is 204 k today she has no clinical bleeding.  White count is elevated because the steroids .  Her hemoglobin has improved  surprising that LDH remains not a little bit elevated at her bilirubin remains normal therefore in my opinion the degree of hemolysis on this patient is minimal.        2.  Hit antibody negative, okay to use Lovenox prophylaxis on low-dose heparin prophylaxis.    3.  Systemic lupus erythematosus, patient on IV Solu-Medrol being tapered.needs  to fu with rheumatology as outpatient.     4.  Sepsis on Zosyn and Ancef.    5.  Heme-positive stools, ?  GI consult.    6.  Diabetic ketoacidosis.  We will follow.    Kendal Martinez MD              7/9/2017      CC:

## 2017-07-09 NOTE — THERAPY TREATMENT NOTE
Acute Care - Physical Therapy Treatment Note  [facility]     Patient Name: Sarita Bai  : 1980  MRN: [id number]  Today's Date: 2017  Onset of Illness/Injury or Date of Surgery Date: 17          Admit Date: 2017    Visit Dx:    ICD-10-CM ICD-9-CM   1. Generalized weakness R53.1 780.79     Patient Active Problem List   Diagnosis   • DKA (diabetic ketoacidoses)   • Pneumonia   • Sepsis   • Acute respiratory failure   • History of systemic lupus erythematosus (SLE)   • History of splenectomy   • History of ITP   • Primary hypothyroidism   • ARF (acute renal failure) with tubular necrosis               Adult Rehabilitation Note       17 0279          Rehab Assessment/Intervention    Discipline physical therapist  -JR      Document Type therapy note (daily note)  -JR      Subjective Information no complaints;agree to therapy  -JR      Patient Effort, Rehab Treatment excellent   MOTIVATED TO ATTAIN GOALS.    -JR      Patient Response to Treatment EXCELLENT  -JR      Recorded by [JR] Gerry Nelson, PT      Vital Signs    Intra Systolic BP Rehab 134  -JR      Intra Treatment Diastolic BP 94  -JR      Intratreatment Heart Rate (beats/min) 115  -JR      Posttreatment Heart Rate (beats/min) 95  -JR      Pre SpO2 (%) 97  -JR      Recorded by [JR] Gerry Nelson, PT      Pain Assessment    Pain Assessment No/denies pain  -JR      Recorded by [JR] Gerry Nelson, PT      Cognitive Assessment/Intervention    Current Cognitive/Communication Assessment functional  -JR      Orientation Status oriented x 4  -JR      Follows Commands/Answers Questions 100% of the time;other (see comments)  -JR      Personal Safety WNL/WFL  -JR      Recorded by [JR] Gerry Nelson, PT      Bed Mobility, Assessment/Treatment    Bed Mobility, Comment SITTING IN RECLINER  -JR      Recorded by [JR] Gerry Nelson, PT      Transfer Assessment/Treatment    Transfers, Sit-Stand [place] minimum assist (75% patient  effort);2 person assist required;nonverbal cues required (demo/gesture);verbal cues required  -JR      Transfers, Stand-Sit [place] minimum assist (75% patient effort);2 person assist required;nonverbal cues required (demo/gesture);verbal cues required  -JR      Transfers, Sit-Stand-Sit, Assist Device rolling walker  -JR      Recorded by [JR] Gerry Nelson PT      Gait Assessment/Treatment    Gait, [place] Level minimum assist (75% patient effort);2 person assist required;nonverbal cues required (demo/gesture);verbal cues required  -JR      Gait, Assistive Device rolling walker  -JR      Gait, Distance (Feet) 15   BEDSIDE CHAIR PUSHED BEHIND.  Pt TOOK ONE SEATED REST BREAK  -JR      Gait, Safety Issues step length decreased  -JR      Gait, Impairments strength decreased  -JR      Recorded by [] Gerry Nelson PT      Positioning and Restraints    Pre-Treatment Position sitting in chair/recliner  -JR      Post Treatment Position chair  -JR      In Chair notified nsg;reclined;call light within reach;encouraged to call for assist  -JR      Recorded by [] Gerry Nelson PT        User Key  (r) = Recorded By, (t) = Taken By, (c) = Cosigned By    Initials Name Effective Dates    JR Gerry Nelson, PT 01/07/16 -                 IP PT Goals       07/08/17 1330          Bed Mobility PT LTG    Bed Mobility PT LTG, Date Established 07/08/17  -EM      Bed Mobility PT LTG, Time to Achieve 1 wk  -EM      Bed Mobility PT LTG, Activity Type all bed mobility  -EM      Bed Mobility PT LTG, [place] Level minimum assist (75% patient effort)  -EM      Transfer Training PT LTG    Transfer Training PT LTG, Date Established 07/08/17  -EM      Transfer Training PT LTG, Time to Achieve 1 wk  -EM      Transfer Training PT LTG, Activity Type all transfers  -EM      Transfer Training PT LTG, [place] Level minimum assist (75% patient effort)  -EM      Gait Training PT LTG    Gait Training Goal PT LTG, Date  Established 07/08/17  -EM      Gait Training Goal PT LTG, Time to Achieve 1 wk  -EM      Gait Training Goal PT LTG, [place] Level minimum assist (75% patient effort)  -EM      Gait Training Goal PT LTG, Assist Device walker, rolling  -EM      Gait Training Goal PT LTG, Distance to Achieve 50 feet   -EM        User Key  (r) = Recorded By, (t) = Taken By, (c) = Cosigned By    Initials Name Provider Type    EM Adrianne Pierce, PT Physical Therapist          Physical Therapy Education     Title: PT OT SLP Therapies (Done)     Topic: Physical Therapy (Done)     Point: Mobility training (Done)    Learning Progress Summary    Learner Readiness Method Response Comment Documented by Status   Patient [place]onelhai E ALEXEI MESA JR 07/09/17 1734 Done    Acceptance E NR  EM 07/08/17 1330 Active               Point: Home exercise program (Done)    Learning Progress Summary    Learner Readiness Method Response Comment Documented by Status   Patient Leonelhai ANGELINA ALEXEI MESA JR 07/09/17 1734 Done               Point: Body mechanics (Done)    Learning Progress Summary    Learner Readiness Method Response Comment Documented by Status   Patient ALEXEI Moss JR 07/09/17 1734 Done               Point: Precautions (Done)    Learning Progress Summary    Learner Readiness Method Response Comment Documented by Status   Patient Genet ALFARO ALEXEI MESA JR 07/09/17 1734 Done                      User Key     Initials Effective Dates Name Provider Type Discipline    EM 12/01/15 -  Adrianne Pierce, PT Physical Therapist PT     01/07/16 -  Gerry Nelson, PT Physical Therapist PT                    PT Recommendation and Plan  Anticipated Discharge Disposition: inpatient rehabilitation facility (pending progress)  Planned Therapy Interventions: bed mobility training, gait training, home exercise program, transfer training  PT Frequency: daily  Plan of Care Review  Plan Of Care Reviewed With: patient  Progress: improving          Outcome Measures       07/09/17  1735 07/08/17 1300       How much help from another person do you currently need...    Turning from your back to your side while in flat bed without using bedrails? 3  -JR 2  -EM     Moving from lying on back to sitting on the side of a flat bed without bedrails? 2  -JR 2  -EM     Moving to and from a bed to a chair (including a wheelchair)? 2  -JR 1  -EM     Standing up from a chair using your arms (e.g., wheelchair, bedside chair)? 3  -JR 1  -EM     Climbing 3-5 steps with a railing? 1  -JR 1  -EM     To walk in hospital room? 3  -JR 1  -EM     AM-PAC 6 Clicks Score 14  -JR 8  -EM     Functional Assessment    Outcome Measure Options  AM-PAC 6 Clicks Basic Mobility (PT)  -EM       User Key  (r) = Recorded By, (t) = Taken By, (c) = Cosigned By    Initials Name Provider Type     Adrianne Pierce, PT Physical Therapist     Gerry Nelson, PT Physical Therapist           Time Calculation:         PT Charges       07/09/17 1736          Time Calculation    Start Time 1710  -      Stop Time 1736  -      Time Calculation (min) 26 min  -      PT Received On 07/09/17  -      PT - Next Appointment 07/09/17  -        User Key  (r) = Recorded By, (t) = Taken By, (c) = Cosigned By    Initials Name Provider Type     Gerry Nelson, PT Physical Therapist          Therapy Charges for Today     Code Description Service Date Service Provider Modifiers Qty    13713946446 HC PT THER PROC EA 15 MIN 7/9/2017 Gerry Nelson, PT GP 1    27362282520 HC PT THER SUPP EA 15 MIN 7/9/2017 Gerry Nelson, PT GP 1          PT G-Codes  Outcome Measure Options: AM-PAC 6 Clicks Basic Mobility (PT)    Gerry Nelson, PT  7/9/2017

## 2017-07-09 NOTE — PLAN OF CARE
Problem: Patient Care Overview (Adult)  Goal: Plan of Care Review    07/09/17 1735   Coping/Psychosocial Response Interventions   Plan Of Care Reviewed With patient   Patient Care Overview   Progress improving   LARGE IMPROVEMENT TODAY.  AMBULATED TODAY.

## 2017-07-09 NOTE — PLAN OF CARE
Problem: Patient Care Overview (Adult)  Goal: Plan of Care Review  Outcome: Ongoing (interventions implemented as appropriate)    07/09/17 0449   Coping/Psychosocial Response Interventions   Plan Of Care Reviewed With patient   Patient Care Overview   Progress improving   Outcome Evaluation   Outcome Summary/Follow up Plan No c/o pain this shift, has been resting quietly. Repositioning every 2 hours and monitoring skin integrity with each reposition. Monitor strict I&O, labs, VS, and daily weights. Family at bedside. Will continue to monitor.       Goal: Adult Individualization and Mutuality  Outcome: Ongoing (interventions implemented as appropriate)  Goal: Discharge Needs Assessment  Outcome: Ongoing (interventions implemented as appropriate)    Problem: Fall Risk (Adult)  Goal: Identify Related Risk Factors and Signs and Symptoms  Outcome: Outcome(s) achieved Date Met:  07/09/17  Goal: Absence of Falls  Outcome: Ongoing (interventions implemented as appropriate)    Problem: Diabetes, Type 1 (Adult)  Goal: Signs and Symptoms of Listed Potential Problems Will be Absent or Manageable (Diabetes, Type 1)  Outcome: Ongoing (interventions implemented as appropriate)    Problem: Sepsis (Adult)  Goal: Signs and Symptoms of Listed Potential Problems Will be Absent or Manageable (Sepsis)  Outcome: Ongoing (interventions implemented as appropriate)    Problem: Pneumonia (Adult)  Goal: Signs and Symptoms of Listed Potential Problems Will be Absent or Manageable (Pneumonia)  Outcome: Ongoing (interventions implemented as appropriate)    Problem: Hemodialysis (Adult)  Goal: Signs and Symptoms of Listed Potential Problems Will be Absent or Manageable (Hemodialysis)  Outcome: Ongoing (interventions implemented as appropriate)

## 2017-07-10 LAB
ALBUMIN SERPL-MCNC: 2.9 G/DL (ref 3.5–5.2)
ALBUMIN/GLOB SERPL: 0.9 G/DL
ALP SERPL-CCNC: 88 U/L (ref 39–117)
ALT SERPL W P-5'-P-CCNC: 35 U/L (ref 1–33)
ANION GAP SERPL CALCULATED.3IONS-SCNC: 19.3 MMOL/L
ANISOCYTOSIS BLD QL: ABNORMAL
APTT PPP: 26.1 SECONDS (ref 22.7–35.4)
AST SERPL-CCNC: 16 U/L (ref 1–32)
BILIRUB CONJ SERPL-MCNC: <0.2 MG/DL (ref 0–0.3)
BILIRUB SERPL-MCNC: 0.4 MG/DL (ref 0.1–1.2)
BUN BLD-MCNC: 77 MG/DL (ref 6–20)
BUN/CREAT SERPL: 12.4 (ref 7–25)
CALCIUM SPEC-SCNC: 8.7 MG/DL (ref 8.6–10.5)
CHLORIDE SERPL-SCNC: 93 MMOL/L (ref 98–107)
CO2 SERPL-SCNC: 22.7 MMOL/L (ref 22–29)
CREAT BLD-MCNC: 6.21 MG/DL (ref 0.57–1)
DACRYOCYTES BLD QL SMEAR: ABNORMAL
DEPRECATED RDW RBC AUTO: 49.1 FL (ref 37–54)
EOSINOPHIL # BLD MANUAL: 0.18 10*3/MM3 (ref 0–0.7)
EOSINOPHIL NFR BLD MANUAL: 1 % (ref 0.3–6.2)
ERYTHROCYTE [DISTWIDTH] IN BLOOD BY AUTOMATED COUNT: 16.2 % (ref 11.7–13)
GFR SERPL CREATININE-BSD FRML MDRD: 8 ML/MIN/1.73
GLOBULIN UR ELPH-MCNC: 3.4 GM/DL
GLUCOSE BLD-MCNC: 310 MG/DL (ref 65–99)
GLUCOSE BLDC GLUCOMTR-MCNC: 149 MG/DL (ref 70–130)
GLUCOSE BLDC GLUCOMTR-MCNC: 226 MG/DL (ref 70–130)
GLUCOSE BLDC GLUCOMTR-MCNC: 235 MG/DL (ref 70–130)
GLUCOSE BLDC GLUCOMTR-MCNC: 276 MG/DL (ref 70–130)
HCT VFR BLD AUTO: 30.9 % (ref 35.6–45.5)
HGB BLD-MCNC: 10.8 G/DL (ref 11.9–15.5)
LDH SERPL-CCNC: 242 U/L (ref 135–214)
LYMPHOCYTES # BLD MANUAL: 0.71 10*3/MM3 (ref 0.9–4.8)
LYMPHOCYTES NFR BLD MANUAL: 4 % (ref 19.6–45.3)
LYMPHOCYTES NFR BLD MANUAL: 4 % (ref 5–12)
MCH RBC QN AUTO: 32 PG (ref 26.9–32)
MCHC RBC AUTO-ENTMCNC: 35 G/DL (ref 32.4–36.3)
MCV RBC AUTO: 91.4 FL (ref 80.5–98.2)
MONOCYTES # BLD AUTO: 0.71 10*3/MM3 (ref 0.2–1.2)
NEUTROPHILS # BLD AUTO: 16.2 10*3/MM3 (ref 1.9–8.1)
NEUTROPHILS NFR BLD MANUAL: 91 % (ref 42.7–76)
OVALOCYTES BLD QL SMEAR: ABNORMAL
PLAT MORPH BLD: NORMAL
PLATELET # BLD AUTO: 244 10*3/MM3 (ref 140–500)
PMV BLD AUTO: 10.9 FL (ref 6–12)
POTASSIUM BLD-SCNC: 4.2 MMOL/L (ref 3.5–5.2)
PROT SERPL-MCNC: 6.3 G/DL (ref 6–8.5)
RBC # BLD AUTO: 3.38 10*6/MM3 (ref 3.9–5.2)
RETICS/RBC NFR AUTO: 3.82 % (ref 0.5–1.5)
SCHISTOCYTES BLD QL SMEAR: ABNORMAL
SODIUM BLD-SCNC: 135 MMOL/L (ref 136–145)
SPHEROCYTES BLD QL SMEAR: ABNORMAL
TARGETS BLD QL SMEAR: ABNORMAL
WBC MORPH BLD: NORMAL
WBC NRBC COR # BLD: 17.8 10*3/MM3 (ref 4.5–10.7)

## 2017-07-10 PROCEDURE — 80053 COMPREHEN METABOLIC PANEL: CPT | Performed by: INTERNAL MEDICINE

## 2017-07-10 PROCEDURE — 85007 BL SMEAR W/DIFF WBC COUNT: CPT | Performed by: INTERNAL MEDICINE

## 2017-07-10 PROCEDURE — 97110 THERAPEUTIC EXERCISES: CPT

## 2017-07-10 PROCEDURE — 25010000002 ENOXAPARIN PER 10 MG: Performed by: INTERNAL MEDICINE

## 2017-07-10 PROCEDURE — 63710000001 INSULIN ASPART PER 5 UNITS: Performed by: INTERNAL MEDICINE

## 2017-07-10 PROCEDURE — 99232 SBSQ HOSP IP/OBS MODERATE 35: CPT | Performed by: INTERNAL MEDICINE

## 2017-07-10 PROCEDURE — 85027 COMPLETE CBC AUTOMATED: CPT | Performed by: INTERNAL MEDICINE

## 2017-07-10 PROCEDURE — 83615 LACTATE (LD) (LDH) ENZYME: CPT | Performed by: INTERNAL MEDICINE

## 2017-07-10 PROCEDURE — 25010000002 METHYLPREDNISOLONE PER 40 MG: Performed by: INTERNAL MEDICINE

## 2017-07-10 PROCEDURE — 85730 THROMBOPLASTIN TIME PARTIAL: CPT | Performed by: INTERNAL MEDICINE

## 2017-07-10 PROCEDURE — 85045 AUTOMATED RETICULOCYTE COUNT: CPT | Performed by: INTERNAL MEDICINE

## 2017-07-10 PROCEDURE — 82248 BILIRUBIN DIRECT: CPT | Performed by: INTERNAL MEDICINE

## 2017-07-10 PROCEDURE — 82962 GLUCOSE BLOOD TEST: CPT

## 2017-07-10 RX ADMIN — INSULIN ASPART 4 UNITS: 100 INJECTION, SOLUTION INTRAVENOUS; SUBCUTANEOUS at 17:40

## 2017-07-10 RX ADMIN — METHYLPREDNISOLONE SODIUM SUCCINATE 20 MG: 40 INJECTION, POWDER, FOR SOLUTION INTRAMUSCULAR; INTRAVENOUS at 17:41

## 2017-07-10 RX ADMIN — INSULIN ASPART 6 UNITS: 100 INJECTION, SOLUTION INTRAVENOUS; SUBCUTANEOUS at 08:18

## 2017-07-10 RX ADMIN — CASTOR OIL AND BALSAM, PERU: 788; 87 OINTMENT TOPICAL at 22:16

## 2017-07-10 RX ADMIN — LANSOPRAZOLE 30 MG: KIT at 08:19

## 2017-07-10 RX ADMIN — INSULIN ASPART 6 UNITS: 100 INJECTION, SOLUTION INTRAVENOUS; SUBCUTANEOUS at 17:40

## 2017-07-10 RX ADMIN — METHYLPREDNISOLONE SODIUM SUCCINATE 20 MG: 40 INJECTION, POWDER, FOR SOLUTION INTRAMUSCULAR; INTRAVENOUS at 06:46

## 2017-07-10 RX ADMIN — INSULIN ASPART 6 UNITS: 100 INJECTION, SOLUTION INTRAVENOUS; SUBCUTANEOUS at 08:17

## 2017-07-10 RX ADMIN — CASTOR OIL AND BALSAM, PERU: 788; 87 OINTMENT TOPICAL at 08:19

## 2017-07-10 RX ADMIN — ENOXAPARIN SODIUM 30 MG: 30 INJECTION SUBCUTANEOUS at 08:18

## 2017-07-10 RX ADMIN — LEVOTHYROXINE SODIUM 50 MCG: 50 TABLET ORAL at 06:46

## 2017-07-10 RX ADMIN — INSULIN ASPART 4 UNITS: 100 INJECTION, SOLUTION INTRAVENOUS; SUBCUTANEOUS at 22:16

## 2017-07-10 RX ADMIN — INSULIN ASPART 6 UNITS: 100 INJECTION, SOLUTION INTRAVENOUS; SUBCUTANEOUS at 13:08

## 2017-07-10 NOTE — SIGNIFICANT NOTE
07/10/17 0913   Rehab Treatment   Discipline physical therapy assistant   Treatment Not Performed patient unavailable for treatment  (Pt off floor for dialysis.  PT to check back in the PM)   Recommendation   PT - Next Appointment 07/10/17

## 2017-07-10 NOTE — SIGNIFICANT NOTE
07/10/17 0919   Rehab Treatment   Discipline speech language pathologist   Rehab Evaluation   Evaluation Not Performed patient unavailable for evaluation  (pt in dialysis, RN stated pt back to baseline cognition and is alert and oriented.  Will d/c speech at this time.)

## 2017-07-10 NOTE — PROGRESS NOTES
"   LOS: 11 days    Patient Care Team:  Gregorio Bai MD as PCP - General (Family Medicine)    Chief Complaint:  No chief complaint on file.      Subjective     Interval History: seen on hd    Patient Complaints: none  Patient Denies:  No cp or soa  History taken from: patient    Review of Systems:    The following systems were reviewed and negative;  constitution, respiratory, cardiovascular and gastrointestinal  Still not making urine  Objective     Vital Signs  Temp:  [97.6 °F (36.4 °C)-98.3 °F (36.8 °C)] 97.9 °F (36.6 °C)  Heart Rate:  [65-72] 72  Resp:  [16-18] 16  BP: (130-138)/(80-91) 130/80    Flowsheet Rows         First Filed Value    Admission Height  67\" (170.2 cm) Documented at 06/29/2017 2028    Admission Weight  155 lb 10.3 oz (70.6 kg) Documented at 06/29/2017 2028                No intake or output data in the 24 hours ending 07/10/17 0944    Physical Exam:   Pleasant, still a little flat  No jvd  Lungs without crackles  s1s2   abd soft nontender bs+  Ext no c/c/e     Results Review:      Results from last 7 days  Lab Units 07/10/17  0515 07/09/17 0356 07/08/17 0424 07/07/17 0356   SODIUM mmol/L 135* 139 138 140   POTASSIUM mmol/L 4.2 4.0 4.3 4.1   CHLORIDE mmol/L 93* 97* 94* 97*   CO2 mmol/L 22.7 25.1 24.7 27.0   BUN mg/dL 77* 53* 69* 30*   CREATININE mg/dL 6.21* 4.01* 4.83* 2.56*   CALCIUM mg/dL 8.7 8.9 8.2* 8.2*   BILIRUBIN mg/dL 0.4 0.4  --  0.4   ALK PHOS U/L 88 95  --  103   ALT (SGPT) U/L 35* 70*  --  193*   AST (SGOT) U/L 16 27  --  38*   GLUCOSE mg/dL 310* 217* 260* 241*       Estimated Creatinine Clearance: 13.2 mL/min (by C-G formula based on Cr of 6.21).      Results from last 7 days  Lab Units 07/08/17 0424 07/07/17 0356 07/06/17  0510  07/04/17  0259 07/03/17  1213   MAGNESIUM mg/dL  --   --  2.8*  --  2.2 2.0   PHOSPHORUS mg/dL 5.6* 2.5 3.7  < > 0.6* 0.7*   < > = values in this interval not displayed.            Results from last 7 days  Lab Units 07/10/17  0515 07/09/17  0356 " 07/08/17  0424 07/07/17  0356 07/06/17  0510   WBC 10*3/mm3 17.80* 17.00* 18.33* 22.46* 15.14*   HEMOGLOBIN g/dL 10.8* 10.9* 9.9* 10.5* 10.3*   PLATELETS 10*3/mm3 244 209 184 115* 78*         Results from last 7 days  Lab Units 07/05/17  0957   INR  1.17*         Imaging Results (last 24 hours)     Procedure Component Value Units Date/Time    XR Chest 1 View [[id number]] Collected:  07/04/17 0722     Updated:  07/09/17 2111    Narrative:       PORTABLE CHEST X-RAY     CLINICAL HISTORY: ARDS.     COMPARISON: 07/03/2017.     FINDINGS: Portable AP view of the chest was obtained with overlying  monitor leads in place. Life support lines are unchanged. No  pneumothorax demonstrated. There has been partial clearing of multifocal  opacities bilaterally, particularly in the left perihilar region which  is most likely related to improving edema. A component of superimposed  pneumonia cannot be excluded. Right effusion slightly improved also.  Normal heart size.       Impression:       Significant improvement in pulmonary opacities likely  related to edema.     This report was finalized on 7/9/2017 9:08 PM by Sang Madrid MD.       XR Chest 1 View [[id number]] Collected:  07/03/17 0615     Updated:  07/09/17 2111    Narrative:       PORTABLE CHEST X-RAY     CLINICAL HISTORY: Respiratory failure - Rotoprone protocol.     COMPARISON: 07/02/2017.     FINDINGS: Portable AP view of the chest was obtained with overlying  monitor leads in place. The lung apices were unable to be included.  Diffuse opacities are again noted bilaterally, left greater than right.  Findings may be related to multifocal pneumonia or asymmetric pulmonary  edema. They have worsened slightly in the interim. Right greater than  left effusions are stable. Normal heart size. Feeding tube has been  placed and is coiled well beneath the left hemidiaphragm likely in the  mid stomach region. ET tube and central line remain in place.       Impression:       Slight  worsening in multifocal, left greater than right opacities which  may be related to diffuse pneumonia or asymmetric edema.        This report was finalized on 7/9/2017 9:08 PM by Sang Madrid MD.             castor oil-balsam peru  Topical Q12H   enoxaparin 30 mg Subcutaneous Daily   insulin aspart 0-10 Units Subcutaneous 4x Daily AC & at Bedtime   insulin aspart 6 Units Subcutaneous TID With Meals   insulin detemir 24 Units Subcutaneous Nightly   lansoprazole 30 mg Per G Tube QAM   levothyroxine 50 mcg Oral Q AM   methylPREDNISolone sodium succinate 20 mg Intravenous Q12H          Medication Review:   Current Facility-Administered Medications   Medication Dose Route Frequency Provider Last Rate Last Dose   • artificial tears (LUBRIFRESH P.M.) ophthalmic ointment   Both Eyes PRN Madison Harris MD       • calcium gluconate 1 g in sodium chloride 0.9 % 50 mL IVPB  1 g Intravenous PRN Susan Urena MD        Or   • calcium gluconate 2 g in sodium chloride 0.9 % 50 mL IVPB  2 g Intravenous PRN Susan Urena MD       • castor oil-balsam peru (VENELEX) ointment   Topical Q12H Madison Harris MD       • dextrose (D50W) solution 12.5 g  12.5 g Intravenous Q15 Min PRN Alexis Poe MD   12.5 g at 07/01/17 1526   • dextrose (D50W) solution 25-50 mL  25-50 mL Intravenous Q30 Min PRN Van Remy MD       • enoxaparin (LOVENOX) syringe 30 mg  30 mg Subcutaneous Daily Leeroy Porter MD   30 mg at 07/10/17 0818   • insulin aspart (novoLOG) injection 0-10 Units  0-10 Units Subcutaneous 4x Daily AC & at Bedtime Kd Olea MD   6 Units at 07/10/17 0818   • insulin aspart (novoLOG) injection 6 Units  6 Units Subcutaneous TID With Meals Kd Olea MD   6 Units at 07/10/17 0817   • insulin detemir (LEVEMIR) injection 24 Units  24 Units Subcutaneous Nightly Kd Olea MD   24 Units at 07/09/17 2112   • lansoprazole (FIRST) oral suspension 30 mg  30 mg Per G Tube QAM Madison Harris MD   30 mg at  07/10/17 0819   • levothyroxine (SYNTHROID, LEVOTHROID) tablet 50 mcg  50 mcg Oral Q AM Thomas Campbell MD   50 mcg at 07/10/17 0646   • methylPREDNISolone sodium succinate (SOLU-Medrol) injection 20 mg  20 mg Intravenous Q12H Leeroy Porter MD   20 mg at 07/10/17 0646       Assessment/Plan       Active Problems:    DKA (diabetic ketoacidoses)    Pneumonia    Sepsis    Acute respiratory failure    History of systemic lupus erythematosus (SLE)    History of splenectomy    History of ITP    Primary hypothyroidism    ARF (acute renal failure) with tubular necrosis    1. HODAN, initially prerenal , now ATN. Oligoanuric. Undergoing hemodialysis without problem. Has tunnel catheter.  2. Severe right-sided pna/ARDS. Vanc and zosyn. Completed zithromax  3. Encephalopathy much improved   4. New DM1 presenting as DKA, now on scheduled insulin. Endocrine managing.   5. H/o ITP with prior splenectomy: TCP. Likely autoimmune per hematology . HIT antibody negative  6. Hypothyroidism   7. Anemia. Hg stable. 10.8  8. SLE . Raynaud's.          Plan:   Hemodialysis    Hold on permanent access.   Dialysis placement  Surveillance labs          Susan Urena MD  07/10/17  9:44 AM

## 2017-07-10 NOTE — PLAN OF CARE
"Problem: Patient Care Overview (Adult)  Goal: Plan of Care Review  Outcome: Ongoing (interventions implemented as appropriate)    07/10/17 1510   Coping/Psychosocial Response Interventions   Plan Of Care Reviewed With patient   Outcome Evaluation   Outcome Summary/Follow up Plan attempt to start DM ed for new dx of type 1 DM this afternoon at bedside. pt became \"overwhelmed\" very quickly- agreed with pt to f/u when family present for support for learning. plan: likely rehab per bedside RN.           "

## 2017-07-10 NOTE — PROGRESS NOTES
Dr. OSITO Porter    [address]    7/10/2017    Patient ID:  Name:  Sarita Bai  MRN:  [id number]  1980  37 y.o.  female            CC/Reason for visit: Follow-up for pneumonia, ARDS    HPI: Patient denies any new complaints.  She is undergoing hemodialysis today.    Vitals:  Vitals:    07/09/17 2105 07/10/17 0016 07/10/17 0621 07/10/17 0915   BP: 138/88 130/80  151/93   BP Location: Right arm Right arm     Patient Position: Lying Lying     Pulse: 65 72  70   Resp: 18 16  18   Temp: 97.6 °F (36.4 °C) 97.9 °F (36.6 °C)  98.8 °F (37.1 °C)   TempSrc: Oral Oral     SpO2: 99% 96%     Weight:   151 lb (68.5 kg)    Height:         FiO2 (%): 30 %     Body mass index is 23.64 kg/(m^2).  No intake or output data in the 24 hours ending 07/10/17 1126    Exam:  GEN:  No distress, Alert, awake, following all commands  LUNGS: A few coarse breath sounds bilaterally, nonlabored breathing  CV:  Normal S1S2, without murmur, no edema  ABD:  Non tender, no enlarged liver or masses  EXT:  No cyanosis or clubbing    Scheduled meds:    castor oil-balsam peru  Topical Q12H   enoxaparin 30 mg Subcutaneous Daily   insulin aspart 0-10 Units Subcutaneous 4x Daily AC & at Bedtime   insulin aspart 6 Units Subcutaneous TID With Meals   insulin detemir 24 Units Subcutaneous Nightly   lansoprazole 30 mg Per G Tube QAM   levothyroxine 50 mcg Oral Q AM   methylPREDNISolone sodium succinate 20 mg Intravenous Q12H     IV meds:                           Data Review:   I reviewed the patient's medications and new clinical results.  Lab Results   Component Value Date    CALCIUM 8.7 07/10/2017    PHOS 5.6 (H) 07/08/2017       Results from last 7 days  Lab Units 07/10/17  0515 07/09/17  0356 07/08/17  0424 07/07/17  0356  07/05/17  0957   SODIUM mmol/L 135* 139 138 140  < >  --    POTASSIUM mmol/L 4.2 4.0 4.3 4.1  < >  --    CHLORIDE mmol/L 93* 97* 94* 97*  < >  --    CO2 mmol/L 22.7 25.1 24.7 27.0  < >  --    BUN mg/dL 77* 53* 69*  30*  < >  --    CREATININE mg/dL 6.21* 4.01* 4.83* 2.56*  < >  --    CALCIUM mg/dL 8.7 8.9 8.2* 8.2*  < >  --    BILIRUBIN mg/dL 0.4 0.4  --  0.4  --   --    ALK PHOS U/L 88 95  --  103  --   --    ALT (SGPT) U/L 35* 70*  --  193*  --   --    AST (SGOT) U/L 16 27  --  38*  --   --    GLUCOSE mg/dL 310* 217* 260* 241*  < >  --    WBC 10*3/mm3 17.80* 17.00* 18.33* 22.46*  < >  --    HEMOGLOBIN g/dL 10.8* 10.9* 9.9* 10.5*  < >  --    PLATELETS 10*3/mm3 244 209 184 115*  < > 54*   INR   --   --   --   --   --  1.17*   < > = values in this interval not displayed.          ASSESSMENT:   Community acquired pneumonia  DKA (diabetic ketoacidoses)  ARDS  Sepsis  Acute respiratory failure  History of systemic lupus erythematosus (SLE)  History of splenectomy  History of ITP  Primary hypothyroidism  ARF (acute renal failure) with tubular necrosis  Diabetes type 2      PLAN:  The patient should start mobilizing out of bed.  She otherwise is doing quite well.  During physical therapist, this patient may qualify for inpatient rehabilitation.  Endocrinology is seeing the patient.  The blood sugar is out of control again today.      Leeroy Porter MD  7/10/2017

## 2017-07-10 NOTE — PLAN OF CARE
Problem: Patient Care Overview (Adult)  Goal: Plan of Care Review  Outcome: Ongoing (interventions implemented as appropriate)    07/10/17 2631   Coping/Psychosocial Response Interventions   Plan Of Care Reviewed With patient   Patient Care Overview   Progress improving   Outcome Evaluation   Outcome Summary/Follow up Plan pt went to dialysis today, removed 1.7 kilos; blood sugars better in the afternoon; kidney labs still unstable; worked with pt; met with diabetic educator; continue to monitor         Problem: Fall Risk (Adult)  Goal: Absence of Falls  Outcome: Ongoing (interventions implemented as appropriate)

## 2017-07-10 NOTE — PAYOR COMM NOTE
"Tasha Bai (37 y.o. Female)          ATTENTION; NURSE REVIEW, AUTH [id number], CLINICALS FOR YOUR REVIEW, REPLY TO UR DEPT         CLAUDIA ROBLES N  OR UR .[phone]  3    Date of Birth Social Security Number Address Home Phone MRN    1980  [id number] [facility] [phone] [id number]    [place] Marital Status          None Single       Admission Date Admission Type Admitting Provider Attending Provider Department, Room/Bed    6/29/17 Urgent Madison Harris MD Saad, Lebnan S, MD [address], 417/1    Discharge Date Discharge Disposition Discharge Destination                      Attending Provider: Madison Harris MD     Allergies:  Heparin    Isolation:  None   Infection:  None   Code Status:  FULL    Ht:  67.01\" (170.2 cm)   Wt:  151 lb (68.5 kg)    Admission Cmt:  None   Principal Problem:  None                Active Insurance as of 6/29/2017     Primary Coverage     Payor Plan Insurance Group Employer/Plan Group    [employer] BLUE CROSS [facility] [place] BLUE [facility] [id number]     Payor Plan Address Payor Plan Phone Number Effective From Effective To    [address] [phone] 5/1/2017     [place] [address]       Subscriber Name Subscriber Birth Date Member ID       TASHA BAI 1980 [id number]                 Emergency Contacts      (Rel.) Home Phone Work Phone Mobile Phone    Maria G Bai (Mother) [phone] -- --            Vital Signs (last 24 hours)       07/09 0700  -  07/10 0659 07/10 0700  -  07/10 1218   Most Recent    Temp (°F) 97.6 -  98.3      98.8     98.8 (37.1)    Heart Rate 65 -  72    65 -  70     70    Resp 16 -  18      18     18    /80 -  138/88    138/88 -  151/93     151/93    SpO2 (%) 95 -  99      98     98          Lines, Drains & Airways    Active LDAs     Name:   Placement date:   Placement time:   Site:   Days:    Tunneled Central Line - Double Lumen 07/06/17 1300 " "internal jugular vein, left other (see comments)  07/06/17    1300      3    Percutaneous Central Line - Quad Lumen 07/01/17 0930 internal jugular vein, right  07/01/17    0930      9                Hospital Medications (active)       Dose Frequency Start End    artificial tears (LUBRIFRESH P.M.) ophthalmic ointment  As Needed 7/1/2017     Sig - Route: Administer  to both eyes As Needed (Dry Eyes). - Both Eyes    calcium gluconate 1 g in sodium chloride 0.9 % 50 mL IVPB 1 g As Needed 7/1/2017     Sig - Route: Infuse 1 g into a venous catheter As Needed (SERUM Ionized Ca 0.7 - 0.84). - Intravenous    Linked Group 1:  \"Or\" Linked Group Details        calcium gluconate 2 g in sodium chloride 0.9 % 50 mL IVPB 2 g As Needed 7/1/2017     Sig - Route: Infuse 2 g into a venous catheter As Needed (SERUM Ionized Ca 0.6 - 0.69). - Intravenous    Linked Group 1:  \"Or\" Linked Group Details        castor oil-balsam peru (VENELEX) ointment  Every 12 Hours Scheduled 7/3/2017     Sig - Route: Apply  topically Every 12 (Twelve) Hours. - Topical    dextrose (D50W) solution 12.5 g 12.5 g Every 15 Minutes PRN 6/29/2017     Sig - Route: Infuse 25 mL into a venous catheter Every 15 (Fifteen) Minutes As Needed for Low Blood Sugar (for blood glucose < 100 mg/dL). - Intravenous    dextrose (D50W) solution 25-50 mL 25-50 mL Every 30 Minutes PRN 7/4/2017     Sig - Route: Infuse 25-50 mL into a venous catheter Every 30 (Thirty) Minutes As Needed for Low Blood Sugar (Blood Glucose <70 mg/dL; Per Insulin Infusion Protocol). - Intravenous    enoxaparin (LOVENOX) syringe 30 mg 30 mg Daily 7/8/2017     Sig - Route: Inject 0.3 mL under the skin Daily. - Subcutaneous    HYDROcodone-acetaminophen (NORCO) 5-325 MG per tablet 1 tablet 1 tablet Every 4 Hours PRN 6/30/2017 7/10/2017    Sig - Route: Take 1 tablet by mouth Every 4 (Four) Hours As Needed for Moderate Pain (4-6). - Oral    insulin aspart (novoLOG) injection 0-10 Units 0-10 Units 4 Times Daily " Before Meals & Nightly 7/8/2017     Sig - Route: Inject 0-10 Units under the skin 4 (Four) Times a Day Before Meals & at Bedtime. - Subcutaneous    insulin aspart (novoLOG) injection 6 Units 6 Units 3 Times Daily With Meals 7/8/2017     Sig - Route: Inject 6 Units under the skin 3 (Three) Times a Day With Meals. - Subcutaneous    insulin detemir (LEVEMIR) injection 24 Units 24 Units Nightly 7/9/2017     Sig - Route: Inject 24 Units under the skin Every Night. - Subcutaneous    lansoprazole (FIRST) oral suspension 30 mg 30 mg Every Morning 7/4/2017     Sig - Route: 10 mL by Per G Tube route Every Morning. - Per G Tube    levothyroxine (SYNTHROID, LEVOTHROID) tablet 50 mcg 50 mcg Every Early Morning 7/5/2017     Sig - Route: Take 1 tablet by mouth Every Morning. - Oral    methylPREDNISolone sodium succinate (SOLU-Medrol) injection 20 mg 20 mg Every 12 Hours 7/8/2017     Sig - Route: Infuse 0.5 mL into a venous catheter Every 12 (Twelve) Hours. - Intravenous    insulin detemir (LEVEMIR) injection 22 Units (Discontinued) 22 Units Nightly 7/8/2017 7/9/2017    Sig - Route: Inject 22 Units under the skin Every Night. - Subcutaneous          Orders (last 24 hrs)     Start     Ordered    07/10/17 0729  POC Glucose Fingerstick  Once      07/10/17 0728    07/10/17 0639  Manual Differential  Once      07/10/17 0638    07/10/17 0600  CBC & Differential  Morning Draw      07/09/17 1117    07/10/17 0600  Comprehensive Metabolic Panel  Morning Draw      07/09/17 1117    07/10/17 0600  CBC Auto Differential  PROCEDURE ONCE      07/10/17 0001    07/10/17 0000  Hemodialysis Inpatient  Once     Comments:  No heparin    07/09/17 1431    07/09/17 2100  insulin detemir (LEVEMIR) injection 24 Units  Nightly      07/09/17 1328    07/09/17 2037  POC Glucose Fingerstick  Once      07/09/17 2036    07/09/17 1639  POC Glucose Fingerstick  Once      07/09/17 1638    07/09/17 1302  Nursing Communication Please enter her home medication list prior  to admission.  Continuous     Comments:  Please enter her home medication list prior to admission.    07/09/17 1302    07/09/17 1301  PT Consult: Eval & Treat  Once      07/09/17 1300    07/08/17 2100  insulin detemir (LEVEMIR) injection 22 Units  Nightly,   Status:  Discontinued      07/08/17 1343    07/08/17 1815  methylPREDNISolone sodium succinate (SOLU-Medrol) injection 20 mg  Every 12 Hours      07/08/17 1055    07/08/17 1800  insulin aspart (novoLOG) injection 6 Units  3 Times Daily With Meals      07/08/17 1342    07/08/17 1730  insulin aspart (novoLOG) injection 0-10 Units  4 Times Daily Before Meals & Nightly      07/08/17 1349    07/08/17 1130  enoxaparin (LOVENOX) syringe 30 mg  Daily      07/08/17 1054    07/07/17 1700  POC Glucose Fingerstick  4 Times Daily Before Meals & at Bedtime      07/07/17 1635    07/07/17 0600  CBC (No Diff)  Daily      07/06/17 0728    07/06/17 0600  Lactate Dehydrogenase  Daily      07/05/17 1339    07/06/17 0600  Reticulocytes  Daily      07/05/17 1339    07/06/17 0600  Bilirubin, Direct  Daily      07/05/17 1339    07/05/17 0600  levothyroxine (SYNTHROID, LEVOTHROID) tablet 50 mcg  Every Early Morning      07/04/17 1619    07/04/17 0700  lansoprazole (FIRST) oral suspension 30 mg  Every Morning      07/03/17 1340    07/04/17 0647  dextrose (D50W) solution 25-50 mL  Every 30 Minutes PRN      07/04/17 0647    07/03/17 1215  castor oil-balsam peru (VENELEX) ointment  Every 12 Hours Scheduled      07/03/17 1133    07/01/17 0806  aPTT  Daily      07/01/17 0805    07/01/17 0549  artificial tears (LUBRIFRESH P.M.) ophthalmic ointment  As Needed      07/01/17 0555    07/01/17 0318  calcium gluconate 1 g in sodium chloride 0.9 % 50 mL IVPB  As Needed      07/01/17 0322    07/01/17 0318  calcium gluconate 2 g in sodium chloride 0.9 % 50 mL IVPB  As Needed      07/01/17 0322    06/30/17 0259  HYDROcodone-acetaminophen (NORCO) 5-325 MG per tablet 1 tablet  Every 4 Hours PRN       06/30/17 0259    06/29/17 2025  dextrose (D50W) solution 12.5 g  Every 15 Minutes PRN      06/29/17 2030    Unscheduled  Use Water Based Mouth Lubricant, Moisture Barriers on Face  As Needed      07/01/17 0555    Unscheduled  Apply Mepilex Dressings on Bony Prominences & At-Risk Areas  As Needed      07/01/17 0555    Unscheduled  Check Skin Area Around Tubes (Feeding, ET, Catheter) During Assessments  As Needed      07/01/17 0555    Unscheduled  Use Interdry AG Sheets in Skin Folds  As Needed      07/01/17 0555    Unscheduled  Luis Manuel and Document Tube Depth (in cm)  As Needed      07/02/17 0732    Unscheduled  Flush Feeding Tube With 30-50mL Water As Needed  As Needed      07/02/17 0732    Unscheduled  Verify Tube Placement Initially & As Needed  As Needed      07/02/17 0732    Unscheduled  Bladder Scan if Patient Unable to Void 4-6 Hours After Catheter Removal  As Needed      07/07/17 1423    Unscheduled  If Bladder Scan Volume is Less Than 350-500mL & Patient is Without Symptoms of Bladder Discomfort / Distention Monitor Every 1-2 Hours for Spontaneous Void  As Needed      07/07/17 1423    Unscheduled  Straight Cath Every 4-6 Hours As Needed If Patient is Unable to Void After 4-6 Hours, Bladder Scan Volume is Greater Than 350-500mL & Patient Has Symptoms of Bladder Discomfort / Distention  As Needed      07/07/17 1423    Unscheduled  Schedule / Prompt Voiding For Patients With Urinary Incontinence  As Needed      07/07/17 1423    Unscheduled  Up In Chair  As Needed      07/09/17 1300    Signed and Held  heparin (porcine) injection 1,000 Units  As Needed      Signed and Held             Progress Notes  Date of Service: 7/10/2017 9:43 AM  Susan Urena MD   Nephrology   Expand All Collapse All    []Hide copied text      LOS: 11 days    Patient Care Team:  Gregorio Bai MD as PCP - General (Family Medicine)     Chief Complaint: No chief complaint on file.           Subjective      Interval History: seen on  "hd     Patient Complaints: none  Patient Denies: No cp or soa  History taken from: patient     Review of Systems:   The following systems were reviewed and negative; constitution, respiratory, cardiovascular and gastrointestinal  Still not making urine     Objective      Vital Signs  Temp: [97.6 °F (36.4 °C)-98.3 °F (36.8 °C)] 97.9 °F (36.6 °C)  Heart Rate: [65-72] 72  Resp: [16-18] 16  BP: (130-138)/(80-91) 130/80     Flowsheet Rows           First Filed Value     Admission Height   67\" (170.2 cm) Documented at 06/29/2017 2028     Admission Weight   155 lb 10.3 oz (70.6 kg) Documented at 06/29/2017 2028                  No intake or output data in the 24 hours ending 07/10/17 0944     Physical Exam:  Pleasant, still a little flat  No jvd  Lungs without crackles  s1s2   abd soft nontender bs+  Ext no c/c/e      Results Review:      Results from last 7 days  Lab Units 07/10/17  0515 07/09/17 0356 07/08/17 0424 07/07/17  0356   SODIUM mmol/L 135* 139 138 140   POTASSIUM mmol/L 4.2 4.0 4.3 4.1   CHLORIDE mmol/L 93* 97* 94* 97*   CO2 mmol/L 22.7 25.1 24.7 27.0   BUN mg/dL 77* 53* 69* 30*   CREATININE mg/dL 6.21* 4.01* 4.83* 2.56*   CALCIUM mg/dL 8.7 8.9 8.2* 8.2*   BILIRUBIN mg/dL 0.4 0.4 --  0.4   ALK PHOS U/L 88 95 --  103   ALT (SGPT) U/L 35* 70* --  193*   AST (SGOT) U/L 16 27 --  38*   GLUCOSE mg/dL 310* 217* 260* 241*         Estimated Creatinine Clearance: 13.2 mL/min (by C-G formula based on Cr of 6.21).        Results from last 7 days  Lab Units 07/08/17  0424 07/07/17  0356 07/06/17  0510   07/04/17  0259 07/03/17  1213   MAGNESIUM mg/dL --  --  2.8* --  2.2 2.0   PHOSPHORUS mg/dL 5.6* 2.5 3.7 < > 0.6* 0.7*   < > = values in this interval not displayed.              Results from last 7 days  Lab Units 07/10/17  0515 07/09/17  0356 07/08/17  0424 07/07/17  0356 07/06/17  0510   WBC 10*3/mm3 17.80* 17.00* 18.33* 22.46* 15.14*   HEMOGLOBIN g/dL 10.8* 10.9* 9.9* 10.5* 10.3*   PLATELETS 10*3/mm3 244 209 184 115* " 78*            Results from last 7 days  Lab Units 07/05/17  0957   INR   1.17*            Imaging Results (last 24 hours)     Procedure Component Value Units Date/Time     XR Chest 1 View [[id number]] Collected: 07/04/17 0722       Updated: 07/09/17 2111     Narrative:       PORTABLE CHEST X-RAY      CLINICAL HISTORY: ARDS.      COMPARISON: 07/03/2017.      FINDINGS: Portable AP view of the chest was obtained with overlying  monitor leads in place. Life support lines are unchanged. No  pneumothorax demonstrated. There has been partial clearing of multifocal  opacities bilaterally, particularly in the left perihilar region which  is most likely related to improving edema. A component of superimposed  pneumonia cannot be excluded. Right effusion slightly improved also.  Normal heart size.         Impression:       Significant improvement in pulmonary opacities likely  related to edema.      This report was finalized on 7/9/2017 9:08 PM by Sang Madrid MD.         XR Chest 1 View [[id number]] Collected: 07/03/17 0615       Updated: 07/09/17 2111     Narrative:       PORTABLE CHEST X-RAY      CLINICAL HISTORY: Respiratory failure - Rotoprone protocol.      COMPARISON: 07/02/2017.      FINDINGS: Portable AP view of the chest was obtained with overlying  monitor leads in place. The lung apices were unable to be included.  Diffuse opacities are again noted bilaterally, left greater than right.  Findings may be related to multifocal pneumonia or asymmetric pulmonary  edema. They have worsened slightly in the interim. Right greater than  left effusions are stable. Normal heart size. Feeding tube has been  placed and is coiled well beneath the left hemidiaphragm likely in the  mid stomach region. ET tube and central line remain in place.         Impression:       Slight worsening in multifocal, left greater than right opacities which  may be related to diffuse pneumonia or asymmetric edema.          This report was finalized  on 7/9/2017 9:08 PM by Sang Madrid MD.                castor oil-balsam peru   Topical Q12H   enoxaparin 30 mg Subcutaneous Daily   insulin aspart 0-10 Units Subcutaneous 4x Daily AC & at Bedtime   insulin aspart 6 Units Subcutaneous TID With Meals   insulin detemir 24 Units Subcutaneous Nightly   lansoprazole 30 mg Per G Tube QAM   levothyroxine 50 mcg Oral Q AM   methylPREDNISolone sodium succinate 20 mg Intravenous Q12H            Medication Review:   Current Medications             Current Facility-Administered Medications   Medication Dose Route Frequency Provider Last Rate Last Dose   • artificial tears (LUBRIFRESH P.M.) ophthalmic ointment    Both Eyes PRN Madison Harris MD         • calcium gluconate 1 g in sodium chloride 0.9 % 50 mL IVPB 1 g Intravenous PRN Susan Urena MD           Or   • calcium gluconate 2 g in sodium chloride 0.9 % 50 mL IVPB 2 g Intravenous PRN Susan Urena MD         • castor oil-balsam peru (VENELEX) ointment    Topical Q12H Madison Harris MD         • dextrose (D50W) solution 12.5 g 12.5 g Intravenous Q15 Min PRN Alexis Poe MD    12.5 g at 07/01/17 1526   • dextrose (D50W) solution 25-50 mL 25-50 mL Intravenous Q30 Min PRN Van Remy MD         • enoxaparin (LOVENOX) syringe 30 mg 30 mg Subcutaneous Daily Leeroy Porter MD    30 mg at 07/10/17 0818   • insulin aspart (novoLOG) injection 0-10 Units 0-10 Units Subcutaneous 4x Daily AC & at Bedtime Kd Olea MD    6 Units at 07/10/17 0818   • insulin aspart (novoLOG) injection 6 Units 6 Units Subcutaneous TID With Meals Kd Olea MD    6 Units at 07/10/17 0817   • insulin detemir (LEVEMIR) injection 24 Units 24 Units Subcutaneous Nightly Kd Olea MD    24 Units at 07/09/17 2112   • lansoprazole (FIRST) oral suspension 30 mg 30 mg Per G Tube QAM Madison Harris MD    30 mg at 07/10/17 0819   • levothyroxine (SYNTHROID, LEVOTHROID) tablet 50 mcg 50 mcg Oral Q AM Thomas S. Yson, MD     50 mcg at 07/10/17 0646   • methylPREDNISolone sodium succinate (SOLU-Medrol) injection 20 mg 20 mg Intravenous Q12H Leeroy Porter MD    20 mg at 07/10/17 0646               Assessment/Plan             Active Problems:  DKA (diabetic ketoacidoses)  Pneumonia  Sepsis  Acute respiratory failure  History of systemic lupus erythematosus (SLE)  History of splenectomy  History of ITP  Primary hypothyroidism  ARF (acute renal failure) with tubular necrosis     1. HODAN, initially prerenal , now ATN. Oligoanuric. Undergoing hemodialysis without problem. Has tunnel catheter.  2. Severe right-sided pna/ARDS. Vanc and zosyn. Completed zithromax  3. Encephalopathy much improved   4. New DM1 presenting as DKA, now on scheduled insulin. Endocrine managing.   5. H/o ITP with prior splenectomy: TCP. Likely autoimmune per hematology . HIT antibody negative  6. Hypothyroidism   7. Anemia. Hg stable. 10.8  8. SLE . Raynaud's.           Plan:   Hemodialysis    Hold on permanent access.   Dialysis placement  Surveillance labs              Susan Urena MD  07/10/17  9:44 AM                   Progress Notes  Date of Service: 7/10/2017 11:26 AM  Leeroy Porter MD   Pulmonology   Expand All Collapse All    []Hide copied text    Dr. OSITO Porter     [address]     7/10/2017     Patient ID:  Name: Sarita Bai  MRN: [id number]  1980  37 y.o.  female             CC/Reason for visit: Follow-up for pneumonia, ARDS     HPI: Patient denies any new complaints. She is undergoing hemodialysis today.     Vitals:   Vitals           Vitals:     07/09/17 2105 07/10/17 0016 07/10/17 0621 07/10/17 0915   BP: 138/88 130/80   151/93   BP Location: Right arm Right arm       Patient Position: Lying Lying       Pulse: 65 72   70   Resp: 18 16   18   Temp: 97.6 °F (36.4 °C) 97.9 °F (36.6 °C)   98.8 °F (37.1 °C)   TempSrc: Oral Oral       SpO2: 99% 96%       Weight:     151 lb (68.5 kg)     Height:                 FiO2 (%):  30 %     Body mass index is 23.64 kg/(m^2).  No intake or output data in the 24 hours ending 07/10/17 1126     Exam:  GEN:  No distress, Alert, awake, following all commands  LUNGS: A few coarse breath sounds bilaterally, nonlabored breathing  CV:  Normal S1S2, without murmur, no edema  ABD:  Non tender, no enlarged liver or masses  EXT:  No cyanosis or clubbing     Scheduled meds:    castor oil-balsam peru   Topical Q12H   enoxaparin 30 mg Subcutaneous Daily   insulin aspart 0-10 Units Subcutaneous 4x Daily AC & at Bedtime   insulin aspart 6 Units Subcutaneous TID With Meals   insulin detemir 24 Units Subcutaneous Nightly   lansoprazole 30 mg Per G Tube QAM   levothyroxine 50 mcg Oral Q AM   methylPREDNISolone sodium succinate 20 mg Intravenous Q12H      IV meds:       Data Review:  I reviewed the patient's medications and new clinical results.        Lab Results   Component Value Date     CALCIUM 8.7 07/10/2017     PHOS 5.6 (H) 07/08/2017         Results from last 7 days  Lab Units 07/10/17  0515 07/09/17  0356 07/08/17  0424 07/07/17  0356   07/05/17  0957   SODIUM mmol/L 135* 139 138 140 < > --    POTASSIUM mmol/L 4.2 4.0 4.3 4.1 < > --    CHLORIDE mmol/L 93* 97* 94* 97* < > --    CO2 mmol/L 22.7 25.1 24.7 27.0 < > --    BUN mg/dL 77* 53* 69* 30* < > --    CREATININE mg/dL 6.21* 4.01* 4.83* 2.56* < > --    CALCIUM mg/dL 8.7 8.9 8.2* 8.2* < > --    BILIRUBIN mg/dL 0.4 0.4 --  0.4 --  --    ALK PHOS U/L 88 95 --  103 --  --    ALT (SGPT) U/L 35* 70* --  193* --  --    AST (SGOT) U/L 16 27 --  38* --  --    GLUCOSE mg/dL 310* 217* 260* 241* < > --    WBC 10*3/mm3 17.80* 17.00* 18.33* 22.46* < > --    HEMOGLOBIN g/dL 10.8* 10.9* 9.9* 10.5* < > --    PLATELETS 10*3/mm3 244 209 184 115* < > 54*   INR   --  --  --  --  --  1.17*   < > = values in this interval not displayed.           ASSESSMENT:   Community acquired pneumonia  DKA (diabetic ketoacidoses)  ARDS  Sepsis  Acute respiratory failure  History of systemic  lupus erythematosus (SLE)  History of splenectomy  History of ITP  Primary hypothyroidism  ARF (acute renal failure) with tubular necrosis  Diabetes type 2        PLAN:  The patient should start mobilizing out of bed. She otherwise is doing quite well.  During physical therapist, this patient may qualify for inpatient rehabilitation.  Endocrinology is seeing the patient. The blood sugar is out of control again today.        Leeroy Porter MD  7/10/2017

## 2017-07-10 NOTE — PROGRESS NOTES
Continued Stay Note  [facility]     Patient Name: Sarita Bai  MRN: [id number]  Today's Date: 7/10/2017    Admit Date: 6/29/2017          Discharge Plan       07/10/17 1413    Case Management/Social Work Plan    Plan Undetermined-  SNF versus Home with parents and HH.. CCP following    Patient/Family In Agreement With Plan yes    Additional Comments Introduced self and explained role of CCP to patient and father, Ryan at bedside.  Patient states she is agreeable to go to SNF if needed and [facility] booklet with SNF list provided to father and explained that [facility] and [facility]) have dialysis on site.  Patient and father states they will talk with patient's mother and they will make a decision on SNF or home with home health tonight and call CCP tomorrow with number provided by CCP.  CCP will continue to follow and assist as needed.....Sri Qiu RN,CCP               Discharge Codes     None            Sri Qiu RN

## 2017-07-10 NOTE — PLAN OF CARE
Problem: Patient Care Overview (Adult)  Goal: Plan of Care Review  Outcome: Ongoing (interventions implemented as appropriate)    07/10/17 0427   Coping/Psychosocial Response Interventions   Plan Of Care Reviewed With patient   Patient Care Overview   Progress improving   Outcome Evaluation   Outcome Summary/Follow up Plan Pt denies pain. Dialysis scheduled for today. BS WNL, will continue to monitor.       Goal: Adult Individualization and Mutuality  Outcome: Ongoing (interventions implemented as appropriate)    Problem: Fall Risk (Adult)  Goal: Absence of Falls  Outcome: Ongoing (interventions implemented as appropriate)

## 2017-07-10 NOTE — THERAPY TREATMENT NOTE
Acute Care - Physical Therapy Treatment Note  [facility]     Patient Name: Sarita Bai  : 1980  MRN: [id number]  Today's Date: 7/10/2017  Onset of Illness/Injury or Date of Surgery Date: 17          Admit Date: 2017    Visit Dx:    ICD-10-CM ICD-9-CM   1. Generalized weakness R53.1 780.79     Patient Active Problem List   Diagnosis   • DKA (diabetic ketoacidoses)   • Pneumonia   • Sepsis   • Acute respiratory failure   • History of systemic lupus erythematosus (SLE)   • History of splenectomy   • History of ITP   • Primary hypothyroidism   • ARF (acute renal failure) with tubular necrosis               Adult Rehabilitation Note       07/10/17 1400 17 1729       Rehab Assessment/Intervention    Discipline physical therapy assistant  -CW physical therapist  -JR     Document Type therapy note (daily note)  -CW therapy note (daily note)  -JR     Subjective Information agree to therapy;complains of;weakness;fatigue  -CW no complaints;agree to therapy  -JR     Patient Effort, Rehab Treatment good  -CW excellent   MOTIVATED TO ATTAIN GOALS.    -JR     Precautions/Limitations fall precautions  -CW      Patient Response to Treatment  EXCELLENT  -JR     Recorded by [CW] Artem Acosta [JR] Gerry Nelson, PT     Vital Signs    Intra Systolic BP Rehab  134  -JR     Intra Treatment Diastolic BP  94  -JR     Intratreatment Heart Rate (beats/min)  115  -JR     Posttreatment Heart Rate (beats/min)  95  -JR     Pre SpO2 (%)  97  -JR     Recorded by  [JR] Gerry Nelson, PT     Pain Assessment    Pain Assessment No/denies pain  -CW No/denies pain  -JR     Recorded by [CW] Artem Acosta [JR] Gerry Nelson, PT     Cognitive Assessment/Intervention    Current Cognitive/Communication Assessment functional  -CW functional  -JR     Orientation Status oriented x 4  -CW oriented x 4  -JR     Follows Commands/Answers Questions 100% of the time  -% of the time;other (see comments)  -JR      Personal Safety WNL/WFL  -CW WNL/WFL  -JR     Personal Safety Interventions fall prevention program maintained;gait belt;muscle strengthening facilitated;nonskid shoes/slippers when out of bed  -CW      Recorded by [CW] Artem Acosta [JR] Gerry Nelson, PT     Bed Mobility, Assessment/Treatment    Bed Mob, Supine to Sit, [place] contact guard assist  -CW      Bed Mob, Sit to Supine, [place] not tested  -CW      Bed Mobility, Comment up to chair  -CW SITTING IN RECLINER  -JR     Recorded by [CW] Artem Acosta [JR] Gerry Nelson, PT     Transfer Assessment/Treatment    Transfers, Sit-Stand [place] contact guard assist  -CW minimum assist (75% patient effort);2 person assist required;nonverbal cues required (demo/gesture);verbal cues required  -JR     Transfers, Stand-Sit [place] contact guard assist  -CW minimum assist (75% patient effort);2 person assist required;nonverbal cues required (demo/gesture);verbal cues required  -JR     Transfers, Sit-Stand-Sit, Assist Device rolling walker  -CW rolling walker  -JR     Recorded by [CW] Artem Acosta [JR] Gerry Nelson, PT     Gait Assessment/Treatment    Gait, [place] Level contact guard assist  -CW minimum assist (75% patient effort);2 person assist required;nonverbal cues required (demo/gesture);verbal cues required  -JR     Gait, Assistive Device rolling walker  -CW rolling walker  -JR     Gait, Distance (Feet) 20  -CW 15   BEDSIDE CHAIR PUSHED BEHIND.  Pt TOOK ONE SEATED REST BREAK  -JR     Gait, Gait Deviations marck decreased;step length decreased;stride length decreased;ataxia  -CW      Gait, Safety Issues  step length decreased  -JR     Gait, Impairments  strength decreased  -JR     Recorded by [CW] Artem Acosta [JR] Gerry Nelson, PT     Positioning and Restraints    Pre-Treatment Position in bed  -CW sitting in chair/recliner  -JR     Post Treatment Position chair  -CW chair  -JR     In Chair notified  nsg;reclined;call light within reach;encouraged to call for assist  -CW notified nsg;reclined;call light within reach;encouraged to call for assist  -JR     Recorded by [CW] Artem Acosta [JR] Gerry Nelson, PT       User Key  (r) = Recorded By, (t) = Taken By, (c) = Cosigned By    Initials Name Effective Dates    JR Gerry Nelson, PT 01/07/16 -     CW Artem Acosta 12/13/16 -                 IP PT Goals       07/08/17 1330          Bed Mobility PT LTG    Bed Mobility PT LTG, Date Established 07/08/17  -EM      Bed Mobility PT LTG, Time to Achieve 1 wk  -EM      Bed Mobility PT LTG, Activity Type all bed mobility  -EM      Bed Mobility PT LTG, [place] Level minimum assist (75% patient effort)  -EM      Transfer Training PT LTG    Transfer Training PT LTG, Date Established 07/08/17  -EM      Transfer Training PT LTG, Time to Achieve 1 wk  -EM      Transfer Training PT LTG, Activity Type all transfers  -EM      Transfer Training PT LTG, [place] Level minimum assist (75% patient effort)  -EM      Gait Training PT LTG    Gait Training Goal PT LTG, Date Established 07/08/17  -EM      Gait Training Goal PT LTG, Time to Achieve 1 wk  -EM      Gait Training Goal PT LTG, [place] Level minimum assist (75% patient effort)  -EM      Gait Training Goal PT LTG, Assist Device walker, rolling  -EM      Gait Training Goal PT LTG, Distance to Achieve 50 feet   -EM        User Key  (r) = Recorded By, (t) = Taken By, (c) = Cosigned By    Initials Name Provider Type    EM Adrianne Pierce PT Physical Therapist          Physical Therapy Education     Title: PT OT SLP Therapies (Done)     Topic: Physical Therapy (Done)     Point: Mobility training (Done)    Learning Progress Summary    Learner Readiness Method Response Comment Documented by Status   Patient Acceptance E,TB,D MOSHE LINDA 07/10/17 1405 Done    Eager E ALEXEI MESA JR 07/09/17 1734 Done    Acceptance E NR  EM 07/08/17 1330 Active               Point:  Home exercise program (Done)    Learning Progress Summary    Learner Readiness Method Response Comment Documented by Status   Patient Acceptance E,TB,D ALEXEIVU   07/10/17 1405 Done    Eager E ALEXEI MESA   07/09/17 1734 Done               Point: Body mechanics (Done)    Learning Progress Summary    Learner Readiness Method Response Comment Documented by Status   Patient Acceptance E,TB,D DUVU   07/10/17 1405 Done    Eager E MOSHEDU   07/09/17 1734 Done               Point: Precautions (Done)    Learning Progress Summary    Learner Readiness Method Response Comment Documented by Status   Patient Acceptance E,TB,D DUVU   07/10/17 1405 Done    Eager E VUDU   07/09/17 1734 Done                      User Key     Initials Effective Dates Name Provider Type Discipline    EM 12/01/15 -  Adrianne Pierce, PT Physical Therapist PT     01/07/16 -  Gerry Nelson, PT Physical Therapist PT     12/13/16 -  Artem Acosta Physical Therapy Assistant PT                    PT Recommendation and Plan  Anticipated Discharge Disposition: inpatient rehabilitation facility (pending progress)  Planned Therapy Interventions: bed mobility training, gait training, home exercise program, transfer training  PT Frequency: daily  Plan of Care Review  Plan Of Care Reviewed With: patient  Progress: improving  Outcome Summary/Follow up Plan: Pt increasing with bed mobility and transfer safety and I to RW          Outcome Measures       07/10/17 1400 07/09/17 1735 07/08/17 1300    How much help from another person do you currently need...    Turning from your back to your side while in flat bed without using bedrails? 3  -CW 3  -JR 2  -EM    Moving from lying on back to sitting on the side of a flat bed without bedrails? 3  -CW 2  -JR 2  -EM    Moving to and from a bed to a chair (including a wheelchair)? 3  -CW 2  -JR 1  -EM    Standing up from a chair using your arms (e.g., wheelchair, bedside chair)? 3  -CW 3  -JR 1  -EM     Climbing 3-5 steps with a railing? 1  -CW 1  -JR 1  -EM    To walk in hospital room? 3  -CW 3  -JR 1  -EM    AM-PAC 6 Clicks Score 16  -CW 14  -JR 8  -EM    Functional Assessment    Outcome Measure Options AM-PAC 6 Clicks Basic Mobility (PT)  -CW  AM-PAC 6 Clicks Basic Mobility (PT)  -EM      User Key  (r) = Recorded By, (t) = Taken By, (c) = Cosigned By    Initials Name Provider Type    EM Adrianne Pierce, PT Physical Therapist    JR Gerry Nelson, PT Physical Therapist    CW Artem Acosta Physical Therapy Assistant           Time Calculation:         PT Charges       07/10/17 1406 07/10/17 0913       Time Calculation    Start Time 1350  -CW      Stop Time 1406  -CW      Time Calculation (min) 16 min  -CW      PT Received On 07/10/17  -CW      PT - Next Appointment 07/11/17  -CW 07/10/17  -CW       User Key  (r) = Recorded By, (t) = Taken By, (c) = Cosigned By    Initials Name Provider Type    CW Artem Acosta Physical Therapy Assistant          Therapy Charges for Today     Code Description Service Date Service Provider Modifiers Qty    55963796088 HC PT THER PROC EA 15 MIN 7/10/2017 Artem Acosta GP 1    13850609057 HC PT THER SUPP EA 15 MIN 7/10/2017 Artem Acosta GP 1          PT G-Codes  Outcome Measure Options: AM-PAC 6 Clicks Basic Mobility (PT)    Artem Acosta  7/10/2017

## 2017-07-10 NOTE — PROGRESS NOTES
Subjective        REASON FOR FOLOOWUP: thrombocytopenia,  nucleated red cell in circulation, positive Lakisha test, hx of ITP and SLE, Raynaud's phenomenon, renal failure, pneumonia, s/ splenectomy.     On 7/10/2017, patient started hemodialysis due to worsening renal function.  She otherwise feels improved condition area she sits comfortably in the bed side chair.  Left upper chest has dialysis catheter placed.  Right neck has a triple-lumen central line in place.     History of Present Illness My impression is that the patient states thrombocytopenia always very likely RELATED associated sepsis with acute respiratory distress syndrome. She has a positive Lakisha test today she has abundant amount OF nucleated red blood cells in circulation. LDH is mildly elevated bilirubin and indirect bilirubin is normal. 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      My recommendation at this time will be to proceed with IV steroids like she was before and I'm going to proceed with her lupus testing including a repeated ZIGGY and sedimentation rate as well as CITRULLINE peptide antibodies. Dr. CARVER mentioned that this patient was receiving a low-dose prophylactic heparin I doubt that she has HIT. Hit antibody is negative.      Interval History: Patient's platelets have improved significantly and is normal at 204 now. Hemoglobin still stays at 10.9. Her hit antibody has been negative. She does have history of systemic lupus erythematosus for which she followed with Dr. Sanon but hasn't kept her appointments for more than 2 years.     She was started on steroids and now she is tapering with Solu-Medrol 20 mg IV daily now. Her lupus test has been positive. She certainly needs to see rheumatologist as outpatient. She feels much better today.     Past Medical History, Past Surgical History, Social  History, Family History have been reviewed and are without significant changes except as mentioned.     Review of Systems much better, awake responsive smiling , not in pain, no jaundiced.Still on the vent  A comprehensive 14 point review of systems was performed and was negative except as mentioned.     Medications: The current medication list was reviewed in the EMR     ALLERGIES:         Allergies   Allergen Reactions   • Heparin         Waiting for heparin antigen to result            Objective          Vitals:    07/10/17 0800 07/10/17 0915 07/10/17 1200 07/10/17 1259   BP: 138/88 151/93 117/74 110/74   BP Location: Right arm  Right arm Right arm   Patient Position: Lying  Lying Lying   Pulse: 65 70 80    Resp: 18 18 18 26   Temp: 98.8 °F (37.1 °C) 98.8 °F (37.1 °C) 98.6 °F (37 °C) 98.1 °F (36.7 °C)   TempSrc: Temporal Artery   Oral Oral   SpO2: 98%      Weight:   144 lb 2.9 oz (65.4 kg)    Height:          Physical Exam:     GENERAL:  Well-developed, well-nourished female in no acute distress.   SKIN:  Warm, dry without rashes.  She has multiple bruises on her arms from IV needle access.   HEAD:  Normocephalic.  EYES:   Conjunctivae normal.  EARS:  Hearing intact.  NOSE:   No nasal discharge.  MOUTH:   Lips normal.  NECK:  Supple with good range of motion; no thyromegaly or masses.  Right neck has triple-lumen central line in place.  LYMPHATICS:  No cervical adenopathy.  CHEST:  Lungs clear to auscultation. Good airflow.  Left chest versus catheter in place.  CARDIAC:  Regular rate and rhythm without murmurs, rubs or gallops. Normal S1,S2.  ABDOMEN:  Soft, nontender with no organomegaly or masses.  Bowel sounds present.  EXTREMITIES:  No clubbing, cyanosis or edema.  NEUROLOGICAL:  Cranial Nerves II-XII grossly intact.  No focal neurological deficits.  PSYCHIATRIC:  Normal affect and mood.         RECENT LABS:   Lab Results   Component Value Date    NEUTROABS 16.20 (H) 07/10/2017       Results from last 7  days  Lab Units 07/10/17  0515 07/09/17  0356 07/08/17  0424  07/06/17  0510   WBC 10*3/mm3 17.80* 17.00* 18.33*  < > 15.14*   HEMOGLOBIN g/dL 10.8* 10.9* 9.9*  < > 10.3*   HEMATOCRIT % 30.9* 31.2* 28.6*  < > 29.1*   PLATELETS 10*3/mm3 244 209 184  < > 78*   MONOCYTES % % 4.0* 3.0*  --   --  6.0   < > = values in this interval not displayed.     Results from last 7 days  Lab Units 07/10/17  0515 07/09/17  0356 07/08/17 0424 07/07/17  0356   SODIUM mmol/L 135* 139 138 140   POTASSIUM mmol/L 4.2 4.0 4.3 4.1   CHLORIDE mmol/L 93* 97* 94* 97*   CO2 mmol/L 22.7 25.1 24.7 27.0   BUN mg/dL 77* 53* 69* 30*   CREATININE mg/dL 6.21* 4.01* 4.83* 2.56*   CALCIUM mg/dL 8.7 8.9 8.2* 8.2*   BILIRUBIN mg/dL 0.4 0.4  --  0.4   ALK PHOS U/L 88 95  --  103   ALT (SGPT) U/L 35* 70*  --  193*   AST (SGOT) U/L 16 27  --  38*   GLUCOSE mg/dL 310* 217* 260* 241*       ZIGGY Comprehensive Panel   Order: [id number]   Status: Final result   Visible to patient: No (Not Released)      Ref Range & Units 2d ago    Anti-DNA (DS) Ab Qn 0 - 9 IU/mL <1   Comments:                                    Negative      <5                                      Equivocal  5 - 9                                      Positive      >9   RNP Antibodies 0.0 - 0.9 AI >8.0 (H)   [place] Antibodies 0.0 - 0.9 AI 0.7   Antiscleroderma-70 Antibodies 0.0 - 0.9 AI 0.2   JOE SSA (RO) Ab 0.0 - 0.9 AI 0.2   JOE SSB (LA) Ab 0.0 - 0.9 AI <0.2   Antichromatin Antibodies 0.0 - 0.9 AI >8.0 (H)   NORA-1 IgG 0.0 - 0.9 AI <0.2   Anti-Centromere B Antibodies 0.0 - 0.9 AI <0.2   See below:   Comment   Comments: Autoantibody                       Disease             Interpretation Summary   · Normal bilateral lower extremity venous duplex scan.          Interpretation Summary   · Acute right upper extremity superficial thrombophlebitis noted in the cephalic (upper arm).  · Acute left upper extremity superficial thrombophlebitis noted in the cephalic (upper arm).  · All other vessels appear  normal.  · Right IJ not examined due to central line.      Heparin-induced Platelet Antibody   Order: [id number]   Status: Final result   Visible to patient: No (Not Released)      Ref Range & Units 2d ago    Heparin Induced Plt Ab 0.000 - 0.400 OD 0.190   Resulting Agency   [facility]   Narrative   Performed at:  [address] [facility] [place] [place], [place]  [id number]  : Jordy Doll MD, Phone:  [phone]      Specimen Collected: 07/05/17  9:57 AM Last Resulted: 07/06/                   ASSESS/PLAN:   1.  Sepsis secondary to pneumonia.  Was on vent support at one point. This has much improved with antibiotics.  I reviewed her chest x-ray from this morning and compared to the original from 6/29/2017, the right lung infiltration has resolved. ( It may be possible her pulmonary infiltration was all related to her lupus disease, as the infiltration clears up so quick and complete.  Just a thought).     2.  Acute renal failure, with tubular necrosis.  Patient has worsening renal function today and is started on hemodialysis.     3. Thrombocytopenia: Platelets are normalized since 7/8/2017. Her platelet count is 244 k today.  This is likely secondary to underlying sepsis and possibly her Lupus disease.  Hit antibody is negative.     4.  Anemia secondary to inflammatory disease.  Her hemoglobin is stable.     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  She can be put on 20 mg prednisone daily until she sees her rheumatologist for follow-up.  She needs to remain on proton pump inhibitor for GI prophylaxis due to  use of steroids.     5.  Leukocytosis.  Originally related to her infection.  Currently most likely her leukocytosis is due to IV steroids.       6. Hit antibody negative, okay to use low dose Lovenox prophylaxis.       7. Systemic lupus erythematosus, patient on IV Solu-Medrol being tapered.needs to fu with rheumatology as outpatient. See the above section 4.      8. Heme-positive stools, repeat stool test.  Likely it was related to her diarrhea.     Because patient has improved so much.  There is no active role for us.  We'll sign off for now.  Please call if have questions.  We'll will arrange patient to be seen in office in about 4 weeks.     Thank you very much!     LAMONT MEZA M.D., Ph.D.    7/10/2017        LAMONT MEZA M.D., Ph.D.   7/10/2017

## 2017-07-11 LAB
APTT PPP: 27.7 SECONDS (ref 22.7–35.4)
BILIRUB CONJ SERPL-MCNC: <0.2 MG/DL (ref 0–0.3)
DEPRECATED RDW RBC AUTO: 48 FL (ref 37–54)
ERYTHROCYTE [DISTWIDTH] IN BLOOD BY AUTOMATED COUNT: 15.9 % (ref 11.7–13)
GLUCOSE BLDC GLUCOMTR-MCNC: 193 MG/DL (ref 70–130)
GLUCOSE BLDC GLUCOMTR-MCNC: 226 MG/DL (ref 70–130)
GLUCOSE BLDC GLUCOMTR-MCNC: 229 MG/DL (ref 70–130)
GLUCOSE BLDC GLUCOMTR-MCNC: 236 MG/DL (ref 70–130)
HCT VFR BLD AUTO: 28.6 % (ref 35.6–45.5)
HGB BLD-MCNC: 9.9 G/DL (ref 11.9–15.5)
LDH SERPL-CCNC: 210 U/L (ref 135–214)
MCH RBC QN AUTO: 31.6 PG (ref 26.9–32)
MCHC RBC AUTO-ENTMCNC: 34.6 G/DL (ref 32.4–36.3)
MCV RBC AUTO: 91.4 FL (ref 80.5–98.2)
PLATELET # BLD AUTO: 223 10*3/MM3 (ref 140–500)
PMV BLD AUTO: 11 FL (ref 6–12)
RBC # BLD AUTO: 3.13 10*6/MM3 (ref 3.9–5.2)
RETICS/RBC NFR AUTO: 3.09 % (ref 0.5–1.5)
WBC NRBC COR # BLD: 19.69 10*3/MM3 (ref 4.5–10.7)

## 2017-07-11 PROCEDURE — 25010000002 ENOXAPARIN PER 10 MG: Performed by: INTERNAL MEDICINE

## 2017-07-11 PROCEDURE — 85027 COMPLETE CBC AUTOMATED: CPT | Performed by: INTERNAL MEDICINE

## 2017-07-11 PROCEDURE — 82962 GLUCOSE BLOOD TEST: CPT

## 2017-07-11 PROCEDURE — 85730 THROMBOPLASTIN TIME PARTIAL: CPT | Performed by: INTERNAL MEDICINE

## 2017-07-11 PROCEDURE — 82248 BILIRUBIN DIRECT: CPT | Performed by: INTERNAL MEDICINE

## 2017-07-11 PROCEDURE — 83615 LACTATE (LD) (LDH) ENZYME: CPT | Performed by: INTERNAL MEDICINE

## 2017-07-11 PROCEDURE — 97110 THERAPEUTIC EXERCISES: CPT

## 2017-07-11 PROCEDURE — 99231 SBSQ HOSP IP/OBS SF/LOW 25: CPT | Performed by: INTERNAL MEDICINE

## 2017-07-11 PROCEDURE — 25010000002 METHYLPREDNISOLONE PER 40 MG: Performed by: INTERNAL MEDICINE

## 2017-07-11 PROCEDURE — 85045 AUTOMATED RETICULOCYTE COUNT: CPT | Performed by: INTERNAL MEDICINE

## 2017-07-11 PROCEDURE — 63710000001 INSULIN ASPART PER 5 UNITS: Performed by: INTERNAL MEDICINE

## 2017-07-11 RX ORDER — ALBUMIN (HUMAN) 12.5 G/50ML
12.5 SOLUTION INTRAVENOUS AS NEEDED
Status: ACTIVE | OUTPATIENT
Start: 2017-07-11 | End: 2017-07-12

## 2017-07-11 RX ORDER — PREDNISONE 10 MG/1
10 TABLET ORAL
Status: DISCONTINUED | OUTPATIENT
Start: 2017-07-12 | End: 2017-07-13 | Stop reason: HOSPADM

## 2017-07-11 RX ADMIN — INSULIN ASPART 4 UNITS: 100 INJECTION, SOLUTION INTRAVENOUS; SUBCUTANEOUS at 12:33

## 2017-07-11 RX ADMIN — ENOXAPARIN SODIUM 30 MG: 30 INJECTION SUBCUTANEOUS at 08:52

## 2017-07-11 RX ADMIN — INSULIN ASPART 2 UNITS: 100 INJECTION, SOLUTION INTRAVENOUS; SUBCUTANEOUS at 08:50

## 2017-07-11 RX ADMIN — INSULIN ASPART 6 UNITS: 100 INJECTION, SOLUTION INTRAVENOUS; SUBCUTANEOUS at 17:59

## 2017-07-11 RX ADMIN — INSULIN ASPART 4 UNITS: 100 INJECTION, SOLUTION INTRAVENOUS; SUBCUTANEOUS at 18:00

## 2017-07-11 RX ADMIN — LANSOPRAZOLE 30 MG: KIT at 08:49

## 2017-07-11 RX ADMIN — INSULIN ASPART 6 UNITS: 100 INJECTION, SOLUTION INTRAVENOUS; SUBCUTANEOUS at 12:32

## 2017-07-11 RX ADMIN — INSULIN ASPART 4 UNITS: 100 INJECTION, SOLUTION INTRAVENOUS; SUBCUTANEOUS at 22:39

## 2017-07-11 RX ADMIN — INSULIN ASPART 6 UNITS: 100 INJECTION, SOLUTION INTRAVENOUS; SUBCUTANEOUS at 08:49

## 2017-07-11 RX ADMIN — LEVOTHYROXINE SODIUM 50 MCG: 50 TABLET ORAL at 06:00

## 2017-07-11 RX ADMIN — CASTOR OIL AND BALSAM, PERU: 788; 87 OINTMENT TOPICAL at 08:54

## 2017-07-11 RX ADMIN — METHYLPREDNISOLONE SODIUM SUCCINATE 20 MG: 40 INJECTION, POWDER, FOR SOLUTION INTRAMUSCULAR; INTRAVENOUS at 06:00

## 2017-07-11 RX ADMIN — CASTOR OIL AND BALSAM, PERU: 788; 87 OINTMENT TOPICAL at 22:39

## 2017-07-11 NOTE — PROGRESS NOTES
Dr. OSITO Porter    [address]    7/11/2017    Patient ID:  Name:  Sarita Bai  MRN:  [id number]  1980  37 y.o.  female            CC/Reason for visit: Follow-up for systemic lupus, ARDS, pneumonia, acute kidney injury    HPI: She has been improving.  She has no new complaints.  She has received hemodialysis regularly.  I think she is now ready to go to [facility]    Vitals:  Vitals:    07/11/17 0500 07/11/17 0738 07/11/17 0855 07/11/17 1449   BP:   127/79 123/84   BP Location:   Left arm Right arm   Patient Position:   Sitting Sitting   Pulse:  61 81    Resp:  18 18 20   Temp:   98.4 °F (36.9 °C) 97.4 °F (36.3 °C)   TempSrc:   Oral Oral   SpO2:  97% 98% 98%   Weight: 149 lb 14.6 oz (68 kg)      Height:         FiO2 (%): 30 %     Body mass index is 23.47 kg/(m^2).    Intake/Output Summary (Last 24 hours) at 07/11/17 1736  Last data filed at 07/11/17 1500   Gross per 24 hour   Intake              630 ml   Output                0 ml   Net              630 ml       Exam:  GEN:  No distress,Awake, alert, conversant  LUNGS: Clear breath sounds bilat, nonlabored breathing  CV:  Normal S1S2, without murmur, no edema  ABD:  Non tender, no enlarged liver or masses  EXT:  No cyanosis or clubbing    Scheduled meds:    castor oil-balsam peru  Topical Q12H   enoxaparin 30 mg Subcutaneous Daily   [START ON 7/12/2017] epoetin sarah 5,000 Units Intravenous Once per day on Mon Wed Fri   insulin aspart 0-10 Units Subcutaneous 4x Daily AC & at Bedtime   insulin aspart 6 Units Subcutaneous TID With Meals   insulin detemir 24 Units Subcutaneous Nightly   lansoprazole 30 mg Per G Tube QAM   levothyroxine 50 mcg Oral Q AM   methylPREDNISolone sodium succinate 20 mg Intravenous Q12H     IV meds:                           Data Review:   I reviewed the patient's medications and new clinical results.  Lab Results   Component Value Date    CALCIUM 8.7 07/10/2017    PHOS 5.6 (H) 07/08/2017        Results from last 7 days  Lab Units 07/11/17  0600 07/10/17  0515 07/09/17  0356 07/08/17  0424 07/07/17  0356  07/05/17  0957   SODIUM mmol/L  --  135* 139 138 140  < >  --    POTASSIUM mmol/L  --  4.2 4.0 4.3 4.1  < >  --    CHLORIDE mmol/L  --  93* 97* 94* 97*  < >  --    CO2 mmol/L  --  22.7 25.1 24.7 27.0  < >  --    BUN mg/dL  --  77* 53* 69* 30*  < >  --    CREATININE mg/dL  --  6.21* 4.01* 4.83* 2.56*  < >  --    CALCIUM mg/dL  --  8.7 8.9 8.2* 8.2*  < >  --    BILIRUBIN mg/dL  --  0.4 0.4  --  0.4  --   --    ALK PHOS U/L  --  88 95  --  103  --   --    ALT (SGPT) U/L  --  35* 70*  --  193*  --   --    AST (SGOT) U/L  --  16 27  --  38*  --   --    GLUCOSE mg/dL  --  310* 217* 260* 241*  < >  --    WBC 10*3/mm3 19.69* 17.80* 17.00* 18.33* 22.46*  < >  --    HEMOGLOBIN g/dL 9.9* 10.8* 10.9* 9.9* 10.5*  < >  --    PLATELETS 10*3/mm3 223 244 209 184 115*  < > 54*   INR   --   --   --   --   --   --  1.17*   < > = values in this interval not displayed.                ASSESSMENT:   Community acquired pneumonia  DKA (diabetic ketoacidoses)  ARDS  Sepsis  Acute respiratory failure  History of systemic lupus erythematosus (SLE)  History of splenectomy  History of ITP  Primary hypothyroidism  ARF (acute renal failure) with tubular necrosis  Diabetes type 2      PLAN:  Blood sugar is not well controlled.  Awaiting for endocrinology to make some final adjustments.  I would like to discharge the patient soon.  I am going to decrease her steroid dose to prednisone 10 mg daily.    Otherwise she has outpatient orders and arrangements for hemodialysis 3 times a week.  Continue tunneled dialysis catheter for now.    Remove central line in her neck.  Continue ambulation.  Ready for [facility] rehabilitation center when okay with other specialists.      Leeroy Porter MD  7/11/2017

## 2017-07-11 NOTE — PROGRESS NOTES
"   LOS: 12 days    Patient Care Team:  Gregorio Bai MD as PCP - General (Family Medicine)    Chief Complaint:  No chief complaint on file.      Subjective     Interval History: Follow up HODAN, anuric.  Eating. Getting out of bed. Bowels moving. Weak.           Objective     Vital Signs  Temp:  [97.4 °F (36.3 °C)-98.5 °F (36.9 °C)] 97.4 °F (36.3 °C)  Heart Rate:  [61-81] 81  Resp:  [18-20] 20  BP: (123-162)/(79-92) 123/84    Flowsheet Rows         First Filed Value    Admission Height  67\" (170.2 cm) Documented at 06/29/2017 2028    Admission Weight  155 lb 10.3 oz (70.6 kg) Documented at 06/29/2017 2028          I/O this shift:  In: 630 [P.O.:600; I.V.:30]  Out: -   I/O last 3 completed shifts:  In: -   Out: 1700 [Other:1700]    Intake/Output Summary (Last 24 hours) at 07/11/17 1628  Last data filed at 07/11/17 1500   Gross per 24 hour   Intake              630 ml   Output                0 ml   Net              630 ml       Physical Exam:  Voice hoarse.  Malar rash.  Heart RRR no s3 or rub. Lungs clear to auscultation. Abd BS+ soft, nontender. Ext 1+ lower ext edema. Raynauds fingers.       Results from last 7 days  Lab Units 07/10/17  0515 07/09/17  0356 07/08/17  0424 07/07/17  0356   SODIUM mmol/L 135* 139 138 140   POTASSIUM mmol/L 4.2 4.0 4.3 4.1   CHLORIDE mmol/L 93* 97* 94* 97*   CO2 mmol/L 22.7 25.1 24.7 27.0   BUN mg/dL 77* 53* 69* 30*   CREATININE mg/dL 6.21* 4.01* 4.83* 2.56*   CALCIUM mg/dL 8.7 8.9 8.2* 8.2*   BILIRUBIN mg/dL 0.4 0.4  --  0.4   ALK PHOS U/L 88 95  --  103   ALT (SGPT) U/L 35* 70*  --  193*   AST (SGOT) U/L 16 27  --  38*   GLUCOSE mg/dL 310* 217* 260* 241*       Estimated Creatinine Clearance: 13.1 mL/min (by C-G formula based on Cr of 6.21).      Results from last 7 days  Lab Units 07/08/17  0424 07/07/17  0356 07/06/17  0510   MAGNESIUM mg/dL  --   --  2.8*   PHOSPHORUS mg/dL 5.6* 2.5 3.7               Results from last 7 days  Lab Units 07/11/17  0600 07/10/17  0515 07/09/17  0356 " 07/08/17  0424 07/07/17  0356   WBC 10*3/mm3 19.69* 17.80* 17.00* 18.33* 22.46*   HEMOGLOBIN g/dL 9.9* 10.8* 10.9* 9.9* 10.5*   PLATELETS 10*3/mm3 223 244 209 184 115*         Results from last 7 days  Lab Units 07/05/17  0957   INR  1.17*         Imaging Results (last 24 hours)     ** No results found for the last 24 hours. **          castor oil-balsam peru  Topical Q12H   enoxaparin 30 mg Subcutaneous Daily   insulin aspart 0-10 Units Subcutaneous 4x Daily AC & at Bedtime   insulin aspart 6 Units Subcutaneous TID With Meals   insulin detemir 24 Units Subcutaneous Nightly   lansoprazole 30 mg Per G Tube QAM   levothyroxine 50 mcg Oral Q AM   methylPREDNISolone sodium succinate 20 mg Intravenous Q12H          Medication Review:   Current Facility-Administered Medications   Medication Dose Route Frequency Provider Last Rate Last Dose   • albumin human 25 % IV SOLN 12.5 g  12.5 g Intravenous PRN Debra Mohamud MD       • artificial tears (LUBRIFRESH P.M.) ophthalmic ointment   Both Eyes PRN Madison Harris MD       • calcium gluconate 1 g in sodium chloride 0.9 % 50 mL IVPB  1 g Intravenous PRN Susan Urena MD        Or   • calcium gluconate 2 g in sodium chloride 0.9 % 50 mL IVPB  2 g Intravenous PRN Susan Urena MD       • castor oil-balsam peru (VENELEX) ointment   Topical Q12H Madison Harris MD       • dextrose (D50W) solution 12.5 g  12.5 g Intravenous Q15 Min PRN Alexis Poe MD   12.5 g at 07/01/17 1526   • dextrose (D50W) solution 25-50 mL  25-50 mL Intravenous Q30 Min PRN Van Remy MD       • enoxaparin (LOVENOX) syringe 30 mg  30 mg Subcutaneous Daily Leeroy Porter MD   30 mg at 07/11/17 0852   • insulin aspart (novoLOG) injection 0-10 Units  0-10 Units Subcutaneous 4x Daily AC & at Bedtime Kd Olea MD   4 Units at 07/11/17 1233   • insulin aspart (novoLOG) injection 6 Units  6 Units Subcutaneous TID With Meals Kd Olea MD   6 Units at 07/11/17 1232   •  insulin detemir (LEVEMIR) injection 24 Units  24 Units Subcutaneous Nightly Kd Olea MD   24 Units at 07/10/17 8217   • lansoprazole (FIRST) oral suspension 30 mg  30 mg Per G Tube LIAT Harris MD   30 mg at 07/11/17 0849   • levothyroxine (SYNTHROID, LEVOTHROID) tablet 50 mcg  50 mcg Oral Q AM Thomas Campbell MD   50 mcg at 07/11/17 0600   • methylPREDNISolone sodium succinate (SOLU-Medrol) injection 20 mg  20 mg Intravenous Q12H Leeroy Porter MD   20 mg at 07/11/17 0600       Assessment/Plan       Active Problems:    DKA (diabetic ketoacidoses)    Pneumonia    Sepsis    Acute respiratory failure    History of systemic lupus erythematosus (SLE)    History of splenectomy    History of ITP    Primary hypothyroidism    ARF (acute renal failure) with tubular necrosis    1. HODAN, initially  prerenal , now  ATN. Anuric. Dialysis via [facility]. Outpatient dialysis being arranged.   2. Severe right-sided pna/ARDS . Resolved.   3. SLE on steroid.   4. New DM1 presenting as DKA, now on scheduled insulin.  Endocrine managing.   5. H/o ITP with prior splenectomy: TCP resolved. HTI negative.  Likely autoimmune per hematology .    6. Hypothyroidism on oral replacement.   7. Anemia.            Plan:   1. Dialysis tomorrow.   2. DW patient and her mother.   Debra Mohamud MD  07/11/17  4:28 PM

## 2017-07-11 NOTE — THERAPY TREATMENT NOTE
Acute Care - Physical Therapy Treatment Note  [facility]     Patient Name: Sarita Bai  : 1980  MRN: [id number]  Today's Date: 2017  Onset of Illness/Injury or Date of Surgery Date: 17          Admit Date: 2017    Visit Dx:    ICD-10-CM ICD-9-CM   1. Generalized weakness R53.1 780.79     Patient Active Problem List   Diagnosis   • DKA (diabetic ketoacidoses)   • Pneumonia   • Sepsis   • Acute respiratory failure   • History of systemic lupus erythematosus (SLE)   • History of splenectomy   • History of ITP   • Primary hypothyroidism   • ARF (acute renal failure) with tubular necrosis               Adult Rehabilitation Note       17 1000 07/10/17 1400 17 1729    Rehab Assessment/Intervention    Discipline physical therapy assistant  -CW physical therapy assistant  -CW physical therapist  -JR    Document Type therapy note (daily note)  -CW therapy note (daily note)  -CW therapy note (daily note)  -JR    Subjective Information agree to therapy;complains of;weakness;fatigue  -CW agree to therapy;complains of;weakness;fatigue  -CW no complaints;agree to therapy  -JR    Patient Effort, Rehab Treatment good  -CW good  -CW excellent   MOTIVATED TO ATTAIN GOALS.    -JR    Precautions/Limitations fall precautions  -CW fall precautions  -CW     Patient Response to Treatment   EXCELLENT  -JR    Recorded by [CW] Artem Acosta [CW] Artem Acosta [JR] Gerry Nelson, PT    Vital Signs    Intra Systolic BP Rehab   134  -JR    Intra Treatment Diastolic BP   94  -JR    Intratreatment Heart Rate (beats/min)   115  -JR    Posttreatment Heart Rate (beats/min)   95  -JR    Pre SpO2 (%)   97  -JR    Recorded by   [JR] Gerry Nelson, PT    Pain Assessment    Pain Assessment No/denies pain  -CW No/denies pain  -CW No/denies pain  -JR    Recorded by [CW] Artem Acosta [CW] Artem Acosta [JR] Gerry Nelson, PT    Cognitive Assessment/Intervention    Current  Cognitive/Communication Assessment functional  -CW functional  -CW functional  -JR    Orientation Status oriented x 4  -CW oriented x 4  -CW oriented x 4  -JR    Follows Commands/Answers Questions 100% of the time  -% of the time  -% of the time;other (see comments)  -JR    Personal Safety WNL/WFL  -CW WNL/WFL  -CW WNL/WFL  -JR    Personal Safety Interventions fall prevention program maintained;nonskid shoes/slippers when out of bed;gait belt;muscle strengthening facilitated  -CW fall prevention program maintained;gait belt;muscle strengthening facilitated;nonskid shoes/slippers when out of bed  -CW     Recorded by [CW] Artem Acosta [CW] Artem Acosta [JR] Gerry Nelson, PT    Bed Mobility, Assessment/Treatment    Bed Mob, Supine to Sit, [place] contact guard assist  -CW contact guard assist  -CW     Bed Mob, Sit to Supine, [place] not tested  -CW not tested  -CW     Bed Mobility, Comment up to chair  -CW up to chair  -CW SITTING IN RECLINER  -JR    Recorded by [CW] Artem Acosta [CW] Artem Acosta [JR] Gerry Nelson, PT    Transfer Assessment/Treatment    Transfers, Sit-Stand [place] contact guard assist  -CW contact guard assist  -CW minimum assist (75% patient effort);2 person assist required;nonverbal cues required (demo/gesture);verbal cues required  -JR    Transfers, Stand-Sit [place] contact guard assist  -CW contact guard assist  -CW minimum assist (75% patient effort);2 person assist required;nonverbal cues required (demo/gesture);verbal cues required  -JR    Transfers, Sit-Stand-Sit, Assist Device rolling walker  -CW rolling walker  -CW rolling walker  -JR    Recorded by [CW] Artem Acosta [CW] Artem Acosta [JR] Gerry Nelson PT    Gait Assessment/Treatment    Gait, [place] Level contact guard assist  -CW contact guard assist  -CW minimum assist (75% patient effort);2 person assist required;nonverbal cues required  (demo/gesture);verbal cues required  -JR    Gait, Assistive Device rolling walker  -CW rolling walker  -CW rolling walker  -JR    Gait, Distance (Feet) 60  -CW 20  -CW 15   BEDSIDE CHAIR PUSHED BEHIND.  Pt TOOK ONE SEATED REST BREAK  -JR    Gait, Gait Deviations marck decreased;step length decreased;stride length decreased  -CW [place] decreased;step length decreased;stride length decreased;ataxia  -CW     Gait, Safety Issues   step length decreased  -JR    Gait, Impairments   strength decreased  -JR    Recorded by [CW] Artem Acosta [CW] Artem Acosta [JR] Gerry Nelson, PT    Positioning and Restraints    Pre-Treatment Position in bed  -CW in bed  -CW sitting in chair/recliner  -JR    Post Treatment Position chair  -CW chair  -CW chair  -JR    In Chair notified nsg;reclined;call light within reach;encouraged to call for assist  -CW notified nsg;reclined;call light within reach;encouraged to call for assist  -CW notified nsg;reclined;call light within reach;encouraged to call for assist  -JR    Recorded by [CW] Artem Acosta [CW] Artem Acosta [JR] Gerry Nelson, PT      User Key  (r) = Recorded By, (t) = Taken By, (c) = Cosigned By    Initials Name Effective Dates    JR Gerry Nelson, PT 01/07/16 -     CW Artem Acosta 12/13/16 -                 IP PT Goals       07/08/17 1330          Bed Mobility PT LTG    Bed Mobility PT LTG, Date Established 07/08/17  -EM      Bed Mobility PT LTG, Time to Achieve 1 wk  -EM      Bed Mobility PT LTG, Activity Type all bed mobility  -EM      Bed Mobility PT LTG, [place] Level minimum assist (75% patient effort)  -EM      Transfer Training PT LTG    Transfer Training PT LTG, Date Established 07/08/17  -EM      Transfer Training PT LTG, Time to Achieve 1 wk  -EM      Transfer Training PT LTG, Activity Type all transfers  -EM      Transfer Training PT LTG, [place] Level minimum assist (75% patient effort)  -EM      Gait Training PT LTG     Gait Training Goal PT LTG, Date Established 07/08/17  -EM      Gait Training Goal PT LTG, Time to Achieve 1 wk  -EM      Gait Training Goal PT LTG, [place] Level minimum assist (75% patient effort)  -EM      Gait Training Goal PT LTG, Assist Device walker, rolling  -EM      Gait Training Goal PT LTG, Distance to Achieve 50 feet   -EM        User Key  (r) = Recorded By, (t) = Taken By, (c) = Cosigned By    Initials Name Provider Type    EM Adrianne Pierce, PT Physical Therapist          Physical Therapy Education     Title: PT OT SLP Therapies (Done)     Topic: Physical Therapy (Done)     Point: Mobility training (Done)    Learning Progress Summary    Learner Readiness Method Response Comment Documented by Status   Patient Acceptance TB,E VU,DU   07/11/17 1057 Done    Acceptance E,TB,D DU,VU   07/10/17 1405 Done    Eager E MOSHE,DU   07/09/17 1734 Done    Acceptance E NR  EM 07/08/17 1330 Active               Point: Home exercise program (Done)    Learning Progress Summary    Learner Readiness Method Response Comment Documented by Status   Patient Acceptance TB,E VU,DU   07/11/17 1057 Done    Acceptance E,TB,D DU,VU   07/10/17 1405 Done    Eager E VU,DU   07/09/17 1734 Done               Point: Body mechanics (Done)    Learning Progress Summary    Learner Readiness Method Response Comment Documented by Status   Patient Acceptance TB,E VU,DU   07/11/17 1057 Done    Acceptance E,TB,D DU,VU   07/10/17 1405 Done    Eager E VU,DU   07/09/17 1734 Done               Point: Precautions (Done)    Learning Progress Summary    Learner Readiness Method Response Comment Documented by Status   Patient Acceptance TB,E VU,DU   07/11/17 1057 Done    Acceptance E,TB,D DU,VU   07/10/17 1405 Done    Eager E VU,DU   07/09/17 1734 Done                      User Key     Initials Effective Dates Name Provider Type Discipline    EM 12/01/15 -  Adrianne Pierce, PT Physical Therapist PT     01/07/16 -  Gerry  SANDRA Nelson, PT Physical Therapist PT    CW 12/13/16 -  Artem Acosta Physical Therapy Assistant PT                    PT Recommendation and Plan  Anticipated Discharge Disposition: inpatient rehabilitation facility (pending progress)  Planned Therapy Interventions: bed mobility training, gait training, home exercise program, transfer training  PT Frequency: daily  Plan of Care Review  Plan Of Care Reviewed With: patient  Progress: improving  Outcome Summary/Follow up Plan: Pt increasing with strength and balance with RWX and amb distance          Outcome Measures       07/11/17 1000 07/10/17 1400 07/09/17 1735    How much help from another person do you currently need...    Turning from your back to your side while in flat bed without using bedrails? 3  -CW 3  -CW 3  -JR    Moving from lying on back to sitting on the side of a flat bed without bedrails? 3  -CW 3  -CW 2  -JR    Moving to and from a bed to a chair (including a wheelchair)? 3  -CW 3  -CW 2  -JR    Standing up from a chair using your arms (e.g., wheelchair, bedside chair)? 3  -CW 3  -CW 3  -JR    Climbing 3-5 steps with a railing? 1  -CW 1  -CW 1  -JR    To walk in hospital room? 3  -CW 3  -CW 3  -JR    AM-PAC 6 Clicks Score 16  -CW 16  -CW 14  -JR    Functional Assessment    Outcome Measure Options AM-PAC 6 Clicks Basic Mobility (PT)  -CW AM-PAC 6 Clicks Basic Mobility (PT)  -CW       07/08/17 1300          How much help from another person do you currently need...    Turning from your back to your side while in flat bed without using bedrails? 2  -EM      Moving from lying on back to sitting on the side of a flat bed without bedrails? 2  -EM      Moving to and from a bed to a chair (including a wheelchair)? 1  -EM      Standing up from a chair using your arms (e.g., wheelchair, bedside chair)? 1  -EM      Climbing 3-5 steps with a railing? 1  -EM      To walk in hospital room? 1  -EM      AM-PAC 6 Clicks Score 8  -EM      Functional Assessment     Outcome Measure Options AM-PAC 6 Clicks Basic Mobility (PT)  -EM        User Key  (r) = Recorded By, (t) = Taken By, (c) = Cosigned By    Initials Name Provider Type    EM Adrianne Pierce, PT Physical Therapist    JR Gerry Nelson, PT Physical Therapist    CW Artem Acosta Physical Therapy Assistant           Time Calculation:         PT Charges       07/11/17 1058          Time Calculation    Start Time 1040  -CW      Stop Time 1058  -CW      Time Calculation (min) 18 min  -CW      PT Received On 07/11/17  -CW      PT - Next Appointment 07/12/17  -CW        User Key  (r) = Recorded By, (t) = Taken By, (c) = Cosigned By    Initials Name Provider Type    CW Artem Acosta Physical Therapy Assistant          Therapy Charges for Today     Code Description Service Date Service Provider Modifiers Qty    05882467947 HC PT THER PROC EA 15 MIN 7/10/2017 Artem Acosta GP 1    85112510307 HC PT THER SUPP EA 15 MIN 7/10/2017 Artem Acosta GP 1    66190404433 HC PT THER PROC EA 15 MIN 7/11/2017 Artem Acosta GP 1          PT G-Codes  Outcome Measure Options: AM-PAC 6 Clicks Basic Mobility (PT)    Artem Acosta  7/11/2017

## 2017-07-11 NOTE — PLAN OF CARE
Problem: Patient Care Overview (Adult)  Goal: Plan of Care Review  Outcome: Ongoing (interventions implemented as appropriate)    07/11/17 0336   Coping/Psychosocial Response Interventions   Plan Of Care Reviewed With patient   Patient Care Overview   Progress improving   Outcome Evaluation   Outcome Summary/Follow up Plan Pt had dialysis yesterday, inable to finish because access clotted off and pt allergic to heparin. PT got up and walked to chair. Diabetes educator to return today and try education again. Will continue to monitor.       Goal: Adult Individualization and Mutuality  Outcome: Ongoing (interventions implemented as appropriate)  Goal: Discharge Needs Assessment  Outcome: Ongoing (interventions implemented as appropriate)    Problem: Fall Risk (Adult)  Goal: Absence of Falls  Outcome: Ongoing (interventions implemented as appropriate)

## 2017-07-11 NOTE — CONSULTS
"Diabetes Education  Assessment/Teaching    Patient Name:  Sarita Bai  YOB: 1980  MRN: [id number]  Admit Date:  6/29/2017      Assessment Date:  7/11/2017       Most Recent Value    General Information      Referral From:  MD silvestre    Height  5' 7.01\" (1.702 m)    Height Method  Stated    Weight  149 lb 14.6 oz (68 kg)    Weight Method  Bed scale    Pregnancy Assessment     Diabetes History     What type of diabetes do you have?  Type 1    Length of Diabetes Diagnosis  Newly diagnosed <6 months    Education Preferences     Barriers to Learning  other (comment) [None noted]    Nutrition Information     Assessment Topics     Healthy Eating - Assessment  Needs education    Being Active - Assessment  Needs education    Taking Medication - Assessment  Needs education    Problem Solving - Assessment  Needs education    Reducing Risk - Assessment  Needs education    Monitoring - Assessment  Needs education    DM Goals     Healthy Eating - Goal  0-30 days from discharge    Being Active - Goal  0-30 days from discharge    Taking Medication - Goal  0-7 days from discharge    Problem Solving - Goal  0-30 days from discharge    Reducing Risk - Goal  30-90 days from discharge    Monitoring - Goal  0-7 days from discharge    Contact Plan  Follow-up medical care               Most Recent Value    DM Education Needs     Meter  Meter provided    Meter Type  One Touch [Verio Flex]    Frequency of Testing  AC meals    Blood Glucose Target Range   premeal    Problem Solving  Hypoglycemia, Hyperglycemia, Sick days, Signs, Symptoms, Treatment [DKA process and prevention]    Reducing Risks  A1C testing    Healthy Eating  RD consult    Healthy Coping  Appropriate    Discharge Plan  Facility, Follow-up with endocrinolgoist [Rehab]    Motivation  Engaged, Strong    Teaching Method  Explanation, Discussion, Demonstration, Handouts, Teach back    Patient Response  Verbalized understanding, Demonstrates adequately    "         Other Comments:         Electronically signed by:  Kathrin Clarke RN  07/11/17 11:53 AM

## 2017-07-11 NOTE — PROGRESS NOTES
"  ENDOCRINE    Subjective   AND PLANS  Sarita Bai is a 37 y.o. female.     Events over weekend noted  Feels better.  No shortness of breath.  Afebrile off antibiotics.  Remains on Solu-Medrol 20 mg every 12 hours  Appetite good.  No nausea or vomiting.  Fasting glucose 276.  Random glucose .  Insulin adjusted yesterday by Dr. Olea.  Continue with same for now.    Tolerating levothyroxine 50 µg per day.  Recheck thyroid function tests in 4 weeks and adjust dose if needed.  Celiac antibody negative.  Patient had dialysis today.  Still waiting for kidney function to recover  Objective   /74 (BP Location: Right arm, Patient Position: Lying)  Pulse 80  Temp 98.1 °F (36.7 °C) (Oral)   Resp 26  Ht 67.01\" (170.2 cm)  Wt 144 lb 2.9 oz (65.4 kg)  SpO2 98%  BMI 22.58 kg/m2  Physical Exam    Awake, alert, not in distress.  Thyroid is not enlarged.  No cervical lymphadenopathy.  No rales or wheezes.  Regular heart rate and rhythm.  Abdomen soft, nontender.  No cyanosis.    Lab Results (last 24 hours)     Procedure Component Value Units Date/Time    POC Glucose Fingerstick [[id number]]  (Normal) Collected:  07/09/17 2035    Specimen:  Blood Updated:  07/09/17 2036     Glucose 79 mg/dL     Narrative:       Meter: [id number] : [id number] [place] Lilly    Reticulocytes [[id number]]  (Abnormal) Collected:  07/10/17 0515    Specimen:  Blood Updated:  07/10/17 0709     Reticulocyte % 3.82 (H) %     aPTT [[id number]]  (Normal) Collected:  07/10/17 0515    Specimen:  Blood Updated:  07/10/17 0710     PTT 26.1 seconds     POC Glucose Fingerstick [[id number]]  (Abnormal) Collected:  07/10/17 0727    Specimen:  Blood Updated:  07/10/17 0728     Glucose 276 (H) mg/dL     Narrative:       Meter: [id number] : [id number] Emily JARQUIN    CBC & Differential [[id number]] Collected:  07/10/17 0515    Specimen:  Blood Updated:  07/10/17 0735    Narrative:       The following orders were created for panel order CBC & " Differential.  Procedure                               Abnormality         Status                     ---------                               -----------         ------                     Manual Differential[[id number]]          Abnormal            Final result               CBC Auto Differential[[id number]]        Abnormal            Final result                 Please view results for these tests on the individual orders.    CBC Auto Differential [[id number]]  (Abnormal) Collected:  07/10/17 0515    Specimen:  Blood Updated:  07/10/17 0735     WBC 17.80 (H) 10*3/mm3      RBC 3.38 (L) 10*6/mm3      Hemoglobin 10.8 (L) g/dL      Hematocrit 30.9 (L) %      MCV 91.4 fL      MCH 32.0 pg      MCHC 35.0 g/dL      RDW 16.2 (H) %      RDW-SD 49.1 fl      MPV 10.9 fL      Platelets 244 10*3/mm3     Manual Differential [[id number]]  (Abnormal) Collected:  07/10/17 0515    Specimen:  Blood Updated:  07/10/17 0735     Neutrophil % 91.0 (H) %      Lymphocyte % 4.0 (L) %      Monocyte % 4.0 (L) %      Eosinophil % 1.0 %      Neutrophils Absolute 16.20 (H) 10*3/mm3      Lymphocytes Absolute 0.71 (L) 10*3/mm3      Monocytes Absolute 0.71 10*3/mm3      Eosinophils Absolute 0.18 10*3/mm3      Anisocytosis Mod/2+     Dacrocytes Slight/1+     Ovalocytes Slight/1+     Target Cells Slight/1+     Schistocytes Slight/1+     Spherocytes Slight/1+     WBC Morphology Normal     Platelet Morphology Normal    Lactate Dehydrogenase [[id number]]  (Abnormal) Collected:  07/10/17 0515    Specimen:  Blood Updated:  07/10/17 0807      (H) U/L     Comprehensive Metabolic Panel [[id number]]  (Abnormal) Collected:  07/10/17 0515    Specimen:  Blood Updated:  07/10/17 0807     Glucose 310 (H) mg/dL      BUN 77 (H) mg/dL      Creatinine 6.21 (H) mg/dL      Sodium 135 (L) mmol/L      Potassium 4.2 mmol/L      Chloride 93 (L) mmol/L      CO2 22.7 mmol/L      Calcium 8.7 mg/dL      Total Protein 6.3 g/dL      Albumin 2.90 (L) g/dL      ALT (SGPT) 35 (H)  U/L      AST (SGOT) 16 U/L      Alkaline Phosphatase 88 U/L      Total Bilirubin 0.4 mg/dL      eGFR Non African Amer 8 (L) mL/min/1.73      Globulin 3.4 gm/dL      A/G Ratio 0.9 g/dL      BUN/Creatinine Ratio 12.4     Anion Gap 19.3 mmol/L     Bilirubin, Direct [[id number]]  (Normal) Collected:  07/10/17 0515    Specimen:  Blood Updated:  07/10/17 0818     Bilirubin, Direct <0.2 mg/dL     POC Glucose Fingerstick [[id number]]  (Abnormal) Collected:  07/10/17 1253    Specimen:  Blood Updated:  07/10/17 1255     Glucose 149 (H) mg/dL     Narrative:       Meter: [id number] : [id number] Emily JARQUIN    POC Glucose Fingerstick [[id number]]  (Abnormal) Collected:  07/10/17 1729    Specimen:  Blood Updated:  07/10/17 1730     Glucose 235 (H) mg/dL     Narrative:       Meter: [id number] : [id number] Emily JARQUIN            Active Problems:    DKA (diabetic ketoacidoses)    Pneumonia    Sepsis    Acute respiratory failure    History of systemic lupus erythematosus (SLE)    History of splenectomy    History of ITP    Primary hypothyroidism    ARF (acute renal failure) with tubular necrosis    Insulin therapy as discussed above.  Thyroid hormone replacement as discussed above.  Continue dialysis per nephrologist.  Agree with plans for rheumatology follow-up.

## 2017-07-11 NOTE — PROGRESS NOTES
Continued Stay Note  [facility]     Patient Name: Sarita Bai  MRN: [id number]  Today's Date: 7/11/2017    Admit Date: 6/29/2017          Discharge Plan       07/11/17 1256    Case Management/Social Work Plan    Plan Referral to [facility]-  await evaluation.     Patient/Family In Agreement With Plan yes    Additional Comments Spoke with patient and mother at bedside and they would like referral to [facility](Sruthi notified at [phone])- await evaluation.... CCP will continue to follow and assist as needed.....Sri Qiu RN,CCP               Discharge Codes     None            Sri Qiu RN

## 2017-07-11 NOTE — PLAN OF CARE
Problem: Patient Care Overview (Adult)  Goal: Plan of Care Review  Outcome: Ongoing (interventions implemented as appropriate)    07/11/17 1057   Coping/Psychosocial Response Interventions   Plan Of Care Reviewed With patient   Patient Care Overview   Progress improving   Outcome Evaluation   Outcome Summary/Follow up Plan Pt increasing with strength and balance with RWX and amb distance

## 2017-07-11 NOTE — PROGRESS NOTES
"  ENDOCRINE    Subjective   Sarita Bai is a 37 y.o. female.     Slowly improving.  No nausea or vomiting.  Patient received the last dose of Solu-Medrol 20 mg this morning.  She will switch to prednisone 10 mg every morning starting tomorrow.  Fasting glucose 193 this morning after she received Solu-Medrol at 6 AM.  Continue Levemir 24 units at bedtime.  Will increase NovoLog to 8 units before breakfast, 6 units at lunch and 6 units at supper.  Continue NovoLog as needed.    Objective   /84 (BP Location: Right arm, Patient Position: Sitting)  Pulse 81  Temp 97.4 °F (36.3 °C) (Oral)   Resp 20  Ht 67.01\" (170.2 cm)  Wt 149 lb 14.6 oz (68 kg)  SpO2 98%  BMI 23.47 kg/m2  Physical Exam    Awake, alert, not in distress.  Normal rales or wheezes.  Regular heart rate and rhythm.  No rubs.  Abdomen soft, nontender.  No cyanosis.  No pedal edema.    Lab Results (last 24 hours)     Procedure Component Value Units Date/Time    POC Glucose Fingerstick [[id number]]  (Abnormal) Collected:  07/10/17 2102    Specimen:  Blood Updated:  07/10/17 2109     Glucose 226 (H) mg/dL     Narrative:       Meter: [id number] : [id number] Sreedhar Jason    aPTT [[id number]]  (Normal) Collected:  07/11/17 0600    Specimen:  Blood Updated:  07/11/17 0651     PTT 27.7 seconds     Lactate Dehydrogenase [[id number]]  (Normal) Collected:  07/11/17 0600    Specimen:  Blood Updated:  07/11/17 0709      U/L     Reticulocytes [[id number]]  (Abnormal) Collected:  07/11/17 0600    Specimen:  Blood Updated:  07/11/17 0709     Reticulocyte % 3.09 (H) %     CBC (No Diff) [[id number]]  (Abnormal) Collected:  07/11/17 0600    Specimen:  Blood Updated:  07/11/17 0709     WBC 19.69 (H) 10*3/mm3      RBC 3.13 (L) 10*6/mm3      Hemoglobin 9.9 (L) g/dL      Hematocrit 28.6 (L) %      MCV 91.4 fL      MCH 31.6 pg      MCHC 34.6 g/dL      RDW 15.9 (H) %      RDW-SD 48.0 fl      MPV 11.0 fL      Platelets 223 10*3/mm3     Bilirubin, Direct " [[id number]]  (Normal) Collected:  07/11/17 0600    Specimen:  Blood Updated:  07/11/17 0720     Bilirubin, Direct <0.2 mg/dL     POC Glucose Fingerstick [[id number]]  (Abnormal) Collected:  07/11/17 0810    Specimen:  Blood Updated:  07/11/17 0812     Glucose 193 (H) mg/dL     Narrative:       Meter: [id number] : [id number] Emily JARQUIN    POC Glucose Fingerstick [[id number]]  (Abnormal) Collected:  07/11/17 1149    Specimen:  Blood Updated:  07/11/17 1154     Glucose 229 (H) mg/dL     Narrative:       Meter: [id number] : [id number] Emily JARQIUN    POC Glucose Fingerstick [[id number]]  (Abnormal) Collected:  07/11/17 1623    Specimen:  Blood Updated:  07/11/17 1630     Glucose 236 (H) mg/dL     Narrative:       Meter: [id number] : [id number] Emily JARQUIN            Active Problems:    DKA (diabetic ketoacidoses)    Pneumonia    Sepsis    Acute respiratory failure    History of systemic lupus erythematosus (SLE)    History of splenectomy    History of ITP    Primary hypothyroidism    ARF (acute renal failure) with tubular necrosis    Insulin therapy as discussed above.

## 2017-07-11 NOTE — DISCHARGE PLACEMENT REQUEST
"Sarita Bai (37 y.o. Female)     Date of Birth Social Security Number Address Home Phone MRN    1980  [id number] [phone] [id number]    Yarsani Marital Status          None Single       Admission Date Admission Type Admitting Provider Attending Provider Department, Room/Bed    6/29/17 Urgent Madison Harris MD Saad, Lebnan S, MD [address], [address]    Discharge Date Discharge Disposition Discharge Destination                      Attending Provider: Madison Harris MD     Allergies:  Heparin    Isolation:  None   Infection:  None   Code Status:  FULL    Ht:  67.01\" (170.2 cm)   Wt:  149 lb 14.6 oz (68 kg)    Admission Cmt:  None   Principal Problem:  None                Active Insurance as of 6/29/2017     Primary Coverage     Payor Plan Insurance Group Employer/Plan Group    [employer]HEM BLUE CROSS ANTHEM BLUE CROSS BLUE SHIELD O [id number]     Payor Plan Address Payor Plan Phone Number Effective From Effective To    [address] [phone] 5/1/2017     [place] [address]       Subscriber Name Subscriber Birth Date Member ID       SARITA BAI 1980 [id number]                 Emergency Contacts      (Rel.) Home Phone Work Phone Mobile Phone    Maria G Bai (Mother) [phone] -- --              "

## 2017-07-11 NOTE — PLAN OF CARE
Problem: Patient Care Overview (Adult)  Goal: Plan of Care Review  Outcome: Ongoing (interventions implemented as appropriate)    07/11/17 1150   Coping/Psychosocial Response Interventions   Plan Of Care Reviewed With patient   Patient Care Overview   Progress improving   Outcome Evaluation   Outcome Summary/Follow up Plan Diabetes education initiated       Goal: Discharge Needs Assessment  Outcome: Ongoing (interventions implemented as appropriate)

## 2017-07-12 LAB
ALBUMIN SERPL-MCNC: 2.7 G/DL (ref 3.5–5.2)
ALBUMIN/GLOB SERPL: 0.8 G/DL
ALP SERPL-CCNC: 74 U/L (ref 39–117)
ALT SERPL W P-5'-P-CCNC: 22 U/L (ref 1–33)
ANION GAP SERPL CALCULATED.3IONS-SCNC: 24 MMOL/L
AST SERPL-CCNC: 17 U/L (ref 1–32)
BILIRUB SERPL-MCNC: 0.2 MG/DL (ref 0.1–1.2)
BUN BLD-MCNC: 81 MG/DL (ref 6–20)
BUN/CREAT SERPL: 11.4 (ref 7–25)
CALCIUM SPEC-SCNC: 8.6 MG/DL (ref 8.6–10.5)
CHLORIDE SERPL-SCNC: 87 MMOL/L (ref 98–107)
CO2 SERPL-SCNC: 18 MMOL/L (ref 22–29)
CREAT BLD-MCNC: 7.13 MG/DL (ref 0.57–1)
DEPRECATED RDW RBC AUTO: 48.9 FL (ref 37–54)
ERYTHROCYTE [DISTWIDTH] IN BLOOD BY AUTOMATED COUNT: 15.6 % (ref 11.7–13)
GFR SERPL CREATININE-BSD FRML MDRD: 6 ML/MIN/1.73
GLOBULIN UR ELPH-MCNC: 3.3 GM/DL
GLUCOSE BLD-MCNC: 90 MG/DL (ref 65–99)
GLUCOSE BLDC GLUCOMTR-MCNC: 148 MG/DL (ref 70–130)
GLUCOSE BLDC GLUCOMTR-MCNC: 248 MG/DL (ref 70–130)
GLUCOSE BLDC GLUCOMTR-MCNC: 82 MG/DL (ref 70–130)
HCT VFR BLD AUTO: 27.9 % (ref 35.6–45.5)
HGB BLD-MCNC: 9.6 G/DL (ref 11.9–15.5)
MCH RBC QN AUTO: 32.4 PG (ref 26.9–32)
MCHC RBC AUTO-ENTMCNC: 34.4 G/DL (ref 32.4–36.3)
MCV RBC AUTO: 94.3 FL (ref 80.5–98.2)
PLATELET # BLD AUTO: 238 10*3/MM3 (ref 140–500)
PMV BLD AUTO: 10.7 FL (ref 6–12)
POTASSIUM BLD-SCNC: 4 MMOL/L (ref 3.5–5.2)
PROT SERPL-MCNC: 6 G/DL (ref 6–8.5)
RBC # BLD AUTO: 2.96 10*6/MM3 (ref 3.9–5.2)
SODIUM BLD-SCNC: 129 MMOL/L (ref 136–145)
WBC NRBC COR # BLD: 18.26 10*3/MM3 (ref 4.5–10.7)

## 2017-07-12 PROCEDURE — 85027 COMPLETE CBC AUTOMATED: CPT | Performed by: INTERNAL MEDICINE

## 2017-07-12 PROCEDURE — 86235 NUCLEAR ANTIGEN ANTIBODY: CPT | Performed by: INTERNAL MEDICINE

## 2017-07-12 PROCEDURE — 63710000001 INSULIN ASPART PER 5 UNITS: Performed by: INTERNAL MEDICINE

## 2017-07-12 PROCEDURE — 97110 THERAPEUTIC EXERCISES: CPT

## 2017-07-12 PROCEDURE — 82962 GLUCOSE BLOOD TEST: CPT

## 2017-07-12 PROCEDURE — 63410000001 EPOETIN ALFA PER 1000 UNITS: Performed by: INTERNAL MEDICINE

## 2017-07-12 PROCEDURE — 99231 SBSQ HOSP IP/OBS SF/LOW 25: CPT | Performed by: INTERNAL MEDICINE

## 2017-07-12 PROCEDURE — 80053 COMPREHEN METABOLIC PANEL: CPT | Performed by: INTERNAL MEDICINE

## 2017-07-12 PROCEDURE — 63710000001 PREDNISONE PER 5 MG: Performed by: INTERNAL MEDICINE

## 2017-07-12 PROCEDURE — 25010000002 HEPARIN (PORCINE) PER 1000 UNITS

## 2017-07-12 PROCEDURE — 86225 DNA ANTIBODY NATIVE: CPT | Performed by: INTERNAL MEDICINE

## 2017-07-12 PROCEDURE — 25010000002 ENOXAPARIN PER 10 MG: Performed by: INTERNAL MEDICINE

## 2017-07-12 RX ORDER — HEPARIN SODIUM 1000 [USP'U]/ML
1000 INJECTION, SOLUTION INTRAVENOUS; SUBCUTANEOUS AS NEEDED
Status: DISCONTINUED | OUTPATIENT
Start: 2017-07-12 | End: 2017-07-13 | Stop reason: HOSPADM

## 2017-07-12 RX ORDER — HEPARIN SODIUM 1000 [USP'U]/ML
INJECTION, SOLUTION INTRAVENOUS; SUBCUTANEOUS
Status: COMPLETED
Start: 2017-07-12 | End: 2017-07-12

## 2017-07-12 RX ADMIN — LEVOTHYROXINE SODIUM 50 MCG: 50 TABLET ORAL at 06:30

## 2017-07-12 RX ADMIN — INSULIN ASPART 6 UNITS: 100 INJECTION, SOLUTION INTRAVENOUS; SUBCUTANEOUS at 17:51

## 2017-07-12 RX ADMIN — ENOXAPARIN SODIUM 30 MG: 30 INJECTION SUBCUTANEOUS at 17:51

## 2017-07-12 RX ADMIN — PREDNISONE 10 MG: 10 TABLET ORAL at 17:50

## 2017-07-12 RX ADMIN — INSULIN ASPART 8 UNITS: 100 INJECTION, SOLUTION INTRAVENOUS; SUBCUTANEOUS at 08:31

## 2017-07-12 RX ADMIN — HEPARIN SODIUM 4200 UNITS: 1000 INJECTION, SOLUTION INTRAVENOUS; SUBCUTANEOUS at 13:14

## 2017-07-12 RX ADMIN — CASTOR OIL AND BALSAM, PERU: 788; 87 OINTMENT TOPICAL at 20:05

## 2017-07-12 RX ADMIN — ERYTHROPOIETIN 5000 UNITS: 3000 INJECTION, SOLUTION INTRAVENOUS; SUBCUTANEOUS at 11:10

## 2017-07-12 RX ADMIN — INSULIN ASPART 4 UNITS: 100 INJECTION, SOLUTION INTRAVENOUS; SUBCUTANEOUS at 17:50

## 2017-07-12 NOTE — PAYOR COMM NOTE
"Tasha Bai (37 y.o. Female)           ATTENTION; CONTINUED STAY REVIEW, CASE REF#[id number], CLINICALS FOR YOUR REVIEW, REPLY TO          UR DEPT, CLAUDIA ROBLES N  OR UR  [phone]       Date of Birth Social Security Number Address Home Phone MRN    1980  [address] [address] [phone] [id number]    Orthodoxy Marital Status          None Single       Admission Date Admission Type Admitting Provider Attending Provider Department, Room/Bed    6/29/17 Urgent Madison Harris MD Anaya, Leeroy HALEY MD [address], 417/1    Discharge Date Discharge Disposition Discharge Destination                      Attending Provider: Leeroy Porter MD     Allergies:  Heparin    Isolation:  None   Infection:  None   Code Status:  FULL    Ht:  67.01\" (170.2 cm)   Wt:  153 lb (69.4 kg)    Admission Cmt:  None   Principal Problem:  None                Active Insurance as of 6/29/2017     Primary Coverage     Payor Plan Insurance Group Employer/Plan Group    ANTHEM BLUE CROSS ANTHEM BLUE CROSS BLUE [facility] [id number]     Payor Plan Address Payor Plan Phone Number Effective From Effective To    [id number] [phone] 5/1/2017     [place] [address]       Subscriber Name Subscriber Birth Date Member ID       TASHA BAI 1980 [id number]                 Emergency Contacts      (Rel.) Home Phone Work Phone Mobile Phone    Maria G Bai (Mother) [phone] -- --            Lines, Drains & Airways    Active LDAs     Name:   Placement date:   Placement time:   Site:   Days:    Tunneled Central Line - Double Lumen 07/06/17 1300 internal jugular vein, left other (see comments)  07/06/17    1300      6    Peripheral IV Line - Single Lumen 07/11/17 1940  24 gauge  07/11/17    1940      less than 1                Hospital Medications (active)       Dose Frequency Start End    albumin human 25 % IV SOLN 12.5 g 12.5 g As Needed 7/11/2017 " "7/12/2017    Sig - Route: Infuse 50 mL into a venous catheter As Needed (Hypotension During Dialysis). - Intravenous    artificial tears (LUBRIFRESH P.M.) ophthalmic ointment  As Needed 7/1/2017     Sig - Route: Administer  to both eyes As Needed (Dry Eyes). - Both Eyes    calcium gluconate 1 g in sodium chloride 0.9 % 50 mL IVPB 1 g As Needed 7/1/2017     Sig - Route: Infuse 1 g into a venous catheter As Needed (SERUM Ionized Ca 0.7 - 0.84). - Intravenous    Linked Group 1:  \"Or\" Linked Group Details        calcium gluconate 2 g in sodium chloride 0.9 % 50 mL IVPB 2 g As Needed 7/1/2017     Sig - Route: Infuse 2 g into a venous catheter As Needed (SERUM Ionized Ca 0.6 - 0.69). - Intravenous    Linked Group 1:  \"Or\" Linked Group Details        castor oil-balsam peru (VENELEX) ointment  Every 12 Hours Scheduled 7/3/2017     Sig - Route: Apply  topically Every 12 (Twelve) Hours. - Topical    dextrose (D50W) solution 12.5 g 12.5 g Every 15 Minutes PRN 6/29/2017     Sig - Route: Infuse 25 mL into a venous catheter Every 15 (Fifteen) Minutes As Needed for Low Blood Sugar (for blood glucose < 100 mg/dL). - Intravenous    dextrose (D50W) solution 25-50 mL 25-50 mL Every 30 Minutes PRN 7/4/2017     Sig - Route: Infuse 25-50 mL into a venous catheter Every 30 (Thirty) Minutes As Needed for Low Blood Sugar (Blood Glucose <70 mg/dL; Per Insulin Infusion Protocol). - Intravenous    enoxaparin (LOVENOX) syringe 30 mg 30 mg Daily 7/8/2017     Sig - Route: Inject 0.3 mL under the skin Daily. - Subcutaneous    epoetin sarah (EPOGEN,PROCRIT) injection 5,000 Units 5,000 Units 3 Times Weekly 7/12/2017     Sig - Route: Infuse 1.67 mL into a venous catheter Once per day on Mon Wed Fri. - Intravenous    heparin (porcine) injection 1,000 Units 1,000 Units As Needed 7/12/2017     Sig - Route: 1 mL by Intracatheter route As Needed (For Dialysis Catheter Care.). - Intracatheter    insulin aspart (novoLOG) injection 0-10 Units 0-10 Units 4 " Times Daily Before Meals & Nightly 7/8/2017     Sig - Route: Inject 0-10 Units under the skin 4 (Four) Times a Day Before Meals & at Bedtime. - Subcutaneous    insulin aspart (novoLOG) injection 6 Units 6 Units Daily With Lunch 7/12/2017     Sig - Route: Inject 6 Units under the skin Daily With Lunch. - Subcutaneous    insulin aspart (novoLOG) injection 6 Units 6 Units Daily With Dinner 7/12/2017     Sig - Route: Inject 6 Units under the skin Daily With Dinner. - Subcutaneous    insulin aspart (novoLOG) injection 8 Units 8 Units Daily With Breakfast 7/12/2017     Sig - Route: Inject 8 Units under the skin Daily With Breakfast. - Subcutaneous    insulin detemir (LEVEMIR) injection 24 Units 24 Units Nightly 7/9/2017     Sig - Route: Inject 24 Units under the skin Every Night. - Subcutaneous    levothyroxine (SYNTHROID, LEVOTHROID) tablet 50 mcg 50 mcg Every Early Morning 7/5/2017     Sig - Route: Take 1 tablet by mouth Every Morning. - Oral    predniSONE (DELTASONE) tablet 10 mg 10 mg Daily With Breakfast 7/12/2017     Sig - Route: Take 1 tablet by mouth Daily With Breakfast. - Oral    insulin aspart (novoLOG) injection 6 Units (Discontinued) 6 Units 3 Times Daily With Meals 7/8/2017 7/11/2017    Sig - Route: Inject 6 Units under the skin 3 (Three) Times a Day With Meals. - Subcutaneous    lansoprazole (FIRST) oral suspension 30 mg (Discontinued) 30 mg Every Morning 7/4/2017 7/11/2017    Sig - Route: 10 mL by Per G Tube route Every Morning. - Per G Tube    methylPREDNISolone sodium succinate (SOLU-Medrol) injection 20 mg (Discontinued) 20 mg Every 12 Hours 7/8/2017 7/11/2017    Sig - Route: Infuse 0.5 mL into a venous catheter Every 12 (Twelve) Hours. - Intravenous               Thomas Campbell MD at 7/11/2017  7:45 PM  Version 1 of 1           ENDOCRINE    Subjective   Sarita Bai is a 37 y.o. female.     Slowly improving.  No nausea or vomiting.  Patient received the last dose of Solu-Medrol 20 mg this morning.   "She will switch to prednisone 10 mg every morning starting tomorrow.  Fasting glucose 193 this morning after she received Solu-Medrol at 6 AM.  Continue Levemir 24 units at bedtime.  Will increase NovoLog to 8 units before breakfast, 6 units at lunch and 6 units at supper.  Continue NovoLog as needed.    Objective   /84 (BP Location: Right arm, Patient Position: Sitting)  Pulse 81  Temp 97.4 °F (36.3 °C) (Oral)   Resp 20  Ht 67.01\" (170.2 cm)  Wt 149 lb 14.6 oz (68 kg)  SpO2 98%  BMI 23.47 kg/m2  Physical Exam    Awake, alert, not in distress.  Normal rales or wheezes.  Regular heart rate and rhythm.  No rubs.  Abdomen soft, nontender.  No cyanosis.  No pedal edema.    Lab Results (last 24 hours)     Procedure Component Value Units Date/Time    POC Glucose Fingerstick [[id number]]  (Abnormal) Collected:  07/10/17 2102    Specimen:  Blood Updated:  07/10/17 2109     Glucose 226 (H) mg/dL     Narrative:       Meter: [id number] : [id number] Sreedhar [place]    aPTT [[id number]]  (Normal) Collected:  07/11/17 0600    Specimen:  Blood Updated:  07/11/17 0651     PTT 27.7 seconds     Lactate Dehydrogenase [[id number]]  (Normal) Collected:  07/11/17 0600    Specimen:  Blood Updated:  07/11/17 0709      U/L     Reticulocytes [[id number]]  (Abnormal) Collected:  07/11/17 0600    Specimen:  Blood Updated:  07/11/17 0709     Reticulocyte % 3.09 (H) %     CBC (No Diff) [[id number]]  (Abnormal) Collected:  07/11/17 0600    Specimen:  Blood Updated:  07/11/17 0709     WBC 19.69 (H) 10*3/mm3      RBC 3.13 (L) 10*6/mm3      Hemoglobin 9.9 (L) g/dL      Hematocrit 28.6 (L) %      MCV 91.4 fL      MCH 31.6 pg      MCHC 34.6 g/dL      RDW 15.9 (H) %      RDW-SD 48.0 fl      MPV 11.0 fL      Platelets 223 10*3/mm3     Bilirubin, Direct [[id number]]  (Normal) Collected:  07/11/17 0600    Specimen:  Blood Updated:  07/11/17 0720     Bilirubin, Direct <0.2 mg/dL     POC Glucose Fingerstick [[id number]]  (Abnormal) " "Collected:  07/11/17 0810    Specimen:  Blood Updated:  07/11/17 0812     Glucose 193 (H) mg/dL     Narrative:       Meter: [id number] : [id number] Emilyremi JARQUIN    POC Glucose Fingerstick [[id number]]  (Abnormal) Collected:  07/11/17 1149    Specimen:  Blood Updated:  07/11/17 1154     Glucose 229 (H) mg/dL     Narrative:       Meter: [id number] : [id number] Emily Anamika BRITTON    POC Glucose Fingerstick [[id number]]  (Abnormal) Collected:  07/11/17 1623    Specimen:  Blood Updated:  07/11/17 1630     Glucose 236 (H) mg/dL     Narrative:       Meter: [id number] : [id number] Emilyremi JARQUIN            Active Problems:    DKA (diabetic ketoacidoses)    Pneumonia    Sepsis    Acute respiratory failure    History of systemic lupus erythematosus (SLE)    History of splenectomy    History of ITP    Primary hypothyroidism    ARF (acute renal failure) with tubular necrosis    Insulin therapy as discussed above.           Electronically signed by Thomas Campbell MD at 7/11/2017  7:48 PM      Debra Mohamud MD at 7/12/2017 10:01 AM  Version 2 of 2            LOS: 13 days    Patient Care Team:  Gregorio Bai MD as PCP - General (Family Medicine)    Chief Complaint:  No chief complaint on file.      Subjective     Interval History: Follow up HODAN, anuric.  Eating. Getting out of bed. Bowels moving. Weak.   Currently on dialysis.            Objective     Vital Signs  Temp:  [97.4 °F (36.3 °C)-99.1 °F (37.3 °C)] 97.6 °F (36.4 °C)  Heart Rate:  [71-85] 71  Resp:  [16-20] 18  BP: (123-140)/(74-88) 129/79    Flowsheet Rows         First Filed Value    Admission Height  67\" (170.2 cm) Documented at 06/29/2017 2028    Admission Weight  155 lb 10.3 oz (70.6 kg) Documented at 06/29/2017 2028             I/O last 3 completed shifts:  In: 1050 [P.O.:1020; I.V.:30]  Out: -     Intake/Output Summary (Last 24 hours) at 07/12/17 1001  Last data filed at 07/12/17 0530   Gross per 24 hour   Intake              770 ml   Output       "          0 ml   Net              770 ml       Physical Exam:  On dialysis. qB 400.    Voice hoarse.  Malar rash.  Heart RRR no s3 or rub. Lungs clear to auscultation. Abd BS+ soft, nontender. Ext 1+ lower ext edema. Raynauds fingers.       Results from last 7 days  Lab Units 07/12/17  0431 07/10/17  0515 07/09/17  0356   SODIUM mmol/L 129* 135* 139   POTASSIUM mmol/L 4.0 4.2 4.0   CHLORIDE mmol/L 87* 93* 97*   CO2 mmol/L 18.0* 22.7 25.1   BUN mg/dL 81* 77* 53*   CREATININE mg/dL 7.13* 6.21* 4.01*   CALCIUM mg/dL 8.6 8.7 8.9   BILIRUBIN mg/dL 0.2 0.4 0.4   ALK PHOS U/L 74 88 95   ALT (SGPT) U/L 22 35* 70*   AST (SGOT) U/L 17 16 27   GLUCOSE mg/dL 90 310* 217*       Estimated Creatinine Clearance: 11.6 mL/min (by C-G formula based on Cr of 7.13).      Results from last 7 days  Lab Units 07/08/17  0424 07/07/17  0356 07/06/17  0510   MAGNESIUM mg/dL  --   --  2.8*   PHOSPHORUS mg/dL 5.6* 2.5 3.7               Results from last 7 days  Lab Units 07/12/17  0431 07/11/17  0600 07/10/17  0515 07/09/17  0356 07/08/17  0424   WBC 10*3/mm3 18.26* 19.69* 17.80* 17.00* 18.33*   HEMOGLOBIN g/dL 9.6* 9.9* 10.8* 10.9* 9.9*   PLATELETS 10*3/mm3 238 223 244 209 184               Imaging Results (last 24 hours)     ** No results found for the last 24 hours. **          castor oil-balsam peru  Topical Q12H   enoxaparin 30 mg Subcutaneous Daily   epoetin sarah 5,000 Units Intravenous Once per day on Mon Wed Fri   insulin aspart 0-10 Units Subcutaneous 4x Daily AC & at Bedtime   insulin aspart 6 Units Subcutaneous Daily With Lunch   insulin aspart 6 Units Subcutaneous Daily With Dinner   insulin aspart 8 Units Subcutaneous Daily With Breakfast   insulin detemir 24 Units Subcutaneous Nightly   levothyroxine 50 mcg Oral Q AM   predniSONE 10 mg Oral Daily With Breakfast          Medication Review:   Current Facility-Administered Medications   Medication Dose Route Frequency Provider Last Rate Last Dose   • albumin human 25 % IV SOLN 12.5 g   12.5 g Intravenous PRN Debra Mohamud MD       • artificial tears (LUBRIFRESH P.M.) ophthalmic ointment   Both Eyes PRN Madison Harris MD       • calcium gluconate 1 g in sodium chloride 0.9 % 50 mL IVPB  1 g Intravenous PRN Susan Urena MD        Or   • calcium gluconate 2 g in sodium chloride 0.9 % 50 mL IVPB  2 g Intravenous PRN Susan Urena MD       • castor oil-balsam peru (VENELEX) ointment   Topical Q12H Madison Harris MD       • dextrose (D50W) solution 12.5 g  12.5 g Intravenous Q15 Min PRN Alexis Poe MD   12.5 g at 07/01/17 1526   • dextrose (D50W) solution 25-50 mL  25-50 mL Intravenous Q30 Min PRN Van Remy MD       • enoxaparin (LOVENOX) syringe 30 mg  30 mg Subcutaneous Daily Leeroy Porter MD   30 mg at 07/11/17 0852   • epoetin sarah (EPOGEN,PROCRIT) injection 5,000 Units  5,000 Units Intravenous Once per day on Mon Wed Fri Debra Mohamud MD       • heparin (porcine) injection 1,000 Units  1,000 Units Intracatheter PRN Angelica Saucedo MD       • insulin aspart (novoLOG) injection 0-10 Units  0-10 Units Subcutaneous 4x Daily AC & at Bedtime Kd Olae MD   4 Units at 07/11/17 2239   • insulin aspart (novoLOG) injection 6 Units  6 Units Subcutaneous Daily With Lunch Thomas Campbell MD       • insulin aspart (novoLOG) injection 6 Units  6 Units Subcutaneous Daily With Dinner Thomas Campbell MD       • insulin aspart (novoLOG) injection 8 Units  8 Units Subcutaneous Daily With Breakfast Thomas Campbell MD   8 Units at 07/12/17 0831   • insulin detemir (LEVEMIR) injection 24 Units  24 Units Subcutaneous Nightly Kd Olea MD   24 Units at 07/11/17 2239   • levothyroxine (SYNTHROID, LEVOTHROID) tablet 50 mcg  50 mcg Oral Q AM Thomas Campbell MD   50 mcg at 07/12/17 0630   • predniSONE (DELTASONE) tablet 10 mg  10 mg Oral Daily With Breakfast Leeroy Porter MD           Assessment/Plan       Active Problems:    DKA (diabetic ketoacidoses)     Pneumonia    Sepsis    Acute respiratory failure    History of systemic lupus erythematosus (SLE)    History of splenectomy    History of ITP    Primary hypothyroidism    ARF (acute renal failure) with tubular necrosis    1. HODAN, initially  prerenal , now  ATN. Anuric. Dialysis dependent since 6/30.  Dialysis via [facility]. Outpatient dialysis being arranged at [facility].   2. Severe right-sided pna/ARDS . Resolved.   3. SLE on steroid. Draw ZIGGY panel today.  Her anti DS DNA was negative 7/5. She had seen Dr. Butler rheumatology in the past. Will need follow up.   4. New DM1 presenting as DKA, now on scheduled insulin.  Endocrine managing.   5. H/o ITP with prior splenectomy: TCP resolved. HTI negative.  Likely autoimmune per hematology .    6. Hypothyroidism on oral replacement.   7. Anemia.   Hg stable. Procrit with HD>          Plan:   1. Dialysis today. Scan bladder after HD to make sure not retaining.   2. DW patient and her uncle Dr. Bai by phone  3. Will need Rheumatology follow up.  Has seen Dr. Conrad Butler previously.  I made appointment with his office ARNP for July 20 at 2:45 pm.  Office staff thinks that DR. Butler will be in the office that day.  4. Will also need endocrine follow up.     Debra Mohamud MD  07/12/17  10:01 AM       Electronically signed by Debra Mohamud MD at 7/12/2017 10:45 AM                Progress Notes  Date of Service: 7/12/2017 8:45 AM  Sri Qiu RN   Case Management      []Hide copied text  []Hover for attribution information  Continued Stay Note  [facility]     Patient Name: Sarita Bai  MRN: [id number]  Today's Date: 7/12/2017    Admit Date: 6/29/2017            Discharge Plan        07/12/17 0842          Case Management/Social Work Plan     Plan [facility] precert started today 7/12/17     Patient/Family In Agreement With Plan yes     Additional Comments Spoke with Sruthi at [facility] at [phone] and  they will start precert today. Patient and father, Ryan informed that [facility] has accepted patient and will start [facility] precert today... CCP will continue to follow and assist as needed....Sri Qiu RN,CCP                      Discharge Codes      None             THIERNO Hopper MD at 7/12/2017  1:45 PM  Version 1 of 1           Dr. OSITO Porter    [address]    7/12/2017    Patient ID:  Name:  Sarita Bai  MRN:  [id number]  1980  37 y.o.  female            CC/Reason for visit: Follow-up for systemic lupus, ARDS, pneumonia, acute kidney injury    HPI: Patient feels quite all right.  She just completed hemodialysis.  No new complaints.  Waiting for insurance approval for transfer to [facility]    Vitals:  Vitals:    07/12/17 0500 07/12/17 0833 07/12/17 0855 07/12/17 1300   BP:  131/74 129/79 121/77   BP Location:  Right arm Left arm Left arm   Patient Position:  Sitting Lying    Pulse:  85 71 78   Resp:  18 18 16   Temp:  97.8 °F (36.6 °C) 97.6 °F (36.4 °C) 97.6 °F (36.4 °C)   TempSrc:  Oral Oral Oral   SpO2:  96%     Weight: 153 lb (69.4 kg)      Height:         FiO2 (%): 30 %     Body mass index is 23.96 kg/(m^2).    Intake/Output Summary (Last 24 hours) at 07/12/17 1345  Last data filed at 07/12/17 1300   Gross per 24 hour   Intake              470 ml   Output             2000 ml   Net            -1530 ml       Exam:  GEN:  No distress, Awake, alert, following all commands  LUNGS: Clear breath sounds bilat, nonlabored breathing  CV:  Normal S1S2, without murmur, no edema      Scheduled meds:    castor oil-balsam peru  Topical Q12H   enoxaparin 30 mg Subcutaneous Daily   epoetin sarah 5,000 Units Intravenous Once per day on Mon Wed Fri   insulin aspart 0-10 Units Subcutaneous 4x Daily AC & at Bedtime   insulin aspart 6 Units Subcutaneous Daily With Lunch   insulin aspart 6 Units Subcutaneous Daily With Dinner   insulin aspart 8  Units Subcutaneous Daily With Breakfast   insulin detemir 24 Units Subcutaneous Nightly   levothyroxine 50 mcg Oral Q AM   predniSONE 10 mg Oral Daily With Breakfast     IV meds:                           Data Review:   I reviewed the patient's medications and new clinical results.  Lab Results   Component Value Date    CALCIUM 8.6 07/12/2017    PHOS 5.6 (H) 07/08/2017       Results from last 7 days  Lab Units 07/12/17  0431 07/11/17  0600 07/10/17  0515 07/09/17  0356   SODIUM mmol/L 129*  --  135* 139   POTASSIUM mmol/L 4.0  --  4.2 4.0   CHLORIDE mmol/L 87*  --  93* 97*   CO2 mmol/L 18.0*  --  22.7 25.1   BUN mg/dL 81*  --  77* 53*   CREATININE mg/dL 7.13*  --  6.21* 4.01*   CALCIUM mg/dL 8.6  --  8.7 8.9   BILIRUBIN mg/dL 0.2  --  0.4 0.4   ALK PHOS U/L 74  --  88 95   ALT (SGPT) U/L 22  --  35* 70*   AST (SGOT) U/L 17  --  16 27   GLUCOSE mg/dL 90  --  310* 217*   WBC 10*3/mm3 18.26* 19.69* 17.80* 17.00*   HEMOGLOBIN g/dL 9.6* 9.9* 10.8* 10.9*   PLATELETS 10*3/mm3 238 223 244 209                   Results from last 7 days  Lab Units 07/07/17  0721   PH, ARTERIAL pH units 7.519*   PCO2, ARTERIAL mm Hg 37.3   PO2 ART mm Hg 117.2*   MODALITY  Adult Vent   O2 SATURATION CALC % 99.0         ASSESSMENT:   Community acquired pneumonia  DKA (diabetic ketoacidoses)  ARDS  Sepsis  Acute respiratory failure  History of systemic lupus erythematosus (SLE)  History of splenectomy  History of ITP  Primary hypothyroidism  ARF (acute renal failure) with tubular necrosis  Diabetes type 2      PLAN:  Patient may be discharged tomorrow.  She will continue on some low-dose prednisone and she will see Dr. Butler, rheumatologist next week.  I discussed the case with Dr. Mohamud nephrology.        Leeroy Porter MD  7/12/2017     Electronically signed by Leeroy Porter MD at 7/12/2017  1:47 PM

## 2017-07-12 NOTE — PROGRESS NOTES
Continued Stay Note  [facility]     Patient Name: Sarita Bai  MRN: [id number]  Today's Date: 7/12/2017    Admit Date: 6/29/2017          Discharge Plan       07/12/17 0842    Case Management/Social Work Plan    Plan [facility]- skilled-  [facility] precert started today 7/12/17    Patient/Family In Agreement With Plan yes    Additional Comments Spoke with Sruthi at [facility] at [phone] and they will start precert today.  Patient and father, Ryan informed that [facility] has accepted patient and will start [facility] precert today... CCP will continue to follow and assist as needed....Sri Qiu RN,CCP               Discharge Codes     None            Sri Qiu RN

## 2017-07-12 NOTE — THERAPY TREATMENT NOTE
Acute Care - Physical Therapy Treatment Note  [facility]     Patient Name: Sarita Bai  : 1980  MRN: [id number]  Today's Date: 2017  Onset of Illness/Injury or Date of Surgery Date: 17          Admit Date: 2017    Visit Dx:    ICD-10-CM ICD-9-CM   1. Generalized weakness R53.1 780.79     Patient Active Problem List   Diagnosis   • DKA (diabetic ketoacidoses)   • Pneumonia   • Sepsis   • Acute respiratory failure   • History of systemic lupus erythematosus (SLE)   • History of splenectomy   • History of ITP   • Primary hypothyroidism   • ARF (acute renal failure) with tubular necrosis               Adult Rehabilitation Note       17 1500 17 1000 07/10/17 1400    Rehab Assessment/Intervention    Discipline physical therapy assistant  -CW physical therapy assistant  -CW physical therapy assistant  -CW    Document Type therapy note (daily note)  -CW therapy note (daily note)  -CW therapy note (daily note)  -CW    Subjective Information agree to therapy;complains of;weakness;fatigue  -CW agree to therapy;complains of;weakness;fatigue  -CW agree to therapy;complains of;weakness;fatigue  -CW    Patient Effort, Rehab Treatment good  -CW good  -CW good  -CW    Precautions/Limitations fall precautions  -CW fall precautions  -CW fall precautions  -CW    Recorded by [CW] Artem Acosta [CW] Artem Acosta [CW] Artem Acosta    Pain Assessment    Pain Assessment No/denies pain  -CW No/denies pain  -CW No/denies pain  -CW    Recorded by [CW] Artem Acosta [CW] Artem Acosta [CW] Artem Acosta    Cognitive Assessment/Intervention    Current Cognitive/Communication Assessment functional  -CW functional  -CW functional  -CW    Orientation Status oriented x 4  -CW oriented x 4  -CW oriented x 4  -CW    Follows Commands/Answers Questions 100% of the time  -% of the time  -% of the time  -CW    Personal Safety WNL/WFL  -CW WNL/WFL  -CW WNL/WFL  -CW     Personal Safety Interventions fall prevention program maintained;gait belt;muscle strengthening facilitated;nonskid shoes/slippers when out of bed  -CW fall prevention program maintained;nonskid shoes/slippers when out of bed;gait belt;muscle strengthening facilitated  -CW fall prevention program maintained;gait belt;muscle strengthening facilitated;nonskid shoes/slippers when out of bed  -CW    Recorded by [CW] Artem Acosta [CW] Artem Acosta [CW] Artem Acosta    Bed Mobility, Assessment/Treatment    Bed Mob, Supine to Sit, [place]  contact guard assist  -CW contact guard assist  -CW    Bed Mob, Sit to Supine, [place] not tested  -CW not tested  -CW not tested  -CW    Bed Mobility, Comment in chair  -CW up to chair  -CW up to chair  -CW    Recorded by [CW] Artem Acosta [CW] Artem Acosta [CW] Artem Acosta    Transfer Assessment/Treatment    Transfers, Sit-Stand [place] contact guard assist  -CW contact guard assist  -CW contact guard assist  -CW    Transfers, Stand-Sit [place] contact guard assist  -CW contact guard assist  -CW contact guard assist  -CW    Transfers, Sit-Stand-Sit, Assist Device rolling walker  -CW rolling walker  -CW rolling walker  -CW    Recorded by [CW] Artem Acosta [CW] Artem Acosta [CW] Artem Acosta    Gait Assessment/Treatment    Gait, [place] Level contact guard assist  -CW contact guard assist  -CW contact guard assist  -CW    Gait, Assistive Device rolling walker  -CW rolling walker  -CW rolling walker  -CW    Gait, Distance (Feet) 30  -CW 60  -CW 20  -CW    Gait, Gait Deviations mar[place] decreased;step length decreased;stride length decreased  -CW [place] decreased;step length decreased;stride length decreased  -CW [place] decreased;step length decreased;stride length decreased;ataxia  -CW    Recorded by [CW] Artem Acosta [CW] Artem Acosta [CW] Artem Acosta    Positioning and Restraints     Pre-Treatment Position sitting in chair/recliner  -CW in bed  -CW in bed  -CW    Post Treatment Position chair  -CW chair  -CW chair  -CW    In Chair notified nsg;sitting;call light within reach;encouraged to call for assist;with family/caregiver  -CW notified nsg;reclined;call light within reach;encouraged to call for assist  -CW notified nsg;reclined;call light within reach;encouraged to call for assist  -CW    Recorded by [CW] Artem Acosta [CW] Artem Acosta [CW] Artem Acosta      07/09/17 1729          Rehab Assessment/Intervention    Discipline physical therapist  -JR      Document Type therapy note (daily note)  -JR      Subjective Information no complaints;agree to therapy  -JR      Patient Effort, Rehab Treatment excellent   MOTIVATED TO ATTAIN GOALS.    -JR      Patient Response to Treatment EXCELLENT  -JR      Recorded by [JR] Gerry Nelson, PT      Vital Signs    Intra Systolic BP Rehab 134  -JR      Intra Treatment Diastolic BP 94  -JR      Intratreatment Heart Rate (beats/min) 115  -JR      Posttreatment Heart Rate (beats/min) 95  -JR      Pre SpO2 (%) 97  -JR      Recorded by [JR] Gerry Nelson, PT      Pain Assessment    Pain Assessment No/denies pain  -JR      Recorded by [JR] Gerry Nelson, PT      Cognitive Assessment/Intervention    Current Cognitive/Communication Assessment functional  -JR      Orientation Status oriented x 4  -JR      Follows Commands/Answers Questions 100% of the time;other (see comments)  -JR      Personal Safety WNL/WFL  -JR      Recorded by [JR] Gerry Nelson, PT      Bed Mobility, Assessment/Treatment    Bed Mobility, Comment SITTING IN RECLINER  -JR      Recorded by [JR] Gerry Nelson, PT      Transfer Assessment/Treatment    Transfers, Sit-Stand [place] minimum assist (75% patient effort);2 person assist required;nonverbal cues required (demo/gesture);verbal cues required  -JR      Transfers, Stand-Sit [place] minimum assist (75%  patient effort);2 person assist required;nonverbal cues required (demo/gesture);verbal cues required  -JR      Transfers, Sit-Stand-Sit, Assist Device rolling walker  -JR      Recorded by [JR] Gerry Nelson PT      Gait Assessment/Treatment    Gait, [place] Level minimum assist (75% patient effort);2 person assist required;nonverbal cues required (demo/gesture);verbal cues required  -JR      Gait, Assistive Device rolling walker  -JR      Gait, Distance (Feet) 15   BEDSIDE CHAIR PUSHED BEHIND.  Pt TOOK ONE SEATED REST BREAK  -JR      Gait, Safety Issues step length decreased  -JR      Gait, Impairments strength decreased  -JR      Recorded by [JR] Gerry Nelson PT      Positioning and Restraints    Pre-Treatment Position sitting in chair/recliner  -JR      Post Treatment Position chair  -JR      In Chair notified nsg;reclined;call light within reach;encouraged to call for assist  -JR      Recorded by [JR] Gerry Nelson, PT        User Key  (r) = Recorded By, (t) = Taken By, (c) = Cosigned By    Initials Name Effective Dates    JR Gerry Nelson, PT 01/07/16 -     CW Atrem Acosta 12/13/16 -                 IP PT Goals       07/08/17 1330          Bed Mobility PT LTG    Bed Mobility PT LTG, Date Established 07/08/17  -EM      Bed Mobility PT LTG, Time to Achieve 1 wk  -EM      Bed Mobility PT LTG, Activity Type all bed mobility  -EM      Bed Mobility PT LTG, [place] Level minimum assist (75% patient effort)  -EM      Transfer Training PT LTG    Transfer Training PT LTG, Date Established 07/08/17  -EM      Transfer Training PT LTG, Time to Achieve 1 wk  -EM      Transfer Training PT LTG, Activity Type all transfers  -EM      Transfer Training PT LTG, [place] Level minimum assist (75% patient effort)  -EM      Gait Training PT LTG    Gait Training Goal PT LTG, Date Established 07/08/17  -EM      Gait Training Goal PT LTG, Time to Achieve 1 wk  -EM      Gait Training Goal PT LTG,  [place] Level minimum assist (75% patient effort)  -EM      Gait Training Goal PT LTG, Assist Device walker, rolling  -EM      Gait Training Goal PT LTG, Distance to Achieve 50 feet   -EM        User Key  (r) = Recorded By, (t) = Taken By, (c) = Cosigned By    Initials Name Provider Type    EM Adrianne Pierce, PT Physical Therapist          Physical Therapy Education     Title: PT OT SLP Therapies (Done)     Topic: Physical Therapy (Done)     Point: Mobility training (Done)    Learning Progress Summary    Learner Readiness Method Response Comment Documented by Status   Patient Acceptance E,TB DU,VU   07/12/17 1506 Done    Acceptance TB,E VU,DU   07/11/17 1057 Done    Acceptance E,TB,D DU,VU   07/10/17 1405 Done    Eager E VU,DU   07/09/17 1734 Done    Acceptance E NR  EM 07/08/17 1330 Active               Point: Home exercise program (Done)    Learning Progress Summary    Learner Readiness Method Response Comment Documented by Status   Patient Acceptance E,TB DU,VU   07/12/17 1506 Done    Acceptance TB,E VU,DU   07/11/17 1057 Done    Acceptance E,TB,D DU,VU   07/10/17 1405 Done    Eager E VU,DU   07/09/17 1734 Done               Point: Body mechanics (Done)    Learning Progress Summary    Learner Readiness Method Response Comment Documented by Status   Patient Acceptance E,TB DU,VU   07/12/17 1506 Done    Acceptance TB,E VU,DU   07/11/17 1057 Done    Acceptance E,TB,D DU,VU   07/10/17 1405 Done    Eager E VU,DU   07/09/17 1734 Done               Point: Precautions (Done)    Learning Progress Summary    Learner Readiness Method Response Comment Documented by Status   Patient Acceptance E,TB DU,VU   07/12/17 1506 Done    Acceptance TB,E VU,DU   07/11/17 1057 Done    Acceptance E,TB,D DU,VU   07/10/17 1405 Done    Eager E VU,DU   07/09/17 1734 Done                      User Key     Initials Effective Dates Name Provider Type Discipline    EM 12/01/15 -  Adrianne Pierce, PT  Physical Therapist PT    JR 01/07/16 -  Gerry Nelson, PT Physical Therapist PT    CW 12/13/16 -  Artem Acosta Physical Therapy Assistant PT                    PT Recommendation and Plan  Anticipated Discharge Disposition: inpatient rehabilitation facility (pending progress)  Planned Therapy Interventions: bed mobility training, gait training, home exercise program, transfer training  PT Frequency: daily  Plan of Care Review  Plan Of Care Reviewed With: patient  Progress: improving  Outcome Summary/Follow up Plan: Pt increasing with strength and balance with use of RWX but fatigued quickly with amb          Outcome Measures       07/12/17 1500 07/11/17 1000 07/10/17 1400    How much help from another person do you currently need...    Turning from your back to your side while in flat bed without using bedrails? 3  -CW 3  -CW 3  -CW    Moving from lying on back to sitting on the side of a flat bed without bedrails? 3  -CW 3  -CW 3  -CW    Moving to and from a bed to a chair (including a wheelchair)? 3  -CW 3  -CW 3  -CW    Standing up from a chair using your arms (e.g., wheelchair, bedside chair)? 3  -CW 3  -CW 3  -CW    Climbing 3-5 steps with a railing? 1  -CW 1  -CW 1  -CW    To walk in hospital room? 3  -CW 3  -CW 3  -CW    AM-PAC 6 Clicks Score 16  -CW 16  -CW 16  -CW    Functional Assessment    Outcome Measure Options AM-PAC 6 Clicks Basic Mobility (PT)  -CW AM-PAC 6 Clicks Basic Mobility (PT)  -CW AM-PAC 6 Clicks Basic Mobility (PT)  -CW      07/09/17 1735          How much help from another person do you currently need...    Turning from your back to your side while in flat bed without using bedrails? 3  -JR      Moving from lying on back to sitting on the side of a flat bed without bedrails? 2  -JR      Moving to and from a bed to a chair (including a wheelchair)? 2  -JR      Standing up from a chair using your arms (e.g., wheelchair, bedside chair)? 3  -JR      Climbing 3-5 steps with a railing? 1   -JR      To walk in hospital room? 3  -JR      AM-PAC 6 Clicks Score 14  -JR        User Key  (r) = Recorded By, (t) = Taken By, (c) = Cosigned By    Initials Name Provider Type    JR Gerry Nelson, PT Physical Therapist    CW Artem Acosta Physical Therapy Assistant           Time Calculation:         PT Charges       07/12/17 1507 07/12/17 0932       Time Calculation    Start Time 1453  -CW      Stop Time 1507  -CW      Time Calculation (min) 14 min  -CW      PT Received On 07/12/17  -CW      PT - Next Appointment 07/13/17  -CW 07/12/17  -CW       User Key  (r) = Recorded By, (t) = Taken By, (c) = Cosigned By    Initials Name Provider Type    CW Artem Acosta Physical Therapy Assistant          Therapy Charges for Today     Code Description Service Date Service Provider Modifiers Qty    69282853454 HC PT THER PROC EA 15 MIN 7/11/2017 Artem Acosta GP 1    58335817000 HC PT THER PROC EA 15 MIN 7/12/2017 Artem Acosta GP 1    45277631027 HC PT THER SUPP EA 15 MIN 7/12/2017 Artem Acosta GP 1          PT G-Codes  Outcome Measure Options: AM-PAC 6 Clicks Basic Mobility (PT)    Artem Acosta  7/12/2017

## 2017-07-12 NOTE — SIGNIFICANT NOTE
07/12/17 0932   Rehab Treatment   Discipline physical therapy assistant   Treatment Not Performed patient unavailable for treatment  (Pt off floor for dialysis PT to check back in PM)   Recommendation   PT - Next Appointment 07/12/17

## 2017-07-12 NOTE — PROGRESS NOTES
"  ENDOCRINE    Subjective   AND PLANS  Sarita Bai is a 37 y.o. female.     No complaints.  No nausea or vomiting.  Had a late lunch today and was not given mealtime NovoLog.  Blood sugar 248 at supper tonight.  Fasting glucose 82.  Continue Levemir 24 units at bedtime and NovoLog 8 units at breakfast, 6 units at lunch, and 6 units at supper plus NovoLog sliding scale.    Continue levothyroxine 50 µg per day.  Recheck thyroid function tests in 4-6 weeks.    Patient may transferred to SNU anytime from an endocrine standpoint.  Follow-up with me after 1 month.  Instructions given to patient.    Objective   /77 (BP Location: Left arm)  Pulse 78  Temp 97.6 °F (36.4 °C) (Oral)   Resp 16  Ht 67.01\" (170.2 cm)  Wt 153 lb (69.4 kg)  SpO2 96%  BMI 23.96 kg/m2  Physical Exam    Awake, alert, not in distress  No rales or wheezes.  Regular heart rate and rhythm.  Abdomen soft, nontender.  No cyanosis or clubbing.    Lab Results (last 24 hours)     Procedure Component Value Units Date/Time    POC Glucose Fingerstick [[id number]]  (Abnormal) Collected:  07/11/17 2001    Specimen:  Blood Updated:  07/11/17 2003     Glucose 226 (H) mg/dL     Narrative:       Meter: [id number] : [id number] Rachel Soliman    CBC (No Diff) [[id number]]  (Abnormal) Collected:  07/12/17 0431    Specimen:  Blood Updated:  07/12/17 0532     WBC 18.26 (H) 10*3/mm3      RBC 2.96 (L) 10*6/mm3      Hemoglobin 9.6 (L) g/dL      Hematocrit 27.9 (L) %      MCV 94.3 fL      MCH 32.4 (H) pg      MCHC 34.4 g/dL      RDW 15.6 (H) %      RDW-SD 48.9 fl      MPV 10.7 fL      Platelets 238 10*3/mm3     Comprehensive Metabolic Panel [[id number]]  (Abnormal) Collected:  07/12/17 0431    Specimen:  Blood Updated:  07/12/17 0608     Glucose 90 mg/dL      BUN 81 (H) mg/dL      Creatinine 7.13 (H) mg/dL      Sodium 129 (L) mmol/L      Potassium 4.0 mmol/L      Chloride 87 (L) mmol/L      CO2 18.0 (L) mmol/L      Calcium 8.6 mg/dL      Total Protein 6.0 g/dL  "     Albumin 2.70 (L) g/dL      ALT (SGPT) 22 U/L      AST (SGOT) 17 U/L      Alkaline Phosphatase 74 U/L      Total Bilirubin 0.2 mg/dL      eGFR Non African Amer 6 (L) mL/min/1.73      Globulin 3.3 gm/dL      A/G Ratio 0.8 g/dL      BUN/Creatinine Ratio 11.4     Anion Gap 24.0 mmol/L     POC Glucose Fingerstick [[id number]]  (Normal) Collected:  07/12/17 0711    Specimen:  Blood Updated:  07/12/17 0725     Glucose 82 mg/dL     Narrative:       Meter: [id number] : [id number] Yonatan Benjamin    [facility] Comprehensive Panel [[id number]] Collected:  07/12/17 1030    Specimen:  Blood Updated:  07/12/17 1032    POC Glucose Fingerstick [[id number]]  (Abnormal) Collected:  07/12/17 1720    Specimen:  Blood Updated:  07/12/17 1728     Glucose 248 (H) mg/dL     Narrative:       Meter: [id number] : [id number] Yonatan Benjamin            Active Problems:    DKA (diabetic ketoacidoses)    Pneumonia    Sepsis    Acute respiratory failure    History of systemic lupus erythematosus (SLE)    History of splenectomy    History of ITP    Primary hypothyroidism    ARF (acute renal failure) with tubular necrosis    Insulin therapy as discussed above.  Thyroid hormone replacement as discussed above.  Follow-up instruction was given to patient.

## 2017-07-12 NOTE — PLAN OF CARE
Problem: Patient Care Overview (Adult)  Goal: Plan of Care Review  Outcome: Ongoing (interventions implemented as appropriate)    07/12/17 1506   Coping/Psychosocial Response Interventions   Plan Of Care Reviewed With patient   Patient Care Overview   Progress improving   Outcome Evaluation   Outcome Summary/Follow up Plan Pt increasing with strength and balance with use of RWX but fatigued quickly with amb

## 2017-07-12 NOTE — PROGRESS NOTES
Dr. OSITO Porter    [address]    7/12/2017    Patient ID:  Name:  Sarita Bai  MRN:  [id number]  1980  37 y.o.  female            CC/Reason for visit: Follow-up for systemic lupus, ARDS, pneumonia, acute kidney injury    HPI: Patient feels quite all right.  She just completed hemodialysis.  No new complaints.  Waiting for insurance approval for transfer to [facility]    Vitals:  Vitals:    07/12/17 0500 07/12/17 0833 07/12/17 0855 07/12/17 1300   BP:  131/74 129/79 121/77   BP Location:  Right arm Left arm Left arm   Patient Position:  Sitting Lying    Pulse:  85 71 78   Resp:  18 18 16   Temp:  97.8 °F (36.6 °C) 97.6 °F (36.4 °C) 97.6 °F (36.4 °C)   TempSrc:  Oral Oral Oral   SpO2:  96%     Weight: 153 lb (69.4 kg)      Height:         FiO2 (%): 30 %     Body mass index is 23.96 kg/(m^2).    Intake/Output Summary (Last 24 hours) at 07/12/17 1345  Last data filed at 07/12/17 1300   Gross per 24 hour   Intake              470 ml   Output             2000 ml   Net            -1530 ml       Exam:  GEN:  No distress, Awake, alert, following all commands  LUNGS: Clear breath sounds bilat, nonlabored breathing  CV:  Normal S1S2, without murmur, no edema      Scheduled meds:    castor oil-balsam peru  Topical Q12H   enoxaparin 30 mg Subcutaneous Daily   epoetin sarah 5,000 Units Intravenous Once per day on Mon Wed Fri   insulin aspart 0-10 Units Subcutaneous 4x Daily AC & at Bedtime   insulin aspart 6 Units Subcutaneous Daily With Lunch   insulin aspart 6 Units Subcutaneous Daily With Dinner   insulin aspart 8 Units Subcutaneous Daily With Breakfast   insulin detemir 24 Units Subcutaneous Nightly   levothyroxine 50 mcg Oral Q AM   predniSONE 10 mg Oral Daily With Breakfast     IV meds:                           Data Review:   I reviewed the patient's medications and new clinical results.  Lab Results   Component Value Date    CALCIUM 8.6 07/12/2017    PHOS 5.6 (H) 07/08/2017        Results from last 7 days  Lab Units 07/12/17  0431 07/11/17  0600 07/10/17  0515 07/09/17  0356   SODIUM mmol/L 129*  --  135* 139   POTASSIUM mmol/L 4.0  --  4.2 4.0   CHLORIDE mmol/L 87*  --  93* 97*   CO2 mmol/L 18.0*  --  22.7 25.1   BUN mg/dL 81*  --  77* 53*   CREATININE mg/dL 7.13*  --  6.21* 4.01*   CALCIUM mg/dL 8.6  --  8.7 8.9   BILIRUBIN mg/dL 0.2  --  0.4 0.4   ALK PHOS U/L 74  --  88 95   ALT (SGPT) U/L 22  --  35* 70*   AST (SGOT) U/L 17  --  16 27   GLUCOSE mg/dL 90  --  310* 217*   WBC 10*3/mm3 18.26* 19.69* 17.80* 17.00*   HEMOGLOBIN g/dL 9.6* 9.9* 10.8* 10.9*   PLATELETS 10*3/mm3 238 223 244 209                   Results from last 7 days  Lab Units 07/07/17  0721   PH, ARTERIAL pH units 7.519*   PCO2, ARTERIAL mm Hg 37.3   PO2 ART mm Hg 117.2*   MODALITY  Adult Vent   O2 SATURATION CALC % 99.0         ASSESSMENT:   Community acquired pneumonia  DKA (diabetic ketoacidoses)  ARDS  Sepsis  Acute respiratory failure  History of systemic lupus erythematosus (SLE)  History of splenectomy  History of ITP  Primary hypothyroidism  ARF (acute renal failure) with tubular necrosis  Diabetes type 2      PLAN:  Patient may be discharged tomorrow.  She will continue on some low-dose prednisone and she will see Dr. Butler, rheumatologist next week.  I discussed the case with Dr. Mohamud nephrology.        Leeroy Porter MD  7/12/2017

## 2017-07-12 NOTE — PLAN OF CARE
Problem: Patient Care Overview (Adult)  Goal: Plan of Care Review  Outcome: Ongoing (interventions implemented as appropriate)    07/12/17 0307   Coping/Psychosocial Response Interventions   Plan Of Care Reviewed With patient   Patient Care Overview   Progress improving   Outcome Evaluation   Outcome Summary/Follow up Plan IJ removed, steroids changed to PO. Pt stated the diabetes education was very thorough and she feels she is better equipped to handle her condition. Seems mariusz doing well, will continue to monitor.       Goal: Adult Individualization and Mutuality  Outcome: Ongoing (interventions implemented as appropriate)  Goal: Discharge Needs Assessment  Outcome: Ongoing (interventions implemented as appropriate)    Problem: Fall Risk (Adult)  Goal: Absence of Falls  Outcome: Ongoing (interventions implemented as appropriate)

## 2017-07-12 NOTE — PROGRESS NOTES
"   LOS: 13 days    Patient Care Team:  Gregorio Bai MD as PCP - General (Family Medicine)    Chief Complaint:  No chief complaint on file.      Subjective     Interval History: Follow up HODAN, anuric.  Eating. Getting out of bed. Bowels moving. Weak.   Currently on dialysis.            Objective     Vital Signs  Temp:  [97.4 °F (36.3 °C)-99.1 °F (37.3 °C)] 97.6 °F (36.4 °C)  Heart Rate:  [71-85] 71  Resp:  [16-20] 18  BP: (123-140)/(74-88) 129/79    Flowsheet Rows         First Filed Value    Admission Height  67\" (170.2 cm) Documented at 06/29/2017 2028    Admission Weight  155 lb 10.3 oz (70.6 kg) Documented at 06/29/2017 2028             I/O last 3 completed shifts:  In: 1050 [P.O.:1020; I.V.:30]  Out: -     Intake/Output Summary (Last 24 hours) at 07/12/17 1001  Last data filed at 07/12/17 0530   Gross per 24 hour   Intake              770 ml   Output                0 ml   Net              770 ml       Physical Exam:  On dialysis. qB 400.    Voice hoarse.  Malar rash.  Heart RRR no s3 or rub. Lungs clear to auscultation. Abd BS+ soft, nontender. Ext 1+ lower ext edema. Raynauds fingers.       Results from last 7 days  Lab Units 07/12/17  0431 07/10/17  0515 07/09/17  0356   SODIUM mmol/L 129* 135* 139   POTASSIUM mmol/L 4.0 4.2 4.0   CHLORIDE mmol/L 87* 93* 97*   CO2 mmol/L 18.0* 22.7 25.1   BUN mg/dL 81* 77* 53*   CREATININE mg/dL 7.13* 6.21* 4.01*   CALCIUM mg/dL 8.6 8.7 8.9   BILIRUBIN mg/dL 0.2 0.4 0.4   ALK PHOS U/L 74 88 95   ALT (SGPT) U/L 22 35* 70*   AST (SGOT) U/L 17 16 27   GLUCOSE mg/dL 90 310* 217*       Estimated Creatinine Clearance: 11.6 mL/min (by C-G formula based on Cr of 7.13).      Results from last 7 days  Lab Units 07/08/17  0424 07/07/17  0356 07/06/17  0510   MAGNESIUM mg/dL  --   --  2.8*   PHOSPHORUS mg/dL 5.6* 2.5 3.7               Results from last 7 days  Lab Units 07/12/17  0431 07/11/17  0600 07/10/17  0515 07/09/17  0356 07/08/17  0424   WBC 10*3/mm3 18.26* 19.69* 17.80* 17.00* " 18.33*   HEMOGLOBIN g/dL 9.6* 9.9* 10.8* 10.9* 9.9*   PLATELETS 10*3/mm3 238 223 244 209 184               Imaging Results (last 24 hours)     ** No results found for the last 24 hours. **          castor oil-balsam peru  Topical Q12H   enoxaparin 30 mg Subcutaneous Daily   epoetin sarah 5,000 Units Intravenous Once per day on Mon Wed Fri   insulin aspart 0-10 Units Subcutaneous 4x Daily AC & at Bedtime   insulin aspart 6 Units Subcutaneous Daily With Lunch   insulin aspart 6 Units Subcutaneous Daily With Dinner   insulin aspart 8 Units Subcutaneous Daily With Breakfast   insulin detemir 24 Units Subcutaneous Nightly   levothyroxine 50 mcg Oral Q AM   predniSONE 10 mg Oral Daily With Breakfast          Medication Review:   Current Facility-Administered Medications   Medication Dose Route Frequency Provider Last Rate Last Dose   • albumin human 25 % IV SOLN 12.5 g  12.5 g Intravenous PRN Debra Mohamud MD       • artificial tears (LUBRIFRESH P.M.) ophthalmic ointment   Both Eyes PRN Madison Harris MD       • calcium gluconate 1 g in sodium chloride 0.9 % 50 mL IVPB  1 g Intravenous PRN Susan Urena MD        Or   • calcium gluconate 2 g in sodium chloride 0.9 % 50 mL IVPB  2 g Intravenous PRN Susan Urena MD       • castor oil-balsam peru (VENELEX) ointment   Topical Q12H Madison Harris MD       • dextrose (D50W) solution 12.5 g  12.5 g Intravenous Q15 Min PRN Alexis Poe MD   12.5 g at 07/01/17 1526   • dextrose (D50W) solution 25-50 mL  25-50 mL Intravenous Q30 Min PRN Van Remy MD       • enoxaparin (LOVENOX) syringe 30 mg  30 mg Subcutaneous Daily Leeroy Porter MD   30 mg at 07/11/17 0852   • epoetin sarah (EPOGEN,PROCRIT) injection 5,000 Units  5,000 Units Intravenous Once per day on Mon Wed Fri Debra Mohamud MD       • heparin (porcine) injection 1,000 Units  1,000 Units Intracatheter PRN Angelica Saucedo MD       • insulin aspart (novoLOG) injection 0-10 Units  0-10  Units Subcutaneous 4x Daily AC & at Bedtime Kd Olea MD   4 Units at 07/11/17 2239   • insulin aspart (novoLOG) injection 6 Units  6 Units Subcutaneous Daily With Lunch Thomas Campbell MD       • insulin aspart (novoLOG) injection 6 Units  6 Units Subcutaneous Daily With Dinner Thomas Campbell MD       • insulin aspart (novoLOG) injection 8 Units  8 Units Subcutaneous Daily With Breakfast Thomas Campbell MD   8 Units at 07/12/17 0831   • insulin detemir (LEVEMIR) injection 24 Units  24 Units Subcutaneous Nightly Kd Olea MD   24 Units at 07/11/17 2239   • levothyroxine (SYNTHROID, LEVOTHROID) tablet 50 mcg  50 mcg Oral Q AM Thomas Campbell MD   50 mcg at 07/12/17 0630   • predniSONE (DELTASONE) tablet 10 mg  10 mg Oral Daily With Breakfast Leeroy Porter MD           Assessment/Plan       Active Problems:    DKA (diabetic ketoacidoses)    Pneumonia    Sepsis    Acute respiratory failure    History of systemic lupus erythematosus (SLE)    History of splenectomy    History of ITP    Primary hypothyroidism    ARF (acute renal failure) with tubular necrosis    1. HODAN, initially  prerenal , now  ATN. Anuric. Dialysis dependent since 6/30.  Dialysis via [facility]. Outpatient dialysis being arranged at [facility].   2. Severe right-sided pna/ARDS . Resolved.   3. SLE on steroid. Draw ZIGGY panel today.  Her anti DS DNA was negative 7/5. She had seen Dr. Butler rheumatology in the past. Will need follow up.   4. New DM1 presenting as DKA, now on scheduled insulin.  Endocrine managing.   5. H/o ITP with prior splenectomy: TCP resolved. HTI negative.  Likely autoimmune per hematology .    6. Hypothyroidism on oral replacement.   7. Anemia.   Hg stable. Procrit with HD>          Plan:   1. Dialysis today. Scan bladder after HD to make sure not retaining.   2. DW patient and her uncle Dr. Bai by phone  3. Will need Rheumatology follow up.  Has seen Dr. Conrad Butler previously.  I made appointment with his  office ARNP for July 20 at 2:45 pm.  Office staff thinks that DR. Butler will be in the office that day.  4. Will also need endocrine follow up.     Debra Mohamud MD  07/12/17  10:01 AM

## 2017-07-12 NOTE — PLAN OF CARE
Problem: Patient Care Overview (Adult)  Goal: Plan of Care Review  Outcome: Ongoing (interventions implemented as appropriate)    07/12/17 1817   Coping/Psychosocial Response Interventions   Plan Of Care Reviewed With patient   Patient Care Overview   Progress improving   Outcome Evaluation   Outcome Summary/Follow up Plan VSS, HD today, plan to DC to [facility] possibly tmrw precert started today, pt working with PT easily fatigued       Goal: Adult Individualization and Mutuality  Outcome: Ongoing (interventions implemented as appropriate)  Goal: Discharge Needs Assessment  Outcome: Ongoing (interventions implemented as appropriate)    Problem: Fall Risk (Adult)  Goal: Absence of Falls  Outcome: Ongoing (interventions implemented as appropriate)    Problem: Diabetes, Type 1 (Adult)  Goal: Signs and Symptoms of Listed Potential Problems Will be Absent or Manageable (Diabetes, Type 1)  Outcome: Ongoing (interventions implemented as appropriate)    Problem: Sepsis (Adult)  Goal: Signs and Symptoms of Listed Potential Problems Will be Absent or Manageable (Sepsis)  Outcome: Ongoing (interventions implemented as appropriate)    Problem: Pneumonia (Adult)  Goal: Signs and Symptoms of Listed Potential Problems Will be Absent or Manageable (Pneumonia)  Outcome: Ongoing (interventions implemented as appropriate)    Problem: Hemodialysis (Adult)  Goal: Signs and Symptoms of Listed Potential Problems Will be Absent or Manageable (Hemodialysis)  Outcome: Ongoing (interventions implemented as appropriate)

## 2017-07-12 NOTE — CONSULTS
"Diabetes Education  Assessment/Teaching    Patient Name:  Sarita Bai  YOB: 1980  MRN: [id number]  Admit Date:  6/29/2017      Assessment Date:  7/12/2017       Most Recent Value    General Information      Referral From:  MD silvestre    Height  5' 7.01\" (1.702 m)    Height Method  Stated    Weight  153 lb (69.4 kg)    Weight Method  Bed scale    Pregnancy Assessment     Diabetes History     What type of diabetes do you have?  Type 1    Length of Diabetes Diagnosis  Newly diagnosed <6 months    Education Preferences     Barriers to Learning  other (comment) [None noted]    Nutrition Information     Assessment Topics     Healthy Eating - Assessment  Needs education    Being Active - Assessment  Needs education    Taking Medication - Assessment  Needs education    Problem Solving - Assessment  Needs education    Reducing Risk - Assessment  Needs education    Monitoring - Assessment  Needs education    DM Goals     Healthy Eating - Goal  0-30 days from discharge    Being Active - Goal  0-30 days from discharge    Taking Medication - Goal  0-7 days from discharge    Problem Solving - Goal  0-30 days from discharge    Reducing Risk - Goal  30-90 days from discharge    Monitoring - Goal  0-7 days from discharge    Contact Plan  Follow-up medical care               Most Recent Value    DM Education Needs     Meter  Meter provided    Meter Type  One Touch [Verio Flex]    Frequency of Testing  AC meals    Blood Glucose Target Range   premeal    Medication  Insulin, Actions, Side effects, Administration, Pen [Levemir FlexTouch and Novolog Flexpen with 4 mm Maria Guadalupe pen needles.  ]    Problem Solving  Hypoglycemia, Hyperglycemia, Sick days, Signs, Symptoms, Treatment [DKA process and prevention]    Reducing Risks  A1C testing    Healthy Eating  RD consult    Healthy Coping  Appropriate    Discharge Plan  Facility, Follow-up with endocrinolgoist [Rehab]    Motivation  Engaged, Strong    Teaching Method  " Explanation, Discussion, Demonstration, Handouts, Teach back    Patient Response  Verbalized understanding, Needs reinforcement [Will have nursing assist w/ pt giving self insulin.  ]            Other Comments:          Electronically signed by:  Kathrin Clarke RN  07/12/17 3:02 PM

## 2017-07-13 VITALS
SYSTOLIC BLOOD PRESSURE: 135 MMHG | DIASTOLIC BLOOD PRESSURE: 80 MMHG | WEIGHT: 146.83 LBS | TEMPERATURE: 97.8 F | HEART RATE: 69 BPM | BODY MASS INDEX: 23.04 KG/M2 | OXYGEN SATURATION: 98 % | HEIGHT: 67 IN | RESPIRATION RATE: 18 BRPM

## 2017-07-13 LAB
ALBUMIN SERPL-MCNC: 2.3 G/DL (ref 3.5–5.2)
ALBUMIN/GLOB SERPL: 0.6 G/DL
ALP SERPL-CCNC: 75 U/L (ref 39–117)
ALT SERPL W P-5'-P-CCNC: 15 U/L (ref 1–33)
ANION GAP SERPL CALCULATED.3IONS-SCNC: 19.1 MMOL/L
AST SERPL-CCNC: 19 U/L (ref 1–32)
BASOPHILS # BLD AUTO: 0.01 10*3/MM3 (ref 0–0.2)
BASOPHILS NFR BLD AUTO: 0.1 % (ref 0–1.5)
BILIRUB SERPL-MCNC: 0.3 MG/DL (ref 0.1–1.2)
BUN BLD-MCNC: 37 MG/DL (ref 6–20)
BUN/CREAT SERPL: 7.9 (ref 7–25)
CALCIUM SPEC-SCNC: 8 MG/DL (ref 8.6–10.5)
CENTROMERE B AB SER-ACNC: <0.2 AI (ref 0–0.9)
CHLORIDE SERPL-SCNC: 88 MMOL/L (ref 98–107)
CHROMATIN AB SERPL-ACNC: 7.9 AI (ref 0–0.9)
CO2 SERPL-SCNC: 20.9 MMOL/L (ref 22–29)
CREAT BLD-MCNC: 4.67 MG/DL (ref 0.57–1)
DEPRECATED RDW RBC AUTO: 50.8 FL (ref 37–54)
DSDNA AB SER-ACNC: <1 IU/ML (ref 0–9)
ENA JO1 AB SER-ACNC: <0.2 AI (ref 0–0.9)
ENA RNP AB SER-ACNC: 7.9 AI (ref 0–0.9)
ENA SCL70 AB SER-ACNC: <0.2 AI (ref 0–0.9)
ENA SM AB SER-ACNC: 0.7 AI (ref 0–0.9)
ENA SS-A AB SER-ACNC: 0.5 AI (ref 0–0.9)
ENA SS-B AB SER-ACNC: <0.2 AI (ref 0–0.9)
EOSINOPHIL # BLD AUTO: 0.02 10*3/MM3 (ref 0–0.7)
EOSINOPHIL NFR BLD AUTO: 0.1 % (ref 0.3–6.2)
ERYTHROCYTE [DISTWIDTH] IN BLOOD BY AUTOMATED COUNT: 15.8 % (ref 11.7–13)
GFR SERPL CREATININE-BSD FRML MDRD: 10 ML/MIN/1.73
GLOBULIN UR ELPH-MCNC: 3.8 GM/DL
GLUCOSE BLD-MCNC: 106 MG/DL (ref 65–99)
GLUCOSE BLDC GLUCOMTR-MCNC: 214 MG/DL (ref 70–130)
GLUCOSE BLDC GLUCOMTR-MCNC: 78 MG/DL (ref 70–130)
HCT VFR BLD AUTO: 27.6 % (ref 35.6–45.5)
HGB BLD-MCNC: 9.4 G/DL (ref 11.9–15.5)
IMM GRANULOCYTES # BLD: 0.06 10*3/MM3 (ref 0–0.03)
IMM GRANULOCYTES NFR BLD: 0.4 % (ref 0–0.5)
LYMPHOCYTES # BLD AUTO: 0.85 10*3/MM3 (ref 0.9–4.8)
LYMPHOCYTES NFR BLD AUTO: 5.9 % (ref 19.6–45.3)
Lab: ABNORMAL
MAGNESIUM SERPL-MCNC: 2.4 MG/DL (ref 1.6–2.6)
MCH RBC QN AUTO: 32.4 PG (ref 26.9–32)
MCHC RBC AUTO-ENTMCNC: 34.1 G/DL (ref 32.4–36.3)
MCV RBC AUTO: 95.2 FL (ref 80.5–98.2)
MONOCYTES # BLD AUTO: 1.69 10*3/MM3 (ref 0.2–1.2)
MONOCYTES NFR BLD AUTO: 11.7 % (ref 5–12)
NEUTROPHILS # BLD AUTO: 11.85 10*3/MM3 (ref 1.9–8.1)
NEUTROPHILS NFR BLD AUTO: 81.8 % (ref 42.7–76)
PHOSPHATE SERPL-MCNC: 8 MG/DL (ref 2.5–4.5)
PLATELET # BLD AUTO: 185 10*3/MM3 (ref 140–500)
PMV BLD AUTO: 10.5 FL (ref 6–12)
POTASSIUM BLD-SCNC: 4.2 MMOL/L (ref 3.5–5.2)
PROT SERPL-MCNC: 6.1 G/DL (ref 6–8.5)
RBC # BLD AUTO: 2.9 10*6/MM3 (ref 3.9–5.2)
SODIUM BLD-SCNC: 128 MMOL/L (ref 136–145)
WBC NRBC COR # BLD: 14.48 10*3/MM3 (ref 4.5–10.7)

## 2017-07-13 PROCEDURE — 80053 COMPREHEN METABOLIC PANEL: CPT | Performed by: INTERNAL MEDICINE

## 2017-07-13 PROCEDURE — 97110 THERAPEUTIC EXERCISES: CPT

## 2017-07-13 PROCEDURE — 63710000001 INSULIN ASPART PER 5 UNITS: Performed by: INTERNAL MEDICINE

## 2017-07-13 PROCEDURE — 83735 ASSAY OF MAGNESIUM: CPT | Performed by: INTERNAL MEDICINE

## 2017-07-13 PROCEDURE — 25010000002 ENOXAPARIN PER 10 MG: Performed by: INTERNAL MEDICINE

## 2017-07-13 PROCEDURE — 82962 GLUCOSE BLOOD TEST: CPT

## 2017-07-13 PROCEDURE — 85025 COMPLETE CBC W/AUTO DIFF WBC: CPT | Performed by: INTERNAL MEDICINE

## 2017-07-13 PROCEDURE — 84100 ASSAY OF PHOSPHORUS: CPT | Performed by: INTERNAL MEDICINE

## 2017-07-13 PROCEDURE — 63710000001 PREDNISONE PER 5 MG: Performed by: INTERNAL MEDICINE

## 2017-07-13 RX ORDER — PREDNISONE 10 MG/1
10 TABLET ORAL
Qty: 30 TABLET | Refills: 0 | Status: SHIPPED | OUTPATIENT
Start: 2017-07-13 | End: 2017-12-13 | Stop reason: DRUGHIGH

## 2017-07-13 RX ADMIN — INSULIN ASPART 6 UNITS: 100 INJECTION, SOLUTION INTRAVENOUS; SUBCUTANEOUS at 12:09

## 2017-07-13 RX ADMIN — LEVOTHYROXINE SODIUM 50 MCG: 50 TABLET ORAL at 06:16

## 2017-07-13 RX ADMIN — INSULIN ASPART 8 UNITS: 100 INJECTION, SOLUTION INTRAVENOUS; SUBCUTANEOUS at 08:45

## 2017-07-13 RX ADMIN — PREDNISONE 10 MG: 10 TABLET ORAL at 08:44

## 2017-07-13 RX ADMIN — ENOXAPARIN SODIUM 30 MG: 30 INJECTION SUBCUTANEOUS at 08:44

## 2017-07-13 RX ADMIN — INSULIN ASPART 4 UNITS: 100 INJECTION, SOLUTION INTRAVENOUS; SUBCUTANEOUS at 12:09

## 2017-07-13 RX ADMIN — CASTOR OIL AND BALSAM, PERU: 788; 87 OINTMENT TOPICAL at 08:46

## 2017-07-13 NOTE — PROGRESS NOTES
Continued Stay Note  [facility]     Patient Name: Sarita Bai  MRN: [id number]  Today's Date: 7/13/2017    Admit Date: 6/29/2017          Discharge Plan       07/13/17 1123    Case Management/Social Work Plan    Plan [facility]- skilled with dialysis schedule for MWF at 12noon at facility    Patient/Family In Agreement With Plan yes    Additional Comments Message from Sruthi at [facility] at [phone] that states [place] precert has been obtained and dialysis chair confirmed there for MWF at 12:00 noon.  Dr. Porter notified that [place] precert has been obtained.  Informed patient and father that [place] precert has been obtained  and that patient will be going to [facility] -room # 135 and father states family will provide transportation to [facility].  THIERNO Jimenes informed that [place] precert has been obtained and MD plans to discharge patient today to [facility] and family is providing transportation....Sri Qiu RN,CCP                Discharge Codes     None        Expected Discharge Date and Time     Expected Discharge Date Expected Discharge Time    Jul 13, 2017             Sri Qiu RN

## 2017-07-13 NOTE — PROGRESS NOTES
Adult Nutrition  Assessment/PES    Patient Name:  Sarita Bai  YOB: 1980  MRN: [id number]  Admit Date:  6/29/2017    Assessment Date:  7/13/2017        Reason for Assessment       07/13/17 1335    Reason for Assessment    Reason For Assessment/Visit nurse/nurse practitioner consult;physician consult;education                              Problem/Interventions:        Problem 1       07/13/17 1336    Nutrition Diagnoses Problem 1    Problem 1 Knowledge Deficit    Etiology (related to) Medical Diagnosis    Renal ESRD;Hemodialysis    Signs/Symptoms (evidenced by) Reported  Information Deficit                          Education/Evaluation       07/13/17 1336    Education    Education Provided education regarding    Provided education regarding Key food habit change    Education Topics Renal diet;Fluid    Monitor/Evaluation    Education Follow-up Reinforce PRN        Comments:  Given copy for home use.    Electronically signed by:  Annemarie Escamilla RD  07/13/17 1:40 PM

## 2017-07-13 NOTE — PLAN OF CARE
Problem: Patient Care Overview (Adult)  Goal: Plan of Care Review  Outcome: Ongoing (interventions implemented as appropriate)    07/13/17 0530   Coping/Psychosocial Response Interventions   Plan Of Care Reviewed With patient   Patient Care Overview   Progress improving   Outcome Evaluation   Outcome Summary/Follow up Plan VSS, plan to DC to [facility] possibly today or tomorrow, PT continues to work with patient, still fatigues quickly. Will continue to monitor.        Goal: Adult Individualization and Mutuality  Outcome: Ongoing (interventions implemented as appropriate)  Goal: Discharge Needs Assessment  Outcome: Ongoing (interventions implemented as appropriate)    Problem: Fall Risk (Adult)  Goal: Absence of Falls  Outcome: Ongoing (interventions implemented as appropriate)    Problem: Diabetes, Type 1 (Adult)  Goal: Signs and Symptoms of Listed Potential Problems Will be Absent or Manageable (Diabetes, Type 1)  Outcome: Ongoing (interventions implemented as appropriate)    Problem: Sepsis (Adult)  Goal: Signs and Symptoms of Listed Potential Problems Will be Absent or Manageable (Sepsis)  Outcome: Ongoing (interventions implemented as appropriate)    Problem: Pneumonia (Adult)  Goal: Signs and Symptoms of Listed Potential Problems Will be Absent or Manageable (Pneumonia)  Outcome: Ongoing (interventions implemented as appropriate)    Problem: Hemodialysis (Adult)  Goal: Signs and Symptoms of Listed Potential Problems Will be Absent or Manageable (Hemodialysis)  Outcome: Ongoing (interventions implemented as appropriate)

## 2017-07-13 NOTE — PROGRESS NOTES
"   LOS: 14 days    Patient Care Team:  Gregorio Bai MD as PCP - General (Family Medicine)    Chief Complaint:  No chief complaint on file.      Subjective     Interval History: Follow up HODAN, anuric. Eating. Getting out of bed.   No complaints.  Denies soa, cp, n/v.  Objective     Vital Signs  Temp:  [97.5 °F (36.4 °C)-98.2 °F (36.8 °C)] 98.2 °F (36.8 °C)  Heart Rate:  [71-85] 72  Resp:  [16-18] 16  BP: (121-135)/(50-82) 125/82    Flowsheet Rows         First Filed Value    Admission Height  67\" (170.2 cm) Documented at 06/29/2017 2028    Admission Weight  155 lb 10.3 oz (70.6 kg) Documented at 06/29/2017 2028             I/O last 3 completed shifts:  In: 1050 [P.O.:1020; I.V.:30]  Out: 2000 [Other:2000]    Intake/Output Summary (Last 24 hours) at 07/13/17 0655  Last data filed at 07/12/17 1300   Gross per 24 hour   Intake                0 ml   Output             2000 ml   Net            -2000 ml       Physical Exam:   Voice hoarse. Malar rash. Heart RRR no s3 or rub. Lungs clear to auscultation. Abd BS+ soft, nontender. Ext 1+ lower ext edema. Raynauds fingers.      Results Review:      Results from last 7 days  Lab Units 07/13/17  0404 07/12/17  0431 07/10/17  0515   SODIUM mmol/L 128* 129* 135*   POTASSIUM mmol/L 4.2 4.0 4.2   CHLORIDE mmol/L 88* 87* 93*   CO2 mmol/L 20.9* 18.0* 22.7   BUN mg/dL 37* 81* 77*   CREATININE mg/dL 4.67* 7.13* 6.21*   CALCIUM mg/dL 8.0* 8.6 8.7   BILIRUBIN mg/dL 0.3 0.2 0.4   ALK PHOS U/L 75 74 88   ALT (SGPT) U/L 15 22 35*   AST (SGOT) U/L 19 17 16   GLUCOSE mg/dL 106* 90 310*       Estimated Creatinine Clearance: 17 mL/min (by C-G formula based on Cr of 4.67).      Results from last 7 days  Lab Units 07/13/17  0404 07/08/17  0424 07/07/17  0356   MAGNESIUM mg/dL 2.4  --   --    PHOSPHORUS mg/dL 8.0* 5.6* 2.5               Results from last 7 days  Lab Units 07/13/17  0404 07/12/17  0431 07/11/17  0600 07/10/17  0515 07/09/17  0356   WBC 10*3/mm3 14.48* 18.26* 19.69* 17.80* 17.00* "   HEMOGLOBIN g/dL 9.4* 9.6* 9.9* 10.8* 10.9*   PLATELETS 10*3/mm3 185 238 223 244 209               Imaging Results (last 24 hours)     ** No results found for the last 24 hours. **          castor oil-balsam peru  Topical Q12H   enoxaparin 30 mg Subcutaneous Daily   epoetin sarah 5,000 Units Intravenous Once per day on Mon Wed Fri   insulin aspart 0-10 Units Subcutaneous 4x Daily AC & at Bedtime   insulin aspart 6 Units Subcutaneous Daily With Lunch   insulin aspart 6 Units Subcutaneous Daily With Dinner   insulin aspart 8 Units Subcutaneous Daily With Breakfast   insulin detemir 24 Units Subcutaneous Nightly   levothyroxine 50 mcg Oral Q AM   predniSONE 10 mg Oral Daily With Breakfast          Medication Review:   Current Facility-Administered Medications   Medication Dose Route Frequency Provider Last Rate Last Dose   • artificial tears (LUBRIFRESH P.M.) ophthalmic ointment   Both Eyes PRN Madison Harris MD       • calcium gluconate 1 g in sodium chloride 0.9 % 50 mL IVPB  1 g Intravenous PRN Susan Urena MD        Or   • calcium gluconate 2 g in sodium chloride 0.9 % 50 mL IVPB  2 g Intravenous PRN Susan Urena MD       • castor oil-balsam peru (VENELEX) ointment   Topical Q12H Madison Harris MD       • dextrose (D50W) solution 12.5 g  12.5 g Intravenous Q15 Min PRN Alexis Poe MD   12.5 g at 07/01/17 1526   • dextrose (D50W) solution 25-50 mL  25-50 mL Intravenous Q30 Min PRN Van Remy MD       • enoxaparin (LOVENOX) syringe 30 mg  30 mg Subcutaneous Daily Leeroy Porter MD   30 mg at 07/12/17 1751   • epoetin sarah (EPOGEN,PROCRIT) injection 5,000 Units  5,000 Units Intravenous Once per day on Mon Wed Fri Debra Mohamud MD   5,000 Units at 07/12/17 1110   • heparin (porcine) injection 1,000 Units  1,000 Units Intracatheter PRN Angelica Saucedo MD   4,200 Units at 07/12/17 1314   • insulin aspart (novoLOG) injection 0-10 Units  0-10 Units Subcutaneous 4x Daily AC & at  Bedtime Kd Olea MD   4 Units at 07/12/17 1750   • insulin aspart (novoLOG) injection 6 Units  6 Units Subcutaneous Daily With Lunch Thomas Campbell MD       • insulin aspart (novoLOG) injection 6 Units  6 Units Subcutaneous Daily With Dinner Thomas Campbell MD   6 Units at 07/12/17 1751   • insulin aspart (novoLOG) injection 8 Units  8 Units Subcutaneous Daily With Breakfast Thomas Campbell MD   8 Units at 07/12/17 0831   • insulin detemir (LEVEMIR) injection 24 Units  24 Units Subcutaneous Nightly Kd Olea MD   24 Units at 07/12/17 2151   • levothyroxine (SYNTHROID, LEVOTHROID) tablet 50 mcg  50 mcg Oral Q AM Thomas Campbell MD   50 mcg at 07/13/17 0616   • predniSONE (DELTASONE) tablet 10 mg  10 mg Oral Daily With Breakfast Leeroy Porter MD   10 mg at 07/12/17 1750       Assessment/Plan       Active Problems:    DKA (diabetic ketoacidoses)    Pneumonia    Sepsis    Acute respiratory failure    History of systemic lupus erythematosus (SLE)    History of splenectomy    History of ITP    Primary hypothyroidism    ARF (acute renal failure) with tubular necrosis      1. HODAN, initially prerenal , now ATN. Anuric. Dialysis dependent since 6/30.  Dialysis via [facility]. Outpatient dialysis being arranged at [facility].   2. Severe right-sided pna/ARDS . Resolved.   3. SLE on steroid. Draw ZIGGY panel today. Her anti DS DNA was negative 7/5. She had seen Dr. Butler rheumatology in the past. Will need follow up.   4. New DM1 presenting as DKA, now on scheduled insulin. Endocrine managing.   5. H/o ITP with prior splenectomy: TCP resolved. HTI negative. Likely autoimmune per hematology .   6. Hypothyroidism on oral replacement.   7. Anemia. Hg stable. Procrit with HD             Plan:   1. Will see at [facility] dialysis unit  2. Will need Rheumatology follow up. Has seen Dr. Conard Butler previously. Dr. Mohamud made appointment with his office ARNP for July 20 at 2:45 pm. Office staff thinks that   Butler will be in the office that day.  3. Will also need endocrine follow up.            Susan Urena MD  07/13/17  6:55 AM

## 2017-07-13 NOTE — THERAPY TREATMENT NOTE
Acute Care - Physical Therapy Treatment Note  [facility]     Patient Name: Sarita Bai  : 1980  MRN: [id number]  Today's Date: 2017  Onset of Illness/Injury or Date of Surgery Date: 17          Admit Date: 2017    Visit Dx:    ICD-10-CM ICD-9-CM   1. Generalized weakness R53.1 780.79     Patient Active Problem List   Diagnosis   • DKA (diabetic ketoacidoses)   • Pneumonia   • Sepsis   • Acute respiratory failure   • History of systemic lupus erythematosus (SLE)   • History of splenectomy   • History of ITP   • Primary hypothyroidism   • ARF (acute renal failure) with tubular necrosis               Adult Rehabilitation Note       17 1053 17 1500 17 1000    Rehab Assessment/Intervention    Discipline physical therapist  -CH physical therapy assistant  -CW physical therapy assistant  -CW    Document Type therapy note (daily note)  -CH therapy note (daily note)  -CW therapy note (daily note)  -CW    Subjective Information agree to therapy;complains of;fatigue  -CH agree to therapy;complains of;weakness;fatigue  -CW agree to therapy;complains of;weakness;fatigue  -CW    Patient Effort, Rehab Treatment good  -CH good  -CW good  -CW    Precautions/Limitations fall precautions  -CH fall precautions  -CW fall precautions  -CW    Recorded by [CH] Cristiana Brush, PT [CW] Artem Acosta [CW] Artem Acosta    Pain Assessment    Pain Assessment No/denies pain  -CH No/denies pain  -CW No/denies pain  -CW    Recorded by [CH] Cristiana Brush, PT [CW] Artem Acosta [CW] Artem Acosta    Cognitive Assessment/Intervention    Current Cognitive/Communication Assessment functional  -CH functional  -CW functional  -CW    Orientation Status oriented x 4  -CH oriented x 4  -CW oriented x 4  -CW    Follows Commands/Answers Questions 100% of the time  -% of the time  -% of the time  -CW    Personal Safety WNL/WFL  -CH WNL/WFL  -CW WNL/WFL  -CW    Personal Safety  Interventions fall prevention program maintained;gait belt;nonskid shoes/slippers when out of bed  -CH fall prevention program maintained;gait belt;muscle strengthening facilitated;nonskid shoes/slippers when out of bed  -CW fall prevention program maintained;nonskid shoes/slippers when out of bed;gait belt;muscle strengthening facilitated  -CW    Recorded by [CH] Cristiana Brush, PT [CW] Artem Acosta [CW] Artem Acosta    Bed Mobility, Assessment/Treatment    Bed Mob, Supine to Sit, [place] not tested  -CH  contact guard assist  -CW    Bed Mob, Sit to Supine, [place] not tested  -CH not tested  -CW not tested  -CW    Bed Mobility, Comment sitting in chair  -CH in chair  -CW up to chair  -CW    Recorded by [CH] Cristiana Brush, PT [CW] Artem Acosta [CW] Artem Acosta    Transfer Assessment/Treatment    Transfers, Sit-Stand [place] contact guard assist  -CH contact guard assist  -CW contact guard assist  -CW    Transfers, Stand-Sit [place] contact guard assist  -CH contact guard assist  -CW contact guard assist  -CW    Transfers, Sit-Stand-Sit, Assist Device rolling walker  -CH rolling walker  -CW rolling walker  -CW    Recorded by [CH] Cristiana Brush, PT [CW] Artem Acosta [CW] Artem Acosta    Gait Assessment/Treatment    Gait, [place] Level contact guard assist  -CH contact guard assist  -CW contact guard assist  -CW    Gait, Assistive Device rolling walker  -CH rolling walker  -CW rolling walker  -CW    Gait, Distance (Feet) 35  -CH 30  -CW 60  -CW    Gait, Gait Deviations [place] decreased;step length decreased;stride length decreased  -CH [place] decreased;step length decreased;stride length decreased  -CW [place] decreased;step length decreased;stride length decreased  -CW    Gait, Safety Issues step length decreased  -      Gait, Impairments strength decreased  -      Recorded by [CH] Cristiana Brush, PT [CW] Artem Acosta [CW] Artem  SCOTT Acosta    Therapy Exercises    Bilateral Lower Extremities AROM:;10 reps;ankle pumps/circles;LAQ;5 reps;hip flexion  -CH      Recorded by [CH] Cristiana Brush PT      Positioning and Restraints    Pre-Treatment Position sitting in chair/recliner  -CH sitting in chair/recliner  -CW in bed  -CW    Post Treatment Position chair  -CH chair  -CW chair  -CW    In Chair sitting;call light within reach;encouraged to call for assist;with family/caregiver  -CH notified nsg;sitting;call light within reach;encouraged to call for assist;with family/caregiver  -CW notified nsg;reclined;call light within reach;encouraged to call for assist  -CW    Recorded by [CH] Cristiana Brush PT [CW] Artem Acosta [CW] Artem Acosta      07/10/17 1400          Rehab Assessment/Intervention    Discipline physical therapy assistant  -CW      Document Type therapy note (daily note)  -CW      Subjective Information agree to therapy;complains of;weakness;fatigue  -CW      Patient Effort, Rehab Treatment good  -CW      Precautions/Limitations fall precautions  -CW      Recorded by [CW] Artem Acosta      Pain Assessment    Pain Assessment No/denies pain  -CW      Recorded by [CW] Artem Acosta      Cognitive Assessment/Intervention    Current Cognitive/Communication Assessment functional  -CW      Orientation Status oriented x 4  -CW      Follows Commands/Answers Questions 100% of the time  -CW      Personal Safety WNL/WFL  -CW      Personal Safety Interventions fall prevention program maintained;gait belt;muscle strengthening facilitated;nonskid shoes/slippers when out of bed  -CW      Recorded by [CW] Artem Acosta      Bed Mobility, Assessment/Treatment    Bed Mob, Supine to Sit, [place] contact guard assist  -CW      Bed Mob, Sit to Supine, [place] not tested  -CW      Bed Mobility, Comment up to chair  -CW      Recorded by [CW] Artem Acosta      Transfer Assessment/Treatment    Transfers,  Sit-Stand [place] contact guard assist  -CW      Transfers, Stand-Sit [place] contact guard assist  -CW      Transfers, Sit-Stand-Sit, Assist Device rolling walker  -CW      Recorded by [CW] Artem Acosta      Gait Assessment/Treatment    Gait, [place] Level contact guard assist  -CW      Gait, Assistive Device rolling walker  -CW      Gait, Distance (Feet) 20  -CW      Gait, Gait Deviations [place] decreased;step length decreased;stride length decreased;ataxia  -CW      Recorded by [CW] Artem Acosta      Positioning and Restraints    Pre-Treatment Position in bed  -CW      Post Treatment Position chair  -CW      In Chair notified nsg;reclined;call light within reach;encouraged to call for assist  -CW      Recorded by [CW] Artem Acosta        User Key  (r) = Recorded By, (t) = Taken By, (c) = Cosigned By    Initials Name Effective Dates    CH Cristiana Brush, PT 12/01/15 -     CW Artem Acosta 12/13/16 -                 IP PT Goals       07/08/17 1330          Bed Mobility PT LTG    Bed Mobility PT LTG, Date Established 07/08/17  -EM      Bed Mobility PT LTG, Time to Achieve 1 wk  -EM      Bed Mobility PT LTG, Activity Type all bed mobility  -EM      Bed Mobility PT LTG, [place] Level minimum assist (75% patient effort)  -EM      Transfer Training PT LTG    Transfer Training PT LTG, Date Established 07/08/17  -EM      Transfer Training PT LTG, Time to Achieve 1 wk  -EM      Transfer Training PT LTG, Activity Type all transfers  -EM      Transfer Training PT LTG, [place] Level minimum assist (75% patient effort)  -EM      Gait Training PT LTG    Gait Training Goal PT LTG, Date Established 07/08/17  -EM      Gait Training Goal PT LTG, Time to Achieve 1 wk  -EM      Gait Training Goal PT LTG, [place] Level minimum assist (75% patient effort)  -EM      Gait Training Goal PT LTG, Assist Device walker, rolling  -EM      Gait Training Goal PT LTG, Distance to Achieve 50  feet   -EM        User Key  (r) = Recorded By, (t) = Taken By, (c) = Cosigned By    Initials Name Provider Type    EM Adrianne Pierce, PT Physical Therapist          Physical Therapy Education     Title: PT OT SLP Therapies (Done)     Topic: Physical Therapy (Done)     Point: Mobility training (Done)    Learning Progress Summary    Learner Readiness Method Response Comment Documented by Status   Patient Acceptance E,TB,D VU,NR   07/13/17 1255 Done    Acceptance E,TB DU,VU   07/12/17 1506 Done    Acceptance TB,E VU,DU   07/11/17 1057 Done    Acceptance E,TB,D DU,VU   07/10/17 1405 Done    Eager E VU,DU   07/09/17 1734 Done    Acceptance E NR   07/08/17 1330 Active               Point: Home exercise program (Done)    Learning Progress Summary    Learner Readiness Method Response Comment Documented by Status   Patient Acceptance E,TB,D VU,NR   07/13/17 1255 Done    Acceptance E,TB DU,VU   07/12/17 1506 Done    Acceptance TB,E VU,DU   07/11/17 1057 Done    Acceptance E,TB,D DU,VU   07/10/17 1405 Done    Eager E VU,DU   07/09/17 1734 Done               Point: Body mechanics (Done)    Learning Progress Summary    Learner Readiness Method Response Comment Documented by Status   Patient Acceptance E,TB,D VU,NR   07/13/17 1255 Done    Acceptance E,TB DU,VU   07/12/17 1506 Done    Acceptance TB,E VU,DU   07/11/17 1057 Done    Acceptance E,TB,D DU,VU   07/10/17 1405 Done    Eager E VU,DU   07/09/17 1734 Done               Point: Precautions (Done)    Learning Progress Summary    Learner Readiness Method Response Comment Documented by Status   Patient Acceptance E,TB,D VU,NR   07/13/17 1255 Done    Acceptance E,TB DU,VU   07/12/17 1506 Done    Acceptance TB,E VU,DU   07/11/17 1057 Done    Acceptance E,TB,D DU,VU   07/10/17 1405 Done    Eager E VU,DU   07/09/17 1734 Done                      User Key     Initials Effective Dates Name Provider Type [facility] 12/01/15 Dimitry Zendejas  JACKELYN Brush, PT Physical Therapist PT    EM 12/01/15 -  Adrianne Pierce, PT Physical Therapist PT    JR 01/07/16 -  Gerry Nelson, PT Physical Therapist PT    CW 12/13/16 -  Artem Acosta Physical Therapy Assistant PT                    PT Recommendation and Plan  Anticipated Discharge Disposition: inpatient rehabilitation facility (pending progress)  Planned Therapy Interventions: bed mobility training, gait training, home exercise program, transfer training  PT Frequency: daily  Plan of Care Review  Plan Of Care Reviewed With: patient  Outcome Summary/Follow up Plan: Pt demonstrates some increased activity tolerance and functional strength as she was able to increase her gait distance slightly and required slighlty less assistance. Pt continue to be limited by fatigue. PT will continue to follow.          Outcome Measures       07/13/17 1200 07/12/17 1500 07/11/17 1000    How much help from another person do you currently need...    Turning from your back to your side while in flat bed without using bedrails? 3  -CH 3  -CW 3  -CW    Moving from lying on back to sitting on the side of a flat bed without bedrails? 3  -CH 3  -CW 3  -CW    Moving to and from a bed to a chair (including a wheelchair)? 3  -CH 3  -CW 3  -CW    Standing up from a chair using your arms (e.g., wheelchair, bedside chair)? 3  -CH 3  -CW 3  -CW    Climbing 3-5 steps with a railing? 1  -CH 1  -CW 1  -CW    To walk in hospital room? 3  -CH 3  -CW 3  -CW    AM-PAC 6 Clicks Score 16  -CH 16  -CW 16  -CW    Functional Assessment    Outcome Measure Options AM-PAC 6 Clicks Basic Mobility (PT)  -CH AM-PAC 6 Clicks Basic Mobility (PT)  -CW AM-PAC 6 Clicks Basic Mobility (PT)  -CW      07/10/17 1400          How much help from another person do you currently need...    Turning from your back to your side while in flat bed without using bedrails? 3  -CW      Moving from lying on back to sitting on the side of a flat bed without bedrails? 3  -CW       Moving to and from a bed to a chair (including a wheelchair)? 3  -CW      Standing up from a chair using your arms (e.g., wheelchair, bedside chair)? 3  -CW      Climbing 3-5 steps with a railing? 1  -CW      To walk in hospital room? 3  -CW      AM-PAC 6 Clicks Score 16  -CW      Functional Assessment    Outcome Measure Options AM-PAC 6 Clicks Basic Mobility (PT)  -CW        User Key  (r) = Recorded By, (t) = Taken By, (c) = Cosigned By    Initials Name Provider Type     Cristiana Brush, PT Physical Therapist    CW Artem Acosta Physical Therapy Assistant           Time Calculation:         PT Charges       07/13/17 [facility]          Time Calculation    Start Time 1043  -      Stop Time 1053  -      Time Calculation (min) 10 min  -      PT Received On 07/13/17  -      PT - Next Appointment 07/14/17  -        User Key  (r) = Recorded By, (t) = Taken By, (c) = Cosigned By    Initials Name Provider Type     Cristiana Brush PT Physical Therapist          Therapy Charges for Today     Code Description Service Date Service Provider Modifiers Qty    [id number]  PT THER PROC EA 15 MIN 7/13/2017 Cristiana Brush, PT GP 1          PT G-Codes  Outcome Measure Options: AM-PAC 6 Clicks Basic Mobility (PT)    Cristiana Brush PT  7/13/2017

## 2017-07-13 NOTE — PROGRESS NOTES
Continued Stay Note  [facility]     Patient Name: Sarita Bai  MRN: [id number]  Today's Date: 7/13/2017    Admit Date: 6/29/2017          Discharge Plan       07/13/17 1405    Case Management/Social Work Plan    Plan [facility]- skilled    Final Note    Final Note Discharged  7/13/17 to [place] - skilled [facility]      07/13/17 1123    Case Management/Social Work Plan    Plan [place]- skilled with dialysis schedule for MWF at 12noon at facility    Patient/Family In Agreement With Plan yes    Additional Comments Message from Sruthi at [facility] at [phone] that states [place] precert has been obtained and dialysis chair confirmed there for MWF at 12:00 noon.  Dr. Porter notified that [place] precert has been obtained.  Informed patient and father that [place] precert has been obtained  and that patient will be going to [facility] -room # [facility] and father states family will provide transportation to [facility].  THIERNO Jimenes informed that [place] precert has been obtained and MD plans to discharge patient today to [place] and family is providing transportation....Sri Qiu RN,[facility]                Discharge Codes       07/13/17 1405    Discharge Codes    Discharge Codes 03  Discharged/transferred to skilled nursing facility (SNF) with Medicare certification in anticipation of skilled care        Expected Discharge Date and Time     Expected Discharge Date Expected Discharge Time    Jul 13, 2017             Sri Qiu RN

## 2017-07-13 NOTE — PLAN OF CARE
Problem: Patient Care Overview (Adult)  Goal: Plan of Care Review  Outcome: Ongoing (interventions implemented as appropriate)    07/13/17 2530   Coping/Psychosocial Response Interventions   Plan Of Care Reviewed With patient   Outcome Evaluation   Outcome Summary/Follow up Plan Pt demonstrates some increased activity tolerance and functional strength as she was able to increase her gait distance slightly and required slighlty less assistance. Pt continue to be limited by fatigue. PT will continue to follow.

## 2017-07-13 NOTE — DISCHARGE SUMMARY
Patient Identification:  Name: Sarita Bai  Age: 37 y.o.  Sex: female  :  1980  MRN: [id number]  Primary Care Physician: Gregorio Bai MD    Admit date: 2017  Discharge date and time: 2017   Discharged Condition: good    Discharge Diagnoses:   Community acquired pneumonia  DKA (diabetic ketoacidoses)  ARDS  Sepsis  Acute respiratory failure  History of systemic lupus erythematosus (SLE)  History of splenectomy  History of ITP  Primary hypothyroidism  ARF (acute renal failure) with tubular necrosis  Diabetes type 2      Hospital Course: Sarita Bai presented to [facility]  with symptoms of confusion.  She was transferred from [facility] emergency room upon arrival.  Her blood sugar had been 500.  Her blood gas showed a significant metabolic acidosis with a very low pH.  She had not had any history of diabetes.  The patient's condition deteriorated rapidly.  She developed a pneumonia and ARDS and required intubation and mechanical ventilation in the intensive care unit.  She was diagnosed with acute kidney injury and required hemodialysis.  She is still on hemodialysis.    She also developed idiopathic thrombocytopenia, and was seen by hematology.    She does have a history of lupus and now has an appointment set up to see Dr. Butler as an outpatient.    She has done very well.  Her pneumonia was properly treated with antibiotics and with rotation pronation on mechanical ventilation in the ICU.  She was extremely ill, close to dying.    She is now being discharged to [place] facilities.  She will receive rehabilitation and hemodialysis there.        Consults:   IP CONSULT TO NUTRITION SERVICES  IP CONSULT TO DIABETES EDUCATOR  IP CONSULT TO ENDOCRINOLOGY  IP CONSULT TO INFECTIOUS DISEASES  IP CONSULT TO NEPHROLOGY  IP CONSULT TO NUTRITION SERVICES  IP CONSULT TO ENDOCRINOLOGY  IP CONSULT TO NUTRITION SERVICES  IP CONSULT TO CASE MANAGEMENT   IP  CONSULT TO HEMATOLOGY AND ONCOLOGY  IP CONSULT TO VASCULAR SURGERY  IP CONSULT TO ENDOCRINOLOGY  IP CONSULT TO NUTRITION SERVICES  IP CONSULT TO NUTRITION SERVICES    Significant Diagnostic Studies:   CBC:   Results from last 7 days  Lab Units 07/13/17  0404   WBC 10*3/mm3 14.48*   RBC 10*6/mm3 2.90*     BMP:   Results from last 7 days  Lab Units 07/13/17  0404   GLUCOSE mg/dL 106*   CO2 mmol/L 20.9*   BUN mg/dL 37*   CREATININE mg/dL 4.67*   CALCIUM mg/dL 8.0*     Coagulation: No results found for: INR, APTT  Cardiac markers:     ABGs:   Results from last 7 days  Lab Units 07/07/17  0721   BASE EXCESS ART mmol/L 7.1*     Radiology review:   Imaging Results (most recent)     Procedure Component Value Units Date/Time    XR Chest 1 View [[id number]] Collected:  06/30/17 1723     Updated:  06/30/17 1728    Narrative:       ONE VIEW PORTABLE CHEST AT 5:07 PM     HISTORY: Shortness of breath. ET tube placement     There has been recent insertion of an ET tube which ends in good  position 3.4 cm above the sai. There is extensive bilateral  pneumonia, right larger than left and the left-sided pneumonia shows  further worsening since yesterday's exam.     This report was finalized on 6/30/2017 5:25 PM by Dr. Ba Marley MD.       XR Chest 1 View [[id number]] Collected:  06/29/17 2108     Updated:  06/30/17 2109    Narrative:       X-RAY CHEST 1 VIEW.     HISTORY: Dyspnea cardiac origin.     COMPARISON: No prior studies for comparison.     FINDINGS:  Cardiomediastinal silhouette is within normal limits.         Large opacity in the right lung concerning for infiltrate, there is a  small right effusion.     Mild opacity in the left lung base, infiltrate is suspected.              Impression:       Large right, small left infiltrate. Suspect small right effusion.  Follow-up to resolution is recommended.         This report was finalized on 6/30/2017 9:06 PM by Dr. Fabi Collado MD.       XR Chest Post CVA Port [[id number]]  Collected:  07/01/17 1012     Updated:  07/01/17 1016    Narrative:       ONE VIEW PORTABLE CHEST AT 10:00 AM     HISTORY: Recent central line placement     There has been recent insertion of a right jugular catheter which ends  in the SVC. There is no pneumothorax. Again noted is extensive  consolidation throughout both lungs and moderately large right pleural  effusion showing no change from yesterday. An ET tube remains in good  position 3 cm above the sai.     This report was finalized on 7/1/2017 10:13 AM by Dr. Ba Marley MD.       XR Chest 1 View [[id number]] Collected:  07/02/17 0539     Updated:  07/05/17 0632    Narrative:       X-RAY CHEST 1 VIEW.     HISTORY: Respiratory failure.     COMPARISON: 07/01/2017.     FINDINGS:  Cardiomediastinal silhouette is within normal limits. Line and tubes are  stable.     Diffuse bilateral lung opacities and bilateral pleural effusions.              Impression:       Persistent bilateral infiltrates and effusions, follow-up to resolution  is recommended.         This report was finalized on 7/5/2017 6:29 AM by Dr. Fabi Collado MD.       XR Chest 1 View [[id number]] Collected:  07/06/17 1451     Updated:  07/06/17 1458    Narrative:       XR CHEST 1 VW-     HISTORY: Female who is 37 years-old,  new catheter placement     TECHNIQUE: Frontal view of the chest     COMPARISON: 07/04/2017     FINDINGS: Endotracheal tube, IJ catheter appears stable. Left-sided  lumen central venous catheter extends to the cavoatrial junction  region/proximal right atrium. Heart, mediastinum and pulmonary  vasculature are unremarkable. No focal pulmonary consolidation, pleural  effusion, or obvious large pneumothorax, but the left apex is partly  obscured by overlying objects, and assessment is also limited by supine  positioning. No acute osseous process.       Impression:       New left-sided central venous catheter, no pneumothorax  identified, limited as described, consider follow-up  upright view of the  chest with overlying objects moved away from left apical region.     This report was finalized on 7/6/2017 2:55 PM by Dr. Storm Biswas MD.       IR PICC W Fluoro Surgery Only [[id number]] Resulted:  07/07/17 0805     Updated:  07/07/17 0805    XR Chest 1 View [[id number]] Collected:  07/04/17 0722     Updated:  07/09/17 2111    Narrative:       PORTABLE CHEST X-RAY     CLINICAL HISTORY: ARDS.     COMPARISON: 07/03/2017.     FINDINGS: Portable AP view of the chest was obtained with overlying  monitor leads in place. Life support lines are unchanged. No  pneumothorax demonstrated. There has been partial clearing of multifocal  opacities bilaterally, particularly in the left perihilar region which  is most likely related to improving edema. A component of superimposed  pneumonia cannot be excluded. Right effusion slightly improved also.  Normal heart size.       Impression:       Significant improvement in pulmonary opacities likely  related to edema.     This report was finalized on 7/9/2017 9:08 PM by Sang Madrid MD.       XR Chest 1 View [[id number]] Collected:  07/03/17 0615     Updated:  07/09/17 2111    Narrative:       PORTABLE CHEST X-RAY     CLINICAL HISTORY: Respiratory failure - Rotoprone protocol.     COMPARISON: 07/02/2017.     FINDINGS: Portable AP view of the chest was obtained with overlying  monitor leads in place. The lung apices were unable to be included.  Diffuse opacities are again noted bilaterally, left greater than right.  Findings may be related to multifocal pneumonia or asymmetric pulmonary  edema. They have worsened slightly in the interim. Right greater than  left effusions are stable. Normal heart size. Feeding tube has been  placed and is coiled well beneath the left hemidiaphragm likely in the  mid stomach region. ET tube and central line remain in place.       Impression:       Slight worsening in multifocal, left greater than right opacities which  may be  related to diffuse pneumonia or asymmetric edema.        This report was finalized on 7/9/2017 9:08 PM by Sang Madrid MD.             Discharge Exam:  Alert and oriented x 4, in NAD  Supple neck, midline trach  RRR, no m/r/g, no edema  Clear bilaterally, no wheezing, nonlabored  No clubbing or cyanosis     Disposition:  Rehab    Patient Instructions:    Sarita Bai   [place] Medication Instructions IRENA:[id number]    Printed on:07/13/17 5277   Medication Information                      predniSONE (DELTASONE) 10 MG tablet  Take 1 tablet by mouth Daily With Breakfast.                    Medication Reconciliation: Please see electronically completed Med Rec.    Total time spent discharging patient including evaluation, medication reconciliation, arranging follow up, and post hospitalization instructions and education total time exceeds 30 minutes.    Signed:  Leeroy Porter MD  7/13/2017  11:38 AM

## 2017-07-13 NOTE — CONSULTS
Pediatric Nutrition  Assessment/PES    Patient Name:  Sarita Bai  YOB: 1980  MRN: [id number]  Admit Date:  6/29/2017      Assessment Date:  7/13/2017                            Problems/Intervetions:        Problem 1       07/13/17 1336    Nutrition Diagnoses Problem 1    Problem 1 Knowledge Deficit    Etiology (related to) Medical Diagnosis    Renal ESRD;Hemodialysis    Signs/Symptoms (evidenced by) Reported  Information Deficit                            Education/Evaluation       07/13/17 1336    Education    Education Provided education regarding    Provided education regarding Key food habit change    Education Topics Renal diet;Fluid    Monitor/Evaluation    Education Follow-up Reinforce PRN          Comments:  Given copy for home use.      Electronically signed by:  Annemarie Escamilla RD  07/13/17 1:39 PM

## 2017-07-14 ENCOUNTER — TELEPHONE (OUTPATIENT)
Dept: ONCOLOGY | Facility: CLINIC | Age: 37
End: 2017-07-14

## 2017-07-14 NOTE — TELEPHONE ENCOUNTER
----- Message from Napoleon Yancey MD PhD sent at 7/13/2017  7:51 PM EDT -----  Regarding: appt with Dr. Cedeno   Please arrange patient to be seen by Dr. Cedeno in about 3-4 weeks.  She was discharged her today to [facility] for rehabilitation.  She is on dialysis. Needs cbc and ferritin, iron, B12 and folate.     Thanks!    Dr. Yancey

## 2017-08-03 ENCOUNTER — LAB (OUTPATIENT)
Dept: LAB | Facility: HOSPITAL | Age: 37
End: 2017-08-03

## 2017-08-03 ENCOUNTER — OFFICE VISIT (OUTPATIENT)
Dept: ONCOLOGY | Facility: CLINIC | Age: 37
End: 2017-08-03

## 2017-08-03 VITALS
WEIGHT: 144.2 LBS | BODY MASS INDEX: 22.63 KG/M2 | RESPIRATION RATE: 16 BRPM | HEIGHT: 67 IN | SYSTOLIC BLOOD PRESSURE: 132 MMHG | TEMPERATURE: 97.8 F | OXYGEN SATURATION: 100 % | HEART RATE: 85 BPM | DIASTOLIC BLOOD PRESSURE: 78 MMHG

## 2017-08-03 DIAGNOSIS — Z86.2 HISTORY OF ITP: ICD-10-CM

## 2017-08-03 DIAGNOSIS — A41.9 SEPSIS, DUE TO UNSPECIFIED ORGANISM: Primary | ICD-10-CM

## 2017-08-03 DIAGNOSIS — M32.9 HISTORY OF SYSTEMIC LUPUS ERYTHEMATOSUS (SLE) (HCC): ICD-10-CM

## 2017-08-03 DIAGNOSIS — E61.1 IRON DEFICIENCY: Primary | ICD-10-CM

## 2017-08-03 LAB
BASOPHILS # BLD AUTO: 0.11 10*3/MM3 (ref 0–0.1)
BASOPHILS NFR BLD AUTO: 1 % (ref 0–1.1)
DEPRECATED RDW RBC AUTO: 54.1 FL (ref 37–49)
EOSINOPHIL # BLD AUTO: 0.04 10*3/MM3 (ref 0–0.36)
EOSINOPHIL NFR BLD AUTO: 0.4 % (ref 1–5)
ERYTHROCYTE [DISTWIDTH] IN BLOOD BY AUTOMATED COUNT: 17 % (ref 11.7–14.5)
HCT VFR BLD AUTO: 32.6 % (ref 34–45)
HGB BLD-MCNC: 10.8 G/DL (ref 11.5–14.9)
IMM GRANULOCYTES # BLD: 0.81 10*3/MM3 (ref 0–0.03)
IMM GRANULOCYTES NFR BLD: 7.5 % (ref 0–0.5)
LYMPHOCYTES # BLD AUTO: 1.44 10*3/MM3 (ref 1–3.5)
LYMPHOCYTES NFR BLD AUTO: 13.3 % (ref 20–49)
MCH RBC QN AUTO: 29.8 PG (ref 27–33)
MCHC RBC AUTO-ENTMCNC: 33.1 G/DL (ref 32–35)
MCV RBC AUTO: 90.1 FL (ref 83–97)
MONOCYTES # BLD AUTO: 1.69 10*3/MM3 (ref 0.25–0.8)
MONOCYTES NFR BLD AUTO: 15.6 % (ref 4–12)
NEUTROPHILS # BLD AUTO: 6.75 10*3/MM3 (ref 1.5–7)
NEUTROPHILS NFR BLD AUTO: 62.2 % (ref 39–75)
NRBC BLD MANUAL-RTO: 0.9 /100 WBC (ref 0–0)
PLATELET # BLD AUTO: 611 10*3/MM3 (ref 150–375)
PMV BLD AUTO: 9.7 FL (ref 8.9–12.1)
RBC # BLD AUTO: 3.62 10*6/MM3 (ref 3.9–5)
WBC NRBC COR # BLD: 10.84 10*3/MM3 (ref 4–10)

## 2017-08-03 PROCEDURE — 85025 COMPLETE CBC W/AUTO DIFF WBC: CPT | Performed by: INTERNAL MEDICINE

## 2017-08-03 PROCEDURE — 36416 COLLJ CAPILLARY BLOOD SPEC: CPT | Performed by: INTERNAL MEDICINE

## 2017-08-03 PROCEDURE — 99215 OFFICE O/P EST HI 40 MIN: CPT | Performed by: INTERNAL MEDICINE

## 2017-08-03 RX ORDER — INSULIN ASPART 100 [IU]/ML
INJECTION, SOLUTION INTRAVENOUS; SUBCUTANEOUS
Refills: 0 | COMMUNITY
Start: 2017-07-28 | End: 2017-09-14 | Stop reason: SDUPTHER

## 2017-08-03 RX ORDER — LEVOTHYROXINE SODIUM 0.05 MG/1
TABLET ORAL
Refills: 0 | COMMUNITY
Start: 2017-07-28 | End: 2017-08-10 | Stop reason: SDUPTHER

## 2017-08-03 RX ORDER — PREDNISONE 1 MG/1
TABLET ORAL
Refills: 0 | COMMUNITY
Start: 2017-07-28 | End: 2017-08-10 | Stop reason: SDUPTHER

## 2017-08-03 NOTE — PROGRESS NOTES
Subjective .     REASONS FOR FOLLOWUP: History of ITP, SLE    HISTORY OF PRESENT ILLNESS:  The patient is a 37 y.o. year old female who is here for follow-up with the above-mentioned history.    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  She has been seen periodically in our office last in 2011 receiving a repeat pneumococcal vaccination the previously having begin 2011.  The patient is followed by rheumatology regularly for her lupus having been treated with Plaquenil but evidently having stopped it over the last year.     This patient was admitted June 29 through July 13 having developed an apparent illness ultimately associated with fever and confusion.  She presented to [facility] with hyperglycemia, metabolic acidosis and no previous history of diabetes.  Her condition rapidly deteriorated with development of pneumonia with ARDS requiring intubation and mechanical ventilation, acute kidney injury requiring hemodialysis and further thrombocytopenia leading to a reconsultation.  She was seen by Dr. Amato with exam consistent with Raynaud's phenomenon in her hands, laboratory studies with positive Lakisha testing, elevated LDH, bilirubin, and increasing nucleated RBCs in the peripheral circulation.  He felt that she likely had an autoimmune process and agreed with IV steroids.  She continued intensive care, IV steroids, and, fortunate, went on to slowly improve.  This included rotation pronation a mechanical ventilation the ICU.  The patient was seen by multiple services including ID, nephrology, hematology, vascular and endocrinology as well as an intensive care physicians.  Upon discharge she proceeded to rehabilitation for a period of time and, wonderfully, has made gradual progress.  She was recently seen by  Dr. Butler of rheumatology and is on a steroid taper,?  Restart of Plaquenil in the near future.  She is also to see endocrinology for her ketoacidosis and apparent glucose intolerance/diabetes.     Additional recent history includes her continuance on hemodialysis and recent removal of her Shiley catheter after which she developed an internal jugular thrombosis and was treated in [facility] facilities including institution of anticoagulation.  This is evidently just over the last several days to be seen in Epic everywhere notes.  She was seen by hematology as well during that visit and given intravenous iron as well as transfused.  She is now seen back by our practice for continued follow-up.  We have reviewed her recent and previous history over approximately 60 minutes today.      Past Medical History:   Diagnosis Date   • History of ITP    • Lupus    • Thyroid activity decreased        ONCOLOGIC HISTORY:  (History from previous dates can be found in the separate document.)    Current Outpatient Prescriptions on File Prior to Visit   Medication Sig Dispense Refill   • predniSONE (DELTASONE) 10 MG tablet Take 1 tablet by mouth Daily With Breakfast. 30 tablet 0     No current facility-administered medications on file prior to visit.        ALLERGIES:   No Known Allergies    Social History     Social History   • Marital status: Single     Spouse name: N/A   • Number of children: N/A   • Years of education: N/A     Occupational History   • Not on file.     Social History Main Topics   • Smoking status: Never Smoker   • Smokeless tobacco: Never Used   • Alcohol use No   • Drug use: Not on file   • Sexual activity: No     Other Topics Concern   • Not on file     Social History Narrative         Cancer-related family history is not on file.     Review of Systems  A comprehensive 14 point review of systems was performed and was negative except as mentioned.    Objective      Vitals:    08/03/17 1022   BP: 132/78  "  Pulse: 85   Resp: 16   Temp: 97.8 °F (36.6 °C)   TempSrc: Oral   SpO2: 100%   Weight: 144 lb 3.2 oz (65.4 kg)   Height: 67.32\" (171 cm)  Comment: new    PainSc: 5  Comment: neck- blood clot     Current Status 8/3/2017   ECOG score 0       Physical Exam    GENERAL: [The patient is a well-developed, well-nourished adult female in no acute distress.  She is alert and oriented ×3 lying comfortably in bed, conversant]    HEENT: [Atraumatic, normocephalic.  Pupils are equal, round, and reactive.  Extraocular  muscles are intact.]    NECK: [Supple with full range of motion.  No rigidity or meningismus.  No evidence of thyromegaly or adenopathy.]    CHEST: [Nontender, without nodularity, mass, or rash.]    LUNGS: [Clear to auscultation and percussion, normal expiratory excursion.]    CARDIOVASCULAR: [Normal rate, regular rhythm, without murmur, rub, gallop, or additional heart sounds.]    ABDOMEN: [Soft, nondistended, nontender,, no masses or organomegaly, no evidence of ascites or additional mass.]    EXTREMITIES: [No evidence of clubbing, cyanosis, or edema.]    NEUROLOGIC: [Cranial nerves II through XII are tested and found grossly intact.  Deep tendon reflexes normal reflexive bilaterally, plantar flexion bilaterally, normal proprioception bilaterally.]                RECENT LABS:  Hematology WBC   Date Value Ref Range Status   08/03/2017 10.84 (H) 4.00 - 10.00 10*3/mm3 Final     RBC   Date Value Ref Range Status   08/03/2017 3.62 (L) 3.90 - 5.00 10*6/mm3 Final     Hemoglobin   Date Value Ref Range Status   08/03/2017 10.8 (L) 11.5 - 14.9 g/dL Final     Hematocrit   Date Value Ref Range Status   08/03/2017 32.6 (L) 34.0 - 45.0 % Final     Platelets   Date Value Ref Range Status   08/03/2017 611 (H) 150 - 375 10*3/mm3 Final        Assessment/Plan            37-year-old female with a 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  She had been treated " with steroids for ITP initially but when on to have a splenectomy in 2002 and remained relatively stable.  She was last seen in 2011 in remission.     The patient, more recently, had a difficult and complicated hospitalization admitted June 29 through July 13 with community-acquired pneumonia, sepsis, acute respiratory failure with ARDS, acute renal failure as well as additional studies including thrombocytopenia (multifactorial) and additional laboratory studies with RNP antibodies of greater than 8.0, anti-chromatin antibodies greater than 8.0 which, coupled with her additional findings per peripheral blood smear could be consistent with an exacerbation of her SLE.  She additionally had an episode of thrombosis related to her vascular catheter used for dialysis for which she is currently on Eliquis.  She also required additional transfusion and IV iron therapy.     The patient has not been seen by this physician for some time so we reviewed her history over approximately an hour today including her findings in hospital, her presentation and many complications thereafter.  She is uncertain whether she had an exacerbation of her SLE after discussion with rheumatology and currently remains on a steroid taper.  She is to see rheumatology in the next month or so as well for follow-up.  Hematologically, fortunately, she is stable at this point and will plan close follow-up in the office both for worsening cytopenias including thrombocytopenia and/or the need for further IV iron support.  Plan:  1.  7 weeks repeat laboratory studies including serum chemistries, CBC, IPF, iron studies  2.  General reevaluation in 8 weeks for possible IV iron is needed and review of rheumatologic recommendations.                Cc:

## 2017-08-10 ENCOUNTER — OFFICE VISIT (OUTPATIENT)
Dept: ENDOCRINOLOGY | Age: 37
End: 2017-08-10

## 2017-08-10 VITALS
OXYGEN SATURATION: 98 % | HEART RATE: 82 BPM | WEIGHT: 143 LBS | BODY MASS INDEX: 22.44 KG/M2 | DIASTOLIC BLOOD PRESSURE: 90 MMHG | SYSTOLIC BLOOD PRESSURE: 136 MMHG | HEIGHT: 67 IN

## 2017-08-10 DIAGNOSIS — E13.9 LATENT AUTOIMMUNE DIABETES IN ADULTS (LADA), MANAGED AS TYPE 1 (HCC): Primary | ICD-10-CM

## 2017-08-10 DIAGNOSIS — E03.9 PRIMARY HYPOTHYROIDISM: ICD-10-CM

## 2017-08-10 PROBLEM — E11.10 DKA (DIABETIC KETOACIDOSES): Status: RESOLVED | Noted: 2017-06-30 | Resolved: 2017-08-10

## 2017-08-10 PROCEDURE — 99214 OFFICE O/P EST MOD 30 MIN: CPT | Performed by: INTERNAL MEDICINE

## 2017-08-10 RX ORDER — LEVOTHYROXINE SODIUM 0.05 MG/1
TABLET ORAL
Qty: 30 TABLET | Refills: 5 | Status: SHIPPED | OUTPATIENT
Start: 2017-08-10 | End: 2017-10-10 | Stop reason: SDUPTHER

## 2017-08-10 NOTE — PROGRESS NOTES
Subjective   Sarita Bai is a 37 y.o. female.     HPI Comments: Hospital f/u for  dm2 , hypothyroidism  Testing bs 4 x day     Hypothyroidism   Associated symptoms include headaches.      As a 37-year-old female who came in for follow-up.  She was admitted last month for adult respiratory distress syndrome due to pneumonia, diabetic ketoacidosis, thrombocytopenia and acute renal failure.  She was transferred to [facility] on 2017 and was discharged from the facility on 2017.  She had her last hemodialysis on 2017.  Her recent serum creatinine was 1.0.    She has no previous history of diabetes mellitus.  REGINA antibody was markedly elevated.  C-peptide was 1.8.  She was discharged from the hospital on Levemir 24 units at bedtime and NovoLog 8 units at breakfast, 6 units at lunch and 6 units at supper.  Her insulin custodial has come down and she was taken off Levemir 6 units on 2017.  She has required only 1 dose of NovoLog since she has been home    Fasting blood sugar runs between 121-150.  Lunchtime blood sugar runs between 127-197.  Suppertime blood sugar runs between .  Bedtime blood sugar runs between .  She has not had any hypoglycemic events and she has been home    She has hypothyroidism and is on levothyroxine 50 µg per day.    She is on prednisone 5 mg every other day until she follows with Dr. Butler on 2017.    She is  0.  She had regular menstrual periods.  She is not on birth control pills.    The following portions of the patient's history were reviewed and updated as appropriate: allergies, current medications, past family history, past medical history, past social history, past surgical history and problem list.    Review of Systems   Constitutional: Negative.    Eyes: Negative.    Respiratory: Negative.    Cardiovascular: Positive for leg swelling.   Gastrointestinal: Negative.    Endocrine: Negative.    Genitourinary:  "Negative.    Musculoskeletal: Negative.    Allergic/Immunologic: Negative.    Neurological: Positive for headaches.   Hematological: Bruises/bleeds easily (on eliquis ).   Psychiatric/Behavioral: Negative.        Objective      Vitals:    08/10/17 1316   BP: 136/90   BP Location: Right arm   Patient Position: Sitting   Cuff Size: Small Adult   Pulse: 82   SpO2: 98%   Weight: 143 lb (64.9 kg)   Height: 67.32\" (171 cm)     Physical Exam   Constitutional: She is oriented to person, place, and time. She appears well-developed and well-nourished. No distress.   HENT:   Head: Normocephalic.   Nose: Nose normal.   Mouth/Throat: No oropharyngeal exudate.   Eyes: Conjunctivae and EOM are normal. Right eye exhibits no discharge. Left eye exhibits no discharge. No scleral icterus.   Neck: Neck supple. No JVD present. No tracheal deviation present. No thyromegaly present.   Cardiovascular: Normal rate, regular rhythm, normal heart sounds and intact distal pulses.  Exam reveals no gallop and no friction rub.    No murmur heard.  Pulmonary/Chest: Effort normal and breath sounds normal. No respiratory distress. She has no wheezes. She has no rales. She exhibits no tenderness.   Abdominal: Soft. Bowel sounds are normal. She exhibits no distension and no mass. There is no tenderness.   Musculoskeletal: Normal range of motion. She exhibits no edema, tenderness or deformity.   Lymphadenopathy:     She has no cervical adenopathy.   Neurological: She is alert and oriented to person, place, and time. She has normal reflexes. She displays normal reflexes. No cranial nerve deficit. Coordination normal.   Skin: Skin is warm and dry. No rash noted. No erythema. No pallor.   Psychiatric: She has a normal mood and affect. Her behavior is normal.        Lab on 08/03/2017   Component Date Value Ref Range Status   • WBC 08/03/2017 10.84* 4.00 - 10.00 10*3/mm3 Final   • RBC 08/03/2017 3.62* 3.90 - 5.00 10*6/mm3 Final   • Hemoglobin 08/03/2017 " 10.8* 11.5 - 14.9 g/dL Final   • Hematocrit 08/03/2017 32.6* 34.0 - 45.0 % Final   • MCV 08/03/2017 90.1  83.0 - 97.0 fL Final   • MCH 08/03/2017 29.8  27.0 - 33.0 pg Final   • MCHC 08/03/2017 33.1  32.0 - 35.0 g/dL Final   • RDW 08/03/2017 17.0* 11.7 - 14.5 % Final   • RDW-SD 08/03/2017 54.1* 37.0 - 49.0 fl Final   • MPV 08/03/2017 9.7  8.9 - 12.1 fL Final   • Platelets 08/03/2017 611* 150 - 375 10*3/mm3 Final   • Neutrophil % 08/03/2017 62.2  39.0 - 75.0 % Final   • Lymphocyte % 08/03/2017 13.3* 20.0 - 49.0 % Final   • Monocyte % 08/03/2017 15.6* 4.0 - 12.0 % Final   • Eosinophil % 08/03/2017 0.4* 1.0 - 5.0 % Final   • Basophil % 08/03/2017 1.0  0.0 - 1.1 % Final   • Immature Grans % 08/03/2017 7.5* 0.0 - 0.5 % Final   • Neutrophils, Absolute 08/03/2017 6.75  1.50 - 7.00 10*3/mm3 Final   • Lymphocytes, Absolute 08/03/2017 1.44  1.00 - 3.50 10*3/mm3 Final   • Monocytes, Absolute 08/03/2017 1.69* 0.25 - 0.80 10*3/mm3 Final   • Eosinophils, Absolute 08/03/2017 0.04  0.00 - 0.36 10*3/mm3 Final   • Basophils, Absolute 08/03/2017 0.11* 0.00 - 0.10 10*3/mm3 Final   • Immature Grans, Absolute 08/03/2017 0.81* 0.00 - 0.03 10*3/mm3 Final   • nRBC 08/03/2017 0.9* 0.0 - 0.0 /100 WBC Final     Assessment/Plan   Sarita was seen today for hypothyroidism and diabetes.    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    Primary hypothyroidism  -     levothyroxine (SYNTHROID, LEVOTHROID) 50 MCG tablet; One tablet once a day on an empty stomach      Start Tresiba 4 units every evening  Continue NovoLog as needed.  Continue levothyroxine 50 µg per day.  Flu vac this fall    RTC 3 mos.

## 2017-08-23 ENCOUNTER — TRANSCRIBE ORDERS (OUTPATIENT)
Dept: ADMINISTRATIVE | Facility: HOSPITAL | Age: 37
End: 2017-08-23

## 2017-08-23 DIAGNOSIS — R09.1 PLEURISY: Primary | ICD-10-CM

## 2017-08-24 ENCOUNTER — HOSPITAL ENCOUNTER (INPATIENT)
Facility: HOSPITAL | Age: 37
LOS: 1 days | Discharge: HOME | End: 2017-08-25
Attending: INTERNAL MEDICINE | Admitting: INTERNAL MEDICINE

## 2017-08-24 ENCOUNTER — APPOINTMENT (OUTPATIENT)
Dept: ULTRASOUND IMAGING | Facility: HOSPITAL | Age: 37
End: 2017-08-24
Attending: INTERNAL MEDICINE

## 2017-08-24 ENCOUNTER — APPOINTMENT (OUTPATIENT)
Dept: CARDIOLOGY | Facility: HOSPITAL | Age: 37
End: 2017-08-24
Attending: INTERNAL MEDICINE

## 2017-08-24 ENCOUNTER — APPOINTMENT (OUTPATIENT)
Dept: CT IMAGING | Facility: HOSPITAL | Age: 37
End: 2017-08-24
Attending: INTERNAL MEDICINE

## 2017-08-24 PROBLEM — R50.9 FEVER: Status: ACTIVE | Noted: 2017-08-24

## 2017-08-24 LAB
ALBUMIN SERPL-MCNC: 3.2 G/DL (ref 3.5–5.2)
ALBUMIN/GLOB SERPL: 0.7 G/DL
ALP SERPL-CCNC: 105 U/L (ref 39–117)
ALT SERPL W P-5'-P-CCNC: 7 U/L (ref 1–33)
ANION GAP SERPL CALCULATED.3IONS-SCNC: 13.8 MMOL/L
AST SERPL-CCNC: 11 U/L (ref 1–32)
BH CV ECHO MEAS - ACS: 2 CM
BH CV ECHO MEAS - AO MEAN PG (FULL): 1 MMHG
BH CV ECHO MEAS - AO MEAN PG: 4 MMHG
BH CV ECHO MEAS - AO ROOT AREA (BSA CORRECTED): 1.5
BH CV ECHO MEAS - AO ROOT AREA: 5.7 CM^2
BH CV ECHO MEAS - AO ROOT DIAM: 2.7 CM
BH CV ECHO MEAS - AO V2 MAX: 140 CM/SEC
BH CV ECHO MEAS - AO V2 MEAN: 94.7 CM/SEC
BH CV ECHO MEAS - AO V2 VTI: 26.2 CM
BH CV ECHO MEAS - ASC AORTA: 2.5 CM
BH CV ECHO MEAS - AVA(I,A): 2.8 CM^2
BH CV ECHO MEAS - AVA(I,D): 2.8 CM^2
BH CV ECHO MEAS - BSA(HAYCOCK): 1.8 M^2
BH CV ECHO MEAS - BSA: 1.8 M^2
BH CV ECHO MEAS - BZI_BMI: 22.4 KILOGRAMS/M^2
BH CV ECHO MEAS - BZI_METRIC_HEIGHT: 170.2 CM
BH CV ECHO MEAS - BZI_METRIC_WEIGHT: 64.9 KG
BH CV ECHO MEAS - CONTRAST EF (2CH): 59.4 ML/M^2
BH CV ECHO MEAS - CONTRAST EF 4CH: 57.1 ML/M^2
BH CV ECHO MEAS - EDV(CUBED): 91.1 ML
BH CV ECHO MEAS - EDV(MOD-SP2): 69 ML
BH CV ECHO MEAS - EDV(MOD-SP4): 63 ML
BH CV ECHO MEAS - EDV(TEICH): 92.4 ML
BH CV ECHO MEAS - EF(CUBED): 80.7 %
BH CV ECHO MEAS - EF(MOD-SP2): 59.4 %
BH CV ECHO MEAS - EF(MOD-SP4): 57.1 %
BH CV ECHO MEAS - EF(TEICH): 73.4 %
BH CV ECHO MEAS - ESV(CUBED): 17.6 ML
BH CV ECHO MEAS - ESV(MOD-SP2): 28 ML
BH CV ECHO MEAS - ESV(MOD-SP4): 27 ML
BH CV ECHO MEAS - ESV(TEICH): 24.6 ML
BH CV ECHO MEAS - FS: 42.2 %
BH CV ECHO MEAS - IVS/LVPW: 0.9
BH CV ECHO MEAS - IVSD: 0.9 CM
BH CV ECHO MEAS - LAT PEAK E' VEL: 8 CM/SEC
BH CV ECHO MEAS - LV DIASTOLIC VOL/BSA (35-75): 35.9 ML/M^2
BH CV ECHO MEAS - LV MASS(C)D: 142.9 GRAMS
BH CV ECHO MEAS - LV MASS(C)DI: 81.5 GRAMS/M^2
BH CV ECHO MEAS - LV MEAN PG: 3 MMHG
BH CV ECHO MEAS - LV SYSTOLIC VOL/BSA (12-30): 15.4 ML/M^2
BH CV ECHO MEAS - LV V1 MAX: 118 CM/SEC
BH CV ECHO MEAS - LV V1 MEAN: 71.3 CM/SEC
BH CV ECHO MEAS - LV V1 VTI: 23.7 CM
BH CV ECHO MEAS - LVIDD: 4.5 CM
BH CV ECHO MEAS - LVIDS: 2.6 CM
BH CV ECHO MEAS - LVLD AP2: 7 CM
BH CV ECHO MEAS - LVLD AP4: 7.6 CM
BH CV ECHO MEAS - LVLS AP2: 6.2 CM
BH CV ECHO MEAS - LVLS AP4: 5.8 CM
BH CV ECHO MEAS - LVOT AREA (M): 3.1 CM^2
BH CV ECHO MEAS - LVOT AREA: 3.1 CM^2
BH CV ECHO MEAS - LVOT DIAM: 2 CM
BH CV ECHO MEAS - LVPWD: 1 CM
BH CV ECHO MEAS - MED PEAK E' VEL: 9 CM/SEC
BH CV ECHO MEAS - MV A DUR: 0.09 SEC
BH CV ECHO MEAS - MV A MAX VEL: 70.6 CM/SEC
BH CV ECHO MEAS - MV DEC SLOPE: 529 CM/SEC^2
BH CV ECHO MEAS - MV DEC TIME: 0.18 SEC
BH CV ECHO MEAS - MV E MAX VEL: 98.7 CM/SEC
BH CV ECHO MEAS - MV E/A: 1.4
BH CV ECHO MEAS - MV MEAN PG: 2 MMHG
BH CV ECHO MEAS - MV P1/2T MAX VEL: 121 CM/SEC
BH CV ECHO MEAS - MV P1/2T: 67 MSEC
BH CV ECHO MEAS - MV V2 MEAN: 60.7 CM/SEC
BH CV ECHO MEAS - MV V2 VTI: 28 CM
BH CV ECHO MEAS - MVA P1/2T LCG: 1.8 CM^2
BH CV ECHO MEAS - MVA(P1/2T): 3.3 CM^2
BH CV ECHO MEAS - MVA(VTI): 2.7 CM^2
BH CV ECHO MEAS - PA ACC SLOPE: 0 CM/SEC^2
BH CV ECHO MEAS - PA ACC TIME: 0.12 SEC
BH CV ECHO MEAS - PA MAX PG: 3.7 MMHG
BH CV ECHO MEAS - PA PR(ACCEL): 26.8 MMHG
BH CV ECHO MEAS - PA V2 MAX: 96 CM/SEC
BH CV ECHO MEAS - PULM A REVS DUR: 0.11 SEC
BH CV ECHO MEAS - PULM A REVS VEL: 24.2 CM/SEC
BH CV ECHO MEAS - PULM DIAS VEL: 61.9 CM/SEC
BH CV ECHO MEAS - PULM S/D: 0.76
BH CV ECHO MEAS - PULM SYS VEL: 46.8 CM/SEC
BH CV ECHO MEAS - QP/QS: 0.86
BH CV ECHO MEAS - RAP SYSTOLE: 8 MMHG
BH CV ECHO MEAS - RV MEAN PG: 1 MMHG
BH CV ECHO MEAS - RV V1 MEAN: 38.2 CM/SEC
BH CV ECHO MEAS - RV V1 VTI: 13 CM
BH CV ECHO MEAS - RVOT AREA: 4.9 CM^2
BH CV ECHO MEAS - RVOT DIAM: 2.5 CM
BH CV ECHO MEAS - RVSP: 24.6 MMHG
BH CV ECHO MEAS - SI(AO): 85.6 ML/M^2
BH CV ECHO MEAS - SI(CUBED): 41.9 ML/M^2
BH CV ECHO MEAS - SI(LVOT): 42.5 ML/M^2
BH CV ECHO MEAS - SI(MOD-SP2): 23.4 ML/M^2
BH CV ECHO MEAS - SI(MOD-SP4): 20.5 ML/M^2
BH CV ECHO MEAS - SI(TEICH): 38.7 ML/M^2
BH CV ECHO MEAS - SUP REN AO DIAM: 1.5 CM
BH CV ECHO MEAS - SV(AO): 150 ML
BH CV ECHO MEAS - SV(CUBED): 73.5 ML
BH CV ECHO MEAS - SV(LVOT): 74.5 ML
BH CV ECHO MEAS - SV(MOD-SP2): 41 ML
BH CV ECHO MEAS - SV(MOD-SP4): 36 ML
BH CV ECHO MEAS - SV(RVOT): 63.8 ML
BH CV ECHO MEAS - SV(TEICH): 67.8 ML
BH CV ECHO MEAS - TAPSE (>1.6): 2.2 CM2
BH CV ECHO MEAS - TR MAX VEL: 204 CM/SEC
BH CV LOWER VASCULAR LEFT COMMON FEMORAL AUGMENT: NORMAL
BH CV LOWER VASCULAR LEFT COMMON FEMORAL COMPETENT: NORMAL
BH CV LOWER VASCULAR LEFT COMMON FEMORAL COMPRESS: NORMAL
BH CV LOWER VASCULAR LEFT COMMON FEMORAL PHASIC: NORMAL
BH CV LOWER VASCULAR LEFT COMMON FEMORAL SPONT: NORMAL
BH CV LOWER VASCULAR LEFT DISTAL FEMORAL COMPRESS: NORMAL
BH CV LOWER VASCULAR LEFT GASTRONEMIUS COMPRESS: NORMAL
BH CV LOWER VASCULAR LEFT GREATER SAPH AK COMPRESS: NORMAL
BH CV LOWER VASCULAR LEFT GREATER SAPH BK COMPRESS: NORMAL
BH CV LOWER VASCULAR LEFT MID FEMORAL AUGMENT: NORMAL
BH CV LOWER VASCULAR LEFT MID FEMORAL COMPETENT: NORMAL
BH CV LOWER VASCULAR LEFT MID FEMORAL COMPRESS: NORMAL
BH CV LOWER VASCULAR LEFT MID FEMORAL PHASIC: NORMAL
BH CV LOWER VASCULAR LEFT MID FEMORAL SPONT: NORMAL
BH CV LOWER VASCULAR LEFT PERONEAL COMPRESS: NORMAL
BH CV LOWER VASCULAR LEFT POPLITEAL AUGMENT: NORMAL
BH CV LOWER VASCULAR LEFT POPLITEAL COMPETENT: NORMAL
BH CV LOWER VASCULAR LEFT POPLITEAL COMPRESS: NORMAL
BH CV LOWER VASCULAR LEFT POPLITEAL PHASIC: NORMAL
BH CV LOWER VASCULAR LEFT POPLITEAL SPONT: NORMAL
BH CV LOWER VASCULAR LEFT POSTERIOR TIBIAL COMPRESS: NORMAL
BH CV LOWER VASCULAR LEFT PROXIMAL FEMORAL COMPRESS: NORMAL
BH CV LOWER VASCULAR LEFT SAPHENOFEMORAL JUNCTION AUGMENT: NORMAL
BH CV LOWER VASCULAR LEFT SAPHENOFEMORAL JUNCTION COMPETENT: NORMAL
BH CV LOWER VASCULAR LEFT SAPHENOFEMORAL JUNCTION COMPRESS: NORMAL
BH CV LOWER VASCULAR LEFT SAPHENOFEMORAL JUNCTION PHASIC: NORMAL
BH CV LOWER VASCULAR LEFT SAPHENOFEMORAL JUNCTION SPONT: NORMAL
BH CV LOWER VASCULAR RIGHT COMMON FEMORAL AUGMENT: NORMAL
BH CV LOWER VASCULAR RIGHT COMMON FEMORAL COMPETENT: NORMAL
BH CV LOWER VASCULAR RIGHT COMMON FEMORAL COMPRESS: NORMAL
BH CV LOWER VASCULAR RIGHT COMMON FEMORAL PHASIC: NORMAL
BH CV LOWER VASCULAR RIGHT COMMON FEMORAL SPONT: NORMAL
BH CV LOWER VASCULAR RIGHT DISTAL FEMORAL COMPRESS: NORMAL
BH CV LOWER VASCULAR RIGHT GASTRONEMIUS COMPRESS: NORMAL
BH CV LOWER VASCULAR RIGHT GREATER SAPH AK COMPRESS: NORMAL
BH CV LOWER VASCULAR RIGHT GREATER SAPH BK COMPRESS: NORMAL
BH CV LOWER VASCULAR RIGHT MID FEMORAL AUGMENT: NORMAL
BH CV LOWER VASCULAR RIGHT MID FEMORAL COMPETENT: NORMAL
BH CV LOWER VASCULAR RIGHT MID FEMORAL COMPRESS: NORMAL
BH CV LOWER VASCULAR RIGHT MID FEMORAL PHASIC: NORMAL
BH CV LOWER VASCULAR RIGHT MID FEMORAL SPONT: NORMAL
BH CV LOWER VASCULAR RIGHT PERONEAL COMPRESS: NORMAL
BH CV LOWER VASCULAR RIGHT POPLITEAL AUGMENT: NORMAL
BH CV LOWER VASCULAR RIGHT POPLITEAL COMPETENT: NORMAL
BH CV LOWER VASCULAR RIGHT POPLITEAL COMPRESS: NORMAL
BH CV LOWER VASCULAR RIGHT POPLITEAL PHASIC: NORMAL
BH CV LOWER VASCULAR RIGHT POPLITEAL SPONT: NORMAL
BH CV LOWER VASCULAR RIGHT POSTERIOR TIBIAL COMPRESS: NORMAL
BH CV LOWER VASCULAR RIGHT PROXIMAL FEMORAL COMPRESS: NORMAL
BH CV LOWER VASCULAR RIGHT SAPHENOFEMORAL JUNCTION AUGMENT: NORMAL
BH CV LOWER VASCULAR RIGHT SAPHENOFEMORAL JUNCTION COMPETENT: NORMAL
BH CV LOWER VASCULAR RIGHT SAPHENOFEMORAL JUNCTION COMPRESS: NORMAL
BH CV LOWER VASCULAR RIGHT SAPHENOFEMORAL JUNCTION PHASIC: NORMAL
BH CV LOWER VASCULAR RIGHT SAPHENOFEMORAL JUNCTION SPONT: NORMAL
BH CV UPPER VENOUS LEFT AXILLARY AUGMENT: NORMAL
BH CV UPPER VENOUS LEFT AXILLARY COMPETENT: NORMAL
BH CV UPPER VENOUS LEFT AXILLARY COMPRESS: NORMAL
BH CV UPPER VENOUS LEFT AXILLARY PHASIC: NORMAL
BH CV UPPER VENOUS LEFT AXILLARY SPONT: NORMAL
BH CV UPPER VENOUS LEFT BASILIC FOREARM COMPRESS: NORMAL
BH CV UPPER VENOUS LEFT BASILIC UPPER COMPRESS: NORMAL
BH CV UPPER VENOUS LEFT BRACHIAL COMPRESS: NORMAL
BH CV UPPER VENOUS LEFT CEPHALIC FOREARM COMPRESS: NORMAL
BH CV UPPER VENOUS LEFT CEPHALIC UPPER COMPRESS: NORMAL
BH CV UPPER VENOUS LEFT INTERNAL JUGULAR AUGMENT: NORMAL
BH CV UPPER VENOUS LEFT INTERNAL JUGULAR COLOR: 1
BH CV UPPER VENOUS LEFT INTERNAL JUGULAR COMPETENT: NORMAL
BH CV UPPER VENOUS LEFT INTERNAL JUGULAR COMPRESS: NORMAL
BH CV UPPER VENOUS LEFT INTERNAL JUGULAR PHASIC: NORMAL
BH CV UPPER VENOUS LEFT INTERNAL JUGULAR SPONT: NORMAL
BH CV UPPER VENOUS LEFT RADIAL COMPRESS: NORMAL
BH CV UPPER VENOUS LEFT SUBCLAVIAN AUGMENT: NORMAL
BH CV UPPER VENOUS LEFT SUBCLAVIAN COMPETENT: NORMAL
BH CV UPPER VENOUS LEFT SUBCLAVIAN PHASIC: NORMAL
BH CV UPPER VENOUS LEFT SUBCLAVIAN SPONT: NORMAL
BH CV UPPER VENOUS LEFT ULNAR COMPRESS: NORMAL
BH CV UPPER VENOUS RIGHT AXILLARY AUGMENT: NORMAL
BH CV UPPER VENOUS RIGHT AXILLARY COMPETENT: NORMAL
BH CV UPPER VENOUS RIGHT AXILLARY COMPRESS: NORMAL
BH CV UPPER VENOUS RIGHT AXILLARY PHASIC: NORMAL
BH CV UPPER VENOUS RIGHT AXILLARY SPONT: NORMAL
BH CV UPPER VENOUS RIGHT BASILIC FOREARM COMPRESS: NORMAL
BH CV UPPER VENOUS RIGHT BASILIC UPPER COMPRESS: NORMAL
BH CV UPPER VENOUS RIGHT BRACHIAL COMPRESS: NORMAL
BH CV UPPER VENOUS RIGHT CEPHALIC FOREARM COMPRESS: NORMAL
BH CV UPPER VENOUS RIGHT CEPHALIC UPPER COLOR: 1
BH CV UPPER VENOUS RIGHT CEPHALIC UPPER COMPRESS: NORMAL
BH CV UPPER VENOUS RIGHT CEPHALIC UPPER THROMBUS: NORMAL
BH CV UPPER VENOUS RIGHT INTERNAL JUGULAR AUGMENT: NORMAL
BH CV UPPER VENOUS RIGHT INTERNAL JUGULAR COMPETENT: NORMAL
BH CV UPPER VENOUS RIGHT INTERNAL JUGULAR COMPRESS: NORMAL
BH CV UPPER VENOUS RIGHT INTERNAL JUGULAR PHASIC: NORMAL
BH CV UPPER VENOUS RIGHT INTERNAL JUGULAR SPONT: NORMAL
BH CV UPPER VENOUS RIGHT RADIAL COMPRESS: NORMAL
BH CV UPPER VENOUS RIGHT SUBCLAVIAN AUGMENT: NORMAL
BH CV UPPER VENOUS RIGHT SUBCLAVIAN COMPETENT: NORMAL
BH CV UPPER VENOUS RIGHT SUBCLAVIAN PHASIC: NORMAL
BH CV UPPER VENOUS RIGHT SUBCLAVIAN SPONT: NORMAL
BH CV UPPER VENOUS RIGHT ULNAR COMPRESS: NORMAL
BH CV VAS BP RIGHT ARM: NORMAL MMHG
BH CV XLRA - RV BASE: 2.7 CM
BH CV XLRA - TDI S': 13 CM/SEC
BILIRUB SERPL-MCNC: 0.3 MG/DL (ref 0.1–1.2)
BUN BLD-MCNC: 11 MG/DL (ref 6–20)
BUN/CREAT SERPL: 15.3 (ref 7–25)
CALCIUM SPEC-SCNC: 9.5 MG/DL (ref 8.6–10.5)
CHLORIDE SERPL-SCNC: 102 MMOL/L (ref 98–107)
CO2 SERPL-SCNC: 20.2 MMOL/L (ref 22–29)
CREAT BLD-MCNC: 0.72 MG/DL (ref 0.57–1)
D-LACTATE SERPL-SCNC: 0.8 MMOL/L (ref 0.5–2)
DEPRECATED RDW RBC AUTO: 61.6 FL (ref 37–54)
E/E' RATIO: 12
ERYTHROCYTE [DISTWIDTH] IN BLOOD BY AUTOMATED COUNT: 18.7 % (ref 11.7–13)
ERYTHROCYTE [SEDIMENTATION RATE] IN BLOOD: >140 MM/HR (ref 0–20)
GFR SERPL CREATININE-BSD FRML MDRD: 90 ML/MIN/1.73
GLOBULIN UR ELPH-MCNC: 4.4 GM/DL
GLUCOSE BLD-MCNC: 128 MG/DL (ref 65–99)
GLUCOSE BLDC GLUCOMTR-MCNC: 110 MG/DL (ref 70–130)
GLUCOSE BLDC GLUCOMTR-MCNC: 170 MG/DL (ref 70–130)
HCT VFR BLD AUTO: 27.3 % (ref 35.6–45.5)
HGB BLD-MCNC: 8.7 G/DL (ref 11.9–15.5)
INR PPP: 1.3 (ref 0.8–1.2)
INR PPP: 1.33 (ref 0.9–1.1)
LEFT ATRIUM VOLUME INDEX: 15 ML/M2
MCH RBC QN AUTO: 28.7 PG (ref 26.9–32)
MCHC RBC AUTO-ENTMCNC: 31.9 G/DL (ref 32.4–36.3)
MCV RBC AUTO: 90.1 FL (ref 80.5–98.2)
NT-PROBNP SERPL-MCNC: 592.3 PG/ML (ref 0–450)
PLATELET # BLD AUTO: 531 10*3/MM3 (ref 140–500)
PMV BLD AUTO: 9.4 FL (ref 6–12)
POTASSIUM BLD-SCNC: 3.8 MMOL/L (ref 3.5–5.2)
PROCALCITONIN SERPL-MCNC: 0.84 NG/ML (ref 0.1–0.25)
PROT SERPL-MCNC: 7.6 G/DL (ref 6–8.5)
PROTHROMBIN TIME: 15.5 SECONDS (ref 12.8–15.2)
PROTHROMBIN TIME: 16 SECONDS (ref 11.7–14.2)
RBC # BLD AUTO: 3.03 10*6/MM3 (ref 3.9–5.2)
SODIUM BLD-SCNC: 136 MMOL/L (ref 136–145)
WBC NRBC COR # BLD: 13.65 10*3/MM3 (ref 4.5–10.7)

## 2017-08-24 PROCEDURE — 86235 NUCLEAR ANTIGEN ANTIBODY: CPT | Performed by: INTERNAL MEDICINE

## 2017-08-24 PROCEDURE — 93306 TTE W/DOPPLER COMPLETE: CPT | Performed by: INTERNAL MEDICINE

## 2017-08-24 PROCEDURE — 93010 ELECTROCARDIOGRAM REPORT: CPT | Performed by: INTERNAL MEDICINE

## 2017-08-24 PROCEDURE — 25010000002 CEFTRIAXONE PER 250 MG: Performed by: INTERNAL MEDICINE

## 2017-08-24 PROCEDURE — 76942 ECHO GUIDE FOR BIOPSY: CPT

## 2017-08-24 PROCEDURE — 87206 SMEAR FLUORESCENT/ACID STAI: CPT | Performed by: INTERNAL MEDICINE

## 2017-08-24 PROCEDURE — 85652 RBC SED RATE AUTOMATED: CPT | Performed by: INTERNAL MEDICINE

## 2017-08-24 PROCEDURE — 93306 TTE W/DOPPLER COMPLETE: CPT

## 2017-08-24 PROCEDURE — 82962 GLUCOSE BLOOD TEST: CPT

## 2017-08-24 PROCEDURE — 83880 ASSAY OF NATRIURETIC PEPTIDE: CPT | Performed by: INTERNAL MEDICINE

## 2017-08-24 PROCEDURE — 93970 EXTREMITY STUDY: CPT

## 2017-08-24 PROCEDURE — 0W993ZX DRAINAGE OF RIGHT PLEURAL CAVITY, PERCUTANEOUS APPROACH, DIAGNOSTIC: ICD-10-PCS | Performed by: RADIOLOGY

## 2017-08-24 PROCEDURE — 85610 PROTHROMBIN TIME: CPT | Performed by: INTERNAL MEDICINE

## 2017-08-24 PROCEDURE — 87075 CULTR BACTERIA EXCEPT BLOOD: CPT | Performed by: INTERNAL MEDICINE

## 2017-08-24 PROCEDURE — 87205 SMEAR GRAM STAIN: CPT | Performed by: INTERNAL MEDICINE

## 2017-08-24 PROCEDURE — 85027 COMPLETE CBC AUTOMATED: CPT | Performed by: INTERNAL MEDICINE

## 2017-08-24 PROCEDURE — 86225 DNA ANTIBODY NATIVE: CPT | Performed by: INTERNAL MEDICINE

## 2017-08-24 PROCEDURE — 71275 CT ANGIOGRAPHY CHEST: CPT

## 2017-08-24 PROCEDURE — 84145 PROCALCITONIN (PCT): CPT | Performed by: INTERNAL MEDICINE

## 2017-08-24 PROCEDURE — 87015 SPECIMEN INFECT AGNT CONCNTJ: CPT | Performed by: INTERNAL MEDICINE

## 2017-08-24 PROCEDURE — 83605 ASSAY OF LACTIC ACID: CPT | Performed by: INTERNAL MEDICINE

## 2017-08-24 PROCEDURE — 0 IOPAMIDOL PER 1 ML: Performed by: INTERNAL MEDICINE

## 2017-08-24 PROCEDURE — 80053 COMPREHEN METABOLIC PANEL: CPT | Performed by: INTERNAL MEDICINE

## 2017-08-24 PROCEDURE — 93005 ELECTROCARDIOGRAM TRACING: CPT | Performed by: INTERNAL MEDICINE

## 2017-08-24 PROCEDURE — 87070 CULTURE OTHR SPECIMN AEROBIC: CPT | Performed by: INTERNAL MEDICINE

## 2017-08-24 RX ORDER — LIDOCAINE HYDROCHLORIDE 10 MG/ML
20 INJECTION, SOLUTION INFILTRATION; PERINEURAL ONCE
Status: COMPLETED | OUTPATIENT
Start: 2017-08-24 | End: 2017-08-24

## 2017-08-24 RX ORDER — IPRATROPIUM BROMIDE AND ALBUTEROL SULFATE 2.5; .5 MG/3ML; MG/3ML
3 SOLUTION RESPIRATORY (INHALATION)
Status: DISCONTINUED | OUTPATIENT
Start: 2017-08-24 | End: 2017-08-24

## 2017-08-24 RX ORDER — CEFTRIAXONE SODIUM 1 G/50ML
1 INJECTION, SOLUTION INTRAVENOUS EVERY 24 HOURS
Status: DISCONTINUED | OUTPATIENT
Start: 2017-08-24 | End: 2017-08-25

## 2017-08-24 RX ORDER — CALCIUM CARBONATE 200(500)MG
2 TABLET,CHEWABLE ORAL 2 TIMES DAILY PRN
Status: DISCONTINUED | OUTPATIENT
Start: 2017-08-24 | End: 2017-08-25

## 2017-08-24 RX ORDER — PREDNISONE 10 MG/1
10 TABLET ORAL
Status: DISCONTINUED | OUTPATIENT
Start: 2017-08-24 | End: 2017-08-25 | Stop reason: HOSPADM

## 2017-08-24 RX ORDER — ACETAMINOPHEN 325 MG/1
650 TABLET ORAL EVERY 4 HOURS PRN
Status: DISCONTINUED | OUTPATIENT
Start: 2017-08-24 | End: 2017-08-25 | Stop reason: HOSPADM

## 2017-08-24 RX ORDER — SODIUM CHLORIDE 0.9 % (FLUSH) 0.9 %
1-10 SYRINGE (ML) INJECTION AS NEEDED
Status: DISCONTINUED | OUTPATIENT
Start: 2017-08-24 | End: 2017-08-25 | Stop reason: HOSPADM

## 2017-08-24 RX ORDER — BISACODYL 10 MG
10 SUPPOSITORY, RECTAL RECTAL DAILY PRN
Status: DISCONTINUED | OUTPATIENT
Start: 2017-08-24 | End: 2017-08-25

## 2017-08-24 RX ORDER — LEVOTHYROXINE SODIUM 0.05 MG/1
50 TABLET ORAL
Status: DISCONTINUED | OUTPATIENT
Start: 2017-08-24 | End: 2017-08-25 | Stop reason: HOSPADM

## 2017-08-24 RX ORDER — BISACODYL 5 MG/1
5 TABLET, DELAYED RELEASE ORAL DAILY PRN
Status: DISCONTINUED | OUTPATIENT
Start: 2017-08-24 | End: 2017-08-25

## 2017-08-24 RX ADMIN — ACETAMINOPHEN 650 MG: 325 TABLET ORAL at 23:23

## 2017-08-24 RX ADMIN — IOPAMIDOL 85 ML: 755 INJECTION, SOLUTION INTRAVENOUS at 14:06

## 2017-08-24 RX ADMIN — LIDOCAINE HYDROCHLORIDE 17 ML: 10 INJECTION, SOLUTION INFILTRATION; PERINEURAL at 13:31

## 2017-08-24 RX ADMIN — APIXABAN 5 MG: 5 TABLET, FILM COATED ORAL at 21:13

## 2017-08-24 RX ADMIN — CEFTRIAXONE SODIUM 1 G: 1 INJECTION, SOLUTION INTRAVENOUS at 20:37

## 2017-08-24 NOTE — NURSING NOTE
Dr. Marley aware that patient last took eliquis 16 hours ago. Okay to proceed with thoracentesis.

## 2017-08-24 NOTE — PROGRESS NOTES
BLEV doppler completed. Preliminary report is negative for DVT    BUEV doppler completed. 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

## 2017-08-24 NOTE — PROGRESS NOTES
[facility] PROGRESS NOTE.    Patient is off the floor for testing.  Case discussed with her significant other at bedside.  Also discussed with her nurse.  Chart reviewed.  I discussed her case over the phone with Dr. Remy, her admitting physician.  We will continue to follow.

## 2017-08-24 NOTE — PROGRESS NOTES
Continued Stay Note  [facility]     Patient Name: Sarita Bai  MRN: [id number]  Today's Date: 8/24/2017    Admit Date: 8/24/2017          Discharge Plan       08/24/17 1254    Case Management/Social Work Plan    Additional Comments Attempted to screen, patient off the floor.  Sergei, RN, CCP              Discharge Codes     None            Belinda Comer RN

## 2017-08-24 NOTE — H&P
Patient Care Team:  Gregorio Bai MD as PCP - General (Family Medicine)  Van Remy MD as Referring Physician (Pulmonary Disease)  Sorin Cedeno MD as Consulting Physician (Hematology and Oncology)  Susan Urena MD as Consulting Physician (Nephrology)  Conrad Butler MD as Consulting Physician (Rheumatology)    Chief complaint fever, chest pain and SOB    Subjective     Patient is a 37 y.o. female. With recent E admission wiht DKA, PNA, ARDS,ERF,Hypothyroidism and ITP flair whom left went to rehab doing well and renal failure resolved and right IJ catheter removed and developed DVTin right IJ after catheter removal. On Eliquis. Starting SUnday fever and chills controlled with tylenol and sharp apin left chest worse with movement and respiration and increased SOB. CXR yest new moderate to large bilateral pleural effusions. NO cough or minimal . No swelling or weight gain. Had been on steroids was weaned to 5 mg qod but yesterdaysaw Dr butler and increased back to 10mg per day.      Review of Systems  Constitution:she having fever and chills treating with Tylenol since sunday  Eyes:no acute visual changes  ENT:no sore throat or runny nose  Respiratory:SOB as above  Cardiovascular:CP as above  Gastrointestinal:no nausea, vomiting or diarrhea  Genitourinary:nodysuria decreased urinary out put or urinary urgency or frequency  Integument:no skin rashes  Hematologic/Lymphatic: no easy bleeding recent Right jugular DVT  Musculoskeletal:no arthritis fairs  Neurological:no headhaches  Behavioral/Psych:not reviewed  Endocrine:following with endo DM and hypothyroid doing well  Allergies/Immunologic:none    History  Past Medical History:   Diagnosis Date   • History of ITP    • Lupus    • Thyroid activity decreased      Past Surgical History:   Procedure Laterality Date   • INSERT CENTRAL LINE AT BEDSIDE  6/30/2017        • INSERTION HEMODIALYSIS CATHETER N/A 7/6/2017     Procedure: LEFT IJ TUNNELED  CATHETER ;  Surgeon: Caleb Bliss MD;  [facility] MAIN OR;  Service:    • INTUBATION  6/30/2017        • SPLENECTOMY  05/2002     Family History   Problem Relation Age of Onset   • Thyroid disease Mother    • Hyperlipidemia Mother    • Hypertension Mother    • Diabetes Father    • Hypertension Father    • Atrial fibrillation Father    • Hyperlipidemia Father      Social History     Social History   • Marital status: Single     Spouse name: N/A   • Number of children: N/A   • Years of education: N/A     Social History Main Topics   • Smoking status: Never Smoker   • Smokeless tobacco: Never Used   • Alcohol use No   • Drug use: Not on file   • Sexual activity: No     Other Topics Concern   • Not on file     Social History Narrative       Allergies:  Review of patient's allergies indicates no known allergies.    Medications:  Prior to Admission medications    Medication Sig Start Date End Date Taking? Authorizing Provider   Acetaminophen (TYLENOL) 325 MG capsule Take  by mouth As Needed.    Historical Provider, MD   apixaban (ELIQUIS) 5 MG tablet tablet Take 5 mg by mouth 1 (One) Time. 8/2/17 9/1/17  Historical Provider, MD   glucose blood (ONETOUCH VERIO) test strip Testing bs 4 x day  Dx code E13.9 8/22/17   Thomas Campbell MD   insulin degludec (TRESIBA FLEXTOUCH) 100 UNIT/ML solution pen-injector injection Inject 4 Units under the skin Every Evening. 8/10/17   Thomas Campbell MD   levothyroxine (SYNTHROID, LEVOTHROID) 50 MCG tablet One tablet once a day on an empty stomach 8/10/17   Thomas Campbell MD   NOVOLOG FLEXPEN 100 UNIT/ML solution pen-injector sc pen USE AS DIRECTED PER SLIDING SCALE 7/28/17   Historical Provider, MD GARCIA DELICA LANCETS FINE misc Testing bs 4 x day 8/23/17   Thomas Campbell MD   predniSONE (DELTASONE) 10 MG tablet Take 1 tablet by mouth Daily With Breakfast.  Patient taking differently: Take 5 mg by mouth Every Other Day. 7/13/17    Leeroy Porter MD       insulin aspart 1 Units Subcutaneous TID With Meals   insulin degludec 4 Units Subcutaneous Nightly   levothyroxine 50 mcg Oral Q AM   predniSONE 10 mg Oral Daily With Breakfast          Objective     Vital Signs  Temp:  [98 °F (36.7 °C)] 98 °F (36.7 °C)  Heart Rate:  [84] 84  Resp:  [16] 16  BP: (122)/(76) 122/76  There is no height or weight on file to calculate BMI.  No intake or output data in the 24 hours ending 17 0920            Physical Exam:  General Appearance: WD/WF in NAD  Eyes: conjunctiva clear and anicteric  ENT: MMM noerythema or exudates  Neck: no JVD or HJR, trachea midline no adenopathy or thyromegally  Lungs:  but clear no wheezes or rales decreased BS at bases with dullnes on percussion bothlung bases.  No chest wall tenderness  Cardiac: RRR no murmur or rub  Abdomen: S/nt/active BS no HSM  : not examined  Musc/Skel: no joint erythema or effusions  Skin: no jaundice , petechea or rashes  Neuro: alet and oriented grossly intact  Extremities/P Vascular: no clubbin g or cyanosis trace bilateral pretibial edema  MSE:  Good spirits until told her thought should go to hospital then crying      Labs:          Radiographic Imaging:  Imaging Results (last 24 hours)     ** No results found for the last 24 hours. **          Reviewed CXR from yester day moderate to large bilateral pleural effusions nodefinite infiltrates    Assessment/Plan     SHe has fever and chills with left pleurtic chest pain and MARKED BILATERL PLEURAL EFFUSIONS WITH sle RECENTLY TAPERING STEROIDS RECENT pna AND ards AND RECENT dvt.  Possibilites quit large need to rule out PE will chek dopplers and PE CT if renal function still normal  Will check echo no evidence tamponade consider pleuropericadritis. Will also check PCT carlee out infection may need effusions tapped.  DIscussed with pateint and with Dr Porter my partenr covering hospital he knows patient from last admission and will follow  uptesting and assume hospital care      Van Remy MD  08/24/17  9:20 AM    Time: I have spent over 70 min on patien today

## 2017-08-24 NOTE — PROGRESS NOTES
Continued Stay Note  [facility]     Patient Name: Sarita Bai  MRN: [id number]  Today's Date: 8/24/2017    Admit Date: 8/24/2017          Discharge Plan       08/24/17 1450    Case Management/Social Work Plan    Additional Comments Patient continues to be off the floor.  Sergei RN, CCP      08/24/17 1254    Case Management/Social Work Plan    Additional Comments Attempted to screen, patient off the floor.  Sergei RN, CCP              Discharge Codes     None            Belinda Comer RN

## 2017-08-24 NOTE — PLAN OF CARE
Problem: Patient Care Overview (Adult)  Goal: Plan of Care Review  Outcome: Ongoing (interventions implemented as appropriate)    08/24/17 1822   Coping/Psychosocial Response Interventions   Plan Of Care Reviewed With patient   Patient Care Overview   Progress progress towards functional goals is fair   Outcome Evaluation   Outcome Summary/Follow up Plan pt alert and oriented. admitted from Dr. Remy's office today. doppler today. old thrombus in right arm and new dvt in left arm. md notified. orders recevied and noted. procal elevated. 1ml out from thoracentesis. md notified of both. orders received and noted. echo and ct of chest today. bs monitoring initiated. restart eliquis tonight. vs are stable. no acute distress noted. will continue to monitor.        Goal: Adult Individualization and Mutuality  Outcome: Ongoing (interventions implemented as appropriate)    Problem: Pain, Acute (Adult)  Goal: Acceptable Pain Control/Comfort Level  Outcome: Ongoing (interventions implemented as appropriate)    Problem: VTE, DVT and PE (Adult)  Goal: Signs and Symptoms of Listed Potential Problems Will be Absent or Manageable (VTE, DVT and PE)  Outcome: Ongoing (interventions implemented as appropriate)

## 2017-08-25 VITALS
OXYGEN SATURATION: 98 % | HEART RATE: 71 BPM | BODY MASS INDEX: 22.7 KG/M2 | RESPIRATION RATE: 16 BRPM | SYSTOLIC BLOOD PRESSURE: 121 MMHG | WEIGHT: 144.6 LBS | TEMPERATURE: 98.3 F | HEIGHT: 67 IN | DIASTOLIC BLOOD PRESSURE: 76 MMHG

## 2017-08-25 LAB
ALBUMIN SERPL-MCNC: 3 G/DL (ref 3.5–5.2)
ALBUMIN/GLOB SERPL: 0.7 G/DL
ALP SERPL-CCNC: 91 U/L (ref 39–117)
ALT SERPL W P-5'-P-CCNC: 9 U/L (ref 1–33)
ANION GAP SERPL CALCULATED.3IONS-SCNC: 14.1 MMOL/L
AST SERPL-CCNC: 8 U/L (ref 1–32)
BASOPHILS # BLD AUTO: 0.02 10*3/MM3 (ref 0–0.2)
BASOPHILS NFR BLD AUTO: 0.3 % (ref 0–1.5)
BILIRUB SERPL-MCNC: 0.2 MG/DL (ref 0.1–1.2)
BUN BLD-MCNC: 10 MG/DL (ref 6–20)
BUN/CREAT SERPL: 14.7 (ref 7–25)
CALCIUM SPEC-SCNC: 9.1 MG/DL (ref 8.6–10.5)
CENTROMERE B AB SER-ACNC: <0.2 AI (ref 0–0.9)
CHLORIDE SERPL-SCNC: 102 MMOL/L (ref 98–107)
CHROMATIN AB SERPL-ACNC: >8 AI (ref 0–0.9)
CO2 SERPL-SCNC: 21.9 MMOL/L (ref 22–29)
CREAT BLD-MCNC: 0.68 MG/DL (ref 0.57–1)
DAT POLY-SP REAG RBC QL: NEGATIVE
DEPRECATED RDW RBC AUTO: 62.7 FL (ref 37–54)
DSDNA AB SER-ACNC: <1 IU/ML (ref 0–9)
ENA JO1 AB SER-ACNC: <0.2 AI (ref 0–0.9)
ENA RNP AB SER-ACNC: >8 AI (ref 0–0.9)
ENA SCL70 AB SER-ACNC: <0.2 AI (ref 0–0.9)
ENA SM AB SER-ACNC: 0.8 AI (ref 0–0.9)
ENA SS-A AB SER-ACNC: 0.9 AI (ref 0–0.9)
ENA SS-B AB SER-ACNC: <0.2 AI (ref 0–0.9)
EOSINOPHIL # BLD AUTO: 0.07 10*3/MM3 (ref 0–0.7)
EOSINOPHIL NFR BLD AUTO: 0.9 % (ref 0.3–6.2)
ERYTHROCYTE [DISTWIDTH] IN BLOOD BY AUTOMATED COUNT: 18.8 % (ref 11.7–13)
FERRITIN SERPL-MCNC: 1036 NG/ML (ref 13–150)
GFR SERPL CREATININE-BSD FRML MDRD: 96 ML/MIN/1.73
GIE STN SPEC: NORMAL
GLOBULIN UR ELPH-MCNC: 4.1 GM/DL
GLUCOSE BLD-MCNC: 201 MG/DL (ref 65–99)
GLUCOSE BLDC GLUCOMTR-MCNC: 125 MG/DL (ref 70–130)
GLUCOSE BLDC GLUCOMTR-MCNC: 158 MG/DL (ref 70–130)
HCT VFR BLD AUTO: 26.9 % (ref 35.6–45.5)
HGB BLD-MCNC: 8.3 G/DL (ref 11.9–15.5)
HGB RETIC QN: 20.9 PG (ref 32.7–38.6)
IMM GRANULOCYTES # BLD: 0.03 10*3/MM3 (ref 0–0.03)
IMM GRANULOCYTES NFR BLD: 0.4 % (ref 0–0.5)
IMM RETICS NFR: 11.5 % (ref 0.7–13.7)
IRON 24H UR-MRATE: 27 MCG/DL (ref 37–145)
IRON SATN MFR SERPL: 14 % (ref 20–50)
LDH SERPL-CCNC: 89 U/L (ref 135–214)
LYMPHOCYTES # BLD AUTO: 1.11 10*3/MM3 (ref 0.9–4.8)
LYMPHOCYTES NFR BLD AUTO: 14.6 % (ref 19.6–45.3)
Lab: ABNORMAL
MCH RBC QN AUTO: 28.2 PG (ref 26.9–32)
MCHC RBC AUTO-ENTMCNC: 30.9 G/DL (ref 32.4–36.3)
MCV RBC AUTO: 91.5 FL (ref 80.5–98.2)
MONOCYTES # BLD AUTO: 1.16 10*3/MM3 (ref 0.2–1.2)
MONOCYTES NFR BLD AUTO: 15.3 % (ref 5–12)
NEUTROPHILS # BLD AUTO: 5.19 10*3/MM3 (ref 1.9–8.1)
NEUTROPHILS NFR BLD AUTO: 68.5 % (ref 42.7–76)
NIGHT BLUE STAIN TISS: NORMAL
NRBC BLD MANUAL-RTO: 0.3 /100 WBC (ref 0–0)
PLATELET # BLD AUTO: 581 10*3/MM3 (ref 140–500)
PMV BLD AUTO: 9.4 FL (ref 6–12)
POTASSIUM BLD-SCNC: 3.2 MMOL/L (ref 3.5–5.2)
PROT SERPL-MCNC: 7.1 G/DL (ref 6–8.5)
RBC # BLD AUTO: 2.94 10*6/MM3 (ref 3.9–5.2)
RETICS/RBC NFR AUTO: 0.81 % (ref 0.5–1.5)
SODIUM BLD-SCNC: 138 MMOL/L (ref 136–145)
TIBC SERPL-MCNC: 191 MCG/DL (ref 298–536)
TRANSFERRIN SERPL-MCNC: 128 MG/DL (ref 200–360)
WBC NRBC COR # BLD: 7.58 10*3/MM3 (ref 4.5–10.7)

## 2017-08-25 PROCEDURE — 83540 ASSAY OF IRON: CPT | Performed by: INTERNAL MEDICINE

## 2017-08-25 PROCEDURE — 85046 RETICYTE/HGB CONCENTRATE: CPT | Performed by: INTERNAL MEDICINE

## 2017-08-25 PROCEDURE — 84466 ASSAY OF TRANSFERRIN: CPT | Performed by: INTERNAL MEDICINE

## 2017-08-25 PROCEDURE — 85025 COMPLETE CBC W/AUTO DIFF WBC: CPT | Performed by: INTERNAL MEDICINE

## 2017-08-25 PROCEDURE — 82962 GLUCOSE BLOOD TEST: CPT

## 2017-08-25 PROCEDURE — 80053 COMPREHEN METABOLIC PANEL: CPT | Performed by: INTERNAL MEDICINE

## 2017-08-25 PROCEDURE — 82728 ASSAY OF FERRITIN: CPT | Performed by: INTERNAL MEDICINE

## 2017-08-25 PROCEDURE — 99254 IP/OBS CNSLTJ NEW/EST MOD 60: CPT | Performed by: INTERNAL MEDICINE

## 2017-08-25 PROCEDURE — 83615 LACTATE (LD) (LDH) ENZYME: CPT | Performed by: INTERNAL MEDICINE

## 2017-08-25 PROCEDURE — 86880 COOMBS TEST DIRECT: CPT | Performed by: INTERNAL MEDICINE

## 2017-08-25 PROCEDURE — 63710000001 PREDNISONE PER 5 MG: Performed by: INTERNAL MEDICINE

## 2017-08-25 RX ADMIN — LEVOTHYROXINE SODIUM 50 MCG: 50 TABLET ORAL at 07:08

## 2017-08-25 RX ADMIN — PREDNISONE 10 MG: 10 TABLET ORAL at 08:08

## 2017-08-25 RX ADMIN — APIXABAN 5 MG: 5 TABLET, FILM COATED ORAL at 08:08

## 2017-08-25 NOTE — DISCHARGE SUMMARY
Patient Identification:  Name: Sarita Bai  Age: 37 y.o.  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  She was diagnosed with bilateral pleural effusions and pericardial effusion.  CT angiogram of the chest ruled out pulmonary embolism.  Bilateral Doppler ultrasounds of the venous system of her upper extremities revealed DVT, which was not new, because 3 weeks ago at [facility], she was diagnosed with a left internal jugular deep vein thrombosis.  Thoracentesis was attempted but only 2 cc were obtained.  This was sent for further testing.    The patient now is appropriate for discharge home.  There are no signs of infection.  Pro-calcitonin was mildly elevated, reason for which she received 1 dose of empiric Rocephin IV.  She has not had any fever, elevated white count, green sputum or chills.  I believe it is safe for her to go home on 10 mg of prednisone for her lupus flareup, and follow-up with Dr. Butler, her rheumatologist within a few days.    She needs to continue taking Eliquis.  She is taking it for her known upper extremity DVTs.  She does not have new DVTs.    She will also follow-up with her primary care physician as well as Dr. Remy within the next 2 weeks.    Consults:   IP CONSULT TO HEMATOLOGY AND ONCOLOGY    Significant Diagnostic Studies:   CBC:   Results from last 7 days  Lab Units 17  0914   WBC 10*3/mm3 7.58   RBC 10*6/mm3 2.94*     BMP:   Results from last 7 days  Lab Units 17  0914   GLUCOSE mg/dL 201*   CO2 mmol/L 21.9*   BUN mg/dL 10   CREATININE  mg/dL 0.68   CALCIUM mg/dL 9.1     Coagulation:   Lab Results   Component Value Date    INR 1.33 (H) 08/24/2017     Cardiac markers:     ABGs:       Invalid input(s): PO2, PCO2  Radiology review:   Imaging Results (most recent)     Procedure Component Value Units Date/Time    US Thoracentesis Right [[id number]] Collected:  08/24/17 1513     Updated:  08/24/17 1520    Narrative:       ULTRASOUND GUIDED RIGHT THORACENTESIS     HISTORY: Small bilateral pleural effusions noted on recent CT scan.     FINDINGS: Following sterile prep and local anesthetic, an 18-gauge  needle was advanced into the small right-sided pleural effusion by Dr. Marley. Then 5 mL of slightly blood-tinged pleural fluid was removed  and sent for studies as requested.     The patient tolerated the procedure well and there were no immediate  competitions.     This report was finalized on 8/24/2017 3:17 PM by Dr. Ba Marley MD.       CT Angiogram Chest With & Without Contrast [[id number]] Collected:  08/24/17 1450     Updated:  08/24/17 1559    Narrative:       CT ANGIOGRAM OF THE CHEST. MULTIPLE CORONAL, SAGITTAL, AND 3-D  RECONSTRUCTIONS     HISTORY: 37-year-old female with shortness of breath following  thoracentesis. Known acute left upper extremity DVT and chronic right  upper extremity superficial thrombophlebitis.     TECHNIQUE: CT angiogram of the chest was performed. Multiple coronal,  sagittal, and 3-D reconstruction images were obtained. There is no  previous chest CT for comparison.     FINDINGS: There is adequate opacification of the pulmonary arteries and  branches. There are no filling defects and there is no convincing  evidence for pulmonary thromboemboli. There are small-moderate size  pleural effusions which appear possibly loculated. There is rounded  atelectatic change at the right lung base and there is also atelectatic  change at the left lower lobe. There is a small pericardial effusion  measuring 9-10 mm in transverse  diameter. There is no lymphadenopathy  within the chest.       Impression:       1. There is no evidence for pulmonary thromboemboli.  2. Small-moderate bilateral pleural effusions may be loculated. There is  atelectatic change at both lower lobes. There is also a small  pericardial effusion measuring 9-10 mm transversely.     This report was finalized on 8/24/2017 3:56 PM by Dr. Annemarie Hernandez MD.             Discharge Exam:  Alert and oriented x 4, in NAD  Supple neck, midline trach  RRR, no m/r/g, no edema  Diminished breath sounds over the bases, right greater than left., no wheezing, nonlabored  No clubbing or cyanosis     Disposition:  Home    Patient Instructions:    Sarita Bai   Home Medication Instructions IRENA:[id number]    Printed on:08/25/17 1135   Medication Information                      Acetaminophen (TYLENOL) 325 MG capsule  Take  by mouth As Needed.             apixaban (ELIQUIS) 5 MG tablet tablet  Take 5 mg by mouth 1 (One) Time.             glucose blood (ONETOUCH VERIO) test strip  Testing bs 4 x day  Dx code E13.9             insulin degludec (TRESIBA FLEXTOUCH) 100 UNIT/ML solution pen-injector injection  Inject 4 Units under the skin Every Evening.             levothyroxine (SYNTHROID, LEVOTHROID) 50 MCG tablet  One tablet once a day on an empty stomach             NOVOLOG FLEXPEN 100 UNIT/ML solution pen-injector sc pen  USE AS DIRECTED PER SLIDING SCALE             ONETOUCH DELICA LANCETS FINE misc  Testing bs 4 x day             predniSONE (DELTASONE) 10 MG tablet  Take 1 tablet by mouth Daily With Breakfast.                       Medication Reconciliation: Please see electronically completed Med Rec.    Total time spent discharging patient including evaluation, medication reconciliation, arranging follow up, and post hospitalization instructions and education total time Less than 30 minutes.    Signed:  Leeroy Porter MD  8/25/2017  11:35 AM

## 2017-08-25 NOTE — CONSULTS
Subjective     REASON FOR CONSULTATION:  Left internal jugular vein thrombosis  Provide an opinion on any further workup or treatment                             REQUESTING PHYSICIAN:  Charlotte    RECORDS OBTAINED:  Records of the patients history including those obtained from the referring provider were reviewed and summarized in detail.      History of Present Illness   This is a very pleasant 37-year-old woman well known to our practice from her recent hospitalization and a prior history of autoimmune thrombocytopenia associated with positive ZIGGY.  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  We also followed the patient during a recent, complicated hospital admission from June 29 through July 13 when she was admitted with community-acquired pneumonia, sepsis, ARDS, renal failure requiring hemodialysis and multifactorial thrombocytopenia.  She had positive autoimmune antibodies and felt potentially to have exacerbation of lupus.  She was placed on steroids.  She has since followed up with her rheumatologist Dr. Butler and was undergoing a steroid taper reaching a low of prednisone 5 mg every other day.  When she saw Dr. Butler this week, she was having cough, shortness of breath, and I believe fever for which prednisone was increased to 10 mg daily.  She was seen by pulmonary medicine yesterday with continued fevers, chills, shortness of breath and pleuritic chest pain and admitted for further evaluation.  She was found to have bilateral pleural effusions and a small pericardial effusion for which she underwent a thoracentesis 8/24/17.  Results are pending.  She also underwent an echocardiogram showing an ejection fraction of 57% a small pericardial effusion which appears fit burn us but no evidence of tamponade.    With respect to her IJ DVT, this does not appear new.  From her hospitalization in July, the patient had a dialysis catheter placed in the left  neck.  She had the dialysis catheter removed in early August after her kidney function improved.  After the dialysis catheter was removed she developed acute swelling in the left neck and had a Doppler ultrasound performed  At [facility] on 8/1/17, results under care everywhere; this showed a left internal jugular occlusive deep venous thrombosis.  Was placed on Eliquis in her neck swelling significantly improved.  On admission here, repeat ultrasound of the upper extremities showed a chronic superficial thrombophlebitis in the right cephalic and an acute DVT in the internal jugular on the left, the same as noted at [place] on 8/1/17.  Therefore this does not appear to be an Eliquis failure.  Ultrasound of the lower extremity showed no DVT, and CT angiogram of the chest does not show evidence for pulmonary thromboemboli.      Past Medical History:   Diagnosis Date   • Diabetes mellitus    • History of ITP    • Lupus    • PNA (pneumonia) 06/29/2017   • Thyroid activity decreased         Past Surgical History:   Procedure Laterality Date   • INSERT CENTRAL LINE AT BEDSIDE  6/30/2017        • INSERTION HEMODIALYSIS CATHETER N/A 7/6/2017    Procedure: LEFT IJ TUNNELED  CATHETER ;  Surgeon: Caleb Bliss MD;  Location: [facility];  Service:    • INTUBATION  6/30/2017        • SPLENECTOMY  05/2002        No current facility-administered medications on file prior to encounter.      Current Outpatient Prescriptions on File Prior to Encounter   Medication Sig Dispense Refill   • Acetaminophen (TYLENOL) 325 MG capsule Take  by mouth As Needed.     • apixaban (ELIQUIS) 5 MG tablet tablet Take 5 mg by mouth 1 (One) Time.     • glucose blood (ONETOUCH VERIO) test strip Testing bs 4 x day  Dx code E13.9 400 each 1   • insulin degludec (TRESIBA FLEXTOUCH) 100 UNIT/ML solution pen-injector injection Inject 4 Units under the skin Every Evening. 5 pen 5   • levothyroxine (SYNTHROID, LEVOTHROID) 50 MCG tablet One tablet  once a day on an empty stomach 30 tablet 5   • NOVOLOG FLEXPEN 100 UNIT/ML solution pen-injector sc pen USE AS DIRECTED PER SLIDING SCALE  0   • ONETOUCH DELICA LANCETS FINE misc Testing bs 4 x day 400 each 1   • predniSONE (DELTASONE) 10 MG tablet Take 1 tablet by mouth Daily With Breakfast. (Patient taking differently: Take 5 mg by mouth Every Other Day.) 30 tablet 0        ALLERGIES:  No Known Allergies     Social History     Social History   • Marital status: Single     Spouse name: N/A   • Number of children: N/A   • Years of education: N/A     Social History Main Topics   • Smoking status: Never Smoker   • Smokeless tobacco: Never Used   • Alcohol use 1.8 oz/week     1 Cans of beer, 2 Glasses of wine per week      Comment: SOCIAL DRINKER   • Drug use: None   • Sexual activity: No     Other Topics Concern   • None     Social History Narrative        Family History   Problem Relation Age of Onset   • Thyroid disease Mother    • Hyperlipidemia Mother    • Hypertension Mother    • Diabetes Father    • Hypertension Father    • Atrial fibrillation Father    • Hyperlipidemia Father         Review of Systems   Constitutional: Negative.    HENT: Negative.    Respiratory: Positive for cough and shortness of breath.    Cardiovascular: Positive for chest pain. Negative for palpitations.   Gastrointestinal: Negative for abdominal distention, blood in stool and constipation.   Musculoskeletal: Negative for arthralgias and back pain.   Skin: Positive for rash.   Hematological: Negative for adenopathy. Does not bruise/bleed easily.   Psychiatric/Behavioral: Negative for agitation. The patient is not nervous/anxious.          Objective     Vitals:    08/24/17 2034 08/24/17 2100 08/24/17 2320 08/25/17 0726   BP:   132/72 121/76   BP Location:   Left arm Left arm   Patient Position:   Lying Lying   Pulse:  81 87 71   Resp:   16 16   Temp:   99.2 °F (37.3 °C)    TempSrc:   Oral    SpO2:  99%  98%   Weight: 144 lb 9.6 oz (65.6 kg)  "     Height: 67\" (170.2 cm)        Current Status 8/3/2017   ECOG score 0       Physical Exam    Gen: pleasant woman, nad on room air in no resp distress  HEENT: no icterus  CV: RRR, don't appreciate rub  CHEST: diminished bases  ABD: soft, ntnd  MS: tr ankle edema      RECENT LABS:  Hematology WBC   Date Value Ref Range Status   08/24/2017 13.65 (H) 4.50 - 10.70 10*3/mm3 Final     RBC   Date Value Ref Range Status   08/24/2017 3.03 (L) 3.90 - 5.20 10*6/mm3 Final     Hemoglobin   Date Value Ref Range Status   08/24/2017 8.7 (L) 11.9 - 15.5 g/dL Final     Hematocrit   Date Value Ref Range Status   08/24/2017 27.3 (L) 35.6 - 45.5 % Final     Platelets   Date Value Ref Range Status   08/24/2017 531 (H) 140 - 500 10*3/mm3 Final        Glucose   Date Value Ref Range Status   08/24/2017 128 (H) 65 - 99 mg/dL Final     Sodium   Date Value Ref Range Status   08/24/2017 136 136 - 145 mmol/L Final     Potassium   Date Value Ref Range Status   08/24/2017 3.8 3.5 - 5.2 mmol/L Final     CO2   Date Value Ref Range Status   08/24/2017 20.2 (L) 22.0 - 29.0 mmol/L Final     Chloride   Date Value Ref Range Status   08/24/2017 102 98 - 107 mmol/L Final     Anion Gap   Date Value Ref Range Status   08/24/2017 13.8 mmol/L Final     Creatinine   Date Value Ref Range Status   08/24/2017 0.72 0.57 - 1.00 mg/dL Final     BUN   Date Value Ref Range Status   08/24/2017 11 6 - 20 mg/dL Final     BUN/Creatinine Ratio   Date Value Ref Range Status   08/24/2017 15.3 7.0 - 25.0 Final     Calcium   Date Value Ref Range Status   08/24/2017 9.5 8.6 - 10.5 mg/dL Final     eGFR Non  Amer   Date Value Ref Range Status   08/24/2017 90 >60 mL/min/1.73 Final     Alkaline Phosphatase   Date Value Ref Range Status   08/24/2017 105 39 - 117 U/L Final     Total Protein   Date Value Ref Range Status   08/24/2017 7.6 6.0 - 8.5 g/dL Final     ALT (SGPT)   Date Value Ref Range Status   08/24/2017 7 1 - 33 U/L Final     AST (SGOT)   Date Value Ref Range " Status   08/24/2017 11 1 - 32 U/L Final     Total Bilirubin   Date Value Ref Range Status   08/24/2017 0.3 0.1 - 1.2 mg/dL Final     Albumin   Date Value Ref Range Status   08/24/2017 3.20 (L) 3.50 - 5.20 g/dL Final     Globulin   Date Value Ref Range Status   08/24/2017 4.4 gm/dL Final     A/G Ratio   Date Value Ref Range Status   08/24/2017 0.7 g/dL Final     procal 0.84      Assessment/Plan     1.  Left internal jugular occlusive deep venous thrombosis: This does not appear to be a new thrombus as [facility] (Doppler result available through care everywhere 8/1/17 showing DVT affecting the left jugular vein) at which time she was placed on Eliquis.  Her neck swelling has significantly improved with anticoagulation and I do not suspect failure of anticoagulation.  I would recommend to continue Eliquis at this time.    2.  Worsening anemia: Hemoglobin has declined from 10.8-8.7 from 8/3 2/8/24 on admission.  This is not far off her baseline from the previous hospitalization.  Given her autoimmune history, I'm going to repeat the CBC this morning along with an LDH and reticulocyte count plus Lakisha test to make sure that she is not having an autoimmune hemolysis.    3.  History of ITP: Platelets are now elevated, likely inflammatory    4.  Pleural and pericardial effusions with echocardiogram showing fibrinous material in the pericardial fluid most likely related to the patient's autoimmune disease.  I will leave steroid dosing and other immunosuppressive therapy to the primary team and her rheumatologist but her current symptoms are more than likely related to flare of her autoimmune disease which occurred during recent steroid taper

## 2017-08-25 NOTE — PLAN OF CARE
Problem: Patient Care Overview (Adult)  Goal: Plan of Care Review  Outcome: Outcome(s) achieved Date Met:  08/25/17 08/25/17 1146   Coping/Psychosocial Response Interventions   Plan Of Care Reviewed With patient   Patient Care Overview   Progress improving   Outcome Evaluation   Outcome Summary/Follow up Plan Afebrile. Denies pain or SOA. Telemetry NS. Pt states is ready for discharge.       Goal: Adult Individualization and Mutuality  Outcome: Outcome(s) achieved Date Met:  08/25/17  Goal: Discharge Needs Assessment  Outcome: Outcome(s) achieved Date Met:  08/25/17 08/25/17 1146   Discharge Needs Assessment   Concerns To Be Addressed no discharge needs identified   Discharge Disposition home or self-care   Living Environment   Transportation Available car;family or friend will provide         Problem: Pain, Acute (Adult)  Goal: Acceptable Pain Control/Comfort Level  Outcome: Outcome(s) achieved Date Met:  08/25/17 08/25/17 1146   Pain, Acute (Adult)   Acceptable Pain Control/Comfort Level making progress toward outcome         Problem: VTE, DVT and PE (Adult)  Goal: Signs and Symptoms of Listed Potential Problems Will be Absent or Manageable (VTE, DVT and PE)  Outcome: Outcome(s) achieved Date Met:  08/25/17 08/25/17 1146   VTE, DVT and PE   Problems Assessed (VTE, DVT, PE) all   Problems Present (VTE, DVT, PE) none

## 2017-08-25 NOTE — PROGRESS NOTES
Continued Stay Note  [facility]     Patient Name: Sarita Bai  MRN: [id number]  Today's Date: 8/25/2017    Admit Date: 8/24/2017          Discharge Plan       08/25/17 1242    Case Management/Social Work Plan    Plan Home with parents    Final Note    Final Note d/c'd              Discharge Codes       08/25/17 1242    Discharge Codes    Discharge Codes 01  Discharge to home        Expected Discharge Date and Time     Expected Discharge Date Expected Discharge Time    Aug 25, 2017             Belinda Comer RN

## 2017-08-25 NOTE — PLAN OF CARE
Problem: Patient Care Overview (Adult)  Goal: Plan of Care Review  Outcome: Ongoing (interventions implemented as appropriate)    08/25/17 0420   Coping/Psychosocial Response Interventions   Plan Of Care Reviewed With patient   Patient Care Overview   Progress improving   Outcome Evaluation   Outcome Summary/Follow up Plan Med with tylenol for c/o pain in chest, no c/o soa, sinus rythm on monitor, Eyes closed at long interval, resting quietly       Goal: Adult Individualization and Mutuality  Outcome: Ongoing (interventions implemented as appropriate)  Goal: Discharge Needs Assessment  Outcome: Ongoing (interventions implemented as appropriate)    Problem: Pain, Acute (Adult)  Goal: Identify Related Risk Factors and Signs and Symptoms  Outcome: Outcome(s) achieved Date Met:  08/25/17  Goal: Acceptable Pain Control/Comfort Level  Outcome: Ongoing (interventions implemented as appropriate)    Problem: VTE, DVT and PE (Adult)  Goal: Signs and Symptoms of Listed Potential Problems Will be Absent or Manageable (VTE, DVT and PE)  Outcome: Ongoing (interventions implemented as appropriate)

## 2017-08-25 NOTE — PROGRESS NOTES
Discharge Planning Assessment  [facility]     Patient Name: Sarita Bai  MRN: [id number]  Today's Date: 8/25/2017    Admit Date: 8/24/2017          Discharge Needs Assessment       08/25/17 0833    Living Environment    Lives With alone    Living Arrangements house    Home Accessibility no concerns    Type of Financial/Environmental Concern none    Transportation Available car;family or friend will provide    Living Environment    Provides Primary Care For no one    Quality Of Family Relationships supportive    Able to Return to Prior Living Arrangements other (see comments)    Living Arrangement Comments staying with parents, at this time    Discharge Needs Assessment    Concerns To Be Addressed basic needs concerns    Readmission Within The Last 30 Days no previous admission in last 30 days    Equipment Currently Used at Home none    Equipment Needed After Discharge none    Discharge Planning Comments Mark Bai [phone]            Discharge Plan       08/25/17 0841    Case Management/Social Work Plan    Plan Home with parents    Patient/Family In Agreement With Plan yes    Additional Comments S/W patient and brother, Mark at bedside and verified face sheet.  Patient usually lives at home alone.  Patient has been staying with her parents, since this illness and she does not currently drive.  Patient usually drives and is able to get to appointments.  Patient uses [facility] on [address] for medications and has no problems paying for them.  Patient does not have an advance directive, or living will and explained that Chaplains are able to assist, if needed.  Patient will be picked up by parents or brother at d/c.  Will continue to monitor and assist, as needed.  Sergei, RN, CCP        Discharge Placement     No information found                Demographic Summary       08/25/17 0831    Referral Information    Admission Type inpatient    Arrived From [facility]    Referral Source  admission list    Reason For Consult discharge planning    Record Reviewed plan of care    Contact Information    Permission Granted to Share Information With family/designee   Maria G Bai, mother [phone]    Primary Care Physician Information    Name Dr. Gregorio Bai            Functional Status       08/25/17 0856    Functional Status Current    Ambulation 0-->independent    Transferring 0-->independent    Toileting 0-->independent    Bathing 0-->independent    Dressing 0-->independent    Eating 0-->independent    Communication 0-->understands/communicates without difficulty    Swallowing (if score 2 or more for any item, consult Rehab Services) 0-->swallows foods/liquids without difficulty    Functional Status Prior    Ambulation 0-->independent    Transferring 0-->independent    Toileting 0-->independent    Bathing 0-->independent    Dressing 0-->independent    Eating 0-->independent    Communication 0-->understands/communicates without difficulty    Swallowing 0-->swallows foods/liquids without difficulty    IADL    Medications independent    Meal Preparation independent    Housekeeping independent    Laundry independent    Shopping independent    Oral Care independent    Activity Tolerance    Current Activity Limitations none    Cognitive/Perceptual/Developmental    Current Mental Status/Cognitive Functioning no deficits noted    Recent Changes in Mental Status/Cognitive Functioning no changes            Psychosocial     None            Abuse/Neglect     None            Legal     None            Substance Abuse     None            Patient Forms     None          Belinda Comer RN

## 2017-08-27 LAB
BACTERIA FLD CULT: NO GROWTH
GRAM STN SPEC: NORMAL

## 2017-08-29 LAB — BACTERIA SPEC ANAEROBE CULT: NORMAL

## 2017-09-13 ENCOUNTER — APPOINTMENT (OUTPATIENT)
Dept: LAB | Facility: HOSPITAL | Age: 37
End: 2017-09-13

## 2017-09-13 ENCOUNTER — LAB (OUTPATIENT)
Dept: LAB | Facility: HOSPITAL | Age: 37
End: 2017-09-13

## 2017-09-13 DIAGNOSIS — M32.9 HISTORY OF SYSTEMIC LUPUS ERYTHEMATOSUS (SLE) (HCC): ICD-10-CM

## 2017-09-13 DIAGNOSIS — E61.1 IRON DEFICIENCY: ICD-10-CM

## 2017-09-13 DIAGNOSIS — Z86.2 HISTORY OF ITP: ICD-10-CM

## 2017-09-13 LAB
ALBUMIN SERPL-MCNC: 3.5 G/DL (ref 3.5–5.2)
ALBUMIN/GLOB SERPL: 0.9 G/DL (ref 1.1–2.4)
ALP SERPL-CCNC: 76 U/L (ref 38–116)
ALT SERPL W P-5'-P-CCNC: 10 U/L (ref 0–33)
ANION GAP SERPL CALCULATED.3IONS-SCNC: 14.8 MMOL/L
AST SERPL-CCNC: 9 U/L (ref 0–32)
BASOPHILS # BLD AUTO: 0.05 10*3/MM3 (ref 0–0.1)
BASOPHILS NFR BLD AUTO: 0.3 % (ref 0–1.1)
BILIRUB SERPL-MCNC: 0.3 MG/DL (ref 0.1–1.2)
BUN BLD-MCNC: 16 MG/DL (ref 6–20)
BUN/CREAT SERPL: 25 (ref 7.3–30)
CALCIUM SPEC-SCNC: 9.5 MG/DL (ref 8.5–10.2)
CHLORIDE SERPL-SCNC: 97 MMOL/L (ref 98–107)
CO2 SERPL-SCNC: 22.2 MMOL/L (ref 22–29)
CREAT BLD-MCNC: 0.64 MG/DL (ref 0.6–1.1)
DEPRECATED RDW RBC AUTO: 71.3 FL (ref 37–49)
EOSINOPHIL # BLD AUTO: 0.01 10*3/MM3 (ref 0–0.36)
EOSINOPHIL NFR BLD AUTO: 0.1 % (ref 1–5)
ERYTHROCYTE [DISTWIDTH] IN BLOOD BY AUTOMATED COUNT: 20.1 % (ref 11.7–14.5)
FERRITIN SERPL-MCNC: 590.2 NG/ML (ref 11–207)
GFR SERPL CREATININE-BSD FRML MDRD: 103 ML/MIN/1.73
GLOBULIN UR ELPH-MCNC: 3.9 GM/DL (ref 1.8–3.5)
GLUCOSE BLD-MCNC: 189 MG/DL (ref 74–124)
HCT VFR BLD AUTO: 31.9 % (ref 34–45)
HGB BLD-MCNC: 10.1 G/DL (ref 11.5–14.9)
IMM GRANULOCYTES # BLD: 0.07 10*3/MM3 (ref 0–0.03)
IMM GRANULOCYTES NFR BLD: 0.5 % (ref 0–0.5)
IRON 24H UR-MRATE: 12 MCG/DL (ref 37–145)
IRON SATN MFR SERPL: 5 % (ref 14–48)
LYMPHOCYTES # BLD AUTO: 0.78 10*3/MM3 (ref 1–3.5)
LYMPHOCYTES NFR BLD AUTO: 5 % (ref 20–49)
MCH RBC QN AUTO: 30.7 PG (ref 27–33)
MCHC RBC AUTO-ENTMCNC: 31.7 G/DL (ref 32–35)
MCV RBC AUTO: 97 FL (ref 83–97)
MONOCYTES # BLD AUTO: 1.49 10*3/MM3 (ref 0.25–0.8)
MONOCYTES NFR BLD AUTO: 9.6 % (ref 4–12)
NEUTROPHILS # BLD AUTO: 13.05 10*3/MM3 (ref 1.5–7)
NEUTROPHILS NFR BLD AUTO: 84.5 % (ref 39–75)
NRBC BLD MANUAL-RTO: 0 /100 WBC (ref 0–0)
PLATELET # BLD AUTO: 398 10*3/MM3 (ref 150–375)
PLATELETS.RETICULATED NFR BLD AUTO: 2.9 % (ref 1.1–6.1)
PMV BLD AUTO: 9.4 FL (ref 8.9–12.1)
POTASSIUM BLD-SCNC: 4.4 MMOL/L (ref 3.5–4.7)
PROT SERPL-MCNC: 7.4 G/DL (ref 6.3–8)
RBC # BLD AUTO: 3.29 10*6/MM3 (ref 3.9–5)
SODIUM BLD-SCNC: 134 MMOL/L (ref 134–145)
TIBC SERPL-MCNC: 225 MCG/DL (ref 249–505)
TRANSFERRIN SERPL-MCNC: 161 MG/DL (ref 200–360)
WBC NRBC COR # BLD: 15.45 10*3/MM3 (ref 4–10)

## 2017-09-13 PROCEDURE — 84466 ASSAY OF TRANSFERRIN: CPT | Performed by: INTERNAL MEDICINE

## 2017-09-13 PROCEDURE — 80053 COMPREHEN METABOLIC PANEL: CPT | Performed by: INTERNAL MEDICINE

## 2017-09-13 PROCEDURE — 85025 COMPLETE CBC W/AUTO DIFF WBC: CPT | Performed by: INTERNAL MEDICINE

## 2017-09-13 PROCEDURE — 82728 ASSAY OF FERRITIN: CPT | Performed by: INTERNAL MEDICINE

## 2017-09-13 PROCEDURE — 83540 ASSAY OF IRON: CPT | Performed by: INTERNAL MEDICINE

## 2017-09-13 PROCEDURE — 36415 COLL VENOUS BLD VENIPUNCTURE: CPT | Performed by: INTERNAL MEDICINE

## 2017-09-13 PROCEDURE — 85055 RETICULATED PLATELET ASSAY: CPT | Performed by: INTERNAL MEDICINE

## 2017-09-20 ENCOUNTER — LAB (OUTPATIENT)
Dept: LAB | Facility: HOSPITAL | Age: 37
End: 2017-09-20

## 2017-09-20 ENCOUNTER — OFFICE VISIT (OUTPATIENT)
Dept: ONCOLOGY | Facility: CLINIC | Age: 37
End: 2017-09-20

## 2017-09-20 ENCOUNTER — APPOINTMENT (OUTPATIENT)
Dept: ONCOLOGY | Facility: HOSPITAL | Age: 37
End: 2017-09-20

## 2017-09-20 ENCOUNTER — INFUSION (OUTPATIENT)
Dept: ONCOLOGY | Facility: HOSPITAL | Age: 37
End: 2017-09-20

## 2017-09-20 ENCOUNTER — APPOINTMENT (OUTPATIENT)
Dept: LAB | Facility: HOSPITAL | Age: 37
End: 2017-09-20

## 2017-09-20 ENCOUNTER — APPOINTMENT (OUTPATIENT)
Dept: ONCOLOGY | Facility: CLINIC | Age: 37
End: 2017-09-20

## 2017-09-20 VITALS
OXYGEN SATURATION: 100 % | WEIGHT: 145.2 LBS | BODY MASS INDEX: 22.79 KG/M2 | RESPIRATION RATE: 14 BRPM | HEART RATE: 91 BPM | DIASTOLIC BLOOD PRESSURE: 74 MMHG | HEIGHT: 67 IN | SYSTOLIC BLOOD PRESSURE: 124 MMHG | TEMPERATURE: 98.1 F

## 2017-09-20 VITALS — SYSTOLIC BLOOD PRESSURE: 121 MMHG | HEART RATE: 58 BPM | DIASTOLIC BLOOD PRESSURE: 73 MMHG

## 2017-09-20 DIAGNOSIS — Z86.2 HISTORY OF ITP: ICD-10-CM

## 2017-09-20 DIAGNOSIS — M32.9 HISTORY OF SYSTEMIC LUPUS ERYTHEMATOSUS (SLE) (HCC): ICD-10-CM

## 2017-09-20 DIAGNOSIS — E61.1 IRON DEFICIENCY: Primary | ICD-10-CM

## 2017-09-20 DIAGNOSIS — Z90.81 HISTORY OF SPLENECTOMY: ICD-10-CM

## 2017-09-20 DIAGNOSIS — E61.1 IRON DEFICIENCY: ICD-10-CM

## 2017-09-20 LAB
ALBUMIN SERPL-MCNC: 3.5 G/DL (ref 3.5–5.2)
ALBUMIN/GLOB SERPL: 0.9 G/DL (ref 1.1–2.4)
ALP SERPL-CCNC: 78 U/L (ref 38–116)
ALT SERPL W P-5'-P-CCNC: 8 U/L (ref 0–33)
ANION GAP SERPL CALCULATED.3IONS-SCNC: 14.6 MMOL/L
AST SERPL-CCNC: 12 U/L (ref 0–32)
BASOPHILS # BLD AUTO: 0.04 10*3/MM3 (ref 0–0.1)
BASOPHILS NFR BLD AUTO: 0.4 % (ref 0–1.1)
BILIRUB SERPL-MCNC: <0.2 MG/DL (ref 0.1–1.2)
BUN BLD-MCNC: 16 MG/DL (ref 6–20)
BUN/CREAT SERPL: 22.2 (ref 7.3–30)
CALCIUM SPEC-SCNC: 9.4 MG/DL (ref 8.5–10.2)
CHLORIDE SERPL-SCNC: 99 MMOL/L (ref 98–107)
CO2 SERPL-SCNC: 22.4 MMOL/L (ref 22–29)
CREAT BLD-MCNC: 0.72 MG/DL (ref 0.6–1.1)
DEPRECATED RDW RBC AUTO: 69.9 FL (ref 37–49)
EOSINOPHIL # BLD AUTO: 0.01 10*3/MM3 (ref 0–0.36)
EOSINOPHIL NFR BLD AUTO: 0.1 % (ref 1–5)
ERYTHROCYTE [DISTWIDTH] IN BLOOD BY AUTOMATED COUNT: 19.9 % (ref 11.7–14.5)
GFR SERPL CREATININE-BSD FRML MDRD: 90 ML/MIN/1.73
GLOBULIN UR ELPH-MCNC: 4 GM/DL (ref 1.8–3.5)
GLUCOSE BLD-MCNC: 247 MG/DL (ref 74–124)
HCT VFR BLD AUTO: 33.9 % (ref 34–45)
HGB BLD-MCNC: 10.7 G/DL (ref 11.5–14.9)
IMM GRANULOCYTES # BLD: 0.07 10*3/MM3 (ref 0–0.03)
IMM GRANULOCYTES NFR BLD: 0.7 % (ref 0–0.5)
LYMPHOCYTES # BLD AUTO: 1.2 10*3/MM3 (ref 1–3.5)
LYMPHOCYTES NFR BLD AUTO: 11.9 % (ref 20–49)
MCH RBC QN AUTO: 30.2 PG (ref 27–33)
MCHC RBC AUTO-ENTMCNC: 31.6 G/DL (ref 32–35)
MCV RBC AUTO: 95.8 FL (ref 83–97)
MONOCYTES # BLD AUTO: 0.74 10*3/MM3 (ref 0.25–0.8)
MONOCYTES NFR BLD AUTO: 7.4 % (ref 4–12)
NEUTROPHILS # BLD AUTO: 8 10*3/MM3 (ref 1.5–7)
NEUTROPHILS NFR BLD AUTO: 79.5 % (ref 39–75)
NRBC BLD MANUAL-RTO: 0 /100 WBC (ref 0–0)
PLATELET # BLD AUTO: 687 10*3/MM3 (ref 150–375)
PMV BLD AUTO: 8.8 FL (ref 8.9–12.1)
POTASSIUM BLD-SCNC: 4.2 MMOL/L (ref 3.5–4.7)
PROT SERPL-MCNC: 7.5 G/DL (ref 6.3–8)
RBC # BLD AUTO: 3.54 10*6/MM3 (ref 3.9–5)
SODIUM BLD-SCNC: 136 MMOL/L (ref 134–145)
WBC NRBC COR # BLD: 10.06 10*3/MM3 (ref 4–10)

## 2017-09-20 PROCEDURE — 96375 TX/PRO/DX INJ NEW DRUG ADDON: CPT

## 2017-09-20 PROCEDURE — 25010000002 FERUMOXYTOL 510 MG/17ML SOLUTION 510 MG VIAL: Performed by: INTERNAL MEDICINE

## 2017-09-20 PROCEDURE — 63710000001 DIPHENHYDRAMINE PER 50 MG: Performed by: INTERNAL MEDICINE

## 2017-09-20 PROCEDURE — 96374 THER/PROPH/DIAG INJ IV PUSH: CPT

## 2017-09-20 PROCEDURE — 80053 COMPREHEN METABOLIC PANEL: CPT | Performed by: INTERNAL MEDICINE

## 2017-09-20 PROCEDURE — 99214 OFFICE O/P EST MOD 30 MIN: CPT | Performed by: INTERNAL MEDICINE

## 2017-09-20 PROCEDURE — 36415 COLL VENOUS BLD VENIPUNCTURE: CPT | Performed by: INTERNAL MEDICINE

## 2017-09-20 PROCEDURE — 85025 COMPLETE CBC W/AUTO DIFF WBC: CPT | Performed by: INTERNAL MEDICINE

## 2017-09-20 RX ORDER — SODIUM CHLORIDE 9 MG/ML
250 INJECTION, SOLUTION INTRAVENOUS ONCE
Status: CANCELLED | OUTPATIENT
Start: 2017-09-20

## 2017-09-20 RX ORDER — HYDROXYCHLOROQUINE SULFATE 200 MG/1
TABLET, FILM COATED ORAL 2 TIMES DAILY
COMMUNITY
End: 2018-02-09

## 2017-09-20 RX ORDER — DIPHENHYDRAMINE HCL 25 MG
25 CAPSULE ORAL ONCE
Status: CANCELLED | OUTPATIENT
Start: 2017-09-20

## 2017-09-20 RX ORDER — SODIUM CHLORIDE 9 MG/ML
250 INJECTION, SOLUTION INTRAVENOUS ONCE
Status: COMPLETED | OUTPATIENT
Start: 2017-09-20 | End: 2017-09-20

## 2017-09-20 RX ORDER — DIPHENHYDRAMINE HCL 25 MG
25 CAPSULE ORAL ONCE
Status: COMPLETED | OUTPATIENT
Start: 2017-09-20 | End: 2017-09-20

## 2017-09-20 RX ADMIN — FERUMOXYTOL 510 MG: 510 INJECTION INTRAVENOUS at 16:04

## 2017-09-20 RX ADMIN — DIPHENHYDRAMINE HYDROCHLORIDE 25 MG: 25 CAPSULE ORAL at 15:43

## 2017-09-20 RX ADMIN — FAMOTIDINE 20 MG: 10 INJECTION INTRAVENOUS at 15:43

## 2017-09-20 RX ADMIN — SODIUM CHLORIDE 250 ML: 900 INJECTION, SOLUTION INTRAVENOUS at 15:43

## 2017-09-20 NOTE — PROGRESS NOTES
Subjective .     REASONS FOR FOLLOWUP: History of ITP, SLE    HISTORY OF PRESENT ILLNESS:  The patient is a 37 y.o. year old female who is here for follow-up with the above-mentioned history.    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  She has been seen periodically in our office last in 2011 receiving a repeat pneumococcal vaccination the previously having begin 2011.  The patient is followed by rheumatology regularly for her lupus having been treated with Plaquenil but evidently having stopped it over the last year.     This patient was admitted June 29 through July 13 having developed an apparent illness ultimately associated with fever and confusion.  She presented to [facility] with hyperglycemia, metabolic acidosis and no previous history of diabetes.  Her condition rapidly deteriorated with development of pneumonia with ARDS requiring intubation and mechanical ventilation, acute kidney injury requiring hemodialysis and further thrombocytopenia leading to a reconsultation.  She was seen by Dr. Amato with exam consistent with Raynaud's phenomenon in her hands, laboratory studies with positive Lakisha testing, elevated LDH, bilirubin, and increasing nucleated RBCs in the peripheral circulation.  He felt that she likely had an autoimmune process and agreed with IV steroids.  She continued intensive care, IV steroids, and, fortunate, went on to slowly improve.  This included rotation pronation a mechanical ventilation the ICU.  The patient was seen by multiple services including ID, nephrology, hematology, vascular and endocrinology as well as an intensive care physicians.  Upon discharge she proceeded to rehabilitation for a period of time and, wonderfully, has made gradual progress.  She was recently seen by Dr. Butler  of rheumatology and is on a steroid taper,?  Restart of Plaquenil in the near future.  She is also to see endocrinology for her ketoacidosis and apparent glucose intolerance/diabetes.     Additional recent history includes her continuance on hemodialysis and recent removal of her Shiley catheter after which she developed an internal jugular thrombosis and was treated in [facility] facilities including institution of anticoagulation.  This is evidently just over the last several days to be seen in Epic everywhere notes.  She was seen by hematology as well during that visit and given intravenous iron as well as transfused.  She is now seen back by our practice for continued follow-up.  We have reviewed her recent and previous history over approximately 60 minutes today.    We elected to have close follow-up with repeat laboratory studies done as the patient went on to recover from her recent infectious episode.  Repeat laboratory studies were performed September 13 showing normal liver and kidney function, repeat iron of 12, iron saturation of 5, ferritin down to 590 from 1036, TIBC of 225.  The patient has been seen by rheumatology is now back on Plaquenil and records describe her current hospitalization August 24 through the 25th wherein she presented with chest discomfort, fever and shortness of breath.  CT scan revealed no evidence of pulmonary embolism, lower extremities reveal DVT which were not new there being a previous DVT left internal jugular location RD known.  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  As elected to continue steroids which had been temporarily increased and thereafter proceed with taper.  The patient now presents back to our office September 20, 2017 considerably improved we've discussed her status which actually includes further evidence of iron  deficiency and need for additional IV iron.      Past Medical History:   Diagnosis Date   • Diabetes mellitus    • H/O intestinal malabsorption    • H/O Venous thrombosis     Acute embolism and thrombosis of left internal jugular vein    • History of anemia    • History of blood transfusion    • History of ITP    • Lupus     SLE   • PNA (pneumonia) 06/29/2017   • Renal disorder    • Thyroid activity decreased        ONCOLOGIC HISTORY:  (History from previous dates can be found in the separate document.)    Current Outpatient Prescriptions on File Prior to Visit   Medication Sig Dispense Refill   • Acetaminophen (TYLENOL) 325 MG capsule Take  by mouth As Needed.     • glucose blood (ONETOUCH VERIO) test strip Testing bs 4 x day  Dx code E13.9 400 each 1   • insulin aspart (NOVOLOG FLEXPEN) 100 UNIT/ML solution pen-injector sc pen On a s/s max 10 units daily 5 mL 5   • insulin degludec (TRESIBA FLEXTOUCH) 100 UNIT/ML solution pen-injector injection Inject 4 Units under the skin Every Evening. 5 pen 5   • Insulin Pen Needle (B-D UF III MINI PEN NEEDLES) 31G X 5 MM misc Using 1 pen needle daily 100 each 0   • levothyroxine (SYNTHROID, LEVOTHROID) 50 MCG tablet One tablet once a day on an empty stomach 30 tablet 5   • ONETOUCH DELICA LANCETS FINE misc Testing bs 4 x day 400 each 1   • predniSONE (DELTASONE) 10 MG tablet Take 1 tablet by mouth Daily With Breakfast. (Patient taking differently: Take 5 mg by mouth Daily.) 30 tablet 0     No current facility-administered medications on file prior to visit.        ALLERGIES:   No Known Allergies    Social History     Social History   • Marital status: Single     Spouse name: N/A   • Number of children: 0   • Years of education: college     Occupational History   •       Social History Main Topics   • Smoking status: Never Smoker   • Smokeless tobacco: Never Used   • Alcohol use 1.8 oz/week     2 Glasses of wine, 1 Cans of beer per week      Comment: SOCIAL DRINKER  "  • Drug use: Not on file   • Sexual activity: No     Other Topics Concern   • Not on file     Social History Narrative         Cancer-related family history includes Prostate cancer (age of onset: 71) in her father.     Review of Systems  A comprehensive 14 point review of systems was performed and was negative except as mentioned.    Objective      Vitals:    09/20/17 1244   BP: 124/74   Pulse: 91   Resp: 14   Temp: 98.1 °F (36.7 °C)   TempSrc: Oral   SpO2: 100%   Weight: 145 lb 3.2 oz (65.9 kg)   Height: 67.32\" (171 cm)   PainSc: 0-No pain     Current Status 9/20/2017   ECOG score 0       Physical Exam    GENERAL: [The patient is a well-developed, well-nourished adult female in no acute distress.  She is alert and oriented ×3 lying comfortably in bed, conversant]    HEENT: [Atraumatic, normocephalic.  Pupils are equal, round, and reactive.  Extraocular  muscles are intact.]    NECK: [Supple with full range of motion.  No rigidity or meningismus.  No evidence of thyromegaly or adenopathy.]    CHEST: [Nontender, without nodularity, mass, or rash.]    LUNGS: [Clear to auscultation and percussion, normal expiratory excursion.]    CARDIOVASCULAR: [Normal rate, regular rhythm, without murmur, rub, gallop, or additional heart sounds.]    ABDOMEN: [Soft, nondistended, nontender,, no masses or organomegaly, no evidence of ascites or additional mass.]    EXTREMITIES: [No evidence of clubbing, cyanosis, or edema.]    NEUROLOGIC: [Cranial nerves II through XII are tested and found grossly intact.  Deep tendon reflexes normal reflexive bilaterally, plantar flexion bilaterally, normal proprioception bilaterally.]                RECENT LABS:  Hematology WBC   Date Value Ref Range Status   09/20/2017 10.06 (H) 4.00 - 10.00 10*3/mm3 Final     RBC   Date Value Ref Range Status   09/20/2017 3.54 (L) 3.90 - 5.00 10*6/mm3 Final     Hemoglobin   Date Value Ref Range Status   09/20/2017 10.7 (L) 11.5 - 14.9 g/dL Final     Hematocrit "   Date Value Ref Range Status   09/20/2017 33.9 (L) 34.0 - 45.0 % Final     Platelets   Date Value Ref Range Status   09/20/2017 687 (H) 150 - 375 10*3/mm3 Final        Assessment/Plan            37-year-old female with a 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  She had been treated with steroids for ITP initially but when on to have a splenectomy in 2002 and remained relatively stable.  She was last seen in 2011 in remission.     The patient, more recently, had a difficult and complicated hospitalization admitted June 29 through July 13 with community-acquired pneumonia, sepsis, acute respiratory failure with ARDS, acute renal failure as well as additional studies including thrombocytopenia (multifactorial) and additional laboratory studies with RNP antibodies of greater than 8.0, anti-chromatin antibodies greater than 8.0 which, coupled with her additional findings per peripheral blood smear could be consistent with an exacerbation of her SLE.  She additionally had an episode of thrombosis related to her vascular catheter used for dialysis for which she is currently on Eliquis.  She also required additional transfusion and IV iron therapy.     The patient has not been seen by this physician for some time so we reviewed her history over approximately an hour today including her findings in hospital, her presentation and many complications thereafter.  She is uncertain whether she had an exacerbation of her SLE after discussion with rheumatology and currently remains on a steroid taper.  She is to see rheumatology in the next month or so as well for follow-up.  Hematologically, fortunately, she is stable at this point and will plan close follow-up in the office both for worsening cytopenias including thrombocytopenia and/or the need for further IV iron support.     The patient's studies went on to reveal evidence of mild iron deficiency though  continued anemia and follow-up was anticipated for possible additional IV iron.  She had intercurrent hospitalization for complications due to her lupus now including bilateral pleural effusions-small-and pleuropericarditis.  This was addressed with additional steroids that were instituted with plans for further taper.  The patient had assessment September 13 showing evidence of further iron deficiency and as she seen back in office September 20 we plan to proceed with IV iron both this week and next.  She is to continue her steroid taper hereafter and continue Eliquis twice a day at this point.  We'll plan at least 3-6 months of anticoagulation with control of her lupus for she is taken off of anticoagulation after reassessment via Doppler.  Then:  1.  Feraheme today and next week  2.  Monthly CBC-RN evaluation  3.  Repeat iron studies and 11 weeks  4.  M.D. assessment in 3 months for general review and potential further IV iron.

## 2017-09-27 ENCOUNTER — INFUSION (OUTPATIENT)
Dept: ONCOLOGY | Facility: HOSPITAL | Age: 37
End: 2017-09-27

## 2017-09-27 VITALS
DIASTOLIC BLOOD PRESSURE: 83 MMHG | HEART RATE: 87 BPM | WEIGHT: 144 LBS | BODY MASS INDEX: 22.34 KG/M2 | SYSTOLIC BLOOD PRESSURE: 138 MMHG | TEMPERATURE: 98 F

## 2017-09-27 DIAGNOSIS — E61.1 IRON DEFICIENCY: Primary | ICD-10-CM

## 2017-09-27 PROCEDURE — 25010000002 FERUMOXYTOL 510 MG/17ML SOLUTION 510 MG VIAL: Performed by: INTERNAL MEDICINE

## 2017-09-27 PROCEDURE — 96374 THER/PROPH/DIAG INJ IV PUSH: CPT

## 2017-09-27 PROCEDURE — 96375 TX/PRO/DX INJ NEW DRUG ADDON: CPT

## 2017-09-27 PROCEDURE — 63710000001 DIPHENHYDRAMINE PER 50 MG: Performed by: INTERNAL MEDICINE

## 2017-09-27 RX ORDER — SODIUM CHLORIDE 9 MG/ML
250 INJECTION, SOLUTION INTRAVENOUS ONCE
Status: COMPLETED | OUTPATIENT
Start: 2017-09-27 | End: 2017-09-27

## 2017-09-27 RX ORDER — DIPHENHYDRAMINE HCL 25 MG
25 CAPSULE ORAL ONCE
Status: CANCELLED | OUTPATIENT
Start: 2017-09-27

## 2017-09-27 RX ORDER — SODIUM CHLORIDE 9 MG/ML
250 INJECTION, SOLUTION INTRAVENOUS ONCE
Status: CANCELLED | OUTPATIENT
Start: 2017-09-27

## 2017-09-27 RX ORDER — DIPHENHYDRAMINE HCL 25 MG
25 CAPSULE ORAL ONCE
Status: COMPLETED | OUTPATIENT
Start: 2017-09-27 | End: 2017-09-27

## 2017-09-27 RX ADMIN — SODIUM CHLORIDE 250 ML: 900 INJECTION, SOLUTION INTRAVENOUS at 14:31

## 2017-09-27 RX ADMIN — FERUMOXYTOL 510 MG: 510 INJECTION INTRAVENOUS at 14:58

## 2017-09-27 RX ADMIN — FAMOTIDINE 20 MG: 10 INJECTION INTRAVENOUS at 14:31

## 2017-09-27 RX ADMIN — DIPHENHYDRAMINE HYDROCHLORIDE 25 MG: 25 CAPSULE ORAL at 14:32

## 2017-10-10 DIAGNOSIS — E13.9 LATENT AUTOIMMUNE DIABETES IN ADULTS (LADA), MANAGED AS TYPE 1 (HCC): ICD-10-CM

## 2017-10-10 DIAGNOSIS — E03.9 PRIMARY HYPOTHYROIDISM: ICD-10-CM

## 2017-10-10 RX ORDER — LEVOTHYROXINE SODIUM 0.05 MG/1
TABLET ORAL
Qty: 90 TABLET | Refills: 1 | Status: SHIPPED | OUTPATIENT
Start: 2017-10-10 | End: 2017-11-13 | Stop reason: SDUPTHER

## 2017-10-18 ENCOUNTER — APPOINTMENT (OUTPATIENT)
Dept: ONCOLOGY | Facility: HOSPITAL | Age: 37
End: 2017-10-18

## 2017-10-18 ENCOUNTER — APPOINTMENT (OUTPATIENT)
Dept: LAB | Facility: HOSPITAL | Age: 37
End: 2017-10-18

## 2017-10-19 ENCOUNTER — LAB (OUTPATIENT)
Dept: LAB | Facility: HOSPITAL | Age: 37
End: 2017-10-19

## 2017-10-19 ENCOUNTER — CLINICAL SUPPORT (OUTPATIENT)
Dept: ONCOLOGY | Facility: HOSPITAL | Age: 37
End: 2017-10-19

## 2017-10-19 DIAGNOSIS — E61.1 IRON DEFICIENCY: ICD-10-CM

## 2017-10-19 LAB
BASOPHILS # BLD AUTO: 0.08 10*3/MM3 (ref 0–0.1)
BASOPHILS NFR BLD AUTO: 0.9 % (ref 0–1.1)
DEPRECATED RDW RBC AUTO: 69.6 FL (ref 37–49)
EOSINOPHIL # BLD AUTO: 0.11 10*3/MM3 (ref 0–0.36)
EOSINOPHIL NFR BLD AUTO: 1.3 % (ref 1–5)
ERYTHROCYTE [DISTWIDTH] IN BLOOD BY AUTOMATED COUNT: 19.9 % (ref 11.7–14.5)
HCT VFR BLD AUTO: 39.6 % (ref 34–45)
HGB BLD-MCNC: 12.9 G/DL (ref 11.5–14.9)
IMM GRANULOCYTES # BLD: 0.05 10*3/MM3 (ref 0–0.03)
IMM GRANULOCYTES NFR BLD: 0.6 % (ref 0–0.5)
LYMPHOCYTES # BLD AUTO: 1.87 10*3/MM3 (ref 1–3.5)
LYMPHOCYTES NFR BLD AUTO: 21.6 % (ref 20–49)
MCH RBC QN AUTO: 30.8 PG (ref 27–33)
MCHC RBC AUTO-ENTMCNC: 32.6 G/DL (ref 32–35)
MCV RBC AUTO: 94.5 FL (ref 83–97)
MONOCYTES # BLD AUTO: 1.23 10*3/MM3 (ref 0.25–0.8)
MONOCYTES NFR BLD AUTO: 14.2 % (ref 4–12)
NEUTROPHILS # BLD AUTO: 5.32 10*3/MM3 (ref 1.5–7)
NEUTROPHILS NFR BLD AUTO: 61.4 % (ref 39–75)
NRBC BLD MANUAL-RTO: 0 /100 WBC (ref 0–0)
PLATELET # BLD AUTO: 229 10*3/MM3 (ref 150–375)
PMV BLD AUTO: 11.2 FL (ref 8.9–12.1)
RBC # BLD AUTO: 4.19 10*6/MM3 (ref 3.9–5)
WBC NRBC COR # BLD: 8.66 10*3/MM3 (ref 4–10)

## 2017-10-19 PROCEDURE — 85025 COMPLETE CBC W/AUTO DIFF WBC: CPT | Performed by: INTERNAL MEDICINE

## 2017-10-19 PROCEDURE — 36416 COLLJ CAPILLARY BLOOD SPEC: CPT | Performed by: INTERNAL MEDICINE

## 2017-10-19 NOTE — PROGRESS NOTES
Pt here today for CBC and RN review.  CBC reviewed with pt and copy given to pt.  Pt voiced no complaints or concerns. CBC today WNL for pt.  Pt pleased with results.  Reviewed next appt with pt and pt aware.       Pt v/u to call office with any questions or concerns.

## 2017-11-13 ENCOUNTER — OFFICE VISIT (OUTPATIENT)
Dept: ENDOCRINOLOGY | Age: 37
End: 2017-11-13

## 2017-11-13 VITALS
SYSTOLIC BLOOD PRESSURE: 138 MMHG | WEIGHT: 146.6 LBS | BODY MASS INDEX: 23.01 KG/M2 | DIASTOLIC BLOOD PRESSURE: 82 MMHG | HEIGHT: 67 IN

## 2017-11-13 DIAGNOSIS — M32.9 HISTORY OF SYSTEMIC LUPUS ERYTHEMATOSUS (SLE) (HCC): ICD-10-CM

## 2017-11-13 DIAGNOSIS — E03.9 PRIMARY HYPOTHYROIDISM: ICD-10-CM

## 2017-11-13 DIAGNOSIS — E13.9 LATENT AUTOIMMUNE DIABETES IN ADULTS (LADA), MANAGED AS TYPE 1 (HCC): Primary | ICD-10-CM

## 2017-11-13 DIAGNOSIS — Z90.81 HISTORY OF SPLENECTOMY: ICD-10-CM

## 2017-11-13 PROCEDURE — 99214 OFFICE O/P EST MOD 30 MIN: CPT | Performed by: INTERNAL MEDICINE

## 2017-11-13 RX ORDER — LEVOTHYROXINE SODIUM 0.07 MG/1
TABLET ORAL
Qty: 90 TABLET | Refills: 2 | Status: SHIPPED | OUTPATIENT
Start: 2017-11-13 | End: 2018-08-02 | Stop reason: SDUPTHER

## 2017-11-13 RX ORDER — PREDNISONE 1 MG/1
TABLET ORAL
Refills: 1 | COMMUNITY
Start: 2017-11-04 | End: 2017-11-13 | Stop reason: SDUPTHER

## 2017-11-13 NOTE — PROGRESS NOTES
Subjective   Sarita Bai is a 37 y.o. female.     HPI Comments: F/u for dm 2,hypothyroidism / testing bs 4 x day / last dm eye exam sept 2017 / last dm foot exam 8/10/17 with dr Campbell     Diabetes   She has type 1 diabetes mellitus. No MedicAlert identification noted. The initial diagnosis of diabetes was made 3 months ago. Hypoglycemia symptoms include sweats. Pertinent negatives for hypoglycemia include no confusion, dizziness, hunger, mood changes, nervousness/anxiousness, pallor, seizures, sleepiness, speech difficulty or tremors. Associated symptoms include polyphagia. Pertinent negatives for diabetes include no blurred vision, no chest pain, no fatigue, no foot paresthesias, no foot ulcerations, no polydipsia, no polyuria, no visual change, no weakness and no weight loss. Pertinent negatives for hypoglycemia complications include no blackouts, no hospitalization, no nocturnal hypoglycemia, no required assistance and no required glucagon injection. Symptoms are stable. Pertinent negatives for diabetic complications include no CVA, heart disease, impotence, nephropathy, peripheral neuropathy, PVD or retinopathy. There are no known risk factors for coronary artery disease. Current diabetic treatment includes diet and insulin injections. She is compliant with treatment all of the time. She is currently taking insulin pre-lunch and at bedtime. Insulin injections are given by patient. Rotation sites for injection include the abdominal wall. Her weight is stable. She is following a generally healthy diet. Meal planning includes carbohydrate counting. She has not had a previous visit with a dietitian. She participates in exercise three times a week. She monitors blood glucose at home 3-4 x per day. She monitors urine at home <1 x per month. Blood glucose monitoring compliance is excellent. Her home blood glucose trend is fluctuating minimally. Her breakfast blood glucose is taken after 10 am. Her breakfast blood  glucose range is generally 110-130 mg/dl. Her lunch blood glucose is taken between 12-1 pm. Her lunch blood glucose range is generally 140-180 mg/dl. Her dinner blood glucose is taken between 5-6 pm. Her dinner blood glucose range is generally 110-130 mg/dl. Her highest blood glucose is 180-200 mg/dl. Her overall blood glucose range is 140-180 mg/dl. She does not see a podiatrist.Eye exam is current.   Hypothyroidism   Pertinent negatives include no chest pain, fatigue, visual change or weakness.      Patient is a 37-year-old female who came in for follow-up.  She was admitted in  for adult respiratory distress syndrome due to pneumonia, diabetic ketoacidosis, thrombocytopenia and acute renal failure.  She was transferred to [place] on 2017 and was discharged from the facility on 2017.  She had her last hemodialysis on 2017.       She has no previous history of diabetes mellitus.  REGINA antibody was markedly elevated.  C-peptide was 1.8.  She was discharged from the hospital on Levemir 24 units at bedtime and NovoLog 8 units at breakfast, 6 units at lunch and 6 units at supper.      She is on Tresiba 10 units q noon and Novolog 4-6 with each meal.  She has few hypoglycemic episodes during the day.  FBS .  Lunchtime 133-230.  Suppertime .  Bedtime  .  She has gained 3 pounds since 2017.    Her last eye exam was  and she has no retinopathy.     She has hypothyroidism and is on levothyroxine 50 µg per day.  TSH done this month was 4.2.     She has SLE and follows with Dr. Sanon.  She is on Plaquenil, and prednisone 7.5 mg daily daily     She is  0.  She had regular menstrual periods.  She is not on birth control pills.    The following portions of the patient's history were reviewed and updated as appropriate: allergies, current medications, past family history, past medical history, past social history, past surgical history and problem  "list.    Review of Systems   Constitutional: Negative.  Negative for fatigue and weight loss.   Eyes: Negative.  Negative for blurred vision.   Respiratory: Negative.    Cardiovascular: Negative.  Negative for chest pain.   Gastrointestinal: Negative.    Endocrine: Positive for polyphagia. Negative for polydipsia and polyuria.   Genitourinary: Negative.  Negative for impotence.   Musculoskeletal: Negative.    Skin: Negative.  Negative for pallor.   Allergic/Immunologic: Negative.    Neurological: Negative.  Negative for dizziness, tremors, seizures, speech difficulty and weakness.   Hematological: Negative.    Psychiatric/Behavioral: Negative.  Negative for confusion. The patient is not nervous/anxious.        Objective      Vitals:    11/13/17 1332   BP: 138/82   BP Location: Right arm   Patient Position: Sitting   Cuff Size: Large Adult   Weight: 146 lb 9.6 oz (66.5 kg)   Height: 67.32\" (171 cm)     Physical Exam   Constitutional: She is oriented to person, place, and time. She appears well-developed and well-nourished. No distress.   HENT:   Head: Normocephalic.   Nose: Nose normal.   Mouth/Throat: No oropharyngeal exudate.   Eyes: Conjunctivae are normal. Right eye exhibits no discharge. Left eye exhibits no discharge. No scleral icterus.   Neck: Normal range of motion. Neck supple. No JVD present. No tracheal deviation present. No thyromegaly present.   Cardiovascular: Normal rate, regular rhythm, normal heart sounds and intact distal pulses.  Exam reveals no gallop and no friction rub.    No murmur heard.  Pulmonary/Chest: Effort normal and breath sounds normal. No respiratory distress. She has no wheezes. She has no rales.   Abdominal: Soft. Bowel sounds are normal. She exhibits no distension and no mass. There is no tenderness.   Musculoskeletal: Normal range of motion. She exhibits no edema, tenderness or deformity.   Lymphadenopathy:     She has no cervical adenopathy.   Neurological: She is alert and " oriented to person, place, and time. She has normal reflexes. She displays normal reflexes. Coordination normal.   Intact light touch   Skin: Skin is warm and dry. No rash noted. No erythema. No pallor.   Psychiatric: She has a normal mood and affect. Her behavior is normal.     Lab on 10/19/2017   Component Date Value Ref Range Status   • WBC 10/19/2017 8.66  4.00 - 10.00 10*3/mm3 Final   • RBC 10/19/2017 4.19  3.90 - 5.00 10*6/mm3 Final   • Hemoglobin 10/19/2017 12.9  11.5 - 14.9 g/dL Final   • Hematocrit 10/19/2017 39.6  34.0 - 45.0 % Final   • MCV 10/19/2017 94.5  83.0 - 97.0 fL Final   • MCH 10/19/2017 30.8  27.0 - 33.0 pg Final   • MCHC 10/19/2017 32.6  32.0 - 35.0 g/dL Final   • RDW 10/19/2017 19.9* 11.7 - 14.5 % Final   • RDW-SD 10/19/2017 69.6* 37.0 - 49.0 fl Final   • MPV 10/19/2017 11.2  8.9 - 12.1 fL Final   • Platelets 10/19/2017 229  150 - 375 10*3/mm3 Final   • Neutrophil % 10/19/2017 61.4  39.0 - 75.0 % Final   • Lymphocyte % 10/19/2017 21.6  20.0 - 49.0 % Final   • Monocyte % 10/19/2017 14.2* 4.0 - 12.0 % Final   • Eosinophil % 10/19/2017 1.3  1.0 - 5.0 % Final   • Basophil % 10/19/2017 0.9  0.0 - 1.1 % Final   • Immature Grans % 10/19/2017 0.6* 0.0 - 0.5 % Final   • Neutrophils, Absolute 10/19/2017 5.32  1.50 - 7.00 10*3/mm3 Final   • Lymphocytes, Absolute 10/19/2017 1.87  1.00 - 3.50 10*3/mm3 Final   • Monocytes, Absolute 10/19/2017 1.23* 0.25 - 0.80 10*3/mm3 Final   • Eosinophils, Absolute 10/19/2017 0.11  0.00 - 0.36 10*3/mm3 Final   • Basophils, Absolute 10/19/2017 0.08  0.00 - 0.10 10*3/mm3 Final   • Immature Grans, Absolute 10/19/2017 0.05* 0.00 - 0.03 10*3/mm3 Final   • nRBC 10/19/2017 0.0  0.0 - 0.0 /100 WBC Final     Assessment/Plan   Sarita was seen today for diabetes and hypothyroidism.    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  Check hemoglobin A1c and lipid profile.  Increase levothyroxine to 75 µg per day.  Continue Plaquenil and prednisone per Dr. Butler.    RTC 4 mos.    Send copy of my notes and labs to Dr. Bai, Dr. Conrad Butler, and Dr. Urena

## 2017-11-14 LAB
ALBUMIN SERPL-MCNC: 4.3 G/DL (ref 3.5–5.2)
ALBUMIN/CREAT UR: 12.1 MG/G CREAT (ref 0–30)
ALBUMIN/GLOB SERPL: 1.2 G/DL
ALP SERPL-CCNC: 89 U/L (ref 39–117)
ALT SERPL-CCNC: 25 U/L (ref 1–33)
APPEARANCE UR: CLEAR
AST SERPL-CCNC: 20 U/L (ref 1–32)
BILIRUB SERPL-MCNC: 0.3 MG/DL (ref 0.1–1.2)
BILIRUB UR QL STRIP: NEGATIVE
BUN SERPL-MCNC: 13 MG/DL (ref 6–20)
BUN/CREAT SERPL: 17.6 (ref 7–25)
CALCIUM SERPL-MCNC: 10 MG/DL (ref 8.6–10.5)
CHLORIDE SERPL-SCNC: 99 MMOL/L (ref 98–107)
CHOLEST SERPL-MCNC: 185 MG/DL (ref 0–200)
CO2 SERPL-SCNC: 23.1 MMOL/L (ref 22–29)
COLOR UR: YELLOW
CREAT SERPL-MCNC: 0.74 MG/DL (ref 0.57–1)
CREAT UR-MCNC: 28 MG/DL
ENDOMYSIUM IGA SER QL: NEGATIVE
GFR SERPLBLD CREATININE-BSD FMLA CKD-EPI: 106 ML/MIN/1.73
GFR SERPLBLD CREATININE-BSD FMLA CKD-EPI: 87 ML/MIN/1.73
GLOBULIN SER CALC-MCNC: 3.5 GM/DL
GLUCOSE SERPL-MCNC: 109 MG/DL (ref 65–99)
GLUCOSE UR QL: NEGATIVE
HBA1C MFR BLD: 6.52 % (ref 4.8–5.6)
HDLC SERPL-MCNC: 71 MG/DL (ref 40–60)
HGB UR QL STRIP: NEGATIVE
IGA SERPL-MCNC: 301 MG/DL (ref 87–352)
KETONES UR QL STRIP: ABNORMAL
LDLC SERPL CALC-MCNC: 100 MG/DL (ref 0–100)
LEUKOCYTE ESTERASE UR QL STRIP: NEGATIVE
MICROALBUMIN UR-MCNC: 3.4 UG/ML
NITRITE UR QL STRIP: NEGATIVE
PH UR STRIP: 6.5 [PH] (ref 5–8)
POTASSIUM SERPL-SCNC: 5.2 MMOL/L (ref 3.5–5.2)
PROT SERPL-MCNC: 7.8 G/DL (ref 6–8.5)
PROT UR QL STRIP: NEGATIVE
SODIUM SERPL-SCNC: 138 MMOL/L (ref 136–145)
SP GR UR: 1.01 (ref 1–1.03)
TRIGL SERPL-MCNC: 69 MG/DL (ref 0–150)
TTG IGA SER-ACNC: <2 U/ML (ref 0–3)
UROBILINOGEN UR STRIP-MCNC: ABNORMAL MG/DL
VLDLC SERPL CALC-MCNC: 13.8 MG/DL (ref 5–40)

## 2017-11-15 ENCOUNTER — CLINICAL SUPPORT (OUTPATIENT)
Dept: ONCOLOGY | Facility: HOSPITAL | Age: 37
End: 2017-11-15

## 2017-11-15 ENCOUNTER — LAB (OUTPATIENT)
Dept: LAB | Facility: HOSPITAL | Age: 37
End: 2017-11-15

## 2017-11-15 DIAGNOSIS — E61.1 IRON DEFICIENCY: ICD-10-CM

## 2017-11-15 LAB
BASOPHILS # BLD AUTO: 0.06 10*3/MM3 (ref 0–0.1)
BASOPHILS NFR BLD AUTO: 0.6 % (ref 0–1.1)
DEPRECATED RDW RBC AUTO: 62.4 FL (ref 37–49)
EOSINOPHIL # BLD AUTO: 0.21 10*3/MM3 (ref 0–0.36)
EOSINOPHIL NFR BLD AUTO: 2.2 % (ref 1–5)
ERYTHROCYTE [DISTWIDTH] IN BLOOD BY AUTOMATED COUNT: 18 % (ref 11.7–14.5)
HCT VFR BLD AUTO: 40.8 % (ref 34–45)
HGB BLD-MCNC: 14.2 G/DL (ref 11.5–14.9)
IMM GRANULOCYTES # BLD: 0.07 10*3/MM3 (ref 0–0.03)
IMM GRANULOCYTES NFR BLD: 0.7 % (ref 0–0.5)
LYMPHOCYTES # BLD AUTO: 1.95 10*3/MM3 (ref 1–3.5)
LYMPHOCYTES NFR BLD AUTO: 20.7 % (ref 20–49)
MCH RBC QN AUTO: 33 PG (ref 27–33)
MCHC RBC AUTO-ENTMCNC: 34.8 G/DL (ref 32–35)
MCV RBC AUTO: 94.9 FL (ref 83–97)
MONOCYTES # BLD AUTO: 1.23 10*3/MM3 (ref 0.25–0.8)
MONOCYTES NFR BLD AUTO: 13.1 % (ref 4–12)
NEUTROPHILS # BLD AUTO: 5.9 10*3/MM3 (ref 1.5–7)
NEUTROPHILS NFR BLD AUTO: 62.7 % (ref 39–75)
NRBC BLD MANUAL-RTO: 0 /100 WBC (ref 0–0)
PLATELET # BLD AUTO: 244 10*3/MM3 (ref 150–375)
PMV BLD AUTO: 9.7 FL (ref 8.9–12.1)
RBC # BLD AUTO: 4.3 10*6/MM3 (ref 3.9–5)
WBC NRBC COR # BLD: 9.42 10*3/MM3 (ref 4–10)

## 2017-11-15 PROCEDURE — 36416 COLLJ CAPILLARY BLOOD SPEC: CPT | Performed by: INTERNAL MEDICINE

## 2017-11-15 PROCEDURE — 85025 COMPLETE CBC W/AUTO DIFF WBC: CPT | Performed by: INTERNAL MEDICINE

## 2017-11-22 RX ORDER — APIXABAN 5 MG/1
TABLET, FILM COATED ORAL
Qty: 60 TABLET | Refills: 2 | Status: SHIPPED | OUTPATIENT
Start: 2017-11-22 | End: 2018-02-09

## 2017-12-06 ENCOUNTER — LAB (OUTPATIENT)
Dept: LAB | Facility: HOSPITAL | Age: 37
End: 2017-12-06

## 2017-12-06 DIAGNOSIS — E61.1 IRON DEFICIENCY: ICD-10-CM

## 2017-12-06 LAB
BASOPHILS # BLD AUTO: 0.07 10*3/MM3 (ref 0–0.1)
BASOPHILS NFR BLD AUTO: 1 % (ref 0–1.1)
DEPRECATED RDW RBC AUTO: 58.6 FL (ref 37–49)
EOSINOPHIL # BLD AUTO: 0.15 10*3/MM3 (ref 0–0.36)
EOSINOPHIL NFR BLD AUTO: 2.2 % (ref 1–5)
ERYTHROCYTE [DISTWIDTH] IN BLOOD BY AUTOMATED COUNT: 16.5 % (ref 11.7–14.5)
FERRITIN SERPL-MCNC: 735.1 NG/ML (ref 11–207)
HCT VFR BLD AUTO: 42 % (ref 34–45)
HGB BLD-MCNC: 13.9 G/DL (ref 11.5–14.9)
IMM GRANULOCYTES # BLD: 0.03 10*3/MM3 (ref 0–0.03)
IMM GRANULOCYTES NFR BLD: 0.4 % (ref 0–0.5)
IRON 24H UR-MRATE: 79 MCG/DL (ref 37–145)
IRON SATN MFR SERPL: 29 % (ref 14–48)
LYMPHOCYTES # BLD AUTO: 1.36 10*3/MM3 (ref 1–3.5)
LYMPHOCYTES NFR BLD AUTO: 20.2 % (ref 20–49)
MCH RBC QN AUTO: 32.4 PG (ref 27–33)
MCHC RBC AUTO-ENTMCNC: 33.1 G/DL (ref 32–35)
MCV RBC AUTO: 97.9 FL (ref 83–97)
MONOCYTES # BLD AUTO: 0.96 10*3/MM3 (ref 0.25–0.8)
MONOCYTES NFR BLD AUTO: 14.2 % (ref 4–12)
NEUTROPHILS # BLD AUTO: 4.17 10*3/MM3 (ref 1.5–7)
NEUTROPHILS NFR BLD AUTO: 62 % (ref 39–75)
NRBC BLD MANUAL-RTO: 0 /100 WBC (ref 0–0)
PLATELET # BLD AUTO: 341 10*3/MM3 (ref 150–375)
PMV BLD AUTO: 9.6 FL (ref 8.9–12.1)
RBC # BLD AUTO: 4.29 10*6/MM3 (ref 3.9–5)
TIBC SERPL-MCNC: 274 MCG/DL (ref 249–505)
TRANSFERRIN SERPL-MCNC: 196 MG/DL (ref 200–360)
WBC NRBC COR # BLD: 6.74 10*3/MM3 (ref 4–10)

## 2017-12-06 PROCEDURE — 85025 COMPLETE CBC W/AUTO DIFF WBC: CPT | Performed by: INTERNAL MEDICINE

## 2017-12-06 PROCEDURE — 82728 ASSAY OF FERRITIN: CPT | Performed by: INTERNAL MEDICINE

## 2017-12-06 PROCEDURE — 36415 COLL VENOUS BLD VENIPUNCTURE: CPT | Performed by: INTERNAL MEDICINE

## 2017-12-06 PROCEDURE — 84466 ASSAY OF TRANSFERRIN: CPT | Performed by: INTERNAL MEDICINE

## 2017-12-06 PROCEDURE — 83540 ASSAY OF IRON: CPT | Performed by: INTERNAL MEDICINE

## 2017-12-13 ENCOUNTER — APPOINTMENT (OUTPATIENT)
Dept: LAB | Facility: HOSPITAL | Age: 37
End: 2017-12-13

## 2017-12-13 ENCOUNTER — OFFICE VISIT (OUTPATIENT)
Dept: ONCOLOGY | Facility: CLINIC | Age: 37
End: 2017-12-13

## 2017-12-13 ENCOUNTER — APPOINTMENT (OUTPATIENT)
Dept: ONCOLOGY | Facility: HOSPITAL | Age: 37
End: 2017-12-13

## 2017-12-13 VITALS
WEIGHT: 144.8 LBS | TEMPERATURE: 98 F | HEIGHT: 67 IN | BODY MASS INDEX: 22.73 KG/M2 | SYSTOLIC BLOOD PRESSURE: 110 MMHG | RESPIRATION RATE: 16 BRPM | HEART RATE: 78 BPM | DIASTOLIC BLOOD PRESSURE: 78 MMHG

## 2017-12-13 DIAGNOSIS — I82.603: Primary | ICD-10-CM

## 2017-12-13 DIAGNOSIS — E61.1 IRON DEFICIENCY: ICD-10-CM

## 2017-12-13 DIAGNOSIS — Z86.2 HISTORY OF ITP: ICD-10-CM

## 2017-12-13 PROCEDURE — G0463 HOSPITAL OUTPT CLINIC VISIT: HCPCS | Performed by: INTERNAL MEDICINE

## 2017-12-13 PROCEDURE — 99214 OFFICE O/P EST MOD 30 MIN: CPT | Performed by: INTERNAL MEDICINE

## 2017-12-13 RX ORDER — AMLODIPINE BESYLATE 2.5 MG/1
TABLET ORAL
Refills: 1 | COMMUNITY
Start: 2017-11-16 | End: 2018-02-09

## 2017-12-13 RX ORDER — PREDNISONE 1 MG/1
TABLET ORAL
Refills: 1 | COMMUNITY
Start: 2017-11-16 | End: 2018-08-14

## 2017-12-13 NOTE — PROGRESS NOTES
Subjective .     REASONS FOR FOLLOWUP: History of ITP, SLE    HISTORY OF PRESENT ILLNESS:  The patient is a 37 y.o. year old female who is here for follow-up with the above-mentioned history.    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  She has been seen periodically in our office last in 2011 receiving a repeat pneumococcal vaccination the previously having begin 2011.  The patient is followed by rheumatology regularly for her lupus having been treated with Plaquenil but evidently having stopped it over the last year.     This patient was admitted June 29 through July 13 having developed an apparent illness ultimately associated with fever and confusion.  She presented to [facility] with hyperglycemia, metabolic acidosis and no previous history of diabetes.  Her condition rapidly deteriorated with development of pneumonia with ARDS requiring intubation and mechanical ventilation, acute kidney injury requiring hemodialysis and further thrombocytopenia leading to a reconsultation.  She was seen by Dr. Amato with exam consistent with Raynaud's phenomenon in her hands, laboratory studies with positive Lakisha testing, elevated LDH, bilirubin, and increasing nucleated RBCs in the peripheral circulation.  He felt that she likely had an autoimmune process and agreed with IV steroids.  She continued intensive care, IV steroids, and, fortunate, went on to slowly improve.  This included rotation pronation a mechanical ventilation the ICU.  The patient was seen by multiple services including ID, nephrology, hematology, vascular and endocrinology as well as an intensive care physicians.  Upon discharge she proceeded to rehabilitation for a period of time and, wonderfully, has made gradual progress.  She was recently seen by Dr. Butler  of rheumatology and is on a steroid taper,?  Restart of Plaquenil in the near future.  She is also to see endocrinology for her ketoacidosis and apparent glucose intolerance/diabetes.     Additional recent history includes her continuance on hemodialysis and recent removal of her Shiley catheter after which she developed an internal jugular thrombosis and was treated in [facility] facilities including institution of anticoagulation.  This is evidently just over the last several days to be seen in Epic everywhere notes.  She was seen by hematology as well during that visit and given intravenous iron as well as transfused.  She is now seen back by our practice for continued follow-up.  We have reviewed her recent and previous history over approximately 60 minutes today.    We elected to have close follow-up with repeat laboratory studies done as the patient went on to recover from her recent infectious episode.  Repeat laboratory studies were performed September 13 showing normal liver and kidney function, repeat iron of 12, iron saturation of 5, ferritin down to 590 from 1036, TIBC of 225.  The patient has been seen by rheumatology is now back on Plaquenil and records describe her current hospitalization August 24 through the 25th wherein she presented with chest discomfort, fever and shortness of breath.  CT scan revealed no evidence of pulmonary embolism, lower extremities reveal DVT which were not new there being a previous DVT left internal jugular location RD known.  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  As elected to continue steroids which had been temporarily increased and thereafter proceed with taper.  The patient now presents back to our office September 20, 2017 considerably improved we've discussed her status which actually includes further evidence of iron  deficiency and need for additional IV iron.  The patient was treated with IV iron initially seen back December 13, 2017 she is improved considerably with normalized performance status.  Repeat iron studies do not show evidence of residual iron deficiency and she remains on current anticoagulation as well as therapy with rheumatology with tapering steroids and daily Plaquenil as well as use of Amlodipine for her Raynaud's.  We have discussed reassessing her DVTs at 6 months from development which would be February 2018.      Past Medical History:   Diagnosis Date   • Diabetes mellitus    • H/O intestinal malabsorption    • H/O Venous thrombosis     Acute embolism and thrombosis of left internal jugular vein    • History of anemia    • History of blood transfusion    • History of ITP    • Lupus     SLE   • PNA (pneumonia) 06/29/2017   • Renal disorder    • Thyroid activity decreased        ONCOLOGIC HISTORY:  (History from previous dates can be found in the separate document.)    Current Outpatient Prescriptions on File Prior to Visit   Medication Sig Dispense Refill   • Acetaminophen (TYLENOL) 325 MG capsule Take  by mouth As Needed.     • ELIQUIS 5 MG tablet tablet TAKE 1 TABLET BY MOUTH TWICE DAILY 60 tablet 2   • glucose blood (ONETOUCH VERIO) test strip Testing bs 4 x day  Dx code E13.9 400 each 1   • hydroxychloroquine (PLAQUENIL) 200 MG tablet Take  by mouth 2 (Two) Times a Day.     • insulin aspart (NOVOLOG FLEXPEN) 100 UNIT/ML solution pen-injector sc pen On a s/s max 10 units daily 11 mL 1   • insulin degludec (TRESIBA FLEXTOUCH) 100 UNIT/ML solution pen-injector injection Inject 4 Units under the skin Every Evening. 6 mL 1   • Insulin Pen Needle (B-D UF III MINI PEN NEEDLES) 31G X 5 MM misc Using 4 pen needles daily 400 each 1   • levothyroxine (SYNTHROID, LEVOTHROID) 75 MCG tablet One tablet once a day on an empty stomach 90 tablet 2   • Multiple Vitamins-Minerals (MULTI COMPLETE PO) Take 1 tablet/day by  mouth.     • ONETOUCH DELICA LANCETS FINE misc Testing bs 4 x day 400 each 1   • predniSONE (DELTASONE) 10 MG tablet Take 1 tablet by mouth Daily With Breakfast. (Patient taking differently: Take 7.5 mg by mouth Daily.) 30 tablet 0     No current facility-administered medications on file prior to visit.        ALLERGIES:   No Known Allergies    Social History     Social History   • Marital status: Single     Spouse name: N/A   • Number of children: 0   • Years of education: college     Occupational History   •       Social History Main Topics   • Smoking status: Never Smoker   • Smokeless tobacco: Never Used   • Alcohol use 1.8 oz/week     2 Glasses of wine, 1 Cans of beer per week      Comment: SOCIAL DRINKER   • Drug use: Not on file   • Sexual activity: No     Other Topics Concern   • Not on file     Social History Narrative         Cancer-related family history includes Prostate cancer (age of onset: 71) in her father.     Review of Systems  A comprehensive 14 point review of systems was performed and was negative except as mentioned.    Objective      There were no vitals filed for this visit.  Current Status 9/20/2017   ECOG score 0       Physical Exam    GENERAL: [The patient is a well-developed, well-nourished adult female in no acute distress.  She is alert and oriented ×3 lying comfortably in bed, conversant]    HEENT: [Atraumatic, normocephalic.  Pupils are equal, round, and reactive.  Extraocular  muscles are intact.]    NECK: [Supple with full range of motion.  No rigidity or meningismus.  No evidence of thyromegaly or adenopathy.]    CHEST: [Nontender, without nodularity, mass, or rash.]    LUNGS: [Clear to auscultation and percussion, normal expiratory excursion.]    CARDIOVASCULAR: [Normal rate, regular rhythm, without murmur, rub, gallop, or additional heart sounds.]    ABDOMEN: [Soft, nondistended, nontender,, no masses or organomegaly, no evidence of ascites or additional  mass.]    EXTREMITIES: [No evidence of clubbing, cyanosis, or edema.]    NEUROLOGIC: [Cranial nerves II through XII are tested and found grossly intact.  Deep tendon reflexes normal reflexive bilaterally, plantar flexion bilaterally, normal proprioception bilaterally.]                RECENT LABS:  Hematology WBC   Date Value Ref Range Status   12/06/2017 6.74 4.00 - 10.00 10*3/mm3 Final     RBC   Date Value Ref Range Status   12/06/2017 4.29 3.90 - 5.00 10*6/mm3 Final     Hemoglobin   Date Value Ref Range Status   12/06/2017 13.9 11.5 - 14.9 g/dL Final     Hematocrit   Date Value Ref Range Status   12/06/2017 42.0 34.0 - 45.0 % Final     Platelets   Date Value Ref Range Status   12/06/2017 341 150 - 375 10*3/mm3 Final        Assessment/Plan            37-year-old female with a 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  She had been treated with steroids for ITP initially but when on to have a splenectomy in 2002 and remained relatively stable.  She was last seen in 2011 in remission.     The patient, more recently, had a difficult and complicated hospitalization admitted June 29 through July 13 with community-acquired pneumonia, sepsis, acute respiratory failure with ARDS, acute renal failure as well as additional studies including thrombocytopenia (multifactorial) and additional laboratory studies with RNP antibodies of greater than 8.0, anti-chromatin antibodies greater than 8.0 which, coupled with her additional findings per peripheral blood smear could be consistent with an exacerbation of her SLE.  She additionally had an episode of thrombosis related to her vascular catheter used for dialysis for which she is currently on Eliquis.  She also required additional transfusion and IV iron therapy.     The patient has not been seen by this physician for some time so we reviewed her history over approximately an hour today including her findings  in hospital, her presentation and many complications thereafter.  She is uncertain whether she had an exacerbation of her SLE after discussion with rheumatology and currently remains on a steroid taper.  She is to see rheumatology in the next month or so as well for follow-up.  Hematologically, fortunately, she is stable at this point and will plan close follow-up in the office both for worsening cytopenias including thrombocytopenia and/or the need for further IV iron support.     The patient's studies went on to reveal evidence of mild iron deficiency though continued anemia and follow-up was anticipated for possible additional IV iron.  She had intercurrent hospitalization for complications due to her lupus now including bilateral pleural effusions-small-and pleuropericarditis.  This was addressed with additional steroids that were instituted with plans for further taper.  The patient had assessment September 13 showing evidence of further iron deficiency and as she seen back in office September 20 we plan to proceed with IV iron both this week and next.  She is to continue her steroid taper hereafter and continue Eliquis twice a day at this point.  We'll plan at least 3-6 months of anticoagulation with control of her lupus for she is taken off of anticoagulation after reassessment via Doppler.  The patient was treated with IV iron successfully and is next seen back December 13, 2017.  Her lupus symptoms have approximately resolved at this point.  We discussed reassessment with Doppler examination 6 months from her hospitalization which would be in February.  1.  Repeat Dopplers-upper extremities-scheduling mid-February 2018  2.  9 weeks-M.D., CBC, reticulated hemoglobin

## 2018-02-02 ENCOUNTER — HOSPITAL ENCOUNTER (OUTPATIENT)
Dept: CARDIOLOGY | Facility: HOSPITAL | Age: 38
Discharge: HOME | End: 2018-02-02
Attending: INTERNAL MEDICINE | Admitting: INTERNAL MEDICINE

## 2018-02-02 DIAGNOSIS — I82.603: ICD-10-CM

## 2018-02-02 LAB
BH CV UPPER VENOUS LEFT AXILLARY AUGMENT: NORMAL
BH CV UPPER VENOUS LEFT AXILLARY COMPETENT: NORMAL
BH CV UPPER VENOUS LEFT AXILLARY COMPRESS: NORMAL
BH CV UPPER VENOUS LEFT AXILLARY PHASIC: NORMAL
BH CV UPPER VENOUS LEFT AXILLARY SPONT: NORMAL
BH CV UPPER VENOUS LEFT BASILIC FOREARM COMPRESS: NORMAL
BH CV UPPER VENOUS LEFT BASILIC UPPER COMPRESS: NORMAL
BH CV UPPER VENOUS LEFT BRACHIAL COMPRESS: NORMAL
BH CV UPPER VENOUS LEFT CEPHALIC FOREARM COMPRESS: NORMAL
BH CV UPPER VENOUS LEFT CEPHALIC UPPER COMPRESS: NORMAL
BH CV UPPER VENOUS LEFT INTERNAL JUGULAR AUGMENT: NORMAL
BH CV UPPER VENOUS LEFT INTERNAL JUGULAR COLOR: 1
BH CV UPPER VENOUS LEFT INTERNAL JUGULAR COMPETENT: NORMAL
BH CV UPPER VENOUS LEFT INTERNAL JUGULAR COMPRESS: NORMAL
BH CV UPPER VENOUS LEFT INTERNAL JUGULAR PHASIC: NORMAL
BH CV UPPER VENOUS LEFT INTERNAL JUGULAR SPONT: NORMAL
BH CV UPPER VENOUS LEFT INTERNAL JUGULAR THROMBUS: NORMAL
BH CV UPPER VENOUS LEFT RADIAL COMPRESS: NORMAL
BH CV UPPER VENOUS LEFT SUBCLAVIAN AUGMENT: NORMAL
BH CV UPPER VENOUS LEFT SUBCLAVIAN COLOR: 1
BH CV UPPER VENOUS LEFT SUBCLAVIAN COMPETENT: NORMAL
BH CV UPPER VENOUS LEFT SUBCLAVIAN COMPRESS: NORMAL
BH CV UPPER VENOUS LEFT SUBCLAVIAN PHASIC: NORMAL
BH CV UPPER VENOUS LEFT SUBCLAVIAN SPONT: NORMAL
BH CV UPPER VENOUS LEFT SUBCLAVIAN THROMBUS: NORMAL
BH CV UPPER VENOUS LEFT ULNAR COMPRESS: NORMAL
BH CV UPPER VENOUS RIGHT AXILLARY AUGMENT: NORMAL
BH CV UPPER VENOUS RIGHT AXILLARY COMPETENT: NORMAL
BH CV UPPER VENOUS RIGHT AXILLARY COMPRESS: NORMAL
BH CV UPPER VENOUS RIGHT AXILLARY PHASIC: NORMAL
BH CV UPPER VENOUS RIGHT AXILLARY SPONT: NORMAL
BH CV UPPER VENOUS RIGHT BASILIC FOREARM COMPRESS: NORMAL
BH CV UPPER VENOUS RIGHT BASILIC UPPER COMPRESS: NORMAL
BH CV UPPER VENOUS RIGHT BRACHIAL COMPRESS: NORMAL
BH CV UPPER VENOUS RIGHT CEPHALIC FOREARM COMPRESS: NORMAL
BH CV UPPER VENOUS RIGHT CEPHALIC UPPER COLOR: 1
BH CV UPPER VENOUS RIGHT CEPHALIC UPPER COMPRESS: NORMAL
BH CV UPPER VENOUS RIGHT CEPHALIC UPPER THROMBUS: NORMAL
BH CV UPPER VENOUS RIGHT INTERNAL JUGULAR AUGMENT: NORMAL
BH CV UPPER VENOUS RIGHT INTERNAL JUGULAR COMPETENT: NORMAL
BH CV UPPER VENOUS RIGHT INTERNAL JUGULAR COMPRESS: NORMAL
BH CV UPPER VENOUS RIGHT INTERNAL JUGULAR PHASIC: NORMAL
BH CV UPPER VENOUS RIGHT INTERNAL JUGULAR SPONT: NORMAL
BH CV UPPER VENOUS RIGHT RADIAL COMPRESS: NORMAL
BH CV UPPER VENOUS RIGHT SUBCLAVIAN AUGMENT: NORMAL
BH CV UPPER VENOUS RIGHT SUBCLAVIAN COMPETENT: NORMAL
BH CV UPPER VENOUS RIGHT SUBCLAVIAN COMPRESS: NORMAL
BH CV UPPER VENOUS RIGHT SUBCLAVIAN PHASIC: NORMAL
BH CV UPPER VENOUS RIGHT SUBCLAVIAN SPONT: NORMAL
BH CV UPPER VENOUS RIGHT ULNAR COMPRESS: NORMAL

## 2018-02-02 PROCEDURE — 93970 EXTREMITY STUDY: CPT

## 2018-02-09 ENCOUNTER — LAB (OUTPATIENT)
Dept: LAB | Facility: HOSPITAL | Age: 38
End: 2018-02-09

## 2018-02-09 ENCOUNTER — OFFICE VISIT (OUTPATIENT)
Dept: ONCOLOGY | Facility: CLINIC | Age: 38
End: 2018-02-09

## 2018-02-09 VITALS
OXYGEN SATURATION: 100 % | WEIGHT: 143.8 LBS | BODY MASS INDEX: 22.57 KG/M2 | HEART RATE: 74 BPM | RESPIRATION RATE: 16 BRPM | DIASTOLIC BLOOD PRESSURE: 76 MMHG | SYSTOLIC BLOOD PRESSURE: 124 MMHG | HEIGHT: 67 IN | TEMPERATURE: 98 F

## 2018-02-09 DIAGNOSIS — Z86.2 HISTORY OF ITP: ICD-10-CM

## 2018-02-09 DIAGNOSIS — E61.1 IRON DEFICIENCY: ICD-10-CM

## 2018-02-09 DIAGNOSIS — I82.603: ICD-10-CM

## 2018-02-09 DIAGNOSIS — Z86.2 HISTORY OF ITP: Primary | ICD-10-CM

## 2018-02-09 LAB
BASOPHILS # BLD AUTO: 0.06 10*3/MM3 (ref 0–0.1)
BASOPHILS NFR BLD AUTO: 0.6 % (ref 0–1.1)
DEPRECATED RDW RBC AUTO: 52.7 FL (ref 37–49)
EOSINOPHIL # BLD AUTO: 0.12 10*3/MM3 (ref 0–0.36)
EOSINOPHIL NFR BLD AUTO: 1.1 % (ref 1–5)
ERYTHROCYTE [DISTWIDTH] IN BLOOD BY AUTOMATED COUNT: 14.2 % (ref 11.7–14.5)
FERRITIN SERPL-MCNC: 721.9 NG/ML (ref 11–207)
HCT VFR BLD AUTO: 39.5 % (ref 34–45)
HGB BLD-MCNC: 13.4 G/DL (ref 11.5–14.9)
HGB RETIC QN: 37.8 PG (ref 29.8–36.1)
IMM GRANULOCYTES # BLD: 0.05 10*3/MM3 (ref 0–0.03)
IMM GRANULOCYTES NFR BLD: 0.5 % (ref 0–0.5)
IMM RETICS NFR: 7.3 % (ref 3–15.8)
LYMPHOCYTES # BLD AUTO: 1 10*3/MM3 (ref 1–3.5)
LYMPHOCYTES NFR BLD AUTO: 9.2 % (ref 20–49)
MCH RBC QN AUTO: 33.9 PG (ref 27–33)
MCHC RBC AUTO-ENTMCNC: 33.9 G/DL (ref 32–35)
MCV RBC AUTO: 100 FL (ref 83–97)
MONOCYTES # BLD AUTO: 1.07 10*3/MM3 (ref 0.25–0.8)
MONOCYTES NFR BLD AUTO: 9.9 % (ref 4–12)
NEUTROPHILS # BLD AUTO: 8.53 10*3/MM3 (ref 1.5–7)
NEUTROPHILS NFR BLD AUTO: 78.7 % (ref 39–75)
NRBC BLD MANUAL-RTO: 0 /100 WBC (ref 0–0)
PLATELET # BLD AUTO: 305 10*3/MM3 (ref 150–375)
PMV BLD AUTO: 9.5 FL (ref 8.9–12.1)
RBC # BLD AUTO: 3.95 10*6/MM3 (ref 3.9–5)
RETICS/RBC NFR AUTO: 1.69 % (ref 0.6–2)
WBC NRBC COR # BLD: 10.83 10*3/MM3 (ref 4–10)

## 2018-02-09 PROCEDURE — 99213 OFFICE O/P EST LOW 20 MIN: CPT | Performed by: INTERNAL MEDICINE

## 2018-02-09 PROCEDURE — 85025 COMPLETE CBC W/AUTO DIFF WBC: CPT | Performed by: INTERNAL MEDICINE

## 2018-02-09 PROCEDURE — 85046 RETICYTE/HGB CONCENTRATE: CPT | Performed by: INTERNAL MEDICINE

## 2018-02-09 PROCEDURE — 36415 COLL VENOUS BLD VENIPUNCTURE: CPT | Performed by: INTERNAL MEDICINE

## 2018-02-09 PROCEDURE — 82728 ASSAY OF FERRITIN: CPT | Performed by: INTERNAL MEDICINE

## 2018-02-09 RX ORDER — AMLODIPINE BESYLATE 5 MG/1
TABLET ORAL
Refills: 3 | COMMUNITY
Start: 2018-01-16 | End: 2020-02-05

## 2018-02-09 NOTE — PROGRESS NOTES
Subjective .     REASONS FOR FOLLOWUP: History of ITP, SLE    HISTORY OF PRESENT ILLNESS:  The patient is a 37 y.o. year old female who is here for follow-up with the above-mentioned history.    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  She has been seen periodically in our office last in 2011 receiving a repeat pneumococcal vaccination the previously having begin 2011.  The patient is followed by rheumatology regularly for her lupus having been treated with Plaquenil but evidently having stopped it over the last year.     This patient was admitted June 29 through July 13 having developed an apparent illness ultimately associated with fever and confusion.  She presented to [facility] with hyperglycemia, metabolic acidosis and no previous history of diabetes.  Her condition rapidly deteriorated with development of pneumonia with ARDS requiring intubation and mechanical ventilation, acute kidney injury requiring hemodialysis and further thrombocytopenia leading to a reconsultation.  She was seen by Dr. Amato with exam consistent with Raynaud's phenomenon in her hands, laboratory studies with positive Lakisha testing, elevated LDH, bilirubin, and increasing nucleated RBCs in the peripheral circulation.  He felt that she likely had an autoimmune process and agreed with IV steroids.  She continued intensive care, IV steroids, and, fortunate, went on to slowly improve.  This included rotation pronation a mechanical ventilation the ICU.  The patient was seen by multiple services including ID, nephrology, hematology, vascular and endocrinology as well as an intensive care physicians.  Upon discharge she proceeded to rehabilitation for a period of time and, wonderfully, has made gradual progress.  She was recently seen by Dr. Butler  of rheumatology and is on a steroid taper,?  Restart of Plaquenil in the near future.  She is also to see endocrinology for her ketoacidosis and apparent glucose intolerance/diabetes.     Additional recent history includes her continuance on hemodialysis and recent removal of her Shiley catheter after which she developed an internal jugular thrombosis [facility].  This is evidently just over the last several days to be seen in Epic everywhere notes.  She was seen by hematology as well during that visit and given intravenous iron as well as transfused.  She is now seen back by our practice for continued follow-up.  We have reviewed her recent and previous history over approximately 60 minutes today.    We elected to have close follow-up with repeat laboratory studies done as the patient went on to recover from her recent infectious episode.  Repeat laboratory studies were performed September 13 showing normal liver and kidney function, repeat iron of 12, iron saturation of 5, ferritin down to 590 from 1036, TIBC of 225.  The patient has been seen by rheumatology is now back on Plaquenil and records describe her current hospitalization August 24 through the 25th wherein she presented with chest discomfort, fever and shortness of breath.  CT scan revealed no evidence of pulmonary embolism, lower extremities reveal DVT which were not new there being a previous DVT left internal jugular location RD known.  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  As elected to continue steroids which had been temporarily increased and thereafter proceed with taper.  The patient now presents back to our office September 20, 2017 considerably improved we've discussed her status which actually includes further evidence of iron  deficiency and need for additional IV iron.  The patient was treated with IV iron initially seen back December 13, 2017 she is improved considerably with normalized performance status.  Repeat iron studies do not show evidence of residual iron deficiency and she remains on current anticoagulation as well as therapy with rheumatology with tapering steroids and daily Plaquenil as well as use of Amlodipine for her Raynaud's.  We have discussed reassessing her DVTs at 6 months from development which would be February 2018.   The patient is next seen February 09, 2018.  She is doing extremely well with control of her lupus.  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  She's now had 6 months of anticoagulation and will go and discontinue her Eliquis.      Past Medical History:   Diagnosis Date   • Diabetes mellitus    • H/O intestinal malabsorption    • H/O Venous thrombosis     Acute embolism and thrombosis of left internal jugular vein    • History of anemia    • History of blood transfusion    • History of ITP    • Lupus     SLE   • PNA (pneumonia) 06/29/2017   • Renal disorder    • Thyroid activity decreased        ONCOLOGIC HISTORY:  (History from previous dates can be found in the separate document.)    Current Outpatient Prescriptions on File Prior to Visit   Medication Sig Dispense Refill   • Acetaminophen (TYLENOL) 325 MG capsule Take  by mouth As Needed.     • ELIQUIS 5 MG tablet tablet TAKE 1 TABLET BY MOUTH TWICE DAILY 60 tablet 2   • glucose blood (ONETOUCH VERIO) test strip Testing bs 4 x day  Dx code E13.9 400 each 1   • insulin aspart (NOVOLOG FLEXPEN) 100 UNIT/ML solution pen-injector sc pen On a s/s max 10 units daily 11 mL 1   • insulin degludec (TRESIBA FLEXTOUCH) 100 UNIT/ML solution pen-injector injection Inject 4 Units under the skin Every  "Evening. 6 mL 1   • Insulin Pen Needle (B-D UF III MINI PEN NEEDLES) 31G X 5 MM misc Using 4 pen needles daily 400 each 1   • levothyroxine (SYNTHROID, LEVOTHROID) 75 MCG tablet One tablet once a day on an empty stomach 90 tablet 2   • Multiple Vitamins-Minerals (MULTI COMPLETE PO) Take 1 tablet/day by mouth.     • ONETOUCH DELICA LANCETS FINE misc Testing bs 4 x day 400 each 1   • predniSONE (DELTASONE) 1 MG tablet TAKE 2 TABLETS (2 MG) BY MOUTH DAILY X 1 MONTH THEN TAPER BY 1 MG A MONTH IF TOLERATED  1   • [DISCONTINUED] amLODIPine (NORVASC) 2.5 MG tablet TAKE 1 TABLET BY MOUTH EVERY DAY  1   • [DISCONTINUED] hydroxychloroquine (PLAQUENIL) 200 MG tablet Take  by mouth 2 (Two) Times a Day.       No current facility-administered medications on file prior to visit.        ALLERGIES:   No Known Allergies    Social History     Social History   • Marital status: Single     Spouse name: N/A   • Number of children: 0   • Years of education: college     Occupational History   •       Social History Main Topics   • Smoking status: Never Smoker   • Smokeless tobacco: Never Used   • Alcohol use 1.8 oz/week     2 Glasses of wine, 1 Cans of beer per week      Comment: SOCIAL DRINKER   • Drug use: Not on file   • Sexual activity: No     Other Topics Concern   • Not on file     Social History Narrative         Cancer-related family history includes Prostate cancer (age of onset: 71) in her father.     Review of Systems  A comprehensive 14 point review of systems was performed and was negative except as mentioned.    Objective      Vitals:    02/09/18 1003   BP: 124/76   Pulse: 74   Resp: 16   Temp: 98 °F (36.7 °C)   TempSrc: Oral   SpO2: 100%   Weight: 65.2 kg (143 lb 12.8 oz)   Height: 171 cm (67.32\")   PainSc: 0-No pain     Current Status 2/9/2018   ECOG score 0       Physical Exam    GENERAL: [The patient is a well-developed, well-nourished adult female in no acute distress.  She is alert and oriented ×3 lying " comfortably in bed, conversant]    HEENT: [Atraumatic, normocephalic.  Pupils are equal, round, and reactive.  Extraocular  muscles are intact.]    NECK: [Supple with full range of motion.  No rigidity or meningismus.  No evidence of thyromegaly or adenopathy.]    CHEST: [Nontender, without nodularity, mass, or rash.]    LUNGS: [Clear to auscultation and percussion, normal expiratory excursion.]    CARDIOVASCULAR: [Normal rate, regular rhythm, without murmur, rub, gallop, or additional heart sounds.]    ABDOMEN: [Soft, nondistended, nontender,, no masses or organomegaly, no evidence of ascites or additional mass.]    EXTREMITIES: [No evidence of clubbing, cyanosis, or edema.]    NEUROLOGIC: [Cranial nerves II through XII are tested and found grossly intact.  Deep tendon reflexes normal reflexive bilaterally, plantar flexion bilaterally, normal proprioception bilaterally.]                RECENT LABS:  Hematology WBC   Date Value Ref Range Status   02/09/2018 10.83 (H) 4.00 - 10.00 10*3/mm3 Final     RBC   Date Value Ref Range Status   02/09/2018 3.95 3.90 - 5.00 10*6/mm3 Final     Hemoglobin   Date Value Ref Range Status   02/09/2018 13.4 11.5 - 14.9 g/dL Final     Hematocrit   Date Value Ref Range Status   02/09/2018 39.5 34.0 - 45.0 % Final     Platelets   Date Value Ref Range Status   02/09/2018 305 150 - 375 10*3/mm3 Final        Assessment/Plan            37-year-old female with a 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  She had been treated with steroids for ITP initially but when on to have a splenectomy in 2002 and remained relatively stable.  She was last seen in 2011 in remission.     The patient, more recently, had a difficult and complicated hospitalization admitted June 29 through July 13 with community-acquired pneumonia, sepsis, acute respiratory failure with ARDS, acute renal failure as well as additional studies including  thrombocytopenia (multifactorial) and additional laboratory studies with RNP antibodies of greater than 8.0, anti-chromatin antibodies greater than 8.0 which, coupled with her additional findings per peripheral blood smear could be consistent with an exacerbation of her SLE.  She additionally had an episode of thrombosis related to her vascular catheter used for dialysis for which she is currently on Eliquis.  She also required additional transfusion and IV iron therapy.     The patient has not been seen by this physician for some time so we reviewed her history over approximately an hour today including her findings in hospital, her presentation and many complications thereafter.  She is uncertain whether she had an exacerbation of her SLE after discussion with rheumatology and currently remains on a steroid taper.  She is to see rheumatology in the next month or so as well for follow-up.  Hematologically, fortunately, she is stable at this point and will plan close follow-up in the office both for worsening cytopenias including thrombocytopenia and/or the need for further IV iron support.     The patient's studies went on to reveal evidence of mild iron deficiency though continued anemia and follow-up was anticipated for possible additional IV iron.  She had intercurrent hospitalization for complications due to her lupus now including bilateral pleural effusions-small-and pleuropericarditis.  This was addressed with additional steroids that were instituted with plans for further taper.  The patient had assessment September 13 showing evidence of further iron deficiency and as she seen back in office September 20 we plan to proceed with IV iron both this week and next.  She is to continue her steroid taper hereafter and continue Eliquis twice a day at this point.  We'll plan at least 3-6 months of anticoagulation with control of her lupus for she is taken off of anticoagulation after reassessment via Doppler.  The  patient was treated with IV iron successfully and is next seen back December 13, 2017.  Her lupus symptoms have approximately resolved at this point.  We discussed reassessment with Doppler examination 6 months from her hospitalization which were performed February 02, 2018.  The remains superficial phlebitis in the right upper extremity that is chronic as well as chronic left upper extremity DVT and internal jugular and subclavian vein.  She has had 6 months anticoagulation at this point and her lupus is under excellent control.  She has no additional IV lines will plan to discontinue her Eliquis.  We will see her in 6 months for follow-up.

## 2018-02-12 RX ORDER — APIXABAN 5 MG/1
TABLET, FILM COATED ORAL
Qty: 60 TABLET | Refills: 2 | Status: SHIPPED | OUTPATIENT
Start: 2018-02-12 | End: 2018-03-27

## 2018-03-27 ENCOUNTER — OFFICE VISIT (OUTPATIENT)
Dept: ENDOCRINOLOGY | Age: 38
End: 2018-03-27

## 2018-03-27 VITALS
OXYGEN SATURATION: 97 % | SYSTOLIC BLOOD PRESSURE: 116 MMHG | BODY MASS INDEX: 23.32 KG/M2 | DIASTOLIC BLOOD PRESSURE: 68 MMHG | WEIGHT: 148.6 LBS | HEIGHT: 67 IN | HEART RATE: 73 BPM

## 2018-03-27 DIAGNOSIS — E13.9 LATENT AUTOIMMUNE DIABETES IN ADULTS (LADA), MANAGED AS TYPE 1 (HCC): Primary | ICD-10-CM

## 2018-03-27 DIAGNOSIS — M32.9 HISTORY OF SYSTEMIC LUPUS ERYTHEMATOSUS (SLE) (HCC): ICD-10-CM

## 2018-03-27 DIAGNOSIS — E03.9 PRIMARY HYPOTHYROIDISM: ICD-10-CM

## 2018-03-27 PROCEDURE — 99214 OFFICE O/P EST MOD 30 MIN: CPT | Performed by: INTERNAL MEDICINE

## 2018-03-27 RX ORDER — HYDROXYCHLOROQUINE SULFATE 200 MG/1
200 TABLET, FILM COATED ORAL 2 TIMES DAILY
Refills: 2 | COMMUNITY
Start: 2018-03-12 | End: 2018-03-27 | Stop reason: DRUGHIGH

## 2018-03-27 NOTE — PROGRESS NOTES
Subjective   Sarita Bai is a 37 y.o. female.     F/u for dm 2,hypothyroidism / testing bs 4 x day / last dm eye exam Sept 2017/ last dm foot exam today with dr Diehl       Diabetes   She presents for her follow-up diabetic visit. She has type 1 diabetes mellitus. No MedicAlert identification noted. The initial diagnosis of diabetes was made 8 months ago. Hypoglycemia symptoms include sweats. Pertinent negatives for diabetes include no blurred vision, no chest pain, no fatigue, no foot paresthesias, no foot ulcerations, no polydipsia, no polyphagia, no polyuria, no visual change, no weakness and no weight loss. Pertinent negatives for hypoglycemia complications include no blackouts, no hospitalization, no nocturnal hypoglycemia, no required assistance and no required glucagon injection. Symptoms are stable. Pertinent negatives for diabetic complications include no CVA, heart disease, impotence, nephropathy, peripheral neuropathy, PVD or retinopathy. There are no known risk factors for coronary artery disease. Current diabetic treatment includes diet and insulin injections. She is compliant with treatment all of the time. She is currently taking insulin pre-lunch, pre-dinner and at bedtime. Insulin injections are given by patient. Rotation sites for injection include the abdominal wall. Her weight is stable. She is following a diabetic and generally healthy diet. Meal planning includes carbohydrate counting. She has not had a previous visit with a dietitian. She participates in exercise three times a week. She monitors blood glucose at home 3-4 x per day. She monitors urine at home <1 x per month. Blood glucose monitoring compliance is excellent. Her home blood glucose trend is fluctuating minimally. Her breakfast blood glucose is taken between 7-8 am. Her breakfast blood glucose range is generally  mg/dl. Her lunch blood glucose is taken between 12-1 pm. Her lunch blood glucose range is generally 130-140  mg/dl. Her dinner blood glucose is taken between 6-7 pm. Her dinner blood glucose range is generally 130-140 mg/dl. Her highest blood glucose is 180-200 mg/dl. Her overall blood glucose range is 110-130 mg/dl. She does not see a podiatrist.Eye exam is current.   Hypothyroidism   Pertinent negatives include no chest pain, fatigue, visual change or weakness.      Patient is a 37-year-old female who came in for follow-up.  She was admitted in  for adult respiratory distress syndrome due to pneumonia, diabetic ketoacidosis, thrombocytopenia and acute renal failure.  She was transferred to [facility] on 2017 and was discharged from the facility on 2017.  She had her last hemodialysis on 2017.       She has no previous history of diabetes mellitus.  REGINA antibody was markedly elevated.  C-peptide was 1.8.  She was discharged from the hospital on Levemir 24 units at bedtime and NovoLog 8 units at breakfast, 6 units at lunch and 6 units at supper.       She is on Tresiba 6 units q noon and Novolog 4-8 with each meal.  She has few hypoglycemic episodes during the day.  FBS .  RBS .  She has gained 2 pounds since 2017.     Her last eye exam was  and she has no retinopathy. Urine microalbumin was normal in 2017.  She was released by Dr. Urena.  She denies paresthesias     She has hypothyroidism and is on levothyroxine 75 µg per day.       She has SLE and follows with Dr. Butler.  She is on Plaquenil, and prednisone 1 mg daily.  She will discontinue prednisone after tomorrow     She is  0.  She had regular menstrual periods.  She is not on birth control pills.  The following portions of the patient's history were reviewed and updated as appropriate: allergies, current medications, past family history, past medical history, past social history, past surgical history and problem list.    Review of Systems   Constitutional: Negative.  Negative for fatigue and  "weight loss.   Eyes: Negative.  Negative for blurred vision.   Respiratory: Negative.    Cardiovascular: Negative.  Negative for chest pain.   Gastrointestinal: Negative.    Endocrine: Negative.  Negative for polydipsia, polyphagia and polyuria.   Genitourinary: Negative.  Negative for impotence.   Musculoskeletal: Negative.    Skin: Negative.    Allergic/Immunologic: Negative.    Neurological: Negative.  Negative for weakness.   Hematological: Negative.    Psychiatric/Behavioral: Negative.        Objective      Vitals:    03/27/18 1036   BP: 116/68   BP Location: Right arm   Patient Position: Sitting   Cuff Size: Small Adult   Pulse: 73   SpO2: 97%   Weight: 67.4 kg (148 lb 9.6 oz)   Height: 171 cm (67.32\")     Physical Exam   Constitutional: She is oriented to person, place, and time. She appears well-developed and well-nourished. No distress.   HENT:   Head: Normocephalic.   Right Ear: External ear normal.   Left Ear: External ear normal.   Nose: Nose normal.   Mouth/Throat: No oropharyngeal exudate.   Eyes: Conjunctivae and EOM are normal. Right eye exhibits no discharge. Left eye exhibits no discharge. No scleral icterus.   Neck: Neck supple. No JVD present. No tracheal deviation present. No thyromegaly present.   Cardiovascular: Normal rate, regular rhythm, normal heart sounds and intact distal pulses.  Exam reveals no gallop and no friction rub.    No murmur heard.  Pulmonary/Chest: Effort normal and breath sounds normal. No respiratory distress. She has no wheezes. She has no rales. She exhibits no tenderness.   Abdominal: Soft. Bowel sounds are normal. She exhibits no distension and no mass. There is no tenderness. There is no guarding.   Musculoskeletal: Normal range of motion. She exhibits no edema, tenderness or deformity.   Lymphadenopathy:     She has no cervical adenopathy.   Neurological: She is alert and oriented to person, place, and time. She displays normal reflexes. No cranial nerve deficit. " Coordination normal.   Intact light touch   Skin: Skin is warm and dry. No rash noted. No erythema. No pallor.   Psychiatric: She has a normal mood and affect. Her behavior is normal.     Lab on 02/09/2018   Component Date Value Ref Range Status   • Ferritin 02/09/2018 721.90* 11.00 - 207.00 ng/mL Final   • Immature Reticulocyte Fraction 02/09/2018 7.3  3.0 - 15.8 % Final   • Reticulocyte % 02/09/2018 1.69  0.60 - 2.00 % Final   • Reticulocyte Hgb 02/09/2018 37.8* 29.8 - 36.1 pg Final   • WBC 02/09/2018 10.83* 4.00 - 10.00 10*3/mm3 Final   • RBC 02/09/2018 3.95  3.90 - 5.00 10*6/mm3 Final   • Hemoglobin 02/09/2018 13.4  11.5 - 14.9 g/dL Final   • Hematocrit 02/09/2018 39.5  34.0 - 45.0 % Final   • MCV 02/09/2018 100.0* 83.0 - 97.0 fL Final   • MCH 02/09/2018 33.9* 27.0 - 33.0 pg Final   • MCHC 02/09/2018 33.9  32.0 - 35.0 g/dL Final   • RDW 02/09/2018 14.2  11.7 - 14.5 % Final   • RDW-SD 02/09/2018 52.7* 37.0 - 49.0 fl Final   • MPV 02/09/2018 9.5  8.9 - 12.1 fL Final   • Platelets 02/09/2018 305  150 - 375 10*3/mm3 Final   • Neutrophil % 02/09/2018 78.7* 39.0 - 75.0 % Final   • Lymphocyte % 02/09/2018 9.2* 20.0 - 49.0 % Final   • Monocyte % 02/09/2018 9.9  4.0 - 12.0 % Final   • Eosinophil % 02/09/2018 1.1  1.0 - 5.0 % Final   • Basophil % 02/09/2018 0.6  0.0 - 1.1 % Final   • Immature Grans % 02/09/2018 0.5  0.0 - 0.5 % Final   • Neutrophils, Absolute 02/09/2018 8.53* 1.50 - 7.00 10*3/mm3 Final   • Lymphocytes, Absolute 02/09/2018 1.00  1.00 - 3.50 10*3/mm3 Final   • Monocytes, Absolute 02/09/2018 1.07* 0.25 - 0.80 10*3/mm3 Final   • Eosinophils, Absolute 02/09/2018 0.12  0.00 - 0.36 10*3/mm3 Final   • Basophils, Absolute 02/09/2018 0.06  0.00 - 0.10 10*3/mm3 Final   • Immature Grans, Absolute 02/09/2018 0.05* 0.00 - 0.03 10*3/mm3 Final   • nRBC 02/09/2018 0.0  0.0 - 0.0 /100 WBC Final     Assessment/Plan   Sarita was seen today for diabetes and hypothyroidism.    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  Continue levothyroxine.  Continue Plaquenil per Dr. Butler.    RTC 4 mos.    Send copy of my note and labs to Dr. Bai, and Dr. Butler.

## 2018-03-28 LAB
ALBUMIN SERPL-MCNC: 4 G/DL (ref 3.5–5.2)
ALBUMIN/GLOB SERPL: 1.3 G/DL
ALP SERPL-CCNC: 72 U/L (ref 39–117)
ALT SERPL-CCNC: 21 U/L (ref 1–33)
AST SERPL-CCNC: 20 U/L (ref 1–32)
BILIRUB SERPL-MCNC: 0.2 MG/DL (ref 0.1–1.2)
BUN SERPL-MCNC: 17 MG/DL (ref 6–20)
BUN/CREAT SERPL: 25.4 (ref 7–25)
CALCIUM SERPL-MCNC: 9.9 MG/DL (ref 8.6–10.5)
CHLORIDE SERPL-SCNC: 99 MMOL/L (ref 98–107)
CHOLEST SERPL-MCNC: 159 MG/DL (ref 0–200)
CO2 SERPL-SCNC: 24.8 MMOL/L (ref 22–29)
CREAT SERPL-MCNC: 0.67 MG/DL (ref 0.57–1)
GFR SERPLBLD CREATININE-BSD FMLA CKD-EPI: 119 ML/MIN/1.73
GFR SERPLBLD CREATININE-BSD FMLA CKD-EPI: 98 ML/MIN/1.73
GLOBULIN SER CALC-MCNC: 3 GM/DL
GLUCOSE SERPL-MCNC: 127 MG/DL (ref 65–99)
HBA1C MFR BLD: 5.82 % (ref 4.8–5.6)
HDLC SERPL-MCNC: 56 MG/DL (ref 40–60)
INTERPRETATION: NORMAL
LDLC SERPL CALC-MCNC: 90 MG/DL (ref 0–100)
Lab: NORMAL
POTASSIUM SERPL-SCNC: 4.7 MMOL/L (ref 3.5–5.2)
PROT SERPL-MCNC: 7 G/DL (ref 6–8.5)
SODIUM SERPL-SCNC: 138 MMOL/L (ref 136–145)
T4 FREE SERPL-MCNC: 1.15 NG/DL (ref 0.93–1.7)
TRIGL SERPL-MCNC: 66 MG/DL (ref 0–150)
TSH SERPL DL<=0.005 MIU/L-ACNC: 1.6 MIU/ML (ref 0.27–4.2)
VLDLC SERPL CALC-MCNC: 13.2 MG/DL (ref 5–40)

## 2018-04-23 RX ORDER — BLOOD SUGAR DIAGNOSTIC
STRIP MISCELLANEOUS
Qty: 400 EACH | Refills: 1 | Status: SHIPPED | OUTPATIENT
Start: 2018-04-23 | End: 2018-09-26 | Stop reason: SDUPTHER

## 2018-08-02 DIAGNOSIS — E03.9 PRIMARY HYPOTHYROIDISM: ICD-10-CM

## 2018-08-02 RX ORDER — LEVOTHYROXINE SODIUM 75 MCG
TABLET ORAL
Qty: 90 TABLET | Refills: 0 | Status: SHIPPED | OUTPATIENT
Start: 2018-08-02 | End: 2018-09-26 | Stop reason: SDUPTHER

## 2018-08-14 ENCOUNTER — OFFICE VISIT (OUTPATIENT)
Dept: ENDOCRINOLOGY | Age: 38
End: 2018-08-14

## 2018-08-14 VITALS
DIASTOLIC BLOOD PRESSURE: 72 MMHG | WEIGHT: 150.8 LBS | SYSTOLIC BLOOD PRESSURE: 118 MMHG | HEART RATE: 60 BPM | OXYGEN SATURATION: 96 % | BODY MASS INDEX: 23.67 KG/M2 | HEIGHT: 67 IN

## 2018-08-14 DIAGNOSIS — E13.9 LATENT AUTOIMMUNE DIABETES IN ADULTS (LADA), MANAGED AS TYPE 1 (HCC): Primary | ICD-10-CM

## 2018-08-14 DIAGNOSIS — M32.9 HISTORY OF SYSTEMIC LUPUS ERYTHEMATOSUS (SLE) (HCC): ICD-10-CM

## 2018-08-14 DIAGNOSIS — E03.9 PRIMARY HYPOTHYROIDISM: ICD-10-CM

## 2018-08-14 DIAGNOSIS — R68.89 ABNORMAL ENDOCRINE LABORATORY TEST FINDING: ICD-10-CM

## 2018-08-14 PROCEDURE — 99214 OFFICE O/P EST MOD 30 MIN: CPT | Performed by: INTERNAL MEDICINE

## 2018-08-14 RX ORDER — HYDROXYCHLOROQUINE SULFATE 200 MG/1
200 TABLET, FILM COATED ORAL 2 TIMES DAILY
Refills: 2 | COMMUNITY
Start: 2018-05-08 | End: 2018-08-14 | Stop reason: ALTCHOICE

## 2018-08-14 NOTE — PROGRESS NOTES
Piero Bai is a 38 y.o. female.     F/u for dm 2,hypothyroidism/ testing bs 4 x day / last dm eye exam  Sept 2017/ last dm foot exam 3/27/18 with dr Diehl       Diabetes   She has type 1 diabetes mellitus. No MedicAlert identification noted. The initial diagnosis of diabetes was made 1 years ago. Hypoglycemia symptoms include sweats. Pertinent negatives for hypoglycemia include no confusion, headaches, hunger, mood changes, nervousness/anxiousness, pallor, seizures, sleepiness or speech difficulty. Pertinent negatives for diabetes include no blurred vision, no chest pain, no fatigue, no foot paresthesias, no foot ulcerations, no polydipsia, no polyphagia, no polyuria, no visual change, no weakness and no weight loss. Pertinent negatives for hypoglycemia complications include no blackouts, no hospitalization, no nocturnal hypoglycemia, no required assistance and no required glucagon injection. Symptoms are stable. Pertinent negatives for diabetic complications include no CVA, heart disease, impotence, nephropathy, peripheral neuropathy, PVD or retinopathy. There are no known risk factors for coronary artery disease. Current diabetic treatment includes diet and insulin injections. She is compliant with treatment most of the time. She is currently taking insulin pre-lunch, pre-dinner and at bedtime. Insulin injections are given by patient. Rotation sites for injection include the abdominal wall. Her weight is stable. She is following a diabetic and generally healthy diet. When asked about meal planning, she reported none. She has not had a previous visit with a dietitian. She participates in exercise intermittently. She monitors blood glucose at home 3-4 x per day. She monitors urine at home <1 x per month. Blood glucose monitoring compliance is excellent. Her home blood glucose trend is fluctuating minimally. Her breakfast blood glucose is taken after 10 am. Her breakfast blood glucose range is  generally 130-140 mg/dl. Her lunch blood glucose is taken between 12-1 pm. Her lunch blood glucose range is generally 110-130 mg/dl. Her dinner blood glucose is taken between 6-7 pm. Her dinner blood glucose range is generally 110-130 mg/dl. Her highest blood glucose is 140-180 mg/dl. Her overall blood glucose range is 110-130 mg/dl. She does not see a podiatrist.Eye exam is current.   Hypothyroidism   Pertinent negatives include no chest pain, fatigue, headaches, visual change or weakness.      Patient is a 38-year-old female who came in for follow-up.     She was admitted in  for adult respiratory distress syndrome due to pneumonia,diabetic ketoacidosis, thrombocytopenia and acute renal failure.  She was transferred to [place] on 2017 and was discharged from the facility on 2017.  She had her last hemodialysis on 2017.       She has no previous history of diabetes mellitus.  REGINA antibody was markedly elevated.  C-peptide was 1.8.  She was discharged from the hospital on Levemir 24 units at bedtime and NovoLog 8 units at breakfast, 6 units at lunch and 6 units at supper.       She is on Tresiba 6 units q noon and Novolog 4-8 with each meal.  She has few hypoglycemic episodes during the day.  FBS .  RBS .  She has gained 2 pounds since 3/18.     Her last eye exam was  and she has no retinopathy. Urine microalbumin was normal in 2017.  She was released by Dr. Urena.  She denies paresthesias     She has hypothyroidism and is on levothyroxine 75 µg per day.       She has SLE and follows with Dr. Butler.  She is on Plaquenil.  She went off prednisone in 2018.     She is  0.  She had regular menstrual periods.  She is not on birth control pills.    The following portions of the patient's history were reviewed and updated as appropriate: allergies, current medications, past family history, past medical history, past social history, past surgical  "history and problem list.    Review of Systems   Constitutional: Negative.  Negative for fatigue and weight loss.   HENT: Negative.    Eyes: Negative.  Negative for blurred vision.   Respiratory: Negative.    Cardiovascular: Negative.  Negative for chest pain.   Gastrointestinal: Negative.    Endocrine: Negative.  Negative for polydipsia, polyphagia and polyuria.   Genitourinary: Negative.  Negative for impotence.   Musculoskeletal: Negative.    Skin: Negative for pallor.   Allergic/Immunologic: Negative.    Neurological: Negative.  Negative for seizures, speech difficulty, weakness and headaches.   Hematological: Negative.    Psychiatric/Behavioral: Negative.  Negative for confusion. The patient is not nervous/anxious.        Objective      Vitals:    08/14/18 1406   BP: 118/72   BP Location: Right arm   Patient Position: Sitting   Cuff Size: Small Adult   Pulse: 60   SpO2: 96%   Weight: 68.4 kg (150 lb 12.8 oz)   Height: 171 cm (67.32\")     Physical Exam   Constitutional: She is oriented to person, place, and time. She appears well-developed and well-nourished. No distress.   HENT:   Head: Normocephalic.   Nose: Nose normal.   Mouth/Throat: No oropharyngeal exudate.   Eyes: Conjunctivae and EOM are normal. Right eye exhibits no discharge. Left eye exhibits no discharge. No scleral icterus.   Neck: Neck supple. No JVD present. No tracheal deviation present. No thyromegaly present.   Cardiovascular: Normal rate, regular rhythm, normal heart sounds and intact distal pulses.  Exam reveals no gallop and no friction rub.    No murmur heard.  Pulmonary/Chest: Effort normal and breath sounds normal. No respiratory distress. She has no wheezes. She has no rales. She exhibits no tenderness.   Abdominal: Soft. Bowel sounds are normal. She exhibits no distension and no mass. There is no tenderness. There is no guarding.   Musculoskeletal: Normal range of motion. She exhibits no edema, tenderness or deformity. "   Lymphadenopathy:     She has no cervical adenopathy.   Neurological: She is alert and oriented to person, place, and time. She displays normal reflexes. She exhibits normal muscle tone. Coordination normal.   Skin: No rash noted. No erythema. No pallor.   Intact light touch    Psychiatric: She has a normal mood and affect. Her behavior is normal.     Office Visit on 03/27/2018   Component Date Value Ref Range Status   • Glucose 03/27/2018 127* 65 - 99 mg/dL Final   • BUN 03/27/2018 17  6 - 20 mg/dL Final   • Creatinine 03/27/2018 0.67  0.57 - 1.00 mg/dL Final   • eGFR Non  Am 03/27/2018 98  >60 mL/min/1.73 Final   • eGFR African Am 03/27/2018 119  >60 mL/min/1.73 Final   • BUN/Creatinine Ratio 03/27/2018 25.4* 7.0 - 25.0 Final   • Sodium 03/27/2018 138  136 - 145 mmol/L Final   • Potassium 03/27/2018 4.7  3.5 - 5.2 mmol/L Final   • Chloride 03/27/2018 99  98 - 107 mmol/L Final   • Total CO2 03/27/2018 24.8  22.0 - 29.0 mmol/L Final   • Calcium 03/27/2018 9.9  8.6 - 10.5 mg/dL Final   • Total Protein 03/27/2018 7.0  6.0 - 8.5 g/dL Final   • Albumin 03/27/2018 4.00  3.50 - 5.20 g/dL Final   • Globulin 03/27/2018 3.0  gm/dL Final   • A/G Ratio 03/27/2018 1.3  g/dL Final   • Total Bilirubin 03/27/2018 0.2  0.1 - 1.2 mg/dL Final   • Alkaline Phosphatase 03/27/2018 72  39 - 117 U/L Final   • AST (SGOT) 03/27/2018 20  1 - 32 U/L Final   • ALT (SGPT) 03/27/2018 21  1 - 33 U/L Final   • Total Cholesterol 03/27/2018 159  0 - 200 mg/dL Final   • Triglycerides 03/27/2018 66  0 - 150 mg/dL Final   • HDL Cholesterol 03/27/2018 56  40 - 60 mg/dL Final   • VLDL Cholesterol 03/27/2018 13.2  5 - 40 mg/dL Final   • LDL Cholesterol  03/27/2018 90  0 - 100 mg/dL Final   • Hemoglobin A1C 03/27/2018 5.82* 4.80 - 5.60 % Final    Comment: Hemoglobin A1C Ranges:  Increased Risk for Diabetes  5.7% to 6.4%  Diabetes                     >= 6.5%  Diabetic Goal                < 7.0%     • TSH 03/27/2018 1.600  0.270 - 4.200 mIU/mL Final    • Free T4 03/27/2018 1.15  0.93 - 1.70 ng/dL Final   • Interpretation 03/27/2018 Note   Final    Supplemental report is available.   • PDF Image 03/27/2018 Not applicable   Final     Assessment/Plan   Sarita was seen today for diabetes and hypothyroidism.    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  Patient was given information on insulin pumps and continuous glucose sensor.  Check hemoglobin A1c.  Continue Synthroid 75 µg per day check thyroid function tests.  Check lipid profile.    Send copy of my notes and labs to Dr. Bai.    RTC 4 mos.

## 2018-08-15 LAB
ALBUMIN SERPL-MCNC: 4.5 G/DL (ref 3.5–5.2)
ALBUMIN/GLOB SERPL: 1.5 G/DL
ALP SERPL-CCNC: 67 U/L (ref 39–117)
ALT SERPL-CCNC: 27 U/L (ref 1–33)
AST SERPL-CCNC: 18 U/L (ref 1–32)
BILIRUB SERPL-MCNC: 0.4 MG/DL (ref 0.1–1.2)
BUN SERPL-MCNC: 10 MG/DL (ref 6–20)
BUN/CREAT SERPL: 14.7 (ref 7–25)
CALCIUM SERPL-MCNC: 9.8 MG/DL (ref 8.6–10.5)
CHLORIDE SERPL-SCNC: 100 MMOL/L (ref 98–107)
CHOLEST SERPL-MCNC: 159 MG/DL (ref 0–200)
CO2 SERPL-SCNC: 23.4 MMOL/L (ref 22–29)
CREAT SERPL-MCNC: 0.68 MG/DL (ref 0.57–1)
GLOBULIN SER CALC-MCNC: 3.1 GM/DL
GLUCOSE SERPL-MCNC: 65 MG/DL (ref 65–99)
HBA1C MFR BLD: 5.79 % (ref 4.8–5.6)
HDLC SERPL-MCNC: 55 MG/DL (ref 40–60)
INTERPRETATION: NORMAL
LDLC SERPL CALC-MCNC: 87 MG/DL (ref 0–100)
Lab: NORMAL
POTASSIUM SERPL-SCNC: 4.2 MMOL/L (ref 3.5–5.2)
PROT SERPL-MCNC: 7.6 G/DL (ref 6–8.5)
SODIUM SERPL-SCNC: 139 MMOL/L (ref 136–145)
TRIGL SERPL-MCNC: 84 MG/DL (ref 0–150)
TSH SERPL DL<=0.005 MIU/L-ACNC: 2.9 MIU/ML (ref 0.27–4.2)
VLDLC SERPL CALC-MCNC: 16.8 MG/DL (ref 5–40)

## 2018-08-17 ENCOUNTER — APPOINTMENT (OUTPATIENT)
Dept: LAB | Facility: HOSPITAL | Age: 38
End: 2018-08-17

## 2018-08-17 ENCOUNTER — OFFICE VISIT (OUTPATIENT)
Dept: ONCOLOGY | Facility: CLINIC | Age: 38
End: 2018-08-17

## 2018-08-17 VITALS
BODY MASS INDEX: 23.86 KG/M2 | DIASTOLIC BLOOD PRESSURE: 62 MMHG | RESPIRATION RATE: 12 BRPM | TEMPERATURE: 97.9 F | HEART RATE: 65 BPM | WEIGHT: 152 LBS | HEIGHT: 67 IN | OXYGEN SATURATION: 100 % | SYSTOLIC BLOOD PRESSURE: 104 MMHG

## 2018-08-17 DIAGNOSIS — Z86.2 HISTORY OF ITP: Primary | ICD-10-CM

## 2018-08-17 DIAGNOSIS — E61.1 IRON DEFICIENCY: Primary | ICD-10-CM

## 2018-08-17 LAB
BASOPHILS # BLD AUTO: 0.07 10*3/MM3 (ref 0–0.1)
BASOPHILS NFR BLD AUTO: 0.9 % (ref 0–1.1)
DEPRECATED RDW RBC AUTO: 49.7 FL (ref 37–49)
EOSINOPHIL # BLD AUTO: 0.11 10*3/MM3 (ref 0–0.36)
EOSINOPHIL NFR BLD AUTO: 1.4 % (ref 1–5)
ERYTHROCYTE [DISTWIDTH] IN BLOOD BY AUTOMATED COUNT: 13.9 % (ref 11.7–14.5)
HCT VFR BLD AUTO: 41 % (ref 34–45)
HGB BLD-MCNC: 14.3 G/DL (ref 11.5–14.9)
HGB RETIC QN: 37.2 PG (ref 29.8–36.1)
IMM GRANULOCYTES # BLD: 0.05 10*3/MM3 (ref 0–0.03)
IMM GRANULOCYTES NFR BLD: 0.6 % (ref 0–0.5)
IMM RETICS NFR: 7.2 % (ref 3–15.8)
LYMPHOCYTES # BLD AUTO: 1.32 10*3/MM3 (ref 1–3.5)
LYMPHOCYTES NFR BLD AUTO: 16.4 % (ref 20–49)
MCH RBC QN AUTO: 33.9 PG (ref 27–33)
MCHC RBC AUTO-ENTMCNC: 34.9 G/DL (ref 32–35)
MCV RBC AUTO: 97.2 FL (ref 83–97)
MONOCYTES # BLD AUTO: 1.58 10*3/MM3 (ref 0.25–0.8)
MONOCYTES NFR BLD AUTO: 19.7 % (ref 4–12)
NEUTROPHILS # BLD AUTO: 4.91 10*3/MM3 (ref 1.5–7)
NEUTROPHILS NFR BLD AUTO: 61 % (ref 39–75)
NRBC BLD MANUAL-RTO: 0 /100 WBC (ref 0–0)
PLATELET # BLD AUTO: 208 10*3/MM3 (ref 150–375)
PMV BLD AUTO: 11 FL (ref 8.9–12.1)
RBC # BLD AUTO: 4.22 10*6/MM3 (ref 3.9–5)
RETICS/RBC NFR AUTO: 1.21 % (ref 0.6–2)
WBC NRBC COR # BLD: 8.04 10*3/MM3 (ref 4–10)

## 2018-08-17 PROCEDURE — 36416 COLLJ CAPILLARY BLOOD SPEC: CPT | Performed by: INTERNAL MEDICINE

## 2018-08-17 PROCEDURE — 85025 COMPLETE CBC W/AUTO DIFF WBC: CPT | Performed by: INTERNAL MEDICINE

## 2018-08-17 PROCEDURE — 99213 OFFICE O/P EST LOW 20 MIN: CPT | Performed by: INTERNAL MEDICINE

## 2018-08-17 PROCEDURE — 85046 RETICYTE/HGB CONCENTRATE: CPT | Performed by: INTERNAL MEDICINE

## 2018-08-17 NOTE — PROGRESS NOTES
Subjective .  Doing well overall, no additional symptoms    REASONS FOR FOLLOWUP: History of ITP, SLE    HISTORY OF PRESENT ILLNESS:  The patient is a 38 y.o. year old female who is here for follow-up with the above-mentioned history.    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  She has been seen periodically in our office last in 2011 receiving a repeat pneumococcal vaccination the previously having begin 2011.  The patient is followed by rheumatology regularly for her lupus having been treated with Plaquenil but evidently having stopped it over the last year.     This patient was admitted June 29 through July 13 having developed an apparent illness ultimately associated with fever and confusion.  She presented to [facility] with hyperglycemia, metabolic acidosis and no previous history of diabetes.  Her condition rapidly deteriorated with development of pneumonia with ARDS requiring intubation and mechanical ventilation, acute kidney injury requiring hemodialysis and further thrombocytopenia leading to a reconsultation.  She was seen by Dr. Amato with exam consistent with Raynaud's phenomenon in her hands, laboratory studies with positive Lakisha testing, elevated LDH, bilirubin, and increasing nucleated RBCs in the peripheral circulation.  He felt that she likely had an autoimmune process and agreed with IV steroids.  She continued intensive care, IV steroids, and, fortunate, went on to slowly improve.  This included rotation pronation a mechanical ventilation the ICU.  The patient was seen by multiple services including ID, nephrology, hematology, vascular and endocrinology as well as an intensive care physicians.  Upon discharge she proceeded to rehabilitation for a period of time and, wonderfully, has made gradual  progress.  She was recently seen by Dr. Butler of rheumatology and is on a steroid taper,?  Restart of Plaquenil in the near future.  She is also to see endocrinology for her ketoacidosis and apparent glucose intolerance/diabetes.     Additional recent history includes her continuance on hemodialysis and recent removal of her Shiley catheter after which she developed an internal jugular thrombosis and was treated in [facility] facilities including institution of anticoagulation.  This is evidently just over the last several days to be seen in Epic everywhere notes.  She was seen by hematology as well during that visit and given intravenous iron as well as transfused.  She is now seen back by our practice for continued follow-up.  We have reviewed her recent and previous history over approximately 60 minutes today.    We elected to have close follow-up with repeat laboratory studies done as the patient went on to recover from her recent infectious episode.  Repeat laboratory studies were performed September 13 showing normal liver and kidney function, repeat iron of 12, iron saturation of 5, ferritin down to 590 from 1036, TIBC of 225.  The patient has been seen by rheumatology is now back on Plaquenil and records describe her current hospitalization August 24 through the 25th wherein she presented with chest discomfort, fever and shortness of breath.  CT scan revealed no evidence of pulmonary embolism, lower extremities reveal DVT which were not new there being a previous DVT left internal jugular location RD known.  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  As elected to continue steroids which had been temporarily increased and thereafter proceed with taper.  The patient now presents back to our office September 20, 2017 considerably improved we've discussed her status  which actually includes further evidence of iron deficiency and need for additional IV iron.  The patient was treated with IV iron initially seen back December 13, 2017 she is improved considerably with normalized performance status.  Repeat iron studies do not show evidence of residual iron deficiency and she remains on current anticoagulation as well as therapy with rheumatology with tapering steroids and daily Plaquenil as well as use of Amlodipine for her Raynaud's.  We have discussed reassessing her DVTs at 6 months from development which would be February 2018.   The patient is next seen February 09, 2018.  She is doing extremely well with control of her lupus.  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  She's now had 6 months of anticoagulation and will go and discontinue her Eliquis.    The patient is next seen August 17.  She is doing well overall without additional rheumatologic symptoms.  She is starting a new job and quite happy as to how she feels and with a new position.      Past Medical History:   Diagnosis Date   • Diabetes mellitus (CMS/HCC)    • H/O intestinal malabsorption    • H/O Venous thrombosis     Acute embolism and thrombosis of left internal jugular vein    • History of anemia    • History of blood transfusion    • History of ITP    • Lupus     SLE   • PNA (pneumonia) 06/29/2017   • Renal disorder    • Thyroid activity decreased        ONCOLOGIC HISTORY:  (History from previous dates can be found in the separate document.)    Current Outpatient Prescriptions on File Prior to Visit   Medication Sig Dispense Refill   • Acetaminophen (TYLENOL) 325 MG capsule Take  by mouth As Needed.     • amLODIPine (NORVASC) 5 MG tablet TAKE 1  1/2 tablets daily  3   • Hydroxychloroquine Sulfate (PLAQUENIL PO) Take 150 mg by mouth Daily. Take 1 and 1/2  tablets daily     • insulin aspart (NOVOLOG FLEXPEN) 100 UNIT/ML solution pen-injector sc pen On a s/s max 10 units daily 11 mL 1   • insulin degludec (TRESIBA FLEXTOUCH) 100 UNIT/ML solution pen-injector injection Inject 4 Units under the skin Every Evening. (Patient taking differently: Inject 6 Units under the skin Every Evening.) 6 mL 1   • Insulin Pen Needle (B-D UF III MINI PEN NEEDLES) 31G X 5 MM misc Using 4 pen needles daily 400 each 1   • Multiple Vitamins-Minerals (MULTI COMPLETE PO) Take 1 tablet/day by mouth.     • ONETOUCH DELICA LANCETS FINE misc Testing bs 4 x day 400 each 1   • ONETOUCH VERIO test strip Testing bs 4 x day  Dx code E10.9 400 each 1   • SYNTHROID 75 MCG tablet TAKE 1 TABLET ONCE DAILY EDINSON EMPTY STOMACH 90 tablet 0     No current facility-administered medications on file prior to visit.        ALLERGIES:   No Known Allergies    Social History     Social History   • Marital status: Single     Spouse name: N/A   • Number of children: 0   • Years of education: college     Occupational History   •       Social History Main Topics   • Smoking status: Never Smoker   • Smokeless tobacco: Never Used   • Alcohol use 1.8 oz/week     2 Glasses of wine, 1 Cans of beer per week      Comment: SOCIAL DRINKER   • Drug use: Unknown   • Sexual activity: No     Other Topics Concern   • Not on file     Social History Narrative   • No narrative on file         Cancer-related family history includes Prostate cancer (age of onset: 71) in her father.     Review of Systems  A comprehensive 14 point review of systems was performed and was negative except as mentioned.    Objective      Vitals:    08/17/18 0922   PainSc: 0-No pain     Current Status 8/17/2018   ECOG score 0       Physical Exam    GENERAL: [The patient is a well-developed, well-nourished adult female in no acute distress.  She is alert and oriented ×3 lying comfortably in bed, conversant]    HEENT: [Atraumatic, normocephalic.  Pupils  are equal, round, and reactive.  Extraocular  muscles are intact.]    NECK: [Supple with full range of motion.  No rigidity or meningismus.  No evidence of thyromegaly or adenopathy.]    CHEST: [Nontender, without nodularity, mass, or rash.]    LUNGS: [Clear to auscultation and percussion, normal expiratory excursion.]    CARDIOVASCULAR: [Normal rate, regular rhythm, without murmur, rub, gallop, or additional heart sounds.]    ABDOMEN: [Soft, nondistended, nontender,, no masses or organomegaly, no evidence of ascites or additional mass.]    EXTREMITIES: [No evidence of clubbing, cyanosis, or edema.]    NEUROLOGIC: [Cranial nerves II through XII are tested and found grossly intact.  Deep tendon reflexes normal reflexive bilaterally, plantar flexion bilaterally, normal proprioception bilaterally.]                RECENT LABS:  Hematology WBC   Date Value Ref Range Status   02/09/2018 10.83 (H) 4.00 - 10.00 10*3/mm3 Final     RBC   Date Value Ref Range Status   02/09/2018 3.95 3.90 - 5.00 10*6/mm3 Final     Hemoglobin   Date Value Ref Range Status   02/09/2018 13.4 11.5 - 14.9 g/dL Final     Hematocrit   Date Value Ref Range Status   02/09/2018 39.5 34.0 - 45.0 % Final     Platelets   Date Value Ref Range Status   02/09/2018 305 150 - 375 10*3/mm3 Final        Assessment/Plan            38-year-old female with a 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  She had been treated with steroids for ITP initially but when on to have a splenectomy in 2002 and remained relatively stable.  She was last seen in 2011 in remission.     The patient, more recently, had a difficult and complicated hospitalization admitted June 29 through July 13 with community-acquired pneumonia, sepsis, acute respiratory failure with ARDS, acute renal failure as well as additional studies including thrombocytopenia (multifactorial) and additional laboratory studies with RNP  antibodies of greater than 8.0, anti-chromatin antibodies greater than 8.0 which, coupled with her additional findings per peripheral blood smear could be consistent with an exacerbation of her SLE.  She additionally had an episode of thrombosis related to her vascular catheter used for dialysis for which she is currently on Eliquis.  She also required additional transfusion and IV iron therapy.     The patient has not been seen by this physician for some time so we reviewed her history over approximately an hour today including her findings in hospital, her presentation and many complications thereafter.  She is uncertain whether she had an exacerbation of her SLE after discussion with rheumatology and currently remains on a steroid taper.  She is to see rheumatology in the next month or so as well for follow-up.  Hematologically, fortunately, she is stable at this point and will plan close follow-up in the office both for worsening cytopenias including thrombocytopenia and/or the need for further IV iron support.     The patient's studies went on to reveal evidence of mild iron deficiency though continued anemia and follow-up was anticipated for possible additional IV iron.  She had intercurrent hospitalization for complications due to her lupus now including bilateral pleural effusions-small-and pleuropericarditis.  This was addressed with additional steroids that were instituted with plans for further taper.  The patient had assessment September 13 showing evidence of further iron deficiency and as she seen back in office September 20 we plan to proceed with IV iron both this week and next.  She is to continue her steroid taper hereafter and continue Eliquis twice a day at this point.  We'll plan at least 3-6 months of anticoagulation with control of her lupus for she is taken off of anticoagulation after reassessment via Doppler.  The patient was treated with IV iron successfully and is next seen back December  13, 2017.  Her lupus symptoms have approximately resolved at this point.  We discussed reassessment with Doppler examination 6 months from her hospitalization which were performed February 02, 2018.  The remains superficial phlebitis in the right upper extremity that is chronic as well as chronic left upper extremity DVT and internal jugular and subclavian vein.  She has had 6 months anticoagulation at this point and her lupus is under excellent control.  She has no additional IV lines will plan to discontinue her Eliquis.  We will see her in 6 months for follow-up.    The patient is next seen August 17, 2018 doing very well.  We have discussed long-term follow-up which will include an every 6 months CBC IPF and M.D. in 1 year.  Options of these records will go to her new rheumatologist, primary care and endocrinology.

## 2018-08-19 ENCOUNTER — RESULTS ENCOUNTER (OUTPATIENT)
Dept: ENDOCRINOLOGY | Age: 38
End: 2018-08-19

## 2018-08-19 DIAGNOSIS — R68.89 ABNORMAL ENDOCRINE LABORATORY TEST FINDING: ICD-10-CM

## 2018-08-19 DIAGNOSIS — E13.9 LATENT AUTOIMMUNE DIABETES IN ADULTS (LADA), MANAGED AS TYPE 1 (HCC): ICD-10-CM

## 2018-09-14 ENCOUNTER — RESULTS ENCOUNTER (OUTPATIENT)
Dept: ENDOCRINOLOGY | Age: 38
End: 2018-09-14

## 2018-09-14 DIAGNOSIS — E03.9 PRIMARY HYPOTHYROIDISM: ICD-10-CM

## 2018-09-26 DIAGNOSIS — E13.9 LATENT AUTOIMMUNE DIABETES IN ADULTS (LADA), MANAGED AS TYPE 1 (HCC): ICD-10-CM

## 2018-09-26 DIAGNOSIS — E03.9 PRIMARY HYPOTHYROIDISM: ICD-10-CM

## 2018-09-26 RX ORDER — LEVOTHYROXINE SODIUM 0.07 MG/1
75 TABLET ORAL EVERY MORNING
Qty: 90 TABLET | Refills: 1 | Status: SHIPPED | OUTPATIENT
Start: 2018-09-26 | End: 2019-04-29 | Stop reason: SDUPTHER

## 2018-09-26 RX ORDER — BLOOD SUGAR DIAGNOSTIC
STRIP MISCELLANEOUS
Qty: 400 EACH | Refills: 1 | Status: SHIPPED | OUTPATIENT
Start: 2018-09-26 | End: 2019-05-16

## 2018-10-01 DIAGNOSIS — E13.9 LATENT AUTOIMMUNE DIABETES IN ADULTS (LADA), MANAGED AS TYPE 1 (HCC): ICD-10-CM

## 2018-12-10 DIAGNOSIS — E13.9 LATENT AUTOIMMUNE DIABETES IN ADULTS (LADA), MANAGED AS TYPE 1 (HCC): Primary | ICD-10-CM

## 2018-12-10 DIAGNOSIS — E03.9 PRIMARY HYPOTHYROIDISM: ICD-10-CM

## 2018-12-10 DIAGNOSIS — R68.89 ABNORMAL ENDOCRINE LABORATORY TEST FINDING: ICD-10-CM

## 2018-12-19 ENCOUNTER — RESULTS ENCOUNTER (OUTPATIENT)
Dept: ENDOCRINOLOGY | Age: 38
End: 2018-12-19

## 2018-12-19 DIAGNOSIS — R68.89 ABNORMAL ENDOCRINE LABORATORY TEST FINDING: ICD-10-CM

## 2018-12-19 DIAGNOSIS — E13.9 LATENT AUTOIMMUNE DIABETES IN ADULTS (LADA), MANAGED AS TYPE 1 (HCC): ICD-10-CM

## 2018-12-19 DIAGNOSIS — E03.9 PRIMARY HYPOTHYROIDISM: ICD-10-CM

## 2018-12-19 LAB
ALBUMIN SERPL-MCNC: 4.1 G/DL (ref 3.5–5.2)
ALBUMIN/GLOB SERPL: 1.2 G/DL
ALP SERPL-CCNC: 79 U/L (ref 39–117)
ALT SERPL-CCNC: 21 U/L (ref 1–33)
AST SERPL-CCNC: 16 U/L (ref 1–32)
BILIRUB SERPL-MCNC: 0.2 MG/DL (ref 0.1–1.2)
BUN SERPL-MCNC: 15 MG/DL (ref 6–20)
BUN/CREAT SERPL: 21.7 (ref 7–25)
CALCIUM SERPL-MCNC: 9.7 MG/DL (ref 8.6–10.5)
CHLORIDE SERPL-SCNC: 102 MMOL/L (ref 98–107)
CHOLEST SERPL-MCNC: 162 MG/DL (ref 0–200)
CO2 SERPL-SCNC: 28.2 MMOL/L (ref 22–29)
CREAT SERPL-MCNC: 0.69 MG/DL (ref 0.57–1)
GLOBULIN SER CALC-MCNC: 3.4 GM/DL
GLUCOSE SERPL-MCNC: 150 MG/DL (ref 65–99)
HBA1C MFR BLD: 6.39 % (ref 4.8–5.6)
HDLC SERPL-MCNC: 53 MG/DL (ref 40–60)
INTERPRETATION: NORMAL
LDLC SERPL CALC-MCNC: 93 MG/DL (ref 0–100)
Lab: NORMAL
POTASSIUM SERPL-SCNC: 4.6 MMOL/L (ref 3.5–5.2)
PROT SERPL-MCNC: 7.5 G/DL (ref 6–8.5)
SODIUM SERPL-SCNC: 138 MMOL/L (ref 136–145)
TRIGL SERPL-MCNC: 78 MG/DL (ref 0–150)
TSH SERPL DL<=0.005 MIU/L-ACNC: 2.46 MIU/ML (ref 0.27–4.2)
UNABLE TO VOID: NORMAL
VLDLC SERPL CALC-MCNC: 15.6 MG/DL (ref 5–40)

## 2018-12-26 ENCOUNTER — OFFICE VISIT (OUTPATIENT)
Dept: ENDOCRINOLOGY | Age: 38
End: 2018-12-26

## 2018-12-26 VITALS
HEIGHT: 67 IN | WEIGHT: 150.8 LBS | OXYGEN SATURATION: 97 % | BODY MASS INDEX: 23.67 KG/M2 | HEART RATE: 82 BPM | SYSTOLIC BLOOD PRESSURE: 136 MMHG | DIASTOLIC BLOOD PRESSURE: 74 MMHG

## 2018-12-26 DIAGNOSIS — M32.9 HISTORY OF SYSTEMIC LUPUS ERYTHEMATOSUS (SLE) (HCC): ICD-10-CM

## 2018-12-26 DIAGNOSIS — E03.9 PRIMARY HYPOTHYROIDISM: ICD-10-CM

## 2018-12-26 DIAGNOSIS — E10.9 TYPE 1 DIABETES MELLITUS WITHOUT COMPLICATION (HCC): Primary | ICD-10-CM

## 2018-12-26 PROCEDURE — 99214 OFFICE O/P EST MOD 30 MIN: CPT | Performed by: INTERNAL MEDICINE

## 2018-12-26 RX ORDER — FLASH GLUCOSE SCANNING READER
1 EACH MISCELLANEOUS ONCE
Qty: 1 DEVICE | Refills: 0 | Status: SHIPPED | OUTPATIENT
Start: 2018-12-26 | End: 2018-12-26

## 2018-12-26 RX ORDER — FLASH GLUCOSE SENSOR
1 KIT MISCELLANEOUS AS NEEDED
Qty: 2 EACH | Refills: 3 | Status: SHIPPED | OUTPATIENT
Start: 2018-12-26 | End: 2019-04-04 | Stop reason: SDUPTHER

## 2018-12-26 NOTE — PROGRESS NOTES
Subjective   Sarita Bai is a 38 y.o. female.     F/u for dm 1 , hypothyroidism / testing bs 4 x day / last dm eye exam oct 2018/ last dm foot exam 3/27/18 with dr Diehl / flu vaccine@ [facility]       Diabetes   She presents for her follow-up diabetic visit. She has type 1 diabetes mellitus. No MedicAlert identification noted. The initial diagnosis of diabetes was made 1.5 years ago. Hypoglycemia symptoms include sweats. Pertinent negatives for hypoglycemia include no confusion, dizziness, headaches, hunger, mood changes, nervousness/anxiousness, pallor, seizures, sleepiness, speech difficulty or tremors. Pertinent negatives for diabetes include no blurred vision, no chest pain, no fatigue, no foot paresthesias, no foot ulcerations, no polydipsia, no polyphagia, no polyuria, no visual change, no weakness and no weight loss. Pertinent negatives for hypoglycemia complications include no blackouts, no hospitalization, no nocturnal hypoglycemia, no required assistance and no required glucagon injection. Symptoms are stable. Pertinent negatives for diabetic complications include no CVA, heart disease, impotence, nephropathy, peripheral neuropathy, PVD or retinopathy. There are no known risk factors for coronary artery disease. Current diabetic treatment includes diet and insulin injections. She is compliant with treatment most of the time. She is currently taking insulin pre-lunch, pre-dinner and at bedtime. Insulin injections are given by patient. Rotation sites for injection include the abdominal wall. Her weight is stable. She is following a diabetic diet. Meal planning includes carbohydrate counting. She has not had a previous visit with a dietitian. She participates in exercise intermittently. She monitors blood glucose at home 3-4 x per day. She monitors urine at home <1 x per month. Blood glucose monitoring compliance is excellent. Her home blood glucose trend is fluctuating minimally. Her breakfast blood  glucose is taken between 6-7 am. Her breakfast blood glucose range is generally 130-140 mg/dl. Her lunch blood glucose is taken between 12-1 pm. Her lunch blood glucose range is generally 110-130 mg/dl. Her dinner blood glucose is taken between 6-7 pm. Her dinner blood glucose range is generally 110-130 mg/dl. Her highest blood glucose is 140-180 mg/dl. Her overall blood glucose range is 130-140 mg/dl. She does not see a podiatrist.Eye exam is current.   Hypothyroidism   Pertinent negatives include no chest pain, fatigue, headaches, visual change or weakness.      Patient is a 38-year-old female who came in for follow-up.      She was diagnosed to have type 1 diabetes mellitus in  when she was admitted from [facility].  REGINA antibody was markedly elevated.  C-peptide was 1.8.       She is on Tresiba 6 units q noon and Novolog 4-6 with each meal.  She has few hypoglycemic episodes during the day.  Her blood sugar records reviewed.  She is interested in using a continuous glucose monitor but not an insulin pump.  The globe in A1c done in 2018 was 6.39%.     Her last eye exam was  and she has no retinopathy. Urine microalbumin was normal in 2017.  She was released by Dr. Urena.  She denies paresthesias     She has hypothyroidism and is on levothyroxine 75 µg per day.  TSH done in 2018 was normal at 2.46.     She has SLE and follows with Dr. Butler.  She is on Plaquenil.  She went off prednisone in 2018.     She is  0.  She had regular menstrual periods.  She is not on birth control pills.    The following portions of the patient's history were reviewed and updated as appropriate: allergies, current medications, past family history, past medical history, past social history, past surgical history and problem list.    Review of Systems   Constitutional: Negative.  Negative for fatigue and weight loss.   HENT: Negative.    Eyes: Negative.  Negative for blurred vision.  "  Respiratory: Negative.    Cardiovascular: Negative.  Negative for chest pain.   Gastrointestinal: Negative.    Endocrine: Negative.  Negative for polydipsia, polyphagia and polyuria.   Genitourinary: Negative.  Negative for impotence.   Musculoskeletal: Negative.    Skin: Negative.  Negative for pallor.   Allergic/Immunologic: Negative.    Neurological: Negative.  Negative for dizziness, tremors, seizures, speech difficulty, weakness and headaches.   Hematological: Negative.    Psychiatric/Behavioral: Negative.  Negative for confusion. The patient is not nervous/anxious.        Objective      Vitals:    12/26/18 1104   BP: 136/74   BP Location: Right arm   Patient Position: Sitting   Cuff Size: Small Adult   Pulse: 82   SpO2: 97%   Weight: 68.4 kg (150 lb 12.8 oz)   Height: 169 cm (66.54\")     Physical Exam   Constitutional: She is oriented to person, place, and time. She appears well-developed and well-nourished. No distress.   HENT:   Head: Normocephalic.   Nose: Nose normal.   Mouth/Throat: No oropharyngeal exudate.   Eyes: Conjunctivae and EOM are normal. Right eye exhibits no discharge. Left eye exhibits no discharge. No scleral icterus.   Neck: Neck supple. No JVD present. No tracheal deviation present. No thyromegaly present.   Cardiovascular: Normal rate, regular rhythm, normal heart sounds and intact distal pulses. Exam reveals no gallop and no friction rub.   No murmur heard.  Pulmonary/Chest: Effort normal and breath sounds normal. No stridor. No respiratory distress. She has no wheezes. She has no rales.   Abdominal: Soft. Bowel sounds are normal. She exhibits no distension and no mass. There is no tenderness. There is no rebound and no guarding.   Musculoskeletal: Normal range of motion. She exhibits no edema, tenderness or deformity.   Lymphadenopathy:     She has no cervical adenopathy.   Neurological: She is alert and oriented to person, place, and time. She displays normal reflexes. No cranial " nerve deficit. Coordination normal.   Intact light touch    Skin: Skin is warm and dry. No rash noted. No erythema. No pallor.   Psychiatric: She has a normal mood and affect. Her behavior is normal.     Results Encounter on 12/19/2018   Component Date Value Ref Range Status   • Hemoglobin A1C 12/19/2018 6.39* 4.80 - 5.60 % Final    Comment: Hemoglobin A1C Ranges:  Increased Risk for Diabetes  5.7% to 6.4%  Diabetes                     >= 6.5%  Diabetic Goal                < 7.0%     • Glucose 12/19/2018 150* 65 - 99 mg/dL Final   • BUN 12/19/2018 15  6 - 20 mg/dL Final   • Creatinine 12/19/2018 0.69  0.57 - 1.00 mg/dL Final   • eGFR Non  Am 12/19/2018 94  >60 mL/min/1.73 Final   • eGFR African Am 12/19/2018 114  >60 mL/min/1.73 Final   • BUN/Creatinine Ratio 12/19/2018 21.7  7.0 - 25.0 Final   • Sodium 12/19/2018 138  136 - 145 mmol/L Final   • Potassium 12/19/2018 4.6  3.5 - 5.2 mmol/L Final   • Chloride 12/19/2018 102  98 - 107 mmol/L Final   • Total CO2 12/19/2018 28.2  22.0 - 29.0 mmol/L Final   • Calcium 12/19/2018 9.7  8.6 - 10.5 mg/dL Final   • Total Protein 12/19/2018 7.5  6.0 - 8.5 g/dL Final   • Albumin 12/19/2018 4.10  3.50 - 5.20 g/dL Final   • Globulin 12/19/2018 3.4  gm/dL Final   • A/G Ratio 12/19/2018 1.2  g/dL Final   • Total Bilirubin 12/19/2018 0.2  0.1 - 1.2 mg/dL Final   • Alkaline Phosphatase 12/19/2018 79  39 - 117 U/L Final   • AST (SGOT) 12/19/2018 16  1 - 32 U/L Final   • ALT (SGPT) 12/19/2018 21  1 - 33 U/L Final   • Total Cholesterol 12/19/2018 162  0 - 200 mg/dL Final   • Triglycerides 12/19/2018 78  0 - 150 mg/dL Final   • HDL Cholesterol 12/19/2018 53  40 - 60 mg/dL Final   • VLDL Cholesterol 12/19/2018 15.6  5 - 40 mg/dL Final   • LDL Cholesterol  12/19/2018 93  0 - 100 mg/dL Final   • TSH 12/19/2018 2.460  0.270 - 4.200 mIU/mL Final   • Interpretation 12/19/2018 Note   Final    Supplemental report is available.   • PDF Image 12/19/2018 Not applicable   Final   • Unable to  Void 12/19/2018 Comment   Final    Comment: The patient was not able to render a urine sample and has been  instructed to return for a urine collection at their earliest  convenience.  The urine testing that you have requested has  been deleted from this report.  When the patient returns and  provides a urine specimen, the urine testing will be performed  and separately reported.       Assessment/Plan   Sarita was seen today for diabetes and hypothyroidism.    Diagnoses and all orders for this visit:    Type 1 diabetes mellitus without complication (CMS/[facility])  -     Microalbumin / Creatinine Urine Ratio - Urine, Clean Catch    Primary hypothyroidism    History of systemic lupus erythematosus (SLE)      Continue Tresiba and NovoLog.  Check urine microalbumin.  Continue levothyroxine 75 µg per day.  Continue Plaquenil per Dr. Shane.    Send copy of my note to Dr. Quintero, Dr. Shane, and     RTC 4 mos.

## 2018-12-27 LAB
ALBUMIN/CREAT UR: 7.7 MG/G CREAT (ref 0–30)
CREAT UR-MCNC: 45.2 MG/DL
MICROALBUMIN UR-MCNC: 3.5 UG/ML

## 2019-02-01 ENCOUNTER — LAB (OUTPATIENT)
Dept: LAB | Facility: HOSPITAL | Age: 39
End: 2019-02-01

## 2019-02-01 ENCOUNTER — CLINICAL SUPPORT (OUTPATIENT)
Dept: ONCOLOGY | Facility: HOSPITAL | Age: 39
End: 2019-02-01

## 2019-02-01 DIAGNOSIS — I82.603: Primary | ICD-10-CM

## 2019-02-01 LAB
BASOPHILS # BLD AUTO: 0.05 10*3/MM3 (ref 0–0.1)
BASOPHILS NFR BLD AUTO: 0.6 % (ref 0–1.1)
DEPRECATED RDW RBC AUTO: 49.1 FL (ref 37–49)
EOSINOPHIL # BLD AUTO: 0.16 10*3/MM3 (ref 0–0.36)
EOSINOPHIL NFR BLD AUTO: 1.9 % (ref 1–5)
ERYTHROCYTE [DISTWIDTH] IN BLOOD BY AUTOMATED COUNT: 13.9 % (ref 11.7–14.5)
HCT VFR BLD AUTO: 39 % (ref 34–45)
HGB BLD-MCNC: 13.7 G/DL (ref 11.5–14.9)
IMM GRANULOCYTES # BLD AUTO: 0.04 10*3/MM3 (ref 0–0.03)
IMM GRANULOCYTES NFR BLD AUTO: 0.5 % (ref 0–0.5)
LYMPHOCYTES # BLD AUTO: 1.2 10*3/MM3 (ref 1–3.5)
LYMPHOCYTES NFR BLD AUTO: 14.3 % (ref 20–49)
MCH RBC QN AUTO: 33.7 PG (ref 27–33)
MCHC RBC AUTO-ENTMCNC: 35.1 G/DL (ref 32–35)
MCV RBC AUTO: 96.1 FL (ref 83–97)
MONOCYTES # BLD AUTO: 1.58 10*3/MM3 (ref 0.25–0.8)
MONOCYTES NFR BLD AUTO: 18.8 % (ref 4–12)
NEUTROPHILS # BLD AUTO: 5.37 10*3/MM3 (ref 1.5–7)
NEUTROPHILS NFR BLD AUTO: 63.9 % (ref 39–75)
NRBC BLD AUTO-RTO: 0 /100 WBC (ref 0–0)
PLATELET # BLD AUTO: 259 10*3/MM3 (ref 150–375)
PLATELETS.RETICULATED NFR BLD AUTO: 4 % (ref 1.1–6.1)
PMV BLD AUTO: 10.9 FL (ref 8.9–12.1)
RBC # BLD AUTO: 4.06 10*6/MM3 (ref 3.9–5)
WBC NRBC COR # BLD: 8.4 10*3/MM3 (ref 4–10)

## 2019-02-01 PROCEDURE — 36415 COLL VENOUS BLD VENIPUNCTURE: CPT | Performed by: INTERNAL MEDICINE

## 2019-02-01 PROCEDURE — 85025 COMPLETE CBC W/AUTO DIFF WBC: CPT | Performed by: INTERNAL MEDICINE

## 2019-02-01 PROCEDURE — 85055 RETICULATED PLATELET ASSAY: CPT | Performed by: INTERNAL MEDICINE

## 2019-02-01 NOTE — PROGRESS NOTES
Pt is here for lab with RN review.  CBC reviewed with pt, counts are stable for this pt at this time. Pt has no complaints.  Copy of labs given to pt and f/u appt reviewed. Pt is instructed to call with concerns prior to next visit. Pt V/U.   Lab Results   Component Value Date    WBC 8.40 02/01/2019    HGB 13.7 02/01/2019    HCT 39.0 02/01/2019    MCV 96.1 02/01/2019     02/01/2019

## 2019-04-05 RX ORDER — FLASH GLUCOSE SENSOR
KIT MISCELLANEOUS
Qty: 2 EACH | Refills: 3 | Status: SHIPPED | OUTPATIENT
Start: 2019-04-05 | End: 2019-07-27 | Stop reason: SDUPTHER

## 2019-04-22 DIAGNOSIS — R68.89 ABNORMAL ENDOCRINE LABORATORY TEST FINDING: ICD-10-CM

## 2019-04-22 DIAGNOSIS — E10.9 TYPE 1 DIABETES MELLITUS WITHOUT COMPLICATION (HCC): Primary | ICD-10-CM

## 2019-04-22 DIAGNOSIS — E03.9 PRIMARY HYPOTHYROIDISM: ICD-10-CM

## 2019-04-29 DIAGNOSIS — E03.9 PRIMARY HYPOTHYROIDISM: ICD-10-CM

## 2019-04-29 RX ORDER — LEVOTHYROXINE SODIUM 0.07 MG/1
TABLET ORAL
Qty: 90 TABLET | Refills: 0 | Status: SHIPPED | OUTPATIENT
Start: 2019-04-29 | End: 2019-07-08 | Stop reason: SDUPTHER

## 2019-05-02 ENCOUNTER — RESULTS ENCOUNTER (OUTPATIENT)
Dept: ENDOCRINOLOGY | Age: 39
End: 2019-05-02

## 2019-05-02 DIAGNOSIS — E10.9 TYPE 1 DIABETES MELLITUS WITHOUT COMPLICATION (HCC): ICD-10-CM

## 2019-05-02 DIAGNOSIS — E03.9 PRIMARY HYPOTHYROIDISM: ICD-10-CM

## 2019-05-02 DIAGNOSIS — R68.89 ABNORMAL ENDOCRINE LABORATORY TEST FINDING: ICD-10-CM

## 2019-05-02 LAB
ALBUMIN SERPL-MCNC: 4.4 G/DL (ref 3.5–5.2)
ALBUMIN/GLOB SERPL: 1.3 G/DL
ALP SERPL-CCNC: 76 U/L (ref 39–117)
ALT SERPL-CCNC: 21 U/L (ref 1–33)
AST SERPL-CCNC: 16 U/L (ref 1–32)
BILIRUB SERPL-MCNC: 0.2 MG/DL (ref 0.2–1.2)
BUN SERPL-MCNC: 10 MG/DL (ref 6–20)
BUN/CREAT SERPL: 15.4 (ref 7–25)
CALCIUM SERPL-MCNC: 10.5 MG/DL (ref 8.6–10.5)
CHLORIDE SERPL-SCNC: 100 MMOL/L (ref 98–107)
CHOLEST SERPL-MCNC: 148 MG/DL (ref 0–200)
CO2 SERPL-SCNC: 24.8 MMOL/L (ref 22–29)
CREAT SERPL-MCNC: 0.65 MG/DL (ref 0.57–1)
GLOBULIN SER CALC-MCNC: 3.3 GM/DL
GLUCOSE SERPL-MCNC: 138 MG/DL (ref 65–99)
HBA1C MFR BLD: 6.37 % (ref 4.8–5.6)
HDLC SERPL-MCNC: 55 MG/DL (ref 40–60)
INTERPRETATION: NORMAL
LDLC SERPL CALC-MCNC: 77 MG/DL (ref 0–100)
Lab: NORMAL
POTASSIUM SERPL-SCNC: 5.1 MMOL/L (ref 3.5–5.2)
PROT SERPL-MCNC: 7.7 G/DL (ref 6–8.5)
SODIUM SERPL-SCNC: 140 MMOL/L (ref 136–145)
TRIGL SERPL-MCNC: 82 MG/DL (ref 0–150)
TSH SERPL DL<=0.005 MIU/L-ACNC: 2.69 MIU/ML (ref 0.27–4.2)
VLDLC SERPL CALC-MCNC: 16.4 MG/DL

## 2019-05-15 NOTE — PROGRESS NOTES
Subjective   Sarita Bai is a 38 y.o. female.     F/u for dm 1,hypothyroidism / testing bs 4 x day using the freestyle ronaldo  / last dm eye exam oct 2018/ last dm foot exam today with dr Campbell       Diabetes   She has type 1 diabetes mellitus. No MedicAlert identification noted. The initial diagnosis of diabetes was made 2 years ago. Hypoglycemia symptoms include sweats. Pertinent negatives for hypoglycemia include no confusion, dizziness, headaches, hunger, mood changes, nervousness/anxiousness, pallor, seizures, sleepiness, speech difficulty or tremors. Pertinent negatives for diabetes include no blurred vision, no chest pain, no fatigue, no foot paresthesias, no foot ulcerations, no polydipsia, no polyphagia, no polyuria, no visual change, no weakness and no weight loss. Pertinent negatives for hypoglycemia complications include no blackouts, no hospitalization, no nocturnal hypoglycemia, no required assistance and no required glucagon injection. Symptoms are stable. Pertinent negatives for diabetic complications include no CVA, heart disease, impotence, nephropathy, peripheral neuropathy, PVD or retinopathy. Risk factors for coronary artery disease include family history. Current diabetic treatment includes diet and insulin injections. She is compliant with treatment most of the time. She is currently taking insulin pre-breakfast, pre-lunch, pre-dinner and at bedtime. Insulin injections are given by patient. Rotation sites for injection include the abdominal wall. Her weight is stable. She is following a diabetic and generally healthy diet. Meal planning includes carbohydrate counting. She has not had a previous visit with a dietitian. She rarely participates in exercise. She monitors blood glucose at home 5+ x per day. She monitors urine at home <1 x per month. Blood glucose monitoring compliance is excellent. Her home blood glucose trend is fluctuating minimally. Her breakfast blood glucose is taken between  6-7 am. Her breakfast blood glucose range is generally 180-200 mg/dl. Her lunch blood glucose is taken between 11-12 pm. Her lunch blood glucose range is generally  mg/dl. Her dinner blood glucose is taken between 6-7 pm. Her dinner blood glucose range is generally 110-130 mg/dl. Her highest blood glucose is 180-200 mg/dl. Her overall blood glucose range is 110-130 mg/dl. She does not see a podiatrist.Eye exam is current.      Patient is a 38-year-old female who came in for follow-up.      She was diagnosed to have type 1 diabetes mellitus in  when she was admitted from [facility].  REGINA antibody was markedly elevated.  C-peptide was 1.8.    She is on Toujeo 10 units q noon and Humalog 4-6 with each meal.  She has few hypoglycemic episodes during the day.       Her last eye exam was 10/18 and she has no retinopathy. Urine microalbumin was normal in .  She was released by Dr. Urena.  She denies paresthesias     She has hypothyroidism and is on levothyroxine 75 µg per day.  TSH done in  was normal at 2.46.     She has SLE and Raynauld's phenomenon.  Shefollows with Dr. Shane.  She is on Plaquenil and amlodipine.  She went off prednisone in 2018.     She is  0.  She had regular menstrual periods.  She is not on birth control pills.    The following portions of the patient's history were reviewed and updated as appropriate: allergies, current medications, past family history, past medical history, past social history, past surgical history and problem list.    Review of Systems   Constitutional: Negative.  Negative for fatigue and weight loss.   HENT: Negative.    Eyes: Negative.  Negative for blurred vision.   Respiratory: Negative.    Cardiovascular: Negative.  Negative for chest pain.   Gastrointestinal: Negative.    Endocrine: Negative for polydipsia, polyphagia and polyuria.   Genitourinary: Negative.  Negative for impotence.   Musculoskeletal: Negative.    Skin: Negative.  Negative for  "pallor.   Allergic/Immunologic: Negative.    Neurological: Negative.  Negative for dizziness, tremors, seizures, speech difficulty, weakness and headaches.   Hematological: Negative.    Psychiatric/Behavioral: Negative.  Negative for confusion. The patient is not nervous/anxious.        Objective      Vitals:    05/16/19 0959   BP: 124/62   BP Location: Right arm   Patient Position: Sitting   Cuff Size: Small Adult   Pulse: 79   SpO2: 98%   Weight: 67.9 kg (149 lb 12.8 oz)   Height: 169 cm (66.54\")     Physical Exam   Constitutional: She is oriented to person, place, and time. She appears well-developed and well-nourished. No distress.   HENT:   Head: Normocephalic.   Nose: Nose normal.   Mouth/Throat: No oropharyngeal exudate.   Eyes: Conjunctivae and EOM are normal. Right eye exhibits no discharge. Left eye exhibits no discharge. No scleral icterus.   Neck: Neck supple. No JVD present. No tracheal deviation present. No thyromegaly present.   Cardiovascular: Normal rate, regular rhythm, normal heart sounds and intact distal pulses. Exam reveals no gallop and no friction rub.   No murmur heard.  Pulmonary/Chest: Effort normal and breath sounds normal. No stridor. No respiratory distress. She has no wheezes. She has no rales. She exhibits no tenderness.   Abdominal: Soft. Bowel sounds are normal. She exhibits no distension and no mass. There is no tenderness. There is no rebound and no guarding.   Musculoskeletal: Normal range of motion. She exhibits no edema, tenderness or deformity.   Lymphadenopathy:     She has no cervical adenopathy.   Neurological: She is alert and oriented to person, place, and time. She displays normal reflexes. Coordination normal.   Skin: Skin is warm and dry. No rash noted. No erythema. No pallor.   Psychiatric: She has a normal mood and affect. Her behavior is normal.     Results Encounter on 05/02/2019   Component Date Value Ref Range Status   • Glucose 05/02/2019 138* 65 - 99 mg/dL " Final   • BUN 05/02/2019 10  6 - 20 mg/dL Final   • Creatinine 05/02/2019 0.65  0.57 - 1.00 mg/dL Final   • eGFR Non African Am 05/02/2019 101  >60 mL/min/1.73 Final   • eGFR African Am 05/02/2019 122  >60 mL/min/1.73 Final   • BUN/Creatinine Ratio 05/02/2019 15.4  7.0 - 25.0 Final   • Sodium 05/02/2019 140  136 - 145 mmol/L Final   • Potassium 05/02/2019 5.1  3.5 - 5.2 mmol/L Final   • Chloride 05/02/2019 100  98 - 107 mmol/L Final   • Total CO2 05/02/2019 24.8  22.0 - 29.0 mmol/L Final   • Calcium 05/02/2019 10.5  8.6 - 10.5 mg/dL Final   • Total Protein 05/02/2019 7.7  6.0 - 8.5 g/dL Final   • Albumin 05/02/2019 4.40  3.50 - 5.20 g/dL Final   • Globulin 05/02/2019 3.3  gm/dL Final   • A/G Ratio 05/02/2019 1.3  g/dL Final   • Total Bilirubin 05/02/2019 0.2  0.2 - 1.2 mg/dL Final   • Alkaline Phosphatase 05/02/2019 76  39 - 117 U/L Final   • AST (SGOT) 05/02/2019 16  1 - 32 U/L Final   • ALT (SGPT) 05/02/2019 21  1 - 33 U/L Final   • Total Cholesterol 05/02/2019 148  0 - 200 mg/dL Final   • Triglycerides 05/02/2019 82  0 - 150 mg/dL Final   • HDL Cholesterol 05/02/2019 55  40 - 60 mg/dL Final   • VLDL Cholesterol 05/02/2019 16.4  mg/dL Final   • LDL Cholesterol  05/02/2019 77  0 - 100 mg/dL Final   • TSH 05/02/2019 2.690  0.270 - 4.200 mIU/mL Final   • Hemoglobin A1C 05/02/2019 6.37* 4.80 - 5.60 % Final    Comment: Hemoglobin A1C Ranges:  Increased Risk for Diabetes  5.7% to 6.4%  Diabetes                     >= 6.5%  Diabetic Goal                < 7.0%     • Interpretation 05/02/2019 Note   Final    Supplemental report is available.   • PDF Image 05/02/2019 Not applicable   Final     Assessment/Plan   Sarita was seen today for diabetes and hypothyroidism.    Diagnoses and all orders for this visit:    Type 1 diabetes mellitus without complication (CMS/HCC)    Primary hypothyroidism    History of systemic lupus erythematosus (SLE)    History of splenectomy      Continue Toujeo and Humalog.  May increase Toujeo by  1 to 2 units every 3 days until fasting glucose is below 130.  Continue levothyroxine 75 mcg/day.  Continue Plaquenil and amlodipine per Dr. Shane.    Copy of my note sent to Dr. Bai and Dr. Shane.    RTC 4 mos.

## 2019-05-16 ENCOUNTER — OFFICE VISIT (OUTPATIENT)
Dept: ENDOCRINOLOGY | Age: 39
End: 2019-05-16

## 2019-05-16 VITALS
HEART RATE: 79 BPM | OXYGEN SATURATION: 98 % | DIASTOLIC BLOOD PRESSURE: 62 MMHG | HEIGHT: 67 IN | WEIGHT: 149.8 LBS | SYSTOLIC BLOOD PRESSURE: 124 MMHG | BODY MASS INDEX: 23.51 KG/M2

## 2019-05-16 DIAGNOSIS — E03.9 PRIMARY HYPOTHYROIDISM: ICD-10-CM

## 2019-05-16 DIAGNOSIS — E10.9 TYPE 1 DIABETES MELLITUS WITHOUT COMPLICATION (HCC): Primary | ICD-10-CM

## 2019-05-16 DIAGNOSIS — Z90.81 HISTORY OF SPLENECTOMY: ICD-10-CM

## 2019-05-16 DIAGNOSIS — M32.9 HISTORY OF SYSTEMIC LUPUS ERYTHEMATOSUS (SLE) (HCC): ICD-10-CM

## 2019-05-16 PROBLEM — I73.00 RAYNAUD'S PHENOMENON WITHOUT GANGRENE: Status: ACTIVE | Noted: 2019-05-16

## 2019-05-16 PROBLEM — J18.9 PNEUMONIA: Status: RESOLVED | Noted: 2017-07-01 | Resolved: 2019-05-16

## 2019-05-16 PROBLEM — N17.0 ARF (ACUTE RENAL FAILURE) WITH TUBULAR NECROSIS: Status: RESOLVED | Noted: 2017-07-05 | Resolved: 2019-05-16

## 2019-05-16 PROBLEM — J96.00 ACUTE RESPIRATORY FAILURE: Status: RESOLVED | Noted: 2017-07-01 | Resolved: 2019-05-16

## 2019-05-16 PROBLEM — I82.603: Status: RESOLVED | Noted: 2017-12-13 | Resolved: 2019-05-16

## 2019-05-16 PROCEDURE — 99214 OFFICE O/P EST MOD 30 MIN: CPT | Performed by: INTERNAL MEDICINE

## 2019-05-16 RX ORDER — METHYLPREDNISOLONE 4 MG/1
TABLET ORAL
COMMUNITY
Start: 2019-05-14 | End: 2019-08-16

## 2019-05-16 RX ORDER — HYDROXYCHLOROQUINE SULFATE 200 MG/1
TABLET, FILM COATED ORAL
COMMUNITY
Start: 2019-03-03 | End: 2019-05-16 | Stop reason: SDUPTHER

## 2019-07-08 DIAGNOSIS — E03.9 PRIMARY HYPOTHYROIDISM: ICD-10-CM

## 2019-07-08 RX ORDER — LEVOTHYROXINE SODIUM 0.07 MG/1
75 TABLET ORAL EVERY MORNING
Qty: 90 TABLET | Refills: 1 | Status: SHIPPED | OUTPATIENT
Start: 2019-07-08 | End: 2020-01-17

## 2019-07-08 RX ORDER — INSULIN GLARGINE 300 U/ML
6 INJECTION, SOLUTION SUBCUTANEOUS DAILY
Qty: 5 ML | Refills: 1
Start: 2019-07-08 | End: 2019-07-11 | Stop reason: SDUPTHER

## 2019-07-11 RX ORDER — INSULIN GLARGINE 300 U/ML
6 INJECTION, SOLUTION SUBCUTANEOUS DAILY
Qty: 5 ML | Refills: 1 | Status: SHIPPED | OUTPATIENT
Start: 2019-07-11 | End: 2019-10-04 | Stop reason: SDUPTHER

## 2019-07-29 RX ORDER — FLASH GLUCOSE SENSOR
KIT MISCELLANEOUS
Qty: 2 EACH | Refills: 3 | Status: SHIPPED | OUTPATIENT
Start: 2019-07-29 | End: 2019-11-14 | Stop reason: SDUPTHER

## 2019-08-15 NOTE — PROGRESS NOTES
Subjective Today the patient reports that she has been doing well since her last visit.      REASONS FOR FOLLOWUP: History of ITP, SLE    HISTORY OF PRESENT ILLNESS:  The patient is a 39 y.o. year old female who is here for follow-up with the above-mentioned history.    History of Present Illness         Patient is now 2-year-old female who we had seen previously in February 2001 when she developed ITP-  Purpura requiring steroids and subsequently a splenectomy 2002 to control the process.  She has been seen periodically in our office last in 2011 receiving a repeat pneumococcal vaccination the previously having begin 2011.  The patient is followed by rheumatology regularly for her lupus having been treated with Plaquenil but evidently having stopped it over the last year.     This patient was admitted June 29 through July 13 having developed an apparent illness ultimately associated with fever and confusion.  She presented to [facility] with hyperglycemia, metabolic acidosis and no previous history of diabetes.  Her condition rapidly deteriorated with development of pneumonia with ARDS requiring intubation and mechanical ventilation, acute kidney injury requiring hemodialysis and further thrombocytopenia leading to a reconsultation.  She was seen by Dr. Amato with exam consistent with Raynaud's phenomenon in her hands, laboratory studies with positive Lakisha testing, elevated LDH, bilirubin, and increasing nucleated RBCs in the peripheral circulation.  He felt that she likely had an autoimmune process and agreed with IV steroids.  She continued intensive care, IV steroids, and, fortunate, went on to slowly improve.  This included rotation pronation a mechanical ventilation the ICU.  The patient was seen by multiple services including ID, nephrology, hematology, vascular and endocrinology as well as an intensive care physicians.  Upon discharge she proceeded to rehabilitation for a period of time and,  wonderfully, has made gradual progress.  She was recently seen by Dr. Butler of rheumatology and is on a steroid taper,?  Restart of Plaquenil in the near future.  She is also to see endocrinology for her ketoacidosis and apparent glucose intolerance/diabetes.     Additional recent history includes her continuance on hemodialysis and recent removal of her Shiley catheter after which she developed an internal jugular thrombosis and was treated in [facility] facilities including institution of anticoagulation.  This is evidently just over the last several days to be seen in Epic everywhere notes.  She was seen by hematology as well during that visit and given intravenous iron as well as transfused.  She is now seen back by our practice for continued follow-up.  We have reviewed her recent and previous history over approximately 60 minutes today.    We elected to have close follow-up with repeat laboratory studies done as the patient went on to recover from her recent infectious episode.  Repeat laboratory studies were performed September 13 showing normal liver and kidney function, repeat iron of 12, iron saturation of 5, ferritin down to 590 from 1036, TIBC of 225.  The patient has been seen by rheumatology is now back on Plaquenil and records describe her current hospitalization August 24 through the 25th wherein she presented with chest discomfort, fever and shortness of breath.  CT scan revealed no evidence of pulmonary embolism, lower extremities reveal DVT which were not new there being a previous DVT left internal jugular location RD known.  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  As elected to continue steroids which had been temporarily increased and thereafter proceed with taper.  The patient now presents back to our office September 20, 2017 considerably  improved we've discussed her status which actually includes further evidence of iron deficiency and need for additional IV iron.  The patient was treated with IV iron initially seen back December 13, 2017 she is improved considerably with normalized performance status.  Repeat iron studies do not show evidence of residual iron deficiency and she remains on current anticoagulation as well as therapy with rheumatology with tapering steroids and daily Plaquenil as well as use of Amlodipine for her Raynaud's.  We have discussed reassessing her DVTs at 6 months from development which would be February 2018.   The patient is next seen February 09, 2018.  She is doing extremely well with control of her lupus.  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  She's now had 6 months of anticoagulation and will go and discontinue her Eliquis.    The patient is next seen August 17.  She is doing well overall without additional rheumatologic symptoms.  She is starting a new job and quite happy as to how she feels and with a new position.  The patient is next seen August 16, 2019, and fortunately, doing well physically.  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  Hematologically and rheumatological her symptoms have not recurred.      Past Medical History:   Diagnosis Date   • ARF (acute renal failure) with tubular necrosis (CMS/HCC) 7/5/2017   • Diabetes mellitus (CMS/HCC)    • H/O intestinal malabsorption    • H/O Venous thrombosis     Acute embolism and thrombosis of left internal jugular vein    • History of anemia    • History of blood transfusion    • History of ITP    • Lupus     SLE   • PNA (pneumonia) 06/29/2017   • Renal disorder    • Thyroid activity decreased        ONCOLOGIC HISTORY:  (History from previous dates can be  found in the separate document.)    Current Outpatient Medications on File Prior to Visit   Medication Sig Dispense Refill   • Acetaminophen (TYLENOL) 325 MG capsule Take  by mouth As Needed.     • amLODIPine (NORVASC) 5 MG tablet 1 tablet BID  3   • Continuous Blood Gluc Sensor (FREESTYLE MARIBETH 14 DAY SENSOR) misc APPLY 1 DEVICE TOPICALLY TO THE UPPER ARM EVERY 14 DAYS 2 each 3   • hydroxychloroquine (PLAQUENIL) 200 MG tablet      • Hydroxychloroquine Sulfate (PLAQUENIL PO) Take 150 mg by mouth Daily. Take 1 and 1/2 tablets daily     • Insulin Lispro (HUMALOG KWIKPEN) 100 UNIT/ML solution pen-injector INJECT PER SLIDING SCALE WITH A MAXIMUM OF 6 UNITS DAILY 15 mL 0   • Insulin Pen Needle (B-D UF III MINI PEN NEEDLES) 31G X 5 MM misc Using 4 pen needles daily 400 each 1   • levothyroxine (SYNTHROID, LEVOTHROID) 75 MCG tablet Take 1 tablet by mouth Every Morning. 90 tablet 1   • Multiple Vitamins-Minerals (MULTI COMPLETE PO) Take 1 tablet/day by mouth.     • TOUJEO SOLOSTAR 300 UNIT/ML solution pen-injector Inject 6 Units under the skin into the appropriate area as directed Daily. +1-2 units every 3 days if fbs over 130 5 mL 1   • [DISCONTINUED] methylPREDNISolone (MEDROL) 4 MG tablet as directed       No current facility-administered medications on file prior to visit.        ALLERGIES:   No Known Allergies    Social History     Socioeconomic History   • Marital status: Single     Spouse name: Not on file   • Number of children: 0   • Years of education: college   • Highest education level: Not on file   Occupational History   • Occupation:    Tobacco Use   • Smoking status: Never Smoker   • Smokeless tobacco: Never Used   Substance and Sexual Activity   • Alcohol use: Yes     Alcohol/week: 1.8 oz     Types: 2 Glasses of wine, 1 Cans of beer per week     Comment: SOCIAL DRINKER   • Drug use: No   • Sexual activity: No         Cancer-related family history includes Prostate cancer (age of onset: 71) in her  "father.     Review of Systems   Constitutional: Negative for fatigue.   Respiratory: Negative for chest tightness, shortness of breath and wheezing.    Gastrointestinal: Negative for abdominal pain, constipation, diarrhea, nausea and vomiting.   Musculoskeletal: Negative for arthralgias.        Patient has minimal swelling involving right hand middle finger   Neurological: Negative for weakness.   Objective      Vitals:    08/16/19 0909   BP: 131/81   Pulse: 82   Resp: 16   Temp: 98.7 °F (37.1 °C)   TempSrc: Oral   SpO2: 100%   Weight: 68.3 kg (150 lb 9.6 oz)   Height: 169 cm (66.54\")  Comment: new yearly height   PainSc: 0-No pain     Current Status 8/16/2019   ECOG score 0       Physical Exam   Constitutional: She is oriented to person, place, and time. She appears well-nourished.   HENT:   Head: Normocephalic and atraumatic.   Eyes: Conjunctivae and EOM are normal. Pupils are equal, round, and reactive to light.   Neck: Normal range of motion. Neck supple.   Cardiovascular: Normal rate, regular rhythm, normal heart sounds and intact distal pulses.   Pulmonary/Chest: Effort normal and breath sounds normal.   Abdominal: Soft. Bowel sounds are normal.   Musculoskeletal: Normal range of motion. She exhibits edema.   Particularly involving her right hand middle finger   Neurological: She is alert and oriented to person, place, and time.   Skin: Skin is warm and dry.   Psychiatric: She has a normal mood and affect. Her behavior is normal.         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  She had been treated with steroids for ITP initially but when on to have a splenectomy in 2002 and remained relatively stable.  She was last seen in 2011 in remission.     The patient, more recently, had a difficult and complicated hospitalization admitted June 29 through July 13 with community-acquired pneumonia, sepsis, acute respiratory failure with ARDS, acute renal failure as well as additional studies including thrombocytopenia (multifactorial) and additional laboratory studies with RNP antibodies of greater than 8.0, anti-chromatin antibodies greater than 8.0 which, coupled with her additional findings per peripheral blood smear could be consistent with an exacerbation of her SLE.  She additionally had an episode of thrombosis related to her vascular catheter used for dialysis for which she is currently on Eliquis.  She also required additional transfusion and IV iron therapy.     The patient has not been seen by this physician for some time so we reviewed her history over approximately an hour today including her findings in hospital, her presentation and many complications thereafter.  She is uncertain whether she had an exacerbation of her SLE after discussion with rheumatology and currently remains on a steroid taper.  She is to see rheumatology in the next month or so as well for follow-up.  Hematologically, fortunately, she is stable at this point and will plan close follow-up in the office both for worsening cytopenias including thrombocytopenia and/or the need for further IV iron support.     The patient's studies went on to reveal evidence of mild iron deficiency though continued anemia and follow-up was anticipated for possible additional IV iron.  She had intercurrent hospitalization for complications due to her lupus now including bilateral pleural  effusions-small-and pleuropericarditis.  This was addressed with additional steroids that were instituted with plans for further taper.  The patient had assessment September 13 showing evidence of further iron deficiency and as she seen back in office September 20 we plan to proceed with IV iron both this week and next.  She is to continue her steroid taper hereafter and continue Eliquis twice a day at this point.  We'll plan at least 3-6 months of anticoagulation with control of her lupus for she is taken off of anticoagulation after reassessment via Doppler.  The patient was treated with IV iron successfully and is next seen back December 13, 2017.  Her lupus symptoms have approximately resolved at this point.  We discussed reassessment with Doppler examination 6 months from her hospitalization which were performed February 02, 2018.  The remains superficial phlebitis in the right upper extremity that is chronic as well as chronic left upper extremity DVT and internal jugular and subclavian vein.  She has had 6 months anticoagulation at this point and her lupus is under excellent control.  She has no additional IV lines will plan to discontinue her Eliquis.  We will see her in 6 months for follow-up.    The patient is next seen August 17, 2018 doing very well.  We have discussed long-term follow-up which will include an every 6 months CBC IPF and M.D. in 1 year.  Options of these records will go to her new rheumatologist, primary care and endocrinology.  The patient is next seen August 16, 2019 again doing well clinically.  She has a mild macrocytosis that anemia we will recheck reticulocyte count today.  Otherwise plan:    *CBC, Retic, IPF, LDH q 6 months  *MD assessment in 12 months

## 2019-08-16 ENCOUNTER — LAB (OUTPATIENT)
Dept: LAB | Facility: HOSPITAL | Age: 39
End: 2019-08-16

## 2019-08-16 ENCOUNTER — OFFICE VISIT (OUTPATIENT)
Dept: ONCOLOGY | Facility: CLINIC | Age: 39
End: 2019-08-16

## 2019-08-16 VITALS
BODY MASS INDEX: 23.64 KG/M2 | OXYGEN SATURATION: 100 % | DIASTOLIC BLOOD PRESSURE: 81 MMHG | HEART RATE: 82 BPM | RESPIRATION RATE: 16 BRPM | HEIGHT: 67 IN | SYSTOLIC BLOOD PRESSURE: 131 MMHG | TEMPERATURE: 98.7 F | WEIGHT: 150.6 LBS

## 2019-08-16 DIAGNOSIS — E61.1 IRON DEFICIENCY: Primary | ICD-10-CM

## 2019-08-16 DIAGNOSIS — Z86.2 HISTORY OF ITP: ICD-10-CM

## 2019-08-16 LAB
ALBUMIN SERPL-MCNC: 4.1 G/DL (ref 3.5–5.2)
ALBUMIN/GLOB SERPL: 1.2 G/DL (ref 1.1–2.4)
ALP SERPL-CCNC: 83 U/L (ref 38–116)
ALT SERPL W P-5'-P-CCNC: 21 U/L (ref 0–33)
ANION GAP SERPL CALCULATED.3IONS-SCNC: 11.4 MMOL/L (ref 5–15)
AST SERPL-CCNC: 21 U/L (ref 0–32)
BASOPHILS # BLD AUTO: 0.05 10*3/MM3 (ref 0–0.2)
BASOPHILS NFR BLD AUTO: 0.8 % (ref 0–1.5)
BILIRUB SERPL-MCNC: 0.2 MG/DL (ref 0.2–1.2)
BUN BLD-MCNC: 8 MG/DL (ref 6–20)
BUN/CREAT SERPL: 11.8 (ref 7.3–30)
CALCIUM SPEC-SCNC: 9.5 MG/DL (ref 8.5–10.2)
CHLORIDE SERPL-SCNC: 101 MMOL/L (ref 98–107)
CO2 SERPL-SCNC: 25.6 MMOL/L (ref 22–29)
CREAT BLD-MCNC: 0.68 MG/DL (ref 0.6–1.1)
DEPRECATED RDW RBC AUTO: 53.7 FL (ref 37–54)
EOSINOPHIL # BLD AUTO: 0.08 10*3/MM3 (ref 0–0.4)
EOSINOPHIL NFR BLD AUTO: 1.3 % (ref 0.3–6.2)
ERYTHROCYTE [DISTWIDTH] IN BLOOD BY AUTOMATED COUNT: 14.2 % (ref 12.3–15.4)
GFR SERPL CREATININE-BSD FRML MDRD: 95 ML/MIN/1.73
GLOBULIN UR ELPH-MCNC: 3.5 GM/DL (ref 1.8–3.5)
GLUCOSE BLD-MCNC: 124 MG/DL (ref 74–124)
HCT VFR BLD AUTO: 42.9 % (ref 34–46.6)
HGB BLD-MCNC: 14.5 G/DL (ref 12–15.9)
HGB RETIC QN AUTO: 38.2 PG (ref 29.8–36.1)
IMM GRANULOCYTES # BLD AUTO: 0.02 10*3/MM3 (ref 0–0.05)
IMM GRANULOCYTES NFR BLD AUTO: 0.3 % (ref 0–0.5)
IMM RETICS NFR: 7.9 % (ref 3–15.8)
LYMPHOCYTES # BLD AUTO: 0.91 10*3/MM3 (ref 0.7–3.1)
LYMPHOCYTES NFR BLD AUTO: 14.8 % (ref 19.6–45.3)
MCH RBC QN AUTO: 34.5 PG (ref 26.6–33)
MCHC RBC AUTO-ENTMCNC: 33.8 G/DL (ref 31.5–35.7)
MCV RBC AUTO: 102.1 FL (ref 79–97)
MONOCYTES # BLD AUTO: 1.11 10*3/MM3 (ref 0.1–0.9)
MONOCYTES NFR BLD AUTO: 18.1 % (ref 5–12)
NEUTROPHILS # BLD AUTO: 3.97 10*3/MM3 (ref 1.7–7)
NEUTROPHILS NFR BLD AUTO: 64.7 % (ref 42.7–76)
NRBC BLD AUTO-RTO: 0 /100 WBC (ref 0–0.2)
PLATELET # BLD AUTO: 306 10*3/MM3 (ref 140–450)
PLATELETS.RETICULATED NFR BLD AUTO: 3.9 % (ref 0.9–6.5)
PMV BLD AUTO: 9.5 FL (ref 6–12)
POTASSIUM BLD-SCNC: 4.4 MMOL/L (ref 3.5–4.7)
PROT SERPL-MCNC: 7.6 G/DL (ref 6.3–8)
RBC # BLD AUTO: 4.2 10*6/MM3 (ref 3.77–5.28)
RETICS/RBC NFR AUTO: 1.48 % (ref 0.7–1.9)
SODIUM BLD-SCNC: 138 MMOL/L (ref 134–145)
WBC NRBC COR # BLD: 6.14 10*3/MM3 (ref 3.4–10.8)

## 2019-08-16 PROCEDURE — 85055 RETICULATED PLATELET ASSAY: CPT

## 2019-08-16 PROCEDURE — 99213 OFFICE O/P EST LOW 20 MIN: CPT | Performed by: INTERNAL MEDICINE

## 2019-08-16 PROCEDURE — 85025 COMPLETE CBC W/AUTO DIFF WBC: CPT

## 2019-08-16 PROCEDURE — 36415 COLL VENOUS BLD VENIPUNCTURE: CPT

## 2019-08-16 PROCEDURE — 85046 RETICYTE/HGB CONCENTRATE: CPT

## 2019-08-16 PROCEDURE — 80053 COMPREHEN METABOLIC PANEL: CPT

## 2019-08-16 RX ORDER — HYDROXYCHLOROQUINE SULFATE 200 MG/1
TABLET, FILM COATED ORAL
COMMUNITY
Start: 2019-05-31 | End: 2019-10-04 | Stop reason: SDUPTHER

## 2019-09-12 DIAGNOSIS — R68.89 ABNORMAL ENDOCRINE LABORATORY TEST FINDING: ICD-10-CM

## 2019-09-12 DIAGNOSIS — E10.9 TYPE 1 DIABETES MELLITUS WITHOUT COMPLICATION (HCC): ICD-10-CM

## 2019-09-12 DIAGNOSIS — E03.9 PRIMARY HYPOTHYROIDISM: Primary | ICD-10-CM

## 2019-09-19 ENCOUNTER — RESULTS ENCOUNTER (OUTPATIENT)
Dept: ENDOCRINOLOGY | Age: 39
End: 2019-09-19

## 2019-09-19 DIAGNOSIS — R68.89 ABNORMAL ENDOCRINE LABORATORY TEST FINDING: ICD-10-CM

## 2019-09-19 DIAGNOSIS — E03.9 PRIMARY HYPOTHYROIDISM: ICD-10-CM

## 2019-09-19 DIAGNOSIS — E10.9 TYPE 1 DIABETES MELLITUS WITHOUT COMPLICATION (HCC): ICD-10-CM

## 2019-09-20 LAB
ALBUMIN SERPL-MCNC: 4.2 G/DL (ref 3.5–5.2)
ALBUMIN/GLOB SERPL: 1.4 G/DL
ALP SERPL-CCNC: 83 U/L (ref 39–117)
ALT SERPL-CCNC: 18 U/L (ref 1–33)
AST SERPL-CCNC: 15 U/L (ref 1–32)
BILIRUB SERPL-MCNC: 0.2 MG/DL (ref 0.2–1.2)
BUN SERPL-MCNC: 10 MG/DL (ref 6–20)
BUN/CREAT SERPL: 13.7 (ref 7–25)
CALCIUM SERPL-MCNC: 9.2 MG/DL (ref 8.6–10.5)
CHLORIDE SERPL-SCNC: 100 MMOL/L (ref 98–107)
CHOLEST SERPL-MCNC: 153 MG/DL (ref 0–200)
CO2 SERPL-SCNC: 26.5 MMOL/L (ref 22–29)
CREAT SERPL-MCNC: 0.73 MG/DL (ref 0.57–1)
GLOBULIN SER CALC-MCNC: 2.9 GM/DL
GLUCOSE SERPL-MCNC: 130 MG/DL (ref 65–99)
HBA1C MFR BLD: 7.2 % (ref 4.8–5.6)
HDLC SERPL-MCNC: 54 MG/DL (ref 40–60)
INTERPRETATION: NORMAL
LDLC SERPL CALC-MCNC: 87 MG/DL (ref 0–100)
Lab: NORMAL
POTASSIUM SERPL-SCNC: 4.9 MMOL/L (ref 3.5–5.2)
PROT SERPL-MCNC: 7.1 G/DL (ref 6–8.5)
SODIUM SERPL-SCNC: 138 MMOL/L (ref 136–145)
TRIGL SERPL-MCNC: 59 MG/DL (ref 0–150)
TSH SERPL DL<=0.005 MIU/L-ACNC: 2.24 UIU/ML (ref 0.27–4.2)
VLDLC SERPL CALC-MCNC: 11.8 MG/DL

## 2019-10-03 NOTE — PROGRESS NOTES
Subjective   Sarita Bai is a 39 y.o. female.     F/u for dm 1, hypothyroidism / testing bs 4 x day using the freestyle ronaldo/ last dm eye exam Oct 2018/ last dm foot exam 19 with dr Campbell / flu  vacc @ [facility]        Patient is a 38-year-old female who came in for follow-up.      She was diagnosed to have type 1 diabetes mellitus in  when she was admitted from [facility].  REGINA antibody was markedly elevated.  C-peptide was 1.8.    She is on Toujeo 16 units q noon and Humalog 12 with each meal.  She has few hypoglycemic episodes during the day.      Freestyle ronaldo sensor data reviewed.     Her last eye exam was 10/18 and she has no retinopathy. Urine microalbumin was normal in .  She was released by Dr. Urena.  She denies paresthesias     She has hypothyroidism and is on levothyroxine 75 µg per day.  TSH done in  was normal at 2.46.     She has SLE and Raynauld's phenomenon.  She follows with Dr. Shane.  She is on Plaquenil and amlodipine.  She went off prednisone in 2018.     She is  0.  She had regular menstrual periods.  She is not on birth control pills.    She had flu vaccine last week    The following portions of the patient's history were reviewed and updated as appropriate: allergies, current medications, past family history, past medical history, past social history, past surgical history and problem list.    Review of Systems   Constitutional: Negative.    HENT: Negative.    Eyes: Negative.    Respiratory: Negative.    Cardiovascular: Negative.    Gastrointestinal: Negative.    Endocrine: Negative.    Genitourinary: Negative.    Musculoskeletal: Negative.    Allergic/Immunologic: Negative.    Neurological: Negative.    Hematological: Negative.    Psychiatric/Behavioral: Negative.        Objective      Vitals:    10/04/19 0903   BP: 118/70   BP Location: Right arm   Patient Position: Sitting   Cuff Size: Small Adult   Pulse: 71   SpO2: 99%   Weight: 67.9 kg (149 lb 12.8 oz)   Height:  "169 cm (66.54\")     Physical Exam   Constitutional: She is oriented to person, place, and time. She appears well-developed and well-nourished. No distress.   HENT:   Head: Normocephalic.   Right Ear: External ear normal.   Left Ear: External ear normal.   Nose: Nose normal.   Mouth/Throat: Oropharynx is clear and moist. No oropharyngeal exudate.   Eyes: Conjunctivae and EOM are normal. Right eye exhibits no discharge. Left eye exhibits no discharge. No scleral icterus.   Neck: Neck supple. No JVD present. No tracheal deviation present. No thyromegaly present.   Cardiovascular: Normal rate, regular rhythm, normal heart sounds and intact distal pulses. Exam reveals no gallop and no friction rub.   No murmur heard.  Pulmonary/Chest: Effort normal and breath sounds normal. No stridor. No respiratory distress. She has no wheezes. She has no rales. She exhibits no tenderness.   Abdominal: Soft. Bowel sounds are normal. She exhibits no distension and no mass. There is no tenderness. There is no rebound and no guarding.   Musculoskeletal: Normal range of motion. She exhibits no edema, tenderness or deformity.   Lymphadenopathy:     She has no cervical adenopathy.   Neurological: She is alert and oriented to person, place, and time. She displays normal reflexes. No cranial nerve deficit or sensory deficit. She exhibits normal muscle tone. Coordination normal.   Intact light touch   Skin: Skin is warm and dry. She is not diaphoretic. No erythema. No pallor.   Psychiatric: She has a normal mood and affect. Her behavior is normal.     Results Encounter on 09/19/2019   Component Date Value Ref Range Status   • Glucose 09/20/2019 130* 65 - 99 mg/dL Final   • BUN 09/20/2019 10  6 - 20 mg/dL Final   • Creatinine 09/20/2019 0.73  0.57 - 1.00 mg/dL Final   • eGFR Non  Am 09/20/2019 88  >60 mL/min/1.73 Final   • eGFR African Am 09/20/2019 106  >60 mL/min/1.73 Final   • BUN/Creatinine Ratio 09/20/2019 13.7  7.0 - 25.0 Final   • " Sodium 09/20/2019 138  136 - 145 mmol/L Final   • Potassium 09/20/2019 4.9  3.5 - 5.2 mmol/L Final   • Chloride 09/20/2019 100  98 - 107 mmol/L Final   • Total CO2 09/20/2019 26.5  22.0 - 29.0 mmol/L Final   • Calcium 09/20/2019 9.2  8.6 - 10.5 mg/dL Final   • Total Protein 09/20/2019 7.1  6.0 - 8.5 g/dL Final   • Albumin 09/20/2019 4.20  3.50 - 5.20 g/dL Final   • Globulin 09/20/2019 2.9  gm/dL Final   • A/G Ratio 09/20/2019 1.4  g/dL Final   • Total Bilirubin 09/20/2019 0.2  0.2 - 1.2 mg/dL Final   • Alkaline Phosphatase 09/20/2019 83  39 - 117 U/L Final   • AST (SGOT) 09/20/2019 15  1 - 32 U/L Final   • ALT (SGPT) 09/20/2019 18  1 - 33 U/L Final   • Total Cholesterol 09/20/2019 153  0 - 200 mg/dL Final   • Triglycerides 09/20/2019 59  0 - 150 mg/dL Final   • HDL Cholesterol 09/20/2019 54  40 - 60 mg/dL Final   • VLDL Cholesterol 09/20/2019 11.8  mg/dL Final   • LDL Cholesterol  09/20/2019 87  0 - 100 mg/dL Final   • TSH 09/20/2019 2.240  0.270 - 4.200 uIU/mL Final   • Hemoglobin A1C 09/20/2019 7.20* 4.80 - 5.60 % Final    Comment: Hemoglobin A1C Ranges:  Increased Risk for Diabetes  5.7% to 6.4%  Diabetes                     >= 6.5%  Diabetic Goal                < 7.0%     • Interpretation 09/20/2019 Note   Final    Supplemental report is available.   • PDF Image 09/20/2019 Not applicable   Final     Assessment/Plan   Sarita was seen today for diabetes and hypothyroidism.    Diagnoses and all orders for this visit:    Type 1 diabetes mellitus without complication (CMS/HCC)    Primary hypothyroidism    Other orders  -     TOUJEO SOLOSTAR 300 UNIT/ML solution pen-injector injection; 18 units daily  -     Insulin Lispro (HUMALOG KWIKPEN) 100 UNIT/ML solution pen-injector; 8-12 units TID with meals      Increase Toujeo to 18 units daily.  Continue Humalog 12 units with each meal.  Discussed about insulin pumps.  Continue levothyroxine 75 mcg/day.    Copy of my note sent to Dr. Bai    RTC 4 mos.

## 2019-10-04 ENCOUNTER — OFFICE VISIT (OUTPATIENT)
Dept: ENDOCRINOLOGY | Age: 39
End: 2019-10-04

## 2019-10-04 VITALS
OXYGEN SATURATION: 99 % | SYSTOLIC BLOOD PRESSURE: 118 MMHG | DIASTOLIC BLOOD PRESSURE: 70 MMHG | BODY MASS INDEX: 23.51 KG/M2 | HEIGHT: 67 IN | HEART RATE: 71 BPM | WEIGHT: 149.8 LBS

## 2019-10-04 DIAGNOSIS — E10.9 TYPE 1 DIABETES MELLITUS WITHOUT COMPLICATION (HCC): Primary | ICD-10-CM

## 2019-10-04 DIAGNOSIS — E03.9 PRIMARY HYPOTHYROIDISM: ICD-10-CM

## 2019-10-04 PROCEDURE — 99214 OFFICE O/P EST MOD 30 MIN: CPT | Performed by: INTERNAL MEDICINE

## 2019-10-04 RX ORDER — INSULIN GLARGINE 300 U/ML
INJECTION, SOLUTION SUBCUTANEOUS
Qty: 10 PEN | Refills: 6 | Status: SHIPPED | OUTPATIENT
Start: 2019-10-04 | End: 2020-06-17 | Stop reason: SDUPTHER

## 2019-11-14 RX ORDER — FLASH GLUCOSE SENSOR
KIT MISCELLANEOUS
Qty: 2 EACH | Refills: 5 | Status: SHIPPED | OUTPATIENT
Start: 2019-11-14 | End: 2020-05-04

## 2020-01-17 DIAGNOSIS — E03.9 PRIMARY HYPOTHYROIDISM: ICD-10-CM

## 2020-01-17 RX ORDER — LEVOTHYROXINE SODIUM 0.07 MG/1
TABLET ORAL
Qty: 90 TABLET | Refills: 1 | Status: SHIPPED | OUTPATIENT
Start: 2020-01-17 | End: 2020-07-09

## 2020-01-28 DIAGNOSIS — E03.9 PRIMARY HYPOTHYROIDISM: Primary | ICD-10-CM

## 2020-01-28 DIAGNOSIS — R68.89 ABNORMAL ENDOCRINE LABORATORY TEST FINDING: ICD-10-CM

## 2020-01-28 DIAGNOSIS — E10.9 TYPE 1 DIABETES MELLITUS WITHOUT COMPLICATION (HCC): ICD-10-CM

## 2020-01-29 ENCOUNTER — RESULTS ENCOUNTER (OUTPATIENT)
Dept: ENDOCRINOLOGY | Age: 40
End: 2020-01-29

## 2020-01-29 DIAGNOSIS — R68.89 ABNORMAL ENDOCRINE LABORATORY TEST FINDING: ICD-10-CM

## 2020-01-29 DIAGNOSIS — E03.9 PRIMARY HYPOTHYROIDISM: ICD-10-CM

## 2020-01-29 DIAGNOSIS — E10.9 TYPE 1 DIABETES MELLITUS WITHOUT COMPLICATION (HCC): ICD-10-CM

## 2020-01-30 LAB
ALBUMIN SERPL-MCNC: 4.4 G/DL (ref 3.5–5.2)
ALBUMIN/CREAT UR: <21 MG/G CREAT (ref 0–29)
ALBUMIN/GLOB SERPL: 1.5 G/DL
ALP SERPL-CCNC: 89 U/L (ref 39–117)
ALT SERPL-CCNC: 18 U/L (ref 1–33)
AST SERPL-CCNC: 18 U/L (ref 1–32)
BILIRUB SERPL-MCNC: 0.2 MG/DL (ref 0.2–1.2)
BUN SERPL-MCNC: 7 MG/DL (ref 6–20)
BUN/CREAT SERPL: 9.9 (ref 7–25)
CALCIUM SERPL-MCNC: 9.9 MG/DL (ref 8.6–10.5)
CHLORIDE SERPL-SCNC: 100 MMOL/L (ref 98–107)
CHOLEST SERPL-MCNC: 169 MG/DL (ref 0–200)
CO2 SERPL-SCNC: 22.5 MMOL/L (ref 22–29)
CREAT SERPL-MCNC: 0.71 MG/DL (ref 0.57–1)
CREAT UR-MCNC: 14.6 MG/DL
GLOBULIN SER CALC-MCNC: 3 GM/DL
GLUCOSE SERPL-MCNC: 174 MG/DL (ref 65–99)
HBA1C MFR BLD: 7 % (ref 4.8–5.6)
HDLC SERPL-MCNC: 65 MG/DL (ref 40–60)
INTERPRETATION: NORMAL
LDLC SERPL CALC-MCNC: 94 MG/DL (ref 0–100)
Lab: NORMAL
MICROALBUMIN UR-MCNC: <3 UG/ML
POTASSIUM SERPL-SCNC: 4.7 MMOL/L (ref 3.5–5.2)
PROT SERPL-MCNC: 7.4 G/DL (ref 6–8.5)
SODIUM SERPL-SCNC: 139 MMOL/L (ref 136–145)
TRIGL SERPL-MCNC: 51 MG/DL (ref 0–150)
TSH SERPL DL<=0.005 MIU/L-ACNC: 2.07 UIU/ML (ref 0.27–4.2)
VLDLC SERPL CALC-MCNC: 10.2 MG/DL

## 2020-01-31 ENCOUNTER — CLINICAL SUPPORT (OUTPATIENT)
Dept: ONCOLOGY | Facility: HOSPITAL | Age: 40
End: 2020-01-31

## 2020-01-31 ENCOUNTER — LAB (OUTPATIENT)
Dept: LAB | Facility: HOSPITAL | Age: 40
End: 2020-01-31

## 2020-01-31 DIAGNOSIS — Z86.2 HISTORY OF ITP: ICD-10-CM

## 2020-01-31 DIAGNOSIS — E61.1 IRON DEFICIENCY: ICD-10-CM

## 2020-01-31 LAB
BASOPHILS # BLD AUTO: 0.06 10*3/MM3 (ref 0–0.2)
BASOPHILS NFR BLD AUTO: 0.7 % (ref 0–1.5)
DEPRECATED RDW RBC AUTO: 57.3 FL (ref 37–54)
EOSINOPHIL # BLD AUTO: 0.05 10*3/MM3 (ref 0–0.4)
EOSINOPHIL NFR BLD AUTO: 0.5 % (ref 0.3–6.2)
ERYTHROCYTE [DISTWIDTH] IN BLOOD BY AUTOMATED COUNT: 15.2 % (ref 12.3–15.4)
HCT VFR BLD AUTO: 42.6 % (ref 34–46.6)
HGB BLD-MCNC: 14.7 G/DL (ref 12–15.9)
HGB RETIC QN AUTO: 38.9 PG (ref 29.8–36.1)
IMM GRANULOCYTES # BLD AUTO: 0.03 10*3/MM3 (ref 0–0.05)
IMM GRANULOCYTES NFR BLD AUTO: 0.3 % (ref 0–0.5)
IMM RETICS NFR: 8.4 % (ref 3–15.8)
LYMPHOCYTES # BLD AUTO: 0.77 10*3/MM3 (ref 0.7–3.1)
LYMPHOCYTES NFR BLD AUTO: 8.4 % (ref 19.6–45.3)
MCH RBC QN AUTO: 35.1 PG (ref 26.6–33)
MCHC RBC AUTO-ENTMCNC: 34.5 G/DL (ref 31.5–35.7)
MCV RBC AUTO: 101.7 FL (ref 79–97)
MONOCYTES # BLD AUTO: 1.23 10*3/MM3 (ref 0.1–0.9)
MONOCYTES NFR BLD AUTO: 13.4 % (ref 5–12)
NEUTROPHILS # BLD AUTO: 7.05 10*3/MM3 (ref 1.7–7)
NEUTROPHILS NFR BLD AUTO: 76.7 % (ref 42.7–76)
NRBC BLD AUTO-RTO: 0 /100 WBC (ref 0–0.2)
PLATELET # BLD AUTO: 254 10*3/MM3 (ref 140–450)
PLATELETS.RETICULATED NFR BLD AUTO: 4.2 % (ref 0.9–6.5)
PMV BLD AUTO: 10 FL (ref 6–12)
RBC # BLD AUTO: 4.19 10*6/MM3 (ref 3.77–5.28)
RETICS/RBC NFR AUTO: 1.83 % (ref 0.7–1.9)
WBC NRBC COR # BLD: 9.19 10*3/MM3 (ref 3.4–10.8)

## 2020-01-31 PROCEDURE — 85046 RETICYTE/HGB CONCENTRATE: CPT

## 2020-01-31 PROCEDURE — 36415 COLL VENOUS BLD VENIPUNCTURE: CPT

## 2020-01-31 PROCEDURE — 85055 RETICULATED PLATELET ASSAY: CPT

## 2020-01-31 PROCEDURE — 85025 COMPLETE CBC W/AUTO DIFF WBC: CPT

## 2020-01-31 NOTE — PROGRESS NOTES
Pt is here for lab with RN review.  CBC reviewed with pt, counts are stable for this pt at this time. Pt has no complaints.  Copy of labs given to pt and f/u appt reviewed. Pt is instructed to call the office with any concerns or new symptoms prior to next visit. Pt vu

## 2020-02-05 NOTE — PROGRESS NOTES
Subjective   Sarita Bai is a 39 y.o. female.     F/u for dm 1, hypothyroidism / testing bs using the freestyle ronaldo/ last dm eye exam Oct 2019 / last dm foot exam today with dr Campbell / flu vaccine @ [facility]        Patient is a 39-year-old female who came in for follow-up.      She was diagnosed to have type 1 diabetes mellitus in  when she was admitted from [facility].  REGINA antibody was markedly elevated.  C-peptide was 1.8.      She is on Toujeo 18 units q noon and Humalog 6-8 with each meal.  She has few hypoglycemic episodes during the day.       Freestyle ronaldo sensor data reviewed.     Her last eye exam was 10/18 and she has no retinopathy. Urine microalbumin was normal in .  She was released by Dr. Urena.  She denies paresthesias     She has hypothyroidism and is on levothyroxine 75 µg per day.  TSH done in  is normal at 2.070.     She has SLE and Raynauld's phenomenon.  She follows with Dr. Shane.  She is on Plaquenil and Nimotop.  She went off prednisone in 2018.     She is  0.  She had regular menstrual periods.  She is not on birth control pills.     The following portions of the patient's history were reviewed and updated as appropriate: allergies, current medications, past family history, past medical history, past social history, past surgical history and problem list.    Review of Systems   Constitutional: Negative.    HENT: Negative.    Eyes: Negative.    Respiratory: Negative.    Cardiovascular: Negative.  Negative for chest pain and palpitations.   Gastrointestinal: Negative.    Endocrine: Negative.    Genitourinary: Negative.    Musculoskeletal: Negative for arthralgias and myalgias.   Neurological: Negative for dizziness and numbness.   Hematological: Negative for adenopathy. Does not bruise/bleed easily.     Objective      Vitals:    20 0904   BP: 126/72   BP Location: Left arm   Patient Position: Sitting   Cuff Size: Small Adult   Pulse: 65   SpO2: 99%   Weight: 67.1 kg  "(148 lb)   Height: 169 cm (66.54\")     Physical Exam   Constitutional: She is oriented to person, place, and time. She appears well-developed and well-nourished. No distress.   HENT:   Head: Normocephalic.   Right Ear: External ear normal.   Left Ear: External ear normal.   Nose: Nose normal.   Mouth/Throat: Oropharynx is clear and moist. No oropharyngeal exudate.   Eyes: Conjunctivae and EOM are normal. Right eye exhibits no discharge. Left eye exhibits no discharge. No scleral icterus.   Neck: Neck supple. No JVD present. No tracheal deviation present. No thyromegaly present.   Cardiovascular: Normal rate, regular rhythm, normal heart sounds and intact distal pulses. Exam reveals no gallop and no friction rub.   No murmur heard.  Pulmonary/Chest: Effort normal and breath sounds normal. No stridor. No respiratory distress. She has no wheezes. She has no rales. She exhibits no tenderness.   Abdominal: Soft. Bowel sounds are normal. She exhibits no distension and no mass. There is no tenderness. There is no guarding.   Musculoskeletal: Normal range of motion. She exhibits no edema, tenderness or deformity.   Lymphadenopathy:     She has no cervical adenopathy.   Neurological: She is alert and oriented to person, place, and time. She displays normal reflexes.   Skin: Skin is warm and dry. She is not diaphoretic. No erythema. No pallor.     Lab on 01/31/2020   Component Date Value Ref Range Status   • IPF 01/31/2020 4.20  0.90 - 6.50 % Final   • WBC 01/31/2020 9.19  3.40 - 10.80 10*3/mm3 Final   • RBC 01/31/2020 4.19  3.77 - 5.28 10*6/mm3 Final   • Hemoglobin 01/31/2020 14.7  12.0 - 15.9 g/dL Final   • Hematocrit 01/31/2020 42.6  34.0 - 46.6 % Final   • MCV 01/31/2020 101.7* 79.0 - 97.0 fL Final   • MCH 01/31/2020 35.1* 26.6 - 33.0 pg Final   • MCHC 01/31/2020 34.5  31.5 - 35.7 g/dL Final   • RDW 01/31/2020 15.2  12.3 - 15.4 % Final   • RDW-SD 01/31/2020 57.3* 37.0 - 54.0 fl Final   • MPV 01/31/2020 10.0  6.0 - 12.0 fL " Final   • Platelets 01/31/2020 254  140 - 450 10*3/mm3 Final   • Neutrophil % 01/31/2020 76.7* 42.7 - 76.0 % Final   • Lymphocyte % 01/31/2020 8.4* 19.6 - 45.3 % Final   • Monocyte % 01/31/2020 13.4* 5.0 - 12.0 % Final   • Eosinophil % 01/31/2020 0.5  0.3 - 6.2 % Final   • Basophil % 01/31/2020 0.7  0.0 - 1.5 % Final   • Immature Grans % 01/31/2020 0.3  0.0 - 0.5 % Final   • Neutrophils, Absolute 01/31/2020 7.05* 1.70 - 7.00 10*3/mm3 Final   • Lymphocytes, Absolute 01/31/2020 0.77  0.70 - 3.10 10*3/mm3 Final   • Monocytes, Absolute 01/31/2020 1.23* 0.10 - 0.90 10*3/mm3 Final   • Eosinophils, Absolute 01/31/2020 0.05  0.00 - 0.40 10*3/mm3 Final   • Basophils, Absolute 01/31/2020 0.06  0.00 - 0.20 10*3/mm3 Final   • Immature Grans, Absolute 01/31/2020 0.03  0.00 - 0.05 10*3/mm3 Final   • nRBC 01/31/2020 0.0  0.0 - 0.2 /100 WBC Final   • Immature Reticulocyte Fraction 01/31/2020 8.4  3.0 - 15.8 % Final   • Reticulocyte % 01/31/2020 1.83  0.70 - 1.90 % Final   • Reticulocyte Hgb 01/31/2020 38.9* 29.8 - 36.1 pg Final     Assessment/Plan   Sarita was seen today for diabetes and hypothyroidism.    Diagnoses and all orders for this visit:    Type 1 diabetes mellitus without complication (CMS/[facility])    Primary hypothyroidism    SLE (systemic lupus erythematosus related syndrome) (CMS/[facility])    Raynaud's phenomenon without gangrene      Continue Toujeo and Humalog.  Continue levothyroxine 75 mcg/day.    Continue Plaquenil and Nimotop per Dr. Shane.    Copy of my note sent to Dr. Bai and Dr. Shane.    RTC 4 mos.

## 2020-02-12 ENCOUNTER — OFFICE VISIT (OUTPATIENT)
Dept: ENDOCRINOLOGY | Age: 40
End: 2020-02-12

## 2020-02-12 VITALS
HEART RATE: 65 BPM | SYSTOLIC BLOOD PRESSURE: 126 MMHG | BODY MASS INDEX: 23.23 KG/M2 | OXYGEN SATURATION: 99 % | WEIGHT: 148 LBS | HEIGHT: 67 IN | DIASTOLIC BLOOD PRESSURE: 72 MMHG

## 2020-02-12 DIAGNOSIS — E03.9 PRIMARY HYPOTHYROIDISM: ICD-10-CM

## 2020-02-12 DIAGNOSIS — M32.9 SLE (SYSTEMIC LUPUS ERYTHEMATOSUS RELATED SYNDROME) (HCC): ICD-10-CM

## 2020-02-12 DIAGNOSIS — I73.00 RAYNAUD'S PHENOMENON WITHOUT GANGRENE: ICD-10-CM

## 2020-02-12 DIAGNOSIS — E10.9 TYPE 1 DIABETES MELLITUS WITHOUT COMPLICATION (HCC): Primary | ICD-10-CM

## 2020-02-12 PROCEDURE — 99214 OFFICE O/P EST MOD 30 MIN: CPT | Performed by: INTERNAL MEDICINE

## 2020-02-12 RX ORDER — NIMODIPINE 30 MG/1
60 CAPSULE, LIQUID FILLED ORAL DAILY
COMMUNITY
Start: 2019-11-04

## 2020-05-04 RX ORDER — FLASH GLUCOSE SENSOR
KIT MISCELLANEOUS
Qty: 2 EACH | Refills: 5 | Status: SHIPPED | OUTPATIENT
Start: 2020-05-04 | End: 2020-10-22

## 2020-06-02 DIAGNOSIS — E10.9 TYPE 1 DIABETES MELLITUS WITHOUT COMPLICATION (HCC): Primary | ICD-10-CM

## 2020-06-02 DIAGNOSIS — R68.89 ABNORMAL ENDOCRINE LABORATORY TEST FINDING: ICD-10-CM

## 2020-06-02 DIAGNOSIS — E03.9 PRIMARY HYPOTHYROIDISM: ICD-10-CM

## 2020-06-10 ENCOUNTER — RESULTS ENCOUNTER (OUTPATIENT)
Dept: ENDOCRINOLOGY | Age: 40
End: 2020-06-10

## 2020-06-10 DIAGNOSIS — E03.9 PRIMARY HYPOTHYROIDISM: ICD-10-CM

## 2020-06-10 DIAGNOSIS — E10.9 TYPE 1 DIABETES MELLITUS WITHOUT COMPLICATION (HCC): ICD-10-CM

## 2020-06-10 NOTE — PROGRESS NOTES
Subjective   Sarita Bai is a 40 y.o. female.     F/u for dm 1, hypothyroidism / testing bs using the freestyle ronaldo / last dm eye exam Oct 2019/ last dm foot exam 20 with dr Campbell      Patient is a 40-year-old female who came in for follow-up.      She was diagnosed to have type 1 diabetes mellitus in  when she was admitted from [facility].  REGINA antibody was markedly elevated.  C-peptide was 1.8.      She is on Toujeo 18 units q noon and Humalog 6-8 with each meal.  She has few hypoglycemic episodes during the day.  She is interested in using a tandem T. Slim pump.     Freestyle ronaldo sensor data reviewed.     Her last eye exam was 10/19 and she has no retinopathy. Urine microalbumin was normal in .  She was released by Dr. Urena.  She denies paresthesias     She has hypothyroidism and is on levothyroxine 75 µg per day.  TSH done in  is normal at 2.070.     She has SLE and Raynauld's phenomenon.  She follows with Dr. Shane.  She is on Plaquenil and Nimotop.  She went off prednisone in 2018.     She is  0.  She had regular menstrual periods.  She is not on birth control pills.    The following portions of the patient's history were reviewed and updated as appropriate: allergies, current medications, past family history, past medical history, past social history, past surgical history and problem list.    Review of Systems   Constitutional: Negative.  Negative for chills, fatigue and fever.   HENT: Negative.    Eyes: Negative.  Negative for visual disturbance.   Respiratory: Negative.  Negative for choking, chest tightness, shortness of breath and wheezing.    Cardiovascular: Negative.  Negative for chest pain, palpitations and leg swelling.   Gastrointestinal: Negative.  Negative for abdominal distention, abdominal pain, constipation, diarrhea, nausea and vomiting.   Endocrine: Negative.  Negative for cold intolerance, heat intolerance, polydipsia and polyphagia.   Genitourinary: Negative.   "Negative for difficulty urinating.   Musculoskeletal: Negative.  Negative for back pain, joint swelling and neck pain.   Skin: Negative.  Negative for color change, rash and wound.   Neurological: Negative.  Negative for dizziness, tremors, seizures, light-headedness, numbness and headaches.   Hematological: Negative.  Does not bruise/bleed easily.   Psychiatric/Behavioral: Negative.  Negative for agitation, confusion and sleep disturbance. The patient is not nervous/anxious.      Objective      Vitals:    06/17/20 0914   BP: 134/82   BP Location: Right arm   Patient Position: Sitting   Cuff Size: Small Adult   Pulse: 69   SpO2: 99%   Weight: 66.9 kg (147 lb 6.4 oz)   Height: 169 cm (66.54\")     Physical Exam   Constitutional: She is oriented to person, place, and time. She appears well-developed and well-nourished. No distress.   HENT:   Head: Normocephalic.   Right Ear: External ear normal.   Left Ear: External ear normal.   Nose: Nose normal.   Mouth/Throat: Oropharynx is clear and moist. No oropharyngeal exudate.   Eyes: Conjunctivae and EOM are normal. Right eye exhibits no discharge. Left eye exhibits no discharge. No scleral icterus.   Neck: Neck supple. No JVD present. No tracheal deviation present. No thyromegaly present.   Cardiovascular: Normal rate, regular rhythm, normal heart sounds and intact distal pulses. Exam reveals no gallop and no friction rub.   No murmur heard.  Pulmonary/Chest: Effort normal and breath sounds normal. No stridor. No respiratory distress. She has no wheezes. She has no rales.   Abdominal: Soft. Bowel sounds are normal. She exhibits no distension and no mass. There is no tenderness. There is no guarding.   Musculoskeletal: Normal range of motion. She exhibits no edema or deformity.   Lymphadenopathy:     She has no cervical adenopathy.   Neurological: She is alert and oriented to person, place, and time. She displays normal reflexes. She exhibits normal muscle tone.   Intact " light touch in lower extremities.   Skin: Skin is warm and dry. She is not diaphoretic. No erythema. No pallor.   Psychiatric: She has a normal mood and affect. Her behavior is normal.     Lab on 01/31/2020   Component Date Value Ref Range Status   • IPF 01/31/2020 4.20  0.90 - 6.50 % Final   • WBC 01/31/2020 9.19  3.40 - 10.80 10*3/mm3 Final   • RBC 01/31/2020 4.19  3.77 - 5.28 10*6/mm3 Final   • Hemoglobin 01/31/2020 14.7  12.0 - 15.9 g/dL Final   • Hematocrit 01/31/2020 42.6  34.0 - 46.6 % Final   • MCV 01/31/2020 101.7* 79.0 - 97.0 fL Final   • MCH 01/31/2020 35.1* 26.6 - 33.0 pg Final   • MCHC 01/31/2020 34.5  31.5 - 35.7 g/dL Final   • RDW 01/31/2020 15.2  12.3 - 15.4 % Final   • RDW-SD 01/31/2020 57.3* 37.0 - 54.0 fl Final   • MPV 01/31/2020 10.0  6.0 - 12.0 fL Final   • Platelets 01/31/2020 254  140 - 450 10*3/mm3 Final   • Neutrophil % 01/31/2020 76.7* 42.7 - 76.0 % Final   • Lymphocyte % 01/31/2020 8.4* 19.6 - 45.3 % Final   • Monocyte % 01/31/2020 13.4* 5.0 - 12.0 % Final   • Eosinophil % 01/31/2020 0.5  0.3 - 6.2 % Final   • Basophil % 01/31/2020 0.7  0.0 - 1.5 % Final   • Immature Grans % 01/31/2020 0.3  0.0 - 0.5 % Final   • Neutrophils, Absolute 01/31/2020 7.05* 1.70 - 7.00 10*3/mm3 Final   • Lymphocytes, Absolute 01/31/2020 0.77  0.70 - 3.10 10*3/mm3 Final   • Monocytes, Absolute 01/31/2020 1.23* 0.10 - 0.90 10*3/mm3 Final   • Eosinophils, Absolute 01/31/2020 0.05  0.00 - 0.40 10*3/mm3 Final   • Basophils, Absolute 01/31/2020 0.06  0.00 - 0.20 10*3/mm3 Final   • Immature Grans, Absolute 01/31/2020 0.03  0.00 - 0.05 10*3/mm3 Final   • nRBC 01/31/2020 0.0  0.0 - 0.2 /100 WBC Final   • Immature Reticulocyte Fraction 01/31/2020 8.4  3.0 - 15.8 % Final   • Reticulocyte % 01/31/2020 1.83  0.70 - 1.90 % Final   • Reticulocyte Hgb 01/31/2020 38.9* 29.8 - 36.1 pg Final     Assessment/Plan   Sarita was seen today for diabetes and hypothyroidism.    Diagnoses and all orders for this visit:    Type 1 diabetes  mellitus without complication (CMS/HCC)    Primary hypothyroidism    SLE (systemic lupus erythematosus related syndrome) (CMS/HCC)    Raynaud's phenomenon without gangrene      Continue Toujeo and Humalog.  Continue levothyroxine 75 mcg/day.  Continue Plaquenil and Nimotop per rheumatologist    Copy of my note sent to Dr. Bai and Dr. Shane    RTC 4 mos

## 2020-06-11 LAB
ALBUMIN SERPL-MCNC: 4.1 G/DL (ref 3.5–5.2)
ALBUMIN/GLOB SERPL: 1.2 G/DL
ALP SERPL-CCNC: 97 U/L (ref 39–117)
ALT SERPL-CCNC: 33 U/L (ref 1–33)
AST SERPL-CCNC: 22 U/L (ref 1–32)
BILIRUB SERPL-MCNC: <0.2 MG/DL (ref 0.2–1.2)
BUN SERPL-MCNC: 11 MG/DL (ref 6–20)
BUN/CREAT SERPL: 14.9 (ref 7–25)
CALCIUM SERPL-MCNC: 9.6 MG/DL (ref 8.6–10.5)
CHLORIDE SERPL-SCNC: 102 MMOL/L (ref 98–107)
CHOLEST SERPL-MCNC: 150 MG/DL (ref 0–200)
CO2 SERPL-SCNC: 22.5 MMOL/L (ref 22–29)
CREAT SERPL-MCNC: 0.74 MG/DL (ref 0.57–1)
GLOBULIN SER CALC-MCNC: 3.5 GM/DL
GLUCOSE SERPL-MCNC: 219 MG/DL (ref 65–99)
HBA1C MFR BLD: 6.7 % (ref 4.8–5.6)
HDLC SERPL-MCNC: 48 MG/DL (ref 40–60)
INTERPRETATION: NORMAL
LDLC SERPL CALC-MCNC: 76 MG/DL (ref 0–100)
Lab: NORMAL
POTASSIUM SERPL-SCNC: 4.9 MMOL/L (ref 3.5–5.2)
PROT SERPL-MCNC: 7.6 G/DL (ref 6–8.5)
SODIUM SERPL-SCNC: 135 MMOL/L (ref 136–145)
TRIGL SERPL-MCNC: 128 MG/DL (ref 0–150)
TSH SERPL DL<=0.005 MIU/L-ACNC: 1.87 UIU/ML (ref 0.27–4.2)
VLDLC SERPL CALC-MCNC: 25.6 MG/DL

## 2020-06-17 ENCOUNTER — OFFICE VISIT (OUTPATIENT)
Dept: ENDOCRINOLOGY | Age: 40
End: 2020-06-17

## 2020-06-17 VITALS
OXYGEN SATURATION: 99 % | HEIGHT: 67 IN | WEIGHT: 147.4 LBS | HEART RATE: 69 BPM | BODY MASS INDEX: 23.13 KG/M2 | DIASTOLIC BLOOD PRESSURE: 82 MMHG | SYSTOLIC BLOOD PRESSURE: 134 MMHG

## 2020-06-17 DIAGNOSIS — M32.9 SLE (SYSTEMIC LUPUS ERYTHEMATOSUS RELATED SYNDROME) (HCC): ICD-10-CM

## 2020-06-17 DIAGNOSIS — E10.9 TYPE 1 DIABETES MELLITUS WITHOUT COMPLICATION (HCC): Primary | ICD-10-CM

## 2020-06-17 DIAGNOSIS — I73.00 RAYNAUD'S PHENOMENON WITHOUT GANGRENE: ICD-10-CM

## 2020-06-17 DIAGNOSIS — E03.9 PRIMARY HYPOTHYROIDISM: ICD-10-CM

## 2020-06-17 PROCEDURE — 99214 OFFICE O/P EST MOD 30 MIN: CPT | Performed by: INTERNAL MEDICINE

## 2020-06-17 RX ORDER — HYDROXYCHLOROQUINE SULFATE 200 MG/1
300 TABLET, FILM COATED ORAL DAILY
COMMUNITY
Start: 2020-03-26 | End: 2020-06-17 | Stop reason: SDUPTHER

## 2020-06-17 RX ORDER — INSULIN GLARGINE 300 U/ML
INJECTION, SOLUTION SUBCUTANEOUS
Qty: 6 PEN | Refills: 3 | Status: SHIPPED | OUTPATIENT
Start: 2020-06-17 | End: 2020-09-09

## 2020-07-09 DIAGNOSIS — E03.9 PRIMARY HYPOTHYROIDISM: ICD-10-CM

## 2020-07-09 RX ORDER — LEVOTHYROXINE SODIUM 0.07 MG/1
TABLET ORAL
Qty: 90 TABLET | Refills: 1 | Status: SHIPPED | OUTPATIENT
Start: 2020-07-09 | End: 2020-12-28

## 2020-09-02 DIAGNOSIS — E61.1 IRON DEFICIENCY: Primary | ICD-10-CM

## 2020-09-02 DIAGNOSIS — Z86.2 HISTORY OF ITP: ICD-10-CM

## 2020-09-02 NOTE — PROGRESS NOTES
Subjective Today the patient reports that she has been doing well since her last visit.      REASONS FOR FOLLOWUP: History of ITP, SLE    HISTORY OF PRESENT ILLNESS:  The patient is a 40 y.o. year old female who is here for follow-up with the above-mentioned history.    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  She has been seen periodically in our office last in 2011 receiving a repeat pneumococcal vaccination the previously having begin 2011.  The patient is followed by rheumatology regularly for her lupus having been treated with Plaquenil but evidently having stopped it over the last year.     This patient was admitted June 29 through July 13 having developed an apparent illness ultimately associated with fever and confusion.  She presented to [facility] with hyperglycemia, metabolic acidosis and no previous history of diabetes.  Her condition rapidly deteriorated with development of pneumonia with ARDS requiring intubation and mechanical ventilation, acute kidney injury requiring hemodialysis and further thrombocytopenia leading to a reconsultation.  She was seen by Dr. Amtao with exam consistent with Raynaud's phenomenon in her hands, laboratory studies with positive Lakisha testing, elevated LDH, bilirubin, and increasing nucleated RBCs in the peripheral circulation.  He felt that she likely had an autoimmune process and agreed with IV steroids.  She continued intensive care, IV steroids, and, fortunate, went on to slowly improve.  This included rotation pronation a mechanical ventilation the ICU.  The patient was seen by multiple services including ID, nephrology, hematology, vascular and endocrinology as well as an intensive care physicians.  Upon discharge she proceeded to rehabilitation for a period of time and,  wonderfully, has made gradual progress.  She was recently seen by Dr. Butler of rheumatology and is on a steroid taper,?  Restart of Plaquenil in the near future.  She is also to see endocrinology for her ketoacidosis and apparent glucose intolerance/diabetes.     Additional recent history includes her continuance on hemodialysis and recent removal of her Shiley catheter after which she developed an internal jugular thrombosis and was treated in [facility] facilities including institution of anticoagulation.  This is evidently just over the last several days to be seen in Epic everywhere notes.  She was seen by hematology as well during that visit and given intravenous iron as well as transfused.  She is now seen back by our practice for continued follow-up.  We have reviewed her recent and previous history over approximately 60 minutes today.    We elected to have close follow-up with repeat laboratory studies done as the patient went on to recover from her recent infectious episode.  Repeat laboratory studies were performed September 13 showing normal liver and kidney function, repeat iron of 12, iron saturation of 5, ferritin down to 590 from 1036, TIBC of 225.  The patient has been seen by rheumatology is now back on Plaquenil and records describe her current hospitalization August 24 through the 25th wherein she presented with chest discomfort, fever and shortness of breath.  CT scan revealed no evidence of pulmonary embolism, lower extremities reveal DVT which were not new there being a previous DVT left internal jugular location RD known.  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  As elected to continue steroids which had been temporarily increased and thereafter proceed with taper.  The patient now presents back to our office September 20, 2017 considerably  improved we've discussed her status which actually includes further evidence of iron deficiency and need for additional IV iron.  The patient was treated with IV iron initially seen back December 13, 2017 she is improved considerably with normalized performance status.  Repeat iron studies do not show evidence of residual iron deficiency and she remains on current anticoagulation as well as therapy with rheumatology with tapering steroids and daily Plaquenil as well as use of Amlodipine for her Raynaud's.  We have discussed reassessing her DVTs at 6 months from development which would be February 2018.   The patient is next seen February 09, 2018.  She is doing extremely well with control of her lupus.  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  She's now had 6 months of anticoagulation and will go and discontinue her Eliquis.    The patient is next seen August 17.  She is doing well overall without additional rheumatologic symptoms.  She is starting a new job and quite happy as to how she feels and with a new position.  The patient is next seen August 16, 2019, and fortunately, doing well physically.  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  Hematologically and rheumatological her symptoms have not recurred.  We had a return to every 6 months reassessing in January with a normal CBC, IPF 4.2, platelet count 254,000.     The patient is seen formally September 9 continue to do well.  She still works at [employer] and describes a very high rate of COVID 19 if [facility] but much lower incidence rates here in [place].  She has not had consistent exposures herself nor symptoms.  She now wears an insulin pump successfully and feels reasonably well overall with lack of arthralgias or myalgias  Past  Medical History:   Diagnosis Date   • ARF (acute renal failure) with tubular necrosis (CMS/HCC) 7/5/2017   • Diabetes mellitus (CMS/HCC)    • H/O intestinal malabsorption    • H/O Venous thrombosis     Acute embolism and thrombosis of left internal jugular vein    • History of anemia    • History of blood transfusion    • History of ITP    • Lupus (CMS/HCC)     SLE   • PNA (pneumonia) 06/29/2017   • Renal disorder    • Thyroid activity decreased        ONCOLOGIC HISTORY:  (History from previous dates can be found in the separate document.)    Current Outpatient Medications on File Prior to Visit   Medication Sig Dispense Refill   • Acetaminophen (TYLENOL) 325 MG capsule Take  by mouth As Needed.     • Continuous Blood Gluc Sensor (FREESTYLE MARIBETH 14 DAY SENSOR) misc APPLY 1 DEVICE TO UPPER ARM EVERY 14 DAYS 2 each 5   • Hydroxychloroquine Sulfate (PLAQUENIL PO) Take 150 mg by mouth Daily. Take 1 and 1/2 tablets daily     • insulin lispro (HumaLOG) 100 UNIT/ML injection Pt using 50 units in pump daily 50 mL 1   • Insulin Pen Needle (B-D UF III MINI PEN NEEDLES) 31G X 5 MM misc USING 4 PEN NEEDLES DAILY 400 each 1   • levothyroxine (SYNTHROID, LEVOTHROID) 75 MCG tablet TAKE 1 TABLET BY MOUTH EVERY DAY IN THE MORNING 90 tablet 1   • Multiple Vitamins-Minerals (MULTI COMPLETE PO) Take 1 tablet/day by mouth.     • niMODipine (NIMOTOP) 30 MG capsule Take 30 mg by mouth Daily.     • [DISCONTINUED] TOUJEO SOLOSTAR 300 UNIT/ML solution pen-injector injection 18 units daily 6 pen 3     No current facility-administered medications on file prior to visit.        ALLERGIES:   No Known Allergies    Social History     Socioeconomic History   • Marital status: Single     Spouse name: Not on file   • Number of children: 0   • Years of education: college   • Highest education level: Not on file   Occupational History   • Occupation:    Tobacco Use   • Smoking status: Never Smoker   • Smokeless tobacco: Never Used  "  Substance and Sexual Activity   • Alcohol use: Yes     Alcohol/week: 3.0 standard drinks     Types: 2 Glasses of wine, 1 Cans of beer per week     Comment: SOCIAL DRINKER   • Drug use: No   • Sexual activity: Never         Cancer-related family history includes Prostate cancer (age of onset: 71) in her father.     Review of Systems   Constitutional: Negative for fatigue.   Respiratory: Negative for chest tightness, shortness of breath and wheezing.    Gastrointestinal: Negative for abdominal pain, constipation, diarrhea, nausea and vomiting.   Musculoskeletal: Negative for arthralgias.        Patient has minimal swelling involving right hand middle finger   Neurological: Negative for weakness.   Objective      Vitals:    09/09/20 0755   BP: 121/83   Pulse: 80   Resp: 16   Temp: 97.7 °F (36.5 °C)   SpO2: 100%   Weight: 66.8 kg (147 lb 4.8 oz)   Height: 168 cm (66.14\")  Comment: new ht. without shoes   PainSc: 0-No pain     Current Status 9/9/2020   ECOG score 0       Physical Exam   Constitutional: She is oriented to person, place, and time. She appears well-nourished.   HENT:   Head: Normocephalic and atraumatic.   Eyes: Pupils are equal, round, and reactive to light. Conjunctivae and EOM are normal.   Neck: Normal range of motion. Neck supple.   Cardiovascular: Normal rate, regular rhythm, normal heart sounds and intact distal pulses.   Pulmonary/Chest: Effort normal and breath sounds normal.   Abdominal: Soft. Bowel sounds are normal.   Musculoskeletal: Normal range of motion. She exhibits no edema.   Particularly involving her right hand middle finger   Neurological: She is alert and oriented to person, place, and time.   Skin: Skin is warm and dry.   Psychiatric: She has a normal mood and affect. Her behavior is normal.         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  She had been treated with steroids for ITP initially but when on to have a splenectomy in 2002 and remained relatively stable.  She was last seen in 2011 in remission.     The patient, more recently, had a difficult and complicated hospitalization admitted June 29 through July 13 with community-acquired pneumonia, sepsis, acute respiratory failure with ARDS, acute renal failure as well as additional studies including thrombocytopenia (multifactorial) and additional laboratory studies with RNP antibodies of greater than 8.0, anti-chromatin antibodies greater than 8.0 which, coupled with her additional findings per peripheral blood smear could be consistent with an exacerbation of her SLE.  She additionally had an episode of thrombosis related to her vascular catheter used for dialysis for which she is currently on Eliquis.  She also required additional transfusion and IV iron therapy.     The patient has not been seen by this physician for some time so we reviewed her history over approximately an hour today including her findings in hospital, her presentation and many complications thereafter.  She is uncertain whether she had an exacerbation of her SLE after discussion with rheumatology and currently remains on a steroid taper.  She is to see rheumatology in the next month or so as well for follow-up.  Hematologically, fortunately, she is stable at this point and will plan close follow-up in the office both for worsening cytopenias including thrombocytopenia and/or the need for further  IV iron support.     The patient's studies went on to reveal evidence of mild iron deficiency though continued anemia and follow-up was anticipated for possible additional IV iron.  She had intercurrent hospitalization for complications due to her lupus now including bilateral pleural effusions-small-and pleuropericarditis.  This was addressed with additional steroids that were instituted with plans for further taper.  The patient had assessment September 13 showing evidence of further iron deficiency and as she seen back in office September 20 we plan to proceed with IV iron both this week and next.  She is to continue her steroid taper hereafter and continue Eliquis twice a day at this point.  We'll plan at least 3-6 months of anticoagulation with control of her lupus for she is taken off of anticoagulation after reassessment via Doppler.  The patient was treated with IV iron successfully and is next seen back December 13, 2017.  Her lupus symptoms have approximately resolved at this point.  We discussed reassessment with Doppler examination 6 months from her hospitalization which were performed February 02, 2018.  The remains superficial phlebitis in the right upper extremity that is chronic as well as chronic left upper extremity DVT and internal jugular and subclavian vein.  She has had 6 months anticoagulation at this point and her lupus is under excellent control.  She has no additional IV lines will plan to discontinue her Eliquis.  We will see her in 6 months for follow-up.    The patient is next seen August 17, 2018 doing very well.  We have discussed long-term follow-up which will include an every 6 months CBC IPF and M.D. in 1 year.  Options of these records will go to her new rheumatologist, primary care and endocrinology.  The patient is next seen August 16, 2019 again doing well clinically.  She has a mild macrocytosis that anemia we will recheck reticulocyte count today.  We went on to have her  tested to 6 months and fortunately issues reviewed September 9, 2020 she is stable.     plan:    *CBC, Retic, IPF, LDH q 6 months  *MD assessment in 12 months

## 2020-09-09 ENCOUNTER — LAB (OUTPATIENT)
Dept: LAB | Facility: HOSPITAL | Age: 40
End: 2020-09-09

## 2020-09-09 ENCOUNTER — OFFICE VISIT (OUTPATIENT)
Dept: ONCOLOGY | Facility: CLINIC | Age: 40
End: 2020-09-09

## 2020-09-09 VITALS
BODY MASS INDEX: 23.67 KG/M2 | RESPIRATION RATE: 16 BRPM | TEMPERATURE: 97.7 F | OXYGEN SATURATION: 100 % | HEIGHT: 66 IN | HEART RATE: 80 BPM | SYSTOLIC BLOOD PRESSURE: 121 MMHG | DIASTOLIC BLOOD PRESSURE: 83 MMHG | WEIGHT: 147.3 LBS

## 2020-09-09 DIAGNOSIS — Z86.2 HISTORY OF ITP: ICD-10-CM

## 2020-09-09 DIAGNOSIS — E61.1 IRON DEFICIENCY: Primary | ICD-10-CM

## 2020-09-09 DIAGNOSIS — M32.9 SLE (SYSTEMIC LUPUS ERYTHEMATOSUS RELATED SYNDROME) (HCC): Primary | ICD-10-CM

## 2020-09-09 LAB
BASOPHILS # BLD AUTO: 0.05 10*3/MM3 (ref 0–0.2)
BASOPHILS NFR BLD AUTO: 1 % (ref 0–1.5)
DEPRECATED RDW RBC AUTO: 53.3 FL (ref 37–54)
EOSINOPHIL # BLD AUTO: 0.15 10*3/MM3 (ref 0–0.4)
EOSINOPHIL NFR BLD AUTO: 3 % (ref 0.3–6.2)
ERYTHROCYTE [DISTWIDTH] IN BLOOD BY AUTOMATED COUNT: 14.1 % (ref 12.3–15.4)
HCT VFR BLD AUTO: 41.7 % (ref 34–46.6)
HGB BLD-MCNC: 14.3 G/DL (ref 12–15.9)
HGB RETIC QN AUTO: 39.1 PG (ref 29.8–36.1)
IMM GRANULOCYTES # BLD AUTO: 0.01 10*3/MM3 (ref 0–0.05)
IMM GRANULOCYTES NFR BLD AUTO: 0.2 % (ref 0–0.5)
IMM RETICS NFR: 12 % (ref 3–15.8)
LDH SERPL-CCNC: 176 U/L (ref 99–259)
LYMPHOCYTES # BLD AUTO: 1.12 10*3/MM3 (ref 0.7–3.1)
LYMPHOCYTES NFR BLD AUTO: 22.6 % (ref 19.6–45.3)
MCH RBC QN AUTO: 35.1 PG (ref 26.6–33)
MCHC RBC AUTO-ENTMCNC: 34.3 G/DL (ref 31.5–35.7)
MCV RBC AUTO: 102.5 FL (ref 79–97)
MONOCYTES # BLD AUTO: 0.85 10*3/MM3 (ref 0.1–0.9)
MONOCYTES NFR BLD AUTO: 17.1 % (ref 5–12)
NEUTROPHILS NFR BLD AUTO: 2.78 10*3/MM3 (ref 1.7–7)
NEUTROPHILS NFR BLD AUTO: 56.1 % (ref 42.7–76)
NRBC BLD AUTO-RTO: 0 /100 WBC (ref 0–0.2)
PLATELET # BLD AUTO: 265 10*3/MM3 (ref 140–450)
PLATELETS.RETICULATED NFR BLD AUTO: 4.4 % (ref 0.9–6.5)
PMV BLD AUTO: 9.7 FL (ref 6–12)
RBC # BLD AUTO: 4.07 10*6/MM3 (ref 3.77–5.28)
RETICS # AUTO: 0.06 10*6/MM3 (ref 0.02–0.13)
RETICS/RBC NFR AUTO: 1.46 % (ref 0.7–1.9)
WBC # BLD AUTO: 4.96 10*3/MM3 (ref 3.4–10.8)

## 2020-09-09 PROCEDURE — 83615 LACTATE (LD) (LDH) ENZYME: CPT

## 2020-09-09 PROCEDURE — 85025 COMPLETE CBC W/AUTO DIFF WBC: CPT

## 2020-09-09 PROCEDURE — 99213 OFFICE O/P EST LOW 20 MIN: CPT | Performed by: INTERNAL MEDICINE

## 2020-09-09 PROCEDURE — 85055 RETICULATED PLATELET ASSAY: CPT

## 2020-09-09 PROCEDURE — 85046 RETICYTE/HGB CONCENTRATE: CPT

## 2020-09-09 PROCEDURE — 36415 COLL VENOUS BLD VENIPUNCTURE: CPT

## 2020-10-02 DIAGNOSIS — R68.89 ABNORMAL ENDOCRINE LABORATORY TEST FINDING: ICD-10-CM

## 2020-10-02 DIAGNOSIS — E03.9 PRIMARY HYPOTHYROIDISM: Primary | ICD-10-CM

## 2020-10-02 DIAGNOSIS — E10.9 TYPE 1 DIABETES MELLITUS WITHOUT COMPLICATION (HCC): ICD-10-CM

## 2020-10-07 ENCOUNTER — RESULTS ENCOUNTER (OUTPATIENT)
Dept: ENDOCRINOLOGY | Age: 40
End: 2020-10-07

## 2020-10-07 DIAGNOSIS — R68.89 ABNORMAL ENDOCRINE LABORATORY TEST FINDING: ICD-10-CM

## 2020-10-07 DIAGNOSIS — E10.9 TYPE 1 DIABETES MELLITUS WITHOUT COMPLICATION (HCC): ICD-10-CM

## 2020-10-07 NOTE — PROGRESS NOTES
Subjective   Sarita Bai is a 40 y.o. female.     F/u for dm 1, hypothyroidism/ testing bs using the dexcom / last dm eye exam Oct 2019/ last dm foot exam 20 with dr Campbell / flu vaccine @ [facility]         Patient is a 40-year-old female who came in for follow-up.      She was diagnosed to have type 1 diabetes mellitus in  when she was admitted from [facility].  REGINA antibody was markedly elevated.  C-peptide was 1.8.      She is started on a t:slim insulin pump with a Dexcom 6 sensor in 2020.  Pump settings are as follows:    Basal 0.5 units/h.    Bolus: 1 unit per 13 g of carbohydrate    Correction: 1 unit per 50 greater than 110.    Sensor data reviewed.  Average glucose 137 mg per DL.  In target 25% of the time.  Above target 69% of the time below target 7% of the time.  She has postprandial hypoglycemia when she does not finish her whole meal.  There is no early morning hypoglycemia.    She has lost 2 pounds since 2020.     Her last eye exam was 10/19 and she has no retinopathy. Urine microalbumin was normal in .  She denies paresthesias     She has hypothyroidism and is on levothyroxine 75 µg per day.  TSH done in  is normal at 1.87     She has SLE and Raynauld's phenomenon.  She follows with Dr. Shane.  She is on Plaquenil and Nimotop.  She went off prednisone in 2018.     She is  0.  She had regular menstrual periods.  She is not on birth control pills.    She had a flu vaccine this     The following portions of the patient's history were reviewed and updated as appropriate: allergies, current medications, past family history, past medical history, past social history, past surgical history and problem list.    Review of Systems   Constitutional: Negative.  Negative for chills, fatigue and fever.   HENT: Negative.    Eyes: Negative.    Respiratory: Negative.  Negative for chest tightness, shortness of breath and wheezing.    Cardiovascular: Negative for chest pain,  "palpitations and leg swelling.   Gastrointestinal: Negative.  Negative for abdominal distention, abdominal pain, constipation and diarrhea.   Endocrine: Negative.    Genitourinary: Negative.  Negative for difficulty urinating, frequency and urgency.   Musculoskeletal: Negative.  Negative for arthralgias, back pain, joint swelling, myalgias and neck pain.   Neurological: Negative.  Negative for dizziness, tremors, seizures, light-headedness, numbness and headaches.   Hematological: Negative.  Negative for adenopathy. Does not bruise/bleed easily.   Psychiatric/Behavioral: Negative.  Negative for agitation, confusion and sleep disturbance. The patient is not nervous/anxious.      Objective      Vitals:    10/22/20 0907   BP: 134/82   BP Location: Right arm   Patient Position: Sitting   Cuff Size: Small Adult   Pulse: 78   SpO2: 98%   Weight: 66.1 kg (145 lb 12.8 oz)   Height: 168 cm (66.14\")     Physical Exam  Results Encounter on 10/07/2020   Component Date Value Ref Range Status   • Glucose 10/08/2020 127* 65 - 99 mg/dL Final   • BUN 10/08/2020 7  6 - 20 mg/dL Final   • Creatinine 10/08/2020 0.74  0.57 - 1.00 mg/dL Final   • eGFR Non  Am 10/08/2020 86  >60 mL/min/1.73 Final   • eGFR African Am 10/08/2020 104  >60 mL/min/1.73 Final   • BUN/Creatinine Ratio 10/08/2020 9.5  7.0 - 25.0 Final   • Sodium 10/08/2020 140  136 - 145 mmol/L Final   • Potassium 10/08/2020 4.7  3.5 - 5.2 mmol/L Final   • Chloride 10/08/2020 105  98 - 107 mmol/L Final   • Total CO2 10/08/2020 25.0  22.0 - 29.0 mmol/L Final   • Calcium 10/08/2020 9.4  8.6 - 10.5 mg/dL Final   • Total Protein 10/08/2020 7.2  6.0 - 8.5 g/dL Final   • Albumin 10/08/2020 4.30  3.50 - 5.20 g/dL Final   • Globulin 10/08/2020 2.9  gm/dL Final   • A/G Ratio 10/08/2020 1.5  g/dL Final   • Total Bilirubin 10/08/2020 0.2  0.0 - 1.2 mg/dL Final   • Alkaline Phosphatase 10/08/2020 90  39 - 117 U/L Final   • AST (SGOT) 10/08/2020 22  1 - 32 U/L Final   • ALT (SGPT) " 10/08/2020 30  1 - 33 U/L Final   • Total Cholesterol 10/08/2020 142  0 - 200 mg/dL Final   • Triglycerides 10/08/2020 67  0 - 150 mg/dL Final   • HDL Cholesterol 10/08/2020 54  40 - 60 mg/dL Final   • VLDL Cholesterol Serafin 10/08/2020 14  5 - 40 mg/dL Final   • LDL Chol Calc ([facility]) 10/08/2020 74  0 - 100 mg/dL Final   • Hemoglobin A1C 10/08/2020 7.30* 4.80 - 5.60 % Final    Comment: Hemoglobin A1C Ranges:  Increased Risk for Diabetes  5.7% to 6.4%  Diabetes                     >= 6.5%  Diabetic Goal                < 7.0%     • Interpretation 10/08/2020 Note   Final    Supplemental report is available.     Assessment/Plan   Diagnoses and all orders for this visit:    1. Type 1 diabetes mellitus without complication (CMS/HCC) (Primary)    2. SLE (systemic lupus erythematosus related syndrome) (CMS/HCC)    3. Primary hypothyroidism      Change bolus to 1 unit per 12 g of carbohydrate.  Continue levothyroxine 75 mcg/day.  Continue Plaquenil and Nimotop per rheumatologist    Copy of my note sent to Dr. Bai    RTC 4 mos.

## 2020-10-08 LAB
ALBUMIN SERPL-MCNC: 4.3 G/DL (ref 3.5–5.2)
ALBUMIN/GLOB SERPL: 1.5 G/DL
ALP SERPL-CCNC: 90 U/L (ref 39–117)
ALT SERPL-CCNC: 30 U/L (ref 1–33)
AST SERPL-CCNC: 22 U/L (ref 1–32)
BILIRUB SERPL-MCNC: 0.2 MG/DL (ref 0–1.2)
BUN SERPL-MCNC: 7 MG/DL (ref 6–20)
BUN/CREAT SERPL: 9.5 (ref 7–25)
CALCIUM SERPL-MCNC: 9.4 MG/DL (ref 8.6–10.5)
CHLORIDE SERPL-SCNC: 105 MMOL/L (ref 98–107)
CHOLEST SERPL-MCNC: 142 MG/DL (ref 0–200)
CO2 SERPL-SCNC: 25 MMOL/L (ref 22–29)
CREAT SERPL-MCNC: 0.74 MG/DL (ref 0.57–1)
GLOBULIN SER CALC-MCNC: 2.9 GM/DL
GLUCOSE SERPL-MCNC: 127 MG/DL (ref 65–99)
HBA1C MFR BLD: 7.3 % (ref 4.8–5.6)
HDLC SERPL-MCNC: 54 MG/DL (ref 40–60)
INTERPRETATION: NORMAL
LDLC SERPL CALC-MCNC: 74 MG/DL (ref 0–100)
POTASSIUM SERPL-SCNC: 4.7 MMOL/L (ref 3.5–5.2)
PROT SERPL-MCNC: 7.2 G/DL (ref 6–8.5)
SODIUM SERPL-SCNC: 140 MMOL/L (ref 136–145)
TRIGL SERPL-MCNC: 67 MG/DL (ref 0–150)
VLDLC SERPL CALC-MCNC: 14 MG/DL (ref 5–40)

## 2020-10-22 ENCOUNTER — OFFICE VISIT (OUTPATIENT)
Dept: ENDOCRINOLOGY | Age: 40
End: 2020-10-22

## 2020-10-22 VITALS
BODY MASS INDEX: 23.43 KG/M2 | DIASTOLIC BLOOD PRESSURE: 82 MMHG | HEIGHT: 66 IN | SYSTOLIC BLOOD PRESSURE: 134 MMHG | HEART RATE: 78 BPM | OXYGEN SATURATION: 98 % | WEIGHT: 145.8 LBS

## 2020-10-22 DIAGNOSIS — E10.9 TYPE 1 DIABETES MELLITUS WITHOUT COMPLICATION (HCC): Primary | ICD-10-CM

## 2020-10-22 DIAGNOSIS — M32.9 SLE (SYSTEMIC LUPUS ERYTHEMATOSUS RELATED SYNDROME) (HCC): ICD-10-CM

## 2020-10-22 DIAGNOSIS — E03.9 PRIMARY HYPOTHYROIDISM: ICD-10-CM

## 2020-10-22 PROCEDURE — 99214 OFFICE O/P EST MOD 30 MIN: CPT | Performed by: INTERNAL MEDICINE

## 2020-10-22 PROCEDURE — 95251 CONT GLUC MNTR ANALYSIS I&R: CPT | Performed by: INTERNAL MEDICINE

## 2020-10-22 RX ORDER — HYDROXYCHLOROQUINE SULFATE 200 MG/1
300 TABLET, FILM COATED ORAL DAILY
COMMUNITY
Start: 2020-10-07 | End: 2020-10-22

## 2020-11-02 ENCOUNTER — RESULTS ENCOUNTER (OUTPATIENT)
Dept: ENDOCRINOLOGY | Age: 40
End: 2020-11-02

## 2020-11-02 DIAGNOSIS — E03.9 PRIMARY HYPOTHYROIDISM: ICD-10-CM

## 2020-12-27 DIAGNOSIS — E03.9 PRIMARY HYPOTHYROIDISM: ICD-10-CM

## 2020-12-28 RX ORDER — LEVOTHYROXINE SODIUM 0.07 MG/1
TABLET ORAL
Qty: 90 TABLET | Refills: 1 | Status: SHIPPED | OUTPATIENT
Start: 2020-12-28 | End: 2021-06-25 | Stop reason: SDUPTHER

## 2021-03-09 ENCOUNTER — LAB (OUTPATIENT)
Dept: LAB | Facility: HOSPITAL | Age: 41
End: 2021-03-09

## 2021-03-09 ENCOUNTER — CLINICAL SUPPORT (OUTPATIENT)
Dept: ONCOLOGY | Facility: HOSPITAL | Age: 41
End: 2021-03-09

## 2021-03-09 DIAGNOSIS — E10.9 TYPE 1 DIABETES MELLITUS WITHOUT COMPLICATION (HCC): ICD-10-CM

## 2021-03-09 DIAGNOSIS — R68.89 ABNORMAL ENDOCRINE LABORATORY TEST FINDING: ICD-10-CM

## 2021-03-09 DIAGNOSIS — M32.9 SLE (SYSTEMIC LUPUS ERYTHEMATOSUS RELATED SYNDROME) (HCC): ICD-10-CM

## 2021-03-09 DIAGNOSIS — E03.9 PRIMARY HYPOTHYROIDISM: Primary | ICD-10-CM

## 2021-03-09 DIAGNOSIS — Z86.2 HISTORY OF ITP: ICD-10-CM

## 2021-03-09 LAB
BASOPHILS # BLD AUTO: 0.07 10*3/MM3 (ref 0–0.2)
BASOPHILS NFR BLD AUTO: 1.1 % (ref 0–1.5)
DEPRECATED RDW RBC AUTO: 53.5 FL (ref 37–54)
EOSINOPHIL # BLD AUTO: 0.14 10*3/MM3 (ref 0–0.4)
EOSINOPHIL NFR BLD AUTO: 2.2 % (ref 0.3–6.2)
ERYTHROCYTE [DISTWIDTH] IN BLOOD BY AUTOMATED COUNT: 14.4 % (ref 12.3–15.4)
HCT VFR BLD AUTO: 40.8 % (ref 34–46.6)
HGB BLD-MCNC: 13.9 G/DL (ref 12–15.9)
HGB RETIC QN AUTO: 38.7 PG (ref 29.8–36.1)
IMM GRANULOCYTES # BLD AUTO: 0.02 10*3/MM3 (ref 0–0.05)
IMM GRANULOCYTES NFR BLD AUTO: 0.3 % (ref 0–0.5)
IMM RETICS NFR: 11.2 % (ref 3–15.8)
LDH SERPL-CCNC: 151 U/L (ref 99–259)
LYMPHOCYTES # BLD AUTO: 1.03 10*3/MM3 (ref 0.7–3.1)
LYMPHOCYTES NFR BLD AUTO: 16.2 % (ref 19.6–45.3)
MCH RBC QN AUTO: 34.3 PG (ref 26.6–33)
MCHC RBC AUTO-ENTMCNC: 34.1 G/DL (ref 31.5–35.7)
MCV RBC AUTO: 100.7 FL (ref 79–97)
MONOCYTES # BLD AUTO: 1.1 10*3/MM3 (ref 0.1–0.9)
MONOCYTES NFR BLD AUTO: 17.3 % (ref 5–12)
NEUTROPHILS NFR BLD AUTO: 4 10*3/MM3 (ref 1.7–7)
NEUTROPHILS NFR BLD AUTO: 62.9 % (ref 42.7–76)
NRBC BLD AUTO-RTO: 0 /100 WBC (ref 0–0.2)
PLATELET # BLD AUTO: 317 10*3/MM3 (ref 140–450)
PLATELETS.RETICULATED NFR BLD AUTO: 3.4 % (ref 0.9–6.5)
PMV BLD AUTO: 9.6 FL (ref 6–12)
RBC # BLD AUTO: 4.05 10*6/MM3 (ref 3.77–5.28)
RETICS # AUTO: 0.07 10*6/MM3 (ref 0.02–0.13)
RETICS/RBC NFR AUTO: 1.77 % (ref 0.7–1.9)
WBC # BLD AUTO: 6.36 10*3/MM3 (ref 3.4–10.8)

## 2021-03-09 PROCEDURE — G0463 HOSPITAL OUTPT CLINIC VISIT: HCPCS

## 2021-03-09 PROCEDURE — 85055 RETICULATED PLATELET ASSAY: CPT

## 2021-03-09 PROCEDURE — 85046 RETICYTE/HGB CONCENTRATE: CPT

## 2021-03-09 PROCEDURE — 85025 COMPLETE CBC W/AUTO DIFF WBC: CPT

## 2021-03-09 PROCEDURE — 36415 COLL VENOUS BLD VENIPUNCTURE: CPT

## 2021-03-09 PROCEDURE — 83615 LACTATE (LD) (LDH) ENZYME: CPT

## 2021-03-09 NOTE — NURSING NOTE
Pt here for labs and RN review. CBC reviewed with pt. Counts are stable for this pt at this time. Pt has no c/o at this time. Retic hgb slightly elevated and IPF WNL. Copy of labs given to pt. Pt v/u to call with any new concerns.       Lab Results   Component Value Date    WBC 6.36 03/09/2021    HGB 13.9 03/09/2021    HCT 40.8 03/09/2021    .7 (H) 03/09/2021     03/09/2021

## 2021-03-16 NOTE — PROGRESS NOTES
Subjective   Sarita Bai is a 40 y.o. female.     F/u for dm 1, hypothyroidism/ testing bs using the dexcom / last dm eye exam 20 with dr Patel / last dm foot exam today  with dr Campbell      Patient is a 40-year-old female who came in for follow-up.      She was diagnosed to have type 1 diabetes mellitus in  when she was admitted from [facility].  REGINA antibody was markedly elevated.  C-peptide was 1.8.      She started on a t:slim insulin pump with a Dexcom 6 sensor in 2020.  Shee uses Humalog on the pump.  Pump settings are as follows:     Basal 0.5 units/h.     Bolus: 1 unit per 10 g of carbohydrate     Correction: 1 unit per 50 greater than 110.    Sensor data reviewed.  Average glucose 142 mg per DL.  Blood sugar above 200 is 1%.  Blood sugar 100-199 is 95%.  Blood sugar 71-99 is 4%.  Blood sugar below 70 is 0%.  Fasting glucose are above 130.  She has no early morning hypoglycemia.     She has gained 3 pounds since 10/20.     Her last eye exam was  and she has no retinopathy. Urine microalbumin was normal in 3/21.  She denies paresthesias     She has hypothyroidism and is on levothyroxine 75 µg per day.  TSH done in  is normal at 1.87     She has SLE and Raynauld's phenomenon.  She follows with Dr. Shane.  She is on Plaquenil and Nimotop.  She went off prednisone in 2018.     She is  0.  She had regular menstrual periods.  She is not on birth control pills.     She had 2 Pfizer Covid vaccine      The following portions of the patient's history were reviewed and updated as appropriate: allergies, current medications, past family history, past medical history, past social history, past surgical history and problem list.    Review of Systems   Respiratory: Negative for shortness of breath.    Cardiovascular: Negative for chest pain and palpitations.   Gastrointestinal: Negative.    Genitourinary: Negative.    Musculoskeletal: Negative for myalgias.   Neurological: Negative for  "numbness.   Hematological: Negative for adenopathy. Does not bruise/bleed easily.     Objective      Vitals:    03/26/21 0853   BP: 120/68   Pulse: 83   SpO2: 99%   Weight: 67.5 kg (148 lb 12.8 oz)   Height: 170.2 cm (67\")     Physical Exam  Constitutional:       General: She is not in acute distress.     Appearance: Normal appearance. She is obese. She is not ill-appearing or toxic-appearing.   Eyes:      General: No scleral icterus.        Right eye: No discharge.         Left eye: No discharge.      Extraocular Movements: Extraocular movements intact.      Conjunctiva/sclera: Conjunctivae normal.   Neck:      Vascular: No carotid bruit.   Cardiovascular:      Rate and Rhythm: Normal rate and regular rhythm.      Pulses: Normal pulses.      Heart sounds: Normal heart sounds. No murmur heard.   No friction rub. No gallop.    Pulmonary:      Effort: No respiratory distress.      Breath sounds: Normal breath sounds. No stridor. No wheezing, rhonchi or rales.   Chest:      Chest wall: No tenderness.   Abdominal:      General: Bowel sounds are normal. There is no distension.      Palpations: Abdomen is soft. There is no mass.      Tenderness: There is no abdominal tenderness. There is no right CVA tenderness or left CVA tenderness.      Hernia: No hernia is present.   Musculoskeletal:         General: No swelling, tenderness or deformity. Normal range of motion.      Cervical back: Neck supple. No rigidity or tenderness.      Right lower leg: No edema.      Left lower leg: No edema.   Lymphadenopathy:      Cervical: No cervical adenopathy.   Skin:     General: Skin is warm.      Coloration: Skin is not jaundiced or pale.      Findings: No bruising or erythema.   Neurological:      General: No focal deficit present.      Mental Status: She is alert and oriented to person, place, and time.      Comments: Intact light touch on lower ext       Lab on 03/09/2021   Component Date Value Ref Range Status   • LDH 03/09/2021 151 "  99 - 259 U/L Final   • IPF 03/09/2021 3.40  0.90 - 6.50 % Final   • WBC 03/09/2021 6.36  3.40 - 10.80 10*3/mm3 Final   • RBC 03/09/2021 4.05  3.77 - 5.28 10*6/mm3 Final   • Hemoglobin 03/09/2021 13.9  12.0 - 15.9 g/dL Final   • Hematocrit 03/09/2021 40.8  34.0 - 46.6 % Final   • MCV 03/09/2021 100.7* 79.0 - 97.0 fL Final   • MCH 03/09/2021 34.3* 26.6 - 33.0 pg Final   • MCHC 03/09/2021 34.1  31.5 - 35.7 g/dL Final   • RDW 03/09/2021 14.4  12.3 - 15.4 % Final   • RDW-SD 03/09/2021 53.5  37.0 - 54.0 fl Final   • MPV 03/09/2021 9.6  6.0 - 12.0 fL Final   • Platelets 03/09/2021 317  140 - 450 10*3/mm3 Final   • Neutrophil % 03/09/2021 62.9  42.7 - 76.0 % Final   • Lymphocyte % 03/09/2021 16.2* 19.6 - 45.3 % Final   • Monocyte % 03/09/2021 17.3* 5.0 - 12.0 % Final   • Eosinophil % 03/09/2021 2.2  0.3 - 6.2 % Final   • Basophil % 03/09/2021 1.1  0.0 - 1.5 % Final   • Immature Grans % 03/09/2021 0.3  0.0 - 0.5 % Final   • Neutrophils, Absolute 03/09/2021 4.00  1.70 - 7.00 10*3/mm3 Final   • Lymphocytes, Absolute 03/09/2021 1.03  0.70 - 3.10 10*3/mm3 Final   • Monocytes, Absolute 03/09/2021 1.10* 0.10 - 0.90 10*3/mm3 Final   • Eosinophils, Absolute 03/09/2021 0.14  0.00 - 0.40 10*3/mm3 Final   • Basophils, Absolute 03/09/2021 0.07  0.00 - 0.20 10*3/mm3 Final   • Immature Grans, Absolute 03/09/2021 0.02  0.00 - 0.05 10*3/mm3 Final   • nRBC 03/09/2021 0.0  0.0 - 0.2 /100 WBC Final   • Immature Reticulocyte Fraction 03/09/2021 11.2  3.0 - 15.8 % Final   • Reticulocyte % 03/09/2021 1.77  0.70 - 1.90 % Final   • Reticulocyte Absolute 03/09/2021 0.0717  0.0200 - 0.1300 10*6/mm3 Final   • Reticulocyte Hgb 03/09/2021 38.7* 29.8 - 36.1 pg Final     Assessment/Plan   Diagnoses and all orders for this visit:    1. Type 1 diabetes mellitus without complication (CMS/HCC) (Primary)    2. SLE (systemic lupus erythematosus related syndrome) (CMS/HCC)    3. Primary hypothyroidism    4. History of splenectomy      Increase basal rate to 0.6  units/h.  Change sensor target to .  Continue Synthroid 75 mcg/day  Continue Nimotop and hydroxychloroquine.    Copy of my note sent to Dr. Bai and Dr. Shane.    RTC 4 mos.

## 2021-03-26 ENCOUNTER — OFFICE VISIT (OUTPATIENT)
Dept: ENDOCRINOLOGY | Age: 41
End: 2021-03-26

## 2021-03-26 VITALS
DIASTOLIC BLOOD PRESSURE: 68 MMHG | BODY MASS INDEX: 23.35 KG/M2 | HEIGHT: 67 IN | HEART RATE: 83 BPM | SYSTOLIC BLOOD PRESSURE: 120 MMHG | WEIGHT: 148.8 LBS | OXYGEN SATURATION: 99 %

## 2021-03-26 DIAGNOSIS — M32.9 SLE (SYSTEMIC LUPUS ERYTHEMATOSUS RELATED SYNDROME) (HCC): ICD-10-CM

## 2021-03-26 DIAGNOSIS — E10.9 TYPE 1 DIABETES MELLITUS WITHOUT COMPLICATION (HCC): Primary | ICD-10-CM

## 2021-03-26 DIAGNOSIS — E03.9 PRIMARY HYPOTHYROIDISM: ICD-10-CM

## 2021-03-26 DIAGNOSIS — Z90.81 HISTORY OF SPLENECTOMY: ICD-10-CM

## 2021-03-26 PROCEDURE — 99214 OFFICE O/P EST MOD 30 MIN: CPT | Performed by: INTERNAL MEDICINE

## 2021-03-26 PROCEDURE — 95251 CONT GLUC MNTR ANALYSIS I&R: CPT | Performed by: INTERNAL MEDICINE

## 2021-04-02 ENCOUNTER — BULK ORDERING (OUTPATIENT)
Dept: CASE MANAGEMENT | Facility: OTHER | Age: 41
End: 2021-04-02

## 2021-04-02 DIAGNOSIS — Z23 IMMUNIZATION DUE: ICD-10-CM

## 2021-04-13 NOTE — NURSING NOTE
Patient returned from thoracentesis. Patient has bandaid to right side of back with dime sized amount of blood on pad of bandaid but is otherwise dry and intact.    Otolaryngologist Procedure Text (A): After obtaining clear surgical margins the patient was sent to otolaryngology for surgical repair.  The patient understands they will receive post-surgical care and follow-up from the referring physician's office.

## 2021-06-25 DIAGNOSIS — E03.9 PRIMARY HYPOTHYROIDISM: ICD-10-CM

## 2021-06-26 RX ORDER — LEVOTHYROXINE SODIUM 0.07 MG/1
75 TABLET ORAL EVERY MORNING
Qty: 90 TABLET | Refills: 1 | Status: SHIPPED | OUTPATIENT
Start: 2021-06-26 | End: 2021-12-17

## 2021-09-03 DIAGNOSIS — E03.9 PRIMARY HYPOTHYROIDISM: Primary | ICD-10-CM

## 2021-09-03 DIAGNOSIS — R68.89 ABNORMAL ENDOCRINE LABORATORY TEST FINDING: ICD-10-CM

## 2021-09-03 DIAGNOSIS — E10.9 TYPE 1 DIABETES MELLITUS WITHOUT COMPLICATION (HCC): ICD-10-CM

## 2021-09-09 ENCOUNTER — LAB (OUTPATIENT)
Dept: LAB | Facility: HOSPITAL | Age: 41
End: 2021-09-09

## 2021-09-09 ENCOUNTER — OFFICE VISIT (OUTPATIENT)
Dept: ONCOLOGY | Facility: CLINIC | Age: 41
End: 2021-09-09

## 2021-09-09 VITALS
WEIGHT: 146.5 LBS | TEMPERATURE: 97.7 F | HEART RATE: 96 BPM | SYSTOLIC BLOOD PRESSURE: 135 MMHG | BODY MASS INDEX: 23.54 KG/M2 | HEIGHT: 66 IN | OXYGEN SATURATION: 99 % | RESPIRATION RATE: 12 BRPM | DIASTOLIC BLOOD PRESSURE: 88 MMHG

## 2021-09-09 DIAGNOSIS — M32.9 SLE (SYSTEMIC LUPUS ERYTHEMATOSUS RELATED SYNDROME) (HCC): ICD-10-CM

## 2021-09-09 DIAGNOSIS — Z86.2 HISTORY OF ITP: Primary | ICD-10-CM

## 2021-09-09 DIAGNOSIS — Z86.2 HISTORY OF ITP: ICD-10-CM

## 2021-09-09 DIAGNOSIS — E61.1 IRON DEFICIENCY: Primary | ICD-10-CM

## 2021-09-09 PROBLEM — K90.9 INTESTINAL MALABSORPTION: Status: ACTIVE | Noted: 2017-08-02

## 2021-09-09 PROBLEM — I82.C12 ACUTE EMBOLISM AND THROMBOSIS OF LEFT INTERNAL JUGULAR VEIN: Status: ACTIVE | Noted: 2017-08-01

## 2021-09-09 PROBLEM — D64.9 NORMOCYTIC ANEMIA: Status: ACTIVE | Noted: 2017-08-01

## 2021-09-09 PROBLEM — D50.9 IRON DEFICIENCY ANEMIA: Status: ACTIVE | Noted: 2017-08-02

## 2021-09-09 LAB
BASOPHILS # BLD AUTO: 0.06 10*3/MM3 (ref 0–0.2)
BASOPHILS NFR BLD AUTO: 0.7 % (ref 0–1.5)
DEPRECATED RDW RBC AUTO: 54.8 FL (ref 37–54)
EOSINOPHIL # BLD AUTO: 0.04 10*3/MM3 (ref 0–0.4)
EOSINOPHIL NFR BLD AUTO: 0.5 % (ref 0.3–6.2)
ERYTHROCYTE [DISTWIDTH] IN BLOOD BY AUTOMATED COUNT: 14.7 % (ref 12.3–15.4)
HCT VFR BLD AUTO: 42.2 % (ref 34–46.6)
HGB BLD-MCNC: 14.2 G/DL (ref 12–15.9)
HGB RETIC QN AUTO: 39.5 PG (ref 29.8–36.1)
IMM GRANULOCYTES # BLD AUTO: 0.02 10*3/MM3 (ref 0–0.05)
IMM GRANULOCYTES NFR BLD AUTO: 0.2 % (ref 0–0.5)
IMM RETICS NFR: 10.6 % (ref 3–15.8)
LDH SERPL-CCNC: 151 U/L (ref 99–259)
LYMPHOCYTES # BLD AUTO: 0.92 10*3/MM3 (ref 0.7–3.1)
LYMPHOCYTES NFR BLD AUTO: 10.5 % (ref 19.6–45.3)
MCH RBC QN AUTO: 34.2 PG (ref 26.6–33)
MCHC RBC AUTO-ENTMCNC: 33.6 G/DL (ref 31.5–35.7)
MCV RBC AUTO: 101.7 FL (ref 79–97)
MONOCYTES # BLD AUTO: 1.19 10*3/MM3 (ref 0.1–0.9)
MONOCYTES NFR BLD AUTO: 13.6 % (ref 5–12)
NEUTROPHILS NFR BLD AUTO: 6.51 10*3/MM3 (ref 1.7–7)
NEUTROPHILS NFR BLD AUTO: 74.5 % (ref 42.7–76)
NRBC BLD AUTO-RTO: 0 /100 WBC (ref 0–0.2)
PLATELET # BLD AUTO: 282 10*3/MM3 (ref 140–450)
PLATELETS.RETICULATED NFR BLD AUTO: 4 % (ref 0.9–6.5)
PMV BLD AUTO: 9.7 FL (ref 6–12)
RBC # BLD AUTO: 4.15 10*6/MM3 (ref 3.77–5.28)
RETICS # AUTO: 0.08 10*6/MM3 (ref 0.02–0.13)
RETICS/RBC NFR AUTO: 1.97 % (ref 0.7–1.9)
WBC # BLD AUTO: 8.74 10*3/MM3 (ref 3.4–10.8)

## 2021-09-09 PROCEDURE — 85046 RETICYTE/HGB CONCENTRATE: CPT

## 2021-09-09 PROCEDURE — 85025 COMPLETE CBC W/AUTO DIFF WBC: CPT

## 2021-09-09 PROCEDURE — 85055 RETICULATED PLATELET ASSAY: CPT

## 2021-09-09 PROCEDURE — 99213 OFFICE O/P EST LOW 20 MIN: CPT | Performed by: INTERNAL MEDICINE

## 2021-09-09 PROCEDURE — 83615 LACTATE (LD) (LDH) ENZYME: CPT

## 2021-09-09 PROCEDURE — 36415 COLL VENOUS BLD VENIPUNCTURE: CPT

## 2021-09-09 RX ORDER — SILDENAFIL CITRATE 20 MG/1
1 TABLET ORAL
COMMUNITY
Start: 2021-07-29

## 2021-09-09 RX ORDER — NIFEDIPINE 60 MG/1
60 TABLET, FILM COATED, EXTENDED RELEASE ORAL DAILY
COMMUNITY
Start: 2021-06-25 | End: 2021-09-28 | Stop reason: ALTCHOICE

## 2021-09-09 NOTE — PROGRESS NOTES
Subjective Today the patient reports that she has been doing well since her last visit.  Fortunately she is up-to-date per COVID-19 vaccinations as well      REASONS FOR FOLLOWUP: History of ITP, SLE      History of Present Illness         Patient is now 41-year-old female who we had seen previously in February 2001 when she developed ITP-  Purpura requiring steroids and subsequently a splenectomy 2002 to control the process.  She has been seen periodically in our office last in 2011 receiving a repeat pneumococcal vaccination the previously having begin 2011.  The patient is followed by rheumatology regularly for her lupus having been treated with Plaquenil but evidently having stopped it over the last year.     This patient was admitted June 29 through July 13 having developed an apparent illness ultimately associated with fever and confusion.  She presented to [facility] with hyperglycemia, metabolic acidosis and no previous history of diabetes.  Her condition rapidly deteriorated with development of pneumonia with ARDS requiring intubation and mechanical ventilation, acute kidney injury requiring hemodialysis and further thrombocytopenia leading to a reconsultation.  She was seen by Dr. Amato with exam consistent with Raynaud's phenomenon in her hands, laboratory studies with positive Lakisha testing, elevated LDH, bilirubin, and increasing nucleated RBCs in the peripheral circulation.  He felt that she likely had an autoimmune process and agreed with IV steroids.  She continued intensive care, IV steroids, and, fortunate, went on to slowly improve.  This included rotation pronation a mechanical ventilation the ICU.  The patient was seen by multiple services including ID, nephrology, hematology, vascular and endocrinology as well as an intensive care physicians.  Upon discharge she proceeded to rehabilitation for a period of time and, wonderfully, has made gradual progress.  She was recently seen by   Luke of rheumatology and is on a steroid taper,?  Restart of Plaquenil in the near future.  She is also to see endocrinology for her ketoacidosis and apparent glucose intolerance/diabetes.     Additional recent history includes her continuance on hemodialysis and recent removal of her Shiley catheter after which she developed an internal jugular thrombosis and was treated in [facility] facilities including institution of anticoagulation.  This is evidently just over the last several days to be seen in Epic everywhere notes.  She was seen by hematology as well during that visit and given intravenous iron as well as transfused.  She is now seen back by our practice for continued follow-up.  We have reviewed her recent and previous history over approximately 60 minutes today.    We elected to have close follow-up with repeat laboratory studies done as the patient went on to recover from her recent infectious episode.  Repeat laboratory studies were performed September 13 showing normal liver and kidney function, repeat iron of 12, iron saturation of 5, ferritin down to 590 from 1036, TIBC of 225.  The patient has been seen by rheumatology is now back on Plaquenil and records describe her current hospitalization August 24 through the 25th wherein she presented with chest discomfort, fever and shortness of breath.  CT scan revealed no evidence of pulmonary embolism, lower extremities reveal DVT which were not new there being a previous DVT left internal jugular location RD known.  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  As elected to continue steroids which had been temporarily increased and thereafter proceed with taper.  The patient now presents back to our office September 20, 2017 considerably improved we've discussed her status which actually includes further evidence  of iron deficiency and need for additional IV iron.  The patient was treated with IV iron initially seen back December 13, 2017 she is improved considerably with normalized performance status.  Repeat iron studies do not show evidence of residual iron deficiency and she remains on current anticoagulation as well as therapy with rheumatology with tapering steroids and daily Plaquenil as well as use of Amlodipine for her Raynaud's.  We have discussed reassessing her DVTs at 6 months from development which would be February 2018.   The patient is next seen February 09, 2018.  She is doing extremely well with control of her lupus.  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  She's now had 6 months of anticoagulation and will go and discontinue her Eliquis.    The patient is next seen August 17.  She is doing well overall without additional rheumatologic symptoms.  She is starting a new job and quite happy as to how she feels and with a new position.  The patient is next seen August 16, 2019, and fortunately, doing well physically.  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  Hematologically and rheumatological her symptoms have not recurred.  We had a return to every 6 months reassessing in January with a normal CBC, IPF 4.2, platelet count 254,000.     The patient is seen formally September 9 continue to do well.  She still works at [employer] and describes a very high rate of COVID 19 if [facility] but much lower incidence rates here in [place].  She has not had consistent exposures herself nor symptoms.  She now wears an insulin pump successfully and feels reasonably well overall with lack of arthralgias or myalgias    She is next seen 9/9/2021 continuing to do very well on current medications  particularly with the additional use of nifedipine and sildenafil as concerns her Raynaud's symptoms.  Fortunately she is also up-to-date per COVID-19 vaccinations and, incidentally, would be a candidate for a third vaccination as it becomes available.  Past Medical History:   Diagnosis Date   • ARF (acute renal failure) with tubular necrosis (CMS/HCC) 7/5/2017   • Diabetes mellitus (CMS/HCC)    • H/O intestinal malabsorption    • H/O Venous thrombosis     Acute embolism and thrombosis of left internal jugular vein    • History of anemia    • History of blood transfusion    • History of ITP    • Lupus (CMS/HCC)     SLE   • PNA (pneumonia) 06/29/2017   • Renal disorder    • Thyroid activity decreased        ONCOLOGIC HISTORY:  (History from previous dates can be found in the separate document.)    Current Outpatient Medications on File Prior to Visit   Medication Sig Dispense Refill   • Acetaminophen (TYLENOL) 325 MG capsule Take  by mouth As Needed.     • Hydroxychloroquine Sulfate (PLAQUENIL PO) Take 150 mg by mouth Daily. Take 1 and 1/2 tablets daily     • insulin lispro (HumaLOG) 100 UNIT/ML injection PT USING 50 UNITS IN PUMP DAILY 50 mL 2   • levothyroxine (SYNTHROID, LEVOTHROID) 75 MCG tablet Take 1 tablet by mouth Every Morning. 90 tablet 1   • Multiple Vitamins-Minerals (MULTI COMPLETE PO) Take 1 tablet/day by mouth.     • NIFEdipine CC (ADALAT CC) 60 MG 24 hr tablet Take 60 mg by mouth Daily.     • niMODipine (NIMOTOP) 30 MG capsule Take 60 mg by mouth Daily.     • sildenafil (REVATIO) 20 MG tablet Take 1 tablet by mouth.       No current facility-administered medications on file prior to visit.       ALLERGIES:   No Known Allergies    Social History     Socioeconomic History   • Marital status: Single     Spouse name: Not on file   • Number of children: 0   • Years of education: college   • Highest education level: Not on file   Tobacco Use   • Smoking status: Never Smoker   • Smokeless tobacco: Never Used  "  Substance and Sexual Activity   • Alcohol use: Yes     Alcohol/week: 3.0 standard drinks     Types: 2 Glasses of wine, 1 Cans of beer per week     Comment: SOCIAL DRINKER   • Drug use: No   • Sexual activity: Never         Cancer-related family history includes Prostate cancer (age of onset: 71) in her father.     Review of Systems   Constitutional: Negative for fatigue.   Respiratory: Negative for chest tightness, shortness of breath and wheezing.    Gastrointestinal: Negative for abdominal pain, constipation, diarrhea, nausea and vomiting.   Musculoskeletal: Negative for arthralgias.   Neurological: Negative for weakness.   Objective      Vitals:    09/09/21 1127   BP: 135/88   Pulse: 96   Resp: 12   Temp: 97.7 °F (36.5 °C)   TempSrc: Infrared   SpO2: 99%   Weight: 66.5 kg (146 lb 8 oz)   Height: 168 cm (66.14\")  Comment: new ht   PainSc: 0-No pain     Current Status 9/9/2021   ECOG score 0       Physical Exam   Constitutional: She is oriented to person, place, and time.   HENT:   Head: Normocephalic and atraumatic.   Eyes: Pupils are equal, round, and reactive to light. Conjunctivae are normal.   Cardiovascular: Normal rate, regular rhythm and normal heart sounds.   Pulmonary/Chest: Effort normal and breath sounds normal.   Abdominal: Soft. Bowel sounds are normal.   Musculoskeletal: Normal range of motion.      Comments: Mild swelling involving all the patient's upper extremity digits.   Neurological: She is alert and oriented to person, place, and time.   Skin: Skin is warm and dry.   Psychiatric: Her behavior is normal.         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  She had been treated with steroids for ITP initially but when on to have a splenectomy in 2002 and remained relatively stable.  She was last seen in 2011 in remission.     The patient, more recently, had a difficult and complicated hospitalization admitted June 29 through July 13 with community-acquired pneumonia, sepsis, acute respiratory failure with ARDS, acute renal failure as well as additional studies including thrombocytopenia (multifactorial) and additional laboratory studies with RNP antibodies of greater than 8.0, anti-chromatin antibodies greater than 8.0 which, coupled with her additional findings per peripheral blood smear could be consistent with an exacerbation of her SLE.  She additionally had an episode of thrombosis related to her vascular catheter used for dialysis for which she is currently on Eliquis.  She also required additional transfusion and IV iron therapy.     The patient has not been seen by this physician for some time so we reviewed her history over approximately an hour today including her findings in hospital, her presentation and many complications thereafter.  She is uncertain whether she had an exacerbation of her SLE after discussion with rheumatology and currently remains on a steroid taper.  She is to see rheumatology in the next month or so as well for follow-up.  Hematologically, fortunately, she is stable at this point and will plan close follow-up in the office both for worsening cytopenias including thrombocytopenia and/or the need for further IV iron support.     The patient's studies went on to reveal evidence of mild iron deficiency though continued anemia and follow-up was anticipated for possible additional IV iron.  She had intercurrent  hospitalization for complications due to her lupus now including bilateral pleural effusions-small-and pleuropericarditis.  This was addressed with additional steroids that were instituted with plans for further taper.  The patient had assessment September 13 showing evidence of further iron deficiency and as she seen back in office September 20 we plan to proceed with IV iron both this week and next.  She is to continue her steroid taper hereafter and continue Eliquis twice a day at this point.  We'll plan at least 3-6 months of anticoagulation with control of her lupus for she is taken off of anticoagulation after reassessment via Doppler.  The patient was treated with IV iron successfully and is next seen back December 13, 2017.  Her lupus symptoms have approximately resolved at this point.  We discussed reassessment with Doppler examination 6 months from her hospitalization which were performed February 02, 2018.  The remains superficial phlebitis in the right upper extremity that is chronic as well as chronic left upper extremity DVT and internal jugular and subclavian vein.  She has had 6 months anticoagulation at this point and her lupus is under excellent control.  She has no additional IV lines will plan to discontinue her Eliquis.  We will see her in 6 months for follow-up.    The patient is next seen August 17, 2018 doing very well.  We have discussed long-term follow-up which will include an every 6 months CBC IPF and M.D. in 1 year.  Options of these records will go to her new rheumatologist, primary care and endocrinology.  The patient is next seen August 16, 2019 again doing well clinically.  She has a mild macrocytosis that anemia we will recheck reticulocyte count today.  We went on to have her tested to 6 months and fortunately issues reviewed September 9, 2020 she is stable.  Fortunately when seen back 9/9/2021 this continues to be the case with the patient not having evidence of exacerbation of  any cytopenias including ITP.     plan:    *CBC, Retic, IPF, LDH q 6 months  *MD assessment in 12 months with repeat laboratory studies

## 2021-09-14 ENCOUNTER — LAB (OUTPATIENT)
Dept: ENDOCRINOLOGY | Age: 41
End: 2021-09-14

## 2021-09-14 DIAGNOSIS — E10.9 TYPE 1 DIABETES MELLITUS WITHOUT COMPLICATION (HCC): ICD-10-CM

## 2021-09-14 DIAGNOSIS — R68.89 ABNORMAL ENDOCRINE LABORATORY TEST FINDING: ICD-10-CM

## 2021-09-14 DIAGNOSIS — E03.9 PRIMARY HYPOTHYROIDISM: ICD-10-CM

## 2021-09-14 LAB
ALBUMIN SERPL-MCNC: 4.1 G/DL (ref 3.5–5.2)
ALBUMIN/GLOB SERPL: 1.4 G/DL
ALP SERPL-CCNC: 83 U/L (ref 39–117)
ALT SERPL-CCNC: 29 U/L (ref 1–33)
AST SERPL-CCNC: 25 U/L (ref 1–32)
BILIRUB SERPL-MCNC: 0.2 MG/DL (ref 0–1.2)
BUN SERPL-MCNC: 14 MG/DL (ref 6–20)
BUN/CREAT SERPL: 20.3 (ref 7–25)
CALCIUM SERPL-MCNC: 9.5 MG/DL (ref 8.6–10.5)
CHLORIDE SERPL-SCNC: 104 MMOL/L (ref 98–107)
CHOLEST SERPL-MCNC: 140 MG/DL (ref 0–200)
CO2 SERPL-SCNC: 21.7 MMOL/L (ref 22–29)
CREAT SERPL-MCNC: 0.69 MG/DL (ref 0.57–1)
GLOBULIN SER CALC-MCNC: 3 GM/DL
GLUCOSE SERPL-MCNC: 179 MG/DL (ref 65–99)
HBA1C MFR BLD: 6.9 % (ref 4.8–5.6)
HDLC SERPL-MCNC: 47 MG/DL (ref 40–60)
IMP & REVIEW OF LAB RESULTS: NORMAL
LDLC SERPL CALC-MCNC: 80 MG/DL (ref 0–100)
POTASSIUM SERPL-SCNC: 4.8 MMOL/L (ref 3.5–5.2)
PROT SERPL-MCNC: 7.1 G/DL (ref 6–8.5)
SODIUM SERPL-SCNC: 137 MMOL/L (ref 136–145)
TRIGL SERPL-MCNC: 60 MG/DL (ref 0–150)
TSH SERPL DL<=0.005 MIU/L-ACNC: 2.25 UIU/ML (ref 0.27–4.2)
VLDLC SERPL CALC-MCNC: 13 MG/DL (ref 5–40)

## 2021-09-16 NOTE — PROGRESS NOTES
Subjective   Sarita Bai is a 41 y.o. female.     F/u for dm 1, hypothyroidism/ testing bs using the dexcom4-6x day  / last dm eye exam 20 with dr Patel / last dm foot exam 3/26/21  with dr Campbell         Patient is a 40-year-old female who came in for follow-up.      She was diagnosed to have type 1 diabetes mellitus in  when she was admitted from [facility].  REGINA antibody was markedly elevated.  C-peptide was 1.8.      She started on a t:slim insulin pump with a Dexcom 6 sensor in 2020.  She uses Humalog on the pump.  Pump settings are as follows:     Basal 0.6 units/h.     Bolus: 1 unit per 10 g of carbohydrate     Correction: 1 unit per 50 greater than 110.    Target:      Sensor data reviewed.   Average glucose 132 mg per DL.  Time in target 96%.  Time above target 3%.  Time below target 1%.  She has hypoglycemia when her meal gets delayed.  She has no early morning hypoglycemia.    She has gained 2 lbs since 3/21.     Her last eye exam was  and she has no retinopathy. Urine microalbumin was normal in 3/21.  She denies paresthesias     She has hypothyroidism and is on levothyroxine 75 µg per day.  TSH done in  is 2.25     She has SLE and Raynauld's phenomenon.  She follows with Dr. Shane.  She is on Plaquenil, sildenafil, and Nimotop.  She went off prednisone in 2018.  She denies arthralgia.     She is  0.  She had regular menstrual periods.  She is not on birth control pills.     She had 2 Pfizer Covid vaccine.      The following portions of the patient's history were reviewed and updated as appropriate: allergies, current medications, past family history, past medical history, past social history, past surgical history and problem list.    Review of Systems   HENT: Negative.    Eyes: Negative for visual disturbance.   Respiratory: Negative for shortness of breath and wheezing.    Cardiovascular: Negative for chest pain and palpitations.   Gastrointestinal: Negative.   "  Endocrine: Negative for cold intolerance and heat intolerance.   Genitourinary: Negative.    Musculoskeletal: Negative for myalgias.   Neurological: Negative for weakness and numbness.     Objective      Vitals:    09/28/21 0831   BP: 126/72   BP Location: Right arm   Patient Position: Sitting   Cuff Size: Small Adult   Weight: 66.5 kg (146 lb 9.6 oz)   Height: 168 cm (66.14\")     Physical Exam  Constitutional:       General: She is not in acute distress.     Appearance: Normal appearance. She is not ill-appearing, toxic-appearing or diaphoretic.   HENT:      Head: Normocephalic.      Mouth/Throat:      Mouth: Mucous membranes are dry.      Pharynx: No oropharyngeal exudate or posterior oropharyngeal erythema.   Eyes:      General: No scleral icterus.        Right eye: No discharge.         Left eye: No discharge.   Neck:      Vascular: No carotid bruit.   Cardiovascular:      Rate and Rhythm: Normal rate and regular rhythm.      Heart sounds: Normal heart sounds. No murmur heard.   No friction rub. No gallop.    Pulmonary:      Effort: Pulmonary effort is normal. No respiratory distress.      Breath sounds: Normal breath sounds. No stridor. No rales.   Chest:      Chest wall: No tenderness.   Abdominal:      General: Bowel sounds are normal. There is no distension.      Palpations: Abdomen is soft. There is no mass.      Tenderness: There is no right CVA tenderness or left CVA tenderness.   Musculoskeletal:         General: No swelling or tenderness. Normal range of motion.      Cervical back: Normal range of motion and neck supple. No rigidity or tenderness.   Lymphadenopathy:      Cervical: No cervical adenopathy.   Skin:     General: Skin is warm.      Coloration: Skin is not jaundiced or pale.   Neurological:      General: No focal deficit present.      Mental Status: She is alert and oriented to person, place, and time.   Psychiatric:         Mood and Affect: Mood normal.         Behavior: Behavior normal. "       Lab on 09/14/2021   Component Date Value Ref Range Status   • TSH 09/14/2021 2.250  0.270 - 4.200 uIU/mL Final   • Total Cholesterol 09/14/2021 140  0 - 200 mg/dL Final    Comment: Cholesterol Reference Ranges  (U.S. Department of Health and Human Services ATP III  Classifications)  Desirable          <200 mg/dL  Borderline High    200-239 mg/dL  High Risk          >240 mg/dL  Triglyceride Reference Ranges  (U.S. Department of Health and Human Services ATP III  Classifications)  Normal           <150 mg/dL  Borderline High  150-199 mg/dL  High             200-499 mg/dL  Very High        >500 mg/dL  HDL Reference Ranges  (U.S. Department of Health and Human Services ATP III  Classifcations)  Low     <40 mg/dl (major risk factor for CHD)  High    >60 mg/dl ('negative' risk factor for CHD)  LDL Reference Ranges  (U.S. Department of Health and Human Services ATP III  Classifcations)  Optimal          <100 mg/dL  Near Optimal     100-129 mg/dL  Borderline High  130-159 mg/dL  High             160-189 mg/dL  Very High        >189 mg/dL     • Triglycerides 09/14/2021 60  0 - 150 mg/dL Final   • HDL Cholesterol 09/14/2021 47  40 - 60 mg/dL Final   • VLDL Cholesterol Serafin 09/14/2021 13  5 - 40 mg/dL Final   • LDL Chol Calc ([facility]) 09/14/2021 80  0 - 100 mg/dL Final   • Glucose 09/14/2021 179* 65 - 99 mg/dL Final   • BUN 09/14/2021 14  6 - 20 mg/dL Final   • Creatinine 09/14/2021 0.69  0.57 - 1.00 mg/dL Final   • eGFR Non  Am 09/14/2021 93  >60 mL/min/1.73 Final    Comment: GFR Normal >60  Chronic Kidney Disease <60  Kidney Failure <15     • eGFR  Am 09/14/2021 113  >60 mL/min/1.73 Final   • BUN/Creatinine Ratio 09/14/2021 20.3  7.0 - 25.0 Final   • Sodium 09/14/2021 137  136 - 145 mmol/L Final   • Potassium 09/14/2021 4.8  3.5 - 5.2 mmol/L Final   • Chloride 09/14/2021 104  98 - 107 mmol/L Final   • Total CO2 09/14/2021 21.7* 22.0 - 29.0 mmol/L Final   • Calcium 09/14/2021 9.5  8.6 - 10.5 mg/dL Final   •  Total Protein 09/14/2021 7.1  6.0 - 8.5 g/dL Final   • Albumin 09/14/2021 4.10  3.50 - 5.20 g/dL Final   • Globulin 09/14/2021 3.0  gm/dL Final   • A/G Ratio 09/14/2021 1.4  g/dL Final   • Total Bilirubin 09/14/2021 0.2  0.0 - 1.2 mg/dL Final   • Alkaline Phosphatase 09/14/2021 83  39 - 117 U/L Final   • AST (SGOT) 09/14/2021 25  1 - 32 U/L Final   • ALT (SGPT) 09/14/2021 29  1 - 33 U/L Final   • Hemoglobin A1C 09/14/2021 6.90* 4.80 - 5.60 % Final    Comment: Hemoglobin A1C Ranges:  Increased Risk for Diabetes  5.7% to 6.4%  Diabetes                     >= 6.5%  Diabetic Goal                < 7.0%     • Interpretation 09/14/2021 Note   Final    Supplemental report is available.     Assessment/Plan   Diagnoses and all orders for this visit:    1. Type 1 diabetes mellitus without complication (CMS/HCC) (Primary)    2. SLE (systemic lupus erythematosus related syndrome) (CMS/HCC)    3. Primary hypothyroidism    4. Raynaud's phenomenon without gangrene    5. Insulin pump status      Continue current insulin pump setting.  Continue hydroxychloroquine, Nimotop and sildenafil.  Continue levothyroxine.  Flu vac this fall.    Copy of my note sent to Dr. Bai and Dr. Shane.    RTC 4 mos.

## 2021-09-28 ENCOUNTER — OFFICE VISIT (OUTPATIENT)
Dept: ENDOCRINOLOGY | Age: 41
End: 2021-09-28

## 2021-09-28 VITALS
WEIGHT: 146.6 LBS | DIASTOLIC BLOOD PRESSURE: 72 MMHG | SYSTOLIC BLOOD PRESSURE: 126 MMHG | BODY MASS INDEX: 23.56 KG/M2 | HEIGHT: 66 IN

## 2021-09-28 DIAGNOSIS — M32.9 SLE (SYSTEMIC LUPUS ERYTHEMATOSUS RELATED SYNDROME) (HCC): ICD-10-CM

## 2021-09-28 DIAGNOSIS — I73.00 RAYNAUD'S PHENOMENON WITHOUT GANGRENE: ICD-10-CM

## 2021-09-28 DIAGNOSIS — Z96.41 INSULIN PUMP STATUS: ICD-10-CM

## 2021-09-28 DIAGNOSIS — E10.9 TYPE 1 DIABETES MELLITUS WITHOUT COMPLICATION (HCC): Primary | ICD-10-CM

## 2021-09-28 DIAGNOSIS — E03.9 PRIMARY HYPOTHYROIDISM: ICD-10-CM

## 2021-09-28 PROCEDURE — 95251 CONT GLUC MNTR ANALYSIS I&R: CPT | Performed by: INTERNAL MEDICINE

## 2021-09-28 PROCEDURE — 99214 OFFICE O/P EST MOD 30 MIN: CPT | Performed by: INTERNAL MEDICINE

## 2021-12-17 DIAGNOSIS — E03.9 PRIMARY HYPOTHYROIDISM: ICD-10-CM

## 2021-12-17 RX ORDER — LEVOTHYROXINE SODIUM 0.07 MG/1
75 TABLET ORAL EVERY MORNING
Qty: 90 TABLET | Refills: 1 | Status: SHIPPED | OUTPATIENT
Start: 2021-12-17 | End: 2022-06-16 | Stop reason: SDUPTHER

## 2022-01-21 DIAGNOSIS — E10.9 TYPE 1 DIABETES MELLITUS WITHOUT COMPLICATION: ICD-10-CM

## 2022-01-21 DIAGNOSIS — E03.9 PRIMARY HYPOTHYROIDISM: Primary | ICD-10-CM

## 2022-02-07 ENCOUNTER — OFFICE VISIT (OUTPATIENT)
Dept: ENDOCRINOLOGY | Age: 42
End: 2022-02-07

## 2022-02-07 VITALS
DIASTOLIC BLOOD PRESSURE: 68 MMHG | OXYGEN SATURATION: 99 % | WEIGHT: 149 LBS | HEIGHT: 67 IN | BODY MASS INDEX: 23.39 KG/M2 | HEART RATE: 56 BPM | SYSTOLIC BLOOD PRESSURE: 130 MMHG

## 2022-02-07 DIAGNOSIS — E03.9 PRIMARY HYPOTHYROIDISM: ICD-10-CM

## 2022-02-07 DIAGNOSIS — Z96.41 INSULIN PUMP STATUS: ICD-10-CM

## 2022-02-07 DIAGNOSIS — E10.9 TYPE 1 DIABETES MELLITUS WITHOUT COMPLICATION: Primary | ICD-10-CM

## 2022-02-07 PROCEDURE — 95251 CONT GLUC MNTR ANALYSIS I&R: CPT | Performed by: INTERNAL MEDICINE

## 2022-02-07 PROCEDURE — 99214 OFFICE O/P EST MOD 30 MIN: CPT | Performed by: INTERNAL MEDICINE

## 2022-02-07 NOTE — PROGRESS NOTES
Subjective   Sarita Bai is a 41 y.o. female.     History of Present Illness        Patient is a 40-year-old female who came in for follow-up.      She was diagnosed to have type 1 diabetes mellitus in  when she was admitted from [facility].  REGINA antibody was markedly elevated.  C-peptide was 1.8.      She started on a t:slim insulin pump with a Dexcom 6 sensor in 2020.  She uses Humalog on the pump.  Pump settings are as follows:     Basal 0.6 units/h.     Bolus: 1 unit per 10 g of carbohydrate     Correction: 1 unit per 50 greater than 110.     Target:      Sensor data reviewed.   Average glucose 141 mg per DL.  Time in target 88%.  Time above target 10%.  Time below target 2%.  Average total daily dose 24.29 units.  She has no early morning hypoglycemia.  She is few postprandial hypoglycemia.     She has gained 3 lbs since      Her last eye exam was  and she has no retinopathy. Urine microalbumin was normal in 3/21.  She denies paresthesias     She has hypothyroidism and is on levothyroxine 75 µg per day.      She has SLE and Raynauld's phenomenon.  She follows with Dr. Shane.  She is on Plaquenil, sildenafil, and Nimotop.  She went off prednisone in 2018.  She denies arthralgia.     She is  0.  She had regular menstrual periods.  She is not on birth control pills.     She had 3 Pfizer Covid vaccine.  She had flu vaccine last     The following portions of the patient's history were reviewed and updated as appropriate: allergies, current medications, past family history, past medical history, past social history, past surgical history and problem list.    Review of Systems   Eyes: Negative.    Respiratory: Negative for shortness of breath.    Cardiovascular: Negative.  Negative for chest pain and palpitations.   Endocrine: Negative.    Genitourinary: Negative.    Musculoskeletal: Negative for arthralgias and myalgias.   Neurological: Negative.    Hematological: Negative for  "adenopathy. Does not bruise/bleed easily.     Objective      Vitals:    02/07/22 0758   BP: 130/68   Pulse: 56   SpO2: 99%   Weight: 67.6 kg (149 lb)   Height: 170.2 cm (67\")     Physical Exam  Constitutional:       General: She is not in acute distress.     Appearance: Normal appearance. She is not ill-appearing, toxic-appearing or diaphoretic.   HENT:      Mouth/Throat:      Mouth: Mucous membranes are dry.   Eyes:      General: No scleral icterus.        Right eye: No discharge.         Left eye: No discharge.      Extraocular Movements: Extraocular movements intact.      Conjunctiva/sclera: Conjunctivae normal.   Neck:      Vascular: No carotid bruit.   Cardiovascular:      Rate and Rhythm: Normal rate and regular rhythm.      Pulses: Normal pulses.      Heart sounds: No murmur heard.  No friction rub.   Pulmonary:      Effort: Pulmonary effort is normal. No respiratory distress.      Breath sounds: Normal breath sounds. No stridor. No rales.   Chest:      Chest wall: No tenderness.   Abdominal:      General: Bowel sounds are normal. There is no distension.      Palpations: Abdomen is soft. There is no mass.      Tenderness: There is no right CVA tenderness or left CVA tenderness.   Musculoskeletal:         General: No swelling or tenderness. Normal range of motion.      Cervical back: Normal range of motion and neck supple. No rigidity or tenderness.      Right lower leg: No edema.      Left lower leg: No edema.   Lymphadenopathy:      Cervical: No cervical adenopathy.   Skin:     General: Skin is warm and dry.   Neurological:      General: No focal deficit present.      Mental Status: She is alert and oriented to person, place, and time.   Psychiatric:         Mood and Affect: Mood normal.         Behavior: Behavior normal.       Results Encounter on 01/26/2022   Component Date Value Ref Range Status   • Hemoglobin A1C 01/24/2022 7.0* 4.8 - 5.6 % Final    Comment:          Prediabetes: 5.7 - 6.4           " Diabetes: >6.4           Glycemic control for adults with diabetes: <7.0     • Glucose 01/24/2022 163* 65 - 99 mg/dL Final   • BUN 01/24/2022 15  6 - 24 mg/dL Final   • Creatinine 01/24/2022 0.77  0.57 - 1.00 mg/dL Final   • eGFR Non  Am 01/24/2022 95  >59 mL/min/1.73 Final   • eGFR African Am 01/24/2022 109  >59 mL/min/1.73 Final    Comment: **In accordance with recommendations from the NKF-ASN Task force,**    [facility] is in the process of updating its eGFR calculation to the    2021 CKD-EPI creatinine equation that estimates kidney function    without a race variable.     • BUN/Creatinine Ratio 01/24/2022 19  9 - 23 Final   • Sodium 01/24/2022 136  134 - 144 mmol/L Final   • Potassium 01/24/2022 4.9  3.5 - 5.2 mmol/L Final   • Chloride 01/24/2022 99  96 - 106 mmol/L Final   • Total CO2 01/24/2022 19* 20 - 29 mmol/L Final   • Calcium 01/24/2022 9.8  8.7 - 10.2 mg/dL Final   • Total Protein 01/24/2022 7.6  6.0 - 8.5 g/dL Final   • Albumin 01/24/2022 4.2  3.8 - 4.8 g/dL Final   • Globulin 01/24/2022 3.4  1.5 - 4.5 g/dL Final   • A/G Ratio 01/24/2022 1.2  1.2 - 2.2 Final   • Total Bilirubin 01/24/2022 0.3  0.0 - 1.2 mg/dL Final   • Alkaline Phosphatase 01/24/2022 95  44 - 121 IU/L Final   • AST (SGOT) 01/24/2022 28  0 - 40 IU/L Final   • ALT (SGPT) 01/24/2022 28  0 - 32 IU/L Final   • Total Cholesterol 01/24/2022 152  100 - 199 mg/dL Final   • Triglycerides 01/24/2022 51  0 - 149 mg/dL Final   • HDL Cholesterol 01/24/2022 71  >39 mg/dL Final   • VLDL Cholesterol Serafin 01/24/2022 11  5 - 40 mg/dL Final   • LDL Chol Calc ([facility]) 01/24/2022 70  0 - 99 mg/dL Final   • Free T4 01/24/2022 1.27  0.82 - 1.77 ng/dL Final   • Interpretation 01/24/2022 Note   Final    Supplemental report is available.     Assessment/Plan   Diagnoses and all orders for this visit:    1. Type 1 diabetes mellitus without complication (HCC) (Primary)    2. Primary hypothyroidism    3. Insulin pump status      Continue current insulin pump  setting.  Continue Synthroid 75 mcg/day.  Continue Plaquenil, Nimotop and sildenafil    Copy my note sent to Dr. Bai, and Dr. Shane.    RTC 4 mos.

## 2022-02-24 ENCOUNTER — CLINICAL SUPPORT (OUTPATIENT)
Dept: ONCOLOGY | Facility: HOSPITAL | Age: 42
End: 2022-02-24

## 2022-02-24 ENCOUNTER — LAB (OUTPATIENT)
Dept: LAB | Facility: HOSPITAL | Age: 42
End: 2022-02-24

## 2022-02-24 DIAGNOSIS — M32.9 SLE (SYSTEMIC LUPUS ERYTHEMATOSUS RELATED SYNDROME): ICD-10-CM

## 2022-02-24 DIAGNOSIS — Z86.2 HISTORY OF ITP: ICD-10-CM

## 2022-02-24 LAB
BASOPHILS # BLD AUTO: 0.1 10*3/MM3 (ref 0–0.2)
BASOPHILS NFR BLD AUTO: 1 % (ref 0–1.5)
DEPRECATED RDW RBC AUTO: 51.5 FL (ref 37–54)
EOSINOPHIL # BLD AUTO: 0.18 10*3/MM3 (ref 0–0.4)
EOSINOPHIL NFR BLD AUTO: 1.8 % (ref 0.3–6.2)
ERYTHROCYTE [DISTWIDTH] IN BLOOD BY AUTOMATED COUNT: 14.2 % (ref 12.3–15.4)
HCT VFR BLD AUTO: 41.5 % (ref 34–46.6)
HGB BLD-MCNC: 14.4 G/DL (ref 12–15.9)
IMM GRANULOCYTES # BLD AUTO: 0.05 10*3/MM3 (ref 0–0.05)
IMM GRANULOCYTES NFR BLD AUTO: 0.5 % (ref 0–0.5)
LYMPHOCYTES # BLD AUTO: 0.97 10*3/MM3 (ref 0.7–3.1)
LYMPHOCYTES NFR BLD AUTO: 9.8 % (ref 19.6–45.3)
MCH RBC QN AUTO: 34.4 PG (ref 26.6–33)
MCHC RBC AUTO-ENTMCNC: 34.7 G/DL (ref 31.5–35.7)
MCV RBC AUTO: 99.3 FL (ref 79–97)
MONOCYTES # BLD AUTO: 1.5 10*3/MM3 (ref 0.1–0.9)
MONOCYTES NFR BLD AUTO: 15.2 % (ref 5–12)
NEUTROPHILS NFR BLD AUTO: 7.06 10*3/MM3 (ref 1.7–7)
NEUTROPHILS NFR BLD AUTO: 71.7 % (ref 42.7–76)
NRBC BLD AUTO-RTO: 0 /100 WBC (ref 0–0.2)
PLATELET # BLD AUTO: 276 10*3/MM3 (ref 140–450)
PLATELETS.RETICULATED NFR BLD AUTO: 3.6 % (ref 0.9–6.5)
PMV BLD AUTO: 10.4 FL (ref 6–12)
RBC # BLD AUTO: 4.18 10*6/MM3 (ref 3.77–5.28)
WBC NRBC COR # BLD: 9.86 10*3/MM3 (ref 3.4–10.8)

## 2022-02-24 PROCEDURE — G0463 HOSPITAL OUTPT CLINIC VISIT: HCPCS

## 2022-02-24 PROCEDURE — 85025 COMPLETE CBC W/AUTO DIFF WBC: CPT

## 2022-02-24 PROCEDURE — 36415 COLL VENOUS BLD VENIPUNCTURE: CPT

## 2022-02-24 PROCEDURE — 85055 RETICULATED PLATELET ASSAY: CPT

## 2022-05-27 ENCOUNTER — TELEPHONE (OUTPATIENT)
Dept: ENDOCRINOLOGY | Age: 42
End: 2022-05-27

## 2022-05-27 DIAGNOSIS — Z96.41 INSULIN PUMP STATUS: ICD-10-CM

## 2022-05-27 DIAGNOSIS — E10.9 TYPE 1 DIABETES MELLITUS WITHOUT COMPLICATION: ICD-10-CM

## 2022-05-27 DIAGNOSIS — E03.9 PRIMARY HYPOTHYROIDISM: Primary | ICD-10-CM

## 2022-06-16 DIAGNOSIS — E03.9 PRIMARY HYPOTHYROIDISM: ICD-10-CM

## 2022-06-16 RX ORDER — LEVOTHYROXINE SODIUM 0.07 MG/1
75 TABLET ORAL EVERY MORNING
Qty: 90 TABLET | Refills: 1 | Status: SHIPPED | OUTPATIENT
Start: 2022-06-16 | End: 2022-12-21

## 2022-07-11 NOTE — PROGRESS NOTES
Subjective   Sarita Bai is a 42 y.o. female.     F/u for dm 1, hypothyroidism/ testing bs using the dexcom4-6x day  / last dm eye exam 20 with dr Patel / last dm foot exam 22   with dr Campbell      Patient is a 40-year-old female who came in for follow-up.      She was diagnosed to have type 1 diabetes mellitus in  when she was admitted from [facility].  REGINA antibody was markedly elevated.  C-peptide was 1.8.   Hemoglobin A1c done in 2022 is 7.2%.     She started on a t:slim insulin pump with a Dexcom 6 sensor in 2020.  She uses Humalog on the pump.  Pump settings are as follows:     Basal 0.6 units/h.     Bolus: 1 unit per 10 g of carbohydrate     Correction: 1 unit per 50 greater than 110.     Target:      Sensor data reviewed.   Average glucose 138 mg per DL.  Time in target 94%.  Time above target 5%.  Time below target 1%.  She has no early morning hypoglycemia.  She has morning rise of glucose even without eating breakfast.    She has gained 2 pounds since 2022.     Her last eye exam was  and she has no retinopathy. Urine microalbumin was normal in .  She denies paresthesias.     She has hypothyroidism and is on levothyroxine 75 µg per day.  TSH done in 2022 is normal at 1.7 with normal free T4 at 1.46 ng per DL.     She has SLE and Raynauld's phenomenon.  She follows with Dr. Shane.  She is on Plaquenil, sildenafil, and Nimotop.  She went off prednisone in 2018.  She denies arthralgia.     She is  0.  She had regular menstrual periods.  She is not on birth control pills.     She had 3 Pfizer Covid vaccine.        The following portions of the patient's history were reviewed and updated as appropriate: allergies, current medications, past family history, past medical history, past social history, past surgical history and problem list.    Review of Systems   Eyes: Negative for visual disturbance.   Respiratory: Negative for shortness of breath.   "  Cardiovascular: Negative.    Gastrointestinal: Negative.    Endocrine: Negative.    Genitourinary: Negative.    Musculoskeletal: Negative.  Negative for myalgias.   Neurological: Negative.  Negative for numbness.   Hematological: Negative for adenopathy. Does not bruise/bleed easily.     Vitals:    07/12/22 0956   BP: 124/68   Pulse: 78   Temp: 96.6 °F (35.9 °C)   SpO2: 97%   Weight: 68.8 kg (151 lb 9.6 oz)   Height: 170.2 cm (67.01\")      Objective   Physical Exam  Constitutional:       General: She is not in acute distress.     Appearance: Normal appearance. She is obese. She is not ill-appearing, toxic-appearing or diaphoretic.   Eyes:      General: No scleral icterus.        Right eye: No discharge.         Left eye: No discharge.      Extraocular Movements: Extraocular movements intact.      Conjunctiva/sclera: Conjunctivae normal.   Neck:      Vascular: No carotid bruit.   Cardiovascular:      Rate and Rhythm: Normal rate and regular rhythm.      Pulses: Normal pulses.      Heart sounds: Normal heart sounds. No murmur heard.    No friction rub.   Pulmonary:      Effort: Pulmonary effort is normal. No respiratory distress.      Breath sounds: Normal breath sounds. No stridor.   Chest:      Chest wall: No tenderness.   Abdominal:      General: Bowel sounds are normal.      Palpations: Abdomen is soft.      Tenderness: There is no right CVA tenderness or left CVA tenderness.   Musculoskeletal:         General: No swelling or tenderness. Normal range of motion.      Cervical back: Neck supple. No rigidity or tenderness.      Right lower leg: No edema.      Left lower leg: No edema.   Lymphadenopathy:      Cervical: No cervical adenopathy.   Skin:     General: Skin is warm and dry.   Neurological:      General: No focal deficit present.      Mental Status: She is alert and oriented to person, place, and time.       Lab on 07/01/2022   Component Date Value Ref Range Status   • Creatinine, Urine 07/01/2022 48.8  " Not Estab. mg/dL Final   • Microalbumin, Urine 07/01/2022 3.2  Not Estab. ug/mL Final   • Microalbumin/Creatinine Ratio 07/01/2022 7  0 - 29 mg/g creat Final    Comment:                        Normal:                0 -  29                         Moderately increased: 30 - 300                         Severely increased:       >300     • Glucose 07/01/2022 165 (A) 65 - 99 mg/dL Final   • BUN 07/01/2022 8  6 - 24 mg/dL Final   • Creatinine 07/01/2022 0.71  0.57 - 1.00 mg/dL Final   • EGFR Result 07/01/2022 107  >59 mL/min/1.73 Final   • BUN/Creatinine Ratio 07/01/2022 11  9 - 23 Final   • Sodium 07/01/2022 135  134 - 144 mmol/L Final   • Potassium 07/01/2022 4.5  3.5 - 5.2 mmol/L Final   • Chloride 07/01/2022 99  96 - 106 mmol/L Final   • Total CO2 07/01/2022 22  20 - 29 mmol/L Final   • Calcium 07/01/2022 9.8  8.7 - 10.2 mg/dL Final   • Total Protein 07/01/2022 7.6  6.0 - 8.5 g/dL Final   • Albumin 07/01/2022 4.0  3.8 - 4.8 g/dL Final   • Globulin 07/01/2022 3.6  1.5 - 4.5 g/dL Final   • A/G Ratio 07/01/2022 1.1 (A) 1.2 - 2.2 Final   • Total Bilirubin 07/01/2022 0.3  0.0 - 1.2 mg/dL Final   • Alkaline Phosphatase 07/01/2022 102  44 - 121 IU/L Final   • AST (SGOT) 07/01/2022 29  0 - 40 IU/L Final   • ALT (SGPT) 07/01/2022 28  0 - 32 IU/L Final   • Hemoglobin A1C 07/01/2022 7.2 (A) 4.8 - 5.6 % Final    Comment:          Prediabetes: 5.7 - 6.4           Diabetes: >6.4           Glycemic control for adults with diabetes: <7.0     • TSH 07/01/2022 1.710  0.450 - 4.500 uIU/mL Final   • Free T4 07/01/2022 1.46  0.82 - 1.77 ng/dL Final   • Total Cholesterol 07/01/2022 149  100 - 199 mg/dL Final   • Triglycerides 07/01/2022 51  0 - 149 mg/dL Final   • HDL Cholesterol 07/01/2022 69  >39 mg/dL Final   • VLDL Cholesterol Serafin 07/01/2022 11  5 - 40 mg/dL Final   • LDL Chol Calc ([facility]) 07/01/2022 69  0 - 99 mg/dL Final   • Interpretation 07/01/2022 Note   Final    Supplemental report is available.     Assessment & Plan   Diagnoses  and all orders for this visit:    1. Type 1 diabetes mellitus without complication (HCC) (Primary)    2. Insulin pump status    3. SLE (systemic lupus erythematosus related syndrome) (HCC)    4. Primary hypothyroidism    5. Raynaud's phenomenon without gangrene      Change basal rate at 6 AM to 6 PM to 0.65 units/h and at 6 PM to 6 AM to use 0.6 units/h.  Continue levothyroxine 75 mcg daily.    Continue Plaquenil, sildenafil and Nimotop.    Copy of my note sent to Dr. Bai, Dr. Cedeno and Dr. Shane.    RTC 4 mos.

## 2022-07-12 ENCOUNTER — OFFICE VISIT (OUTPATIENT)
Dept: ENDOCRINOLOGY | Age: 42
End: 2022-07-12

## 2022-07-12 VITALS
TEMPERATURE: 96.6 F | SYSTOLIC BLOOD PRESSURE: 124 MMHG | HEART RATE: 78 BPM | OXYGEN SATURATION: 97 % | WEIGHT: 151.6 LBS | HEIGHT: 67 IN | DIASTOLIC BLOOD PRESSURE: 68 MMHG | BODY MASS INDEX: 23.79 KG/M2

## 2022-07-12 DIAGNOSIS — Z96.41 INSULIN PUMP STATUS: ICD-10-CM

## 2022-07-12 DIAGNOSIS — I73.00 RAYNAUD'S PHENOMENON WITHOUT GANGRENE: ICD-10-CM

## 2022-07-12 DIAGNOSIS — M32.9 SLE (SYSTEMIC LUPUS ERYTHEMATOSUS RELATED SYNDROME): ICD-10-CM

## 2022-07-12 DIAGNOSIS — E03.9 PRIMARY HYPOTHYROIDISM: ICD-10-CM

## 2022-07-12 DIAGNOSIS — Z46.81 INSULIN PUMP TITRATION: ICD-10-CM

## 2022-07-12 DIAGNOSIS — E10.9 TYPE 1 DIABETES MELLITUS WITHOUT COMPLICATION: Primary | ICD-10-CM

## 2022-07-12 PROCEDURE — 99214 OFFICE O/P EST MOD 30 MIN: CPT | Performed by: INTERNAL MEDICINE

## 2022-07-12 RX ORDER — NIFEDIPINE 60 MG/1
60 TABLET, FILM COATED, EXTENDED RELEASE ORAL DAILY
COMMUNITY
Start: 2022-06-16

## 2022-07-12 RX ORDER — HYDROXYCHLOROQUINE SULFATE 200 MG/1
200 TABLET, FILM COATED ORAL DAILY
COMMUNITY
Start: 2021-09-21 | End: 2023-01-27 | Stop reason: DRUGHIGH

## 2022-08-05 RX ORDER — INSULIN LISPRO 100 [IU]/ML
INJECTION, SOLUTION INTRAVENOUS; SUBCUTANEOUS
Qty: 50 ML | Refills: 2 | Status: SHIPPED | OUTPATIENT
Start: 2022-08-05

## 2022-08-22 ENCOUNTER — TELEPHONE (OUTPATIENT)
Dept: ENDOCRINOLOGY | Age: 42
End: 2022-08-22

## 2022-08-22 NOTE — TELEPHONE ENCOUNTER
PT CALLED IN AND NEEDS CARTRIDGES AND INFUSION SET FOR HER INSULIN PUMP. SHE EDGEPARK THAT OUR OFFICE REFUSED TO SIGN THE PRESCRIPTION BECAUSE SHE WAS NOT A PATIENT. WOULD LIKE THE PRESCRIPTION SENT TO [facility].

## 2022-09-21 ENCOUNTER — LAB (OUTPATIENT)
Dept: LAB | Facility: HOSPITAL | Age: 42
End: 2022-09-21

## 2022-09-21 ENCOUNTER — OFFICE VISIT (OUTPATIENT)
Dept: ONCOLOGY | Facility: CLINIC | Age: 42
End: 2022-09-21

## 2022-09-21 VITALS
OXYGEN SATURATION: 98 % | WEIGHT: 155.1 LBS | RESPIRATION RATE: 18 BRPM | DIASTOLIC BLOOD PRESSURE: 80 MMHG | HEIGHT: 67 IN | TEMPERATURE: 97.3 F | BODY MASS INDEX: 24.34 KG/M2 | HEART RATE: 68 BPM | SYSTOLIC BLOOD PRESSURE: 125 MMHG

## 2022-09-21 DIAGNOSIS — D64.9 NORMOCYTIC ANEMIA: ICD-10-CM

## 2022-09-21 DIAGNOSIS — M32.9 SLE (SYSTEMIC LUPUS ERYTHEMATOSUS RELATED SYNDROME): ICD-10-CM

## 2022-09-21 DIAGNOSIS — D50.9 IRON DEFICIENCY ANEMIA, UNSPECIFIED IRON DEFICIENCY ANEMIA TYPE: ICD-10-CM

## 2022-09-21 DIAGNOSIS — D50.9 IRON DEFICIENCY ANEMIA, UNSPECIFIED IRON DEFICIENCY ANEMIA TYPE: Primary | ICD-10-CM

## 2022-09-21 LAB
BASOPHILS # BLD AUTO: 0.04 10*3/MM3 (ref 0–0.2)
BASOPHILS NFR BLD AUTO: 0.7 % (ref 0–1.5)
DEPRECATED RDW RBC AUTO: 51.8 FL (ref 37–54)
EOSINOPHIL # BLD AUTO: 0.1 10*3/MM3 (ref 0–0.4)
EOSINOPHIL NFR BLD AUTO: 1.7 % (ref 0.3–6.2)
ERYTHROCYTE [DISTWIDTH] IN BLOOD BY AUTOMATED COUNT: 13.8 % (ref 12.3–15.4)
HCT VFR BLD AUTO: 38.1 % (ref 34–46.6)
HGB BLD-MCNC: 13.2 G/DL (ref 12–15.9)
HGB RETIC QN AUTO: 38.2 PG (ref 29.8–36.1)
IMM GRANULOCYTES # BLD AUTO: 0.02 10*3/MM3 (ref 0–0.05)
IMM GRANULOCYTES NFR BLD AUTO: 0.3 % (ref 0–0.5)
IMM RETICS NFR: 9.6 % (ref 3–15.8)
LDH SERPL-CCNC: 148 U/L (ref 99–259)
LYMPHOCYTES # BLD AUTO: 1 10*3/MM3 (ref 0.7–3.1)
LYMPHOCYTES NFR BLD AUTO: 17.3 % (ref 19.6–45.3)
MCH RBC QN AUTO: 35.4 PG (ref 26.6–33)
MCHC RBC AUTO-ENTMCNC: 34.6 G/DL (ref 31.5–35.7)
MCV RBC AUTO: 102.1 FL (ref 79–97)
MONOCYTES # BLD AUTO: 1.21 10*3/MM3 (ref 0.1–0.9)
MONOCYTES NFR BLD AUTO: 20.9 % (ref 5–12)
NEUTROPHILS NFR BLD AUTO: 3.42 10*3/MM3 (ref 1.7–7)
NEUTROPHILS NFR BLD AUTO: 59.1 % (ref 42.7–76)
NRBC BLD AUTO-RTO: 0 /100 WBC (ref 0–0.2)
PLATELET # BLD AUTO: 282 10*3/MM3 (ref 140–450)
PLATELETS.RETICULATED NFR BLD AUTO: 4.5 % (ref 0.9–6.5)
PMV BLD AUTO: 9.5 FL (ref 6–12)
RBC # BLD AUTO: 3.73 10*6/MM3 (ref 3.77–5.28)
RETICS # AUTO: 0.07 10*6/MM3 (ref 0.02–0.13)
RETICS/RBC NFR AUTO: 1.81 % (ref 0.7–1.9)
WBC NRBC COR # BLD: 5.79 10*3/MM3 (ref 3.4–10.8)

## 2022-09-21 PROCEDURE — 85046 RETICYTE/HGB CONCENTRATE: CPT

## 2022-09-21 PROCEDURE — 85025 COMPLETE CBC W/AUTO DIFF WBC: CPT

## 2022-09-21 PROCEDURE — 83615 LACTATE (LD) (LDH) ENZYME: CPT

## 2022-09-21 PROCEDURE — 99213 OFFICE O/P EST LOW 20 MIN: CPT | Performed by: INTERNAL MEDICINE

## 2022-09-21 PROCEDURE — 85055 RETICULATED PLATELET ASSAY: CPT

## 2022-09-21 PROCEDURE — 36415 COLL VENOUS BLD VENIPUNCTURE: CPT

## 2022-09-21 NOTE — PROGRESS NOTES
Subjective Today the patient reports that she has been doing well since her last visit.  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  She has been seen periodically in our office last in 2011 receiving a repeat pneumococcal vaccination the previously having begin 2011.  The patient is followed by rheumatology regularly for her lupus having been treated with Plaquenil but evidently having stopped it over the last year.     This patient was admitted June 29 through July 13 having developed an apparent illness ultimately associated with fever and confusion.  She presented to [facility] with hyperglycemia, metabolic acidosis and no previous history of diabetes.  Her condition rapidly deteriorated with development of pneumonia with ARDS requiring intubation and mechanical ventilation, acute kidney injury requiring hemodialysis and further thrombocytopenia leading to a reconsultation.  She was seen by Dr. Amato with exam consistent with Raynaud's phenomenon in her hands, laboratory studies with positive Lakisha testing, elevated LDH, bilirubin, and increasing nucleated RBCs in the peripheral circulation.  He felt that she likely had an autoimmune process and agreed with IV steroids.  She continued intensive care, IV steroids, and, fortunate, went on to slowly improve.  This included rotation pronation a mechanical ventilation the ICU.  The patient was seen by multiple services including ID, nephrology, hematology, vascular and endocrinology as well as an intensive care physicians.  Upon discharge she proceeded to rehabilitation for a period of time and, wonderfully, has made gradual progress.  She was recently seen  by Dr. Butler of rheumatology and is on a steroid taper,?  Restart of Plaquenil in the near future.  She is also to see endocrinology for her ketoacidosis and apparent glucose intolerance/diabetes.     Additional recent history includes her continuance on hemodialysis and recent removal of her Shiley catheter after which she developed an internal jugular thrombosis and was treated in [facility] facilities including institution of anticoagulation.  This is evidently just over the last several days to be seen in Epic everywhere notes.  She was seen by hematology as well during that visit and given intravenous iron as well as transfused.  She is now seen back by our practice for continued follow-up.  We have reviewed her recent and previous history over approximately 60 minutes today.    We elected to have close follow-up with repeat laboratory studies done as the patient went on to recover from her recent infectious episode.  Repeat laboratory studies were performed September 13 showing normal liver and kidney function, repeat iron of 12, iron saturation of 5, ferritin down to 590 from 1036, TIBC of 225.  The patient has been seen by rheumatology is now back on Plaquenil and records describe her current hospitalization August 24 through the 25th wherein she presented with chest discomfort, fever and shortness of breath.  CT scan revealed no evidence of pulmonary embolism, lower extremities reveal DVT which were not new there being a previous DVT left internal jugular location RD known.  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  As elected to continue steroids which had been temporarily increased and thereafter proceed with taper.  The patient now presents back to our office September 20, 2017 considerably improved we've discussed her status which actually includes further  evidence of iron deficiency and need for additional IV iron.  The patient was treated with IV iron initially seen back December 13, 2017 she is improved considerably with normalized performance status.  Repeat iron studies do not show evidence of residual iron deficiency and she remains on current anticoagulation as well as therapy with rheumatology with tapering steroids and daily Plaquenil as well as use of Amlodipine for her Raynaud's.  We have discussed reassessing her DVTs at 6 months from development which would be February 2018.   The patient is next seen February 09, 2018.  She is doing extremely well with control of her lupus.  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  She's now had 6 months of anticoagulation and will go and discontinue her Eliquis.    The patient is next seen August 17.  She is doing well overall without additional rheumatologic symptoms.  She is starting a new job and quite happy as to how she feels and with a new position.  The patient is next seen August 16, 2019, and fortunately, doing well physically.  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  Hematologically and rheumatological her symptoms have not recurred.  We had a return to every 6 months reassessing in January with a normal CBC, IPF 4.2, platelet count 254,000.     The patient is seen formally September 9 continue to do well.  She still works at [employer] and describes a very high rate of COVID 19 if [facility] but much lower incidence rates here in [place].  She has not had consistent exposures herself nor symptoms.  She now wears an insulin pump successfully and feels reasonably well overall with lack of arthralgias or myalgias    She is next seen 9/9/2021 continuing to do very well on current medications  particularly with the additional use of nifedipine and sildenafil as concerns her Raynaud's symptoms.  Fortunately she is also up-to-date per COVID-19 vaccinations and, incidentally, would be a candidate for a third vaccination as it becomes available.     The patient is next seen 9/21/2022 indicating that she has been doing extremely well without any worsening of her symptoms.  We find hematologically she is also been stable without recurrence of ITP.  Past Medical History:   Diagnosis Date   • ARF (acute renal failure) with tubular necrosis (HCC) 7/5/2017   • Diabetes mellitus (HCC)    • H/O intestinal malabsorption    • H/O Venous thrombosis     Acute embolism and thrombosis of left internal jugular vein    • History of anemia    • History of blood transfusion    • History of ITP    • Lupus (HCC)     SLE   • PNA (pneumonia) 06/29/2017   • Renal disorder    • Thyroid activity decreased        ONCOLOGIC HISTORY:  (History from previous dates can be found in the separate document.)    Current Outpatient Medications on File Prior to Visit   Medication Sig Dispense Refill   • Acetaminophen 325 MG capsule Take  by mouth As Needed.     • HumaLOG 100 UNIT/ML injection USE 50 UNITS IN PUMP DAILY 50 mL 2   • Hydroxychloroquine Sulfate (PLAQUENIL PO) Take 150 mg by mouth Daily. Take 1 and 1/2 tablets daily     • levothyroxine (SYNTHROID, LEVOTHROID) 75 MCG tablet Take 1 tablet by mouth Every Morning. 90 tablet 1   • Multiple Vitamins-Minerals (MULTI COMPLETE PO) Take 1 tablet/day by mouth.     • NIFEdipine CC (ADALAT CC) 60 MG 24 hr tablet Take 60 mg by mouth Daily.     • sildenafil (REVATIO) 20 MG tablet Take 1 tablet by mouth.     • hydroxychloroquine (Plaquenil) 200 MG tablet Take 200 mg by mouth Daily.     • niMODipine (NIMOTOP) 30 MG capsule Take 60 mg by mouth Daily.       No current facility-administered medications on file prior to visit.       ALLERGIES:   No Known Allergies    Social History     Socioeconomic  "History   • Marital status: Single   • Number of children: 0   • Years of education: college   Tobacco Use   • Smoking status: Never Smoker   • Smokeless tobacco: Never Used   Substance and Sexual Activity   • Alcohol use: Yes     Alcohol/week: 3.0 standard drinks     Types: 2 Glasses of wine, 1 Cans of beer per week     Comment: SOCIAL DRINKER   • Drug use: No   • Sexual activity: Never         Cancer-related family history includes Prostate cancer (age of onset: 71) in her father.     Review of Systems   Constitutional: Negative for fatigue.   Respiratory: Negative for chest tightness, shortness of breath and wheezing.    Gastrointestinal: Negative for abdominal pain, constipation, diarrhea, nausea and vomiting.   Musculoskeletal: Negative for arthralgias.   Neurological: Negative for weakness.   Objective      Vitals:    09/21/22 1516   BP: 125/80   Pulse: 68   Resp: 18   Temp: 97.3 °F (36.3 °C)   TempSrc: Temporal   SpO2: 98%   Weight: 70.4 kg (155 lb 1.6 oz)   Height: 170.2 cm (67.01\")   PainSc: 0-No pain     Current Status 9/21/2022   ECOG score 0       Physical Exam   Constitutional: She is oriented to person, place, and time.   HENT:   Head: Normocephalic and atraumatic.   Eyes: Pupils are equal, round, and reactive to light. Conjunctivae are normal.   Cardiovascular: Normal rate, regular rhythm and normal heart sounds.   Pulmonary/Chest: Effort normal and breath sounds normal.   Abdominal: Soft. Bowel sounds are normal.   Musculoskeletal: Normal range of motion.      Comments: Moderate swelling and erythema involving all the patient's upper extremity digits particular distal portions   Neurological: She is alert and oriented to person, place, and time.   Skin: Skin is warm and dry.   Psychiatric: Her behavior is normal.         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  She had been treated with steroids for ITP initially but when on to have a splenectomy in 2002 and remained relatively stable.  She was last seen in 2011 in remission.     The patient, more recently, had a difficult and complicated hospitalization admitted June 29 through July 13 with community-acquired pneumonia, sepsis, acute respiratory failure with ARDS, acute renal failure as well as additional studies including thrombocytopenia (multifactorial) and additional laboratory studies with RNP antibodies of greater than 8.0, anti-chromatin antibodies greater than 8.0 which, coupled with her additional findings per peripheral blood smear could be consistent with an exacerbation of her SLE.  She additionally had an episode of thrombosis related to her vascular catheter used for dialysis for which she is currently on Eliquis.  She also required additional transfusion and IV iron therapy.     The patient has not been seen by this physician for some time so we reviewed her history over approximately an hour today including her findings in hospital, her presentation and many complications thereafter.  She is uncertain whether she had an exacerbation of her SLE after discussion with rheumatology and currently remains on a steroid taper.  She is to see rheumatology in the next month or so as well for follow-up.  Hematologically, fortunately, she is stable at this point and will plan close follow-up in the office both for worsening cytopenias including thrombocytopenia and/or the need  for further IV iron support.     The patient's studies went on to reveal evidence of mild iron deficiency though continued anemia and follow-up was anticipated for possible additional IV iron.  She had intercurrent hospitalization for complications due to her lupus now including bilateral pleural effusions-small-and pleuropericarditis.  This was addressed with additional steroids that were instituted with plans for further taper.  The patient had assessment September 13 showing evidence of further iron deficiency and as she seen back in office September 20 we plan to proceed with IV iron both this week and next.  She is to continue her steroid taper hereafter and continue Eliquis twice a day at this point.  We'll plan at least 3-6 months of anticoagulation with control of her lupus for she is taken off of anticoagulation after reassessment via Doppler.  The patient was treated with IV iron successfully and is next seen back December 13, 2017.  Her lupus symptoms have approximately resolved at this point.  We discussed reassessment with Doppler examination 6 months from her hospitalization which were performed February 02, 2018.  The remains superficial phlebitis in the right upper extremity that is chronic as well as chronic left upper extremity DVT and internal jugular and subclavian vein.  She has had 6 months anticoagulation at this point and her lupus is under excellent control.  She has no additional IV lines will plan to discontinue her Eliquis.  We will see her in 6 months for follow-up.    The patient is next seen August 17, 2018 doing very well.  We have discussed long-term follow-up which will include an every 6 months CBC IPF and M.D. in 1 year.  Options of these records will go to her new rheumatologist, primary care and endocrinology.  The patient is next seen August 16, 2019 again doing well clinically.  She has a mild macrocytosis that anemia we will recheck reticulocyte count today.  We went on to  have her tested to 6 months and fortunately issues reviewed September 9, 2020 she is stable.  Fortunately when seen back 9/9/2021 this continues to be the case with the patient not having evidence of exacerbation of any cytopenias including ITP.  The patient return for follow-up at 6-month intervals and when seen 9/21/2022 remains entirely stable.     plan:    *CBC, Retic, IPF, LDH q 6 months  *MD assessment in 12 months with the same repeat laboratory studies

## 2022-12-17 DIAGNOSIS — E03.9 PRIMARY HYPOTHYROIDISM: ICD-10-CM

## 2022-12-21 DIAGNOSIS — E03.9 PRIMARY HYPOTHYROIDISM: ICD-10-CM

## 2022-12-21 RX ORDER — LEVOTHYROXINE SODIUM 0.07 MG/1
TABLET ORAL
Qty: 90 TABLET | Refills: 1 | Status: SHIPPED | OUTPATIENT
Start: 2022-12-21

## 2022-12-21 RX ORDER — LEVOTHYROXINE SODIUM 0.07 MG/1
75 TABLET ORAL EVERY MORNING
Qty: 90 TABLET | Refills: 1 | OUTPATIENT
Start: 2022-12-21

## 2022-12-27 ENCOUNTER — TELEPHONE (OUTPATIENT)
Dept: ENDOCRINOLOGY | Age: 42
End: 2022-12-27

## 2023-01-01 DIAGNOSIS — M32.9 SLE (SYSTEMIC LUPUS ERYTHEMATOSUS RELATED SYNDROME): Primary | ICD-10-CM

## 2023-01-01 DIAGNOSIS — E03.9 PRIMARY HYPOTHYROIDISM: ICD-10-CM

## 2023-01-01 DIAGNOSIS — E10.9 TYPE 1 DIABETES MELLITUS WITHOUT COMPLICATION: ICD-10-CM

## 2023-01-20 ENCOUNTER — LAB (OUTPATIENT)
Dept: ENDOCRINOLOGY | Age: 43
End: 2023-01-20
Payer: COMMERCIAL

## 2023-01-20 DIAGNOSIS — E03.9 PRIMARY HYPOTHYROIDISM: ICD-10-CM

## 2023-01-20 DIAGNOSIS — M32.9 SLE (SYSTEMIC LUPUS ERYTHEMATOSUS RELATED SYNDROME): ICD-10-CM

## 2023-01-20 DIAGNOSIS — E10.9 TYPE 1 DIABETES MELLITUS WITHOUT COMPLICATION: ICD-10-CM

## 2023-01-23 LAB
ALBUMIN SERPL-MCNC: 4.2 G/DL (ref 3.5–5.2)
ALBUMIN/GLOB SERPL: 1.1 G/DL
ALP SERPL-CCNC: 102 U/L (ref 39–117)
ALT SERPL-CCNC: 35 U/L (ref 1–33)
AST SERPL-CCNC: 35 U/L (ref 1–32)
BASOPHILS # BLD MANUAL: 0 10*3/MM3 (ref 0–0.2)
BASOPHILS NFR BLD MANUAL: 0 % (ref 0–1.5)
BILIRUB SERPL-MCNC: 0.3 MG/DL (ref 0–1.2)
BUN SERPL-MCNC: 5 MG/DL (ref 6–20)
BUN/CREAT SERPL: 6.8 (ref 7–25)
CALCIUM SERPL-MCNC: 9.8 MG/DL (ref 8.6–10.5)
CHLORIDE SERPL-SCNC: 100 MMOL/L (ref 98–107)
CO2 SERPL-SCNC: 24.3 MMOL/L (ref 22–29)
CREAT SERPL-MCNC: 0.73 MG/DL (ref 0.57–1)
DIFFERENTIAL COMMENT: ABNORMAL
EGFRCR SERPLBLD CKD-EPI 2021: 104.1 ML/MIN/1.73
ENDOMYSIUM IGA SER QL: NEGATIVE
EOSINOPHIL # BLD MANUAL: 0 10*3/MM3 (ref 0–0.4)
EOSINOPHIL NFR BLD MANUAL: 0 % (ref 0.3–6.2)
ERYTHROCYTE [DISTWIDTH] IN BLOOD BY AUTOMATED COUNT: 14.2 % (ref 12.3–15.4)
GLOBULIN SER CALC-MCNC: 3.7 GM/DL
GLUCOSE SERPL-MCNC: 186 MG/DL (ref 65–99)
HBA1C MFR BLD: 6.8 % (ref 4.8–5.6)
HCT VFR BLD AUTO: 40.4 % (ref 34–46.6)
HGB BLD-MCNC: 14 G/DL (ref 12–15.9)
IGA SERPL-MCNC: 383 MG/DL (ref 87–352)
LYMPHOCYTES # BLD MANUAL: 0.71 10*3/MM3 (ref 0.7–3.1)
LYMPHOCYTES NFR BLD MANUAL: 12 % (ref 19.6–45.3)
MCH RBC QN AUTO: 35.4 PG (ref 26.6–33)
MCHC RBC AUTO-ENTMCNC: 34.7 G/DL (ref 31.5–35.7)
MCV RBC AUTO: 102 FL (ref 79–97)
MONOCYTES # BLD MANUAL: 0.12 10*3/MM3 (ref 0.1–0.9)
MONOCYTES NFR BLD MANUAL: 2 % (ref 5–12)
NEUTROPHILS # BLD MANUAL: 5.1 10*3/MM3 (ref 1.7–7)
NEUTROPHILS NFR BLD MANUAL: 86 % (ref 42.7–76)
NRBC BLD AUTO-RTO: 0 /100 WBC (ref 0–0.2)
PLATELET # BLD AUTO: 253 10*3/MM3 (ref 140–450)
PLATELET BLD QL SMEAR: ABNORMAL
POTASSIUM SERPL-SCNC: 4.3 MMOL/L (ref 3.5–5.2)
PROT SERPL-MCNC: 7.9 G/DL (ref 6–8.5)
RBC # BLD AUTO: 3.96 10*6/MM3 (ref 3.77–5.28)
RBC MORPH BLD: ABNORMAL
SODIUM SERPL-SCNC: 135 MMOL/L (ref 136–145)
TSH SERPL DL<=0.005 MIU/L-ACNC: 2.05 UIU/ML (ref 0.27–4.2)
TTG IGA SER-ACNC: <2 U/ML (ref 0–3)
WBC # BLD AUTO: 5.93 10*3/MM3 (ref 3.4–10.8)

## 2023-01-27 ENCOUNTER — OFFICE VISIT (OUTPATIENT)
Dept: ENDOCRINOLOGY | Age: 43
End: 2023-01-27
Payer: COMMERCIAL

## 2023-01-27 VITALS
HEIGHT: 67 IN | HEART RATE: 56 BPM | BODY MASS INDEX: 24.04 KG/M2 | SYSTOLIC BLOOD PRESSURE: 124 MMHG | TEMPERATURE: 97.1 F | WEIGHT: 153.2 LBS | OXYGEN SATURATION: 96 % | DIASTOLIC BLOOD PRESSURE: 82 MMHG

## 2023-01-27 DIAGNOSIS — E03.9 PRIMARY HYPOTHYROIDISM: ICD-10-CM

## 2023-01-27 DIAGNOSIS — I73.00 RAYNAUD'S PHENOMENON WITHOUT GANGRENE: ICD-10-CM

## 2023-01-27 DIAGNOSIS — R74.01 ELEVATED TRANSAMINASE LEVEL: ICD-10-CM

## 2023-01-27 DIAGNOSIS — R71.8 ELEVATED MCV: ICD-10-CM

## 2023-01-27 DIAGNOSIS — E10.9 TYPE 1 DIABETES MELLITUS WITHOUT COMPLICATION: Primary | ICD-10-CM

## 2023-01-27 DIAGNOSIS — Z96.41 INSULIN PUMP STATUS: ICD-10-CM

## 2023-01-27 LAB
ALBUMIN SERPL-MCNC: 4.3 G/DL (ref 3.5–5.2)
ALBUMIN/GLOB SERPL: 1.3 G/DL
ALP SERPL-CCNC: 102 U/L (ref 39–117)
ALT SERPL-CCNC: 32 U/L (ref 1–33)
AST SERPL-CCNC: 31 U/L (ref 1–32)
BILIRUB SERPL-MCNC: 0.3 MG/DL (ref 0–1.2)
BUN SERPL-MCNC: 10 MG/DL (ref 6–20)
BUN/CREAT SERPL: 14.1 (ref 7–25)
CALCIUM SERPL-MCNC: 9.2 MG/DL (ref 8.6–10.5)
CHLORIDE SERPL-SCNC: 101 MMOL/L (ref 98–107)
CO2 SERPL-SCNC: 24 MMOL/L (ref 22–29)
CREAT SERPL-MCNC: 0.71 MG/DL (ref 0.57–1)
EGFRCR SERPLBLD CKD-EPI 2021: 107.7 ML/MIN/1.73
FOLATE SERPL-MCNC: >20 NG/ML (ref 4.78–24.2)
GGT SERPL-CCNC: 60 U/L (ref 5–36)
GLOBULIN SER CALC-MCNC: 3.4 GM/DL
GLUCOSE SERPL-MCNC: 230 MG/DL (ref 65–99)
POTASSIUM SERPL-SCNC: 4.5 MMOL/L (ref 3.5–5.2)
PROT SERPL-MCNC: 7.7 G/DL (ref 6–8.5)
SODIUM SERPL-SCNC: 136 MMOL/L (ref 136–145)
VIT B12 SERPL-MCNC: 497 PG/ML (ref 211–946)

## 2023-01-27 PROCEDURE — 99214 OFFICE O/P EST MOD 30 MIN: CPT | Performed by: INTERNAL MEDICINE

## 2023-01-27 RX ORDER — HYDROXYCHLOROQUINE SULFATE 100 MG/1
150 TABLET ORAL DAILY
COMMUNITY
Start: 2023-01-27

## 2023-01-27 NOTE — PROGRESS NOTES
Subjective   Sarita Bai is a 42 y.o. female.     History of Present Illness        Patient is a 40-year-old female who came in for follow-up.      She was diagnosed to have type 1 diabetes mellitus in  when she was admitted from [facility].  REGINA antibody was markedly elevated.  C-peptide was 1.8.  Hemoglobin A1c done in 2023 is 6.8%.    She started on a t:slim insulin pump with a Dexcom 6 sensor in 2020.  She uses Humalog on the pump.  Pump settings are as follows:     Basal: 6 AM to 6 PM is 0.65 units/h.  6 PM to 6 AM is 0.6 units/h.                Bolus: 1 unit per 10 g of carbohydrate     Correction: 1 unit per 50 greater than 110.     Target:      Sensor data reviewed.   Blood sugar .  Average glucose 147 mg per DL.  Time in target 85%.  Time above target 14%.  Time below target 2%.  She has no severe hypoglycemic episodes.  She has occasional hypoglycemia after supper.    She has gained 2 pounds since 2022     Her last eye exam was  and she has no retinopathy. Urine microalbumin was normal in .  She denies paresthesias.     She has hypothyroidism and is on levothyroxine 75 µg per day.  TSH done in 2023 is normal at 2.05.     She has SLE and Raynauld's phenomenon.  She follows with Dr. Shane.  She is on Plaquenil, sildenafil, and Nimotop.  She went off prednisone in 2018.  She denies arthralgia.     She is  0.  She had regular menstrual periods.  She is not on birth control pills.     She had 3 Pfizer Covid vaccine.      The following portions of the patient's history were reviewed and updated as appropriate: allergies, current medications, past family history, past medical history, past social history, past surgical history and problem list.    Review of Systems   Eyes: Negative for visual disturbance.   Respiratory: Negative for shortness of breath.    Cardiovascular: Negative for chest pain and palpitations.   Gastrointestinal: Negative.   "  Endocrine: Negative for cold intolerance and heat intolerance.   Genitourinary: Negative.    Musculoskeletal: Negative for arthralgias and myalgias.   Neurological: Negative for numbness.     Vitals:    01/27/23 0906   BP: 124/82   Pulse: 56   Temp: 97.1 °F (36.2 °C)   TempSrc: Temporal   SpO2: 96%   Weight: 69.5 kg (153 lb 3.2 oz)   Height: 170.2 cm (67.01\")      Objective   Physical Exam  Constitutional:       General: She is not in acute distress.     Appearance: Normal appearance. She is not ill-appearing, toxic-appearing or diaphoretic.   Eyes:      General: No scleral icterus.        Right eye: No discharge.         Left eye: No discharge.      Extraocular Movements: Extraocular movements intact.      Conjunctiva/sclera: Conjunctivae normal.   Neck:      Vascular: No carotid bruit.   Cardiovascular:      Rate and Rhythm: Normal rate and regular rhythm.      Heart sounds: Normal heart sounds. No murmur heard.    No friction rub. No gallop.   Pulmonary:      Effort: No respiratory distress.      Breath sounds: Normal breath sounds. No rales.   Chest:      Chest wall: No tenderness.   Abdominal:      General: Bowel sounds are normal.      Palpations: Abdomen is soft.      Tenderness: There is no right CVA tenderness or left CVA tenderness.   Musculoskeletal:      Cervical back: Neck supple.      Right lower leg: No edema.      Left lower leg: No edema.   Lymphadenopathy:      Cervical: No cervical adenopathy.   Neurological:      General: No focal deficit present.      Mental Status: She is alert and oriented to person, place, and time.       Lab on 01/20/2023   Component Date Value Ref Range Status   • Endomysial IgA 01/20/2023 Negative  Negative Final   • Tissue Transglutaminase IgA 01/20/2023 <2  0 - 3 U/mL Final    Comment:                               Negative        0 -  3                                Weak Positive   4 - 10                                Positive           >10   Tissue Transglutaminase " (tTG) has been identified   as the endomysial antigen.  Studies have demonstr-   ated that endomysial IgA antibodies have over 99%   specificity for gluten sensitive enteropathy.     • IgA 01/20/2023 383 (H)  87 - 352 mg/dL Final   • WBC 01/20/2023 5.93  3.40 - 10.80 10*3/mm3 Final   • RBC 01/20/2023 3.96  3.77 - 5.28 10*6/mm3 Final   • Hemoglobin 01/20/2023 14.0  12.0 - 15.9 g/dL Final   • Hematocrit 01/20/2023 40.4  34.0 - 46.6 % Final   • MCV 01/20/2023 102.0 (H)  79.0 - 97.0 fL Final   • MCH 01/20/2023 35.4 (H)  26.6 - 33.0 pg Final   • MCHC 01/20/2023 34.7  31.5 - 35.7 g/dL Final   • RDW 01/20/2023 14.2  12.3 - 15.4 % Final   • Platelets 01/20/2023 253  140 - 450 10*3/mm3 Final   • nRBC 01/20/2023 0.0  0.0 - 0.2 /100 WBC Final   • TSH 01/20/2023 2.050  0.270 - 4.200 uIU/mL Final   • Hemoglobin A1C 01/20/2023 6.80 (H)  4.80 - 5.60 % Final    Comment: Hemoglobin A1C Ranges:  Increased Risk for Diabetes  5.7% to 6.4%  Diabetes                     >= 6.5%  Diabetic Goal                < 7.0%     • Glucose 01/20/2023 186 (H)  65 - 99 mg/dL Final   • BUN 01/20/2023 5 (L)  6 - 20 mg/dL Final   • Creatinine 01/20/2023 0.73  0.57 - 1.00 mg/dL Final   • EGFR Result 01/20/2023 104.1  >60.0 mL/min/1.73 Final    Comment: GFR Normal >60  Chronic Kidney Disease <60  Kidney Failure <15     • BUN/Creatinine Ratio 01/20/2023 6.8 (L)  7.0 - 25.0 Final   • Sodium 01/20/2023 135 (L)  136 - 145 mmol/L Final   • Potassium 01/20/2023 4.3  3.5 - 5.2 mmol/L Final   • Chloride 01/20/2023 100  98 - 107 mmol/L Final   • Total CO2 01/20/2023 24.3  22.0 - 29.0 mmol/L Final   • Calcium 01/20/2023 9.8  8.6 - 10.5 mg/dL Final   • Total Protein 01/20/2023 7.9  6.0 - 8.5 g/dL Final   • Albumin 01/20/2023 4.2  3.5 - 5.2 g/dL Final   • Globulin 01/20/2023 3.7  gm/dL Final   • A/G Ratio 01/20/2023 1.1  g/dL Final   • Total Bilirubin 01/20/2023 0.3  0.0 - 1.2 mg/dL Final   • Alkaline Phosphatase 01/20/2023 102  39 - 117 U/L Final   • AST (SGOT)  01/20/2023 35 (H)  1 - 32 U/L Final   • ALT (SGPT) 01/20/2023 35 (H)  1 - 33 U/L Final   • Neutrophil Rel % 01/20/2023 86.0 (H)  42.7 - 76.0 % Final   • Lymphocyte Rel % 01/20/2023 12.0 (L)  19.6 - 45.3 % Final   • Monocyte Rel % 01/20/2023 2.0 (L)  5.0 - 12.0 % Final   • Eosinophil Rel % 01/20/2023 0.0 (L)  0.3 - 6.2 % Final   • Basophil Rel % 01/20/2023 0.0  0.0 - 1.5 % Final   • Neutrophils Absolute 01/20/2023 5.10  1.70 - 7.00 10*3/mm3 Final   • Lymphocytes Absolute 01/20/2023 0.71  0.70 - 3.10 10*3/mm3 Final   • Monocytes Absolute 01/20/2023 0.12  0.10 - 0.90 10*3/mm3 Final   • Eosinophil Abs 01/20/2023 0.00  0.00 - 0.40 10*3/mm3 Final   • Basophils Absolute 01/20/2023 0.00  0.00 - 0.20 10*3/mm3 Final   • Differential Comment 01/20/2023 Comment   Final    WBC MORPHOLOGY               Normal                      Normal     N   • Comment 01/20/2023 Comment   Final    Comment: ANISOCYTOSIS                 Mod/2+                      None Seen  N  MACROCYTES                   Slight/1+                   None Seen  N     • Plt Comment 01/20/2023 Comment   Final    PLATELET MORPHOLOGY          Normal                      Normal     N     Assessment & Plan   Diagnoses and all orders for this visit:    1. Type 1 diabetes mellitus without complication (HCC) (Primary)    2. Insulin pump status    3. Primary hypothyroidism    4. Raynaud's phenomenon without gangrene    5. Elevated transaminase level  -     Comprehensive Metabolic Panel  -     Gamma GT    6. Elevated MCV  -     Folate  -     Vitamin B12      Continue current insulin pump settings.  Continue levothyroxine 75 mcg a day.  Recheck transaminase and GGT.  Check folate and vitamin B12    Copy my note sent to Dr. Bai and Dr. Shane.    RTC 4 mos.

## 2023-01-27 NOTE — LETTER
January 27, 2023     Sorin Cedeno MD  [address]  [facility] [address]  [facility] [address]    Patient: Sarita Bai   YOB: 1980   Date of Visit: 1/27/2023       Dear Dr. Wilian MD:    Thank you for referring Sarita Bai to me for evaluation. Below are the relevant portions of my assessment and plan of care.    If you have questions, please do not hesitate to call me. I look forward to following Sarita along with you.         Sincerely,        Thomas Campbell MD        CC: No Recipients  Thomas Campbell MD  01/27/23 0955  Signed  Subjective    Sarita Bai is a 42 y.o. female.     History of Present Illness        Patient is a 40-year-old female who came in for follow-up.      She was diagnosed to have type 1 diabetes mellitus in 2017 when she was admitted from [facility].  REGINA antibody was markedly elevated.  C-peptide was 1.8.  Hemoglobin A1c done in January 2023 is 6.8%.    She started on a t:slim insulin pump with a Dexcom 6 sensor in August 2020.  She uses Humalog on the pump.  Pump settings are as follows:     Basal: 6 AM to 6 PM is 0.65 units/h.  6 PM to 6 AM is 0.6 units/h.                Bolus: 1 unit per 10 g of carbohydrate     Correction: 1 unit per 50 greater than 110.     Target:      Sensor data reviewed.   Blood sugar .  Average glucose 147 mg per DL.  Time in target 85%.  Time above target 14%.  Time below target 2%.  She has no severe hypoglycemic episodes.  She has occasional hypoglycemia after supper.    She has gained 2 pounds since July 2022     Her last eye exam was 12/22 and she has no retinopathy. Urine microalbumin was normal in 7/22.  She denies paresthesias.     She has hypothyroidism and is on levothyroxine 75 µg per day.  TSH done in January 2023 is normal at 2.05.     She has SLE and Raynauld's phenomenon.  She follows with Dr. Shane.  She is on Plaquenil, sildenafil, and Nimotop.  She went off prednisone in March 2018.  She denies arthralgia.     She is  " 0.  She had regular menstrual periods.  She is not on birth control pills.     She had 3 Pfizer Covid vaccine.      The following portions of the patient's history were reviewed and updated as appropriate: allergies, current medications, past family history, past medical history, past social history, past surgical history and problem list.    Review of Systems   Eyes: Negative for visual disturbance.   Respiratory: Negative for shortness of breath.    Cardiovascular: Negative for chest pain and palpitations.   Gastrointestinal: Negative.    Endocrine: Negative for cold intolerance and heat intolerance.   Genitourinary: Negative.    Musculoskeletal: Negative for arthralgias and myalgias.   Neurological: Negative for numbness.     Vitals:    23 0906   BP: 124/82   Pulse: 56   Temp: 97.1 °F (36.2 °C)   TempSrc: Temporal   SpO2: 96%   Weight: 69.5 kg (153 lb 3.2 oz)   Height: 170.2 cm (67.01\")      Objective    Physical Exam  Constitutional:       General: She is not in acute distress.     Appearance: Normal appearance. She is not ill-appearing, toxic-appearing or diaphoretic.   Eyes:      General: No scleral icterus.        Right eye: No discharge.         Left eye: No discharge.      Extraocular Movements: Extraocular movements intact.      Conjunctiva/sclera: Conjunctivae normal.   Neck:      Vascular: No carotid bruit.   Cardiovascular:      Rate and Rhythm: Normal rate and regular rhythm.      Heart sounds: Normal heart sounds. No murmur heard.    No friction rub. No gallop.   Pulmonary:      Effort: No respiratory distress.      Breath sounds: Normal breath sounds. No rales.   Chest:      Chest wall: No tenderness.   Abdominal:      General: Bowel sounds are normal.      Palpations: Abdomen is soft.      Tenderness: There is no right CVA tenderness or left CVA tenderness.   Musculoskeletal:      Cervical back: Neck supple.      Right lower leg: No edema.      Left lower leg: No edema. "   Lymphadenopathy:      Cervical: No cervical adenopathy.   Neurological:      General: No focal deficit present.      Mental Status: She is alert and oriented to person, place, and time.       Lab on 01/20/2023   Component Date Value Ref Range Status   • Endomysial IgA 01/20/2023 Negative  Negative Final   • Tissue Transglutaminase IgA 01/20/2023 <2  0 - 3 U/mL Final    Comment:                               Negative        0 -  3                                Weak Positive   4 - 10                                Positive           >10   Tissue Transglutaminase (tTG) has been identified   as the endomysial antigen.  Studies have demonstr-   ated that endomysial IgA antibodies have over 99%   specificity for gluten sensitive enteropathy.     • IgA 01/20/2023 383 (H)  87 - 352 mg/dL Final   • WBC 01/20/2023 5.93  3.40 - 10.80 10*3/mm3 Final   • RBC 01/20/2023 3.96  3.77 - 5.28 10*6/mm3 Final   • Hemoglobin 01/20/2023 14.0  12.0 - 15.9 g/dL Final   • Hematocrit 01/20/2023 40.4  34.0 - 46.6 % Final   • MCV 01/20/2023 102.0 (H)  79.0 - 97.0 fL Final   • MCH 01/20/2023 35.4 (H)  26.6 - 33.0 pg Final   • MCHC 01/20/2023 34.7  31.5 - 35.7 g/dL Final   • RDW 01/20/2023 14.2  12.3 - 15.4 % Final   • Platelets 01/20/2023 253  140 - 450 10*3/mm3 Final   • nRBC 01/20/2023 0.0  0.0 - 0.2 /100 WBC Final   • TSH 01/20/2023 2.050  0.270 - 4.200 uIU/mL Final   • Hemoglobin A1C 01/20/2023 6.80 (H)  4.80 - 5.60 % Final    Comment: Hemoglobin A1C Ranges:  Increased Risk for Diabetes  5.7% to 6.4%  Diabetes                     >= 6.5%  Diabetic Goal                < 7.0%     • Glucose 01/20/2023 186 (H)  65 - 99 mg/dL Final   • BUN 01/20/2023 5 (L)  6 - 20 mg/dL Final   • Creatinine 01/20/2023 0.73  0.57 - 1.00 mg/dL Final   • EGFR Result 01/20/2023 104.1  >60.0 mL/min/1.73 Final    Comment: GFR Normal >60  Chronic Kidney Disease <60  Kidney Failure <15     • BUN/Creatinine Ratio 01/20/2023 6.8 (L)  7.0 - 25.0 Final   • Sodium  01/20/2023 135 (L)  136 - 145 mmol/L Final   • Potassium 01/20/2023 4.3  3.5 - 5.2 mmol/L Final   • Chloride 01/20/2023 100  98 - 107 mmol/L Final   • Total CO2 01/20/2023 24.3  22.0 - 29.0 mmol/L Final   • Calcium 01/20/2023 9.8  8.6 - 10.5 mg/dL Final   • Total Protein 01/20/2023 7.9  6.0 - 8.5 g/dL Final   • Albumin 01/20/2023 4.2  3.5 - 5.2 g/dL Final   • Globulin 01/20/2023 3.7  gm/dL Final   • A/G Ratio 01/20/2023 1.1  g/dL Final   • Total Bilirubin 01/20/2023 0.3  0.0 - 1.2 mg/dL Final   • Alkaline Phosphatase 01/20/2023 102  39 - 117 U/L Final   • AST (SGOT) 01/20/2023 35 (H)  1 - 32 U/L Final   • ALT (SGPT) 01/20/2023 35 (H)  1 - 33 U/L Final   • Neutrophil Rel % 01/20/2023 86.0 (H)  42.7 - 76.0 % Final   • Lymphocyte Rel % 01/20/2023 12.0 (L)  19.6 - 45.3 % Final   • Monocyte Rel % 01/20/2023 2.0 (L)  5.0 - 12.0 % Final   • Eosinophil Rel % 01/20/2023 0.0 (L)  0.3 - 6.2 % Final   • Basophil Rel % 01/20/2023 0.0  0.0 - 1.5 % Final   • Neutrophils Absolute 01/20/2023 5.10  1.70 - 7.00 10*3/mm3 Final   • Lymphocytes Absolute 01/20/2023 0.71  0.70 - 3.10 10*3/mm3 Final   • Monocytes Absolute 01/20/2023 0.12  0.10 - 0.90 10*3/mm3 Final   • Eosinophil Abs 01/20/2023 0.00  0.00 - 0.40 10*3/mm3 Final   • Basophils Absolute 01/20/2023 0.00  0.00 - 0.20 10*3/mm3 Final   • Differential Comment 01/20/2023 Comment   Final    WBC MORPHOLOGY               Normal                      Normal     N   • Comment 01/20/2023 Comment   Final    Comment: ANISOCYTOSIS                 Mod/2+                      None Seen  N  MACROCYTES                   Slight/1+                   None Seen  N     • Plt Comment 01/20/2023 Comment   Final    PLATELET MORPHOLOGY          Normal                      Normal     N     Assessment & Plan   Diagnoses and all orders for this visit:    1. Type 1 diabetes mellitus without complication (HCC) (Primary)    2. Insulin pump status    3. Primary hypothyroidism    4. Raynaud's phenomenon without  gangrene    5. Elevated transaminase level  -     Comprehensive Metabolic Panel  -     Gamma GT    6. Elevated MCV  -     Folate  -     Vitamin B12      Continue current insulin pump settings.  Continue levothyroxine 75 mcg a day.  Recheck transaminase and GGT.  Check folate and vitamin B12    Copy my note sent to Dr. Bai and Dr. Shane.    RTC 4 mos.

## 2023-01-29 DIAGNOSIS — E10.9 TYPE 1 DIABETES MELLITUS WITHOUT COMPLICATION: ICD-10-CM

## 2023-01-29 DIAGNOSIS — M32.8 OTHER FORMS OF SYSTEMIC LUPUS ERYTHEMATOSUS, UNSPECIFIED ORGAN INVOLVEMENT STATUS: ICD-10-CM

## 2023-01-29 DIAGNOSIS — E03.9 PRIMARY HYPOTHYROIDISM: Primary | ICD-10-CM

## 2023-01-29 NOTE — PROGRESS NOTES
AST and ALT are normal.  GGT mildly elevated.  Recheck with next follow-up.  Normal folate and vitamin B12.  Send copy of labs to Dr. Bai.  Copy of labs sent to patient through [employer].

## 2023-03-21 ENCOUNTER — PRIOR AUTHORIZATION (OUTPATIENT)
Dept: ENDOCRINOLOGY | Age: 43
End: 2023-03-21
Payer: COMMERCIAL

## 2023-03-21 NOTE — TELEPHONE ENCOUNTER
3/21/23 PA STARTED for dexcom sensor     PA Case: [id number], Status: Approved, Coverage Starts on: 3/17/2023 12:00:00 AM, Coverage Ends on: 3/16/2024 12:00:00 AM.

## 2023-03-22 ENCOUNTER — CLINICAL SUPPORT (OUTPATIENT)
Dept: ONCOLOGY | Facility: HOSPITAL | Age: 43
End: 2023-03-22
Payer: COMMERCIAL

## 2023-03-22 ENCOUNTER — LAB (OUTPATIENT)
Dept: LAB | Facility: HOSPITAL | Age: 43
End: 2023-03-22
Payer: COMMERCIAL

## 2023-03-22 DIAGNOSIS — D64.9 NORMOCYTIC ANEMIA: ICD-10-CM

## 2023-03-22 DIAGNOSIS — D50.9 IRON DEFICIENCY ANEMIA, UNSPECIFIED IRON DEFICIENCY ANEMIA TYPE: ICD-10-CM

## 2023-03-22 DIAGNOSIS — M32.9 SLE (SYSTEMIC LUPUS ERYTHEMATOSUS RELATED SYNDROME): ICD-10-CM

## 2023-03-22 LAB
BASOPHILS # BLD AUTO: 0.07 10*3/MM3 (ref 0–0.2)
BASOPHILS NFR BLD AUTO: 0.9 % (ref 0–1.5)
DEPRECATED RDW RBC AUTO: 49.7 FL (ref 37–54)
EOSINOPHIL # BLD AUTO: 0.23 10*3/MM3 (ref 0–0.4)
EOSINOPHIL NFR BLD AUTO: 2.9 % (ref 0.3–6.2)
ERYTHROCYTE [DISTWIDTH] IN BLOOD BY AUTOMATED COUNT: 13.3 % (ref 12.3–15.4)
HCT VFR BLD AUTO: 39 % (ref 34–46.6)
HGB BLD-MCNC: 13.9 G/DL (ref 12–15.9)
IMM GRANULOCYTES # BLD AUTO: 0.08 10*3/MM3 (ref 0–0.05)
IMM GRANULOCYTES NFR BLD AUTO: 1 % (ref 0–0.5)
LYMPHOCYTES # BLD AUTO: 0.87 10*3/MM3 (ref 0.7–3.1)
LYMPHOCYTES NFR BLD AUTO: 11.1 % (ref 19.6–45.3)
MCH RBC QN AUTO: 36.1 PG (ref 26.6–33)
MCHC RBC AUTO-ENTMCNC: 35.6 G/DL (ref 31.5–35.7)
MCV RBC AUTO: 101.3 FL (ref 79–97)
MONOCYTES # BLD AUTO: 1.29 10*3/MM3 (ref 0.1–0.9)
MONOCYTES NFR BLD AUTO: 16.4 % (ref 5–12)
NEUTROPHILS NFR BLD AUTO: 5.33 10*3/MM3 (ref 1.7–7)
NEUTROPHILS NFR BLD AUTO: 67.7 % (ref 42.7–76)
NRBC BLD AUTO-RTO: 0.3 /100 WBC (ref 0–0.2)
PLATELET # BLD AUTO: 279 10*3/MM3 (ref 140–450)
PLATELETS.RETICULATED NFR BLD AUTO: 6.6 % (ref 0.9–6.5)
PMV BLD AUTO: 10.2 FL (ref 6–12)
RBC # BLD AUTO: 3.85 10*6/MM3 (ref 3.77–5.28)
WBC NRBC COR # BLD: 7.87 10*3/MM3 (ref 3.4–10.8)

## 2023-03-22 PROCEDURE — G0463 HOSPITAL OUTPT CLINIC VISIT: HCPCS

## 2023-03-22 PROCEDURE — 85025 COMPLETE CBC W/AUTO DIFF WBC: CPT

## 2023-03-22 PROCEDURE — 36415 COLL VENOUS BLD VENIPUNCTURE: CPT

## 2023-03-22 PROCEDURE — 85055 RETICULATED PLATELET ASSAY: CPT

## 2023-03-22 NOTE — NURSING NOTE
Patient is here for lab review with RN.  CBC reviewed, counts are stable for this patient at this time. Patient has no complaints. Copy of labs given to patient and follow up appointment reviewed. Patient is instructed to call the office with any concerns or new symptoms prior to next visit. Patient verbalized understanding and discharged in stable condition.  Lab Results   Component Value Date    WBC 7.87 03/22/2023    HGB 13.9 03/22/2023    HCT 39.0 03/22/2023    .3 (H) 03/22/2023     03/22/2023

## 2023-04-07 RX ORDER — INSULIN LISPRO 100 [IU]/ML
INJECTION, SOLUTION INTRAVENOUS; SUBCUTANEOUS
Qty: 40 ML | Refills: 1 | Status: SHIPPED | OUTPATIENT
Start: 2023-04-07

## 2023-06-12 DIAGNOSIS — E03.9 PRIMARY HYPOTHYROIDISM: ICD-10-CM

## 2023-06-12 RX ORDER — LEVOTHYROXINE SODIUM 0.07 MG/1
75 TABLET ORAL EVERY MORNING
Qty: 90 TABLET | Refills: 1 | Status: SHIPPED | OUTPATIENT
Start: 2023-06-12

## 2023-09-13 NOTE — PROGRESS NOTES
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  She has been seen periodically in our office last in 2011 receiving a repeat pneumococcal vaccination the previously having begin 2011.  The patient is followed by rheumatology regularly for her lupus having been treated with Plaquenil but evidently having stopped it over the last year.     This patient was admitted June 29 through July 13 having developed an apparent illness ultimately associated with fever and confusion.  She presented to [facility] with hyperglycemia, metabolic acidosis and no previous history of diabetes.  Her condition rapidly deteriorated with development of pneumonia with ARDS requiring intubation and mechanical ventilation, acute kidney injury requiring hemodialysis and further thrombocytopenia leading to a reconsultation.  She was seen by Dr. Amato with exam consistent with Raynaud's phenomenon in her hands, laboratory studies with positive Lakisha testing, elevated LDH, bilirubin, and increasing nucleated RBCs in the peripheral circulation.  He felt that she likely had an autoimmune process and agreed with IV steroids.  She continued intensive care, IV steroids, and, fortunate, went on to slowly improve.  This included rotation pronation a mechanical ventilation the ICU.  The patient was seen by multiple services including ID, nephrology, hematology, vascular and endocrinology as well as an intensive care physicians.  Upon discharge she proceeded to rehabilitation for a period of time and, wonderfully, has made gradual progress.  She was recently seen by Dr. Butler of rheumatology and is on a steroid taper,?  Restart of Plaquenil in the near  future.  She is also to see endocrinology for her ketoacidosis and apparent glucose intolerance/diabetes.     Additional recent history includes her continuance on hemodialysis and recent removal of her Shiley catheter after which she developed an internal jugular thrombosis and was treated in [facility] facilities including institution of anticoagulation.  This is evidently just over the last several days to be seen in Epic everywhere notes.  She was seen by hematology as well during that visit and given intravenous iron as well as transfused.  She is now seen back by our practice for continued follow-up.  We have reviewed her recent and previous history over approximately 60 minutes today.    We elected to have close follow-up with repeat laboratory studies done as the patient went on to recover from her recent infectious episode.  Repeat laboratory studies were performed September 13 showing normal liver and kidney function, repeat iron of 12, iron saturation of 5, ferritin down to 590 from 1036, TIBC of 225.  The patient has been seen by rheumatology is now back on [facility] and records describe her current hospitalization August 24 through the 25th wherein she presented with chest discomfort, fever and shortness of breath.  CT scan revealed no evidence of pulmonary embolism, lower extremities reveal DVT which were not new there being a previous DVT left internal jugular location RD known.  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  As elected to continue steroids which had been temporarily increased and thereafter proceed with taper.  The patient now presents back to our office September 20, 2017 considerably improved we've discussed her status which actually includes further evidence of iron deficiency and need for additional IV iron.  The patient was treated with IV  iron initially seen back December 13, 2017 she is improved considerably with normalized performance status.  Repeat iron studies do not show evidence of residual iron deficiency and she remains on current anticoagulation as well as therapy with rheumatology with tapering steroids and daily Plaquenil as well as use of Amlodipine for her Raynaud's.  We have discussed reassessing her DVTs at 6 months from development which would be February 2018.   The patient is next seen February 09, 2018.  She is doing extremely well with control of her lupus.  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  She's now had 6 months of anticoagulation and will go and discontinue her Eliquis.    The patient is next seen August 17.  She is doing well overall without additional rheumatologic symptoms.  She is starting a new job and quite happy as to how she feels and with a new position.  The patient is next seen August 16, 2019, and fortunately, doing well physically.  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  Hematologically and rheumatological her symptoms have not recurred.  We had a return to every 6 months reassessing in January with a normal CBC, IPF 4.2, platelet count 254,000.     The patient is seen formally September 9 continue to do well.  She still works at [employer] and describes a very high rate of COVID 19 if [facility] but much lower incidence rates here in [place].  She has not had consistent exposures herself nor symptoms.  She now wears an insulin pump successfully and feels reasonably well overall with lack of arthralgias or myalgias    She is next seen 9/9/2021 continuing to do very well on current medications particularly with the additional use of nifedipine and sildenafil as concerns her Raynaud's  symptoms.  Fortunately she is also up-to-date per COVID-19 vaccinations and, incidentally, would be a candidate for a third vaccination as it becomes available.     The patient is next seen 9/21/2022 indicating that she has been doing extremely well without any worsening of her symptoms.  We find hematologically she is also been stable without recurrence of ITP.    The patient patient returned for testing in March 2023 with IPF 6.6, normal CBC with platelet count 279,000.  She is seen by endocrinology 7/3/2023 having been started on insulin pump with Dexcom sensor March 2020 and clearly improving with plans to continue her insulin pump settings, levothyroxine and follow-up with rheumatology planned.    Rheumatology assessment 5/11/2023 found the patient still on Plaquenil, no additional symptoms with control of joint discomfort, no digital ulcerations, recent studies from May 2023 with normal renal function, hepatic function, complements, sed rate, CRP and urinalysis, negative double-stranded DNA and plans to continue sildenafil, nifedipine and Plaquenil.-6-month follow-up.    Patient is next seen back in CBC office 9/14/2023.  She has been doing well except for development after recent sudden vacation in the [facility] slow worsening and a rash involving her forearms.  While she has consistently had dermatitis involving her hands she now has a slightly different erythematous splotchy rash involving her anterior forearms bilaterally.  This is nontender, nonblanching.    Past Medical History:   Diagnosis Date    ARF (acute renal failure) with tubular necrosis 07/05/2017    Diabetes mellitus     H/O intestinal malabsorption     H/O Venous thrombosis     Acute embolism and thrombosis of left internal jugular vein     History of anemia     History of blood transfusion     History of ITP     Lupus     SLE    PNA (pneumonia) 06/29/2017    Renal disorder     Thyroid activity decreased        ONCOLOGIC HISTORY:  (History from  previous dates can be found in the separate document.)    Current Outpatient Medications on File Prior to Visit   Medication Sig Dispense Refill    Acetaminophen 325 MG capsule Take  by mouth As Needed.      Continuous Blood Gluc Sensor (Dexcom G6 Sensor)       Continuous Blood Gluc Transmit (Dexcom G6 Transmitter) misc       HumaLOG 100 UNIT/ML injection USE 50 UNITS IN PUMP DAILY 40 mL 1    Hydroxychloroquine Sulfate 100 MG tablet Take 150 mg by mouth Daily.      levothyroxine (SYNTHROID, LEVOTHROID) 75 MCG tablet Take 1 tablet by mouth Every Morning. Indications: Underactive Thyroid 90 tablet 1    Multiple Vitamins-Minerals (MULTI COMPLETE PO) Take 1 tablet/day by mouth.      NIFEdipine CC (ADALAT CC) 60 MG 24 hr tablet Take 1 tablet by mouth Daily.      sildenafil (REVATIO) 20 MG tablet Take 1 tablet by mouth.       No current facility-administered medications on file prior to visit.       ALLERGIES:   No Known Allergies    Social History     Socioeconomic History    Marital status: Single    Number of children: 0    Years of education: college   Tobacco Use    Smoking status: Never    Smokeless tobacco: Never   Substance and Sexual Activity    Alcohol use: Yes     Alcohol/week: 3.0 standard drinks     Types: 2 Glasses of wine, 1 Cans of beer per week     Comment: SOCIAL DRINKER    Drug use: No    Sexual activity: Never         Cancer-related family history includes Prostate cancer (age of onset: 71) in her father.     Review of Systems   Constitutional:  Negative for fatigue.   Respiratory:  Negative for chest tightness, shortness of breath and wheezing.    Gastrointestinal:  Negative for abdominal pain, constipation, diarrhea, nausea and vomiting.   Musculoskeletal:  Negative for arthralgias.   Skin:  Positive for rash (See history of present illness).   Neurological:  Negative for weakness. Objective      Vitals:    09/14/23 0815   BP: 118/80   Pulse: 75   Resp: 18   Temp: 98.2 °F (36.8 °C)   TempSrc:  "Temporal   SpO2: 100%   Weight: 71 kg (156 lb 8 oz)   Height: 170.2 cm (67.01\")   PainSc: 0-No pain         9/14/2023     8:17 AM   Current Status   ECOG score 0       Physical Exam   Constitutional: She is oriented to person, place, and time.   HENT:   Head: Normocephalic and atraumatic.   Eyes: Pupils are equal, round, and reactive to light. Conjunctivae are normal.   Cardiovascular: Normal rate, regular rhythm and normal heart sounds.   Pulmonary/Chest: Effort normal and breath sounds normal.   Abdominal: Soft. Bowel sounds are normal.   Musculoskeletal: Normal range of motion.      Comments: Moderate swelling and erythema involving all the patient's upper extremity digits particular distal portions   Neurological: She is alert and oriented to person, place, and time.   Skin: Skin is warm and dry.   Patient with erythematous, induration involving her fingers unchanged but additionally splotchy erythematous nonblanching nontender rash involving anterior forearms and anterior tibial regions.  Photos taken and in the record   Psychiatric: Her behavior is normal.       RECENT LABS:  Hematology WBC   Date Value Ref Range Status   09/14/2023 7.17 3.40 - 10.80 10*3/mm3 Final   01/20/2023 5.93 3.40 - 10.80 10*3/mm3 Final     RBC   Date Value Ref Range Status   09/14/2023 3.98 3.77 - 5.28 10*6/mm3 Final   01/20/2023 3.96 3.77 - 5.28 10*6/mm3 Final     Hemoglobin   Date Value Ref Range Status   09/14/2023 14.6 12.0 - 15.9 g/dL Final     Hematocrit   Date Value Ref Range Status   09/14/2023 40.6 34.0 - 46.6 % Final     Platelets   Date Value Ref Range Status   09/14/2023 237 140 - 450 10*3/mm3 Final        Assessment & Plan            43-year-old female with a 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  She had been treated with steroids for ITP initially but when on to have a splenectomy in 2002 and remained relatively stable.  She was last seen in 2011 " in remission.     The patient, more recently, had a difficult and complicated hospitalization admitted June 29 through July 13 with community-acquired pneumonia, sepsis, acute respiratory failure with ARDS, acute renal failure as well as additional studies including thrombocytopenia (multifactorial) and additional laboratory studies with RNP antibodies of greater than 8.0, anti-chromatin antibodies greater than 8.0 which, coupled with her additional findings per peripheral blood smear could be consistent with an exacerbation of her SLE.  She additionally had an episode of thrombosis related to her vascular catheter used for dialysis for which she is currently on Eliquis.  She also required additional transfusion and IV iron therapy.     The patient has not been seen by this physician for some time so we reviewed her history over approximately an hour today including her findings in hospital, her presentation and many complications thereafter.  She is uncertain whether she had an exacerbation of her SLE after discussion with rheumatology and currently remains on a steroid taper.  She is to see rheumatology in the next month or so as well for follow-up.  Hematologically, fortunately, she is stable at this point and will plan close follow-up in the office both for worsening cytopenias including thrombocytopenia and/or the need for further IV iron support.     The patient's studies went on to reveal evidence of mild iron deficiency though continued anemia and follow-up was anticipated for possible additional IV iron.  She had intercurrent hospitalization for complications due to her lupus now including bilateral pleural effusions-small-and pleuropericarditis.  This was addressed with additional steroids that were instituted with plans for further taper.  The patient had assessment September 13 showing evidence of further iron deficiency and as she seen back in office September 20 we plan to proceed with IV iron both  this week and next.  She is to continue her steroid taper hereafter and continue Eliquis twice a day at this point.  We'll plan at least 3-6 months of anticoagulation with control of her lupus for she is taken off of anticoagulation after reassessment via Doppler.  The patient was treated with IV iron successfully and is next seen back December 13, 2017.  Her lupus symptoms have approximately resolved at this point.  We discussed reassessment with Doppler examination 6 months from her hospitalization which were performed February 02, 2018.  The remains superficial phlebitis in the right upper extremity that is chronic as well as chronic left upper extremity DVT and internal jugular and subclavian vein.  She has had 6 months anticoagulation at this point and her lupus is under excellent control.  She has no additional IV lines will plan to discontinue her Eliquis.  We will see her in 6 months for follow-up.    The patient is next seen August 17, 2018 doing very well.  We have discussed long-term follow-up which will include an every 6 months CBC IPF and M.D. in 1 year.  Options of these records will go to her new rheumatologist, primary care and endocrinology.  The patient is next seen August 16, 2019 again doing well clinically.  She has a mild macrocytosis that anemia we will recheck reticulocyte count today.  We went on to have her tested to 6 months and fortunately issues reviewed September 9, 2020 she is stable.  Fortunately when seen back 9/9/2021 this continues to be the case with the patient not having evidence of exacerbation of any cytopenias including ITP.  The patient return for follow-up at 6-month intervals and when seen 9/21/2022 remains entirely stable.    Patient seen 9/14/2023 with dermatitis worsening involving her anterior forearms and anterior tibial regions.  This follows a vacation where she did have some exposure and, in part, is thought to be photodermatitis.  She has follow-up appointments  with dermatology 9/15/2023 and is in contact with the rheumatology office.  Hematologically she is stable without recurrence of her ITP.     plan:    *CBC, Retic, IPF, LDH q 6 months  *MD assessment in 12 months with the same repeat laboratory studies  *Photos included in record for findings of rash and the patient has to contact us if she is not improving or has not had this addressed in a timely fashion.

## 2023-09-14 ENCOUNTER — OFFICE VISIT (OUTPATIENT)
Dept: ONCOLOGY | Facility: CLINIC | Age: 43
End: 2023-09-14
Payer: COMMERCIAL

## 2023-09-14 ENCOUNTER — LAB (OUTPATIENT)
Dept: LAB | Facility: HOSPITAL | Age: 43
End: 2023-09-14
Payer: COMMERCIAL

## 2023-09-14 VITALS
DIASTOLIC BLOOD PRESSURE: 80 MMHG | BODY MASS INDEX: 24.56 KG/M2 | HEIGHT: 67 IN | WEIGHT: 156.5 LBS | OXYGEN SATURATION: 100 % | HEART RATE: 75 BPM | RESPIRATION RATE: 18 BRPM | TEMPERATURE: 98.2 F | SYSTOLIC BLOOD PRESSURE: 118 MMHG

## 2023-09-14 DIAGNOSIS — Z86.2 HISTORY OF ITP: Primary | ICD-10-CM

## 2023-09-14 DIAGNOSIS — D64.9 NORMOCYTIC ANEMIA: ICD-10-CM

## 2023-09-14 DIAGNOSIS — M32.9 SLE (SYSTEMIC LUPUS ERYTHEMATOSUS RELATED SYNDROME): ICD-10-CM

## 2023-09-14 DIAGNOSIS — M32.9 PERSONAL HISTORY OF SYSTEMIC LUPUS ERYTHEMATOSUS (SLE): ICD-10-CM

## 2023-09-14 DIAGNOSIS — D50.9 IRON DEFICIENCY ANEMIA, UNSPECIFIED IRON DEFICIENCY ANEMIA TYPE: ICD-10-CM

## 2023-09-14 LAB
BASOPHILS # BLD AUTO: 0.08 10*3/MM3 (ref 0–0.2)
BASOPHILS NFR BLD AUTO: 1.1 % (ref 0–1.5)
DEPRECATED RDW RBC AUTO: 51.6 FL (ref 37–54)
EOSINOPHIL # BLD AUTO: 0.14 10*3/MM3 (ref 0–0.4)
EOSINOPHIL NFR BLD AUTO: 2 % (ref 0.3–6.2)
ERYTHROCYTE [DISTWIDTH] IN BLOOD BY AUTOMATED COUNT: 13.6 % (ref 12.3–15.4)
HCT VFR BLD AUTO: 40.6 % (ref 34–46.6)
HGB BLD-MCNC: 14.6 G/DL (ref 12–15.9)
IMM GRANULOCYTES # BLD AUTO: 0.12 10*3/MM3 (ref 0–0.05)
IMM GRANULOCYTES NFR BLD AUTO: 1.7 % (ref 0–0.5)
LYMPHOCYTES # BLD AUTO: 0.68 10*3/MM3 (ref 0.7–3.1)
LYMPHOCYTES NFR BLD AUTO: 9.5 % (ref 19.6–45.3)
MCH RBC QN AUTO: 36.7 PG (ref 26.6–33)
MCHC RBC AUTO-ENTMCNC: 36 G/DL (ref 31.5–35.7)
MCV RBC AUTO: 102 FL (ref 79–97)
MONOCYTES # BLD AUTO: 1.52 10*3/MM3 (ref 0.1–0.9)
MONOCYTES NFR BLD AUTO: 21.2 % (ref 5–12)
NEUTROPHILS NFR BLD AUTO: 4.63 10*3/MM3 (ref 1.7–7)
NEUTROPHILS NFR BLD AUTO: 64.5 % (ref 42.7–76)
NRBC BLD AUTO-RTO: 1.5 /100 WBC (ref 0–0.2)
PLATELET # BLD AUTO: 237 10*3/MM3 (ref 140–450)
PLATELETS.RETICULATED NFR BLD AUTO: 7.1 % (ref 0.9–6.5)
PMV BLD AUTO: 10.4 FL (ref 6–12)
RBC # BLD AUTO: 3.98 10*6/MM3 (ref 3.77–5.28)
WBC NRBC COR # BLD: 7.17 10*3/MM3 (ref 3.4–10.8)

## 2023-09-14 PROCEDURE — 85025 COMPLETE CBC W/AUTO DIFF WBC: CPT

## 2023-09-14 PROCEDURE — 36415 COLL VENOUS BLD VENIPUNCTURE: CPT

## 2023-09-14 PROCEDURE — 85055 RETICULATED PLATELET ASSAY: CPT

## 2023-10-02 RX ORDER — INSULIN LISPRO 100 [IU]/ML
INJECTION, SOLUTION INTRAVENOUS; SUBCUTANEOUS
Qty: 45 ML | Refills: 1 | Status: SHIPPED | OUTPATIENT
Start: 2023-10-02

## 2023-10-02 NOTE — TELEPHONE ENCOUNTER
Rx Refill Note  Requested Prescriptions     Pending Prescriptions Disp Refills    HumaLOG 100 UNIT/ML injection [Pharmacy Med Name: HUMALOG 100 UNIT/ML VIAL] 40 mL 1     Sig: USE 50 UNITS IN PUMP DAILY      Last office visit with prescribing clinician: Visit date not found   Last telemedicine visit with prescribing clinician: Visit date not found   Next office visit with prescribing clinician: Visit date not found                         Would you like a call back once the refill request has been completed: [] Yes [] No    If the office needs to give you a call back, can they leave a voicemail: [] Yes [] No    Rashmi Mukherjee  10/02/23, 08:33 EDT

## 2023-10-16 RX ORDER — INSULIN LISPRO 100 [IU]/ML
INJECTION, SOLUTION INTRAVENOUS; SUBCUTANEOUS
Qty: 60 ML | Refills: 2 | Status: SHIPPED | OUTPATIENT
Start: 2023-10-16

## 2023-10-16 NOTE — TELEPHONE ENCOUNTER
Rx Refill Note  Requested Prescriptions     Pending Prescriptions Disp Refills    Insulin Lispro (HumaLOG) 100 UNIT/ML injection 45 mL 1     Sig: Inject 50 Units under the skin into the appropriate area as directed Daily.      Last office visit with prescribing clinician: Visit date not found   Last telemedicine visit with prescribing clinician: Visit date not found   Next office visit with prescribing clinician: Visit date not found                         Would you like a call back once the refill request has been completed: [] Yes [] No    If the office needs to give you a call back, can they leave a voicemail: [] Yes [] No    Rashmi Mukherjee  10/16/23, 07:52 EDT

## 2023-10-23 DIAGNOSIS — E03.9 PRIMARY HYPOTHYROIDISM: ICD-10-CM

## 2023-10-23 DIAGNOSIS — E10.9 TYPE 1 DIABETES MELLITUS WITHOUT COMPLICATION: ICD-10-CM

## 2023-10-24 ENCOUNTER — TELEPHONE (OUTPATIENT)
Dept: ENDOCRINOLOGY | Age: 43
End: 2023-10-24

## 2023-10-24 LAB
ALBUMIN SERPL-MCNC: 3.9 G/DL (ref 3.5–5.2)
ALBUMIN/GLOB SERPL: 1.3 G/DL
ALP SERPL-CCNC: 96 U/L (ref 39–117)
ALT SERPL-CCNC: 22 U/L (ref 1–33)
AST SERPL-CCNC: 24 U/L (ref 1–32)
BILIRUB SERPL-MCNC: 0.2 MG/DL (ref 0–1.2)
BUN SERPL-MCNC: 6 MG/DL (ref 6–20)
BUN/CREAT SERPL: 9.1 (ref 7–25)
CALCIUM SERPL-MCNC: 9.2 MG/DL (ref 8.6–10.5)
CHLORIDE SERPL-SCNC: 101 MMOL/L (ref 98–107)
CHOLEST SERPL-MCNC: 147 MG/DL (ref 0–200)
CO2 SERPL-SCNC: 24.2 MMOL/L (ref 22–29)
CREAT SERPL-MCNC: 0.66 MG/DL (ref 0.57–1)
EGFRCR SERPLBLD CKD-EPI 2021: 110.4 ML/MIN/1.73
GLOBULIN SER CALC-MCNC: 3 GM/DL
GLUCOSE SERPL-MCNC: 163 MG/DL (ref 65–99)
HBA1C MFR BLD: 7 % (ref 4.8–5.6)
HDLC SERPL-MCNC: 68 MG/DL (ref 40–60)
IMP & REVIEW OF LAB RESULTS: NORMAL
LDLC SERPL CALC-MCNC: 67 MG/DL (ref 0–100)
POTASSIUM SERPL-SCNC: 4.2 MMOL/L (ref 3.5–5.2)
PROT SERPL-MCNC: 6.9 G/DL (ref 6–8.5)
SODIUM SERPL-SCNC: 136 MMOL/L (ref 136–145)
T4 FREE SERPL-MCNC: 1.49 NG/DL (ref 0.93–1.7)
TRIGL SERPL-MCNC: 57 MG/DL (ref 0–150)
TSH SERPL DL<=0.005 MIU/L-ACNC: 1.35 UIU/ML (ref 0.27–4.2)
VLDLC SERPL CALC-MCNC: 12 MG/DL (ref 5–40)

## 2023-10-24 NOTE — TELEPHONE ENCOUNTER
"    Caller: Sarita Bai \"Apryl\"    Relationship: Self    Best call back number: [phone]     Requested Prescriptions:      Insulin Lispro (HumaLOG) 100 UNIT/ML injection     Pharmacy where request should be sent: [facility] [id number] [place] [address] - [phone]  - [phone] FX     Last office visit with prescribing clinician: 7/3/2023     Next office visit with prescribing clinician: 11/6/2023     Additional details provided by patient:  PATIENT STATES THE PHARMACY TOLD HER SHE NEEDS A PRE AUTHORIZATION FOR HER INSULIN TO GET IT REFILLED.  PLEASE CONTACT PHARMACY.    Does the patient have less than a 3 day supply:  [] Yes  [x] No    Would you like a call back once the refill request has been completed: [x] Yes [] No    If the office needs to give you a call back, can they leave a voicemail: [x] Yes [] No    James Samaniego Rep   10/24/23 15:11 EDT       "

## 2023-10-25 ENCOUNTER — PRIOR AUTHORIZATION (OUTPATIENT)
Dept: ENDOCRINOLOGY | Age: 43
End: 2023-10-25
Payer: COMMERCIAL

## 2023-10-25 NOTE — TELEPHONE ENCOUNTER
10/25 PA started on humalog   Key [id number]      Available without authorization.  10/25 called and sw the pharmacist to run it thru and call me back

## 2023-11-06 ENCOUNTER — OFFICE VISIT (OUTPATIENT)
Dept: ENDOCRINOLOGY | Age: 43
End: 2023-11-06
Payer: COMMERCIAL

## 2023-11-06 VITALS
DIASTOLIC BLOOD PRESSURE: 76 MMHG | TEMPERATURE: 97.2 F | HEIGHT: 67 IN | WEIGHT: 152.4 LBS | BODY MASS INDEX: 23.92 KG/M2 | OXYGEN SATURATION: 98 % | HEART RATE: 72 BPM | SYSTOLIC BLOOD PRESSURE: 114 MMHG

## 2023-11-06 DIAGNOSIS — E03.9 PRIMARY HYPOTHYROIDISM: ICD-10-CM

## 2023-11-06 DIAGNOSIS — E10.9 TYPE 1 DIABETES MELLITUS WITHOUT COMPLICATION: Primary | ICD-10-CM

## 2023-11-06 DIAGNOSIS — Z96.41 INSULIN PUMP STATUS: ICD-10-CM

## 2023-11-06 DIAGNOSIS — M32.9 SLE (SYSTEMIC LUPUS ERYTHEMATOSUS RELATED SYNDROME): ICD-10-CM

## 2023-11-06 PROCEDURE — 99214 OFFICE O/P EST MOD 30 MIN: CPT | Performed by: INTERNAL MEDICINE

## 2023-11-06 RX ORDER — CLOBETASOL PROPIONATE 0.5 MG/G
OINTMENT TOPICAL
COMMUNITY
Start: 2023-09-25

## 2023-11-06 RX ORDER — HYDROXYZINE HYDROCHLORIDE 10 MG/1
10 TABLET, FILM COATED ORAL EVERY 8 HOURS PRN
Qty: 30 TABLET | Refills: 1 | Status: SHIPPED | OUTPATIENT
Start: 2023-11-06

## 2023-11-06 NOTE — PROGRESS NOTES
Subjective   Sarita Bai is a 43 y.o. female.     History of Present Illness       Patient is a 43-year-old female who came in for follow-up.      She was diagnosed to have type 1 diabetes mellitus in  when she was admitted from [facility].  REGINA antibody was markedly elevated.  C-peptide was 1.8. Hemoglobin done in 10/23 is 7.0%     She started on a t:slim insulin pump with a Dexcom 6 sensor in 2020.  She uses Humalog on the pump.  Pump settings are as follows:     Basal: 6 AM to 6 PM is 0.65 units/h.  6 PM to 6 AM is 0.6 units/h.                Bolus: 1 unit per 10 g of carbohydrate     Correction: 1 unit per 50 greater than 110.     Target:      Sensor data reviewed.   Average glucose 130 mg per DL.  Time in target 88%.  Time above target 8%.  Time below target 4%.  Basal 13.76 units/day.    Total bolus 4.6 units per day.    She has lost 9 lbs since .      Her last eye exam was  and she has no retinopathy. Urine microalbumin was normal in .  She denies paresthesias.     She has hypothyroidism and is on levothyroxine 75 µg per day.  TSH done in 10/23 is normal at 1.35.     She has SLE and Raynauld's phenomenon.  She follows with Dr. Shane.  She is on Plaquenil, sildenafil, and nifedipine 60 mg daily.  She went off prednisone in 2018.  She denies arthralgia.    She has pruritic rash since .  She has seen Marisela Rivera NP and skin biopsy showed histologic changes which can be seen in connective tissue disease such as lupus erythematosus as well as dermatomyositis.  She had partial response with a short course of prednisone might make her blood sugar go higher.     She is  0.  She had regular menstrual periods.  She is not on birth control pills.     She is an  who works for [employer].    The following portions of the patient's history were reviewed and updated as appropriate: allergies, current medications, past family history, past medical history, past  "social history, past surgical history, and problem list.    Review of Systems   Eyes:  Negative for visual disturbance.   Respiratory:  Negative for shortness of breath.    Cardiovascular:  Negative for chest pain and palpitations.   Gastrointestinal: Negative.    Genitourinary: Negative.    Musculoskeletal:  Negative for arthralgias.   Skin:  Positive for rash.     Vitals:    11/06/23 1221   BP: 114/76   Pulse: 72   Temp: 97.2 °F (36.2 °C)   TempSrc: Temporal   SpO2: 98%   Weight: 69.1 kg (152 lb 6.4 oz)   Height: 170.2 cm (67.01\")      Objective   Physical Exam  Constitutional:       Appearance: Normal appearance. She is not toxic-appearing or diaphoretic.   Eyes:      General: No scleral icterus.        Right eye: No discharge.         Left eye: No discharge.      Extraocular Movements: Extraocular movements intact.      Conjunctiva/sclera: Conjunctivae normal.   Neck:      Vascular: No carotid bruit.   Cardiovascular:      Rate and Rhythm: Normal rate and regular rhythm.      Heart sounds: Normal heart sounds.   Pulmonary:      Breath sounds: Normal breath sounds.   Abdominal:      General: Bowel sounds are normal.      Palpations: Abdomen is soft.      Tenderness: There is no right CVA tenderness or left CVA tenderness.   Lymphadenopathy:      Cervical: No cervical adenopathy.   Skin:     Findings: Rash present. No lesion.      Comments: Patchy areas of erythema which does not rekha on pressure.   Neurological:      Mental Status: She is alert and oriented to person, place, and time.      Comments: Intact light touch on lower ext       Orders Only on 10/23/2023   Component Date Value Ref Range Status    Total Cholesterol 10/23/2023 147  0 - 200 mg/dL Final    Comment: Cholesterol Reference Ranges  (U.S. Department of Health and Human Services ATP III  Classifications)  Desirable          <200 mg/dL  Borderline High    200-239 mg/dL  High Risk          >240 mg/dL  Triglyceride Reference Ranges  (U.S. Department " of Health and Human Services ATP III  Classifications)  Normal           <150 mg/dL  Borderline High  150-199 mg/dL  High             200-499 mg/dL  Very High        >500 mg/dL  HDL Reference Ranges  (U.S. Department of Health and Human Services ATP III  Classifications)  Low     <40 mg/dl (major risk factor for CHD)  High    >60 mg/dl ('negative' risk factor for CHD)  LDL Reference Ranges  (U.S. Department of Health and Human Services ATP III  Classifications)  Optimal          <100 mg/dL  Near Optimal     100-129 mg/dL  Borderline High  130-159 mg/dL  High             160-189 mg/dL  Very High        >189 mg/dL      Triglycerides 10/23/2023 57  0 - 150 mg/dL Final    HDL Cholesterol 10/23/2023 68 (H)  40 - 60 mg/dL Final    VLDL Cholesterol Serafin 10/23/2023 12  5 - 40 mg/dL Final    LDL Chol Calc ([facility]) 10/23/2023 67  0 - 100 mg/dL Final    Free T4 10/23/2023 1.49  0.93 - 1.70 ng/dL Final    Results may be falsely increased if patient taking Biotin.    TSH 10/23/2023 1.350  0.270 - 4.200 uIU/mL Final    Hemoglobin A1C 10/23/2023 7.00 (H)  4.80 - 5.60 % Final    Comment: Hemoglobin A1C Ranges:  Increased Risk for Diabetes  5.7% to 6.4%  Diabetes                     >= 6.5%  Diabetic Goal                < 7.0%      Glucose 10/23/2023 163 (H)  65 - 99 mg/dL Final    BUN 10/23/2023 6  6 - 20 mg/dL Final    Creatinine 10/23/2023 0.66  0.57 - 1.00 mg/dL Final    EGFR Result 10/23/2023 110.4  >60.0 mL/min/1.73 Final    Comment: GFR Normal >60  Chronic Kidney Disease <60  Kidney Failure <15      BUN/Creatinine Ratio 10/23/2023 9.1  7.0 - 25.0 Final    Sodium 10/23/2023 136  136 - 145 mmol/L Final    Potassium 10/23/2023 4.2  3.5 - 5.2 mmol/L Final    Chloride 10/23/2023 101  98 - 107 mmol/L Final    Total CO2 10/23/2023 24.2  22.0 - 29.0 mmol/L Final    Calcium 10/23/2023 9.2  8.6 - 10.5 mg/dL Final    Total Protein 10/23/2023 6.9  6.0 - 8.5 g/dL Final    Albumin 10/23/2023 3.9  3.5 - 5.2 g/dL Final    Globulin 10/23/2023 3.0   gm/dL Final    A/G Ratio 10/23/2023 1.3  g/dL Final    Total Bilirubin 10/23/2023 0.2  0.0 - 1.2 mg/dL Final    Alkaline Phosphatase 10/23/2023 96  39 - 117 U/L Final    AST (SGOT) 10/23/2023 24  1 - 32 U/L Final    ALT (SGPT) 10/23/2023 22  1 - 33 U/L Final    Interpretation 10/23/2023 Note   Final    Supplemental report is available.     Assessment & Plan   Diagnoses and all orders for this visit:    1. Type 1 diabetes mellitus without complication (Primary)  -     Comprehensive Metabolic Panel; Future  -     Lipid Panel; Future  -     Hemoglobin A1c; Future    2. Insulin pump status    3. Primary hypothyroidism  -     TSH; Future    4. SLE (systemic lupus erythematosus related syndrome)    Other orders  -     hydrOXYzine (ATARAX) 10 MG tablet; Take 1 tablet by mouth Every 8 (Eight) Hours As Needed for Itching.  Dispense: 30 tablet; Refill: 1      Continue on current insulin pump setting.  Continue levothyroxine 75 mcg/day.  Atarax 12.5 mg 3 times daily as needed for itching.    Will defer treatment of SLE to Dr. Shane.    Copy of my note sent to Dr. Bai, Dr. Shane, and Marisela Rivera NP.    RTC 4 mos.

## 2023-12-07 DIAGNOSIS — E03.9 PRIMARY HYPOTHYROIDISM: ICD-10-CM

## 2023-12-07 RX ORDER — LEVOTHYROXINE SODIUM 0.07 MG/1
75 TABLET ORAL EVERY MORNING
Qty: 90 TABLET | Refills: 2 | Status: SHIPPED | OUTPATIENT
Start: 2023-12-07

## 2023-12-07 NOTE — TELEPHONE ENCOUNTER
Rx Refill Note  Requested Prescriptions     Pending Prescriptions Disp Refills    levothyroxine (SYNTHROID, LEVOTHROID) 75 MCG tablet [Pharmacy Med Name: LEVOTHYROXINE 75 MCG TABLET] 90 tablet 1     Sig: TAKE 1 TABLET BY MOUTH EVERY MORNING. INDICATIONS: UNDERACTIVE THYROID      Last office visit with prescribing clinician: 11/6/2023   Last telemedicine visit with prescribing clinician: Visit date not found   Next office visit with prescribing clinician: 4/9/2024                         Would you like a call back once the refill request has been completed: [] Yes [] No    If the office needs to give you a call back, can they leave a voicemail: [] Yes [] No    Rashmi Mukherjee  12/07/23, 08:30 EST

## 2024-02-19 ENCOUNTER — PRIOR AUTHORIZATION (OUTPATIENT)
Dept: ENDOCRINOLOGY | Age: 44
End: 2024-02-19
Payer: COMMERCIAL

## 2024-02-19 NOTE — TELEPHONE ENCOUNTER
Pa submitted for dexcom g6 transmitter   The plan will fax you a determination, typically within 1 to 5 business days.

## 2024-03-28 ENCOUNTER — LAB (OUTPATIENT)
Dept: LAB | Facility: HOSPITAL | Age: 44
End: 2024-03-28
Payer: COMMERCIAL

## 2024-03-28 DIAGNOSIS — Z86.2 HISTORY OF ITP: ICD-10-CM

## 2024-03-28 DIAGNOSIS — M32.9 PERSONAL HISTORY OF SYSTEMIC LUPUS ERYTHEMATOSUS (SLE): ICD-10-CM

## 2024-03-28 LAB
BASOPHILS # BLD AUTO: 0.06 10*3/MM3 (ref 0–0.2)
BASOPHILS NFR BLD AUTO: 0.9 % (ref 0–1.5)
DEPRECATED RDW RBC AUTO: 52.7 FL (ref 37–54)
EOSINOPHIL # BLD AUTO: 0.13 10*3/MM3 (ref 0–0.4)
EOSINOPHIL NFR BLD AUTO: 1.9 % (ref 0.3–6.2)
ERYTHROCYTE [DISTWIDTH] IN BLOOD BY AUTOMATED COUNT: 13.9 % (ref 12.3–15.4)
HCT VFR BLD AUTO: 39 % (ref 34–46.6)
HGB BLD-MCNC: 13.9 G/DL (ref 12–15.9)
IMM GRANULOCYTES # BLD AUTO: 0.08 10*3/MM3 (ref 0–0.05)
IMM GRANULOCYTES NFR BLD AUTO: 1.2 % (ref 0–0.5)
LYMPHOCYTES # BLD AUTO: 0.72 10*3/MM3 (ref 0.7–3.1)
LYMPHOCYTES NFR BLD AUTO: 10.7 % (ref 19.6–45.3)
MCH RBC QN AUTO: 36.6 PG (ref 26.6–33)
MCHC RBC AUTO-ENTMCNC: 35.6 G/DL (ref 31.5–35.7)
MCV RBC AUTO: 102.6 FL (ref 79–97)
MONOCYTES # BLD AUTO: 1.45 10*3/MM3 (ref 0.1–0.9)
MONOCYTES NFR BLD AUTO: 21.6 % (ref 5–12)
NEUTROPHILS NFR BLD AUTO: 4.27 10*3/MM3 (ref 1.7–7)
NEUTROPHILS NFR BLD AUTO: 63.7 % (ref 42.7–76)
NRBC BLD AUTO-RTO: 0.4 /100 WBC (ref 0–0.2)
PLATELET # BLD AUTO: 230 10*3/MM3 (ref 140–450)
PLATELETS.RETICULATED NFR BLD AUTO: 6.9 % (ref 0.9–6.5)
PMV BLD AUTO: 11.3 FL (ref 6–12)
RBC # BLD AUTO: 3.8 10*6/MM3 (ref 3.77–5.28)
WBC NRBC COR # BLD AUTO: 6.71 10*3/MM3 (ref 3.4–10.8)

## 2024-03-28 PROCEDURE — 85025 COMPLETE CBC W/AUTO DIFF WBC: CPT

## 2024-03-28 PROCEDURE — 36415 COLL VENOUS BLD VENIPUNCTURE: CPT

## 2024-03-28 PROCEDURE — 85055 RETICULATED PLATELET ASSAY: CPT

## 2024-04-09 ENCOUNTER — OFFICE VISIT (OUTPATIENT)
Dept: ENDOCRINOLOGY | Age: 44
End: 2024-04-09
Payer: COMMERCIAL

## 2024-04-09 VITALS
WEIGHT: 159.2 LBS | SYSTOLIC BLOOD PRESSURE: 112 MMHG | HEIGHT: 67 IN | HEART RATE: 79 BPM | OXYGEN SATURATION: 93 % | BODY MASS INDEX: 24.99 KG/M2 | DIASTOLIC BLOOD PRESSURE: 72 MMHG

## 2024-04-09 DIAGNOSIS — Z46.81 INSULIN PUMP TITRATION: ICD-10-CM

## 2024-04-09 DIAGNOSIS — M32.9 SLE (SYSTEMIC LUPUS ERYTHEMATOSUS RELATED SYNDROME): ICD-10-CM

## 2024-04-09 DIAGNOSIS — I73.00 RAYNAUD'S PHENOMENON WITHOUT GANGRENE: ICD-10-CM

## 2024-04-09 DIAGNOSIS — E03.9 PRIMARY HYPOTHYROIDISM: ICD-10-CM

## 2024-04-09 DIAGNOSIS — Z96.41 INSULIN PUMP STATUS: ICD-10-CM

## 2024-04-09 DIAGNOSIS — E10.9 TYPE 1 DIABETES MELLITUS WITHOUT COMPLICATION: Primary | ICD-10-CM

## 2024-04-09 PROCEDURE — 95251 CONT GLUC MNTR ANALYSIS I&R: CPT | Performed by: INTERNAL MEDICINE

## 2024-04-09 PROCEDURE — 99214 OFFICE O/P EST MOD 30 MIN: CPT | Performed by: INTERNAL MEDICINE

## 2024-04-09 RX ORDER — FOLIC ACID 1 MG/1
1 TABLET ORAL DAILY
COMMUNITY
Start: 2024-02-04

## 2024-04-09 NOTE — PROGRESS NOTES
Subjective   Sarita Bai is a 43 y.o. female.     History of Present Illness     She was diagnosed to have type 1 diabetes mellitus in  when she was admitted from [facility].  REGINA antibody was markedly elevated.  C-peptide was 1.8. Hemoglobin done in 2024 is 6.8%.     She started on a t:slim insulin pump with a Dexcom 6 sensor in 2020.  She uses Humalog on the pump.  Pump settings are as follows:     Basal: 6 AM to 6 PM is 0.65 units/h.  6 PM to 6 AM is 0.6 units/h.                Bolus: 1 unit per 10 g of carbohydrate     Correction: 1 unit per 50 greater than 110.     Target:      Sensor data reviewed.   Time in target 92%.  Time above target 6%.  Time below target 2%.  Basal 13.79 units/day.  Mealtime bolus 5.16 units/day.  Correction bolus 1.4 units/day.  Control IQ auto bolus 1.21 units/day.  Average total daily dose 21.56 units/day.    She has gained 7 pounds since 2023.     Her last eye exam was  and she has no retinopathy. Urine microalbumin was normal in .  She denies paresthesias.     She has hypothyroidism and is on levothyroxine 75 µg per day.  TSH done in 2024 is normal at 2.14.     She has SLE and Raynauld's phenomenon.  She follows with Dr. Shane.  She is on Plaquenil, sildenafil, and nifedipine 60 mg daily.  She was started on methotrexate in 2023 and it was discontinued in 2024 when her liver enzymes went up.  She denies arthralgia.     She has pruritic rash since .  She has seen Marisela Rivera NP and skin biopsy showed histologic changes which can be seen in connective tissue disease such as lupus erythematosus as well as dermatomyositis.  She had partial response with a short course of prednisone might make her blood sugar go higher.     She is  0.  She had regular menstrual periods.  She is not on birth control pills.     She is an  who works for [email or web address].    The following portions of the patient's history were  "reviewed and updated as appropriate: allergies, current medications, past family history, past medical history, past social history, past surgical history, and problem list.    Review of Systems   Eyes:  Negative for visual disturbance.   Respiratory:  Negative for shortness of breath.    Cardiovascular:  Negative for chest pain and palpitations.   Gastrointestinal: Negative.    Genitourinary: Negative.    Musculoskeletal:  Negative for myalgias.   Neurological:  Negative for numbness.     Vitals:    04/09/24 0833   BP: 112/72   BP Location: Left arm   Patient Position: Sitting   Cuff Size: Adult   Pulse: 79   SpO2: 93%   Weight: 72.2 kg (159 lb 3.2 oz)   Height: 170.2 cm (67.01\")      Objective   Physical Exam  Constitutional:       General: She is not in acute distress.     Appearance: Normal appearance. She is not ill-appearing, toxic-appearing or diaphoretic.   Eyes:      General: No scleral icterus.        Right eye: No discharge.         Left eye: No discharge.   Neck:      Vascular: No carotid bruit.   Cardiovascular:      Pulses: Normal pulses.      Heart sounds: Normal heart sounds.   Pulmonary:      Breath sounds: No rales.   Chest:      Chest wall: No tenderness.   Abdominal:      General: Bowel sounds are normal.      Palpations: Abdomen is soft.      Tenderness: There is no right CVA tenderness or left CVA tenderness.   Musculoskeletal:      Right lower leg: No edema.      Left lower leg: No edema.   Lymphadenopathy:      Cervical: No cervical adenopathy.   Neurological:      Mental Status: She is alert and oriented to person, place, and time.      Comments: Intact light touch on lower ext   Psychiatric:         Mood and Affect: Mood normal.         Behavior: Behavior normal.       Lab on 03/28/2024   Component Date Value Ref Range Status    IPF 03/28/2024 6.90 (H)  0.90 - 6.50 % Final    WBC 03/28/2024 6.71  3.40 - 10.80 10*3/mm3 Final    RBC 03/28/2024 3.80  3.77 - 5.28 10*6/mm3 Final    Hemoglobin " 03/28/2024 13.9  12.0 - 15.9 g/dL Final    Hematocrit 03/28/2024 39.0  34.0 - 46.6 % Final    MCV 03/28/2024 102.6 (H)  79.0 - 97.0 fL Final    MCH 03/28/2024 36.6 (H)  26.6 - 33.0 pg Final    MCHC 03/28/2024 35.6  31.5 - 35.7 g/dL Final    RDW 03/28/2024 13.9  12.3 - 15.4 % Final    RDW-SD 03/28/2024 52.7  37.0 - 54.0 fl Final    MPV 03/28/2024 11.3  6.0 - 12.0 fL Final    Platelets 03/28/2024 230  140 - 450 10*3/mm3 Final    Neutrophil % 03/28/2024 63.7  42.7 - 76.0 % Final    Lymphocyte % 03/28/2024 10.7 (L)  19.6 - 45.3 % Final    Monocyte % 03/28/2024 21.6 (H)  5.0 - 12.0 % Final    Eosinophil % 03/28/2024 1.9  0.3 - 6.2 % Final    Basophil % 03/28/2024 0.9  0.0 - 1.5 % Final    Immature Grans % 03/28/2024 1.2 (H)  0.0 - 0.5 % Final    Neutrophils, Absolute 03/28/2024 4.27  1.70 - 7.00 10*3/mm3 Final    Lymphocytes, Absolute 03/28/2024 0.72  0.70 - 3.10 10*3/mm3 Final    Monocytes, Absolute 03/28/2024 1.45 (H)  0.10 - 0.90 10*3/mm3 Final    Eosinophils, Absolute 03/28/2024 0.13  0.00 - 0.40 10*3/mm3 Final    Basophils, Absolute 03/28/2024 0.06  0.00 - 0.20 10*3/mm3 Final    Immature Grans, Absolute 03/28/2024 0.08 (H)  0.00 - 0.05 10*3/mm3 Final    nRBC 03/28/2024 0.4 (H)  0.0 - 0.2 /100 WBC Final     Assessment & Plan   Diagnoses and all orders for this visit:    1. Type 1 diabetes mellitus without complication (Primary)    2. Insulin pump titration    3. Insulin pump status    4. Primary hypothyroidism    5. SLE (systemic lupus erythematosus related syndrome)    6. Raynaud's phenomenon without gangrene      Continue current insulin pump settings.    Continue levothyroxine 75 mcg/day    Follow-up with Dr. Shane.    Copy of my note sent to Dr. Bai and Dr. Shane    RTC 4 mos.

## 2024-05-06 ENCOUNTER — TELEPHONE (OUTPATIENT)
Dept: ENDOCRINOLOGY | Age: 44
End: 2024-05-06
Payer: COMMERCIAL

## 2024-05-06 DIAGNOSIS — E10.9 TYPE 1 DIABETES MELLITUS WITHOUT COMPLICATION: Primary | ICD-10-CM

## 2024-05-07 ENCOUNTER — PRIOR AUTHORIZATION (OUTPATIENT)
Dept: ENDOCRINOLOGY | Age: 44
End: 2024-05-07
Payer: COMMERCIAL

## 2024-05-07 RX ORDER — PROCHLORPERAZINE 25 MG/1
SUPPOSITORY RECTAL
Qty: 9 EACH | Refills: 3 | Status: SHIPPED | OUTPATIENT
Start: 2024-05-07

## 2024-05-07 RX ORDER — PROCHLORPERAZINE 25 MG/1
1 SUPPOSITORY RECTAL
Qty: 1 EACH | Refills: 3 | Status: SHIPPED | OUTPATIENT
Start: 2024-05-07

## 2024-07-10 DIAGNOSIS — E10.9 TYPE 1 DIABETES MELLITUS WITHOUT COMPLICATION: Primary | ICD-10-CM

## 2024-07-10 RX ORDER — INSULIN LISPRO 100 [IU]/ML
INJECTION, SOLUTION INTRAVENOUS; SUBCUTANEOUS
Qty: 60 ML | Refills: 2 | Status: SHIPPED | OUTPATIENT
Start: 2024-07-10

## 2024-07-10 NOTE — TELEPHONE ENCOUNTER
Rx Refill Note  Requested Prescriptions     Pending Prescriptions Disp Refills    Insulin Lispro (HumaLOG) 100 UNIT/ML injection 60 mL 2     Sig: May use up to 200 units every 3 days on insulin pump.      Last office visit with prescribing clinician: 4/9/2024   Last telemedicine visit with prescribing clinician: Visit date not found   Next office visit with prescribing clinician: 8/15/2024                         Would you like a call back once the refill request has been completed: [] Yes [] No    If the office needs to give you a call back, can they leave a voicemail: [] Yes [] No    Alexa Cyr MA  07/10/24, 10:25 EDT

## 2024-08-08 DIAGNOSIS — E10.9 TYPE 1 DIABETES MELLITUS WITHOUT COMPLICATION: ICD-10-CM

## 2024-08-08 DIAGNOSIS — M32.9 SLE (SYSTEMIC LUPUS ERYTHEMATOSUS RELATED SYNDROME): ICD-10-CM

## 2024-08-08 DIAGNOSIS — E03.9 PRIMARY HYPOTHYROIDISM: ICD-10-CM

## 2024-08-09 LAB
ALBUMIN SERPL-MCNC: 4 G/DL (ref 3.9–4.9)
ALP SERPL-CCNC: 107 IU/L (ref 44–121)
ALT SERPL-CCNC: 26 IU/L (ref 0–32)
AST SERPL-CCNC: 36 IU/L (ref 0–40)
BASOPHILS # BLD AUTO: 0.1 X10E3/UL (ref 0–0.2)
BASOPHILS NFR BLD AUTO: 1 %
BILIRUB SERPL-MCNC: 0.3 MG/DL (ref 0–1.2)
BUN SERPL-MCNC: 5 MG/DL (ref 6–24)
BUN/CREAT SERPL: 7 (ref 9–23)
CALCIUM SERPL-MCNC: 9.2 MG/DL (ref 8.7–10.2)
CHLORIDE SERPL-SCNC: 99 MMOL/L (ref 96–106)
CHOLEST SERPL-MCNC: 180 MG/DL (ref 100–199)
CO2 SERPL-SCNC: 21 MMOL/L (ref 20–29)
CREAT SERPL-MCNC: 0.72 MG/DL (ref 0.57–1)
EGFRCR SERPLBLD CKD-EPI 2021: 104 ML/MIN/1.73
EOSINOPHIL # BLD AUTO: 0 X10E3/UL (ref 0–0.4)
EOSINOPHIL NFR BLD AUTO: 0 %
ERYTHROCYTE [DISTWIDTH] IN BLOOD BY AUTOMATED COUNT: 13.7 % (ref 11.7–15.4)
FOLATE SERPL-MCNC: >20 NG/ML
GLOBULIN SER CALC-MCNC: 3.5 G/DL (ref 1.5–4.5)
GLUCOSE SERPL-MCNC: 170 MG/DL (ref 70–99)
HBA1C MFR BLD: 6.9 % (ref 4.8–5.6)
HCT VFR BLD AUTO: 41.5 % (ref 34–46.6)
HDLC SERPL-MCNC: 88 MG/DL
HGB BLD-MCNC: 14.2 G/DL (ref 11.1–15.9)
IMM GRANULOCYTES # BLD AUTO: 0 X10E3/UL (ref 0–0.1)
IMM GRANULOCYTES NFR BLD AUTO: 0 %
IMP & REVIEW OF LAB RESULTS: NORMAL
LDLC SERPL CALC-MCNC: 82 MG/DL (ref 0–99)
LYMPHOCYTES # BLD AUTO: 0.4 X10E3/UL (ref 0.7–3.1)
LYMPHOCYTES NFR BLD AUTO: 6 %
MCH RBC QN AUTO: 36 PG (ref 26.6–33)
MCHC RBC AUTO-ENTMCNC: 34.2 G/DL (ref 31.5–35.7)
MCV RBC AUTO: 105 FL (ref 79–97)
MONOCYTES # BLD AUTO: 1.1 X10E3/UL (ref 0.1–0.9)
MONOCYTES NFR BLD AUTO: 14 %
NEUTROPHILS # BLD AUTO: 5.9 X10E3/UL (ref 1.4–7)
NEUTROPHILS NFR BLD AUTO: 79 %
PLATELET # BLD AUTO: 287 X10E3/UL (ref 150–450)
POTASSIUM SERPL-SCNC: 4.4 MMOL/L (ref 3.5–5.2)
PROT SERPL-MCNC: 7.5 G/DL (ref 6–8.5)
RBC # BLD AUTO: 3.94 X10E6/UL (ref 3.77–5.28)
SODIUM SERPL-SCNC: 135 MMOL/L (ref 134–144)
TRIGL SERPL-MCNC: 52 MG/DL (ref 0–149)
TSH SERPL DL<=0.005 MIU/L-ACNC: 2.44 UIU/ML (ref 0.45–4.5)
VIT B12 SERPL-MCNC: 861 PG/ML (ref 232–1245)
VLDLC SERPL CALC-MCNC: 10 MG/DL (ref 5–40)
WBC # BLD AUTO: 7.5 X10E3/UL (ref 3.4–10.8)

## 2024-08-13 LAB
ALBUMIN SERPL-MCNC: 3.9 G/DL (ref 3.9–4.9)
ALP SERPL-CCNC: 108 IU/L (ref 44–121)
ALT SERPL-CCNC: 29 IU/L (ref 0–32)
AST SERPL-CCNC: 37 IU/L (ref 0–40)
BILIRUB SERPL-MCNC: 0.3 MG/DL (ref 0–1.2)
BUN SERPL-MCNC: 5 MG/DL (ref 6–24)
BUN/CREAT SERPL: 7 (ref 9–23)
CALCIUM SERPL-MCNC: 9.1 MG/DL (ref 8.7–10.2)
CHLORIDE SERPL-SCNC: 98 MMOL/L (ref 96–106)
CHOLEST SERPL-MCNC: 179 MG/DL (ref 100–199)
CO2 SERPL-SCNC: 21 MMOL/L (ref 20–29)
CREAT SERPL-MCNC: 0.73 MG/DL (ref 0.57–1)
EGFRCR SERPLBLD CKD-EPI 2021: 103 ML/MIN/1.73
FOLATE SERPL-MCNC: >20 NG/ML
GLOBULIN SER CALC-MCNC: 3.7 G/DL (ref 1.5–4.5)
GLUCOSE SERPL-MCNC: 171 MG/DL (ref 70–99)
HDLC SERPL-MCNC: 89 MG/DL
IMP & REVIEW OF LAB RESULTS: NORMAL
LDLC SERPL CALC-MCNC: 80 MG/DL (ref 0–99)
POTASSIUM SERPL-SCNC: 4.5 MMOL/L (ref 3.5–5.2)
PROT SERPL-MCNC: 7.6 G/DL (ref 6–8.5)
SODIUM SERPL-SCNC: 136 MMOL/L (ref 134–144)
TRIGL SERPL-MCNC: 52 MG/DL (ref 0–149)
TSH SERPL DL<=0.005 MIU/L-ACNC: 2.44 UIU/ML (ref 0.45–4.5)
VIT B12 SERPL-MCNC: 963 PG/ML (ref 232–1245)
VLDLC SERPL CALC-MCNC: 10 MG/DL (ref 5–40)

## 2024-08-14 ENCOUNTER — OFFICE VISIT (OUTPATIENT)
Dept: ENDOCRINOLOGY | Age: 44
End: 2024-08-14
Payer: COMMERCIAL

## 2024-08-14 VITALS
WEIGHT: 160.8 LBS | TEMPERATURE: 97.1 F | OXYGEN SATURATION: 96 % | HEART RATE: 83 BPM | BODY MASS INDEX: 25.24 KG/M2 | SYSTOLIC BLOOD PRESSURE: 134 MMHG | HEIGHT: 67 IN | DIASTOLIC BLOOD PRESSURE: 82 MMHG

## 2024-08-14 DIAGNOSIS — E10.9 TYPE 1 DIABETES MELLITUS WITHOUT COMPLICATION: Primary | ICD-10-CM

## 2024-08-14 DIAGNOSIS — M32.9 SLE (SYSTEMIC LUPUS ERYTHEMATOSUS RELATED SYNDROME): ICD-10-CM

## 2024-08-14 DIAGNOSIS — I73.00 RAYNAUD'S PHENOMENON WITHOUT GANGRENE: ICD-10-CM

## 2024-08-14 DIAGNOSIS — E03.9 PRIMARY HYPOTHYROIDISM: ICD-10-CM

## 2024-08-14 DIAGNOSIS — Z96.41 INSULIN PUMP STATUS: ICD-10-CM

## 2024-08-14 RX ORDER — MYCOPHENOLATE MOFETIL 500 MG/1
TABLET, FILM COATED ORAL
COMMUNITY
Start: 2024-06-13

## 2024-08-14 NOTE — PROGRESS NOTES
Subjective   Sarita Bai is a 44 y.o. female.     History of Present Illness     She was diagnosed to have type 1 diabetes mellitus in  when she was admitted from [facility].  REGINA antibody was markedly elevated.  C-peptide was 1.8.  She has no significant weight change since 2024.  Hemoglobin done in  is 6.9%     She started on a t:slim insulin pump with a Dexcom 6 sensor in 2020.  She uses Humalog on the pump.  Pump settings are as follows:     Basal: 6 AM to 6 PM is 0.65 units/h.  6 PM to 6 AM is 0.6 units/h.                Bolus: 1 unit per 10 g of carbohydrate     Correction: 1 unit per 50 greater than 110.     Target:      Sensor data  Average glucose 132.  Time in range 92%.  Time below range 0.4%.  Time above range 7.8%.  GMI 6.5%.  100% on control IQ.    Average daily dose 21.69 units.  Basal 13.13 units/day.  Bolus 8.57 units/day.    Her last eye exam was  and she has no retinopathy. Urine microalbumin was normal in .  She denies paresthesias.     She has hypothyroidism and is on levothyroxine 75 µg per day.  TSH done in 2024 is normal at 2.44.     She has SLE and Raynauld's phenomenon.  She follows with Dr. Shane.  She is on Plaquenil, sildenafil, and nifedipine 60 mg daily.  She was started on methotrexate in 2023 and it was discontinued in 2024 when her liver enzymes went up.  She denies arthralgia.  She was started on CellCept by Dr. Shane.     She has pruritic rash since .  She was seen Marisela Rivera NP and skin biopsy showed histologic changes which can be seen in connective tissue disease such as lupus erythematosus as well as dermatomyositis.  She had partial response with a short course of prednisone might make her blood sugar go higher.  She will follow with Dr. Aurelia Hudson.     She is  0.  She had regular menstrual periods.  She is not on birth control pills.     She is an  who works for [employer].       The  "following portions of the patient's history were reviewed and updated as appropriate: allergies, current medications, past family history, past medical history, past social history, past surgical history, and problem list.    Review of Systems   Eyes:  Negative for visual disturbance.   Respiratory:  Negative for shortness of breath.    Cardiovascular:  Negative for chest pain and palpitations.   Gastrointestinal: Negative.    Genitourinary: Negative.    Musculoskeletal:  Negative for arthralgias.   Neurological:  Negative for numbness.     Vitals:    08/14/24 0819   BP: 134/82   Pulse: 83   Temp: 97.1 °F (36.2 °C)   TempSrc: Temporal   SpO2: 96%   Weight: 72.9 kg (160 lb 12.8 oz)   Height: 170.2 cm (67.01\")      Objective   Physical Exam  Constitutional:       Appearance: Normal appearance. She is not toxic-appearing or diaphoretic.   Eyes:      General: No scleral icterus.        Right eye: No discharge.         Left eye: No discharge.      Extraocular Movements: Extraocular movements intact.   Neck:      Vascular: No carotid bruit.   Cardiovascular:      Rate and Rhythm: Normal rate and regular rhythm.      Heart sounds: Normal heart sounds.   Pulmonary:      Breath sounds: No rales.   Chest:      Chest wall: No tenderness.   Abdominal:      Palpations: Abdomen is soft.      Tenderness: There is no right CVA tenderness or left CVA tenderness.   Musculoskeletal:      Right lower leg: No edema.      Left lower leg: No edema.   Lymphadenopathy:      Cervical: No cervical adenopathy.   Neurological:      Mental Status: She is alert and oriented to person, place, and time.      Comments: Intact light touch on lower ext       Orders Only on 08/08/2024   Component Date Value Ref Range Status    Glucose 08/07/2024 171 (H)  70 - 99 mg/dL Final    BUN 08/07/2024 5 (L)  6 - 24 mg/dL Final    Creatinine 08/07/2024 0.73  0.57 - 1.00 mg/dL Final    EGFR Result 08/07/2024 103  >59 mL/min/1.73 Final    BUN/Creatinine Ratio " 08/07/2024 7 (L)  9 - 23 Final    Sodium 08/07/2024 136  134 - 144 mmol/L Final    Potassium 08/07/2024 4.5  3.5 - 5.2 mmol/L Final    Chloride 08/07/2024 98  96 - 106 mmol/L Final    Total CO2 08/07/2024 21  20 - 29 mmol/L Final    Calcium 08/07/2024 9.1  8.7 - 10.2 mg/dL Final    Total Protein 08/07/2024 7.6  6.0 - 8.5 g/dL Final    Albumin 08/07/2024 3.9  3.9 - 4.9 g/dL Final    Globulin 08/07/2024 3.7  1.5 - 4.5 g/dL Final    Total Bilirubin 08/07/2024 0.3  0.0 - 1.2 mg/dL Final    Alkaline Phosphatase 08/07/2024 108  44 - 121 IU/L Final    AST (SGOT) 08/07/2024 37  0 - 40 IU/L Final    ALT (SGPT) 08/07/2024 29  0 - 32 IU/L Final    Total Cholesterol 08/07/2024 179  100 - 199 mg/dL Final    Triglycerides 08/07/2024 52  0 - 149 mg/dL Final    HDL Cholesterol 08/07/2024 89  >39 mg/dL Final    VLDL Cholesterol Serafin 08/07/2024 10  5 - 40 mg/dL Final    LDL Chol Calc ([facility]) 08/07/2024 80  0 - 99 mg/dL Final    Hemoglobin A1C 08/08/2024 6.9 (H)  4.8 - 5.6 % Final    Comment:          Prediabetes: 5.7 - 6.4           Diabetes: >6.4           Glycemic control for adults with diabetes: <7.0      TSH 08/07/2024 2.440  0.450 - 4.500 uIU/mL Final    WBC 08/08/2024 7.5  3.4 - 10.8 x10E3/uL Final    RBC 08/08/2024 3.94  3.77 - 5.28 x10E6/uL Final    Hemoglobin 08/08/2024 14.2  11.1 - 15.9 g/dL Final    Hematocrit 08/08/2024 41.5  34.0 - 46.6 % Final    MCV 08/08/2024 105 (H)  79 - 97 fL Final    MCH 08/08/2024 36.0 (H)  26.6 - 33.0 pg Final    MCHC 08/08/2024 34.2  31.5 - 35.7 g/dL Final    RDW 08/08/2024 13.7  11.7 - 15.4 % Final    Platelets 08/08/2024 287  150 - 450 x10E3/uL Final    Neutrophil Rel % 08/08/2024 79  Not Estab. % Final    Lymphocyte Rel % 08/08/2024 6  Not Estab. % Final    Monocyte Rel % 08/08/2024 14  Not Estab. % Final    Eosinophil Rel % 08/08/2024 0  Not Estab. % Final    Basophil Rel % 08/08/2024 1  Not Estab. % Final    Neutrophils Absolute 08/08/2024 5.9  1.4 - 7.0 x10E3/uL Final    Lymphocytes  Absolute 08/08/2024 0.4 (L)  0.7 - 3.1 x10E3/uL Final    Monocytes Absolute 08/08/2024 1.1 (H)  0.1 - 0.9 x10E3/uL Final    Eosinophils Absolute 08/08/2024 0.0  0.0 - 0.4 x10E3/uL Final    Basophils Absolute 08/08/2024 0.1  0.0 - 0.2 x10E3/uL Final    Immature Granulocyte Rel % 08/08/2024 0  Not Estab. % Final    Immature Grans Absolute 08/08/2024 0.0  0.0 - 0.1 x10E3/uL Final    Vitamin B-12 08/07/2024 963  232 - 1,245 pg/mL Final    Folate 08/07/2024 >20.0  >3.0 ng/mL Final    Comment: A serum folate concentration of less than 3.1 ng/mL is  considered to represent clinical deficiency.      Interpretation 08/07/2024 Note   Final    Supplemental report is available.     Assessment & Plan   Diagnoses and all orders for this visit:    1. Type 1 diabetes mellitus without complication (Primary)    2. Insulin pump status    3. Primary hypothyroidism    4. SLE (systemic lupus erythematosus related syndrome)    5. Raynaud's phenomenon without gangrene      Continue current insulin pump settings.    Continue levothyroxine 75 mcg a day.    Will defer treatment of SLE and Raynaud's phenomenon to Dr. Shane.    Copy of my note sent to Dr. Bai, Dr. Shane, and Dr. Hudson.    RTC 4 mos.

## 2024-08-29 DIAGNOSIS — E03.9 PRIMARY HYPOTHYROIDISM: ICD-10-CM

## 2024-08-29 RX ORDER — LEVOTHYROXINE SODIUM 75 UG/1
75 TABLET ORAL EVERY MORNING
Qty: 90 TABLET | Refills: 2 | Status: SHIPPED | OUTPATIENT
Start: 2024-08-29

## 2024-08-29 NOTE — TELEPHONE ENCOUNTER
Rx Refill Note  Requested Prescriptions     Pending Prescriptions Disp Refills    levothyroxine (SYNTHROID, LEVOTHROID) 75 MCG tablet [Pharmacy Med Name: LEVOTHYROXINE 75 MCG TABLET] 90 tablet 2     Sig: TAKE 1 TABLET BY MOUTH EVERY MORNING. INDICATIONS: UNDERACTIVE THYROID      Last office visit with prescribing clinician: 8/14/2024   Last telemedicine visit with prescribing clinician: Visit date not found   Next office visit with prescribing clinician: 12/30/2024                         Would you like a call back once the refill request has been completed: [] Yes [] No    If the office needs to give you a call back, can they leave a voicemail: [] Yes [] No    Rashmi Mukherjee  08/29/24, 07:42 EDT

## 2024-11-09 NOTE — PROGRESS NOTES
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  She has been seen periodically in our office last in 2011 receiving a repeat pneumococcal vaccination the previously having begin 2011.  The patient is followed by rheumatology regularly for her lupus having been treated with Plaquenil but evidently having stopped it over the last year.     This patient was admitted June 29 through July 13 having developed an apparent illness ultimately associated with fever and confusion.  She presented to [facility] with hyperglycemia, metabolic acidosis and no previous history of diabetes.  Her condition rapidly deteriorated with development of pneumonia with ARDS requiring intubation and mechanical ventilation, acute kidney injury requiring hemodialysis and further thrombocytopenia leading to a reconsultation.  She was seen by Dr. Amato with exam consistent with Raynaud's phenomenon in her hands, laboratory studies with positive Lakisha testing, elevated LDH, bilirubin, and increasing nucleated RBCs in the peripheral circulation.  He felt that she likely had an autoimmune process and agreed with IV steroids.  She continued intensive care, IV steroids, and, fortunate, went on to slowly improve.  This included rotation pronation a mechanical ventilation the ICU.  The patient was seen by multiple services including ID, nephrology, hematology, vascular and endocrinology as well as an intensive care physicians.  Upon discharge she proceeded to rehabilitation for a period of time and, wonderfully, has made gradual progress.  She was recently seen by Dr. Butler of rheumatology and is on a steroid taper,?  Restart of Plaquenil in the near  future.  She is also to see endocrinology for her ketoacidosis and apparent glucose intolerance/diabetes.     Additional recent history includes her continuance on hemodialysis and recent removal of her Shiley catheter after which she developed an internal jugular thrombosis and was treated in [facility] facilities including institution of anticoagulation.  This is evidently just over the last several days to be seen in Epic everywhere notes.  She was seen by hematology as well during that visit and given intravenous iron as well as transfused.  She is now seen back by our practice for continued follow-up.  We have reviewed her recent and previous history over approximately 60 minutes today.    We elected to have close follow-up with repeat laboratory studies done as the patient went on to recover from her recent infectious episode.  Repeat laboratory studies were performed September 13 showing normal liver and kidney function, repeat iron of 12, iron saturation of 5, ferritin down to 590 from 1036, TIBC of 225.  The patient has been seen by rheumatology is now back on [facility] and records describe her current hospitalization August 24 through the 25th wherein she presented with chest discomfort, fever and shortness of breath.  CT scan revealed no evidence of pulmonary embolism, lower extremities reveal DVT which were not new there being a previous DVT left internal jugular location RD known.  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  As elected to continue steroids which had been temporarily increased and thereafter proceed with taper.  The patient now presents back to our office September 20, 2017 considerably improved we've discussed her status which actually includes further evidence of iron deficiency and need for additional IV iron.  The patient was treated with IV  iron initially seen back December 13, 2017 she is improved considerably with normalized performance status.  Repeat iron studies do not show evidence of residual iron deficiency and she remains on current anticoagulation as well as therapy with rheumatology with tapering steroids and daily Plaquenil as well as use of Amlodipine for her Raynaud's.  We have discussed reassessing her DVTs at 6 months from development which would be February 2018.   The patient is next seen February 09, 2018.  She is doing extremely well with control of her lupus.  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  She's now had 6 months of anticoagulation and will go and discontinue her Eliquis.    The patient is next seen August 17.  She is doing well overall without additional rheumatologic symptoms.  She is starting a new job and quite happy as to how she feels and with a new position.  The patient is next seen August 16, 2019, and fortunately, doing well physically.  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  Hematologically and rheumatological her symptoms have not recurred.  We had a return to every 6 months reassessing in January with a normal CBC, IPF 4.2, platelet count 254,000.     The patient is seen formally September 9 continue to do well.  She still works at [employer] and describes a very high rate of COVID 19 if [facility] but much lower incidence rates here in [place].  She has not had consistent exposures herself nor symptoms.  She now wears an insulin pump successfully and feels reasonably well overall with lack of arthralgias or myalgias    She is next seen 9/9/2021 continuing to do very well on current medications particularly with the additional use of nifedipine and sildenafil as concerns her Raynaud's  symptoms.  Fortunately she is also up-to-date per COVID-19 vaccinations and, incidentally, would be a candidate for a third vaccination as it becomes available.     The patient is next seen 9/21/2022 indicating that she has been doing extremely well without any worsening of her symptoms.  We find hematologically she is also been stable without recurrence of ITP.    The patient patient returned for testing in March 2023 with IPF 6.6, normal CBC with platelet count 279,000.  She is seen by endocrinology 7/3/2023 having been started on insulin pump with Dexcom sensor March 2020 and clearly improving with plans to continue her insulin pump settings, levothyroxine and follow-up with rheumatology planned.    Rheumatology assessment 5/11/2023 found the patient still on Plaquenil, no additional symptoms with control of joint discomfort, no digital ulcerations, recent studies from May 2023 with normal renal function, hepatic function, complements, sed rate, CRP and urinalysis, negative double-stranded DNA and plans to continue sildenafil, nifedipine and Plaquenil.-6-month follow-up.    Patient is next seen back in CBC office 9/14/2023.  She has been doing well except for development after recent sudden vacation in the [facility] slow worsening and a rash involving her forearms.  While she has consistently had dermatitis involving her hands she now has a slightly different erythematous splotchy rash involving her anterior forearms bilaterally.  This is nontender, nonblanching.    Follow-up assessment yearly was requested and records are reviewed through endocrinology.and rheumatology the patient seen 6/13/2024.  At that point she was doing well on CellCept, pain 0-10, CellCept twice daily and Plaquenil.  Milligrams daily.  Persistently no infection, mild elevation of transaminases, myositis panel negative except for positive U2 RNP antibody.  Plans were made to continue her medications and reevaluate at a subsequent  point.    She is seen in office 11/18/2024 fortunately feeling quite well and review of her rash certainly is much improved compared to previous (see enclosed photos from last year).  She is not having current issues with joint pain or additional rash at this point.  Hematologic studies also appear stable with reticulocyte of 1.98%, IPF of 3.9, CBC with H&H of 13.9 and 40.6 white count 10,020 and platelet count 3 15,000 normal differential.    Past Medical History:   Diagnosis Date    ARF (acute renal failure) with tubular necrosis 07/05/2017    Diabetes mellitus     H/O intestinal malabsorption     H/O Venous thrombosis     Acute embolism and thrombosis of left internal jugular vein     History of anemia     History of blood transfusion     History of ITP     Lupus     SLE    PNA (pneumonia) 06/29/2017    Renal disorder     Thyroid activity decreased     Type 1 diabetes mellitus without complication 08/10/2017       ONCOLOGIC HISTORY:  (History from previous dates can be found in the separate document.)    Current Outpatient Medications on File Prior to Visit   Medication Sig Dispense Refill    Acetaminophen 325 MG capsule Take  by mouth As Needed.      CellCept 500 MG tablet Take 2 tabs po bid with food *then call the office for refill      Continuous Glucose Sensor (Dexcom G6 Sensor) Apply  topically Every 10 (Ten) Days. Indications: Insulin-Dependent Diabetes 9 each 3    Continuous Glucose Transmitter (Dexcom G6 Transmitter) misc 1 each by Other route Every 3 (Three) Months. Indications: Insulin-Dependent Diabetes 1 each 3    folic acid (FOLVITE) 1 MG tablet Take 1 tablet by mouth Daily.      Hydroxychloroquine Sulfate 100 MG tablet Take 150 mg by mouth Daily.      Insulin Lispro (HumaLOG) 100 UNIT/ML injection May use up to 200 units every 3 days on insulin pump.  Indications: Insulin-Dependent Diabetes 60 mL 2    levothyroxine (SYNTHROID, LEVOTHROID) 75 MCG tablet TAKE 1 TABLET BY MOUTH EVERY MORNING.  "INDICATIONS: UNDERACTIVE THYROID 90 tablet 2    Multiple Vitamins-Minerals (MULTI COMPLETE PO) Take 1 tablet/day by mouth.      NIFEdipine CC (ADALAT CC) 60 MG 24 hr tablet Take 1 tablet by mouth Daily.      sildenafil (REVATIO) 20 MG tablet Take 1 tablet by mouth.      clobetasol (TEMOVATE) 0.05 % ointment APPLY 1 APPLICATION TO AFFECTED AREA EXTERNALLY TWICE A DAY FOR 30 DAYS      hydrOXYzine (ATARAX) 10 MG tablet Take 1 tablet by mouth Every 8 (Eight) Hours As Needed for Itching. 30 tablet 1     No current facility-administered medications on file prior to visit.       ALLERGIES:   No Known Allergies    Social History     Socioeconomic History    Marital status: Single    Number of children: 0    Years of education: college   Tobacco Use    Smoking status: Never    Smokeless tobacco: Never   Substance and Sexual Activity    Alcohol use: Yes     Alcohol/week: 3.0 standard drinks of alcohol     Types: 2 Glasses of wine, 1 Cans of beer per week     Comment: SOCIAL DRINKER    Drug use: No    Sexual activity: Never         Cancer-related family history includes Prostate cancer (age of onset: 71) in her father.     Review of Systems   Constitutional:  Negative for fatigue.   Respiratory:  Negative for chest tightness, shortness of breath and wheezing.    Gastrointestinal:  Negative for abdominal pain, constipation, diarrhea, nausea and vomiting.   Musculoskeletal:  Negative for arthralgias.   Skin:  Positive for rash (See history of present illness).   Neurological:  Negative for weakness.   Objective      Vitals:    11/18/24 0800   Pulse: 91   Resp: 16   Temp: 98.4 °F (36.9 °C)   TempSrc: Oral   SpO2: 100%   Weight: 73.4 kg (161 lb 12.8 oz)   Height: 170.2 cm (67\")   PainSc: 0-No pain           11/18/2024     8:01 AM   Current Status   ECOG score 0       Physical Exam   Constitutional: She is oriented to person, place, and time.   HENT:   Head: Normocephalic and atraumatic.   Eyes: Pupils are equal, round, and " reactive to light. Conjunctivae are normal.   Cardiovascular: Normal rate, regular rhythm and normal heart sounds.   Pulmonary/Chest: Effort normal and breath sounds normal.   Abdominal: Soft. Bowel sounds are normal.   Musculoskeletal: Normal range of motion.      Comments: Moderate swelling and erythema involving all the patient's upper extremity digits particular distal portions   Neurological: She is alert and oriented to person, place, and time.   Skin: Skin is warm and dry.   Patient with erythematous, induration involving her fingers unchanged but additionally splotchy erythematous nonblanching nontender rash involving anterior forearms and anterior tibial regions.  Photos taken and in the record   Psychiatric: Her behavior is normal.         RECENT LABS:  Hematology WBC   Date Value Ref Range Status   11/18/2024 10.02 3.40 - 10.80 10*3/mm3 Final   08/08/2024 7.5 3.4 - 10.8 x10E3/uL Final     RBC   Date Value Ref Range Status   11/18/2024 3.93 3.77 - 5.28 10*6/mm3 Final   08/08/2024 3.94 3.77 - 5.28 x10E6/uL Final     Hemoglobin   Date Value Ref Range Status   11/18/2024 13.9 12.0 - 15.9 g/dL Final     Hematocrit   Date Value Ref Range Status   11/18/2024 40.6 34.0 - 46.6 % Final     Platelets   Date Value Ref Range Status   11/18/2024 315 140 - 450 10*3/mm3 Final        Assessment & Plan            44-year-old female with a 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  She had been treated with steroids for ITP initially but when on to have a splenectomy in 2002 and remained relatively stable.  She was last seen in 2011 in remission.     The patient, more recently, had a difficult and complicated hospitalization admitted June 29 through July 13 with community-acquired pneumonia, sepsis, acute respiratory failure with ARDS, acute renal failure as well as additional studies including thrombocytopenia (multifactorial) and additional laboratory  studies with RNP antibodies of greater than 8.0, anti-chromatin antibodies greater than 8.0 which, coupled with her additional findings per peripheral blood smear could be consistent with an exacerbation of her SLE.  She additionally had an episode of thrombosis related to her vascular catheter used for dialysis for which she is currently on Eliquis.  She also required additional transfusion and IV iron therapy.     The patient has not been seen by this physician for some time so we reviewed her history over approximately an hour today including her findings in hospital, her presentation and many complications thereafter.  She is uncertain whether she had an exacerbation of her SLE after discussion with rheumatology and currently remains on a steroid taper.  She is to see rheumatology in the next month or so as well for follow-up.  Hematologically, fortunately, she is stable at this point and will plan close follow-up in the office both for worsening cytopenias including thrombocytopenia and/or the need for further IV iron support.     The patient's studies went on to reveal evidence of mild iron deficiency though continued anemia and follow-up was anticipated for possible additional IV iron.  She had intercurrent hospitalization for complications due to her lupus now including bilateral pleural effusions-small-and pleuropericarditis.  This was addressed with additional steroids that were instituted with plans for further taper.  The patient had assessment September 13 showing evidence of further iron deficiency and as she seen back in office September 20 we plan to proceed with IV iron both this week and next.  She is to continue her steroid taper hereafter and continue Eliquis twice a day at this point.  We'll plan at least 3-6 months of anticoagulation with control of her lupus for she is taken off of anticoagulation after reassessment via Doppler.  The patient was treated with IV iron successfully and is next  seen back December 13, 2017.  Her lupus symptoms have approximately resolved at this point.  We discussed reassessment with Doppler examination 6 months from her hospitalization which were performed February 02, 2018.  The remains superficial phlebitis in the right upper extremity that is chronic as well as chronic left upper extremity DVT and internal jugular and subclavian vein.  She has had 6 months anticoagulation at this point and her lupus is under excellent control.  She has no additional IV lines will plan to discontinue her Eliquis.  We will see her in 6 months for follow-up.    The patient is next seen August 17, 2018 doing very well.  We have discussed long-term follow-up which will include an every 6 months CBC IPF and M.D. in 1 year.  Options of these records will go to her new rheumatologist, primary care and endocrinology.  The patient is next seen August 16, 2019 again doing well clinically.  She has a mild macrocytosis that anemia we will recheck reticulocyte count today.  We went on to have her tested to 6 months and fortunately issues reviewed September 9, 2020 she is stable.  Fortunately when seen back 9/9/2021 this continues to be the case with the patient not having evidence of exacerbation of any cytopenias including ITP.  The patient return for follow-up at 6-month intervals and when seen 9/21/2022 remains entirely stable.    Patient seen 9/14/2023 with dermatitis worsening involving her anterior forearms and anterior tibial regions.  This follows a vacation where she did have some exposure and, in part, is thought to be photodermatitis.  She has follow-up appointments with dermatology 9/15/2023 and is in contact with the rheumatology office.  Hematologically she is stable without recurrence of her ITP.    She is seen in office 11/18/2024 fortunately feeling quite well and review of her rash certainly is much improved compared to previous (see enclosed photos from last year).  She is not  having current issues with joint pain or additional rash at this point.  Hematologic studies also appear stable with reticulocyte of 1.98%, IPF of 3.9, CBC with H&H of 13.9 and 40.6 white count 10,020 and platelet count 3 15,000 normal differential.     plan:  *MD assessment in 12 months with the same repeat laboratory studies  *She will notify us for any additional issues.   Klisyri Counseling:  I discussed with the patient the risks of Klisyri including but not limited to erythema, scaling, itching, weeping, crusting, and pain.

## 2024-11-18 ENCOUNTER — LAB (OUTPATIENT)
Dept: LAB | Facility: HOSPITAL | Age: 44
End: 2024-11-18
Payer: COMMERCIAL

## 2024-11-18 ENCOUNTER — OFFICE VISIT (OUTPATIENT)
Dept: ONCOLOGY | Facility: CLINIC | Age: 44
End: 2024-11-18
Payer: COMMERCIAL

## 2024-11-18 VITALS
BODY MASS INDEX: 25.39 KG/M2 | HEIGHT: 67 IN | HEART RATE: 91 BPM | OXYGEN SATURATION: 100 % | TEMPERATURE: 98.4 F | RESPIRATION RATE: 16 BRPM | WEIGHT: 161.8 LBS

## 2024-11-18 DIAGNOSIS — Z86.2 HISTORY OF ITP: ICD-10-CM

## 2024-11-18 DIAGNOSIS — Z86.2 HISTORY OF ITP: Primary | ICD-10-CM

## 2024-11-18 DIAGNOSIS — M32.9 PERSONAL HISTORY OF SYSTEMIC LUPUS ERYTHEMATOSUS (SLE): ICD-10-CM

## 2024-11-18 LAB
BASOPHILS # BLD AUTO: 0.06 10*3/MM3 (ref 0–0.2)
BASOPHILS NFR BLD AUTO: 0.6 % (ref 0–1.5)
DEPRECATED RDW RBC AUTO: 53 FL (ref 37–54)
EOSINOPHIL # BLD AUTO: 0.04 10*3/MM3 (ref 0–0.4)
EOSINOPHIL NFR BLD AUTO: 0.4 % (ref 0.3–6.2)
ERYTHROCYTE [DISTWIDTH] IN BLOOD BY AUTOMATED COUNT: 13.8 % (ref 12.3–15.4)
HCT VFR BLD AUTO: 40.6 % (ref 34–46.6)
HGB BLD-MCNC: 13.9 G/DL (ref 12–15.9)
HGB RETIC QN AUTO: 37.4 PG (ref 29.8–36.1)
IMM GRANULOCYTES # BLD AUTO: 0.03 10*3/MM3 (ref 0–0.05)
IMM GRANULOCYTES NFR BLD AUTO: 0.3 % (ref 0–0.5)
IMM RETICS NFR: 12.8 % (ref 3–15.8)
LDH SERPL-CCNC: 201 U/L (ref 135–214)
LYMPHOCYTES # BLD AUTO: 0.62 10*3/MM3 (ref 0.7–3.1)
LYMPHOCYTES NFR BLD AUTO: 6.2 % (ref 19.6–45.3)
MCH RBC QN AUTO: 35.4 PG (ref 26.6–33)
MCHC RBC AUTO-ENTMCNC: 34.2 G/DL (ref 31.5–35.7)
MCV RBC AUTO: 103.3 FL (ref 79–97)
MONOCYTES # BLD AUTO: 1.59 10*3/MM3 (ref 0.1–0.9)
MONOCYTES NFR BLD AUTO: 15.9 % (ref 5–12)
NEUTROPHILS NFR BLD AUTO: 7.68 10*3/MM3 (ref 1.7–7)
NEUTROPHILS NFR BLD AUTO: 76.6 % (ref 42.7–76)
NRBC BLD AUTO-RTO: 0 /100 WBC (ref 0–0.2)
PLATELET # BLD AUTO: 315 10*3/MM3 (ref 140–450)
PLATELETS.RETICULATED NFR BLD AUTO: 3.9 % (ref 0.9–6.5)
PMV BLD AUTO: 9.3 FL (ref 6–12)
RBC # BLD AUTO: 3.93 10*6/MM3 (ref 3.77–5.28)
RETICS # AUTO: 0.08 10*6/MM3 (ref 0.02–0.13)
RETICS/RBC NFR AUTO: 1.98 % (ref 0.7–1.9)
WBC NRBC COR # BLD AUTO: 10.02 10*3/MM3 (ref 3.4–10.8)

## 2024-11-18 PROCEDURE — 85025 COMPLETE CBC W/AUTO DIFF WBC: CPT

## 2024-11-18 PROCEDURE — 85046 RETICYTE/HGB CONCENTRATE: CPT

## 2024-11-18 PROCEDURE — 85055 RETICULATED PLATELET ASSAY: CPT

## 2024-11-18 PROCEDURE — 83615 LACTATE (LD) (LDH) ENZYME: CPT | Performed by: INTERNAL MEDICINE

## 2024-11-18 PROCEDURE — 36415 COLL VENOUS BLD VENIPUNCTURE: CPT

## 2024-11-18 PROCEDURE — 99213 OFFICE O/P EST LOW 20 MIN: CPT | Performed by: INTERNAL MEDICINE

## 2024-11-18 NOTE — LETTER
November 18, 2024     Gregorio Bai MD  [address]  [facility] [address]    Patient: Sarita Bai   YOB: 1980   Date of Visit: 11/18/2024     Dear Gregorio Bai MD:       Thank you for referring Sarita Bai to me for evaluation. Below are the relevant portions of my assessment and plan of care.    If you have questions, please do not hesitate to call me. I look forward to following Sarita along with you.         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  She has been seen periodically in our office last in 2011 receiving a repeat pneumococcal vaccination the previously having begin 2011.  The patient is followed by rheumatology regularly for her lupus having been treated with Plaquenil but evidently having stopped it over the last year.     This patient was admitted June 29 through July 13 having developed an apparent illness ultimately associated with fever and confusion.  She presented to [facility] with hyperglycemia, metabolic acidosis and no previous history of diabetes.  Her condition rapidly deteriorated with development of pneumonia with ARDS requiring intubation and mechanical ventilation, acute kidney injury requiring hemodialysis and further thrombocytopenia leading to a reconsultation.  She was seen by Dr. Amato with exam consistent with Raynaud's phenomenon in her hands, laboratory studies with positive Lakisha testing, elevated LDH, bilirubin, and increasing nucleated RBCs in the peripheral circulation.  He  felt that she likely had an autoimmune process and agreed with IV steroids.  She continued intensive care, IV steroids, and, fortunate, went on to slowly improve.  This included rotation pronation a mechanical ventilation the ICU.  The patient was seen by multiple services including ID, nephrology, hematology, vascular and endocrinology as well as an intensive care physicians.  Upon discharge she proceeded to rehabilitation for a period of time and, wonderfully, has made gradual progress.  She was recently seen by Dr. Butler of rheumatology and is on a steroid taper,?  Restart of Plaquenil in the near future.  She is also to see endocrinology for her ketoacidosis and apparent glucose intolerance/diabetes.     Additional recent history includes her continuance on hemodialysis and recent removal of her Shiley catheter after which she developed an internal jugular thrombosis and was treated in [facility] facilities including institution of anticoagulation.  This is evidently just over the last several days to be seen in Epic everywhere notes.  She was seen by hematology as well during that visit and given intravenous iron as well as transfused.  She is now seen back by our practice for continued follow-up.  We have reviewed her recent and previous history over approximately 60 minutes today.    We elected to have close follow-up with repeat laboratory studies done as the patient went on to recover from her recent infectious episode.  Repeat laboratory studies were performed September 13 showing normal liver and kidney function, repeat iron of 12, iron saturation of 5, ferritin down to 590 from 1036, TIBC of 225.  The patient has been seen by rheumatology is now back on Plaquenil and records describe her current hospitalization August 24 through the 25th wherein she presented with chest discomfort, fever and shortness of breath.  CT scan revealed no evidence of pulmonary embolism, lower extremities reveal DVT which  were not new there being a previous DVT left internal jugular location RD known.  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  As elected to continue steroids which had been temporarily increased and thereafter proceed with taper.  The patient now presents back to our office September 20, 2017 considerably improved we've discussed her status which actually includes further evidence of iron deficiency and need for additional IV iron.  The patient was treated with IV iron initially seen back December 13, 2017 she is improved considerably with normalized performance status.  Repeat iron studies do not show evidence of residual iron deficiency and she remains on current anticoagulation as well as therapy with rheumatology with tapering steroids and daily Plaquenil as well as use of Amlodipine for her Raynaud's.  We have discussed reassessing her DVTs at 6 months from development which would be February 2018.   The patient is next seen February 09, 2018.  She is doing extremely well with control of her lupus.  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  She's now had 6 months of anticoagulation and will go and discontinue her Eliquis.    The patient is next seen August 17.  She is doing well overall without additional rheumatologic symptoms.  She is starting a new job and quite happy as to how she feels and with a new position.  The patient is next seen August 16, 2019, and fortunately, doing well physically.  She now works in the [employer] department feels this is going well for her.  Hematologically and rheumatological her symptoms have not recurred.  We had a return to every 6 months reassessing in January with  a normal CBC, IPF 4.2, platelet count 254,000.     The patient is seen formally September 9 continue to do well.  She still works at [employer] and describes a very high rate of COVID 19 if [facility] but much lower incidence rates here in [place].  She has not had consistent exposures herself nor symptoms.  She now wears an insulin pump successfully and feels reasonably well overall with lack of arthralgias or myalgias    She is next seen 9/9/2021 continuing to do very well on current medications particularly with the additional use of nifedipine and sildenafil as concerns her Raynaud's symptoms.  Fortunately she is also up-to-date per COVID-19 vaccinations and, incidentally, would be a candidate for a third vaccination as it becomes available.     The patient is next seen 9/21/2022 indicating that she has been doing extremely well without any worsening of her symptoms.  We find hematologically she is also been stable without recurrence of ITP.    The patient patient returned for testing in March 2023 with IPF 6.6, normal CBC with platelet count 279,000.  She is seen by endocrinology 7/3/2023 having been started on insulin pump with Dexcom sensor March 2020 and clearly improving with plans to continue her insulin pump settings, levothyroxine and follow-up with rheumatology planned.    Rheumatology assessment 5/11/2023 found the patient still on Plaquenil, no additional symptoms with control of joint discomfort, no digital ulcerations, recent studies from May 2023 with normal renal function, hepatic function, complements, sed rate, CRP and urinalysis, negative double-stranded DNA and plans to continue sildenafil, nifedipine and Plaquenil.-6-month follow-up.    Patient is next seen back in CBC office 9/14/2023.  She has been doing well except for development after recent sudden vacation in the [facility] slow worsening and a rash involving her forearms.  While she has consistently had dermatitis involving  her hands she now has a slightly different erythematous splotchy rash involving her anterior forearms bilaterally.  This is nontender, nonblanching.    Follow-up assessment yearly was requested and records are reviewed through endocrinology.and rheumatology the patient seen 6/13/2024.  At that point she was doing well on CellCept, pain 0-10, CellCept twice daily and Plaquenil.  Milligrams daily.  Persistently no infection, mild elevation of transaminases, myositis panel negative except for positive U2 RNP antibody.  Plans were made to continue her medications and reevaluate at a subsequent point.    She is seen in office 11/18/2024 fortunately feeling quite well and review of her rash certainly is much improved compared to previous (see enclosed photos from last year).  She is not having current issues with joint pain or additional rash at this point.  Hematologic studies also appear stable with reticulocyte of 1.98%, IPF of 3.9, CBC with H&H of 13.9 and 40.6 white count 10,020 and platelet count 3 15,000 normal differential.    Past Medical History:   Diagnosis Date   • ARF (acute renal failure) with tubular necrosis 07/05/2017   • Diabetes mellitus    • H/O intestinal malabsorption    • H/O Venous thrombosis     Acute embolism and thrombosis of left internal jugular vein    • History of anemia    • History of blood transfusion    • History of ITP    • Lupus     SLE   • PNA (pneumonia) 06/29/2017   • Renal disorder    • Thyroid activity decreased    • Type 1 diabetes mellitus without complication 08/10/2017       ONCOLOGIC HISTORY:  (History from previous dates can be found in the separate document.)    Current Outpatient Medications on File Prior to Visit   Medication Sig Dispense Refill   • Acetaminophen 325 MG capsule Take  by mouth As Needed.     • CellCept 500 MG tablet Take 2 tabs po bid with food *then call the office for refill     • Continuous Glucose Sensor (Dexcom G6 Sensor) Apply  topically Every 10  (Ten) Days. Indications: Insulin-Dependent Diabetes 9 each 3   • Continuous Glucose Transmitter (Dexcom G6 Transmitter) misc 1 each by Other route Every 3 (Three) Months. Indications: Insulin-Dependent Diabetes 1 each 3   • folic acid (FOLVITE) 1 MG tablet Take 1 tablet by mouth Daily.     • Hydroxychloroquine Sulfate 100 MG tablet Take 150 mg by mouth Daily.     • Insulin Lispro (HumaLOG) 100 UNIT/ML injection May use up to 200 units every 3 days on insulin pump.  Indications: Insulin-Dependent Diabetes 60 mL 2   • levothyroxine (SYNTHROID, LEVOTHROID) 75 MCG tablet TAKE 1 TABLET BY MOUTH EVERY MORNING. INDICATIONS: UNDERACTIVE THYROID 90 tablet 2   • Multiple Vitamins-Minerals (MULTI COMPLETE PO) Take 1 tablet/day by mouth.     • NIFEdipine CC (ADALAT CC) 60 MG 24 hr tablet Take 1 tablet by mouth Daily.     • sildenafil (REVATIO) 20 MG tablet Take 1 tablet by mouth.     • clobetasol (TEMOVATE) 0.05 % ointment APPLY 1 APPLICATION TO AFFECTED AREA EXTERNALLY TWICE A DAY FOR 30 DAYS     • hydrOXYzine (ATARAX) 10 MG tablet Take 1 tablet by mouth Every 8 (Eight) Hours As Needed for Itching. 30 tablet 1     No current facility-administered medications on file prior to visit.       ALLERGIES:   No Known Allergies    Social History     Socioeconomic History   • Marital status: Single   • Number of children: 0   • Years of education: college   Tobacco Use   • Smoking status: Never   • Smokeless tobacco: Never   Substance and Sexual Activity   • Alcohol use: Yes     Alcohol/week: 3.0 standard drinks of alcohol     Types: 2 Glasses of wine, 1 Cans of beer per week     Comment: SOCIAL DRINKER   • Drug use: No   • Sexual activity: Never         Cancer-related family history includes Prostate cancer (age of onset: 71) in her father.     Review of Systems   Constitutional:  Negative for fatigue.   Respiratory:  Negative for chest tightness, shortness of breath and wheezing.    Gastrointestinal:  Negative for abdominal pain,  "constipation, diarrhea, nausea and vomiting.   Musculoskeletal:  Negative for arthralgias.   Skin:  Positive for rash (See history of present illness).   Neurological:  Negative for weakness.   Objective     Vitals:    11/18/24 0800   Pulse: 91   Resp: 16   Temp: 98.4 °F (36.9 °C)   TempSrc: Oral   SpO2: 100%   Weight: 73.4 kg (161 lb 12.8 oz)   Height: 170.2 cm (67\")   PainSc: 0-No pain           11/18/2024     8:01 AM   Current Status   ECOG score 0       Physical Exam   Constitutional: She is oriented to person, place, and time.   HENT:   Head: Normocephalic and atraumatic.   Eyes: Pupils are equal, round, and reactive to light. Conjunctivae are normal.   Cardiovascular: Normal rate, regular rhythm and normal heart sounds.   Pulmonary/Chest: Effort normal and breath sounds normal.   Abdominal: Soft. Bowel sounds are normal.   Musculoskeletal: Normal range of motion.      Comments: Moderate swelling and erythema involving all the patient's upper extremity digits particular distal portions   Neurological: She is alert and oriented to person, place, and time.   Skin: Skin is warm and dry.   Patient with erythematous, induration involving her fingers unchanged but additionally splotchy erythematous nonblanching nontender rash involving anterior forearms and anterior tibial regions.  Photos taken and in the record   Psychiatric: Her behavior is normal.         RECENT LABS:  Hematology WBC   Date Value Ref Range Status   11/18/2024 10.02 3.40 - 10.80 10*3/mm3 Final   08/08/2024 7.5 3.4 - 10.8 x10E3/uL Final     RBC   Date Value Ref Range Status   11/18/2024 3.93 3.77 - 5.28 10*6/mm3 Final   08/08/2024 3.94 3.77 - 5.28 x10E6/uL Final     Hemoglobin   Date Value Ref Range Status   11/18/2024 13.9 12.0 - 15.9 g/dL Final     Hematocrit   Date Value Ref Range Status   11/18/2024 40.6 34.0 - 46.6 % Final     Platelets   Date Value Ref Range Status   11/18/2024 315 140 - 450 10*3/mm3 Final        Assessment & Plan           " 44-year-old female with a 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  She had been treated with steroids for ITP initially but when on to have a splenectomy in 2002 and remained relatively stable.  She was last seen in 2011 in remission.     The patient, more recently, had a difficult and complicated hospitalization admitted June 29 through July 13 with community-acquired pneumonia, sepsis, acute respiratory failure with ARDS, acute renal failure as well as additional studies including thrombocytopenia (multifactorial) and additional laboratory studies with RNP antibodies of greater than 8.0, anti-chromatin antibodies greater than 8.0 which, coupled with her additional findings per peripheral blood smear could be consistent with an exacerbation of her SLE.  She additionally had an episode of thrombosis related to her vascular catheter used for dialysis for which she is currently on Eliquis.  She also required additional transfusion and IV iron therapy.     The patient has not been seen by this physician for some time so we reviewed her history over approximately an hour today including her findings in hospital, her presentation and many complications thereafter.  She is uncertain whether she had an exacerbation of her SLE after discussion with rheumatology and currently remains on a steroid taper.  She is to see rheumatology in the next month or so as well for follow-up.  Hematologically, fortunately, she is stable at this point and will plan close follow-up in the office both for worsening cytopenias including thrombocytopenia and/or the need for further IV iron support.     The patient's studies went on to reveal evidence of mild iron deficiency though continued anemia and follow-up was anticipated for possible additional IV iron.  She had intercurrent hospitalization for complications due to her lupus now including bilateral pleural  effusions-small-and pleuropericarditis.  This was addressed with additional steroids that were instituted with plans for further taper.  The patient had assessment September 13 showing evidence of further iron deficiency and as she seen back in office September 20 we plan to proceed with IV iron both this week and next.  She is to continue her steroid taper hereafter and continue Eliquis twice a day at this point.  We'll plan at least 3-6 months of anticoagulation with control of her lupus for she is taken off of anticoagulation after reassessment via Doppler.  The patient was treated with IV iron successfully and is next seen back December 13, 2017.  Her lupus symptoms have approximately resolved at this point.  We discussed reassessment with Doppler examination 6 months from her hospitalization which were performed February 02, 2018.  The remains superficial phlebitis in the right upper extremity that is chronic as well as chronic left upper extremity DVT and internal jugular and subclavian vein.  She has had 6 months anticoagulation at this point and her lupus is under excellent control.  She has no additional IV lines will plan to discontinue her Eliquis.  We will see her in 6 months for follow-up.    The patient is next seen August 17, 2018 doing very well.  We have discussed long-term follow-up which will include an every 6 months CBC IPF and M.D. in 1 year.  Options of these records will go to her new rheumatologist, primary care and endocrinology.  The patient is next seen August 16, 2019 again doing well clinically.  She has a mild macrocytosis that anemia we will recheck reticulocyte count today.  We went on to have her tested to 6 months and fortunately issues reviewed September 9, 2020 she is stable.  Fortunately when seen back 9/9/2021 this continues to be the case with the patient not having evidence of exacerbation of any cytopenias including ITP.  The patient return for follow-up at 6-month intervals  and when seen 9/21/2022 remains entirely stable.    Patient seen 9/14/2023 with dermatitis worsening involving her anterior forearms and anterior tibial regions.  This follows a vacation where she did have some exposure and, in part, is thought to be photodermatitis.  She has follow-up appointments with dermatology 9/15/2023 and is in contact with the rheumatology office.  Hematologically she is stable without recurrence of her ITP.    She is seen in office 11/18/2024 fortunately feeling quite well and review of her rash certainly is much improved compared to previous (see enclosed photos from last year).  She is not having current issues with joint pain or additional rash at this point.  Hematologic studies also appear stable with reticulocyte of 1.98%, IPF of 3.9, CBC with H&H of 13.9 and 40.6 white count 10,020 and platelet count 3 15,000 normal differential.     plan:  *MD assessment in 12 months with the same repeat laboratory studies  *She will notify us for any additional issues.

## 2024-11-26 ENCOUNTER — TRANSCRIBE ORDERS (OUTPATIENT)
Dept: ADMINISTRATIVE | Facility: HOSPITAL | Age: 44
End: 2024-11-26
Payer: COMMERCIAL

## 2024-11-26 DIAGNOSIS — Z12.89 ENCOUNTER FOR SCREENING FOR MALIGNANT NEOPLASM OF OTHER SITES: ICD-10-CM

## 2024-11-26 DIAGNOSIS — M33.10 OTHER DERMATOMYOSITIS, ORGAN INVOLVEMENT UNSPECIFIED: Primary | ICD-10-CM

## 2024-12-18 ENCOUNTER — TELEPHONE (OUTPATIENT)
Dept: ENDOCRINOLOGY | Age: 44
End: 2024-12-18

## 2024-12-18 DIAGNOSIS — E03.9 PRIMARY HYPOTHYROIDISM: Primary | ICD-10-CM

## 2024-12-18 DIAGNOSIS — E10.9 TYPE 1 DIABETES MELLITUS WITHOUT COMPLICATION: ICD-10-CM

## 2024-12-18 NOTE — PROGRESS NOTES
Pt here for RN review. Pt reports feeling well and voices no concerns. No significant changes in counts or IPF. Offered copy of labs and reviewed schedule. Pt understands to call office with questions or concerns.    Lab Results   Component Value Date    WBC 9.86 02/24/2022    HGB 14.4 02/24/2022    HCT 41.5 02/24/2022    MCV 99.3 (H) 02/24/2022     02/24/2022        18-Dec-2024 20:58

## 2024-12-19 ENCOUNTER — HOSPITAL ENCOUNTER (OUTPATIENT)
Facility: HOSPITAL | Age: 44
Discharge: HOME | End: 2024-12-19
Admitting: DERMATOLOGY
Payer: COMMERCIAL

## 2024-12-19 DIAGNOSIS — M33.10 OTHER DERMATOMYOSITIS, ORGAN INVOLVEMENT UNSPECIFIED: ICD-10-CM

## 2024-12-19 DIAGNOSIS — Z12.89 ENCOUNTER FOR SCREENING FOR MALIGNANT NEOPLASM OF OTHER SITES: ICD-10-CM

## 2024-12-19 PROCEDURE — 74177 CT ABD & PELVIS W/CONTRAST: CPT

## 2024-12-19 PROCEDURE — 25510000001 IOPAMIDOL 61 % SOLUTION: Performed by: DERMATOLOGY

## 2024-12-19 PROCEDURE — 71260 CT THORAX DX C+: CPT

## 2024-12-19 RX ORDER — IOPAMIDOL 612 MG/ML
100 INJECTION, SOLUTION INTRAVASCULAR
Status: COMPLETED | OUTPATIENT
Start: 2024-12-19 | End: 2024-12-19

## 2024-12-19 RX ADMIN — IOPAMIDOL 85 ML: 612 INJECTION, SOLUTION INTRAVENOUS at 15:56

## 2024-12-23 DIAGNOSIS — E10.9 TYPE 1 DIABETES MELLITUS WITHOUT COMPLICATION: ICD-10-CM

## 2024-12-23 DIAGNOSIS — E03.9 PRIMARY HYPOTHYROIDISM: ICD-10-CM

## 2024-12-26 LAB
ALBUMIN SERPL-MCNC: 3.9 G/DL (ref 3.9–4.9)
ALBUMIN/CREAT UR: 13 MG/G CREAT (ref 0–29)
ALP SERPL-CCNC: 90 IU/L (ref 44–121)
ALT SERPL-CCNC: 18 IU/L (ref 0–32)
AST SERPL-CCNC: 23 IU/L (ref 0–40)
BILIRUB SERPL-MCNC: <0.2 MG/DL (ref 0–1.2)
BUN SERPL-MCNC: 4 MG/DL (ref 6–24)
BUN/CREAT SERPL: 5 (ref 9–23)
CALCIUM SERPL-MCNC: 9 MG/DL (ref 8.7–10.2)
CHLORIDE SERPL-SCNC: 99 MMOL/L (ref 96–106)
CHOLEST SERPL-MCNC: 139 MG/DL (ref 100–199)
CO2 SERPL-SCNC: 21 MMOL/L (ref 20–29)
CREAT SERPL-MCNC: 0.77 MG/DL (ref 0.57–1)
CREAT UR-MCNC: 74 MG/DL
EGFRCR SERPLBLD CKD-EPI 2021: 96 ML/MIN/1.73
ENDOMYSIUM IGA SER QL: NEGATIVE
GLOBULIN SER CALC-MCNC: 3.2 G/DL (ref 1.5–4.5)
GLUCOSE SERPL-MCNC: 153 MG/DL (ref 70–99)
HBA1C MFR BLD: 7.1 % (ref 4.8–5.6)
HDLC SERPL-MCNC: 70 MG/DL
IGA SERPL-MCNC: 367 MG/DL (ref 87–352)
IMP & REVIEW OF LAB RESULTS: NORMAL
LDLC SERPL CALC-MCNC: 55 MG/DL (ref 0–99)
MICROALBUMIN UR-MCNC: 9.4 UG/ML
POTASSIUM SERPL-SCNC: 4.4 MMOL/L (ref 3.5–5.2)
PROT SERPL-MCNC: 7.1 G/DL (ref 6–8.5)
SODIUM SERPL-SCNC: 134 MMOL/L (ref 134–144)
TRIGL SERPL-MCNC: 68 MG/DL (ref 0–149)
TSH SERPL DL<=0.005 MIU/L-ACNC: 0.62 UIU/ML (ref 0.45–4.5)
TTG IGA SER-ACNC: <2 U/ML (ref 0–3)
VLDLC SERPL CALC-MCNC: 14 MG/DL (ref 5–40)

## 2024-12-30 ENCOUNTER — OFFICE VISIT (OUTPATIENT)
Dept: ENDOCRINOLOGY | Age: 44
End: 2024-12-30
Payer: COMMERCIAL

## 2024-12-30 VITALS
DIASTOLIC BLOOD PRESSURE: 76 MMHG | BODY MASS INDEX: 26.21 KG/M2 | HEART RATE: 64 BPM | OXYGEN SATURATION: 99 % | WEIGHT: 167 LBS | HEIGHT: 67 IN | SYSTOLIC BLOOD PRESSURE: 122 MMHG

## 2024-12-30 DIAGNOSIS — E10.9 TYPE 1 DIABETES MELLITUS WITHOUT COMPLICATION: Primary | ICD-10-CM

## 2024-12-30 DIAGNOSIS — Z96.41 INSULIN PUMP STATUS: ICD-10-CM

## 2024-12-30 DIAGNOSIS — E03.9 PRIMARY HYPOTHYROIDISM: ICD-10-CM

## 2024-12-30 DIAGNOSIS — I73.00 RAYNAUD'S PHENOMENON WITHOUT GANGRENE: ICD-10-CM

## 2024-12-30 DIAGNOSIS — M32.9 SLE (SYSTEMIC LUPUS ERYTHEMATOSUS RELATED SYNDROME): ICD-10-CM

## 2024-12-30 RX ORDER — TADALAFIL 10 MG/1
10 TABLET ORAL DAILY PRN
COMMUNITY
Start: 2024-12-18

## 2024-12-30 NOTE — PROGRESS NOTES
Subjective   Sarita Bai is a 44 y.o. female.     History of Present Illness     She was diagnosed to have type 1 diabetes mellitus in  when she was admitted from [facility].  REGINA antibody was markedly elevated.  C-peptide was 1.8.  She has gained 7 pounds since 2024.  Hemoglobin A1c done in 2024 is slightly higher at 7.1%.     She started on a T:slim Mobi insulin pump with a Dexcom 6 sensor in 2020.  She uses Humalog on the pump.  Pump settings are as follows:     Basal: 6 AM to 6 PM is 0.65 units/h.  6 PM to 6 AM is 0.6 units/h.                Bolus: 1 unit per 10 g of carbohydrate     Correction: 1 unit per 50 greater than 110.     Target: 110     Sensor data from  to 2024 reviewed.  Average glucose 135 mg per DL.  Time in range 85%.    Time CGM is in use 100%.  GMI 6.5%.  Control %.     Average daily glucose 24.91 units.  Basal 13.64 units.  Bolus 11.28 units.     Her last eye exam was  and she has no retinopathy. Urine microalbumin was normal in .  She denies paresthesias.     She has hypothyroidism and is on levothyroxine 75 µg per day.  TSH done in 2024 is normal at 0.623.     She has SLE and Raynauld's phenomenon.  She follows with Dr. Shane.  She is on Plaquenil, tadalafil, CellCept and nifedipine 60 mg daily.  She was started on methotrexate in 2023 and it was discontinued in 2024 when her liver enzymes went up.  She denies arthralgia.      She has pruritic rash since .  She was seen Marisela Rivera NP and skin biopsy showed histologic changes which can be seen in connective tissue disease such as lupus erythematosus as well as dermatomyositis.  She had partial response with a short course of prednisone might make her blood sugar go higher.  She follows with Dr. Aurelia Hudson.     She is  0.  She had regular menstrual periods.  She is not on birth control pills.     She is an  who works for [employer]  [employer].    The following portions of the patient's history were reviewed and updated as appropriate: allergies, current medications, past family history, past medical history, past social history, past surgical history, and problem list.    Review of Systems   Eyes:  Negative for visual disturbance.   Respiratory:  Negative for shortness of breath and wheezing.    Cardiovascular:  Negative for chest pain and palpitations.   Gastrointestinal: Negative.    Endocrine: Negative for polydipsia and polyuria.   Genitourinary: Negative.    Musculoskeletal:  Negative for myalgias.   Neurological:  Negative for numbness.     Vitals:    12/30/24 0749   BP: 122/76   BP Location: Left arm   Patient Position: Sitting   Pulse: 64   SpO2: 99%   Weight: 75.8 kg (167 lb)   Height: 170.2 cm (67.01\")      Objective   Physical Exam  Constitutional:       Appearance: Normal appearance. She is not toxic-appearing or diaphoretic.   Eyes:      General: No scleral icterus.        Right eye: No discharge.         Left eye: No discharge.      Extraocular Movements: Extraocular movements intact.   Neck:      Vascular: No carotid bruit.   Cardiovascular:      Rate and Rhythm: Normal rate and regular rhythm.      Heart sounds: Normal heart sounds.   Pulmonary:      Breath sounds: Normal breath sounds. No rales.   Chest:      Chest wall: No tenderness.   Abdominal:      Palpations: Abdomen is soft.      Tenderness: There is no right CVA tenderness or left CVA tenderness.   Musculoskeletal:      Right lower leg: No edema.      Left lower leg: No edema.   Lymphadenopathy:      Cervical: No cervical adenopathy.   Neurological:      Mental Status: She is alert and oriented to person, place, and time.       Orders Only on 12/23/2024   Component Date Value Ref Range Status    Glucose 12/23/2024 153 (H)  70 - 99 mg/dL Final    BUN 12/23/2024 4 (L)  6 - 24 mg/dL Final    Creatinine 12/23/2024 0.77  0.57 - 1.00 mg/dL Final    EGFR Result 12/23/2024 96  >59 " mL/min/1.73 Final    BUN/Creatinine Ratio 12/23/2024 5 (L)  9 - 23 Final    Sodium 12/23/2024 134  134 - 144 mmol/L Final    Potassium 12/23/2024 4.4  3.5 - 5.2 mmol/L Final    Chloride 12/23/2024 99  96 - 106 mmol/L Final    Total CO2 12/23/2024 21  20 - 29 mmol/L Final    Calcium 12/23/2024 9.0  8.7 - 10.2 mg/dL Final    Total Protein 12/23/2024 7.1  6.0 - 8.5 g/dL Final    Albumin 12/23/2024 3.9  3.9 - 4.9 g/dL Final    Globulin 12/23/2024 3.2  1.5 - 4.5 g/dL Final    Total Bilirubin 12/23/2024 <0.2  0.0 - 1.2 mg/dL Final    Alkaline Phosphatase 12/23/2024 90  44 - 121 IU/L Final    AST (SGOT) 12/23/2024 23  0 - 40 IU/L Final    ALT (SGPT) 12/23/2024 18  0 - 32 IU/L Final    Hemoglobin A1C 12/23/2024 7.1 (H)  4.8 - 5.6 % Final    Comment:          Prediabetes: 5.7 - 6.4           Diabetes: >6.4           Glycemic control for adults with diabetes: <7.0      Creatinine, Urine 12/23/2024 74.0  Not Estab. mg/dL Final    Microalbumin, Urine 12/23/2024 9.4  Not Estab. ug/mL Final    Microalbumin/Creatinine Ratio 12/23/2024 13  0 - 29 mg/g creat Final    Comment:                        Normal:                0 -  29                         Moderately increased: 30 - 300                         Severely increased:       >300      TSH 12/23/2024 0.623  0.450 - 4.500 uIU/mL Final    Total Cholesterol 12/23/2024 139  100 - 199 mg/dL Final    Triglycerides 12/23/2024 68  0 - 149 mg/dL Final    HDL Cholesterol 12/23/2024 70  >39 mg/dL Final    VLDL Cholesterol Serafin 12/23/2024 14  5 - 40 mg/dL Final    LDL Chol Calc (NIH) 12/23/2024 55  0 - 99 mg/dL Final    Endomysial IgA 12/23/2024 Negative  Negative Final    Tissue Transglutaminase IgA 12/23/2024 <2  0 - 3 U/mL Final    Comment:                               Negative        0 -  3                                Weak Positive   4 - 10                                Positive           >10   Tissue Transglutaminase (tTG) has been identified   as the endomysial antigen.  Studies  have demonstr-   ated that endomysial IgA antibodies have over 99%   specificity for gluten sensitive enteropathy.      IgA 12/23/2024 367 (H)  87 - 352 mg/dL Final    Interpretation 12/23/2024 Note   Final    Supplemental report is available.     Assessment & Plan   Diagnoses and all orders for this visit:    1. Type 1 diabetes mellitus without complication (Primary)  -     Comprehensive Metabolic Panel; Future  -     Hemoglobin A1c; Future  -     Lipid Panel; Future  -     TSH Rfx On Abnormal To Free T4; Future    2. Insulin pump status    3. Primary hypothyroidism  -     TSH Rfx On Abnormal To Free T4; Future    4. SLE (systemic lupus erythematosus related syndrome)    5. Raynaud's phenomenon without gangrene      Continue current insulin pump settings.    Continue levothyroxine 75 mcg a day.    Continue Plaquenil, tadalafil, CellCept, and nifedipine per Dr. Shane.    Copy of my note sent to Dr. Bai, Dr. Shane, Dr. Cedeno and Dr. Hudson.    RTC 6 mos.

## 2025-04-09 ENCOUNTER — PRIOR AUTHORIZATION (OUTPATIENT)
Dept: ENDOCRINOLOGY | Age: 45
End: 2025-04-09
Payer: COMMERCIAL

## 2025-04-09 NOTE — TELEPHONE ENCOUNTER
The member recently filled this medication and will be able to return for their next refill according to their plan limits.

## 2025-05-29 DIAGNOSIS — E03.9 PRIMARY HYPOTHYROIDISM: ICD-10-CM

## 2025-05-29 RX ORDER — LEVOTHYROXINE SODIUM 75 UG/1
75 TABLET ORAL EVERY MORNING
Qty: 90 TABLET | Refills: 2 | Status: SHIPPED | OUTPATIENT
Start: 2025-05-29

## 2025-05-29 NOTE — TELEPHONE ENCOUNTER
Rx Refill Note  Requested Prescriptions     Pending Prescriptions Disp Refills    levothyroxine (SYNTHROID, LEVOTHROID) 75 MCG tablet [Pharmacy Med Name: LEVOTHYROXINE 75 MCG TABLET] 90 tablet 2     Sig: TAKE 1 TABLET BY MOUTH EVERY MORNING. INDICATIONS: UNDERACTIVE THYROID      Last office visit with prescribing clinician: 12/30/2024   Last telemedicine visit with prescribing clinician: Visit date not found   Next office visit with prescribing clinician: 6/30/2025                         Would you like a call back once the refill request has been completed: [] Yes [] No    If the office needs to give you a call back, can they leave a voicemail: [] Yes [] No  Sarita Mukherjee MA  5/29/2025  07:45 EDT

## 2025-06-23 ENCOUNTER — LAB (OUTPATIENT)
Dept: ENDOCRINOLOGY | Age: 45
End: 2025-06-23
Payer: COMMERCIAL

## 2025-06-23 DIAGNOSIS — E03.9 PRIMARY HYPOTHYROIDISM: ICD-10-CM

## 2025-06-23 DIAGNOSIS — E10.9 TYPE 1 DIABETES MELLITUS WITHOUT COMPLICATION: ICD-10-CM

## 2025-06-24 LAB
ALBUMIN SERPL-MCNC: 3.9 G/DL (ref 3.5–5.2)
ALBUMIN/GLOB SERPL: 1.1 G/DL
ALP SERPL-CCNC: 90 U/L (ref 39–117)
ALT SERPL-CCNC: 28 U/L (ref 1–33)
AST SERPL-CCNC: 40 U/L (ref 1–32)
BILIRUB SERPL-MCNC: <0.2 MG/DL (ref 0–1.2)
BUN SERPL-MCNC: 8 MG/DL (ref 6–20)
BUN/CREAT SERPL: 10.5 (ref 7–25)
CALCIUM SERPL-MCNC: 9.1 MG/DL (ref 8.6–10.5)
CHLORIDE SERPL-SCNC: 98 MMOL/L (ref 98–107)
CHOLEST SERPL-MCNC: 154 MG/DL (ref 0–200)
CO2 SERPL-SCNC: 22.1 MMOL/L (ref 22–29)
CREAT SERPL-MCNC: 0.76 MG/DL (ref 0.57–1)
EGFRCR SERPLBLD CKD-EPI 2021: 97.4 ML/MIN/1.73
GLOBULIN SER CALC-MCNC: 3.6 GM/DL
GLUCOSE SERPL-MCNC: 125 MG/DL (ref 65–99)
HBA1C MFR BLD: 6.7 % (ref 4.8–5.6)
HDLC SERPL-MCNC: 71 MG/DL (ref 40–60)
IMP & REVIEW OF LAB RESULTS: NORMAL
LDLC SERPL CALC-MCNC: 72 MG/DL (ref 0–100)
POTASSIUM SERPL-SCNC: 4.2 MMOL/L (ref 3.5–5.2)
PROT SERPL-MCNC: 7.5 G/DL (ref 6–8.5)
SODIUM SERPL-SCNC: 136 MMOL/L (ref 136–145)
TRIGL SERPL-MCNC: 49 MG/DL (ref 0–150)
TSH SERPL DL<=0.005 MIU/L-ACNC: 1.52 UIU/ML (ref 0.27–4.2)
VLDLC SERPL CALC-MCNC: 11 MG/DL (ref 5–40)

## 2025-06-30 ENCOUNTER — OFFICE VISIT (OUTPATIENT)
Dept: ENDOCRINOLOGY | Age: 45
End: 2025-06-30
Payer: COMMERCIAL

## 2025-06-30 VITALS
DIASTOLIC BLOOD PRESSURE: 76 MMHG | BODY MASS INDEX: 25.39 KG/M2 | HEART RATE: 96 BPM | HEIGHT: 67 IN | SYSTOLIC BLOOD PRESSURE: 124 MMHG | WEIGHT: 161.8 LBS | OXYGEN SATURATION: 98 %

## 2025-06-30 DIAGNOSIS — I73.00 RAYNAUD'S PHENOMENON WITHOUT GANGRENE: ICD-10-CM

## 2025-06-30 DIAGNOSIS — E10.9 TYPE 1 DIABETES MELLITUS WITHOUT COMPLICATION: Primary | ICD-10-CM

## 2025-06-30 DIAGNOSIS — Z46.81 INSULIN PUMP TITRATION: ICD-10-CM

## 2025-06-30 DIAGNOSIS — E03.9 PRIMARY HYPOTHYROIDISM: ICD-10-CM

## 2025-06-30 DIAGNOSIS — M32.9 SLE (SYSTEMIC LUPUS ERYTHEMATOSUS RELATED SYNDROME): ICD-10-CM

## 2025-06-30 NOTE — PROGRESS NOTES
Subjective   Sarita Bai is a 45 y.o. female.     History of Present Illness     She was diagnosed to have type 1 diabetes mellitus in 2017 when she was admitted from [facility].  REGINA antibody was markedly elevated.  C-peptide was 1.8.  She has gained 7 pounds since August 2024.  Hemoglobin A1c done in June 2025 is 6.7%.     She started on a T:slim Mobi insulin pump with a Dexcom 6 sensor in August 2024.  She uses Humalog on the pump.  Pump settings are as follows:     Basal: 6 AM to 6 PM is 0.65 units/h.  6 PM to 6 AM is 0.6 units/h.                Bolus: 1 unit per 10 g of carbohydrate     Correction: 1 unit per 50 greater than 110.     Target: 110     Sensor data from June 17, 2025 through June 30, 2025 reviewed.  Average reading 137 mg per DL.  Time in range 86%.  Time above range 13%.  Time below range 0.8%.  Time CGM in use 100%.  Standard deviation 34 mg per DL.  GMI 6.6%.    Control IQ off 0%  Average daily dose 23.52 units.  Basal 13.37 units.  Bolus 10.15 units.  Basal rate: 12 AM to 6 AM is 0.6 units/h.  6 AM to 6 PM is 0.65 units/h.  6 PM to 12 AM is 0.6 units/h.    Carbohydrate ratio: 1 unit per 10 g of carbohydrate.  Correction factor 50.  Target 110    She has no early morning hypoglycemia.  She has intermittent postprandial hyperglycemia mostly after supper on weekends.     Her last eye exam was 12/24 and she has no retinopathy. Urine microalbumin was normal in 12/23.  She denies paresthesias.     She has hypothyroidism and is on levothyroxine 75 µg per day.  TSH done in June 2025 was normal at 1.52.     She has SLE and Raynauld's phenomenon.  She follows with Dr. Shane.  She is on Plaquenil, tadalafil, CellCept and nifedipine 60 mg daily.  She was started on methotrexate in November 2023 and it was discontinued in March 2024 when her liver enzymes went up.  She denies arthralgia.      She has pruritic rash since 7/23.  She was seen Marisela Rivera NP and skin biopsy showed histologic changes which can be  "seen in connective tissue disease such as lupus erythematosus as well as dermatomyositis.  She had partial response with a short course of prednisone might make her blood sugar go higher.  She follows with Dr. Aurelia Hudson.     She is  0.  She has regular menstrual periods.  She is not on birth control pills.     She is an  who works for [employer].    The following portions of the patient's history were reviewed and updated as appropriate: allergies, current medications, past family history, past medical history, past social history, past surgical history, and problem list.    Review of Systems   Respiratory:  Negative for shortness of breath and wheezing.    Cardiovascular:  Negative for chest pain and palpitations.   Gastrointestinal: Negative.    Genitourinary: Negative.    Musculoskeletal:  Negative for myalgias.   Neurological:  Negative for numbness.     Vitals:    25 0808   BP: 124/76   Pulse: 96   SpO2: 98%   Weight: 73.4 kg (161 lb 12.8 oz)   Height: 170.2 cm (67.01\")      Objective   Physical Exam  Constitutional:       Appearance: Normal appearance. She is not toxic-appearing or diaphoretic.   Eyes:      General: No scleral icterus.        Right eye: No discharge.         Left eye: No discharge.   Neck:      Vascular: No carotid bruit.   Cardiovascular:      Rate and Rhythm: Normal rate and regular rhythm.      Heart sounds: Normal heart sounds.   Pulmonary:      Breath sounds: Normal breath sounds. No rales.   Chest:      Chest wall: No tenderness.   Abdominal:      General: Bowel sounds are normal.      Palpations: Abdomen is soft.      Tenderness: There is no right CVA tenderness or left CVA tenderness.   Musculoskeletal:      Right lower leg: No edema.      Left lower leg: No edema.   Lymphadenopathy:      Cervical: No cervical adenopathy.   Neurological:      Mental Status: She is alert and oriented to person, place, and time.       Lab on 2025   Component Date " Value Ref Range Status    Glucose 06/23/2025 125 (H)  65 - 99 mg/dL Final    BUN 06/23/2025 8.0  6.0 - 20.0 mg/dL Final    Creatinine 06/23/2025 0.76  0.57 - 1.00 mg/dL Final    EGFR Result 06/23/2025 97.4  >60.0 mL/min/1.73 Final    Comment: GFR Categories in Chronic Kidney Disease (CKD)  GFR Category          GFR (mL/min/1.73)    Interpretation  G1                    90 or greater        Normal or high  (1)  G2                    60-89                Mild decrease  (1)  G3a                   45-59                Mild to moderate  decrease  G3b                   30-44                Moderate to  severe decrease  G4                    15-29                Severe decrease  G5                    14 or less           Kidney failure  (1)In the absence of evidence of kidney disease, neither  GFR category G1 or G2 fulfill the criteria for CKD.  eGFR calculation 2021 CKD-EPI creatinine equation, which  does not include race as a factor      BUN/Creatinine Ratio 06/23/2025 10.5  7.0 - 25.0 Final    Sodium 06/23/2025 136  136 - 145 mmol/L Final    Potassium 06/23/2025 4.2  3.5 - 5.2 mmol/L Final    Chloride 06/23/2025 98  98 - 107 mmol/L Final    Total CO2 06/23/2025 22.1  22.0 - 29.0 mmol/L Final    Calcium 06/23/2025 9.1  8.6 - 10.5 mg/dL Final    Total Protein 06/23/2025 7.5  6.0 - 8.5 g/dL Final    Albumin 06/23/2025 3.9  3.5 - 5.2 g/dL Final    Globulin 06/23/2025 3.6  gm/dL Final    A/G Ratio 06/23/2025 1.1  g/dL Final    Total Bilirubin 06/23/2025 <0.2  0.0 - 1.2 mg/dL Final    Alkaline Phosphatase 06/23/2025 90  39 - 117 U/L Final    AST (SGOT) 06/23/2025 40 (H)  1 - 32 U/L Final    ALT (SGPT) 06/23/2025 28  1 - 33 U/L Final    Hemoglobin A1C 06/23/2025 6.70 (H)  4.80 - 5.60 % Final    Comment: Hemoglobin A1C Ranges:  Increased Risk for Diabetes  5.7% to 6.4%  Diabetes                     >= 6.5%  Diabetic Goal                < 7.0%      Total Cholesterol 06/23/2025 154  0 - 200 mg/dL Final    Comment: Cholesterol  Reference Ranges  (U.S. Department of Health and Human Services ATP III  Classifications)  Desirable          <200 mg/dL  Borderline High    200-239 mg/dL  High Risk          >240 mg/dL  Triglyceride Reference Ranges  (U.S. Department of Health and Human Services ATP III  Classifications)  Normal           <150 mg/dL  Borderline High  150-199 mg/dL  High             200-499 mg/dL  Very High        >500 mg/dL  HDL Reference Ranges  (U.S. Department of Health and Human Services ATP III  Classifications)  Low     <40 mg/dl (major risk factor for CHD)  High    >60 mg/dl ('negative' risk factor for CHD)  LDL Reference Ranges  (U.S. Department of Health and Human Services ATP III  Classifications)  Optimal          <100 mg/dL  Near Optimal     100-129 mg/dL  Borderline High  130-159 mg/dL  High             160-189 mg/dL  Very High        >189 mg/dL  LDL is calculated using the NIH LDL-C calculation.      Triglycerides 06/23/2025 49  0 - 150 mg/dL Final    HDL Cholesterol 06/23/2025 71 (H)  40 - 60 mg/dL Final    VLDL Cholesterol Serafin 06/23/2025 11  5 - 40 mg/dL Final    LDL Chol Calc (NIH) 06/23/2025 72  0 - 100 mg/dL Final    TSH 06/23/2025 1.520  0.270 - 4.200 uIU/mL Final    Interpretation 06/23/2025 Note   Final    Supplemental report is available.     Assessment & Plan   Diagnoses and all orders for this visit:    1. Type 1 diabetes mellitus without complication (Primary)  -     Comprehensive Metabolic Panel; Future  -     Hemoglobin A1c; Future  -     TSH Rfx On Abnormal To Free T4; Future  -     Lipid Panel; Future    2. Insulin pump titration    3. Primary hypothyroidism  -     TSH Rfx On Abnormal To Free T4; Future    4. SLE (systemic lupus erythematosus related syndrome)    5. Raynaud's phenomenon without gangrene      Continue on current insulin pump settings.    Continue levothyroxine 75 mcg a day.    Continue Plaquenil, tadalafil, CellCept, and nifedipine.  Follow-up with Dr. Shane and Dr. Hudson.    Copy of my  note sent to Dr. Bai, Dr. Shane, and Dr. Hudson.    RTC 6 mos.

## 2025-07-12 DIAGNOSIS — E10.9 TYPE 1 DIABETES MELLITUS WITHOUT COMPLICATION: ICD-10-CM

## 2025-07-14 RX ORDER — INSULIN LISPRO 100 [IU]/ML
INJECTION, SOLUTION INTRAVENOUS; SUBCUTANEOUS
Qty: 60 ML | Refills: 0 | Status: SHIPPED | OUTPATIENT
Start: 2025-07-14

## 2025-07-14 NOTE — TELEPHONE ENCOUNTER
Rx Refill Note  Requested Prescriptions     Pending Prescriptions Disp Refills    Insulin Lispro (HumaLOG) 100 UNIT/ML injection 60 mL 2     Sig: May use up to 200 units every 3 days on insulin pump.  Indications: Type 1 Diabetes      Last office visit with prescribing clinician: 6/30/2025   Last telemedicine visit with prescribing clinician: Visit date not found   Next office visit with prescribing clinician: 12/30/2025                         Would you like a call back once the refill request has been completed: [] Yes [] No    If the office needs to give you a call back, can they leave a voicemail: [] Yes [] No  Sarita Mukherjee MA  7/14/2025  07:51 EDT

## (undated) DEVICE — BIOPATCH™ ANTIMICROBIAL DRESSING WITH CHLORHEXIDINE GLUCONATE IS A HYDROPHILLIC POLYURETHANE ABSORPTIVE FOAM WITH CHLORHEXIDINE GLUCONATE (CHG) WHICH INHIBITS BACTERIAL GROWTH UNDER THE DRESSING. THE DRESSING IS INTENDED TO BE USED TO ABSORB EXUDATE, COVER A WOUND CAUSED BY VASCULAR AND NONVASCULAR PERCUTANEOUS MEDICAL DEVICES DURING SURGERY, AS WELL AS REDUCE LOCAL INFECTION AND COLONIZATION OF MICROORGANISMS.: Brand: BIOPATCH

## (undated) DEVICE — Device

## (undated) DEVICE — DECANT BG O JET

## (undated) DEVICE — ADHS SKIN DERMABOND TOP ADVANCED

## (undated) DEVICE — ANTIBACTERIAL UNDYED BRAIDED (POLYGLACTIN 910), SYNTHETIC ABSORBABLE SUTURE: Brand: COATED VICRYL

## (undated) DEVICE — GOWN,NON-REINFORCED,SIRUS,SET IN SLV,XL: Brand: MEDLINE

## (undated) DEVICE — DRSNG SURESITE WNDW 4X4.5

## (undated) DEVICE — GLV SURG BIOGEL LTX PF 7

## (undated) DEVICE — ADHS SKIN DERMABOND

## (undated) DEVICE — CVR PROB 96IN LF STRL

## (undated) DEVICE — APPL CHLORAPREP W/TINT 10.5ML PERC STRL

## (undated) DEVICE — INTENDED FOR TISSUE SEPARATION, AND OTHER PROCEDURES THAT REQUIRE A SHARP SURGICAL BLADE TO PUNCTURE OR CUT.: Brand: BARD-PARKER ® CARBON RIB-BACK BLADES

## (undated) DEVICE — SYR LUERLOK 20CC

## (undated) DEVICE — SUT ETHLN 2/0 PS 18IN 585H

## (undated) DEVICE — NDL HYPO PRECISIONGLIDE REG 25G 1 1/2

## (undated) DEVICE — SYR LUERLOK 5CC

## (undated) DEVICE — LOU MINOR PROCEDURE: Brand: MEDLINE INDUSTRIES, INC.